# Patient Record
Sex: MALE | Race: WHITE | Employment: FULL TIME | URBAN - METROPOLITAN AREA
[De-identification: names, ages, dates, MRNs, and addresses within clinical notes are randomized per-mention and may not be internally consistent; named-entity substitution may affect disease eponyms.]

---

## 2020-09-08 ENCOUNTER — OP HISTORICAL/CONVERTED ENCOUNTER (OUTPATIENT)
Dept: OTHER | Age: 55
End: 2020-09-08

## 2020-09-17 ENCOUNTER — VIRTUAL VISIT (OUTPATIENT)
Dept: INTERNAL MEDICINE CLINIC | Age: 55
End: 2020-09-17
Payer: COMMERCIAL

## 2020-09-17 DIAGNOSIS — R63.4 WEIGHT LOSS, ABNORMAL: ICD-10-CM

## 2020-09-17 DIAGNOSIS — M62.59 ATROPHY OF MUSCLE OF MULTIPLE SITES: ICD-10-CM

## 2020-09-17 DIAGNOSIS — D64.9 ANEMIA, UNSPECIFIED TYPE: ICD-10-CM

## 2020-09-17 DIAGNOSIS — G72.41 INCLUSION BODY MYOSITIS: Primary | ICD-10-CM

## 2020-09-17 DIAGNOSIS — Z12.11 COLON CANCER SCREENING: ICD-10-CM

## 2020-09-17 DIAGNOSIS — I10 ESSENTIAL HYPERTENSION: ICD-10-CM

## 2020-09-17 DIAGNOSIS — E83.51 HYPOCALCEMIA: ICD-10-CM

## 2020-09-17 DIAGNOSIS — R63.0 POOR APPETITE: ICD-10-CM

## 2020-09-17 PROCEDURE — 99204 OFFICE O/P NEW MOD 45 MIN: CPT | Performed by: INTERNAL MEDICINE

## 2020-09-17 PROCEDURE — 99496 TRANSJ CARE MGMT HIGH F2F 7D: CPT | Performed by: INTERNAL MEDICINE

## 2020-09-17 RX ORDER — PRAVASTATIN SODIUM 20 MG/1
20 TABLET ORAL
COMMUNITY
End: 2020-10-27

## 2020-09-17 RX ORDER — LISINOPRIL 5 MG/1
5 TABLET ORAL DAILY
Qty: 30 TAB | Refills: 1 | Status: SHIPPED | OUTPATIENT
Start: 2020-09-17 | End: 2020-10-27

## 2020-09-17 RX ORDER — FEEDING PUMP
237 EACH MISCELLANEOUS 2 TIMES DAILY
Qty: 14220 ML | Refills: 1 | Status: SHIPPED | OUTPATIENT
Start: 2020-09-17 | End: 2020-11-16

## 2020-09-17 RX ORDER — MIRTAZAPINE 15 MG/1
7.5 TABLET, FILM COATED ORAL
COMMUNITY
End: 2020-10-27

## 2020-09-17 RX ORDER — OXYCODONE HYDROCHLORIDE 15 MG/1
15 TABLET ORAL
COMMUNITY
End: 2020-10-27

## 2020-09-17 RX ORDER — LISINOPRIL 20 MG/1
20 TABLET ORAL DAILY
COMMUNITY
End: 2020-09-17 | Stop reason: ALTCHOICE

## 2020-09-17 RX ORDER — ASPIRIN 81 MG/1
TABLET ORAL DAILY
COMMUNITY
End: 2020-10-27

## 2020-09-17 NOTE — PROGRESS NOTES
Mony Dobbins is a 48 y.o. 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 Jack Blackwell. He has been having episodes of confusion and episode of slurring of speech for which he was taken to the [facility]. [facility] including [facility] and results. He was hospitalized observation unit from 9/8 to 9/10, he had initial CT which showed a possible chronic lacunar infarct, he had CTA head and neck, no acute CVA, he was found to have some hypokalemia, thought to be dehydrated, since he had not been eating much nad has lost a lot of weight very fast.  Echocardiogram was done negative bubble study, no intramural thrombus, there was just mild left ventricular centric hypertrophy, no other findings. He was discharged on aspirin, atorvastatin, and 20 mg of lisinopril. His wife with him on camera tells me that they stopped giving him the lisinopril because his blood pressure went down to 90/52 and he was feeling dizzy. Eating better than before, he's eating 3 times a day small bites, but he says that he does not feel much change in her his appetite since he started taking the Remeron upon discharge from the hospital  He as been home since March working from home 180 lbs in March, 116 lbs now, no nausea, no vomiting. Sleep is ok. He has had a nurse vitis and one OT and PT sessions. He has not been able to be strong to walk since July or so. He says he's not feeling hungry at all. He has been drinking ensure once a twice a week. BP today morning was 146/85. Right now 127/88. Yesterday bp was 125/78, day before yesterday 143/81, day before 153/81, 133/93, 103/66. Never done colonoscopy. Wants to do cologuard    Needs new neurologist, his previous neurologist as per wife is retired.   They say he was at [facility] but he went into the [facility] portal and was not able to find any recent encounter since 2016. He has been following up with physical medicine and rehabilitation, On oxycodone for myositis pain, last visit was yesterday. He denies depression or anxiety. Review of Systems  Review of Systems   Constitutional: Negative for chills, fatigue, fever and unexpected weight change. HENT: Negative for congestion, ear pain, sneezing and sore throat. Eyes: Negative for pain and discharge. Respiratory: Negative for cough, shortness of breath and wheezing. Cardiovascular: Negative for chest pain, palpitations and leg swelling. Gastrointestinal: Negative for abdominal pain, blood in stool, constipation and diarrhea. Endocrine: Negative for polydipsia and polyuria. Genitourinary: Negative for difficulty urinating, dysuria, frequency, hematuria and urgency. Musculoskeletal: Positive for myalgias. Negative for arthralgias, back pain and joint swelling. Skin: Negative for rash. Allergic/Immunologic: Negative for environmental allergies and food allergies. Neurological: Negative for dizziness, speech difficulty, weakness, light-headedness, numbness and headaches. Hematological: Negative for adenopathy. Psychiatric/Behavioral: Negative for behavioral problems (Depression), sleep disturbance and suicidal ideas.           Past Medical History:   Diagnosis Date    Myositis     Familial inclusion      Past Surgical History:   Procedure Laterality Date    HX ORTHOPAEDIC      disc infused, neck      Social History     Socioeconomic History    Marital status:      Spouse name: Not on file    Number of children: Not on file    Years of education: Not on file    Highest education level: Not on file   Tobacco Use    Smoking status: Current Every Day Smoker     Packs/day: 0.50    Smokeless tobacco: Never Used   Substance and Sexual Activity    Alcohol use: Yes     Comment: occ     Family History   Problem Relation Age of Onset    Other Father     Other Sister    [facility] [place] Other Brother      Current Outpatient Medications   Medication Sig Dispense Refill    oxyCODONE IR (OXY-IR) 15 mg immediate release tablet Take 15 mg by mouth every four (4) hours as needed for Pain.  aspirin delayed-release 81 mg tablet Take  by mouth daily.  mirtazapine (REMERON) 15 mg tablet Take 7.5 mg by mouth nightly.  pravastatin (PRAVACHOL) 20 mg tablet Take 20 mg by mouth nightly.  lisinopriL (PRINIVIL, ZESTRIL) 5 mg tablet Take 1 Tab by mouth daily for 60 days. 30 Tab 1    food supplemt, lactose-reduced (Ensure High Protein) liqd Take 237 mL by mouth two (2) times a day for 60 days. 47660 mL 1     No Known Allergies    Objective: There were no vitals taken for this visit. Physical Exam:   Physical Exam  Vitals signs and nursing note reviewed. Constitutional:       Appearance: Normal appearance. HENT:      Head: Normocephalic and atraumatic. Eyes:      General: No scleral icterus. Conjunctiva/sclera: Conjunctivae normal.      Pupils: Pupils are equal, round, and reactive to light. Neck:      Musculoskeletal: Normal range of motion. Skin:     Coloration: Skin is not jaundiced or pale. Findings: No rash. Neurological:      Mental Status: He is alert and oriented to person, place, and time. Psychiatric:         Mood and Affect: Mood normal.         Behavior: Behavior normal.         Thought Content: Thought content normal.         Judgment: Judgment normal.          No results found for this or any previous visit.     Assessment/Plan:    Inclusion body myositis diagnosed in his 30s as mentioned above, progressing, think that lack of appetite has to do with this, he does not have any dysphagia at all, Remeron has not been helping to increase the appetite currently on 7.5 mg we will increase to 50 mg, he has lost more than 70 pounds in less than 6 months, very deconditioned and weak, he benefits from additional protein supplementation, for which I am ordering Ensure, recommended to take twice a day every day. She has already set up physical therapy and Occupational Therapy by home health. I will sign the necessary orders if need be. As part of the labs done in the hospital only remarkable finding calcium mildly low 8.4, probably poor intake, also mild anemia, will recheck CBC and iron profile repeat calcium levels. Check thyroid function and lipid panel as well. For now I recommend not to take pravastatin considering the history of the myositis, and the fact that there is really no clarity on him having had a CVA, seems unlikely. He can continue taking aspirin though. As for the hypertension, lisinopril 20 mg is a high dose, he was having low to borderline blood pressures, so I am reducing it to 5 mg and encouraged him to keep checking the blood pressure and keep diary so that we can review this in our next appointment. They prefer having virtual visits, more convenient for them so we will do our next follow-up that way. Time spent with patient: 40 minutes grater than 50% of which was in coordination of care/counseling        ICD-10-CM ICD-9-CM    1. Inclusion body myositis  G72.41 359.71 REFERRAL TO NEUROLOGY   2. Atrophy of muscle of multiple sites  M62.59 728.2 food supplemt, lactose-reduced (Ensure High Protein) liqd   3. Weight loss, abnormal  R63.4 783.21 food supplemt, lactose-reduced (Ensure High Protein) liqd   4. Poor appetite  R63.0 783.0 food supplemt, lactose-reduced (Ensure High Protein) liqd   5. Essential hypertension  I10 401.9 lisinopriL (PRINIVIL, ZESTRIL) 5 mg tablet      TSH 3RD GENERATION      T4, FREE      LIPID PANEL   6. Hypocalcemia  E83.51 275.41 CALCIUM   7. Anemia, unspecified type  D64.9 285.9 CBC WITH AUTOMATED DIFF   8.  Colon cancer screening  Z12.11 V76.51 COLOGUARD TEST (FECAL DNA COLORECTAL CANCER SCREENING)     Orders Placed This Encounter    COLOGUARD TEST (FECAL DNA COLORECTAL CANCER SCREENING)    CBC WITH AUTOMATED DIFF    TSH 3RD GENERATION    T4, FREE    LIPID PANEL    CALCIUM    REFERRAL TO NEUROLOGY     Referral Priority:   Routine     Referral Type:   Consultation     Referral Reason:   Specialty Services Required     Referred to Provider:   Sarah Castillo MD     Requested Specialty:   Neurology     Number of Visits Requested:   1    DISCONTD: lisinopriL (PRINIVIL, ZESTRIL) 20 mg tablet     Sig: Take 20 mg by mouth daily.  oxyCODONE IR (OXY-IR) 15 mg immediate release tablet     Sig: Take 15 mg by mouth every four (4) hours as needed for Pain.  aspirin delayed-release 81 mg tablet     Sig: Take  by mouth daily.  mirtazapine (REMERON) 15 mg tablet     Sig: Take 7.5 mg by mouth nightly.  pravastatin (PRAVACHOL) 20 mg tablet     Sig: Take 20 mg by mouth nightly.  lisinopriL (PRINIVIL, ZESTRIL) 5 mg tablet     Sig: Take 1 Tab by mouth daily for 60 days. Dispense:  30 Tab     Refill:  1    food supplemt, lactose-reduced (Ensure High Protein) liqd     Sig: Take 237 mL by mouth two (2) times a day for 60 days. Dispense:  25243 mL     Refill:  1     Please send PA request is necessary. Thank you       There are no Patient Instructions on file for this visit. Follow-up and Dispositions    · Return in about 4 weeks (around 10/15/2020) for Virtual visit. f/u HTN, poor appetite, weight loss, inclusion body myositis. Isaías Sen is a 48 y.o. male being evaluated by a Virtual Visit (video visit) encounter to address concerns as mentioned above. A caregiver was present when appropriate. Due to this being a TeleHealth encounter (During WXVEY-38 public health emergency), evaluation of the following organ systems was limited: Vitals/Constitutional/EENT/Resp/CV/GI//MS/Neuro/Skin/Heme-Lymph-Imm.   Pursuant to the emergency declaration under the [facility], [address] [place] [address] and the [employer] and [employer] General Act, this Virtual Visit was conducted with patient's (and/or legal guardian's) consent, to reduce the risk of exposure to COVID-19 and provide necessary medical care. Services were provided through a video synchronous discussion virtually to substitute for in-person encounter. --Day Pantoja MD on 9/18/2020 at 6:41 PM    An electronic signature was used to authenticate this note.

## 2020-09-25 DIAGNOSIS — G72.41 INCLUSION BODY MYOSITIS: Primary | ICD-10-CM

## 2020-09-25 DIAGNOSIS — M62.838 MUSCLE SPASMS OF BOTH LOWER EXTREMITIES: ICD-10-CM

## 2020-09-25 RX ORDER — BACLOFEN 10 MG/1
10 TABLET ORAL 3 TIMES DAILY
Qty: 90 TAB | Refills: 1 | Status: SHIPPED | OUTPATIENT
Start: 2020-09-25 | End: 2020-10-25

## 2020-09-25 NOTE — PROGRESS NOTES
Family called, he's having cramps and spasms, worsening. Start baclofen       ICD-10-CM ICD-9-CM    1. Inclusion body myositis  G72.41 359.71 baclofen (LIORESAL) 10 mg tablet   2.  Muscle spasms of both lower extremities  M62.838 728.85 baclofen (LIORESAL) 10 mg tablet

## 2020-09-26 ENCOUNTER — APPOINTMENT (OUTPATIENT)
Dept: GENERAL RADIOLOGY | Age: 55
DRG: 981 | End: 2020-09-26
Attending: EMERGENCY MEDICINE
Payer: COMMERCIAL

## 2020-09-26 ENCOUNTER — HOSPITAL ENCOUNTER (INPATIENT)
Age: 55
LOS: 31 days | Discharge: HOSPICE | DRG: 981 | End: 2020-10-27
Attending: EMERGENCY MEDICINE | Admitting: INTERNAL MEDICINE
Payer: COMMERCIAL

## 2020-09-26 ENCOUNTER — APPOINTMENT (OUTPATIENT)
Dept: CT IMAGING | Age: 55
DRG: 981 | End: 2020-09-26
Attending: EMERGENCY MEDICINE
Payer: COMMERCIAL

## 2020-09-26 DIAGNOSIS — J69.0 ASPIRATION PNEUMONITIS (HCC): ICD-10-CM

## 2020-09-26 DIAGNOSIS — Z51.5 PALLIATIVE CARE STATUS: ICD-10-CM

## 2020-09-26 DIAGNOSIS — I74.09 ACUTE OCCLUSION OF AORTOILIAC ARTERY (HCC): ICD-10-CM

## 2020-09-26 DIAGNOSIS — M79.605 PAIN IN BOTH LOWER EXTREMITIES: ICD-10-CM

## 2020-09-26 DIAGNOSIS — M79.604 PAIN IN BOTH LOWER EXTREMITIES: ICD-10-CM

## 2020-09-26 DIAGNOSIS — J93.9 PNEUMOTHORAX ON RIGHT: ICD-10-CM

## 2020-09-26 DIAGNOSIS — J93.9 PNEUMOTHORAX ON LEFT: ICD-10-CM

## 2020-09-26 DIAGNOSIS — G45.9 TIA (TRANSIENT ISCHEMIC ATTACK): ICD-10-CM

## 2020-09-26 DIAGNOSIS — G93.40 ENCEPHALOPATHY ACUTE: Primary | ICD-10-CM

## 2020-09-26 DIAGNOSIS — R07.9 CHEST PAIN, UNSPECIFIED TYPE: ICD-10-CM

## 2020-09-26 PROBLEM — I10 ESSENTIAL HYPERTENSION: Status: ACTIVE | Noted: 2020-09-26

## 2020-09-26 PROBLEM — N39.0 UTI (URINARY TRACT INFECTION): Status: ACTIVE | Noted: 2020-09-26

## 2020-09-26 PROBLEM — E86.0 ACUTE DEHYDRATION: Status: ACTIVE | Noted: 2020-09-26

## 2020-09-26 PROBLEM — G93.41 METABOLIC ENCEPHALOPATHY: Status: ACTIVE | Noted: 2020-09-26

## 2020-09-26 LAB
ALBUMIN SERPL-MCNC: 2.1 G/DL (ref 3.5–5)
ALBUMIN/GLOB SERPL: 0.6 {RATIO} (ref 1.1–2.2)
ALP SERPL-CCNC: 373 U/L (ref 45–117)
ALT SERPL-CCNC: 29 U/L (ref 12–78)
ANION GAP SERPL CALC-SCNC: 11 MMOL/L (ref 5–15)
APPEARANCE UR: CLEAR
APTT PPP: 46.4 SEC (ref 23–35.7)
APTT PPP: 55.4 SEC (ref 23–35.7)
ARTERIAL PATENCY WRIST A: ABNORMAL
AST SERPL W P-5'-P-CCNC: 68 U/L (ref 15–37)
ATRIAL RATE: 104 BPM
BACTERIA URNS QL MICRO: ABNORMAL /HPF
BASE DEFICIT BLDA-SCNC: 10.3 MMOL/L (ref 0–2)
BASOPHILS # BLD: 0 K/UL (ref 0–0.1)
BASOPHILS NFR BLD: 0 % (ref 0–1)
BDY SITE: ABNORMAL
BILIRUB SERPL-MCNC: 0.5 MG/DL (ref 0.2–1)
BILIRUB UR QL: ABNORMAL
BUN SERPL-MCNC: 10 MG/DL (ref 6–20)
BUN/CREAT SERPL: 16 (ref 12–20)
CA-I BLD-MCNC: 7.7 MG/DL (ref 8.5–10.1)
CALCULATED R AXIS, ECG10: 77 DEGREES
CALCULATED T AXIS, ECG11: -66 DEGREES
CHLORIDE SERPL-SCNC: 110 MMOL/L (ref 97–108)
CK MB CFR SERPL CALC: 3.5 %
CK MB SERPL-MCNC: 24.8 NG/ML (ref 5–25)
CK SERPL-CCNC: 700 NG/ML (ref 39–308)
CO2 SERPL-SCNC: 17 MMOL/L (ref 21–32)
COHGB MFR BLD: 0.9 % (ref 0–3)
COLOR UR: ABNORMAL
CREAT SERPL-MCNC: 0.61 MG/DL (ref 0.7–1.3)
D DIMER PPP FEU-MCNC: 0.99 UG/ML(FEU)
DIAGNOSIS, 93000: NORMAL
DIFFERENTIAL METHOD BLD: ABNORMAL
EOSINOPHIL # BLD: 0 K/UL (ref 0–0.4)
EOSINOPHIL NFR BLD: 0 % (ref 0–7)
ERYTHROCYTE [DISTWIDTH] IN BLOOD BY AUTOMATED COUNT: 16.6 % (ref 11.5–14.5)
FIBRINOGEN PPP-MCNC: 479 MG/DL (ref 220–535)
GAS FLOW.O2 O2 DELIVERY SYS: 6 L/MIN
GLOBULIN SER CALC-MCNC: 3.4 G/DL (ref 2–4)
GLUCOSE SERPL-MCNC: 87 MG/DL (ref 65–100)
GLUCOSE UR STRIP.AUTO-MCNC: NEGATIVE MG/DL
HCO3 BLDA-SCNC: 16 MMOL/L (ref 22–26)
HCT VFR BLD AUTO: 31.6 % (ref 36.6–50.3)
HGB BLD OXIMETRY-MCNC: 11.3 G/DL (ref 12–16)
HGB BLD-MCNC: 10.8 % (ref 12.1–17)
HGB UR QL STRIP: ABNORMAL
HYALINE CASTS URNS QL MICRO: ABNORMAL /LPF (ref 0–5)
IMM GRANULOCYTES # BLD AUTO: 0.1 K/UL (ref 0–0.04)
IMM GRANULOCYTES NFR BLD AUTO: 2 % (ref 0–0.5)
INR PPP: 2.4 (ref 0.9–1.1)
INR PPP: 2.6 (ref 0.9–1.1)
KETONES UR QL STRIP.AUTO: 20 MG/DL
LEUKOCYTE ESTERASE UR QL STRIP.AUTO: NEGATIVE
LYMPHOCYTES # BLD: 0.8 K/UL (ref 0.8–3.5)
LYMPHOCYTES NFR BLD: 10 % (ref 12–49)
MCH RBC QN AUTO: 32.3 PG (ref 26–34)
MCHC RBC AUTO-ENTMCNC: 34.2 G/DL (ref 30–36.5)
MCV RBC AUTO: 94.6 FL (ref 80–99)
METHGB MFR BLD: 0.6 % (ref 0–3)
MONOCYTES # BLD: 0.3 K/UL (ref 0–1)
MONOCYTES NFR BLD: 3 % (ref 5–13)
MUCOUS THREADS URNS QL MICRO: ABNORMAL /LPF
NEUTS SEG # BLD: 6.9 K/UL (ref 1.8–8)
NEUTS SEG NFR BLD: 85 % (ref 32–75)
NITRITE UR QL STRIP.AUTO: NEGATIVE
OXYHGB MFR BLD: 98 % (ref 0–3)
P-R INTERVAL, ECG05: 134 MS
PCO2 BLDA: 18 MMHG (ref 35–45)
PH BLDA: 7.43 [PH] (ref 7.35–7.45)
PH UR STRIP: 5 [PH] (ref 5–8)
PLATELET # BLD AUTO: 609 K/UL (ref 150–400)
PMV BLD AUTO: 8.8 FL (ref 8.9–12.9)
PO2 BLDA: 134 MMHG (ref 75–100)
POTASSIUM SERPL-SCNC: 3.8 MMOL/L (ref 3.5–5.1)
PROT SERPL-MCNC: 5.5 G/DL (ref 6.4–8.2)
PROT UR STRIP-MCNC: 30 MG/DL
PROTHROMBIN TIME: 25.9 SEC (ref 11.9–14.7)
PROTHROMBIN TIME: 27.1 SEC (ref 11.9–14.7)
Q-T INTERVAL, ECG07: 392 MS
QRS DURATION, ECG06: 76 MS
QTC CALCULATION (BEZET), ECG08: 515 MS
RBC # BLD AUTO: 3.34 M/UL (ref 4.1–5.7)
RBC #/AREA URNS HPF: ABNORMAL /HPF (ref 0–5)
SAO2 % BLD: 100 %
SAO2% DEVICE SAO2% SENSOR NAME: ABNORMAL
SODIUM SERPL-SCNC: 138 MMOL/L (ref 136–145)
SP GR UR REFRACTOMETRY: 1.03 (ref 1–1.03)
THERAPEUTIC RANGE,PTTT: ABNORMAL SEC (ref 68–109)
THERAPEUTIC RANGE,PTTT: ABNORMAL SEC (ref 68–109)
TROPONIN I SERPL-MCNC: <0.05 NG/ML
UROBILINOGEN UR QL STRIP.AUTO: 4 EU/DL (ref 0.1–1)
VENTRICULAR RATE, ECG03: 104 BPM
WBC # BLD AUTO: 8 K/UL (ref 4.1–11.1)
WBC URNS QL MICRO: ABNORMAL /HPF (ref 0–4)

## 2020-09-26 PROCEDURE — 85730 THROMBOPLASTIN TIME PARTIAL: CPT

## 2020-09-26 PROCEDURE — 74011250636 HC RX REV CODE- 250/636: Performed by: INTERNAL MEDICINE

## 2020-09-26 PROCEDURE — 74011250636 HC RX REV CODE- 250/636: Performed by: EMERGENCY MEDICINE

## 2020-09-26 PROCEDURE — 85610 PROTHROMBIN TIME: CPT

## 2020-09-26 PROCEDURE — 87077 CULTURE AEROBIC IDENTIFY: CPT

## 2020-09-26 PROCEDURE — 5A09357 ASSISTANCE WITH RESPIRATORY VENTILATION, LESS THAN 24 CONSECUTIVE HOURS, CONTINUOUS POSITIVE AIRWAY PRESSURE: ICD-10-PCS | Performed by: INTERNAL MEDICINE

## 2020-09-26 PROCEDURE — 87086 URINE CULTURE/COLONY COUNT: CPT

## 2020-09-26 PROCEDURE — 65610000006 HC RM INTENSIVE CARE

## 2020-09-26 PROCEDURE — 87186 SC STD MICRODIL/AGAR DIL: CPT

## 2020-09-26 PROCEDURE — 94640 AIRWAY INHALATION TREATMENT: CPT

## 2020-09-26 PROCEDURE — 85384 FIBRINOGEN ACTIVITY: CPT

## 2020-09-26 PROCEDURE — 85379 FIBRIN DEGRADATION QUANT: CPT

## 2020-09-26 PROCEDURE — 70450 CT HEAD/BRAIN W/O DYE: CPT

## 2020-09-26 PROCEDURE — 36415 COLL VENOUS BLD VENIPUNCTURE: CPT

## 2020-09-26 PROCEDURE — 99284 EMERGENCY DEPT VISIT MOD MDM: CPT

## 2020-09-26 PROCEDURE — 74011000258 HC RX REV CODE- 258: Performed by: NURSE PRACTITIONER

## 2020-09-26 PROCEDURE — 74011250636 HC RX REV CODE- 250/636

## 2020-09-26 PROCEDURE — 87635 SARS-COV-2 COVID-19 AMP PRB: CPT

## 2020-09-26 PROCEDURE — 96374 THER/PROPH/DIAG INJ IV PUSH: CPT

## 2020-09-26 PROCEDURE — 84484 ASSAY OF TROPONIN QUANT: CPT

## 2020-09-26 PROCEDURE — 74011000258 HC RX REV CODE- 258: Performed by: INTERNAL MEDICINE

## 2020-09-26 PROCEDURE — 81001 URINALYSIS AUTO W/SCOPE: CPT

## 2020-09-26 PROCEDURE — 71045 X-RAY EXAM CHEST 1 VIEW: CPT

## 2020-09-26 PROCEDURE — 82553 CREATINE MB FRACTION: CPT

## 2020-09-26 PROCEDURE — 93005 ELECTROCARDIOGRAM TRACING: CPT

## 2020-09-26 PROCEDURE — 74011000250 HC RX REV CODE- 250: Performed by: NURSE PRACTITIONER

## 2020-09-26 PROCEDURE — 96375 TX/PRO/DX INJ NEW DRUG ADDON: CPT

## 2020-09-26 PROCEDURE — 74011000250 HC RX REV CODE- 250: Performed by: INTERNAL MEDICINE

## 2020-09-26 PROCEDURE — 82803 BLOOD GASES ANY COMBINATION: CPT

## 2020-09-26 PROCEDURE — 51702 INSERT TEMP BLADDER CATH: CPT

## 2020-09-26 PROCEDURE — 82550 ASSAY OF CK (CPK): CPT

## 2020-09-26 PROCEDURE — 74011250636 HC RX REV CODE- 250/636: Performed by: NURSE PRACTITIONER

## 2020-09-26 PROCEDURE — 80053 COMPREHEN METABOLIC PANEL: CPT

## 2020-09-26 PROCEDURE — 85025 COMPLETE CBC W/AUTO DIFF WBC: CPT

## 2020-09-26 RX ORDER — CALCIUM GLUCONATE 20 MG/ML
1 INJECTION, SOLUTION INTRAVENOUS ONCE
Status: COMPLETED | OUTPATIENT
Start: 2020-09-26 | End: 2020-09-26

## 2020-09-26 RX ORDER — LORAZEPAM 2 MG/ML
INJECTION INTRAMUSCULAR
Status: COMPLETED
Start: 2020-09-26 | End: 2020-09-26

## 2020-09-26 RX ORDER — LISINOPRIL 10 MG/1
5 TABLET ORAL DAILY
Status: DISCONTINUED | OUTPATIENT
Start: 2020-09-27 | End: 2020-09-27

## 2020-09-26 RX ORDER — ONDANSETRON 2 MG/ML
4 INJECTION INTRAMUSCULAR; INTRAVENOUS
Status: DISCONTINUED | OUTPATIENT
Start: 2020-09-26 | End: 2020-10-26

## 2020-09-26 RX ORDER — SODIUM CHLORIDE 9 MG/ML
100 INJECTION, SOLUTION INTRAVENOUS CONTINUOUS
Status: DISCONTINUED | OUTPATIENT
Start: 2020-09-26 | End: 2020-09-29

## 2020-09-26 RX ORDER — ACETAMINOPHEN 325 MG/1
650 TABLET ORAL
Status: DISCONTINUED | OUTPATIENT
Start: 2020-09-26 | End: 2020-10-27 | Stop reason: HOSPADM

## 2020-09-26 RX ORDER — SODIUM BICARBONATE 1 MEQ/ML
50 SYRINGE (ML) INTRAVENOUS ONCE
Status: COMPLETED | OUTPATIENT
Start: 2020-09-26 | End: 2020-09-26

## 2020-09-26 RX ORDER — MAGNESIUM SULFATE HEPTAHYDRATE 40 MG/ML
2 INJECTION, SOLUTION INTRAVENOUS
Status: COMPLETED | OUTPATIENT
Start: 2020-09-26 | End: 2020-09-26

## 2020-09-26 RX ORDER — ASPIRIN 81 MG/1
81 TABLET ORAL DAILY
Status: DISCONTINUED | OUTPATIENT
Start: 2020-09-27 | End: 2020-10-18

## 2020-09-26 RX ORDER — GUAIFENESIN 100 MG/5ML
400 SOLUTION ORAL
Status: DISCONTINUED | OUTPATIENT
Start: 2020-09-26 | End: 2020-10-26

## 2020-09-26 RX ORDER — IPRATROPIUM BROMIDE AND ALBUTEROL SULFATE 2.5; .5 MG/3ML; MG/3ML
3 SOLUTION RESPIRATORY (INHALATION)
Status: COMPLETED | OUTPATIENT
Start: 2020-09-26 | End: 2020-09-26

## 2020-09-26 RX ORDER — SODIUM BICARBONATE 1 MEQ/ML
100 SYRINGE (ML) INTRAVENOUS ONCE
Status: COMPLETED | OUTPATIENT
Start: 2020-09-26 | End: 2020-09-26

## 2020-09-26 RX ORDER — IPRATROPIUM BROMIDE AND ALBUTEROL SULFATE 2.5; .5 MG/3ML; MG/3ML
3 SOLUTION RESPIRATORY (INHALATION)
Status: DISCONTINUED | OUTPATIENT
Start: 2020-09-26 | End: 2020-09-26

## 2020-09-26 RX ORDER — LORAZEPAM 2 MG/ML
1 INJECTION INTRAMUSCULAR
Status: COMPLETED | OUTPATIENT
Start: 2020-09-26 | End: 2020-09-26

## 2020-09-26 RX ADMIN — PIPERACILLIN SODIUM AND TAZOBACTAM SODIUM 3.38 G: 3; .375 INJECTION, POWDER, LYOPHILIZED, FOR SOLUTION INTRAVENOUS at 19:25

## 2020-09-26 RX ADMIN — SODIUM BICARBONATE 100 MEQ: 84 INJECTION, SOLUTION INTRAVENOUS at 19:25

## 2020-09-26 RX ADMIN — SODIUM CHLORIDE 1000 ML: 9 INJECTION, SOLUTION INTRAVENOUS at 14:16

## 2020-09-26 RX ADMIN — LORAZEPAM 1 MG: 2 INJECTION INTRAMUSCULAR at 17:55

## 2020-09-26 RX ADMIN — METHYLPREDNISOLONE SODIUM SUCCINATE 125 MG: 125 INJECTION, POWDER, FOR SOLUTION INTRAMUSCULAR; INTRAVENOUS at 17:55

## 2020-09-26 RX ADMIN — LORAZEPAM 1 MG: 2 INJECTION, SOLUTION INTRAMUSCULAR; INTRAVENOUS at 17:55

## 2020-09-26 RX ADMIN — IPRATROPIUM BROMIDE AND ALBUTEROL SULFATE 3 ML: .5; 3 SOLUTION RESPIRATORY (INHALATION) at 17:55

## 2020-09-26 RX ADMIN — SODIUM BICARBONATE 50 MEQ: 84 INJECTION, SOLUTION INTRAVENOUS at 18:53

## 2020-09-26 RX ADMIN — CALCIUM GLUCONATE 1000 MG: 20 INJECTION, SOLUTION INTRAVENOUS at 19:24

## 2020-09-26 RX ADMIN — CEFTRIAXONE SODIUM 1 G: 1 INJECTION, POWDER, FOR SOLUTION INTRAMUSCULAR; INTRAVENOUS at 17:55

## 2020-09-26 RX ADMIN — SODIUM BICARBONATE 50 MEQ: 84 INJECTION, SOLUTION INTRAVENOUS at 18:55

## 2020-09-26 RX ADMIN — SODIUM CHLORIDE 1000 ML: 9 INJECTION, SOLUTION INTRAVENOUS at 14:41

## 2020-09-26 RX ADMIN — SODIUM CHLORIDE 100 ML/HR: 9 INJECTION, SOLUTION INTRAVENOUS at 18:12

## 2020-09-26 RX ADMIN — MAGNESIUM SULFATE HEPTAHYDRATE 2 G: 40 INJECTION, SOLUTION INTRAVENOUS at 19:24

## 2020-09-26 NOTE — Clinical Note
TRANSFER - OUT REPORT:     Verbal report given to: Zara. Report consisted of patient's Situation, Background, Assessment and   Recommendations(SBAR). Opportunity for questions and clarification was provided. Patient transported with a Registered Nurse. Patient transported to: PACU.

## 2020-09-26 NOTE — ED TRIAGE NOTES
Family tried to wake him at 11am, pt is altered, confused, does not answer questions consistently, family reports this is not normal for him

## 2020-09-26 NOTE — ED NOTES
This RN to bedside with charge nurse. Pt wife reports that pt suddenly awoke with SOB and respiratory distress. This RN called NP Kibot to bedside. Lungs audibly wet. Pt appears to have aspirated. Orders obtained. Medicated per order. Will continue to monitor.

## 2020-09-26 NOTE — ED PROVIDER NOTES
HPI   Chief Complaint   Patient presents with    Altered mental status     52yoM with hx inclusion body myositis and lacunar CVA presents with altered mental status. Pt is unable to give history due to being non-verbal currently. Wife reports normally he is bed ridden but he can verbalize, is A&Ox2 at least and can eat. Since yesterday afternoon he has been non-verbal, not eating. She reports he was recently started on baclofen but he has only taken one dose. She denies finding recent fevers, cough, n/v/d in him. She denies that he takes any blood thinner, alcohol and illicit drugs.    Past Medical History:   Diagnosis Date    Myositis     Familial inclusion        Past Surgical History:   Procedure Laterality Date    HX ORTHOPAEDIC      disc infused, neck          Family History:   Problem Relation Age of Onset    Other Father     Other Sister     Other Brother        Social History     Socioeconomic History    Marital status:      Spouse name: Not on file    Number of children: Not on file    Years of education: Not on file    Highest education level: Not on file   Occupational History    Not on file   Social Needs    Financial resource strain: Not on file    Food insecurity     Worry: Not on file     Inability: Not on file    Transportation needs     Medical: Not on file     Non-medical: Not on file   Tobacco Use    Smoking status: Current Every Day Smoker     Packs/day: 0.50    Smokeless tobacco: Never Used   Substance and Sexual Activity    Alcohol use: Yes     Comment: occ    Drug use: Not on file    Sexual activity: Not on file   Lifestyle    Physical activity     Days per week: Not on file     Minutes per session: Not on file    Stress: Not on file   Relationships    Social connections     Talks on phone: Not on file     Gets together: Not on file     Attends Muslim service: Not on file     Active member of club or organization: Not on file     Attends meetings of clubs or organizations: Not on file     Relationship status: Not on file    Intimate partner violence     Fear of current or ex partner: Not on file     Emotionally abused: Not on file     Physically abused: Not on file     Forced sexual activity: Not on file   Other Topics Concern    Not on file   Social History Narrative    Not on file         ALLERGIES: Patient has no known allergies. Review of Systems   Unable to perform ROS: Patient nonverbal       Vitals:    09/26/20 1249 09/26/20 1418   BP: (!) 142/82 (!) 146/77   Pulse: (!) 104 (!) 102   Resp: 18 28   Temp: 97.5 °F (36.4 °C)    SpO2: 94% 100%   Weight: 52.6 kg (116 lb)    Height: 6' (1.829 m)             Physical Exam   Patient Vitals for the past 8 hrs:   Temp Pulse Resp BP SpO2   09/26/20 1418  (!) 102 28 (!) 146/77 100 %   09/26/20 1249 97.5 °F (36.4 °C) (!) 104 18 (!) 142/82 94 %        Nursing note and vitals reviewed. Constitutional: NAD. Very thin. Head: Normocephalic and atraumatic. Mouth/Throat: Airway patent. Dry mucous membranes. Eyes: EOMI. 3mm pupils equal and reactive. No scleral icterus. Neck: Neck supple. Cardiovascular: Tachy rate, regular rhythm. Normal heart sounds. No murmur, rub, or gallop. Good pulses throughout. Pulmonary/Chest: No respiratory distress. Clear to auscultation bilaterally. No wheezes, rales, or rhonchi. Abdominal/GI: BS normal, Soft, non-tender, non-distended. No rebound or guarding. Musculoskeletal: No gross injuries or bony deformities. BLE atrophied. Neurological: Alert but non-verbal. No facial droop. Moving BUE spontaneously. Eyes tracking around room. Not following commands. Very fine tremors diffusely but no asterixis. Lymph: No lymphadenopathy. Skin: Skin is warm and dry. No rash noted. MDM   Ddx = CVA, ICH, metabolic disarray, dehydration, pneumonia, UTI, ACS. EKG at 14:00 read by me showing sinus tachycardia rate 104, many motion artefacts, but no STEMI.  Possible diffuse T wave inversions vs motion artefacts. CT head unremarkable. Expect admission to hospitalist for encephalopathy. Labs Reviewed   CBC WITH AUTOMATED DIFF - Abnormal; Notable for the following components:       Result Value    RBC 3.34 (*)     HGB 10.8 (*)     HCT 31.6 (*)     RDW 16.6 (*)     PLATELET 188 (*)     MPV 8.8 (*)     NEUTROPHILS 85 (*)     LYMPHOCYTES 10 (*)     MONOCYTES 3 (*)     IMMATURE GRANULOCYTES 2 (*)     ABS. IMM. GRANS. 0.1 (*)     All other components within normal limits   METABOLIC PANEL, COMPREHENSIVE - Abnormal; Notable for the following components:    Chloride 110 (*)     CO2 17 (*)     Creatinine 0.61 (*)     Calcium 7.7 (*)     AST (SGOT) 68 (*)     Alk. phosphatase 373 (*)     Protein, total 5.5 (*)     Albumin 2.1 (*)     A-G Ratio 0.6 (*)     All other components within normal limits   CK W/ REFLX CKMB - Abnormal; Notable for the following components:    .0 (*)     All other components within normal limits   PROTHROMBIN TIME + INR - Abnormal; Notable for the following components:    Prothrombin time 25.9 (*)     INR 2.4 (*)     All other components within normal limits   PTT - Abnormal; Notable for the following components:    aPTT 55.4 (*)     All other components within normal limits   URINALYSIS W/MICROSCOPIC - Abnormal; Notable for the following components:    Protein 30 (*)     Ketone 20 (*)     Bilirubin Small (*)     Blood Moderate (*)     Urobilinogen 4.0 (*)     Bacteria 1+ (*)     Mucus 1+ (*)     All other components within normal limits   TROPONIN I   CK-MB,QUANT. CT HEAD WO CONT   Final Result   Impression: Unremarkable head CT without contrast.  No infarct, mass, or    hemorrhage. Please see full report. XR CHEST SNGL V   Final Result   Impression: No diagnostic abnormality.               Medications   sodium chloride 0.9 % bolus infusion 1,000 mL (0 mL IntraVENous IV Completed 9/26/20 7912)     Followed by   sodium chloride 0.9 % bolus infusion 1,000 mL (1,000 mL IntraVENous New Bag 9/26/20 1441)            Procedures             4:57 PM  admit Molly Leyden.  stable

## 2020-09-26 NOTE — H&P
Hospitalist           History and Physical Note: 9/26/2020      Subjective:   Patient is a 81-year-old male with history of familial polymyositis, TIA, and hypertension who was brought into the emergency department this afternoon due to weakness and lethargy which started about 3 days ago and progressed. Of note this patient is wheelchair-bound due to his musculoskeletal disease. According to his wife over the past 2 weeks he was experiencing progressive weakness which prompted his primary care doctor to order physical and occupational therapy which he was getting at home. He also complained of bilateral lower extremity pain so his primary care doctor started him on baclofen which he took the first dose  yesterday evening and this morning he was very drowsy which prompted his wife to call EMS and was brought to the emergency department. His appetite noted to be poor over the past 3 days with littile to eat or drink. Physical exam in ER suggestive of dehydration, urinalysis shows hematuria suspicious for UTI, he is afebrile and WBCs normal.      While he was laying flat in ER he he aspirated on his own saliva and developed some wheezing with cough. Chest x-ray on presentation was clear. It was also reported that he had altered mental status but during my evaluation he knew where he was he knew his name he knew he was in the hospital because he was not feeling well. He will be admitted for further management .     Problem List:  Patient Active Problem List   Diagnosis Code    Metabolic encephalopathy H73.47    UTI (urinary tract infection) N39.0    Aspiration pneumonitis (HCC) J69.0    Essential hypertension I10    Acute dehydration E86.0         Medications reviewed  Current Facility-Administered Medications   Medication Dose Route Frequency    cefTRIAXone (ROCEPHIN) 1 g in 0.9% sodium chloride (MBP/ADV) 50 mL MBP  1 g IntraVENous Q24H    0.9% sodium chloride infusion  100 mL/hr IntraVENous CONTINUOUS    [START ON 9/27/2020] lisinopriL (PRINIVIL, ZESTRIL) tablet 5 mg  5 mg Oral DAILY    [START ON 9/27/2020] aspirin delayed-release tablet 81 mg  81 mg Oral DAILY    acetaminophen (TYLENOL) tablet 650 mg  650 mg Oral Q6H PRN    ondansetron (ZOFRAN) injection 4 mg  4 mg IntraVENous Q8H PRN    guaiFENesin (ROBITUSSIN) 20 mg/mL oral liquid 400 mg  400 mg Oral Q6H PRN    [START ON 9/27/2020] methylPREDNISolone (PF) (SOLU-MEDROL) injection 40 mg  40 mg IntraVENous Q8H    sodium bicarbonate 8.4 % (1 mEq/mL) injection 50 mEq  50 mEq IntraVENous ONCE     Current Outpatient Medications   Medication Sig    baclofen (LIORESAL) 10 mg tablet Take 1 Tab by mouth three (3) times daily for 30 days.  oxyCODONE IR (OXY-IR) 15 mg immediate release tablet Take 15 mg by mouth every four (4) hours as needed for Pain.  aspirin delayed-release 81 mg tablet Take  by mouth daily.  mirtazapine (REMERON) 15 mg tablet Take 7.5 mg by mouth nightly.  pravastatin (PRAVACHOL) 20 mg tablet Take 20 mg by mouth nightly.  lisinopriL (PRINIVIL, ZESTRIL) 5 mg tablet Take 1 Tab by mouth daily for 60 days.  food supplemt, lactose-reduced (Ensure High Protein) liqd Take 237 mL by mouth two (2) times a day for 60 days. Review of Systems:   Review of Systems   Constitutional: Negative for chills, diaphoresis, fever, malaise/fatigue and weight loss. HENT: Negative for ear discharge, ear pain, hearing loss, nosebleeds, sinus pain and tinnitus. Respiratory: Positive for cough and wheezing. Negative for hemoptysis, sputum production and shortness of breath. Cardiovascular: Negative for chest pain, palpitations, orthopnea, claudication and leg swelling. Gastrointestinal: Negative for abdominal pain, constipation, diarrhea, heartburn, nausea and vomiting. Genitourinary: Negative for dysuria, flank pain, frequency, hematuria and urgency.    Musculoskeletal: Negative for back pain, falls, joint pain, myalgias and neck pain. Bilateral lower extremity weakness he is wheelchair-bound   Neurological: Positive for weakness. Negative for dizziness, tingling, focal weakness, seizures and headaches. Psychiatric/Behavioral: Negative for depression, hallucinations, memory loss, substance abuse and suicidal ideas. The patient is not nervous/anxious. Objective:   Physical Exam  Constitutional:       General: He is not in acute distress. Appearance: Normal appearance. He is normal weight. HENT:      Head: Normocephalic and atraumatic. Mouth/Throat:      Mouth: Mucous membranes are moist.      Pharynx: Oropharynx is clear. Eyes:      Pupils: Pupils are equal, round, and reactive to light. Neck:      Musculoskeletal: No neck rigidity. Cardiovascular:      Rate and Rhythm: Normal rate and regular rhythm. Heart sounds: No murmur. No friction rub. No gallop. Pulmonary:      Effort: Pulmonary effort is normal. No respiratory distress. Breath sounds: Wheezing present. No rales. Abdominal:      General: Bowel sounds are normal. There is no distension. Palpations: Abdomen is soft. Tenderness: There is no abdominal tenderness. There is no rebound. Musculoskeletal: Normal range of motion. General: No swelling, tenderness, deformity or signs of injury. Comments: Bilateral lower extremity weakness wheelchair-bound   Skin:     General: Skin is warm and dry. Coloration: Skin is not jaundiced. Findings: No bruising, erythema or rash. Neurological:      General: No focal deficit present. Mental Status: He is alert and oriented to person, place, and time. Sensory: No sensory deficit. Motor: No weakness. Gait: Gait normal.   Psychiatric:         Mood and Affect: Mood normal.         Behavior: Behavior normal.         Thought Content:  Thought content normal.          Visit Vitals  BP (!) 146/77 (BP 1 Location: Left arm)   Pulse (!) 102   Temp 97.5 °F (36.4 °C)   Resp 28   Ht 6' (1.829 m)   Wt 52.6 kg (116 lb)   SpO2 100%   BMI 15.73 kg/m²          Data Review:       Recent Days:  Recent Labs     09/26/20  1338   WBC 8.0   HGB 10.8*   HCT 31.6*   *     Recent Labs     09/26/20  1338      K 3.8   *   CO2 17*   GLU 87   BUN 10   CREA 0.61*   CA 7.7*   ALB 2.1*   TBILI 0.5   ALT 29   INR 2.4*     @LABRCNT(ph:3,pco2:3,po2:3,hco3:3,fio2:3)    Past Surgical History:   Procedure Laterality Date    HX ORTHOPAEDIC      disc infused, neck         Family History   Problem Relation Age of Onset    Other Father    [facility] Other Sister     Other Brother         Past Medical History:   Diagnosis Date    Myositis     Familial inclusion         Social History     Tobacco Use    Smoking status: Current Every Day Smoker     Packs/day: 0.50    Smokeless tobacco: Never Used   Substance Use Topics    Alcohol use: Yes     Comment: occ    Drug use: Not on file      Assessment/Plan     1. Acute metabolic encephalopathy  Secondary to dehydration and urinary tract infection  CT of brain shows no acute intracranial abnormalities  Urinalysis positive for hematuria  We will treat underlying causes by   start IV fluids for gentle rehydration and ceftriaxone for UTI    2. Metabolic acidosis  Likely aggravated by dehydration  Serum CO2 17  Will give 50 mEq of sodium bicarbonate once  Hydrate with IV fluids overnight. 3.  Acute dehydration  Secondary to poor oral intake  Start normal saline for gentle rehydration. 4.  Weakness complicated by Ari polymyositis  He is wheelchair-bound  Continue supportive care    5. Acute urinary tract infection  Urinalysis positive for hematuria  We will get a urine culture and start ceftriaxone. 6.  Benign essential hypertension  Restart home dose of lisinopril.     7.  Aspiration pneumonitis  Chest x-ray is clear, afebrile  Will downgrade his diet to NPO and consult speech therapist evaluate  Start ceftriaxone, Solu-Medrol, duo nebs, Mucinex as needed for cough    CBC, BMP in a.m. Heparin for DVT prophylaxis  Full code    Surrogate decision maker is his wife who was at bedside during my evaluation this evening. Care Plan discussed with: Patient/Family    Total time spent with patient: 50 minutes. Naina Rodgers NP     Patient seen in collaboration with nurse practitioner. Noted with increased work of breathing. Arterial blood gases ordered and patient placed on BiPAP. Patient will eventually need intubation. I have updated wife at bedside about clinical deterioration. ED physician Dr. Asiya Parada notified and we all reexamine the patient together. She will follow-up on arterial blood gases and decide on intubation.   Patient will be admitted to the intensive care unit post  reevaluation

## 2020-09-27 ENCOUNTER — APPOINTMENT (OUTPATIENT)
Dept: GENERAL RADIOLOGY | Age: 55
DRG: 981 | End: 2020-09-27
Attending: INTERNAL MEDICINE
Payer: COMMERCIAL

## 2020-09-27 ENCOUNTER — APPOINTMENT (OUTPATIENT)
Dept: CT IMAGING | Age: 55
DRG: 981 | End: 2020-09-27
Attending: INTERNAL MEDICINE
Payer: COMMERCIAL

## 2020-09-27 LAB
ANION GAP SERPL CALC-SCNC: 10 MMOL/L (ref 5–15)
ARTERIAL PATENCY WRIST A: ABNORMAL
BASE DEFICIT BLDA-SCNC: 4.2 MMOL/L (ref 0–2)
BASOPHILS # BLD: 0 K/UL (ref 0–0.1)
BASOPHILS NFR BLD: 0 % (ref 0–1)
BDY SITE: ABNORMAL
BUN SERPL-MCNC: 11 MG/DL (ref 6–20)
BUN/CREAT SERPL: 20 (ref 12–20)
CA-I BLD-MCNC: 7.1 MG/DL (ref 8.5–10.1)
CHLORIDE SERPL-SCNC: 113 MMOL/L (ref 97–108)
CO2 SERPL-SCNC: 23 MMOL/L (ref 21–32)
COHGB MFR BLD: 0.9 % (ref 0–3)
CREAT SERPL-MCNC: 0.55 MG/DL (ref 0.7–1.3)
DIFFERENTIAL METHOD BLD: ABNORMAL
EOSINOPHIL # BLD: 0 K/UL (ref 0–0.4)
EOSINOPHIL NFR BLD: 0 % (ref 0–7)
EPAP/CPAP/PEEP, PAPEEP: 5
ERYTHROCYTE [DISTWIDTH] IN BLOOD BY AUTOMATED COUNT: 16.9 % (ref 11.5–14.5)
FIO2 ON VENT: 50 %
GAS FLOW.O2 SETTING OXYMISER: 14 L/MIN
GLUCOSE SERPL-MCNC: 92 MG/DL (ref 65–100)
HCO3 BLDA-SCNC: 21 MMOL/L (ref 22–26)
HCT VFR BLD AUTO: 29.2 % (ref 36.6–50.3)
HGB BLD OXIMETRY-MCNC: 9.8 G/DL (ref 12–16)
HGB BLD-MCNC: 10 % (ref 12.1–17)
IMM GRANULOCYTES # BLD AUTO: 0.1 K/UL (ref 0–0.04)
IMM GRANULOCYTES NFR BLD AUTO: 1 % (ref 0–0.5)
LYMPHOCYTES # BLD: 0.7 K/UL (ref 0.8–3.5)
LYMPHOCYTES NFR BLD: 7 % (ref 12–49)
MCH RBC QN AUTO: 32.6 PG (ref 26–34)
MCHC RBC AUTO-ENTMCNC: 34.2 G/DL (ref 30–36.5)
MCV RBC AUTO: 95.1 FL (ref 80–99)
METHGB MFR BLD: 1 % (ref 0–3)
MONOCYTES # BLD: 0.3 K/UL (ref 0–1)
MONOCYTES NFR BLD: 3 % (ref 5–13)
NEUTS SEG # BLD: 9.3 K/UL (ref 1.8–8)
NEUTS SEG NFR BLD: 89 % (ref 32–75)
PCO2 BLDA: 20 MMHG (ref 35–45)
PH BLDA: 7.53 [PH] (ref 7.35–7.45)
PLATELET # BLD AUTO: 538 K/UL (ref 150–400)
PMV BLD AUTO: 9.1 FL (ref 8.9–12.9)
PO2 BLDA: 84 MMHG (ref 75–100)
POTASSIUM SERPL-SCNC: 2.9 MMOL/L (ref 3.5–5.1)
RBC # BLD AUTO: 3.07 M/UL (ref 4.1–5.7)
SAO2 % BLD: 97 %
SAO2% DEVICE SAO2% SENSOR NAME: ABNORMAL
SARS-COV-2, COV2: NORMAL
SODIUM SERPL-SCNC: 146 MMOL/L (ref 136–145)
WBC # BLD AUTO: 10.3 K/UL (ref 4.1–11.1)

## 2020-09-27 PROCEDURE — 82803 BLOOD GASES ANY COMBINATION: CPT

## 2020-09-27 PROCEDURE — 74011000258 HC RX REV CODE- 258: Performed by: INTERNAL MEDICINE

## 2020-09-27 PROCEDURE — 71045 X-RAY EXAM CHEST 1 VIEW: CPT

## 2020-09-27 PROCEDURE — 74011250637 HC RX REV CODE- 250/637: Performed by: INTERNAL MEDICINE

## 2020-09-27 PROCEDURE — 94660 CPAP INITIATION&MGMT: CPT

## 2020-09-27 PROCEDURE — 74011250637 HC RX REV CODE- 250/637: Performed by: NURSE PRACTITIONER

## 2020-09-27 PROCEDURE — 74011250636 HC RX REV CODE- 250/636: Performed by: NURSE PRACTITIONER

## 2020-09-27 PROCEDURE — 71275 CT ANGIOGRAPHY CHEST: CPT

## 2020-09-27 PROCEDURE — 92610 EVALUATE SWALLOWING FUNCTION: CPT

## 2020-09-27 PROCEDURE — 74011000636 HC RX REV CODE- 636: Performed by: INTERNAL MEDICINE

## 2020-09-27 PROCEDURE — 74011250636 HC RX REV CODE- 250/636: Performed by: INTERNAL MEDICINE

## 2020-09-27 PROCEDURE — 85025 COMPLETE CBC W/AUTO DIFF WBC: CPT

## 2020-09-27 PROCEDURE — 80048 BASIC METABOLIC PNL TOTAL CA: CPT

## 2020-09-27 PROCEDURE — 65610000006 HC RM INTENSIVE CARE

## 2020-09-27 PROCEDURE — 74011250636 HC RX REV CODE- 250/636: Performed by: EMERGENCY MEDICINE

## 2020-09-27 RX ORDER — METOPROLOL TARTRATE 50 MG/1
50 TABLET ORAL 2 TIMES DAILY
Status: DISCONTINUED | OUTPATIENT
Start: 2020-09-27 | End: 2020-10-15

## 2020-09-27 RX ORDER — LISINOPRIL 20 MG/1
20 TABLET ORAL DAILY
Status: DISCONTINUED | OUTPATIENT
Start: 2020-09-28 | End: 2020-10-07

## 2020-09-27 RX ORDER — POTASSIUM CHLORIDE 20 MEQ/1
40 TABLET, EXTENDED RELEASE ORAL
Status: COMPLETED | OUTPATIENT
Start: 2020-09-27 | End: 2020-09-27

## 2020-09-27 RX ORDER — ENOXAPARIN SODIUM 100 MG/ML
40 INJECTION SUBCUTANEOUS EVERY 24 HOURS
Status: DISCONTINUED | OUTPATIENT
Start: 2020-09-27 | End: 2020-10-23

## 2020-09-27 RX ORDER — ONDANSETRON 2 MG/ML
4 INJECTION INTRAMUSCULAR; INTRAVENOUS ONCE
Status: COMPLETED | OUTPATIENT
Start: 2020-09-27 | End: 2020-09-27

## 2020-09-27 RX ORDER — LABETALOL HYDROCHLORIDE 5 MG/ML
20 INJECTION, SOLUTION INTRAVENOUS ONCE
Status: ACTIVE | OUTPATIENT
Start: 2020-09-27 | End: 2020-09-28

## 2020-09-27 RX ORDER — MORPHINE SULFATE 2 MG/ML
4 INJECTION, SOLUTION INTRAMUSCULAR; INTRAVENOUS ONCE
Status: COMPLETED | OUTPATIENT
Start: 2020-09-27 | End: 2020-09-27

## 2020-09-27 RX ADMIN — PIPERACILLIN SODIUM AND TAZOBACTAM SODIUM 3.38 G: 3; .375 INJECTION, POWDER, LYOPHILIZED, FOR SOLUTION INTRAVENOUS at 03:00

## 2020-09-27 RX ADMIN — PIPERACILLIN SODIUM AND TAZOBACTAM SODIUM 3.38 G: 3; .375 INJECTION, POWDER, LYOPHILIZED, FOR SOLUTION INTRAVENOUS at 12:25

## 2020-09-27 RX ADMIN — ASPIRIN 81 MG: 81 TABLET, COATED ORAL at 08:47

## 2020-09-27 RX ADMIN — IOPAMIDOL 100 ML: 755 INJECTION, SOLUTION INTRAVENOUS at 14:44

## 2020-09-27 RX ADMIN — LISINOPRIL 5 MG: 10 TABLET ORAL at 08:48

## 2020-09-27 RX ADMIN — POTASSIUM CHLORIDE 40 MEQ: 1500 TABLET, EXTENDED RELEASE ORAL at 08:48

## 2020-09-27 RX ADMIN — ENOXAPARIN SODIUM 40 MG: 40 INJECTION SUBCUTANEOUS at 22:00

## 2020-09-27 RX ADMIN — MORPHINE SULFATE 4 MG: 2 INJECTION, SOLUTION INTRAMUSCULAR; INTRAVENOUS at 15:50

## 2020-09-27 RX ADMIN — PIPERACILLIN SODIUM AND TAZOBACTAM SODIUM 3.38 G: 3; .375 INJECTION, POWDER, LYOPHILIZED, FOR SOLUTION INTRAVENOUS at 22:00

## 2020-09-27 RX ADMIN — METHYLPREDNISOLONE SODIUM SUCCINATE 40 MG: 40 INJECTION, POWDER, FOR SOLUTION INTRAMUSCULAR; INTRAVENOUS at 06:14

## 2020-09-27 RX ADMIN — ONDANSETRON 4 MG: 2 INJECTION INTRAMUSCULAR; INTRAVENOUS at 15:50

## 2020-09-27 RX ADMIN — METHYLPREDNISOLONE SODIUM SUCCINATE 40 MG: 40 INJECTION, POWDER, FOR SOLUTION INTRAMUSCULAR; INTRAVENOUS at 15:50

## 2020-09-27 RX ADMIN — SODIUM CHLORIDE 100 ML/HR: 9 INJECTION, SOLUTION INTRAVENOUS at 19:06

## 2020-09-27 RX ADMIN — METHYLPREDNISOLONE SODIUM SUCCINATE 40 MG: 40 INJECTION, POWDER, FOR SOLUTION INTRAMUSCULAR; INTRAVENOUS at 22:00

## 2020-09-27 NOTE — PROGRESS NOTES
SPEECH LANGUAGE PATHOLOGY BEDSIDE SWALLOW EVALUATIONS  Patient: Benny Barraza (24 y.o. male)  Date: 9/27/2020  Primary Diagnosis: Metabolic encephalopathy [A99.60]        Precautions: Aspiration      ASSESSMENT :  Based on the objective data described below, the patient presents with mild-moderate pharyngeal dysphagia. Nsg reports giving patient meds whole with thin liquids this date, tolerated without difficulty. Pt with persistent cough since admission. Pt's wife present with pt's consent. Pt's wife reports patient with limited po since previous acute stay, but was consuming regular diet/thin liquids. Pt aroused with verbal cues, oriented to self, confused. Pt grows increasingly agitated about pulse ox on finger throughout swallow evaluation. Pt somewhat agreeable to po trials. Administered trials of thin and puree. Pt declined solids. Oral phase c/b reduced bolus awareness; verbal cues required to swallow prior to speaking. Intermittent delay in initiation of swallow. Once initiated, HLE appears adequate to digital palpation. S/sx aspiration/penetration observed with thin: wet vocal quality and cough. Impulsivity observed with thin via straw. Pt cued to SMALL sips, cooperated given reminders. Pt's wife expressed understanding. Patient will benefit from skilled intervention to address the above impairments. Patients rehabilitation potential is considered to be Fair. PLAN :  Recommendations and Planned Interventions:  Pt appears appropriate for initiation of puree/thin diet with STRICT aspiration precautions: 1:1 feeding assistance/supervision with ALL po intake, SMALL sips/bites, slow rate of intake, maintain HOB elevation 90 degrees for at least 30 minutes after meals. MBS if/as indicated. Frequency/Duration: Patient will be followed by speech-language pathology 4 times a week to address goals. Discharge Recommendations:  To Be Determined     SUBJECTIVE:   Patient confused, trying to remove pulse ox from finger. OBJECTIVE:     Past Medical History:   Diagnosis Date    Myositis     Familial inclusion      Past Surgical History:   Procedure Laterality Date    HX ORTHOPAEDIC      disc infused, neck      Prior Level of Function/Home Situation: Home with spouse     Diet prior to admission: regular/thin  Current Diet:  NPO, pending bedside swallow evaluation    Cognitive and Communication Status:  Neurologic State: Alert, Irritable, Restless, Confused  Orientation Level: Oriented to person, Disoriented to time, Disoriented to situation, Disoriented to place  Cognition: Decreased attention/concentration, Decreased command following, Impulsive, Poor safety awareness           Swallowing Evaluation:   Oral Assessment:  Oral Assessment  Labial: Left droop  Oral Hygiene: fair  Lingual: No impairment  Velum: No impairment  Mandible: No impairment     P.O. Trials:  Patient Position: upright in bed  Vocal quality prior to P.O.: No impairment  Consistency Presented: Ice chips; Thin liquid;Puree  How Presented: SLP-fed/presented;Spoon;Straw;Cup/sip; Successive swallows     Bolus Acceptance: No impairment  Bolus Formation/Control: No impairment  Propulsion: No impairment  Oral Residue: None  Initiation of Swallow: Delayed (# of seconds)  Laryngeal Elevation: Functional  Aspiration Signs/Symptoms: Change vocal quality;Weak cough  Pharyngeal Phase Characteristics: No impairment, issues, or problems   Effective Modifications: Small sips and bites  Cues for Modifications: Minimal-moderate     Oral Phase Severity: No impairment  Pharyngeal Phase Severity : Mild-moderate    Voice:   Vocal Quality: No impairment             After treatment:   Patient left in no apparent distress in bed, Call bell within reach and Nursing notified    COMMUNICATION/EDUCATION:   Patient was educated regarding ST POC, diet recs, aspiration risks, and safe swallow precautions.   He demonstrated Guarded understanding as evidenced by limited engagement. Patient's wife present for education, expressed understanding. The patient's plan of care including recommendations, planned interventions, and recommended diet changes were discussed with: RN, patient, and pt's wife. Patient/family agree to work toward stated goals and plan of care. Patient is unable to participate in goal setting and plan of care. Thank you for this referral.  Davi Viera M.S., CCC-SLP               Problem: Dysphagia (Adult)  Goal: *Acute Goals and Plan of Care (Insert Text)  9/27/2020 1441 by Manny Ramos  Outcome: Not Met  Note: Speech Therapy Goals  Initiated 9/27/2020  -Patient will participate in modified barium swallow study within 7 day(s). [x] Not met  []  MET   [] Progressing  [] Discontinue  -Patient will tolerate puree diet with thin liquids without signs/symptoms of aspiration given minimal cues within 4 day(s). [x] Not met  []  MET   [] Progressing  [] Discontinue  -Patient will demonstrate understanding of swallow safety precautions and aspiration precautions, diet recs with no cues within 7 day(s).         [x] Not met  []  MET   [] Progressing  [] Discontinue   9/27/2020 1440 by Manny Ramos  Outcome: Not Met

## 2020-09-27 NOTE — ROUTINE PROCESS
Patient received from ED, alert with disorientation to time/place, denies any pain, SOB, or dizziness. Receiving high flow oxygen by nasal cannula at 35lpm/40%FiO2.

## 2020-09-27 NOTE — ROUTINE PROCESS
TRANSFER - OUT REPORT: 
 
Verbal report given to [employer] on [facility]  being transferred to CVICU for routine progression of care Report consisted of patients Situation, Background, Assessment and  
Recommendations(SBAR). Information from the following report(s) SBAR was reviewed with the receiving nurse. Lines:  
Peripheral IV 84/94/07 Left Basilic (Active) Site Assessment Clean, dry, & intact 09/26/20 1428 Phlebitis Assessment 0 09/26/20 1428 Infiltration Assessment 0 09/26/20 1428 Dressing Status Clean, dry, & intact 09/26/20 1428 Dressing Type Transparent 09/26/20 1428 Opportunity for questions and clarification was provided. Patient transported with: 
 Registered Nurse

## 2020-09-27 NOTE — ED NOTES
Assumed care of patient. In to provide AM meds, patient tolerated well. RT in to change patient to hi flow nasal canulla pt toleratd well; maintaining sats above 97%. Patient tolerated po fluids well, no distress noted. Urine to chacko is dark brown almost black in color 500cc removed. Repositioned patient, tolerated well. Wife at bedside at this time. Denies any further needs. Will cont to monitor.

## 2020-09-27 NOTE — CONSULTS
Consult Date: 9/27/2020    Consults Mr. Alfredo Brambila is a 48year old man with history of a familial muscle disorder managed by a Dr. Karsten Churchill in [facility] who has retired, history of cervical spine fusion who has been declining in function since July this year. He was diagnosed with this muscle disorder since age 33 and has 2 sisters and 1 brother with it but they are all more functional than him. He himself was working full time, independent in [place] and started working from home since March this year. Since July he became more and more non ambulatory, was having a lot of muscle cramps in the legs but still no changes in mental status. He has been on oxycodone for many years and was prescribed baclofen 10 mg tid for muscle cramps the first dose of which he took Friday night. He slept all night Friday night and was unarousable yesterday am. His wife then brought him to ER. Ct head unrevealing. He has not seen any doctors for this decline other than a 2 day admission here where he was diagnosed with a age indeterminate lacunar stroke (per wife).    Subjective  requiring O2    Past Medical History:   Diagnosis Date    Myositis     Familial inclusion       Past Surgical History:   Procedure Laterality Date    HX ORTHOPAEDIC      disc infused, neck      Family History   Problem Relation Age of Onset    Other Father     Other Sister     Other Brother       Social History     Tobacco Use    Smoking status: Current Every Day Smoker     Packs/day: 0.50    Smokeless tobacco: Never Used   Substance Use Topics    Alcohol use: Yes     Comment: occ       Current Facility-Administered Medications   Medication Dose Route Frequency Provider Last Rate Last Dose    0.9% sodium chloride infusion  100 mL/hr IntraVENous CONTINUOUS Gisele Long  mL/hr at 09/26/20 1812 100 mL/hr at 09/26/20 1812    lisinopriL (PRINIVIL, ZESTRIL) tablet 5 mg  5 mg Oral DAILY Gisele Long NP   5 mg at 09/27/20 0848    aspirin delayed-release tablet 81 mg  81 mg Oral DAILY Gisele Long NP   81 mg at 09/27/20 0847    acetaminophen (TYLENOL) tablet 650 mg  650 mg Oral Q6H PRN Gisele Long NP        ondansetron (ZOFRAN) injection 4 mg  4 mg IntraVENous Q8H PRN Gisele Long NP        guaiFENesin (ROBITUSSIN) 20 mg/mL oral liquid 400 mg  400 mg Oral Q6H PRN Kye Rivera NP   Stopped at 09/26/20 1855    methylPREDNISolone (PF) (SOLU-MEDROL) injection 40 mg  40 mg IntraVENous Q8H Gisele Long NP   40 mg at 09/27/20 0614    piperacillin-tazobactam (ZOSYN) 3.375 g in 0.9% sodium chloride (MBP/ADV) 100 mL MBP  3.375 g IntraVENous Q8H Kevin Lu MD 25 mL/hr at 09/27/20 0300 3.375 g at 09/27/20 0300     Current Outpatient Medications   Medication Sig Dispense Refill    baclofen (LIORESAL) 10 mg tablet Take 1 Tab by mouth three (3) times daily for 30 days. 90 Tab 1    oxyCODONE IR (OXY-IR) 15 mg immediate release tablet Take 15 mg by mouth every four (4) hours as needed for Pain.  aspirin delayed-release 81 mg tablet Take  by mouth daily.  mirtazapine (REMERON) 15 mg tablet Take 7.5 mg by mouth nightly.  pravastatin (PRAVACHOL) 20 mg tablet Take 20 mg by mouth nightly.  lisinopriL (PRINIVIL, ZESTRIL) 5 mg tablet Take 1 Tab by mouth daily for 60 days. 30 Tab 1    food supplemt, lactose-reduced (Ensure High Protein) liqd Take 237 mL by mouth two (2) times a day for 60 days. 78153 mL 1        Review of Systems   Respiratory: Positive for shortness of breath. Neurological: Positive for speech difficulty and weakness. All other systems reviewed and are negative.       Objective     Vital signs for last 24 hours:  Visit Vitals  BP (!) 147/77   Pulse 98   Temp 97.5 °F (36.4 °C)   Resp 22   Ht 6' (1.829 m)   Wt 52.6 kg (116 lb)   SpO2 99%   BMI 15.73 kg/m²       Intake/Output this shift:    Data Review:   Recent Results (from the past 24 hour(s))   CBC WITH AUTOMATED DIFF    Collection Time: 09/26/20  1:38 PM   Result Value Ref Range    WBC 8.0 4.1 - 11.1 K/uL    RBC 3.34 (L) 4.10 - 5.70 M/uL    HGB 10.8 (L) 12.1 - 17.0 %    HCT 31.6 (L) 36.6 - 50.3 %    MCV 94.6 80.0 - 99.0 FL    MCH 32.3 26.0 - 34.0 PG    MCHC 34.2 30.0 - 36.5 g/dL    RDW 16.6 (H) 11.5 - 14.5 %    PLATELET 458 (H) 594 - 400 K/uL    MPV 8.8 (L) 8.9 - 12.9 FL    NEUTROPHILS 85 (H) 32 - 75 %    LYMPHOCYTES 10 (L) 12 - 49 %    MONOCYTES 3 (L) 5 - 13 %    EOSINOPHILS 0 0 - 7 %    BASOPHILS 0 0 - 1 %    IMMATURE GRANULOCYTES 2 (H) 0.0 - 0.5 %    ABS. NEUTROPHILS 6.9 1.8 - 8.0 K/UL    ABS. LYMPHOCYTES 0.8 0.8 - 3.5 K/UL    ABS. MONOCYTES 0.3 0.0 - 1.0 K/UL    ABS. EOSINOPHILS 0.0 0.0 - 0.4 K/UL    ABS. BASOPHILS 0.0 0.0 - 0.1 K/UL    ABS. IMM. GRANS. 0.1 (H) 0.00 - 0.04 K/UL    DF AUTOMATED     METABOLIC PANEL, COMPREHENSIVE    Collection Time: 09/26/20  1:38 PM   Result Value Ref Range    Sodium 138 136 - 145 mmol/L    Potassium 3.8 3.5 - 5.1 mmol/L    Chloride 110 (H) 97 - 108 mmol/L    CO2 17 (L) 21 - 32 mmol/L    Anion gap 11 5 - 15 mmol/L    Glucose 87 65 - 100 mg/dL    BUN 10 6 - 20 mg/dL    Creatinine 0.61 (L) 0.70 - 1.30 mg/dL    BUN/Creatinine ratio 16 12 - 20      GFR est AA >60 >60 ml/min/1.73m2    GFR est non-AA >60 >60 ml/min/1.73m2    Calcium 7.7 (L) 8.5 - 10.1 mg/dL    Bilirubin, total 0.5 0.2 - 1.0 mg/dL    AST (SGOT) 68 (H) 15 - 37 U/L    ALT (SGPT) 29 12 - 78 U/L    Alk.  phosphatase 373 (H) 45 - 117 U/L    Protein, total 5.5 (L) 6.4 - 8.2 g/dL    Albumin 2.1 (L) 3.5 - 5.0 g/dL    Globulin 3.4 2.0 - 4.0 g/dL    A-G Ratio 0.6 (L) 1.1 - 2.2     CK W/ REFLX CKMB    Collection Time: 09/26/20  1:38 PM   Result Value Ref Range    .0 (H) 39 - 308 ng/mL   PROTHROMBIN TIME + INR    Collection Time: 09/26/20  1:38 PM   Result Value Ref Range    Prothrombin time 25.9 (H) 11.9 - 14.7 sec    INR 2.4 (H) 0.9 - 1.1     TROPONIN I    Collection Time: 09/26/20  1:38 PM   Result Value Ref Range    Troponin-I, Qt. <0.05 <0.05 ng/mL   PTT    Collection Time: 09/26/20  1:38 PM   Result Value Ref Range    aPTT 55.4 (H) 23.0 - 35.7 sec    aPTT, therapeutic range   68 - 109 sec   CK-MB,QUANT.     Collection Time: 09/26/20  1:38 PM   Result Value Ref Range    CK - MB 24.8 (H) <3.6 ng/mL    CK-MB Index 3.5     EKG, 12 LEAD, INITIAL    Collection Time: 09/26/20  2:00 PM   Result Value Ref Range    Ventricular Rate 104 BPM    Atrial Rate 104 BPM    P-R Interval 134 ms    QRS Duration 76 ms    Q-T Interval 392 ms    QTC Calculation (Bezet) 515 ms    Calculated R Axis 77 degrees    Calculated T Axis -66 degrees    Diagnosis       Sinus tachycardia  ST & T wave abnormality, consider inferior ischemia  Abnormal ECG  When compared with ECG of 08-SEP-2020 18:55,  Inverted T waves have replaced nonspecific T wave abnormality in Lateral   leads  Confirmed by ANNETTE PIMENTEL ALEX[place] ([id number]) on 9/26/2020 3:45:55 PM     URINALYSIS W/MICROSCOPIC    Collection Time: 09/26/20  3:13 PM   Result Value Ref Range    Color [place]      Appearance Clear Clear      Specific gravity 1.028 1.003 - 1.030      pH (UA) 5.0 5.0 - 8.0      Protein 30 (A) Negative mg/dL    Glucose Negative Negative mg/dL    Ketone 20 (A) Negative mg/dL    Bilirubin Small (A) Negative      Blood Moderate (A) Negative      Urobilinogen 4.0 (H) 0.1 - 1.0 EU/dL    Nitrites Negative Negative      Leukocyte Esterase Negative Negative      WBC 0-4 0 - 4 /hpf    RBC 20-50 0 - 5 /hpf    Bacteria 1+ (A) Negative /hpf    Mucus 1+ (A) Negative /lpf    Hyaline cast 5-10 0 - 5 /lpf   BLOOD GAS, ARTERIAL    Collection Time: 09/26/20  6:45 PM   Result Value Ref Range    pH 7.429 7.35 - 7.45      PCO2 18 (L) 35 - 45 mmHg    PO2 134 (H) 75 - 100 mmHg    O2  >95 %    BICARBONATE 16 (L) 22 - 26 mmol/L    BASE DEFICIT 10.3 (H) 0 - 2 mmol/L    O2 METHOD Nasal Cannula      O2 FLOW RATE 6.0 L/min    SITE Right Radial      ENIO'S TEST PASS      Carboxy-Hgb 0.9 0 - 3 %    Methemoglobin 0.6 0 - 3 %    tHb 11.3 (L) 12.0 - 16.0 g/dL    Oxyhemoglobin 98.0 (H) 0 - 3 %   PROTHROMBIN TIME + INR    Collection Time: 09/26/20  8:44 PM   Result Value Ref Range    Prothrombin time 27.1 (H) 11.9 - 14.7 sec    INR 2.6 (H) 0.9 - 1.1     PTT    Collection Time: 09/26/20  8:44 PM   Result Value Ref Range    aPTT 46.4 (H) 23.0 - 35.7 sec    aPTT, therapeutic range   68 - 109 sec   D DIMER    Collection Time: 09/26/20  8:44 PM   Result Value Ref Range    D DIMER 0.99 (H) <0.50 ug/ml(FEU)   FIBRINOGEN    Collection Time: 09/26/20  8:44 PM   Result Value Ref Range    Fibrinogen 479 220 - 535 mg/dL   SARS-COV-2    Collection Time: 09/26/20 10:15 PM   Result Value Ref Range    SARS-CoV-2 Please find results under separate order     CBC WITH AUTOMATED DIFF    Collection Time: 09/27/20  2:43 AM   Result Value Ref Range    WBC 10.3 4.1 - 11.1 K/uL    RBC 3.07 (L) 4.10 - 5.70 M/uL    HGB 10.0 (L) 12.1 - 17.0 %    HCT 29.2 (L) 36.6 - 50.3 %    MCV 95.1 80.0 - 99.0 FL    MCH 32.6 26.0 - 34.0 PG    MCHC 34.2 30.0 - 36.5 g/dL    RDW 16.9 (H) 11.5 - 14.5 %    PLATELET 502 (H) 936 - 400 K/uL    MPV 9.1 8.9 - 12.9 FL    NEUTROPHILS 89 (H) 32 - 75 %    LYMPHOCYTES 7 (L) 12 - 49 %    MONOCYTES 3 (L) 5 - 13 %    EOSINOPHILS 0 0 - 7 %    BASOPHILS 0 0 - 1 %    IMMATURE GRANULOCYTES 1 (H) 0.0 - 0.5 %    ABS. NEUTROPHILS 9.3 (H) 1.8 - 8.0 K/UL    ABS. LYMPHOCYTES 0.7 (L) 0.8 - 3.5 K/UL    ABS. MONOCYTES 0.3 0.0 - 1.0 K/UL    ABS. EOSINOPHILS 0.0 0.0 - 0.4 K/UL    ABS. BASOPHILS 0.0 0.0 - 0.1 K/UL    ABS. IMM.  GRANS. 0.1 (H) 0.00 - 0.04 K/UL    DF AUTOMATED     METABOLIC PANEL, BASIC    Collection Time: 09/27/20  2:43 AM   Result Value Ref Range    Sodium 146 (H) 136 - 145 mmol/L    Potassium 2.9 (L) 3.5 - 5.1 mmol/L    Chloride 113 (H) 97 - 108 mmol/L    CO2 23 21 - 32 mmol/L    Anion gap 10 5 - 15 mmol/L    Glucose 92 65 - 100 mg/dL    BUN 11 6 - 20 mg/dL    Creatinine 0.55 (L) 0.70 - 1.30 mg/dL    BUN/Creatinine ratio 20 12 - 20      GFR est AA >60 >60 ml/min/1.73m2    GFR est non-AA >60 >60 ml/min/1.73m2    Calcium 7.1 (L) 8.5 - 10.1 mg/dL   BLOOD GAS, ARTERIAL    Collection Time: 09/27/20  7:45 AM   Result Value Ref Range    pH 7.531 (H) 7.35 - 7.45      PCO2 20 (L) 35 - 45 mmHg    PO2 84 75 - 100 mmHg    O2 SAT 97 >95 %    BICARBONATE 21 (L) 22 - 26 mmol/L    BASE DEFICIT 4.2 (H) 0 - 2 mmol/L    O2 METHOD BiPAP      FIO2 50.0 %    SET RATE 14      EPAP/CPAP/PEEP 5      SITE Right Radial      ENIO'S TEST PASS      Carboxy-Hgb 0.9 0 - 3 %    Methemoglobin 1.0 0 - 3 %    tHb 9.8 (L) 12.0 - 16.0 g/dL       Physical Exam    Neuro Physical Exam      General: emaciated, on supplemental O2    Neurological Exam:  Mental Status: Lethargic but arousable, tells me his name, knows he is in hospital   Cranial Nerves:   Looks at examiner, no facial droop, very dysarthric    Motor:  Not moving any extremity even to pain, decreased tone in all 4 ext    Reflexes:   Deep tendon reflexes 0/4 in b/l patellars, achilles, biceps    Sensory:   difficult to test   Gait:  Not tested    Tremor:   No tremor noted. Cerebellar:  No cerebellar signs present. Marcia Branham b/l     Assessment and Plan: Mr. Xiomara Salinas is a 48year old man with a genetic muscle disorder but has been independently functioning for 23 yrs since diagnosis with quick decline in past 2 months. He became unresponsive yesterday likely due to baclofen given the night before. Plan:   - needs workup for why he is declining so quickly: Mri brain to r/o stroke and mri c spine to r/o myelopathy (myopathy may be masking myelopathic symptoms)  - liver functions, TSH, vit b12, ck levels, mg2+ levels  -swallow eval  -hold baclofen and oxycodone (due to poor respiratory and mental status).

## 2020-09-27 NOTE — PROGRESS NOTES
Hospitalist Progress Note           Daily Progress Note: 9/27/2020    Chief complaint: Shortness of breath and weakness  Subjective: The patient is seen for follow  up.  60-year-old gentleman with history of inclusion body myositis admitted yesterday for progressive weakness and poor oral intake for several days. According to wife, patient has been having a slow clinical decline since July 2020. Noted with decreased mentation yesterday and was brought to the ED. Reportedly aspirated in the ED and had to be placed on BiPAP. More awake and alert today. Blood gases showed improved oxygenation and improved CO2 levels.       Problem List:  Problem List as of 9/27/2020 Date Reviewed: 9/17/2020          Codes Class Noted - Resolved    Metabolic encephalopathy EVU-00-SH: G93.41  ICD-9-CM: 348.31  9/26/2020 - Present        UTI (urinary tract infection) ICD-10-CM: N39.0  ICD-9-CM: 599.0  9/26/2020 - Present        Aspiration pneumonitis ([address] ICD-10-CM: J69.0  ICD-9-CM: 507.0  9/26/2020 - Present        Essential hypertension ICD-10-CM: I10  ICD-9-CM: 401.9  9/26/2020 - Present        Acute dehydration ICD-10-CM: E86.0  ICD-9-CM: 276.51  9/26/2020 - Present              Medications reviewed  Current Facility-Administered Medications   Medication Dose Route Frequency    0.9% sodium chloride infusion  100 mL/hr IntraVENous CONTINUOUS    lisinopriL (PRINIVIL, ZESTRIL) tablet 5 mg  5 mg Oral DAILY    aspirin delayed-release tablet 81 mg  81 mg Oral DAILY    acetaminophen (TYLENOL) tablet 650 mg  650 mg Oral Q6H PRN    ondansetron (ZOFRAN) injection 4 mg  4 mg IntraVENous Q8H PRN    guaiFENesin (ROBITUSSIN) 20 mg/mL oral liquid 400 mg  400 mg Oral Q6H PRN    methylPREDNISolone (PF) (SOLU-MEDROL) injection 40 mg  40 mg IntraVENous Q8H    piperacillin-tazobactam (ZOSYN) 3.375 g in 0.9% sodium chloride (MBP/ADV) 100 mL MBP  3.375 g IntraVENous Q8H     Current Outpatient Medications Medication Sig    baclofen (LIORESAL) 10 mg tablet Take 1 Tab by mouth three (3) times daily for 30 days.  oxyCODONE IR (OXY-IR) 15 mg immediate release tablet Take 15 mg by mouth every four (4) hours as needed for Pain.  aspirin delayed-release 81 mg tablet Take  by mouth daily.  mirtazapine (REMERON) 15 mg tablet Take 7.5 mg by mouth nightly.  pravastatin (PRAVACHOL) 20 mg tablet Take 20 mg by mouth nightly.  lisinopriL (PRINIVIL, ZESTRIL) 5 mg tablet Take 1 Tab by mouth daily for 60 days.  food supplemt, lactose-reduced (Ensure High Protein) liqd Take 237 mL by mouth two (2) times a day for 60 days. Review of Systems:   Review of systems not obtained due to patient factors. Objective:   Physical Exam:     Visit Vitals  BP (!) 147/77   Pulse 98   Temp 97.5 °F (36.4 °C)   Resp 22   Ht 6' (1.829 m)   Wt 52.6 kg (116 lb)   SpO2 99%   BMI 15.73 kg/m²    O2 Flow Rate (L/min): 35 l/min O2 Device: Hi flow nasal cannula    Temp (24hrs), Av.5 °F (36.4 °C), Min:97.5 °F (36.4 °C), Max:97.5 °F (36.4 °C)    No intake/output data recorded.  1901 -  0700  In: 1000 [I.V.:1000]  Out: -     General:  Alert, cooperative, no distress, appears stated age. Lungs:   Clear to auscultation bilaterally. Chest wall:  No tenderness or deformity. Heart:  Regular rate and rhythm, S1, S2 normal, no murmur, click, rub or gallop. Abdomen:   Soft, non-tender. Bowel sounds normal. No masses,  No organomegaly. Extremities: Extremities normal, atraumatic, no cyanosis or edema. Pulses: 2+ and symmetric all extremities. Skin: Skin color, texture, turgor normal. No rashes or lesions   Neurologic: CNII-XII intact.  No gross sensory or motor deficits     Data Review:       Recent Days:  Recent Labs     20  0243 20  1338   WBC 10.3 8.0   HGB 10.0* 10.8*   HCT 29.2* 31.6*   * 609*     Recent Labs     20  0243 204 20  1338   *  --  138   K 2.9*  --  3.8 *  --  110*   CO2 23  --  17*   GLU 92  --  87   BUN 11  --  10   CREA 0.55*  --  0.61*   CA 7.1*  --  7.7*   ALB  --   --  2.1*   TBILI  --   --  0.5   ALT  --   --  29   INR  --  2.6* 2.4*     Recent Labs     09/27/20  0745 09/26/20  1845   PH 7.531* 7.429   PCO2 20* 18*   PO2 84 134*   HCO3 21* 16*   FIO2 50.0  --        24 Hour Results:  Recent Results (from the past 24 hour(s))   CBC WITH AUTOMATED DIFF    Collection Time: 09/26/20  1:38 PM   Result Value Ref Range    WBC 8.0 4.1 - 11.1 K/uL    RBC 3.34 (L) 4.10 - 5.70 M/uL    HGB 10.8 (L) 12.1 - 17.0 %    HCT 31.6 (L) 36.6 - 50.3 %    MCV 94.6 80.0 - 99.0 FL    MCH 32.3 26.0 - 34.0 PG    MCHC 34.2 30.0 - 36.5 g/dL    RDW 16.6 (H) 11.5 - 14.5 %    PLATELET 946 (H) 366 - 400 K/uL    MPV 8.8 (L) 8.9 - 12.9 FL    NEUTROPHILS 85 (H) 32 - 75 %    LYMPHOCYTES 10 (L) 12 - 49 %    MONOCYTES 3 (L) 5 - 13 %    EOSINOPHILS 0 0 - 7 %    BASOPHILS 0 0 - 1 %    IMMATURE GRANULOCYTES 2 (H) 0.0 - 0.5 %    ABS. NEUTROPHILS 6.9 1.8 - 8.0 K/UL    ABS. LYMPHOCYTES 0.8 0.8 - 3.5 K/UL    ABS. MONOCYTES 0.3 0.0 - 1.0 K/UL    ABS. EOSINOPHILS 0.0 0.0 - 0.4 K/UL    ABS. BASOPHILS 0.0 0.0 - 0.1 K/UL    ABS. IMM. GRANS. 0.1 (H) 0.00 - 0.04 K/UL    DF AUTOMATED     METABOLIC PANEL, COMPREHENSIVE    Collection Time: 09/26/20  1:38 PM   Result Value Ref Range    Sodium 138 136 - 145 mmol/L    Potassium 3.8 3.5 - 5.1 mmol/L    Chloride 110 (H) 97 - 108 mmol/L    CO2 17 (L) 21 - 32 mmol/L    Anion gap 11 5 - 15 mmol/L    Glucose 87 65 - 100 mg/dL    BUN 10 6 - 20 mg/dL    Creatinine 0.61 (L) 0.70 - 1.30 mg/dL    BUN/Creatinine ratio 16 12 - 20      GFR est AA >60 >60 ml/min/1.73m2    GFR est non-AA >60 >60 ml/min/1.73m2    Calcium 7.7 (L) 8.5 - 10.1 mg/dL    Bilirubin, total 0.5 0.2 - 1.0 mg/dL    AST (SGOT) 68 (H) 15 - 37 U/L    ALT (SGPT) 29 12 - 78 U/L    Alk.  phosphatase 373 (H) 45 - 117 U/L    Protein, total 5.5 (L) 6.4 - 8.2 g/dL    Albumin 2.1 (L) 3.5 - 5.0 g/dL    Globulin 3.4 2.0 - 4.0 g/dL    A-G Ratio 0.6 (L) 1.1 - 2.2     CK W/ REFLX CKMB    Collection Time: 09/26/20  1:38 PM   Result Value Ref Range    .0 (H) 39 - 308 ng/mL   PROTHROMBIN TIME + INR    Collection Time: 09/26/20  1:38 PM   Result Value Ref Range    Prothrombin time 25.9 (H) 11.9 - 14.7 sec    INR 2.4 (H) 0.9 - 1.1     TROPONIN I    Collection Time: 09/26/20  1:38 PM   Result Value Ref Range    Troponin-I, Qt. <0.05 <0.05 ng/mL   PTT    Collection Time: 09/26/20  1:38 PM   Result Value Ref Range    aPTT 55.4 (H) 23.0 - 35.7 sec    aPTT, therapeutic range   68 - 109 sec   CK-MB,QUANT.     Collection Time: 09/26/20  1:38 PM   Result Value Ref Range    CK - MB 24.8 (H) <3.6 ng/mL    CK-MB Index 3.5     EKG, 12 LEAD, INITIAL    Collection Time: 09/26/20  2:00 PM   Result Value Ref Range    Ventricular Rate 104 BPM    Atrial Rate 104 BPM    P-R Interval 134 ms    QRS Duration 76 ms    Q-T Interval 392 ms    QTC Calculation (Bezet) 515 ms    Calculated R Axis 77 degrees    Calculated T Axis -66 degrees    Diagnosis       Sinus tachycardia  ST & T wave abnormality, consider inferior ischemia  Abnormal ECG  When compared with ECG of 08-SEP-2020 18:55,  Inverted T waves have replaced nonspecific T wave abnormality in Lateral   leads  Confirmed by ANNETTE PIMENTEL [place] ([id number]) on 9/26/2020 3:45:55 PM     URINALYSIS W/MICROSCOPIC    Collection Time: 09/26/20  3:13 PM   Result Value Ref Range    Color [place]      Appearance Clear Clear      Specific gravity 1.028 1.003 - 1.030      pH (UA) 5.0 5.0 - 8.0      Protein 30 (A) Negative mg/dL    Glucose Negative Negative mg/dL    Ketone 20 (A) Negative mg/dL    Bilirubin Small (A) Negative      Blood Moderate (A) Negative      Urobilinogen 4.0 (H) 0.1 - 1.0 EU/dL    Nitrites Negative Negative      Leukocyte Esterase Negative Negative      WBC 0-4 0 - 4 /hpf    RBC 20-50 0 - 5 /hpf    Bacteria 1+ (A) Negative /hpf    Mucus 1+ (A) Negative /lpf    Hyaline cast 5-10 0 - 5 /lpf   BLOOD GAS, ARTERIAL    Collection Time: 09/26/20  6:45 PM   Result Value Ref Range    pH 7.429 7.35 - 7.45      PCO2 18 (L) 35 - 45 mmHg    PO2 134 (H) 75 - 100 mmHg    O2  >95 %    BICARBONATE 16 (L) 22 - 26 mmol/L    BASE DEFICIT 10.3 (H) 0 - 2 mmol/L    O2 METHOD Nasal Cannula      O2 FLOW RATE 6.0 L/min    SITE Right Radial      ENIO'S TEST PASS      Carboxy-Hgb 0.9 0 - 3 %    Methemoglobin 0.6 0 - 3 %    tHb 11.3 (L) 12.0 - 16.0 g/dL    Oxyhemoglobin 98.0 (H) 0 - 3 %   PROTHROMBIN TIME + INR    Collection Time: 09/26/20  8:44 PM   Result Value Ref Range    Prothrombin time 27.1 (H) 11.9 - 14.7 sec    INR 2.6 (H) 0.9 - 1.1     PTT    Collection Time: 09/26/20  8:44 PM   Result Value Ref Range    aPTT 46.4 (H) 23.0 - 35.7 sec    aPTT, therapeutic range   68 - 109 sec   D DIMER    Collection Time: 09/26/20  8:44 PM   Result Value Ref Range    D DIMER 0.99 (H) <0.50 ug/ml(FEU)   FIBRINOGEN    Collection Time: 09/26/20  8:44 PM   Result Value Ref Range    Fibrinogen 479 220 - 535 mg/dL   SARS-COV-2    Collection Time: 09/26/20 10:15 PM   Result Value Ref Range    SARS-CoV-2 Please find results under separate order     CBC WITH AUTOMATED DIFF    Collection Time: 09/27/20  2:43 AM   Result Value Ref Range    WBC 10.3 4.1 - 11.1 K/uL    RBC 3.07 (L) 4.10 - 5.70 M/uL    HGB 10.0 (L) 12.1 - 17.0 %    HCT 29.2 (L) 36.6 - 50.3 %    MCV 95.1 80.0 - 99.0 FL    MCH 32.6 26.0 - 34.0 PG    MCHC 34.2 30.0 - 36.5 g/dL    RDW 16.9 (H) 11.5 - 14.5 %    PLATELET 993 (H) 400 - 400 K/uL    MPV 9.1 8.9 - 12.9 FL    NEUTROPHILS 89 (H) 32 - 75 %    LYMPHOCYTES 7 (L) 12 - 49 %    MONOCYTES 3 (L) 5 - 13 %    EOSINOPHILS 0 0 - 7 %    BASOPHILS 0 0 - 1 %    IMMATURE GRANULOCYTES 1 (H) 0.0 - 0.5 %    ABS. NEUTROPHILS 9.3 (H) 1.8 - 8.0 K/UL    ABS. LYMPHOCYTES 0.7 (L) 0.8 - 3.5 K/UL    ABS. MONOCYTES 0.3 0.0 - 1.0 K/UL    ABS. EOSINOPHILS 0.0 0.0 - 0.4 K/UL    ABS. BASOPHILS 0.0 0.0 - 0.1 K/UL    ABS. IMM.  GRANS. 0.1 (H) 0.00 - 0.04 K/UL DF AUTOMATED     METABOLIC PANEL, BASIC    Collection Time: 09/27/20  2:43 AM   Result Value Ref Range    Sodium 146 (H) 136 - 145 mmol/L    Potassium 2.9 (L) 3.5 - 5.1 mmol/L    Chloride 113 (H) 97 - 108 mmol/L    CO2 23 21 - 32 mmol/L    Anion gap 10 5 - 15 mmol/L    Glucose 92 65 - 100 mg/dL    BUN 11 6 - 20 mg/dL    Creatinine 0.55 (L) 0.70 - 1.30 mg/dL    BUN/Creatinine ratio 20 12 - 20      GFR est AA >60 >60 ml/min/1.73m2    GFR est non-AA >60 >60 ml/min/1.73m2    Calcium 7.1 (L) 8.5 - 10.1 mg/dL   BLOOD GAS, ARTERIAL    Collection Time: 09/27/20  7:45 AM   Result Value Ref Range    pH 7.531 (H) 7.35 - 7.45      PCO2 20 (L) 35 - 45 mmHg    PO2 84 75 - 100 mmHg    O2 SAT 97 >95 %    BICARBONATE 21 (L) 22 - 26 mmol/L    BASE DEFICIT 4.2 (H) 0 - 2 mmol/L    O2 METHOD BiPAP      FIO2 50.0 %    SET RATE 14      EPAP/CPAP/PEEP 5      SITE Right Radial      ENIO'S TEST PASS      Carboxy-Hgb 0.9 0 - 3 %    Methemoglobin 1.0 0 - 3 %    tHb 9.8 (L) 12.0 - 16.0 g/dL           Assessment/     Acute hypoxic respiratory failure likely secondary to aspiration  Urinary tract infection  Metabolic encephalopathy  Severe dehydration due to poor oral intake  Benign essential hypertension  History of inclusion body myositis  Generalized debilitation from medical illness  Moderate protein calorie malnutrition  Metabolic acidosis. Improved after bicarbonate push  Plan:  Continue supportive care including IV antibiotics  Wean off high flow oxygen as tolerated  Updates provided to wife at bedside  Follow-up MRI of the cervical spine and brain  PT OT evaluation when stable      Care Plan discussed with: Patient/Family    Total time spent with patient: 30 minutes.     Bharati Pérez MD

## 2020-09-27 NOTE — PROGRESS NOTES
NEUROLOGY  PROGRESS NOTE    Admission History/Pertinent Events  Martina Cheadle is a 48y.o. year old male with a past medical history of Familial Polymyositis, TIA, HTN who presented on 9/26/2020. Per chart review, patient presented with encephalopathy and generalized weakness which had been worsening over the past 3 days prior to presentation. Patient's wife reported that since July 2020 patient has had a steady decline in his overall functional status. Patient has had a great degree of worsening in the past 2 weeks for which patient was evaluated by his PCP. Patient reportedly was given baclofen for cramping and spastic pain in his bilateral lower extremities. Patient took his 1st dose of baclofen on the evening of 09/25/2020 after which patient became significantly encephalopathic. Workup in the ED revealed UA concerning for UTI for which patient was treated with IV fluids and ceftriaxone. Emergent CTH was negative for acute findings. Neurology consulted  For further evaluation of patient's generalized decline in the past several months. Home Antiplatelets: ASA 81  Home Antilipids: Pravastatin 20      ASSESSMENT/PLAN      Impression  Etiology of patient's presenting encephalopathy likely related to use of baclofen as this was a recent medication patient was started on patient's symptoms started shortly thereafter. Also suspicious of possible metabolic derangement given decreased p.o. intake and possible UTI. Will continue to hold baclofen on oxycodone at the current time. Will follow up on MRI brain and MRI cervical spine to evaluate for new pathology that could be contributing to the patient's state declined from July 2020. Will benefit from obtaining neurology records from [facility]. Usually with familial myopathies, as reported by patient and per chart review, treatment with IV steroids or rituximab does not help.   Will need further information and regards to patient's myopathic diagnosis for further recommendations. Labs  Positive: CK (700), UA (bacteria)  Negative: WBC, Na, BUN, Creatinine, Troponin,      Neuroimaging  -CTH WO  NAICA      -MRI Brain WO      -MRI Cervical WO        Plan      Toxic/Metabolic Encephalopathy  Associated: Familial Polymyositis, Decreased PO Intake, Possible UTI, Baclofen/Oxycodone Use  -[id number], TELE  -PT/OT/ST as needed  -Hold baclofen and oxycodone while patient is encephalopathic  -F/U MRI Brain WO, MRI Cervical WO  -F/U LFTs, TSH, VitB12, Mg  -Obtain Neurology Records from [facility]   Patient reports that he is doing well this morning. He seems confused and is oriented only to person knows that he is in the hospital but is confused to the situation. 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  He has no current complaints.       OBJECTIVE  Vital Signs  Temp:  [97.5 °F (36.4 °C)-97.9 °F (36.6 °C)]   Pulse (Heart Rate):  []   BP: (117-194)/()   Resp Rate:  [18-33]   O2 Sat (%):  [94 %-100 %]   Weight:  [52.6 kg (116 lb)]     MEDICATIONS  Current Facility-Administered Medications   Medication Dose Route Frequency    [START ON 9/28/2020] lisinopriL (PRINIVIL, ZESTRIL) tablet 20 mg  20 mg Oral DAILY    labetaloL (NORMODYNE;TRANDATE) injection 20 mg  20 mg IntraVENous ONCE    metoprolol tartrate (LOPRESSOR) tablet 50 mg  50 mg Oral BID    0.9% sodium chloride infusion  100 mL/hr IntraVENous CONTINUOUS    aspirin delayed-release tablet 81 mg  81 mg Oral DAILY    acetaminophen (TYLENOL) tablet 650 mg  650 mg Oral Q6H PRN    ondansetron (ZOFRAN) injection 4 mg  4 mg IntraVENous Q8H PRN    guaiFENesin (ROBITUSSIN) 20 mg/mL oral liquid 400 mg  400 mg Oral Q6H PRN    methylPREDNISolone (PF) (SOLU-MEDROL) injection 40 mg  40 mg IntraVENous Q8H    piperacillin-tazobactam (ZOSYN) 3.375 g in 0.9% sodium chloride (MBP/ADV) 100 mL MBP  3.375 g IntraVENous Q8H        Physical/Neurological Exam  General: Elderly  male, compliant  Cardiovascular: normal rate and rhythm   Pulmonary: CTAB      Patient awake, alert but clearly confused; following central and peripheral commands   No expressive or receptive aphasia; No dysarthria   Pupils react to light bilaterally; EOM Intact   No visual field deficits on gross exam   Intact to light touch on face bilaterally   No facial droop   No gross hearing loss   Tongue is midline   Motor   BUE: 4+/5   BLE: 2/5 at best   No abnormal movements   Normal tone throughout   Sensation to light touch intact grossly throughout   Reflexes: Absent bilateral lower extremities; 1+ bilateral brachioradialis  Toes downgoing bilaterally, no ankle clonus with forced dorsiflexion bilaterally, negative Evans's bilaterally       Labs: I've reviewed the labs for today     This document has been prepared by the [employer] voice recognition system, typographical errors may have occurred.  Attempts have been made to correct errors, however inadvertent errors may persist.

## 2020-09-28 ENCOUNTER — APPOINTMENT (OUTPATIENT)
Dept: MRI IMAGING | Age: 55
DRG: 981 | End: 2020-09-28
Attending: PSYCHIATRY & NEUROLOGY
Payer: COMMERCIAL

## 2020-09-28 ENCOUNTER — TELEPHONE (OUTPATIENT)
Dept: INTERNAL MEDICINE CLINIC | Age: 55
End: 2020-09-28

## 2020-09-28 ENCOUNTER — APPOINTMENT (OUTPATIENT)
Dept: CT IMAGING | Age: 55
DRG: 981 | End: 2020-09-28
Attending: INTERNAL MEDICINE
Payer: COMMERCIAL

## 2020-09-28 LAB
ANION GAP SERPL CALC-SCNC: 9 MMOL/L (ref 5–15)
BASOPHILS # BLD: 0 K/UL (ref 0–0.1)
BASOPHILS NFR BLD: 0 % (ref 0–1)
BUN SERPL-MCNC: 11 MG/DL (ref 6–20)
BUN/CREAT SERPL: 20 (ref 12–20)
CA-I BLD-MCNC: 7.6 MG/DL (ref 8.5–10.1)
CHLORIDE SERPL-SCNC: 117 MMOL/L (ref 97–108)
CO2 SERPL-SCNC: 21 MMOL/L (ref 21–32)
CREAT SERPL-MCNC: 0.56 MG/DL (ref 0.7–1.3)
DIFFERENTIAL METHOD BLD: ABNORMAL
EOSINOPHIL # BLD: 0 K/UL (ref 0–0.4)
EOSINOPHIL NFR BLD: 0 % (ref 0–7)
ERYTHROCYTE [DISTWIDTH] IN BLOOD BY AUTOMATED COUNT: 17.5 % (ref 11.5–14.5)
GLUCOSE BLD STRIP.AUTO-MCNC: 98 MG/DL (ref 65–100)
GLUCOSE SERPL-MCNC: 102 MG/DL (ref 65–100)
HCT VFR BLD AUTO: 26.3 % (ref 36.6–50.3)
HGB BLD-MCNC: 8.8 % (ref 12.1–17)
IMM GRANULOCYTES # BLD AUTO: 0.1 K/UL (ref 0–0.04)
IMM GRANULOCYTES NFR BLD AUTO: 1 % (ref 0–0.5)
LYMPHOCYTES # BLD: 0.6 K/UL (ref 0.8–3.5)
LYMPHOCYTES NFR BLD: 8 % (ref 12–49)
MCH RBC QN AUTO: 32.1 PG (ref 26–34)
MCHC RBC AUTO-ENTMCNC: 33.5 G/DL (ref 30–36.5)
MCV RBC AUTO: 96 FL (ref 80–99)
MONOCYTES # BLD: 0.4 K/UL (ref 0–1)
MONOCYTES NFR BLD: 5 % (ref 5–13)
NEUTS SEG # BLD: 7.2 K/UL (ref 1.8–8)
NEUTS SEG NFR BLD: 86 % (ref 32–75)
PERFORMED BY, TECHID: NORMAL
PLATELET # BLD AUTO: 398 K/UL (ref 150–400)
PMV BLD AUTO: 8.7 FL (ref 8.9–12.9)
POTASSIUM SERPL-SCNC: 3.4 MMOL/L (ref 3.5–5.1)
RBC # BLD AUTO: 2.74 M/UL (ref 4.1–5.7)
SARS-COV-2, COV2NT: NOT DETECTED
SODIUM SERPL-SCNC: 147 MMOL/L (ref 136–145)
WBC # BLD AUTO: 8.2 K/UL (ref 4.1–11.1)

## 2020-09-28 PROCEDURE — 65610000006 HC RM INTENSIVE CARE

## 2020-09-28 PROCEDURE — 74011250636 HC RX REV CODE- 250/636

## 2020-09-28 PROCEDURE — 80048 BASIC METABOLIC PNL TOTAL CA: CPT

## 2020-09-28 PROCEDURE — 82962 GLUCOSE BLOOD TEST: CPT

## 2020-09-28 PROCEDURE — 74011250637 HC RX REV CODE- 250/637: Performed by: NURSE PRACTITIONER

## 2020-09-28 PROCEDURE — 74011250636 HC RX REV CODE- 250/636: Performed by: INTERNAL MEDICINE

## 2020-09-28 PROCEDURE — 92526 ORAL FUNCTION THERAPY: CPT

## 2020-09-28 PROCEDURE — 77010033678 HC OXYGEN DAILY

## 2020-09-28 PROCEDURE — 36415 COLL VENOUS BLD VENIPUNCTURE: CPT

## 2020-09-28 PROCEDURE — 70551 MRI BRAIN STEM W/O DYE: CPT

## 2020-09-28 PROCEDURE — 74011250636 HC RX REV CODE- 250/636: Performed by: NURSE PRACTITIONER

## 2020-09-28 PROCEDURE — 85025 COMPLETE CBC W/AUTO DIFF WBC: CPT

## 2020-09-28 PROCEDURE — 74011250637 HC RX REV CODE- 250/637: Performed by: INTERNAL MEDICINE

## 2020-09-28 PROCEDURE — 65270000029 HC RM PRIVATE

## 2020-09-28 PROCEDURE — 74011000258 HC RX REV CODE- 258: Performed by: INTERNAL MEDICINE

## 2020-09-28 PROCEDURE — 94660 CPAP INITIATION&MGMT: CPT

## 2020-09-28 RX ORDER — LORAZEPAM 2 MG/ML
0.5 INJECTION INTRAMUSCULAR ONCE
Status: COMPLETED | OUTPATIENT
Start: 2020-09-28 | End: 2020-09-28

## 2020-09-28 RX ORDER — DEXTROSE, SODIUM CHLORIDE, AND POTASSIUM CHLORIDE 5; .45; .15 G/100ML; G/100ML; G/100ML
75 INJECTION INTRAVENOUS CONTINUOUS
Status: DISCONTINUED | OUTPATIENT
Start: 2020-09-28 | End: 2020-09-29

## 2020-09-28 RX ORDER — LABETALOL HCL 20 MG/4 ML
5 SYRINGE (ML) INTRAVENOUS
Status: DISCONTINUED | OUTPATIENT
Start: 2020-09-28 | End: 2020-09-28

## 2020-09-28 RX ORDER — LORAZEPAM 2 MG/ML
INJECTION INTRAMUSCULAR
Status: COMPLETED
Start: 2020-09-28 | End: 2020-09-28

## 2020-09-28 RX ORDER — HALOPERIDOL 5 MG/ML
3 INJECTION INTRAMUSCULAR ONCE
Status: ACTIVE | OUTPATIENT
Start: 2020-09-28 | End: 2020-09-29

## 2020-09-28 RX ADMIN — LORAZEPAM 0.5 MG: 2 INJECTION, SOLUTION INTRAMUSCULAR; INTRAVENOUS at 12:24

## 2020-09-28 RX ADMIN — ENOXAPARIN SODIUM 40 MG: 40 INJECTION SUBCUTANEOUS at 21:22

## 2020-09-28 RX ADMIN — POTASSIUM CHLORIDE, DEXTROSE MONOHYDRATE AND SODIUM CHLORIDE 75 ML/HR: 150; 5; 450 INJECTION, SOLUTION INTRAVENOUS at 11:51

## 2020-09-28 RX ADMIN — METHYLPREDNISOLONE SODIUM SUCCINATE 40 MG: 40 INJECTION, POWDER, FOR SOLUTION INTRAMUSCULAR; INTRAVENOUS at 15:26

## 2020-09-28 RX ADMIN — METHYLPREDNISOLONE SODIUM SUCCINATE 40 MG: 40 INJECTION, POWDER, FOR SOLUTION INTRAMUSCULAR; INTRAVENOUS at 21:22

## 2020-09-28 RX ADMIN — METHYLPREDNISOLONE SODIUM SUCCINATE 40 MG: 40 INJECTION, POWDER, FOR SOLUTION INTRAMUSCULAR; INTRAVENOUS at 06:32

## 2020-09-28 RX ADMIN — ACETAMINOPHEN 650 MG: 325 TABLET, FILM COATED ORAL at 21:23

## 2020-09-28 RX ADMIN — PIPERACILLIN SODIUM AND TAZOBACTAM SODIUM 3.38 G: 3; .375 INJECTION, POWDER, LYOPHILIZED, FOR SOLUTION INTRAVENOUS at 12:23

## 2020-09-28 RX ADMIN — METOPROLOL TARTRATE 50 MG: 50 TABLET, FILM COATED ORAL at 21:22

## 2020-09-28 RX ADMIN — PIPERACILLIN SODIUM AND TAZOBACTAM SODIUM 3.38 G: 3; .375 INJECTION, POWDER, LYOPHILIZED, FOR SOLUTION INTRAVENOUS at 06:32

## 2020-09-28 RX ADMIN — PIPERACILLIN SODIUM AND TAZOBACTAM SODIUM 3.38 G: 3; .375 INJECTION, POWDER, LYOPHILIZED, FOR SOLUTION INTRAVENOUS at 18:44

## 2020-09-28 RX ADMIN — LORAZEPAM 0.5 MG: 2 INJECTION, SOLUTION INTRAMUSCULAR; INTRAVENOUS at 15:25

## 2020-09-28 RX ADMIN — SODIUM CHLORIDE 100 ML/HR: 9 INJECTION, SOLUTION INTRAVENOUS at 06:33

## 2020-09-28 NOTE — PROGRESS NOTES
Reason for Admission:   Metabolic Encephalopathy                     RUR Score:  15%               Plan for utilizing home health:  Prior [facility] in the past.  Currently open for [facility] services via [facility]. RUSS needed prior to discharge. PCP: First and Last name:  William Garza MD   Name of Practice: [address] [place] [address] Internal Medicine   Are you a current patient: Yes/No: Yes   Approximate date of last visit: Sep 17th, 2020 (virtual)    Can you participate in a virtual visit with your PCP: Yes                      Current Advanced Directive/Advance Care Plan: FULL Code. No POA or Advanced Medical Directive. Spouse is the primary decision maker, in the event he's unable to make decisions for himself. Transition of Care Plan:    -d/c home  -RUSS for [facility] via [facility]  -PCP follow up       Lives in a two story home w/spouse. There are five steps to enter the front door. Patient doesn't ambulate up/down the stairs as he uses a chair lift. Patient established care w/listed PCP during virtual visit (9/17/2020). Patient to return approximate Oct 15th, 2020 per AVS.  No scheduled upcoming PCP appointment. Spouse voiced he's being followed by a pain specialist.  [address]). No home O2. DME includes: cane, walkers (2), wheelchairs (2), shower chair, chair lift, blood pressure cuff, pulse ox, bed rail, and shower chair. Pt requires assistance w/ADL's & IADL's from spouse. Currently isn't driving at this time. Prior HH in the past.  Currently open w/HH via [facility]. RUSS order needed prior to discharge for SN, PT, OT. No prior SNF or IRF in the past.  Referral to be sent via [facility] to [facility]. FULL Code. No POA or Advanced Medical Directive. Spouse is the primary decision maker, in the event is unable to make decisions for himself. SW to continue to follow re: discharge disposition.         Care Management Interventions  PCP Verified by CM: Yes(No physical visit.  )  Mode of Transport at Discharge: Other (see comment)(Family)  Transition of Care Consult (CM Consult): Discharge Planning  Discharge Durable Medical Equipment: (No home O2.   DME (cane, walkers, wheelchairs, chairliift, shower chair, blood pressure cuff, pulse ox, bed rails) )  Current Support Network: Lives with Spouse, Own Home(Two story home w/five steps to enter the home.)  Confirm Follow Up Transport: Family  Discharge Location  Discharge Placement: Home with home health        CLAUDIA Murdock

## 2020-09-28 NOTE — CONSULTS
Comprehensive Nutrition Assessment    Type and Reason for Visit: Initial    Nutrition Recommendations/Plan:   Advance diet per SLP recs    RD to provide ONS recs as appropriate    If SLP rec'd maintain NPO, rec'd NG placement for initiation of TF    If/when NG available for use, initiate TF of Jevity 1.5 at 30mL/hr    RD will provide full TF recs as appropriate    Nutrition Assessment:  AMS pta. COVID pending. Dx dehydration, UTI on admit. Admitted to ICU for monitoring 2/2 aspiration event in ED- CXR remains clear. Pt typically consumes regular texture diet at home; SLP rec'd NPO at this time. Plans to continue to monitor. Labs: K 3.4, Na 147. Meds: IVF, D5, solumedrol, zosyn. Malnutrition Assessment:  Malnutrition Status: Moderate malnutrition    Context:  Acute illness     Findings of the 6 clinical characteristics of malnutrition:   Energy Intake:  7 - 50% or less of est energy requirements for 5 or more days  Weight Loss:  Unable to assess     Body Fat Loss:  Unable to assess,     Muscle Mass Loss:  1 - Mild muscle mass loss, Clavicles (pectoralis & deltoids), Thigh (quadraceps), Calf  Fluid Accumulation:  No significant fluid accumulation,        Estimated Daily Nutrient Needs:  Energy (kcal):  1850kcal (30kcal/kg)  Protein (g):  79g (1.5g/kg)       Fluid (ml/day):  1575mL (30mL/kg)    Nutrition Related Findings:  Unable to perform NFPE. Pt does not appear with significant wasting on visual assessment. No documented hx dysphagia, n/v, or c/d. Last BM yesterday, per RN. No edema.       Wounds:    Unstageable(Multiple unstaged PU; none appear to be open.)       Current Nutrition Therapies:  DIET NPO    Anthropometric Measures:  · Height:  6' (182.9 cm)  · Current Body Wt:  52.6 kg (115 lb 15.4 oz)   · Ideal Body Wt:  178 lbs:  65.1 %   · BMI Category:  Underweight (BMI less than 22) age over 72       Nutrition Diagnosis:   · Inadequate protein-energy intake related to swallowing difficulty, increased demand for energy/nutrients, catabolic illness(AMS) as evidenced by NPO or clear liquid status due to medical condition, BMI, swallowing study results      Nutrition Interventions:   Food and/or Nutrient Delivery: Continue NPO(advance diet per SLP)  Nutrition Education and Counseling: No recommendations at this time  Coordination of Nutrition Care: Continued inpatient monitoring    Goals:  Initiate means of nutrition to meet >75% EENs >7 days  Wt gain 1lb +/- 0.5lb per week  Na and lytes wnl  Improve skin integrity       Nutrition Monitoring and Evaluation:   Behavioral-Environmental Outcomes:  N/A  Food/Nutrient Intake Outcomes: Diet advancement/tolerance, Enteral nutrition intake/tolerance  Physical Signs/Symptoms Outcomes: Biochemical data, Weight, Skin    Discharge Planning:     Too soon to determine     Electronically signed by Lawyer Ovalle on 9/28/2020 at 11:08 AM    Contact: [id number]

## 2020-09-28 NOTE — PROGRESS NOTES
Problem: Dysphagia (Adult)  Goal: *Acute Goals and Plan of Care (Insert Text)  Description: Speech Therapy Goals  Initiated 9/28/2020  -Patient will participate in modified barium swallow study if indicated pending pt's progress. [ ] Not met  [ ]  MET   [ ] Progressing  [ ] Nick Francis  -Patient will tolerate puree diet with nectar thick liquids without signs/symptoms of aspiration given moderate cues within 4 day(s). [ ] Not met  [ ]  MET   [ ] Progressing  [ ] Nick Francis  -Patient will demonstrate understanding of swallow safety precautions and aspiration precautions, diet recs with moderate cues within 7 day(s). [ ] Not met  [ ]  MET   [ ] Progressing  [ ] Discontinue      Outcome: Progressing [facility] TREATMENT  Patient: Ruben Estrella (19 y.o. male)  Date: 9/28/2020  Diagnosis: Metabolic encephalopathy [O30.37]   <principal problem not specified>       Precautions: aspiration      ASSESSMENT:  Pt seen for follow up, s/p MRI C-spine, nsg reports pt has received Ativan and is drowsy. Pt NPO Pending SLP assessment though evaluating SLP with recs for puree/thin diet. Pt does respond to clinician with max prompts and does accept PO trials thin via straw. Oral phase with generalized mild weakness, negatively impacted by fatigue. Pharyngeal phase with mild delay and min weakness to palpation. Cough after thin via straw noted. No overt s/s aspiration with nectar via straw. MIN trial puree tolerated without deficits. PLAN:  Recommendations and Planned Interventions: At this time, recommend diet upgrade to puree/nectar with strict aspiration and GERD precautions, monitor pt for s/s aspiration. SLP follow up for trials of diet upgrade, MBS if/as indicated pending pt's progress. Patient continues to benefit from skilled intervention to address the above impairments. Continue treatment per established plan of care.   Discharge Recommendations: To Be Determined     SUBJECTIVE:   Patient is drowsy but responsive. OBJECTIVE:   Cognitive and Communication Status:  Neurologic State: Confused  Orientation Level: Disoriented to place, Disoriented to situation, Disoriented to time  Cognition: Appropriate decision making    Pain:  No overt pain observed          After treatment:   Patient left in no apparent distress in bed, Call bell within reach, and Nursing notified    COMMUNICATION/EDUCATION:   Patient was not able to participate in education due to fatigue, nsg present and made aware of recs. The patient's plan of care including recommendations, planned interventions, and recommended diet changes were discussed with: Registered nurse.      Nicolas Gibbs M.S. CCC-SLP  Time Calculation: 10 mins

## 2020-09-28 NOTE — PROGRESS NOTES
Hospitalist Progress Note           Daily Progress Note: 9/28/2020    Chief complaint: Shortness of breath and weakness  Subjective: The patient is seen for follow  up.  56-year-old gentleman with history of inclusion body myositis admitted for progressive weakness and poor oral intake for several days. According to wife, patient has been having a slow clinical decline since July 2020. Noted with decreased mentation prior to admission and was brought to the ED. Reportedly took baclofen prior to AMS. Reportedly aspirated in the ED and had to be placed on BiPAP. More awake, alert and conversant today. Blood gases showed improved oxygenation and improved CO2 levels.       Problem List:  Problem List as of 9/28/2020 Date Reviewed: 9/17/2020          Codes Class Noted - Resolved    Metabolic encephalopathy YEP-[id number]: G93.41  ICD-9-CM: 348.31  9/26/2020 - Present        UTI (urinary tract infection) ICD-10-CM: N39.0  ICD-9-CM: 599.0  9/26/2020 - Present        Aspiration pneumonitis ([address] ICD-10-CM: J69.0  ICD-9-CM: 507.0  9/26/2020 - Present        Essential hypertension ICD-10-CM: I10  ICD-9-CM: 401.9  9/26/2020 - Present        Acute dehydration ICD-10-CM: E86.0  ICD-9-CM: 276.51  9/26/2020 - Present              Medications reviewed  Current Facility-Administered Medications   Medication Dose Route Frequency    dextrose 5% - 0.45% NaCl with KCl 20 mEq/L infusion  75 mL/hr IntraVENous CONTINUOUS    lisinopriL (PRINIVIL, ZESTRIL) tablet 20 mg  20 mg Oral DAILY    metoprolol tartrate (LOPRESSOR) tablet 50 mg  50 mg Oral BID    enoxaparin (LOVENOX) injection 40 mg  40 mg SubCUTAneous Q24H    0.9% sodium chloride infusion  100 mL/hr IntraVENous CONTINUOUS    aspirin delayed-release tablet 81 mg  81 mg Oral DAILY    acetaminophen (TYLENOL) tablet 650 mg  650 mg Oral Q6H PRN    ondansetron (ZOFRAN) injection 4 mg  4 mg IntraVENous Q8H PRN    guaiFENesin (ROBITUSSIN) 20 mg/mL oral liquid 400 mg  400 mg Oral Q6H PRN    methylPREDNISolone (PF) (SOLU-MEDROL) injection 40 mg  40 mg IntraVENous Q8H    piperacillin-tazobactam (ZOSYN) 3.375 g in 0.9% sodium chloride (MBP/ADV) 100 mL MBP  3.375 g IntraVENous Q8H       Review of Systems:   Review of systems obtained and negative    Objective:   Physical Exam:     Visit Vitals  BP (!) 129/106   Pulse 86   Temp 97.8 °F (36.6 °C)   Resp 21   Ht 6' (1.829 m)   Wt 52.6 kg (116 lb)   SpO2 100%   BMI 15.73 kg/m²    O2 Flow Rate (L/min): 35 l/min O2 Device: Hi flow nasal cannula    Temp (24hrs), Av.9 °F (36.6 °C), Min:97.8 °F (36.6 °C), Max:97.9 °F (36.6 °C)    No intake/output data recorded.  1901 -  0700  In: 2100 [I.V.:2100]  Out: 275 [Urine:275]    General:  Alert, cooperative, no distress, appears older than stated age. Lungs:   Clear to auscultation bilaterally. Chest wall:  No tenderness or deformity. Heart:  Regular rate and rhythm, S1, S2 normal, no murmur, click, rub or gallop. Abdomen:   Soft, non-tender. Bowel sounds normal. No masses,  No organomegaly. Extremities: Extremities normal, atraumatic, no cyanosis or edema. Pulses: 2+ and symmetric all extremities. Skin: Skin color, texture, turgor normal. No rashes or lesions   Neurologic: CNII-XII intact.  No gross sensory or motor deficits     Data Review:       Recent Days:  Recent Labs     20  0445 20  0243 20  1338   WBC 8.2 10.3 8.0   HGB 8.8* 10.0* 10.8*   HCT 26.3* 29.2* 31.6*    538* 609*     Recent Labs     20  0445 20  0243 20  2044 20  1338   * 146*  --  138   K 3.4* 2.9*  --  3.8   * 113*  --  110*   CO2 21 23  --  17*   * 92  --  87   BUN 11 11  --  10   CREA 0.56* 0.55*  --  0.61*   CA 7.6* 7.1*  --  7.7*   ALB  --   --   --  2.1*   TBILI  --   --   --  0.5   ALT  --   --   --  29   INR  --   --  2.6* 2.4*     Recent Labs     20  0745 20  1845   PH 7.531* 7.429   PCO2 20* 18* PO2 84 134*   HCO3 21* 16*   FIO2 50.0  --        24 Hour Results:  Recent Results (from the past 24 hour(s))   METABOLIC PANEL, BASIC    Collection Time: 09/28/20  4:45 AM   Result Value Ref Range    Sodium 147 (H) 136 - 145 mmol/L    Potassium 3.4 (L) 3.5 - 5.1 mmol/L    Chloride 117 (H) 97 - 108 mmol/L    CO2 21 21 - 32 mmol/L    Anion gap 9 5 - 15 mmol/L    Glucose 102 (H) 65 - 100 mg/dL    BUN 11 6 - 20 mg/dL    Creatinine 0.56 (L) 0.70 - 1.30 mg/dL    BUN/Creatinine ratio 20 12 - 20      GFR est AA >60 >60 ml/min/1.73m2    GFR est non-AA >60 >60 ml/min/1.73m2    Calcium 7.6 (L) 8.5 - 10.1 mg/dL   CBC WITH AUTOMATED DIFF    Collection Time: 09/28/20  4:45 AM   Result Value Ref Range    WBC 8.2 4.1 - 11.1 K/uL    RBC 2.74 (L) 4.10 - 5.70 M/uL    HGB 8.8 (L) 12.1 - 17.0 %    HCT 26.3 (L) 36.6 - 50.3 %    MCV 96.0 80.0 - 99.0 FL    MCH 32.1 26.0 - 34.0 PG    MCHC 33.5 30.0 - 36.5 g/dL    RDW 17.5 (H) 11.5 - 14.5 %    PLATELET 655 313 - 417 K/uL    MPV 8.7 (L) 8.9 - 12.9 FL    NEUTROPHILS 86 (H) 32 - 75 %    LYMPHOCYTES 8 (L) 12 - 49 %    MONOCYTES 5 5 - 13 %    EOSINOPHILS 0 0 - 7 %    BASOPHILS 0 0 - 1 %    IMMATURE GRANULOCYTES 1 (H) 0.0 - 0.5 %    ABS. NEUTROPHILS 7.2 1.8 - 8.0 K/UL    ABS. LYMPHOCYTES 0.6 (L) 0.8 - 3.5 K/UL    ABS. MONOCYTES 0.4 0.0 - 1.0 K/UL    ABS. EOSINOPHILS 0.0 0.0 - 0.4 K/UL    ABS. BASOPHILS 0.0 0.0 - 0.1 K/UL    ABS. IMM. GRANS. 0.1 (H) 0.00 - 0.04 K/UL    DF AUTOMATED     GLUCOSE, POC    Collection Time: 09/28/20  8:10 AM   Result Value Ref Range    Glucose (POC) 98 65 - 100 mg/dL    Performed by KRISTEN LEA            Assessment/     Acute hypoxic respiratory failure likely secondary to aspiration. Improved  Urinary tract infection  Metabolic encephalopathy  Severe dehydration due to poor oral intake  Benign essential hypertension  History of inclusion body myositis  Generalized debilitation from medical illness  Moderate protein calorie malnutrition  Metabolic acidosis.   Improved after bicarbonate(4 amps) push  Plan:  Continue supportive care including IV antibiotics  Follow-up MRI of the cervical spine and brain  PT OT evaluation . Transfer to medical telemetry floor  Clinically improving      Care Plan discussed with: Patient/Family    Total time spent with patient: 30 minutes.     Jason Pineda MD

## 2020-09-28 NOTE — CONSULTS
[address]    Name:  Delayne Schilder  MR#:  [id number]  :  1965  ACCOUNT #:  [de-identified]  DATE OF SERVICE:  2020      ATTENDING PHYSICIAN:  Tania Watt MD, hospitalist.    REASON FOR CONSULTATION:  Respiratory failure and pneumonia. HISTORY OF PRESENT ILLNESS:  The patient is a 73-year-old  male. 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 He also has a history of hypertension and TIA. He was brought over to the emergency room yesterday on  because of progressive weakness and lethargy. This apparently started three days ago. The patient is wheelchair bound because of his disorder. The wife expressed concern that for the last two weeks he has had more pronounced weakness. He has been getting physical therapy at home. He also had complained of lower extremity pain and has been on baclofen by his PCP. Because of progressive lethargy, the wife called the paramedics, he was brought over to the emergency room. His oral intake has been poor. In the emergency room, he was evaluated. He appeared to be in metabolic acidosis. He was hydrated with improvement. CT of the head was also obtained. He also had a chest x-ray and a CTA of the chest done. This will be discussed below. He has been ruled out for COVID-19. He has been placed on appropriate droplet precautions. I was asked for further evaluation. He is on high-flow oxygen at now 40% at this time. The patient is unable to provide me with any meaningful information. Most of this information was obtained from current medical records and after discussion with the nursing staff. Hemodynamically, he is stable. PAST MEDICAL HISTORY:  Significant for inclusion body myositis, TIA, and hypertension. ALLERGIES:  NO KNOWN DRUG ALLERGIES.     MEDICATIONS:  Currently, the patient started on normal saline at 100 mL per hour, aspirin 81 mg p.o. daily, labetalol 20 mg x1 was given, lisinopril 20 mg p.o. daily, Solu-Medrol 40 mg IV every 8 hourly, metoprolol 50 mg p.o. b.i.d., Zosyn 3.375 g IV every 8 hourly and other p.r.n. medications. SOCIAL HISTORY:  He lives with his wife. I am unable to obtain any significant history of drug, alcohol, or tobacco abuse. OCCUPATIONAL HISTORY, FAMILY HISTORY, AND REVIEW OF SYSTEMS:  Unobtainable from the patient as well. PHYSICAL EXAMINATION:  GENERAL:  The patient appears older than his stated age of 48 years. He is on high-flow oxygen 40%. In no distress. VITAL SIGNS:  Temperature is 97.9, pulse is 98 per minute, respiratory rate is 21, and blood pressure is 117/71. Saturation is 99%. HEENT:  Pupils equal, round and reactive to light. Mild pallor is noted. No icterus. Nasal passages are patent. He is on high-flow nasal cannula. He is unable to fully open his mouth for oropharyngeal evaluation. NECK:  Supple. Trachea is central.  No JVD or lymphadenopathy. He is not using accessory muscles of respiration. CHEST:  Symmetrical with equal and diminished air entry bilaterally. He does appear to have some basilar crackles, but his inspiratory effort is very poor. No rhonchi or wheezing. Rhythm is regular without any loud murmur or gallop. Monitor showing sinus tachycardia. ABDOMEN:  Soft and benign. Bowel sounds audible. No masses or organomegaly. EXTREMITIES:  Do not show any cyanosis or clubbing. No pitting edema. Pulses are palpable. NEUROLOGIC:  Shows that the patient does not move any extremities. He has decreased muscle tone as well. No obvious tremors. He tries to talk but his speech is very dysarthric. SKIN:  Warm and dry. LABORATORY DATA:  Portable chest x-ray shows no acute process. CT of the head showed no acute process. CTA was personally reviewed. The formal report is pending. He does appear to have right lower lobe airspace disease in the dependent portion compatible with aspiration pneumonia.   WBC count is 10.3 with neutrophils of 89%, hemoglobin is 10, platelet count of 244. INR is 2.6. D-dimer is 0.99. Arterial blood gases done on 50% high-flow showed pH of 7.53, pCO2 of 20, and PaO2 of 84. ASSESSMENT AND PLAN:  1. Hypoxic respiratory failure. This appears to be secondary to aspiration pneumonia. The patient is prone for aspiration pneumonia given his neurologic disorder. He has inclusion body myositis/familial polymyositis. We will be starting him on intravenous Zosyn empirically. He should be n.p.o. for now. Speech therapist evaluation in the morning. Continue with high-flow oxygen at 40%. In the a.m., we will personally review the CT scan as well. He has been placed on droplet precautions, COVID-19 is pending. It is noted that the patient was also started on intravenous Solu-Medrol which may be continued. Consider zinc and vitamin C supplementation. 2.  Metabolic encephalopathy. He has a progressive neurologic disorder. Neurology is consulted. He has familial myositis. Further recommendation per Neurology. 3.  Dehydration and metabolic acidosis. Continue with intravenous fluids. This appears to have improved with hydration. 4.  Possible urinary tract infection. 5.  Hypertension. 6.  For deep venous thrombosis prophylaxis, I will start him on Lovenox subcutaneously. Thank you for allowing me to participate in the care of this patient. I will follow the patient closely with you. It appears that the patient is full code at this time. Prognosis appears to be guarded. Approximately one hour was spent in the management of this patient.       Regi Price MD      [id number]  D:  09/27/2020 20:46  T:  09/28/2020 1:58  JOB #:  [id number]

## 2020-09-28 NOTE — CONSULTS
Full consultation dictated. [id number]. Acute hypoxic respiratory failure. Appears to be due to aspiration pneumonia. Given his neurologic disorder he is at high risk for aspiration. N.p.o. for now. Speech evaluation in the morning. Continue with Zosyn. Continue with IV fluids. Rule out COVID-19. Thank you for this consultation.

## 2020-09-28 NOTE — PROGRESS NOTES
Spoke with Dr. Sindi Larson regarding radiologist concerns from today's CTA. Advised to pass this along tomorrow for Dr. Clare Wiley.

## 2020-09-28 NOTE — PROGRESS NOTES
NEUROLOGY  PROGRESS NOTE    Admission History/Pertinent Events  Kevin Olivares is a 48y.o. year old male with a past medical history of Familial Polymyositis, TIA, HTN who presented on 9/26/2020. Per chart review, patient presented with encephalopathy and generalized weakness which had been worsening over the past 3 days prior to presentation. Patient's wife reported that since July 2020 patient has had a steady decline in his overall functional status. Patient has had a great degree of worsening in the past 2 weeks for which patient was evaluated by his PCP. Patient reportedly was given baclofen for cramping and spastic pain in his bilateral lower extremities. Patient took his 1st dose of baclofen on the evening of 09/25/2020 after which patient became significantly encephalopathic. Workup in the ED revealed UA concerning for UTI for which patient was treated with IV fluids and ceftriaxone. Emergent CTH was negative for acute findings. Neurology consulted  For further evaluation of patient's generalized decline in the past several months. Home Antiplatelets: ASA 81  Home Antilipids: Pravastatin 20      ASSESSMENT/PLAN      Impression  Patient's MRI without any acute/subacute findings to reflect etiology of patient's presenting encephalopathy. Patient unable to get MRI of the cervical spine as he was not able to tolerate the length of the examination. Patient's mental status slowly improving and thus a toxic/metabolic etiology is most likely. Spoke with patient's wife who agrees the patient's mental status is improving and is the best it has been in the last several days. Patient's wife was advised to follow-up with neuromuscular specialist for patient's worsening weakness in the lower extremities to which patient's wife agrees and reports she will try to make an appointment at [facility].      Labs  Positive: CK (700), UA (bacteria)  Negative: WBC, Na, BUN, Creatinine, Troponin,      Neuroimaging  -CTH WO  NAICA      -MRI Brain WO  NAICA  Chronic bilateral white matter microvascular ischemic changes      Plan    Toxic/Metabolic Encephalopathy  Associated: Familial Polymyositis, Decreased PO Intake, Possible UTI, Baclofen/Oxycodone Use  -PT/OT/ST as needed  -Hold baclofen and oxycodone while patient is encephalopathic  -F/U LFTs, TSH, VitB12, Mg    No further neurologic work-up is necessary at the current time. Neurology will now sign off. Please do not hesitate to contact neurology with any questions/concerns. Patient will follow-up with neuromuscular specialty at [facility]. SUBJECTIVE   Patient reports that he is feeling well. He has no complaints this morning but would like his mittens off. He is alert and oriented to person, place but not time. He felt it was 36. He knows who the president is. No changes on neurological examination otherwise.       OBJECTIVE  Vital Signs  Temp:  [97.5 °F (36.4 °C)-97.9 °F (36.6 °C)]   Pulse (Heart Rate):  []   BP: (117-194)/()   Resp Rate:  [18-33]   O2 Sat (%):  [94 %-100 %]   Weight:  [52.6 kg (116 lb)]     MEDICATIONS  Current Facility-Administered Medications   Medication Dose Route Frequency    dextrose 5% - 0.45% NaCl with KCl 20 mEq/L infusion  75 mL/hr IntraVENous CONTINUOUS    lisinopriL (PRINIVIL, ZESTRIL) tablet 20 mg  20 mg Oral DAILY    metoprolol tartrate (LOPRESSOR) tablet 50 mg  50 mg Oral BID    enoxaparin (LOVENOX) injection 40 mg  40 mg SubCUTAneous Q24H    0.9% sodium chloride infusion  100 mL/hr IntraVENous CONTINUOUS    aspirin delayed-release tablet 81 mg  81 mg Oral DAILY    acetaminophen (TYLENOL) tablet 650 mg  650 mg Oral Q6H PRN    ondansetron (ZOFRAN) injection 4 mg  4 mg IntraVENous Q8H PRN    guaiFENesin (ROBITUSSIN) 20 mg/mL oral liquid 400 mg  400 mg Oral Q6H PRN    methylPREDNISolone (PF) (SOLU-MEDROL) injection 40 mg  40 mg IntraVENous Q8H    piperacillin-tazobactam (ZOSYN) 3.375 g in 0.9% sodium chloride (MBP/ADV) 100 mL MBP  3.375 g IntraVENous Q8H        Physical/Neurological Exam  General: Elderly  male, compliant  Cardiovascular: normal rate and rhythm   Pulmonary: CTAB      Patient awake, alert but clearly confused; following central and peripheral commands   No expressive or receptive aphasia; No dysarthria   Pupils react to light bilaterally; EOM Intact   No visual field deficits on gross exam   Intact to light touch on face bilaterally   No facial droop   No gross hearing loss   Tongue is midline   Motor   BUE: 4+/5   BLE: 2/5 at best   No abnormal movements   Normal tone throughout   Sensation to light touch intact grossly throughout   Reflexes: Absent bilateral lower extremities; 1+ bilateral brachioradialis  Toes downgoing bilaterally, no ankle clonus with forced dorsiflexion bilaterally, negative Evans's bilaterally       Labs: I've reviewed the labs for today     This document has been prepared by the [employer] voice recognition system, typographical errors may have occurred.  Attempts have been made to correct errors, however inadvertent errors may persist.

## 2020-09-28 NOTE — WOUND CARE
IP WOUND CONSULT Bethann Severance MEDICAL RECORD NUMBER:  [id number] AGE: 48 y.o. GENDER: male  : 1965 
TODAY'S DATE:  2020 GENERAL  
 
[] Follow-up [x] New Consult Bethann Severance is a 48 y.o. male referred by:  
[x] Physician 
[] Nursing 
[] Other: PAST MEDICAL HISTORY Past Medical History:  
Diagnosis Date  Myositis Familial inclusion PAST SURGICAL HISTORY Past Surgical History:  
Procedure Laterality Date  HX ORTHOPAEDIC    
 disc infused, neck FAMILY HISTORY Family History Problem Relation Age of Onset  Other Father  Other Sister  Other Brother ALLERGIES No Known Allergies MEDICATIONS No current facility-administered medications on file prior to encounter. Current Outpatient Medications on File Prior to Encounter Medication Sig Dispense Refill  baclofen (LIORESAL) 10 mg tablet Take 1 Tab by mouth three (3) times daily for 30 days. 90 Tab 1  
 oxyCODONE IR (OXY-IR) 15 mg immediate release tablet Take 15 mg by mouth every four (4) hours as needed for Pain.  aspirin delayed-release 81 mg tablet Take  by mouth daily.  mirtazapine (REMERON) 15 mg tablet Take 7.5 mg by mouth nightly.  pravastatin (PRAVACHOL) 20 mg tablet Take 20 mg by mouth nightly.  lisinopriL (PRINIVIL, ZESTRIL) 5 mg tablet Take 1 Tab by mouth daily for 60 days. 30 Tab 1  
 food supplemt, lactose-reduced (Ensure High Protein) liqd Take 237 mL by mouth two (2) times a day for 60 days. 16709 mL 1  
 
 
 
[unfilled] Visit Vitals BP (!) 156/83 (BP 1 Location: Right arm) Pulse 80 Temp 96.8 °F (36 °C) Resp 22 Ht 6' (1.829 m) Wt 52.6 kg (116 lb) SpO2 98% BMI 15.73 kg/m² ASSESSMENT Wound Identification: Yes Wound Type: Pressure injuries Contributing Factors: chronic pressure, decreased mobility, shear force, incontinence of stool and malnutrition Wound Foot Anterior;Right;Lateral deep red, blanchable. 09/27/20 (Active) Pressure Injury Deep Tissue Injury 09/28/20 1809 Shape Round regular 09/28/20 1809 Wound Length (cm) 2 cm 09/28/20 1809 Wound Width (cm) 2 cm 09/28/20 1809 Wound Surface Area (cm^2) 4 cm^2 09/28/20 1809 Condition of Edges Closed 09/28/20 1809 Assessment Clean, dry, and intact; Red; Other (Comment) 09/28/20 1809 Tissue Type Percent Maroon/Purple 40 % 09/28/20 1809 Tissue Type Percent Red 60 09/28/20 1809 Drainage Amount None 09/28/20 1809 Wound Odor None 09/28/20 1809 Lakshmi-wound Assessment Non-blanchable erythema 09/28/20 1809 Number of days: 1 Wound Ankle Anterior;Right;Lateral deep red, blanchable with non-blanching area over bony prominence. 09/27/20 (Active) Pressure Injury Stage 1 09/28/20 1809 Wound Length (cm) 1 cm 09/28/20 1809 Wound Width (cm) 1 cm 09/28/20 1809 Wound Surface Area (cm^2) 1 cm^2 09/28/20 1809 Condition of Edges Closed 09/28/20 1809 Assessment Clean, dry, and intact; Red 09/28/20 1809 Tissue Type Percent Red 100 09/28/20 1809 Drainage Amount None 09/28/20 1809 Wound Odor None 09/28/20 1809 Lakshmi-wound Assessment Non-blanchable erythema 09/28/20 1809 Number of days: 1 Wound Heel Left deep red, non-blanching. 09/27/20 (Active) Pressure Injury Stage 1 09/28/20 1809 Shape Round regular 09/28/20 1809 Condition of Edges Closed 09/28/20 1809 Assessment Clean, dry, and intact; Red 09/28/20 1809 Drainage Amount None 09/28/20 1809 Wound Odor None 09/28/20 1809 Lakshmi-wound Assessment Non-blanchable erythema 09/28/20 1809 Number of days: 1 Wound Foot Left;Plantar deep red, blanching. 09/27/20 (Active) Number of days: 1 Wound Hip Right;Posterior; Lateral deep red, blanching. (Active) Dressing Status Clean, dry, and intact; Removed 09/28/20 1809 Dressing Type Foam 09/28/20 1809 Pressure Injury Stage 2 09/28/20 1809 Shape Round regular 09/28/20 1809 Wound Length (cm) 7 cm 09/28/20 1809 Wound Width (cm) 6 cm 09/28/20 1809 Wound Surface Area (cm^2) 42 cm^2 09/28/20 1809 Condition of Base Granulation; Other (comment) 09/28/20 1809 Condition of Edges Open 09/28/20 1809 Assessment Clean 09/28/20 1809 Tissue Type Percent Red 100 09/28/20 1809 Drainage Amount None 09/28/20 1809 Wound Odor None 09/28/20 1809 Number of days: 1 Wound Sacral/coccyx Dorsal deep red/purple, non-blanching, covering most of sacrum, coccyx, and inner buttocks. 09/27/20 (Active) Dressing Status Removed 09/28/20 1809 Dressing Type Foam 09/28/20 1809 Pressure Injury Deep Tissue Injury 09/28/20 1809 Shape Round irregular 09/28/20 1809 Condition of Edges Closed 09/28/20 1809 Assessment Clean, dry, and intact; Red 09/28/20 1809 Tissue Type Percent Maroon/Purple 75 % 09/28/20 1809 Tissue Type Percent Red 25 09/28/20 1809 Drainage Amount None 09/28/20 1809 Wound Odor None 09/28/20 1809 Lakshmi-wound Assessment Non-blanchable erythema 09/28/20 1809 Dressing Changed Changed/New 09/28/20 1809 Dressing Type Applied Zinc based paste 09/28/20 1809 Number of days: 1 Wound Spine deep red, blanching, along bony prominences over spine. 09/27/20 (Active) Dressing Status Clean, dry, and intact 09/28/20 1809 Dressing Type Foam 09/28/20 1809 Pressure Injury Stage 1 09/28/20 1809 Shape Round regular 09/28/20 1809 Condition of Edges Closed 09/28/20 1809 Assessment Clean, dry, and intact 09/28/20 1809 Tissue Type Percent Red 100 09/28/20 1809 Drainage Amount None 09/28/20 1809 Wound Odor None 09/28/20 1809 Lakshmi-wound Assessment Clean;Dry; Intact 09/28/20 1809 Number of days: 1 PLAN Skin Care & Pressure Relief Recommendations Minimize layers of linen Turn/reposition approximately every 2 hours Manage incontinence Promote continence; Skin Protective lotion/cream to buttocks and sacrum daily and as needed with incontinence care Offload heels pillows Physician/Provider notified:  
Recommendations: Will need P500 Low Air Loss when transfers to med-surg unit. DO NOT apply Mepilex Border to right hip wound, skin is too fragile. Teaching completed with:  
[] Patient          
[] Family member      
[] Caregiver         
[] Nursing 
[] Other Patient/Caregiver Teaching: 
Level of patient/caregiver understanding able to:  
[] Indicates understanding       [] Needs reinforcement 
[] Unsuccessful      [] Verbal Understanding 
[] Demonstrated understanding       [] No evidence of learning 
[] Refused teaching         [] N/A Electronically signed by Benjy Stratton RN on 9/28/2020 at 6:27 PM

## 2020-09-28 NOTE — PROGRESS NOTES
Pulmonology and Critical Care Progress Note    Subjective:     Chief Complaint:   Chief Complaint   Patient presents with    Altered mental status          Patient seen and examined in ICU  Overnight as noted    Lying in bed comfortably  Awake and alert  No acute distress    Currently on 30% high flow at 35 L  Adequate oxygen saturations    Review of Systems:  Review of systems not obtained due to patient factors.     Current Facility-Administered Medications   Medication Dose Route Frequency Provider Last Rate Last Dose    dextrose 5% - 0.45% NaCl with KCl 20 mEq/L infusion  75 mL/hr IntraVENous CONTINUOUS Margaret Hall MD 75 mL/hr at 09/28/20 1151 75 mL/hr at 09/28/20 1151    lisinopriL (PRINIVIL, ZESTRIL) tablet 20 mg  20 mg Oral DAILY Margaret Hall MD   Stopped at 09/28/20 0900    metoprolol tartrate (LOPRESSOR) tablet 50 mg  50 mg Oral BID Margaret Hall MD   Stopped at 09/28/20 0900    enoxaparin (LOVENOX) injection 40 mg  40 mg SubCUTAneous Q24H Behzad Ocasio MD   40 mg at 09/27/20 2200    0.9% sodium chloride infusion  100 mL/hr IntraVENous CONTINUOUS Tim Long  mL/hr at 09/28/20 0633 100 mL/hr at 09/28/20 3894    aspirin delayed-release tablet 81 mg  81 mg Oral DAILY Maritza Juárez NP   Stopped at 09/28/20 0900    acetaminophen (TYLENOL) tablet 650 mg  650 mg Oral Q6H PRN Gisele Long NP        ondansetron (ZOFRAN) injection 4 mg  4 mg IntraVENous Q8H PRN Gisele Long NP        guaiFENesin (ROBITUSSIN) 20 mg/mL oral liquid 400 mg  400 mg Oral Q6H PRN Maritza Juárez NP   Stopped at 09/26/20 1855    methylPREDNISolone (PF) (SOLU-MEDROL) injection 40 mg  40 mg IntraVENous Q8H Gisele Long NP   40 mg at 09/28/20 0031    piperacillin-tazobactam (ZOSYN) 3.375 g in 0.9% sodium chloride (MBP/ADV) 100 mL MBP  3.375 g IntraVENous Q8H Margaret Hall MD 25 mL/hr at 09/28/20 1223 3.375 g at 09/28/20 1223            No Known Allergies        Objective:     Blood pressure (!) 129/106, pulse 83, temperature 97.6 °F (36.4 °C), resp. rate 21, height 6' (1.829 m), weight 52.6 kg (116 lb), SpO2 100 %. Temp (24hrs), Av.8 °F (36.6 °C), Min:97.6 °F (36.4 °C), Max:97.9 °F (36.6 °C)      Intake and Output:  Current Shift: No intake/output data recorded. Last 3 Shifts: 1901 -  0700  In: 2100 [I.V.:2100]  Out: 275 [Urine:275]    Physical Exam:     General: Lying in bed comfortably, no acute distress, on high flow oxygen  Eye: Reactive, symmetric  Throat and Neck: Supple  Lung: Reduced air entry bilaterally with prolonged exhalation but no wheezing. Occasional crackles. Heart: S1+S2. No murmurs  Abdomen: soft, non-tender. Bowel sounds normal. No masses; obese  Extremities: No edema  : Not done  Skin: No cyanosis  Neurologic: Awake and alert, grossly nonfocal  Psychiatric:  Unable to perform due to patient's condition      Lab/Data Review: All lab results for the last 24 hours reviewed.   Recent Results (from the past 24 hour(s))   METABOLIC PANEL, BASIC    Collection Time: 20  4:45 AM   Result Value Ref Range    Sodium 147 (H) 136 - 145 mmol/L    Potassium 3.4 (L) 3.5 - 5.1 mmol/L    Chloride 117 (H) 97 - 108 mmol/L    CO2 21 21 - 32 mmol/L    Anion gap 9 5 - 15 mmol/L    Glucose 102 (H) 65 - 100 mg/dL    BUN 11 6 - 20 mg/dL    Creatinine 0.56 (L) 0.70 - 1.30 mg/dL    BUN/Creatinine ratio 20 12 - 20      GFR est AA >60 >60 ml/min/1.73m2    GFR est non-AA >60 >60 ml/min/1.73m2    Calcium 7.6 (L) 8.5 - 10.1 mg/dL   CBC WITH AUTOMATED DIFF    Collection Time: 20  4:45 AM   Result Value Ref Range    WBC 8.2 4.1 - 11.1 K/uL    RBC 2.74 (L) 4.10 - 5.70 M/uL    HGB 8.8 (L) 12.1 - 17.0 %    HCT 26.3 (L) 36.6 - 50.3 %    MCV 96.0 80.0 - 99.0 FL    MCH 32.1 26.0 - 34.0 PG    MCHC 33.5 30.0 - 36.5 g/dL    RDW 17.5 (H) 11.5 - 14.5 %    PLATELET 237 971 - 229 K/uL    MPV 8.7 (L) 8.9 - 12.9 FL    NEUTROPHILS 86 (H) 32 - 75 %    LYMPHOCYTES 8 (L) 12 - 49 %    MONOCYTES 5 5 - 13 %    EOSINOPHILS 0 0 - 7 %    BASOPHILS 0 0 - 1 %    IMMATURE GRANULOCYTES 1 (H) 0.0 - 0.5 %    ABS. NEUTROPHILS 7.2 1.8 - 8.0 K/UL    ABS. LYMPHOCYTES 0.6 (L) 0.8 - 3.5 K/UL    ABS. MONOCYTES 0.4 0.0 - 1.0 K/UL    ABS. EOSINOPHILS 0.0 0.0 - 0.4 K/UL    ABS. BASOPHILS 0.0 0.0 - 0.1 K/UL    ABS. IMM. GRANS. 0.1 (H) 0.00 - 0.04 K/UL    DF AUTOMATED     GLUCOSE, POC    Collection Time: 09/28/20  8:10 AM   Result Value Ref Range    Glucose (POC) 98 65 - 100 mg/dL    Performed by KRISTEN LEA      chest X-ray      CTA CHEST W OR W WO CONT   Final Result   IMPRESSION: Limited by breathing motion. 1. No large pulmonary arterial filling defect. 2. Bronchial thickening with right greater than left lower lung atelectasis or   pneumonia/pneumonitis. Bubbly debris in the trachea. 3. Atherosclerosis. Abrupt discontinuation of contrast in the aorta on the very   inferior slices. Contrast is seen in the celiac artery and SMA and the left   renal artery. The right kidney demonstrates decreased attenuation compared to   the left concerning for medical renal disease to include ischemia. Recommend CTA   abdomen/pelvis. Called report to charge nurse Petra Krishna at 9:05 PM 9/27/2020.   4. Cholelithiasis within distended gallbladder. 5. Dilated small bowel. 6. Other findings as above. XR CHEST PORT   Final Result   IMPRESSION:      No airspace disease in the lungs. Follow-up as clinically indicated. CT HEAD WO CONT   Final Result   Impression: Unremarkable head CT without contrast.  No infarct, mass, or    hemorrhage. Please see full report. XR CHEST SNGL V   Final Result   Impression: No diagnostic abnormality. MRI BRAIN WO CONT    (Results Pending)   MRI CERV SPINE W CONT    (Results Pending)       CT Results  (Last 48 hours)               09/27/20 1438  CTA CHEST W OR W WO CONT Final result    Impression:  IMPRESSION: Limited by breathing motion. 1. No large pulmonary arterial filling defect. 2. Bronchial thickening with right greater than left lower lung atelectasis or   pneumonia/pneumonitis. Bubbly debris in the trachea. 3. Atherosclerosis. Abrupt discontinuation of contrast in the aorta on the very   inferior slices. Contrast is seen in the celiac artery and SMA and the left   renal artery. The right kidney demonstrates decreased attenuation compared to   the left concerning for medical renal disease to include ischemia. Recommend CTA   abdomen/pelvis. Called report to charge nurse Kelly Chaney at 9:05 PM 9/27/2020.   4. Cholelithiasis within distended gallbladder. 5. Dilated small bowel. 6. Other findings as above. Narrative:  PE.       Comparison chest x-ray 9/27/2020 with indication shortness of breath. Chest CTA Technique: Axial chest with IV contrast. Multiplanar chest   reformatting and chest MIPs. 100 cc Isovue 370 administered IV. Dose reduction: All CT scans at this facility are performed using dose reduction   optimization techniques as appropriate to a performed exam including the   following: Automated exposure control, adjustments of the mA and/or kV according   to patient's size, or use of iterative reconstruction technique. FINDINGS: Limited by breathing motion. No large pulmonary arterial filling   defect. Partially calcified thoracic aorta without aneurysm or dissection. Normal heart size. No pericardial effusion. No mediastinal or hilar adenopathy. Emphysema. Bronchial thickening without bronchiectasis leading to dependent   right greater than left lower lung atelectasis or infiltrate. There is bubbly   debris in the trachea. No pleural effusion. No axillary adenopathy. Included   thyroid unremarkable. Included imaging of the upper abdomen demonstrates   cholelithiasis within a distended gallbladder, abrupt discontinuation of flow in   the aorta, diminished contrast enhancement in the right kidney compared to the   left kidney. Dilated small bowel.  Bony skeleton is intact. Incompletely imaged   anterior cervical spine plate fixation. 09/26/20 1352  CT HEAD WO CONT Final result    Impression:  Impression: Unremarkable head CT without contrast.  No infarct, mass, or    hemorrhage. Please see full report. Narrative:  History is acute mental status change. Serial axial images were obtained through the brain at 5 mm intervals without   contrast administration. Bone and brain windows are evaluated as well as   sagittal and coronal reformatted images. Dose reduction:  All CT scans at this facility are performed using dose   reduction optimization techniques as appropriate to a performed exam including   the following: automated exposure control, adjustments of the mA and/or kV   according to patient size, or use of iterative reconstruction technique. The ventricles are midline without extrinsic compression or dilatation. There is   no intra or extra-axial hemorrhage, mass, infarct or edema. No midline shift is   identified. No orbital mass is identified. On bone windows there is no skull fracture or soft tissue swelling. No sinusitis   or mastoiditis is identified. There are vascular calcifications present. There   is motion artifact which mars the images, however. Assessment:     1. Acute respiratory failure with hypoxia  2. Acute metabolic encephalopathy  3. Aspiration pneumonia  4. Metabolic acidosis  5. UTI  6. Hypertension  7. Familial polymyositis    Plan:     1. Hypoxic respiratory failure. This appears to be secondary to aspiration pneumonia. CT chest reviewed  Shows bilateral infiltrates right more than left with debris in the airway suspicious for aspiration    The patient is prone for aspiration pneumonia given his neurologic disorder. He has inclusion body myositis/familial polymyositis. We will be starting him on intravenous Zosyn empirically. Speech therapist evaluation. Continue with high-flow oxygen at 30%. He has been placed on droplet precautions, COVID-19 is pending. It is noted that the patient was also started on intravenous Solu-Medrol which may be continued. 2.  Aspiration pneumonia:  With Zosyn for antibiotic coverage  Speech evaluation    3. Metabolic encephalopathy. He has a progressive neurologic disorder. Neurology is consulted. He has familial myositis. Further recommendation per Neurology. 4.  Dehydration and metabolic acidosis. Continue with intravenous fluids. This appears to have improved with hydration. 5.  Possible urinary tract infection. 6.  Hypertension. For deep venous thrombosis prophylaxis, I will start him on Lovenox subcutaneously.     Thank you for allowing me to participate in the care of this patient. I will follow the patient closely with you. It appears that the patient is full code at this time. Prognosis appears to be guarded. DVT and GI prophylaxis    Questions of patient were answered at bedside in detail  Case discussed in detail with RN, RT, and care team    Thank you for involving me in the care of this patient  I will follow with you closely during hospitalization    Time spent more than 30 minutes in direct patient care with no overlap reviewing results and records, decision-making, and answering questions.       Josie Lott MD  [facility]  9/28/2020  12:35 PM

## 2020-09-29 ENCOUNTER — APPOINTMENT (OUTPATIENT)
Dept: CT IMAGING | Age: 55
DRG: 981 | End: 2020-09-29
Attending: INTERNAL MEDICINE
Payer: COMMERCIAL

## 2020-09-29 ENCOUNTER — APPOINTMENT (OUTPATIENT)
Dept: GENERAL RADIOLOGY | Age: 55
DRG: 981 | End: 2020-09-29
Attending: INTERNAL MEDICINE
Payer: COMMERCIAL

## 2020-09-29 LAB
ANION GAP SERPL CALC-SCNC: 7 MMOL/L (ref 5–15)
ARTERIAL PATENCY WRIST A: ABNORMAL
BASE DEFICIT BLDA-SCNC: 1.3 MMOL/L (ref 0–2)
BASOPHILS # BLD: 0 K/UL (ref 0–0.1)
BASOPHILS NFR BLD: 0 % (ref 0–1)
BDY SITE: ABNORMAL
BUN SERPL-MCNC: 10 MG/DL (ref 6–20)
BUN/CREAT SERPL: 26 (ref 12–20)
CA-I BLD-MCNC: 7.8 MG/DL (ref 8.5–10.1)
CHLORIDE SERPL-SCNC: 116 MMOL/L (ref 97–108)
CO2 SERPL-SCNC: 21 MMOL/L (ref 21–32)
COLONY COUNT,CNT: ABNORMAL
COLONY COUNT,CNT: ABNORMAL
CREAT SERPL-MCNC: 0.38 MG/DL (ref 0.7–1.3)
CULTURE,CULT: ABNORMAL
DIFFERENTIAL METHOD BLD: ABNORMAL
EOSINOPHIL # BLD: 0 K/UL (ref 0–0.4)
EOSINOPHIL NFR BLD: 0 % (ref 0–7)
ERYTHROCYTE [DISTWIDTH] IN BLOOD BY AUTOMATED COUNT: 17 % (ref 11.5–14.5)
FIO2 ON VENT: 21 %
GLUCOSE SERPL-MCNC: 159 MG/DL (ref 65–100)
HCO3 BLDA-SCNC: 23 MMOL/L (ref 22–26)
HCT VFR BLD AUTO: 26.4 % (ref 36.6–50.3)
HGB BLD-MCNC: 9.1 % (ref 12.1–17)
IMM GRANULOCYTES # BLD AUTO: 0.1 K/UL (ref 0–0.04)
IMM GRANULOCYTES NFR BLD AUTO: 1 % (ref 0–0.5)
LYMPHOCYTES # BLD: 0.7 K/UL (ref 0.8–3.5)
LYMPHOCYTES NFR BLD: 11 % (ref 12–49)
MAGNESIUM SERPL-MCNC: 1.7 MG/DL (ref 1.6–2.4)
MCH RBC QN AUTO: 32.5 PG (ref 26–34)
MCHC RBC AUTO-ENTMCNC: 34.5 G/DL (ref 30–36.5)
MCV RBC AUTO: 94.3 FL (ref 80–99)
MONOCYTES # BLD: 0.4 K/UL (ref 0–1)
MONOCYTES NFR BLD: 7 % (ref 5–13)
NEUTS SEG # BLD: 5.1 K/UL (ref 1.8–8)
NEUTS SEG NFR BLD: 81 % (ref 32–75)
PCO2 BLDA: 28 MMHG (ref 35–45)
PH BLDA: 7.49 [PH] (ref 7.35–7.45)
PLATELET # BLD AUTO: 376 K/UL (ref 150–400)
PMV BLD AUTO: 8.7 FL (ref 8.9–12.9)
PO2 BLDA: 80 MMHG (ref 75–100)
POTASSIUM SERPL-SCNC: 2.6 MMOL/L (ref 3.5–5.1)
POTASSIUM SERPL-SCNC: 3 MMOL/L (ref 3.5–5.1)
RBC # BLD AUTO: 2.8 M/UL (ref 4.1–5.7)
SAO2 % BLD: 97 %
SODIUM SERPL-SCNC: 144 MMOL/L (ref 136–145)
SPECIAL REQUESTS,SREQ: ABNORMAL
WBC # BLD AUTO: 6.2 K/UL (ref 4.1–11.1)

## 2020-09-29 PROCEDURE — 85025 COMPLETE CBC W/AUTO DIFF WBC: CPT

## 2020-09-29 PROCEDURE — 92526 ORAL FUNCTION THERAPY: CPT

## 2020-09-29 PROCEDURE — 36415 COLL VENOUS BLD VENIPUNCTURE: CPT

## 2020-09-29 PROCEDURE — 84132 ASSAY OF SERUM POTASSIUM: CPT

## 2020-09-29 PROCEDURE — 71045 X-RAY EXAM CHEST 1 VIEW: CPT

## 2020-09-29 PROCEDURE — 74011000258 HC RX REV CODE- 258: Performed by: INTERNAL MEDICINE

## 2020-09-29 PROCEDURE — 74011250636 HC RX REV CODE- 250/636: Performed by: INTERNAL MEDICINE

## 2020-09-29 PROCEDURE — 82803 BLOOD GASES ANY COMBINATION: CPT

## 2020-09-29 PROCEDURE — 74011250637 HC RX REV CODE- 250/637: Performed by: NURSE PRACTITIONER

## 2020-09-29 PROCEDURE — 74011250637 HC RX REV CODE- 250/637: Performed by: INTERNAL MEDICINE

## 2020-09-29 PROCEDURE — 74011250636 HC RX REV CODE- 250/636: Performed by: NURSE PRACTITIONER

## 2020-09-29 PROCEDURE — 74174 CTA ABD&PLVS W/CONTRAST: CPT

## 2020-09-29 PROCEDURE — 83735 ASSAY OF MAGNESIUM: CPT

## 2020-09-29 PROCEDURE — 65270000029 HC RM PRIVATE

## 2020-09-29 PROCEDURE — 74011000636 HC RX REV CODE- 636: Performed by: INTERNAL MEDICINE

## 2020-09-29 RX ORDER — POTASSIUM CHLORIDE 1.5 G/1.77G
40 POWDER, FOR SOLUTION ORAL 2 TIMES DAILY WITH MEALS
Status: DISCONTINUED | OUTPATIENT
Start: 2020-09-29 | End: 2020-09-29

## 2020-09-29 RX ORDER — POTASSIUM CHLORIDE 750 MG/1
40 TABLET, FILM COATED, EXTENDED RELEASE ORAL
Status: DISCONTINUED | OUTPATIENT
Start: 2020-09-29 | End: 2020-09-29

## 2020-09-29 RX ORDER — POTASSIUM CHLORIDE 1.5 G/1.77G
40 POWDER, FOR SOLUTION ORAL
Status: COMPLETED | OUTPATIENT
Start: 2020-09-29 | End: 2020-09-29

## 2020-09-29 RX ORDER — DEXTROSE MONOHYDRATE AND SODIUM CHLORIDE 5; .45 G/100ML; G/100ML
50 INJECTION, SOLUTION INTRAVENOUS CONTINUOUS
Status: DISCONTINUED | OUTPATIENT
Start: 2020-09-29 | End: 2020-10-09

## 2020-09-29 RX ORDER — POTASSIUM CHLORIDE 7.45 MG/ML
10 INJECTION INTRAVENOUS
Status: COMPLETED | OUTPATIENT
Start: 2020-09-29 | End: 2020-09-29

## 2020-09-29 RX ADMIN — ASPIRIN 81 MG: 81 TABLET, COATED ORAL at 09:17

## 2020-09-29 RX ADMIN — METOPROLOL TARTRATE 50 MG: 50 TABLET, FILM COATED ORAL at 21:19

## 2020-09-29 RX ADMIN — METHYLPREDNISOLONE SODIUM SUCCINATE 40 MG: 40 INJECTION, POWDER, FOR SOLUTION INTRAMUSCULAR; INTRAVENOUS at 07:48

## 2020-09-29 RX ADMIN — IOPAMIDOL 100 ML: 755 INJECTION, SOLUTION INTRAVENOUS at 02:10

## 2020-09-29 RX ADMIN — DEXTROSE AND SODIUM CHLORIDE 75 ML/HR: 5; 450 INJECTION, SOLUTION INTRAVENOUS at 21:18

## 2020-09-29 RX ADMIN — POTASSIUM CHLORIDE 10 MEQ: 7.46 INJECTION, SOLUTION INTRAVENOUS at 11:41

## 2020-09-29 RX ADMIN — DEXTROSE AND SODIUM CHLORIDE 75 ML/HR: 5; 450 INJECTION, SOLUTION INTRAVENOUS at 09:24

## 2020-09-29 RX ADMIN — LISINOPRIL 20 MG: 20 TABLET ORAL at 09:17

## 2020-09-29 RX ADMIN — PIPERACILLIN SODIUM AND TAZOBACTAM SODIUM 3.38 G: 3; .375 INJECTION, POWDER, LYOPHILIZED, FOR SOLUTION INTRAVENOUS at 18:36

## 2020-09-29 RX ADMIN — POTASSIUM CHLORIDE 40 MEQ: 1.5 FOR SOLUTION ORAL at 09:17

## 2020-09-29 RX ADMIN — POTASSIUM CHLORIDE 10 MEQ: 7.46 INJECTION, SOLUTION INTRAVENOUS at 13:12

## 2020-09-29 RX ADMIN — METHYLPREDNISOLONE SODIUM SUCCINATE 40 MG: 40 INJECTION, POWDER, FOR SOLUTION INTRAMUSCULAR; INTRAVENOUS at 21:19

## 2020-09-29 RX ADMIN — ACETAMINOPHEN 650 MG: 325 TABLET, FILM COATED ORAL at 09:24

## 2020-09-29 RX ADMIN — POTASSIUM CHLORIDE, DEXTROSE MONOHYDRATE AND SODIUM CHLORIDE 75 ML/HR: 150; 5; 450 INJECTION, SOLUTION INTRAVENOUS at 02:47

## 2020-09-29 RX ADMIN — ENOXAPARIN SODIUM 40 MG: 40 INJECTION SUBCUTANEOUS at 21:19

## 2020-09-29 RX ADMIN — PIPERACILLIN SODIUM AND TAZOBACTAM SODIUM 3.38 G: 3; .375 INJECTION, POWDER, LYOPHILIZED, FOR SOLUTION INTRAVENOUS at 11:41

## 2020-09-29 RX ADMIN — METHYLPREDNISOLONE SODIUM SUCCINATE 40 MG: 40 INJECTION, POWDER, FOR SOLUTION INTRAMUSCULAR; INTRAVENOUS at 14:29

## 2020-09-29 RX ADMIN — METOPROLOL TARTRATE 50 MG: 50 TABLET, FILM COATED ORAL at 09:17

## 2020-09-29 RX ADMIN — POTASSIUM CHLORIDE 10 MEQ: 7.46 INJECTION, SOLUTION INTRAVENOUS at 09:17

## 2020-09-29 RX ADMIN — POTASSIUM CHLORIDE 10 MEQ: 7.46 INJECTION, SOLUTION INTRAVENOUS at 10:24

## 2020-09-29 RX ADMIN — PIPERACILLIN SODIUM AND TAZOBACTAM SODIUM 3.38 G: 3; .375 INJECTION, POWDER, LYOPHILIZED, FOR SOLUTION INTRAVENOUS at 02:47

## 2020-09-29 NOTE — PROGRESS NOTES
4 Eyes:    Pt has stage 1 pressure ulcers to his right ankle, left heel and medial spine. All are covered with mepilex foam, heels floated. Also had DTI's to his right lateral foot and sacrum. Noted blanchable redness to plantar aspect of left foot and the right lateral hip. Pt on strict turn schedule.

## 2020-09-29 NOTE — PROGRESS NOTES
Noted that CTA chest results with radiologist rec to CTA  abd pelvis not addressed. Called hospitalist Dr. Sundeep Valentin and notified. Received order for CTA abd/pelvis.

## 2020-09-29 NOTE — PROGRESS NOTES
Hospitalist Progress Note           Daily Progress Note: 9/29/2020    Chief complaint: Shortness of breath and weakness  Subjective: The patient is seen for follow  up.  60-year-old gentleman with history of inclusion body myositis admitted for progressive weakness and poor oral intake for several days. There was an abrupt decline on Friday when he was not speaking and was unresponsive. According to wife, patient has been having a slow clinical decline since July 2020. Noted with decreased mentation prior to admission and was brought to the ED. Reportedly took baclofen prior to AMS. Reportedly aspirated in the ED and had to be placed on BiPAP. He has since been weaned to a nasal cannula. MRI of the brain done yesterday is negative    Potassium this morning is low at 2.6 and replacement has been ordered. ABG from 2 days ago showed a respiratory alkalosis with a metabolic acidosis    Urine culture is showing gram-negative rods    CTA of the chest showed right lower lobe pneumonia. It also showed decreased attenuation of the right kidney compared to the left concerning for medical renal disease including ischemia. For that reason, a CTA of the abdomen and pelvis was done overnight but has not yet been read.     Problem List:  Problem List as of 9/29/2020 Date Reviewed: 9/17/2020          Codes Class Noted - Resolved    Metabolic encephalopathy MAP-28-BA: G93.41  ICD-9-CM: 348.31  9/26/2020 - Present        UTI (urinary tract infection) ICD-10-CM: N39.0  ICD-9-CM: 599.0  9/26/2020 - Present        Aspiration pneumonitis ([facility] [address] ICD-10-CM: J69.0  ICD-9-CM: 507.0  9/26/2020 - Present        Essential hypertension ICD-10-CM: I10  ICD-9-CM: 401.9  9/26/2020 - Present        Acute dehydration ICD-10-CM: E86.0  ICD-9-CM: 276.51  9/26/2020 - Present              Medications reviewed  Current Facility-Administered Medications   Medication Dose Route Frequency    dextrose 5 % - 0.45% NaCl infusion  75 mL/hr IntraVENous CONTINUOUS    potassium chloride 10 mEq in 100 ml IVPB  10 mEq IntraVENous Q1H    potassium chloride (KLOR-CON) packet for solution 40 mEq  40 mEq Oral BID WITH MEALS    lisinopriL (PRINIVIL, ZESTRIL) tablet 20 mg  20 mg Oral DAILY    metoprolol tartrate (LOPRESSOR) tablet 50 mg  50 mg Oral BID    enoxaparin (LOVENOX) injection 40 mg  40 mg SubCUTAneous Q24H    aspirin delayed-release tablet 81 mg  81 mg Oral DAILY    acetaminophen (TYLENOL) tablet 650 mg  650 mg Oral Q6H PRN    ondansetron (ZOFRAN) injection 4 mg  4 mg IntraVENous Q8H PRN    guaiFENesin (ROBITUSSIN) 20 mg/mL oral liquid 400 mg  400 mg Oral Q6H PRN    methylPREDNISolone (PF) (SOLU-MEDROL) injection 40 mg  40 mg IntraVENous Q8H    piperacillin-tazobactam (ZOSYN) 3.375 g in 0.9% sodium chloride (MBP/ADV) 100 mL MBP  3.375 g IntraVENous Q8H       Review of Systems:   Review of systems obtained and negative    Objective:   Physical Exam:     Visit Vitals  BP (!) 164/72 (BP 1 Location: Right arm, BP Patient Position: At rest)   Pulse (!) 57   Temp 97.3 °F (36.3 °C)   Resp 22   Ht 6' (1.829 m)   Wt 52.6 kg (116 lb)   SpO2 100%   BMI 15.73 kg/m²    O2 Flow Rate (L/min): 35 l/min O2 Device: Room air    Temp (24hrs), Av.1 °F (36.2 °C), Min:96.8 °F (36 °C), Max:97.6 °F (36.4 °C)    No intake/output data recorded.  1901 -  0700  In: 2100 [I.V.:2100]  Out: 725 [Urine:725]    General:  Alert, cooperative, no distress, appears older than stated age. Lungs:   Clear to auscultation bilaterally. Chest wall:  No tenderness or deformity. Heart:  Regular rate and rhythm, S1, S2 normal, no murmur, click, rub or gallop. Abdomen:   Soft, non-tender. Bowel sounds normal. No masses,  No organomegaly. Extremities: Extremities normal, atraumatic, no cyanosis or edema. Pulses: 2+ and symmetric all extremities.    Skin: Skin color, texture, turgor normal. No rashes or lesions   Neurologic: CNII-XII intact. No gross sensory or motor deficits     Data Review:       Recent Days:  Recent Labs     09/29/20  0420 09/28/20 0445 09/27/20  0243   WBC 6.2 8.2 10.3   HGB 9.1* 8.8* 10.0*   HCT 26.4* 26.3* 29.2*    398 538*     Recent Labs     09/29/20  0420 09/28/20  0445 09/27/20  0243 09/26/20  2044 09/26/20  1338    147* 146*  --  138   K 2.6* 3.4* 2.9*  --  3.8   * 117* 113*  --  110*   CO2 21 21 23  --  17*   * 102* 92  --  87   BUN 10 11 11  --  10   CREA 0.38* 0.56* 0.55*  --  0.61*   CA 7.8* 7.6* 7.1*  --  7.7*   ALB  --   --   --   --  2.1*   TBILI  --   --   --   --  0.5   ALT  --   --   --   --  29   INR  --   --   --  2.6* 2.4*     Recent Labs     09/27/20  0745 09/26/20  1845   PH 7.531* 7.429   PCO2 20* 18*   PO2 84 134*   HCO3 21* 16*   FIO2 50.0  --        24 Hour Results:  Recent Results (from the past 24 hour(s))   METABOLIC PANEL, BASIC    Collection Time: 09/29/20  4:20 AM   Result Value Ref Range    Sodium 144 136 - 145 mmol/L    Potassium 2.6 (LL) 3.5 - 5.1 mmol/L    Chloride 116 (H) 97 - 108 mmol/L    CO2 21 21 - 32 mmol/L    Anion gap 7 5 - 15 mmol/L    Glucose 159 (H) 65 - 100 mg/dL    BUN 10 6 - 20 mg/dL    Creatinine 0.38 (L) 0.70 - 1.30 mg/dL    BUN/Creatinine ratio 26 (H) 12 - 20      GFR est AA >60 >60 ml/min/1.73m2    GFR est non-AA >60 >60 ml/min/1.73m2    Calcium 7.8 (L) 8.5 - 10.1 mg/dL   CBC WITH AUTOMATED DIFF    Collection Time: 09/29/20  4:20 AM   Result Value Ref Range    WBC 6.2 4.1 - 11.1 K/uL    RBC 2.80 (L) 4.10 - 5.70 M/uL    HGB 9.1 (L) 12.1 - 17.0 %    HCT 26.4 (L) 36.6 - 50.3 %    MCV 94.3 80.0 - 99.0 FL    MCH 32.5 26.0 - 34.0 PG    MCHC 34.5 30.0 - 36.5 g/dL    RDW 17.0 (H) 11.5 - 14.5 %    PLATELET 022 300 - 203 K/uL    MPV 8.7 (L) 8.9 - 12.9 FL    NEUTROPHILS 81 (H) 32 - 75 %    LYMPHOCYTES 11 (L) 12 - 49 %    MONOCYTES 7 5 - 13 %    EOSINOPHILS 0 0 - 7 %    BASOPHILS 0 0 - 1 %    IMMATURE GRANULOCYTES 1 (H) 0.0 - 0.5 %    ABS.  NEUTROPHILS 5.1 1.8 - 8.0 K/UL    ABS. LYMPHOCYTES 0.7 (L) 0.8 - 3.5 K/UL    ABS. MONOCYTES 0.4 0.0 - 1.0 K/UL    ABS. EOSINOPHILS 0.0 0.0 - 0.4 K/UL    ABS. BASOPHILS 0.0 0.0 - 0.1 K/UL    ABS. IMM. GRANS. 0.1 (H) 0.00 - 0.04 K/UL    DF AUTOMATED             Assessment/     Acute hypoxic respiratory failure likely secondary to aspiration. Improved    Right lower lobe aspiration pneumonia    Urinary tract infection due to gram-negative rods    Metabolic encephalopathy    Hypokalemia    Medical renal disease right kidney, rule out ischemia    Severe dehydration due to poor oral intake    Benign essential hypertension    History of inclusion body myositis    Generalized debilitation from medical illness    Moderate protein calorie malnutrition    Metabolic acidosis. Plan:  Continue IV Zosyn, await final urine culture  Recheck ABG  Awaiting CTA of abdomen report  I will speak with neurology who apparently mentioned to the wife about a possible transfer to [facility] to see a neuromuscular neurologist    Care Plan discussed with: Patient/Family    Total time spent with patient: 30 minutes.     Annie Sanders MD

## 2020-09-29 NOTE — PROGRESS NOTES
Pulmonology and Critical Care Progress Note    Subjective:     Chief Complaint:   Chief Complaint   Patient presents with    Altered mental status          Patient seen and examined in ICU  Overnight as noted    Lying in bed comfortably  Awake and alert  No acute distress    MRI of the brain done yesterday is negative    Chest x-ray reviewed and shows mild basilar haziness suspicious for atelectasis    ABGs today showed pH of 7.48, PCO2 28, PO2 80, bicarb 23, saturation 97% on room air     Urine culture is showing gram-negative rods     CTA of the chest showed right lower lobe pneumonia. It also showed decreased attenuation of the right kidney compared to the left concerning for medical renal disease including ischemia. For that reason, a CTA of the abdomen and pelvis was done overnight but has not yet been read. Review of Systems:  Review of systems not obtained due to patient factors.     Current Facility-Administered Medications   Medication Dose Route Frequency Provider Last Rate Last Dose    dextrose 5 % - 0.45% NaCl infusion  75 mL/hr IntraVENous CONTINUOUS Deidra Leslie MD 75 mL/hr at 09/29/20 0924 75 mL/hr at 09/29/20 0924    potassium chloride 10 mEq in 100 ml IVPB  10 mEq IntraVENous Q1H Deidra Leslie  mL/hr at 09/29/20 1024 10 mEq at 09/29/20 1024    lisinopriL (PRINIVIL, ZESTRIL) tablet 20 mg  20 mg Oral DAILY Vera Mcmanus MD   20 mg at 09/29/20 0917    metoprolol tartrate (LOPRESSOR) tablet 50 mg  50 mg Oral BID Vera Mcmanus MD   50 mg at 09/29/20 0917    enoxaparin (LOVENOX) injection 40 mg  40 mg SubCUTAneous Q24H Neal Chambers MD   40 mg at 09/28/20 2122    aspirin delayed-release tablet 81 mg  81 mg Oral DAILY Gisele Long NP   81 mg at 09/29/20 0917    acetaminophen (TYLENOL) tablet 650 mg  650 mg Oral Q6H PRN Gisele Long NP   650 mg at 09/29/20 0924    ondansetron (ZOFRAN) injection 4 mg  4 mg IntraVENous Q8H PRN Makenzie Long NP        guaiFENesin (ROBITUSSIN) 20 mg/mL oral liquid 400 mg  400 mg Oral Q6H PRN Mimi Matta NP   Stopped at 20 1855    methylPREDNISolone (PF) (SOLU-MEDROL) injection 40 mg  40 mg IntraVENous Q8H Gisele Long NP   40 mg at 20 0748    piperacillin-tazobactam (ZOSYN) 3.375 g in 0.9% sodium chloride (MBP/ADV) 100 mL MBP  3.375 g IntraVENous Q8H Gwyn Carbine MD 25 mL/hr at 20 0247 3.375 g at 20 0247            No Known Allergies        Objective:     Blood pressure (!) 158/83, pulse 65, temperature 97.3 °F (36.3 °C), resp. rate 22, height 6' (1.829 m), weight 52.6 kg (116 lb), SpO2 100 %. Temp (24hrs), Av.1 °F (36.2 °C), Min:96.8 °F (36 °C), Max:97.6 °F (36.4 °C)      Intake and Output:  Current Shift: 701 - 1900  In: -   Out: 325 [Urine:325]  Last 3 Shifts: 1901 -  07  In: 2100 [I.V.:2100]  Out: 725 [Urine:725]    Physical Exam:     General: Lying in bed comfortably, no acute distress  Throat and Neck: Supple  Lung: Reduced air entry bilaterally with prolonged exhalation but no wheezing. Occasional crackles. Heart: S1+S2. No murmurs  Abdomen: soft, non-tender. Bowel sounds normal. No masses; obese  Extremities: No edema  : Not done  Skin: No cyanosis  Neurologic: Awake and alert, grossly nonfocal  Psychiatric:  Unable to perform due to patient's condition      Lab/Data Review: All lab results for the last 24 hours reviewed.   Recent Results (from the past 24 hour(s))   METABOLIC PANEL, BASIC    Collection Time: 20  4:20 AM   Result Value Ref Range    Sodium 144 136 - 145 mmol/L    Potassium 2.6 (LL) 3.5 - 5.1 mmol/L    Chloride 116 (H) 97 - 108 mmol/L    CO2 21 21 - 32 mmol/L    Anion gap 7 5 - 15 mmol/L    Glucose 159 (H) 65 - 100 mg/dL    BUN 10 6 - 20 mg/dL    Creatinine 0.38 (L) 0.70 - 1.30 mg/dL    BUN/Creatinine ratio 26 (H) 12 - 20      GFR est AA >60 >60 ml/min/1.73m2    GFR est non-AA >60 >60 ml/min/1.73m2    Calcium 7.8 (L) 8.5 - 10.1 mg/dL   CBC WITH AUTOMATED DIFF    Collection Time: 09/29/20  4:20 AM   Result Value Ref Range    WBC 6.2 4.1 - 11.1 K/uL    RBC 2.80 (L) 4.10 - 5.70 M/uL    HGB 9.1 (L) 12.1 - 17.0 %    HCT 26.4 (L) 36.6 - 50.3 %    MCV 94.3 80.0 - 99.0 FL    MCH 32.5 26.0 - 34.0 PG    MCHC 34.5 30.0 - 36.5 g/dL    RDW 17.0 (H) 11.5 - 14.5 %    PLATELET 291 909 - 491 K/uL    MPV 8.7 (L) 8.9 - 12.9 FL    NEUTROPHILS 81 (H) 32 - 75 %    LYMPHOCYTES 11 (L) 12 - 49 %    MONOCYTES 7 5 - 13 %    EOSINOPHILS 0 0 - 7 %    BASOPHILS 0 0 - 1 %    IMMATURE GRANULOCYTES 1 (H) 0.0 - 0.5 %    ABS. NEUTROPHILS 5.1 1.8 - 8.0 K/UL    ABS. LYMPHOCYTES 0.7 (L) 0.8 - 3.5 K/UL    ABS. MONOCYTES 0.4 0.0 - 1.0 K/UL    ABS. EOSINOPHILS 0.0 0.0 - 0.4 K/UL    ABS. BASOPHILS 0.0 0.0 - 0.1 K/UL    ABS. IMM. GRANS. 0.1 (H) 0.00 - 0.04 K/UL    DF AUTOMATED     BLOOD GAS, ARTERIAL    Collection Time: 09/29/20  9:30 AM   Result Value Ref Range    pH 7.485 (H) 7.35 - 7.45      PCO2 28 (L) 35 - 45 mmHg    PO2 80 75 - 100 mmHg    O2 SAT 97 >95 %    BICARBONATE 23 22 - 26 mmol/L    BASE DEFICIT 1.3 0 - 2 mmol/L    FIO2 21.0 %    SITE Right Radial      ENIO'S TEST PASS       chest X-ray      XR CHEST PORT   Final Result   Impression: Underexpanded lungs with bibasilar atelectasis. MRI BRAIN WO CONT   Final Result   Impression: No evidence of acute hemorrhage, mass or infarct. No sinusitis or   mastoiditis. Please see above discussion. CTA CHEST W OR W WO CONT   Final Result   IMPRESSION: Limited by breathing motion. 1. No large pulmonary arterial filling defect. 2. Bronchial thickening with right greater than left lower lung atelectasis or   pneumonia/pneumonitis. Bubbly debris in the trachea. 3. Atherosclerosis. Abrupt discontinuation of contrast in the aorta on the very   inferior slices. Contrast is seen in the celiac artery and SMA and the left   renal artery.  The right kidney demonstrates decreased attenuation compared to   the left concerning for medical renal disease to include ischemia. Recommend CTA   abdomen/pelvis. Called report to charge nurse Darnell Hollis at 9:05 PM 9/27/2020.   4. Cholelithiasis within distended gallbladder. 5. Dilated small bowel. 6. Other findings as above. XR CHEST PORT   Final Result   IMPRESSION:      No airspace disease in the lungs. Follow-up as clinically indicated. CT HEAD WO CONT   Final Result   Impression: Unremarkable head CT without contrast.  No infarct, mass, or    hemorrhage. Please see full report. XR CHEST SNGL V   Final Result   Impression: No diagnostic abnormality. CTA ABDOMEN PELV W CONT    (Results Pending)       CT Results  (Last 48 hours)               09/27/20 1438  CTA CHEST W OR W WO CONT Final result    Impression:  IMPRESSION: Limited by breathing motion. 1. No large pulmonary arterial filling defect. 2. Bronchial thickening with right greater than left lower lung atelectasis or   pneumonia/pneumonitis. Bubbly debris in the trachea. 3. Atherosclerosis. Abrupt discontinuation of contrast in the aorta on the very   inferior slices. Contrast is seen in the celiac artery and SMA and the left   renal artery. The right kidney demonstrates decreased attenuation compared to   the left concerning for medical renal disease to include ischemia. Recommend CTA   abdomen/pelvis. Called report to charge nurse Darnell Hollis at 9:05 PM 9/27/2020.   4. Cholelithiasis within distended gallbladder. 5. Dilated small bowel. 6. Other findings as above. Narrative:  PE.       Comparison chest x-ray 9/27/2020 with indication shortness of breath. Chest CTA Technique: Axial chest with IV contrast. Multiplanar chest   reformatting and chest MIPs. 100 cc Isovue 370 administered IV.    Dose reduction: All CT scans at this facility are performed using dose reduction   optimization techniques as appropriate to a performed exam including the   following: Automated exposure control, adjustments of the mA and/or kV according   to patient's size, or use of iterative reconstruction technique. FINDINGS: Limited by breathing motion. No large pulmonary arterial filling   defect. Partially calcified thoracic aorta without aneurysm or dissection. Normal heart size. No pericardial effusion. No mediastinal or hilar adenopathy. Emphysema. Bronchial thickening without bronchiectasis leading to dependent   right greater than left lower lung atelectasis or infiltrate. There is bubbly   debris in the trachea. No pleural effusion. No axillary adenopathy. Included   thyroid unremarkable. Included imaging of the upper abdomen demonstrates   cholelithiasis within a distended gallbladder, abrupt discontinuation of flow in   the aorta, diminished contrast enhancement in the right kidney compared to the   left kidney. Dilated small bowel. Bony skeleton is intact. Incompletely imaged   anterior cervical spine plate fixation. Assessment:     1. Acute respiratory failure with hypoxia  2. Acute metabolic encephalopathy  3. Aspiration pneumonia  4. Metabolic acidosis  5. UTI  6. Hypertension  7. Familial polymyositis    Plan:     1. Hypoxic respiratory failure. This appears to be secondary to aspiration pneumonia. CT chest reviewed  Shows bilateral infiltrates right more than left with debris in the airway suspicious for aspiration    The patient is prone for aspiration pneumonia given his neurologic disorder. He has inclusion body myositis/familial polymyositis. On intravenous Zosyn empirically. Speech therapist evaluation. Now on room air  ABG stable  No metabolic acidosis  Does have respiratory alkalosis likely due to hyperventilation from pain during ABGs  On IV Solu-Medrol     2. Aspiration pneumonia:  With Zosyn for antibiotic coverage  Speech evaluation    3. Metabolic encephalopathy. He has a progressive neurologic disorder.     Brain MRI negative  Neurology is consulted. He has familial myositis. Further recommendation per Neurology. 4.  Dehydration and metabolic acidosis. Continue with intravenous fluids. This appears to have improved with hydration. 5.  Possible urinary tract infection. 6.  Hypertension. For deep venous thrombosis prophylaxis, I will start him on Lovenox subcutaneously.     Thank you for allowing me to participate in the care of this patient. I will follow the patient closely with you. It appears that the patient is full code at this time. Prognosis appears to be guarded. DVT and GI prophylaxis    Questions of patient were answered at bedside in detail  Case discussed in detail with RN, RT, and care team    Thank you for involving me in the care of this patient  I will follow with you closely during hospitalization    Time spent more than 30 minutes in direct patient care with no overlap reviewing results and records, decision-making, and answering questions.       Hilario Parry MD  [facility]  9/29/2020  12:35 PM

## 2020-09-29 NOTE — PROGRESS NOTES
Telephone report called to [facility] - RESIDENT DRUG [facility]REAT[facility] (WOMEN) on [address]

## 2020-09-29 NOTE — PROGRESS NOTES
Problem: Dysphagia (Adult)  Goal: *Acute Goals and Plan of Care (Insert Text)  Description: Speech Therapy Goals  Initiated 9/28/2020  -Patient will participate in modified barium swallow study if indicated pending pt's progress. [ ] Not met  [ ]  MET   [ x] Progressing  [ ] Discontinue  -Patient will tolerate dysphagia 2 diet with nectar thick liquids without signs/symptoms of aspiration given moderate cues within 4 day(s). [ ] Not met  [ ]  MET   [x ] Progressing  [ ] Discontinue  -Patient will demonstrate understanding of swallow safety precautions and aspiration precautions, diet recs with moderate cues within 7 day(s). [ ] Not met  [ ]  MET   [ x] Progressing  [ ] Discontinue      Outcome: Progressing Towards Goal     SPEECH [address] TREATMENT  Patient: Juan Merritt (89 y.o. male)  Date: 9/29/2020  Diagnosis: Metabolic encephalopathy [R00.18]   <principal problem not specified>       Precautions: aspiration      ASSESSMENT:  Pt seen for tx, wife is present with pt's consent. Pt's vocal quality is hypophonic. With trials thin via straw, significant cough response is noted. No further s/s aspiration with nectar, puree or solid trials. Generalized weakness is noted. Slowed but functional mastication. Pharyngeal phase with reduced hyolaryngeal excursion to palpation with thin; however, this appears improved with thicker consistencies. 02 sats maintained WNL throughout session. PLAN:  Recommendations and Planned Interventions:  Recommend diet upgrade to d2/nectar with STRICT aspiration and GERD precautions, monitor pt closely for s/s aspiration. Pt may benefit from MBS if s/s aspiration persist, when medically appropriate. Discussed with MD, orders obtained. Patient continues to benefit from skilled intervention to address the above impairments. Continue treatment per established plan of care. Discharge Recommendations:   To Be Determined     SUBJECTIVE: Patient agreeable, alert. OBJECTIVE:   Cognitive and Communication Status:  Neurologic State: Alert  Orientation Level: Oriented to person  Cognition: Decreased attention/concentration  Dysphagia Treatment:  Oral Assessment:  Oral Assessment  Labial: Decreased seal  Dentition: Limited  Oral Hygiene: dental caries noted  Lingual: Decreased strength  Velum: No impairment  P.O. Trials:  Patient Position: upright in bed  Vocal quality prior to P.O.: Breathy;Low volume  Consistency Presented: Nectar thick liquid;Puree; Solid; Thin liquid  How Presented: SLP-fed/presented;Cup/sip;Spoon;Straw;Successive swallows  Bolus Acceptance: No impairment   Propulsion: Delayed (# of seconds) 1-2  Oral Residue: None  Initiation of Swallow: Delayed (# of seconds) min delay  Laryngeal Elevation: Functional;Weak  Aspiration Signs/Symptoms: Weak cough  Pharyngeal Phase Characteristics: Easily fatigued      Oral phase severity: mild  Pharyngeal phase severity: mild-mod  Pain:  No overt pain identified by pt. After treatment:   Patient left in no apparent distress in bed, Call bell within reach, Nursing notified and Caregiver / family present    COMMUNICATION/EDUCATION:   Patient was educated regarding his deficit(s) of dysphagia, concerns for aspiration, aspiration risk with s/s aspiration, possible benefit from [facility]. He demonstrated Good understanding as evidenced by verbal responsiveness and teach back. The patient's plan of care including recommendations, planned interventions, and recommended diet changes were discussed with: Registered nurse, Physician and pt and wife.      Berenice Ng M.S. CCC-SLP  Time Calculation: 13 mins

## 2020-09-29 NOTE — PROGRESS NOTES
ALFA Plan:    -d/c home  -PCP follow up  -[place] via [address] [place] [address]      12:40PM [facility] been [facility]. SOC x 24-48 hours after discharge. Patient will require Medicare 2nd IM letter prior to discharge. SW to continue to monitor re: discharge needs and disposition. CLAUDIA Cortez          Referral sent to [address] [place] [address] via [place], for [facility]. SW to continue to follow re: discharge needs and disposition.       CLAUDIA Cortez

## 2020-09-30 LAB
ANION GAP SERPL CALC-SCNC: 8 MMOL/L (ref 5–15)
BASOPHILS # BLD: 0 K/UL (ref 0–0.1)
BASOPHILS NFR BLD: 0 % (ref 0–1)
BUN SERPL-MCNC: 5 MG/DL (ref 6–20)
BUN/CREAT SERPL: 11 (ref 12–20)
CA-I BLD-MCNC: 7.8 MG/DL (ref 8.5–10.1)
CHLORIDE SERPL-SCNC: 111 MMOL/L (ref 97–108)
CO2 SERPL-SCNC: 22 MMOL/L (ref 21–32)
CREAT SERPL-MCNC: 0.44 MG/DL (ref 0.7–1.3)
DIFFERENTIAL METHOD BLD: ABNORMAL
EOSINOPHIL # BLD: 0 K/UL (ref 0–0.4)
EOSINOPHIL NFR BLD: 0 % (ref 0–7)
ERYTHROCYTE [DISTWIDTH] IN BLOOD BY AUTOMATED COUNT: 16.7 % (ref 11.5–14.5)
GLUCOSE SERPL-MCNC: 106 MG/DL (ref 65–100)
HCT VFR BLD AUTO: 29.7 % (ref 36.6–50.3)
HGB BLD-MCNC: 10.4 % (ref 12.1–17)
IMM GRANULOCYTES # BLD AUTO: 0.1 K/UL (ref 0–0.04)
IMM GRANULOCYTES NFR BLD AUTO: 2 % (ref 0–0.5)
LYMPHOCYTES # BLD: 0.7 K/UL (ref 0.8–3.5)
LYMPHOCYTES NFR BLD: 14 % (ref 12–49)
MCH RBC QN AUTO: 32.2 PG (ref 26–34)
MCHC RBC AUTO-ENTMCNC: 35 G/DL (ref 30–36.5)
MCV RBC AUTO: 92 FL (ref 80–99)
MONOCYTES # BLD: 0.4 K/UL (ref 0–1)
MONOCYTES NFR BLD: 8 % (ref 5–13)
NEUTS SEG # BLD: 4.1 K/UL (ref 1.8–8)
NEUTS SEG NFR BLD: 79 % (ref 32–75)
NRBC # BLD: 0.13 K/UL (ref 0–0.01)
NRBC BLD-RTO: 2.6 PER 100 WBC
PLATELET # BLD AUTO: 391 K/UL (ref 150–400)
PMV BLD AUTO: 9 FL (ref 8.9–12.9)
POTASSIUM SERPL-SCNC: 2.8 MMOL/L (ref 3.5–5.1)
RBC # BLD AUTO: 3.23 M/UL (ref 4.1–5.7)
SODIUM SERPL-SCNC: 141 MMOL/L (ref 136–145)
WBC # BLD AUTO: 5.2 K/UL (ref 4.1–11.1)

## 2020-09-30 PROCEDURE — 85025 COMPLETE CBC W/AUTO DIFF WBC: CPT

## 2020-09-30 PROCEDURE — 97161 PT EVAL LOW COMPLEX 20 MIN: CPT

## 2020-09-30 PROCEDURE — 74011250636 HC RX REV CODE- 250/636: Performed by: NURSE PRACTITIONER

## 2020-09-30 PROCEDURE — 97530 THERAPEUTIC ACTIVITIES: CPT

## 2020-09-30 PROCEDURE — 74011250637 HC RX REV CODE- 250/637: Performed by: INTERNAL MEDICINE

## 2020-09-30 PROCEDURE — 74011250636 HC RX REV CODE- 250/636: Performed by: INTERNAL MEDICINE

## 2020-09-30 PROCEDURE — 65270000029 HC RM PRIVATE

## 2020-09-30 PROCEDURE — 92526 ORAL FUNCTION THERAPY: CPT

## 2020-09-30 PROCEDURE — 74011250637 HC RX REV CODE- 250/637: Performed by: NURSE PRACTITIONER

## 2020-09-30 PROCEDURE — 94760 N-INVAS EAR/PLS OXIMETRY 1: CPT

## 2020-09-30 PROCEDURE — 74011000258 HC RX REV CODE- 258: Performed by: INTERNAL MEDICINE

## 2020-09-30 PROCEDURE — 36415 COLL VENOUS BLD VENIPUNCTURE: CPT

## 2020-09-30 PROCEDURE — 80048 BASIC METABOLIC PNL TOTAL CA: CPT

## 2020-09-30 RX ORDER — PREDNISONE 20 MG/1
20 TABLET ORAL
Status: DISCONTINUED | OUTPATIENT
Start: 2020-10-01 | End: 2020-10-17

## 2020-09-30 RX ORDER — OXYCODONE HYDROCHLORIDE 5 MG/1
15 TABLET ORAL
Status: DISCONTINUED | OUTPATIENT
Start: 2020-09-30 | End: 2020-10-23

## 2020-09-30 RX ORDER — LEVOFLOXACIN 500 MG/1
500 TABLET, FILM COATED ORAL EVERY 24 HOURS
Status: DISCONTINUED | OUTPATIENT
Start: 2020-09-30 | End: 2020-10-17

## 2020-09-30 RX ORDER — POTASSIUM CHLORIDE 20 MEQ/1
40 TABLET, EXTENDED RELEASE ORAL 3 TIMES DAILY
Status: COMPLETED | OUTPATIENT
Start: 2020-09-30 | End: 2020-10-01

## 2020-09-30 RX ADMIN — OXYCODONE HYDROCHLORIDE 15 MG: 5 TABLET ORAL at 17:04

## 2020-09-30 RX ADMIN — PIPERACILLIN SODIUM AND TAZOBACTAM SODIUM 3.38 G: 3; .375 INJECTION, POWDER, LYOPHILIZED, FOR SOLUTION INTRAVENOUS at 02:54

## 2020-09-30 RX ADMIN — ONDANSETRON 4 MG: 2 INJECTION INTRAMUSCULAR; INTRAVENOUS at 04:02

## 2020-09-30 RX ADMIN — POTASSIUM CHLORIDE 40 MEQ: 1500 TABLET, EXTENDED RELEASE ORAL at 17:04

## 2020-09-30 RX ADMIN — LEVOFLOXACIN 500 MG: 500 TABLET, FILM COATED ORAL at 12:12

## 2020-09-30 RX ADMIN — ACETAMINOPHEN 650 MG: 325 TABLET, FILM COATED ORAL at 09:50

## 2020-09-30 RX ADMIN — ASPIRIN 81 MG: 81 TABLET, COATED ORAL at 09:46

## 2020-09-30 RX ADMIN — METOPROLOL TARTRATE 50 MG: 50 TABLET, FILM COATED ORAL at 21:20

## 2020-09-30 RX ADMIN — POTASSIUM CHLORIDE 40 MEQ: 1500 TABLET, EXTENDED RELEASE ORAL at 21:20

## 2020-09-30 RX ADMIN — ENOXAPARIN SODIUM 40 MG: 40 INJECTION SUBCUTANEOUS at 21:20

## 2020-09-30 RX ADMIN — METOPROLOL TARTRATE 50 MG: 50 TABLET, FILM COATED ORAL at 09:46

## 2020-09-30 RX ADMIN — METHYLPREDNISOLONE SODIUM SUCCINATE 40 MG: 40 INJECTION, POWDER, FOR SOLUTION INTRAMUSCULAR; INTRAVENOUS at 04:02

## 2020-09-30 RX ADMIN — LISINOPRIL 20 MG: 20 TABLET ORAL at 09:46

## 2020-09-30 NOTE — PROGRESS NOTES
Transfer skin assessment completed by myself and Jerrica Mendes. patient has a skin tear to left wrist, stage 2 to right hip and deep tissue injury to sacrum with stage 2 on sacrum. Stage 1 on left heel, right ankle stage 1 and bilateral heels with deep tissue injury, and red spine.

## 2020-09-30 NOTE — PROGRESS NOTES
PHYSICAL THERAPY EVALUATION  Patient: Sparkle Rolle (81 y.o. male)  Date: 9/30/2020  Primary Diagnosis: Metabolic encephalopathy [M78.31]        Precautions: Falls      ASSESSMENT  This is a 47 y/o male with h/o familial polymyositis  was brought to [facility]  with c/o  weakness and lethargy 3 days ago . CT head and MRI brain  shows no acute intracranial abnormalities. CTA of the chest showed right lower lobe pneumonia            Based on the objective data described below, the patient presents with decreased strength , -2/5 grossly in  b/l LE ,R foot drop  , c/o numbness and tingling in b/l LE (below knees ), tightness present in b/l hamstrings , balance deficits , and need for increased assist with functional mobility ( needs Maurice/modA  for rolling to L side , maxA for supine <>sit transfers , able to sit at the EOB for ~1 minutes , c/o dizziness , needed mod/maxA for static sitting balance , maxA x2 for scooting towards the [facility] ) . Recommend d/c to SNF when medically appropriate . Current Level of Function Impacting Discharge (mobility/balance): Requires maxA for mobility   Functional Outcome Measure: The patient scored 9 on the AM PAC basic mobility outcome measure . Other factors to consider for discharge: Decreased safety awareness      Patient will benefit from skilled therapy intervention to address the above noted impairments. PLAN :  Recommendations and Planned Interventions: bed mobility training, transfer training, gait training, therapeutic exercises, patient and family training/education, and therapeutic activities      Frequency/Duration: Patient will be followed by physical therapy:  5 times a week to address goals.     Recommendation for discharge: (in order for the patient to meet his/her long term goals)  SNF    This discharge recommendation:  Has been made in collaboration with the attending provider and/or case management    IF patient discharges home will need the following DME: to be determined (TBD)         SUBJECTIVE:   Patient stated I am feeling very weak .     OBJECTIVE DATA SUMMARY:   HISTORY:    Past Medical History:   Diagnosis Date    Myositis     Familial inclusion      Past Surgical History:   Procedure Laterality Date    HX ORTHOPAEDIC      disc infused, neck        Personal factors and/or comorbidities impacting plan of care:   Home Situation  Home Environment: Private residence  # Steps to Enter: 4  Rails to Enter: Yes  Hand Rails : Bilateral  Wheelchair Ramp: No  One/Two Story Residence: Two story  # of Interior Steps: (stair lift)  Living Alone: No  Support Systems: Spouse/Significant Other/Partner  Patient Expects to be Discharged to[de-identified] Private residence  Current DME Used/Available at Home: Other (comment)RW , w/c     PLOF: Pt needed assist  for ADLS/IADLS,  mod I with mobility I month ago , progressively declined and was mostly w/c bound prior to admission. EXAMINATION/PRESENTATION/DECISION MAKING:   Critical Behavior:  Neurologic State: Alert  Orientation Level: Oriented X4  Cognition: Decreased attention/concentration  Safety/Judgement: Decreased awareness of need for safety    Skin:  Intact here exposed     Range Of Motion:  AROM: Generally decreased, functional    Strength:    Strength: Generally decreased, functional(-2/5 grossly ,R foot drop > L foot drop)    Tone & Sensation:   Tone: Abnormal(decreased tone )    Sensation: Impaired(numbness on b/l LE below knees )    Coordination:  Coordination: Generally decreased, functional , impaired b/l Le      Functional Mobility:  Bed Mobility:  Rolling: Minimum assistance/mod A   Supine to Sit: Maximum assistance  Sit to Supine: Maximum assistance  Scooting: Maximum assistance  Transfers:  NT sec.  To safety concerns   Balance:   Sitting: Impaired(mod/maxA )  Sitting - Static: Poor (constant support)    Functional Measure:  [facility] [address] [address] AM-PAC 6 Clicks         Basic Mobility Inpatient Short Form  How much difficulty does the patient currently have. .. Unable A Lot A Little None   1. Turning over in bed (including adjusting bedclothes, sheets and blankets)? [] 1   [x] 2   [] 3   [] 4   2. Sitting down on and standing up from a chair with arms ( e.g., wheelchair, bedside commode, etc.)   [] 1   [x] 2   [] 3   [] 4   3. Moving from lying on back to sitting on the side of the bed? [] 1   [x] 2   [] 3   [] 4          How much help from another person does the patient currently need. .. Total A Lot A Little None   4. Moving to and from a bed to a chair (including a wheelchair)? [x] 1   [] 2   [] 3   [] 4   5. Need to walk in hospital room? [x] 1   [] 2   [] 3   [] 4   6. Climbing 3-5 steps with a railing? [x] 1   [] 2   [] 3   [] 4   © 2007, Trustees of [address] [place] [address], under license to [employer]. All rights reserved     Score:  Initial: 9 Most Recent: X (Date :9/30/20-- )   Interpretation of Tool:  Represents activities that are increasingly more difficult (i.e. Bed mobility, Transfers, Gait).   Score 24 23 22-20 19-15 14-10 9-7 6   Modifier CH CI CJ CK CL CM CN           Physical Therapy Evaluation Charge Determination   History Examination Presentation Decision-Making   MEDIUM  Complexity : 1-2 comorbidities / personal factors will impact the outcome/ POC  HIGH Complexity : 4+ Standardized tests and measures addressing body structure, function, activity limitation and / or participation in recreation  MEDIUM Complexity : Evolving with changing characteristics  HIGH Complexity : FOTO score of 1- 25       Based on the above components, the patient evaluation is determined to be of the following complexity level: MEDIUM        Activity Tolerance:   Fair and signs and symptoms of orthostatic hypotension    After treatment patient left in no apparent distress:   Supine in bed, Bed / chair alarm activated, and Caregiver / family present    COMMUNICATION/EDUCATION:   The patients plan of care was discussed with: Registered nurse. Patient/family have participated as able in goal setting and plan of care. and Patient/family agree to work toward stated goals and plan of care.     Thank you for this referral.  John Reynaga   Time Calculation: 36 mins

## 2020-09-30 NOTE — PROGRESS NOTES
Informed Dr. Washington Mandujano of rectal tempeture of 94.1 orders to keep on warming blanket cover patient and continue to monitor

## 2020-09-30 NOTE — PROGRESS NOTES
SPEECH LANGUAGE PATHOLOGY DYSPHAGIA TREATMENT  Patient: Marisa Cordero (35 y.o. male)  Date: 9/30/2020  Diagnosis: Metabolic encephalopathy [I62.05]   <principal problem not specified>       Precautions: Aspiration     ASSESSMENT:  Administered thin liquids to trial via cup and straw. Reduced bolus control w/ weak pharyngeal response. Swallow delay, reduced HLE and protraction to palpation. Weak cough response noted after thin liquid trials. Bolus control improved w/ nectar thick trial from cup. Encouragement to ingest liquid. Pharyngeal response remains weak. No overt s/sx of aspiration noted. Patient declined solid trials at this time. Concerns for aspiration and safety of p.o. intake due to patient's weak respiratory drive, oral and pharyngeal weakness. Concerns also for adequate nutritional intake. PLAN:  Recommendations and Planned Interventions:  Continue ground diet, nectar thick liquids. MBS to further assess swallow function and safety of p.o. intake. Dietitian consult for nutritional supplements. Patient continues to benefit from skilled intervention to address the above impairments. Continue treatment per established plan of care. Discharge Recommendations:  inpatient rehab     SUBJECTIVE:   Patient seen at bedside. Wife present in room. Wife reports patient w/ minimal intake of breakfast tray. Wife indicates patient has had significant weight loss in the past year ~ 70 lbs. They report a \" barium study\"   was completed years ago. Patient required MAX A to reposition in bed. OBJECTIVE:   Cognitive and Communication Status:  Neurologic State: Alert  Orientation Level: Oriented to person  Cognition: Decreased attention/concentration      After treatment:   Patient left in no apparent distress in bed    COMMUNICATION/EDUCATION:   Patient was educated regarding his deficit(s) of swallowing as this relates to his diagnosis of familial polymyositis.   He demonstrated Good understanding as evidenced by verbal understanding. The patient's plan of care including recommendations, planned interventions, and recommended diet changes were discussed with: Physician. Zeynep Burns M.S. CCC-SLP  Time Calculation: 13 mins        Problem: Dysphagia (Adult)  Goal: *Acute Goals and Plan of Care (Insert Text)  Description: Speech Therapy Goals  Initiated 9/28/2020  -Patient will participate in modified barium swallow study if indicated pending pt's progress. [ ] Not met  [ ]  MET   [ x] Progressing  [ ] Discontinue  -Patient will tolerate dysphagia 2 diet with nectar thick liquids without signs/symptoms of aspiration given moderate cues within 4 day(s). [ ] Not met  [ ]  MET   [x ] Progressing  [ ] Discontinue  -Patient will demonstrate understanding of swallow safety precautions and aspiration precautions, diet recs with moderate cues within 7 day(s).         [ ] Not met  [ ]  MET   [ x] Progressing  [ ] Discontinue      Outcome: Progressing Towards Goal

## 2020-09-30 NOTE — PROGRESS NOTES
Patient would benefit from dietitian consult to provide nutritional supplements if warranted. Patient w/ minimal p.o. intake and on-going weight loss.

## 2020-09-30 NOTE — PROGRESS NOTES
Pulmonology and Critical Care Progress Note    Subjective:     Chief Complaint:   Chief Complaint   Patient presents with    Altered mental status          Patient seen and examined  Overnight as noted  Transferred out of ICU  Lying in bed comfortably  Awake and alert  No acute distress    MRI of the brain done is negative    Chest x-ray reviewed and shows mild basilar haziness suspicious for atelectasis    ABGs 9/29 showed pH of 7.48, PCO2 28, PO2 80, bicarb 23, saturation 97% on room air     Urine culture is showing gram-negative rods     CTA of the chest showed right lower lobe pneumonia. It also showed decreased attenuation of the right kidney compared to the left concerning for medical renal disease including ischemia. For that reason, a CTA of the abdomen and pelvis was done overnight but has not yet been read. Review of Systems:  Review of systems not obtained due to patient factors.     Current Facility-Administered Medications   Medication Dose Route Frequency Provider Last Rate Last Dose    levoFLOXacin (LEVAQUIN) tablet 500 mg  500 mg Oral Q24H Radha Zapata MD        potassium chloride (K-DUR, KLOR-CON) SR tablet 40 mEq  40 mEq Oral TID Angelica Nunez MD        dextrose 5 % - 0.45% NaCl infusion  75 mL/hr IntraVENous CONTINUOUS Angelica Nunez MD 75 mL/hr at 09/29/20 2118 75 mL/hr at 09/29/20 2118    lisinopriL (PRINIVIL, ZESTRIL) tablet 20 mg  20 mg Oral DAILY Mart Alarcon MD   20 mg at 09/30/20 0946    metoprolol tartrate (LOPRESSOR) tablet 50 mg  50 mg Oral BID Mart Alarcon MD   50 mg at 09/30/20 0946    enoxaparin (LOVENOX) injection 40 mg  40 mg SubCUTAneous Q24H Luis M Silva MD   40 mg at 09/29/20 2119    aspirin delayed-release tablet 81 mg  81 mg Oral DAILY Gisele Long NP   81 mg at 09/30/20 0946    acetaminophen (TYLENOL) tablet 650 mg  650 mg Oral Q6H PRN Gisele Long NP   650 mg at 09/30/20 0950    ondansetron (ZOFRAN) injection 4 mg  4 mg IntraVENous Q8H PRN Francyne Bound T, NP   4 mg at 20 0402    guaiFENesin (ROBITUSSIN) 20 mg/mL oral liquid 400 mg  400 mg Oral Q6H PRN Francyne Bound T, NP   Stopped at 20 1855    methylPREDNISolone (PF) (SOLU-MEDROL) injection 40 mg  40 mg IntraVENous Q8H Gisele Long T, NP   40 mg at 20 0402            No Known Allergies        Objective:     Blood pressure 137/78, pulse 74, temperature (!) 93.6 °F (34.2 °C), resp. rate 14, height 6' (1.829 m), weight 52.6 kg (116 lb), SpO2 99 %. Temp (24hrs), Av.7 °F (34.8 °C), Min:93 °F (33.9 °C), Max:97 °F (36.1 °C)      Intake and Output:  Current Shift: No intake/output data recorded. Last 3 Shifts:  1901 -  0700  In: 3086.3 [P.O.:150; I.V.:2936.3]  Out: 1450 [Urine:1450]    Physical Exam:     General: Lying in bed comfortably, no acute distress  Throat and Neck: Supple  Lung: Reduced air entry bilaterally with prolonged exhalation but no wheezing. Occasional crackles. Heart: S1+S2. No murmurs  Abdomen: soft, non-tender. Bowel sounds normal. No masses; obese  Extremities: No edema  : Not done  Skin: No cyanosis  Neurologic: Awake and alert, grossly nonfocal  Psychiatric:  Unable to perform due to patient's condition      Lab/Data Review: All lab results for the last 24 hours reviewed.   Recent Results (from the past 24 hour(s))   POTASSIUM    Collection Time: 20  3:30 PM   Result Value Ref Range    Potassium 3.0 (L) 3.5 - 5.1 mmol/L   MAGNESIUM    Collection Time: 20  3:30 PM   Result Value Ref Range    Magnesium 1.7 1.6 - 2.4 mg/dL   METABOLIC PANEL, BASIC    Collection Time: 20  5:01 AM   Result Value Ref Range    Sodium 141 136 - 145 mmol/L    Potassium 2.8 (L) 3.5 - 5.1 mmol/L    Chloride 111 (H) 97 - 108 mmol/L    CO2 22 21 - 32 mmol/L    Anion gap 8 5 - 15 mmol/L    Glucose 106 (H) 65 - 100 mg/dL    BUN 5 (L) 6 - 20 mg/dL    Creatinine 0.44 (L) 0.70 - 1.30 mg/dL    BUN/Creatinine ratio 11 (L) 12 - 20      GFR est AA >60 >60 ml/min/1.73m2    GFR est non-AA >60 >60 ml/min/1.73m2    Calcium 7.8 (L) 8.5 - 10.1 mg/dL   CBC WITH AUTOMATED DIFF    Collection Time: 09/30/20  5:01 AM   Result Value Ref Range    WBC 5.2 4.1 - 11.1 K/uL    RBC 3.23 (L) 4.10 - 5.70 M/uL    HGB 10.4 (L) 12.1 - 17.0 %    HCT 29.7 (L) 36.6 - 50.3 %    MCV 92.0 80.0 - 99.0 FL    MCH 32.2 26.0 - 34.0 PG    MCHC 35.0 30.0 - 36.5 g/dL    RDW 16.7 (H) 11.5 - 14.5 %    PLATELET 332 500 - 057 K/uL    MPV 9.0 8.9 - 12.9 FL    NRBC 2.6 (H) 0  WBC    ABSOLUTE NRBC 0.13 (H) 0.00 - 0.01 K/uL    NEUTROPHILS 79 (H) 32 - 75 %    LYMPHOCYTES 14 12 - 49 %    MONOCYTES 8 5 - 13 %    EOSINOPHILS 0 0 - 7 %    BASOPHILS 0 0 - 1 %    IMMATURE GRANULOCYTES 2 (H) 0.0 - 0.5 %    ABS. NEUTROPHILS 4.1 1.8 - 8.0 K/UL    ABS. LYMPHOCYTES 0.7 (L) 0.8 - 3.5 K/UL    ABS. MONOCYTES 0.4 0.0 - 1.0 K/UL    ABS. EOSINOPHILS 0.0 0.0 - 0.4 K/UL    ABS. BASOPHILS 0.0 0.0 - 0.1 K/UL    ABS. IMM. GRANS. 0.1 (H) 0.00 - 0.04 K/UL    DF AUTOMATED       chest X-ray      XR CHEST PORT   Final Result   Impression: Underexpanded lungs with bibasilar atelectasis. CTA ABDOMEN PELV W CONT   Final Result   IMPRESSION: Mid abdominal aortic occlusion, with threatened right kidney and   fairly good collateralization to the legs. This could be causing a mesenteric   steal phenomenon, however      MRI BRAIN WO CONT   Final Result   Impression: No evidence of acute hemorrhage, mass or infarct. No sinusitis or   mastoiditis. Please see above discussion. CTA CHEST W OR W WO CONT   Final Result   IMPRESSION: Limited by breathing motion. 1. No large pulmonary arterial filling defect. 2. Bronchial thickening with right greater than left lower lung atelectasis or   pneumonia/pneumonitis. Bubbly debris in the trachea. 3. Atherosclerosis. Abrupt discontinuation of contrast in the aorta on the very   inferior slices. Contrast is seen in the celiac artery and SMA and the left   renal artery.  The right kidney demonstrates decreased attenuation compared to   the left concerning for medical renal disease to include ischemia. Recommend CTA   abdomen/pelvis. Called report to charge nurse Natividad Odell at 9:05 PM 9/27/2020.   4. Cholelithiasis within distended gallbladder. 5. Dilated small bowel. 6. Other findings as above. XR CHEST PORT   Final Result   IMPRESSION:      No airspace disease in the lungs. Follow-up as clinically indicated. CT HEAD WO CONT   Final Result   Impression: Unremarkable head CT without contrast.  No infarct, mass, or    hemorrhage. Please see full report. XR CHEST SNGL V   Final Result   Impression: No diagnostic abnormality. CT Results  (Last 48 hours)               09/29/20 0215  CTA ABDOMEN PELV W CONT Final result    Impression:  IMPRESSION: Mid abdominal aortic occlusion, with threatened right kidney and   fairly good collateralization to the legs. This could be causing a mesenteric   steal phenomenon, however       Narrative:  Contrast study or follow-up from 2 days prior and shows small pleural effusions   and adjacent atelectasis. Liver, spleen, atrophic pancreas are normal. Small   gallstones. Normal adrenals       Again seen is aortic occlusion at the renal arteries, with high-grade stenosis   of right renal artery at its origin and with moderate renal atrophy. No   demonstrated renal perfusion on this arterial phase only study, noting arterial   phase contrast does get to the renal hilum, with renal artery is diminutive. Left renal artery is widely patent and kidney is appropriately perfused. Mild   disease at celiac and SMA origins, not flow-limiting. Reconstitution of femoral   branches mainly from inferior epigastric and inferior mesenteric branches and   some reconstitution of diseased iliacs       No small bowel abnormality, lymphadenopathy or free fluid       Catheterize bladder is normal. Rectum mildly distended with stool.  Lower body wall edema and advanced chronic changes at the right hip                 Assessment:     1. Acute respiratory failure with hypoxia  2. Acute metabolic encephalopathy  3. Aspiration pneumonia  4. Metabolic acidosis  5. UTI  6. Hypertension  7. Familial polymyositis    Plan:     1. Hypoxic respiratory failure. This appears to be secondary to aspiration pneumonia. CT chest reviewed  Shows bilateral infiltrates right more than left with debris in the airway suspicious for aspiration    The patient is prone for aspiration pneumonia given his neurologic disorder. He has inclusion body myositis/familial polymyositis. On intravenous Zosyn empirically. Speech therapist evaluation. Now on room air  ABG stable  No metabolic acidosis  Does have respiratory alkalosis likely due to hyperventilation from pain during ABGs  On IV Solu-Medrol     2. Aspiration pneumonia:  With Zosyn for antibiotic coverage  Speech evaluation    3. Metabolic encephalopathy. He has a progressive neurologic disorder. Brain MRI negative  Neurology is consulted. He has familial myositis. Further recommendation per Neurology. 4.  Dehydration and metabolic acidosis. Continue with intravenous fluids. This appears to have improved with hydration. 5.  Possible urinary tract infection. 6.  Hypertension.     Thank you for allowing me to participate in the care of this patient. I will follow the patient closely with you. It appears that the patient is full code at this time. Prognosis appears to be guarded.       DVT and GI prophylaxis    PT OT evaluation    Questions of patient were answered at bedside in detail  Case discussed in detail with RN, RT, and care team    Thank you for involving me in the care of this patient  I will follow with you closely during hospitalization    Time spent more than 30 minutes in direct patient care with no overlap reviewing results and records, decision-making, and answering questions.       Chirag Roberts MD  [facility]  9/30/2020  12:35 PM

## 2020-09-30 NOTE — PROGRESS NOTES
Hospitalist Progress Note           Daily Progress Note: 9/30/2020    Chief complaint: Shortness of breath and weakness  Subjective: The patient is seen for follow  up. Repeat ABG yesterday showed respiratory alkalosis. Potassium still low at 2.8. I spoke with the neurologist yesterday who clarified that he is recommending patient follow-up in the outpatient setting with a neuromuscular neurologist, not a transfer. Urine culture shows E. coli sensitive to quinolones and ceftriaxone. A of the abdomen shows mid abdominal aortic occlusion with good collateralization to the legs. There is high-grade stenosis of the right renal artery at its origin. Given the collateralization, this appears to be a chronic process. His body temperature dropped to 93 last night and again this morning.   A warming blanket has been placed      Problem List:  Problem List as of 9/30/2020 Date Reviewed: 9/17/2020          Codes Class Noted - Resolved    Metabolic encephalopathy CHW-23-VV: G93.41  ICD-9-CM: 348.31  9/26/2020 - Present        UTI (urinary tract infection) ICD-10-CM: N39.0  ICD-9-CM: 599.0  9/26/2020 - Present        Aspiration pneumonitis ([facility] [address] ICD-10-CM: J69.0  ICD-9-CM: 507.0  9/26/2020 - Present        Essential hypertension ICD-10-CM: I10  ICD-9-CM: 401.9  9/26/2020 - Present        Acute dehydration ICD-10-CM: E86.0  ICD-9-CM: 276.51  9/26/2020 - Present              Medications reviewed  Current Facility-Administered Medications   Medication Dose Route Frequency    dextrose 5 % - 0.45% NaCl infusion  75 mL/hr IntraVENous CONTINUOUS    lisinopriL (PRINIVIL, ZESTRIL) tablet 20 mg  20 mg Oral DAILY    metoprolol tartrate (LOPRESSOR) tablet 50 mg  50 mg Oral BID    enoxaparin (LOVENOX) injection 40 mg  40 mg SubCUTAneous Q24H    aspirin delayed-release tablet 81 mg  81 mg Oral DAILY    acetaminophen (TYLENOL) tablet 650 mg  650 mg Oral Q6H PRN    ondansetron (ZOFRAN) injection 4 mg  4 mg IntraVENous Q8H PRN    guaiFENesin (ROBITUSSIN) 20 mg/mL oral liquid 400 mg  400 mg Oral Q6H PRN    methylPREDNISolone (PF) (SOLU-MEDROL) injection 40 mg  40 mg IntraVENous Q8H    piperacillin-tazobactam (ZOSYN) 3.375 g in 0.9% sodium chloride (MBP/ADV) 100 mL MBP  3.375 g IntraVENous Q8H       Review of Systems:   Review of systems obtained and negative    Objective:   Physical Exam:     Visit Vitals  /78 (BP 1 Location: Right arm)   Pulse 74   Temp (!) 93.6 °F (34.2 °C)   Resp 14   Ht 6' (1.829 m)   Wt 52.6 kg (116 lb)   SpO2 99%   BMI 15.73 kg/m²    O2 Flow Rate (L/min): 35 l/min O2 Device: Room air    Temp (24hrs), Av.9 °F (34.9 °C), Min:93 °F (33.9 °C), Max:97 °F (36.1 °C)    No intake/output data recorded.  190 -  0700  In: 3086.3 [P.O.:150; I.V.:2936.3]  Out: 1450 [Urine:1450]    General:  Alert, cooperative, no distress, appears older than stated age. Lungs:   Clear to auscultation bilaterally. Chest wall:  No tenderness or deformity. Heart:  Regular rate and rhythm, S1, S2 normal, no murmur, click, rub or gallop. Abdomen:   Soft, non-tender. Bowel sounds normal. No masses,  No organomegaly. Extremities: Extremities normal, atraumatic, no cyanosis or edema. Pulses: 2+ and symmetric all extremities. Skin: Skin color, texture, turgor normal. No rashes or lesions   Neurologic: CNII-XII intact.  No gross sensory or motor deficits     Data Review:       Recent Days:  Recent Labs     20  0501 20  0420 20  0445   WBC 5.2 6.2 8.2   HGB 10.4* 9.1* 8.8*   HCT 29.7* 26.4* 26.3*    376 398     Recent Labs     20  0501 20  1530 20  0420 20  0445     --  144 147*   K 2.8* 3.0* 2.6* 3.4*   *  --  116* 117*   CO2 22  --  21 21   *  --  159* 102*   BUN 5*  --  10 11   CREA 0.44*  --  0.38* 0.56*   CA 7.8*  --  7.8* 7.6*   MG  --  1.7  --   --      Recent Labs     20  0930   PH 7.485*   PCO2 28*   PO2 80 HCO3 23   FIO2 21.0       24 Hour Results:  Recent Results (from the past 24 hour(s))   POTASSIUM    Collection Time: 09/29/20  3:30 PM   Result Value Ref Range    Potassium 3.0 (L) 3.5 - 5.1 mmol/L   MAGNESIUM    Collection Time: 09/29/20  3:30 PM   Result Value Ref Range    Magnesium 1.7 1.6 - 2.4 mg/dL   METABOLIC PANEL, BASIC    Collection Time: 09/30/20  5:01 AM   Result Value Ref Range    Sodium 141 136 - 145 mmol/L    Potassium 2.8 (L) 3.5 - 5.1 mmol/L    Chloride 111 (H) 97 - 108 mmol/L    CO2 22 21 - 32 mmol/L    Anion gap 8 5 - 15 mmol/L    Glucose 106 (H) 65 - 100 mg/dL    BUN 5 (L) 6 - 20 mg/dL    Creatinine 0.44 (L) 0.70 - 1.30 mg/dL    BUN/Creatinine ratio 11 (L) 12 - 20      GFR est AA >60 >60 ml/min/1.73m2    GFR est non-AA >60 >60 ml/min/1.73m2    Calcium 7.8 (L) 8.5 - 10.1 mg/dL   CBC WITH AUTOMATED DIFF    Collection Time: 09/30/20  5:01 AM   Result Value Ref Range    WBC 5.2 4.1 - 11.1 K/uL    RBC 3.23 (L) 4.10 - 5.70 M/uL    HGB 10.4 (L) 12.1 - 17.0 %    HCT 29.7 (L) 36.6 - 50.3 %    MCV 92.0 80.0 - 99.0 FL    MCH 32.2 26.0 - 34.0 PG    MCHC 35.0 30.0 - 36.5 g/dL    RDW 16.7 (H) 11.5 - 14.5 %    PLATELET 052 319 - 262 K/uL    MPV 9.0 8.9 - 12.9 FL    NRBC 2.6 (H) 0  WBC    ABSOLUTE NRBC 0.13 (H) 0.00 - 0.01 K/uL    NEUTROPHILS 79 (H) 32 - 75 %    LYMPHOCYTES 14 12 - 49 %    MONOCYTES 8 5 - 13 %    EOSINOPHILS 0 0 - 7 %    BASOPHILS 0 0 - 1 %    IMMATURE GRANULOCYTES 2 (H) 0.0 - 0.5 %    ABS. NEUTROPHILS 4.1 1.8 - 8.0 K/UL    ABS. LYMPHOCYTES 0.7 (L) 0.8 - 3.5 K/UL    ABS. MONOCYTES 0.4 0.0 - 1.0 K/UL    ABS. EOSINOPHILS 0.0 0.0 - 0.4 K/UL    ABS. BASOPHILS 0.0 0.0 - 0.1 K/UL    ABS. IMM. GRANS. 0.1 (H) 0.00 - 0.04 K/UL    DF AUTOMATED             Assessment/     Acute hypoxic respiratory failure likely secondary to aspiration.  Improved    Right lower lobe aspiration pneumonia    Urinary tract infection due to E. coli    Metabolic encephalopathy, improved    Hypokalemia    Hypothermia, probably largely environmental.  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 Plan:  Change antibiotic to oral levofloxacin  Vascular surgery consultation although doubt intervention will be required given apparent chronicity of problem  Working toward SNF placement  Replete potassium    Care Plan discussed with: Patient/Family    Total time spent with patient: 30 minutes.     Reynold Porter MD

## 2020-10-01 ENCOUNTER — APPOINTMENT (OUTPATIENT)
Dept: NON INVASIVE DIAGNOSTICS | Age: 55
DRG: 981 | End: 2020-10-01
Attending: SURGERY
Payer: COMMERCIAL

## 2020-10-01 LAB
ALBUMIN SERPL-MCNC: 1.5 G/DL (ref 3.5–5)
ANION GAP SERPL CALC-SCNC: 10 MMOL/L (ref 5–15)
BASOPHILS # BLD: 0 K/UL (ref 0–0.1)
BASOPHILS NFR BLD: 0 % (ref 0–1)
BUN SERPL-MCNC: 3 MG/DL (ref 6–20)
BUN/CREAT SERPL: 6 (ref 12–20)
CA-I BLD-MCNC: 7.7 MG/DL (ref 8.5–10.1)
CHLORIDE SERPL-SCNC: 111 MMOL/L (ref 97–108)
CO2 SERPL-SCNC: 21 MMOL/L (ref 21–32)
CREAT SERPL-MCNC: 0.5 MG/DL (ref 0.7–1.3)
DIFFERENTIAL METHOD BLD: ABNORMAL
ECHO AO ROOT DIAM: 3.1 CM
ECHO AV PEAK GRADIENT: 3 MMHG
ECHO EST RA PRESSURE: 3 MMHG
ECHO LV E' SEPTAL VELOCITY: 4.78 CM/S
ECHO LV EDV A4C: 87 CM3
ECHO LV EJECTION FRACTION BIPLANE: 52.3 % (ref 55–100)
ECHO LV ESV A4C: 38 CM3
ECHO LV INTERNAL DIMENSION DIASTOLIC: 4.22 CM (ref 4.2–5.9)
ECHO LV INTERNAL DIMENSION SYSTOLIC: 3.3 CM
ECHO LV IVSD: 0.9 CM (ref 0.6–1)
ECHO LV MASS 2D: 144.1 G (ref 88–224)
ECHO LV MASS INDEX 2D: 85.3 G/M2 (ref 49–115)
ECHO LV POSTERIOR WALL DIASTOLIC: 1.16 CM (ref 0.6–1)
ECHO LVOT PEAK GRADIENT: 1 MMHG
ECHO MV A VELOCITY: 78.4 CM/S
ECHO MV AREA PHT: 2.82 CM2
ECHO MV E DECELERATION TIME (DT): 0.18 S
ECHO MV E VELOCITY: 78.4 CM/S
ECHO MV E/A RATIO: 1
ECHO MV E/E' SEPTAL: 16.4
ECHO MV PRESSURE HALF TIME (PHT): 0.08 S
ECHO PV REGURGITANT MAX VELOCITY: 513 CM/S
ECHO PV REGURGITANT MAX VELOCITY: 54.3 CM/S
ECHO PV REGURGITANT MAX VELOCITY: 84.5 CM/S
ECHO RIGHT VENTRICULAR SYSTOLIC PRESSURE (RVSP): 19 MMHG
ECHO RV INTERNAL DIMENSION: 3.3 CM
ECHO TV MAX VELOCITY: 201 CM/S
ECHO TV REGURGITANT PEAK GRADIENT: 16 MMHG
EOSINOPHIL # BLD: 0 K/UL (ref 0–0.4)
EOSINOPHIL NFR BLD: 0 % (ref 0–7)
ERYTHROCYTE [DISTWIDTH] IN BLOOD BY AUTOMATED COUNT: 17 % (ref 11.5–14.5)
GLUCOSE SERPL-MCNC: 91 MG/DL (ref 65–100)
HCT VFR BLD AUTO: 30.6 % (ref 36.6–50.3)
HGB BLD-MCNC: 10.6 % (ref 12.1–17)
IMM GRANULOCYTES # BLD AUTO: 0 K/UL (ref 0–0.04)
IMM GRANULOCYTES NFR BLD AUTO: 1 % (ref 0–0.5)
LYMPHOCYTES # BLD: 1.7 K/UL (ref 0.8–3.5)
LYMPHOCYTES NFR BLD: 23 % (ref 12–49)
MCH RBC QN AUTO: 32.2 PG (ref 26–34)
MCHC RBC AUTO-ENTMCNC: 34.6 G/DL (ref 30–36.5)
MCV RBC AUTO: 93 FL (ref 80–99)
MONOCYTES # BLD: 0.6 K/UL (ref 0–1)
MONOCYTES NFR BLD: 8 % (ref 5–13)
NEUTS SEG # BLD: 5.1 K/UL (ref 1.8–8)
NEUTS SEG NFR BLD: 69 % (ref 32–75)
PHOSPHATE SERPL-MCNC: 1.5 MG/DL (ref 2.6–4.7)
PLATELET # BLD AUTO: 379 K/UL (ref 150–400)
PMV BLD AUTO: 9.5 FL (ref 8.9–12.9)
POTASSIUM SERPL-SCNC: 3.2 MMOL/L (ref 3.5–5.1)
RBC # BLD AUTO: 3.29 M/UL (ref 4.1–5.7)
SODIUM SERPL-SCNC: 142 MMOL/L (ref 136–145)
WBC # BLD AUTO: 7 K/UL (ref 4.1–11.1)

## 2020-10-01 PROCEDURE — 85025 COMPLETE CBC W/AUTO DIFF WBC: CPT

## 2020-10-01 PROCEDURE — 74011250636 HC RX REV CODE- 250/636: Performed by: SURGERY

## 2020-10-01 PROCEDURE — 97530 THERAPEUTIC ACTIVITIES: CPT

## 2020-10-01 PROCEDURE — 93880 EXTRACRANIAL BILAT STUDY: CPT

## 2020-10-01 PROCEDURE — 74011636637 HC RX REV CODE- 636/637: Performed by: INTERNAL MEDICINE

## 2020-10-01 PROCEDURE — 80069 RENAL FUNCTION PANEL: CPT

## 2020-10-01 PROCEDURE — 65270000029 HC RM PRIVATE

## 2020-10-01 PROCEDURE — 74011000258 HC RX REV CODE- 258: Performed by: INTERNAL MEDICINE

## 2020-10-01 PROCEDURE — 74011250637 HC RX REV CODE- 250/637: Performed by: INTERNAL MEDICINE

## 2020-10-01 PROCEDURE — 93922 UPR/L XTREMITY ART 2 LEVELS: CPT

## 2020-10-01 PROCEDURE — 99251 PR INITL INPATIENT CONSULT NEW/ESTAB PT 20 MIN: CPT | Performed by: INTERNAL MEDICINE

## 2020-10-01 PROCEDURE — 74011250636 HC RX REV CODE- 250/636: Performed by: INTERNAL MEDICINE

## 2020-10-01 PROCEDURE — 36415 COLL VENOUS BLD VENIPUNCTURE: CPT

## 2020-10-01 PROCEDURE — 74011250637 HC RX REV CODE- 250/637: Performed by: NURSE PRACTITIONER

## 2020-10-01 PROCEDURE — C8929 TTE W OR WO FOL WCON,DOPPLER: HCPCS

## 2020-10-01 RX ORDER — EZETIMIBE 10 MG/1
10 TABLET ORAL DAILY
Status: DISCONTINUED | OUTPATIENT
Start: 2020-10-02 | End: 2020-10-07

## 2020-10-01 RX ADMIN — LEVOFLOXACIN 500 MG: 500 TABLET, FILM COATED ORAL at 13:34

## 2020-10-01 RX ADMIN — OXYCODONE HYDROCHLORIDE 15 MG: 5 TABLET ORAL at 00:11

## 2020-10-01 RX ADMIN — DIBASIC SODIUM PHOSPHATE, MONOBASIC POTASSIUM PHOSPHATE AND MONOBASIC SODIUM PHOSPHATE 2 TABLET: 852; 155; 130 TABLET ORAL at 13:34

## 2020-10-01 RX ADMIN — DEXTROSE AND SODIUM CHLORIDE 75 ML/HR: 5; 450 INJECTION, SOLUTION INTRAVENOUS at 15:47

## 2020-10-01 RX ADMIN — PERFLUTREN 2 ML: 6.52 INJECTION, SUSPENSION INTRAVENOUS at 11:00

## 2020-10-01 RX ADMIN — OXYCODONE HYDROCHLORIDE 15 MG: 5 TABLET ORAL at 15:57

## 2020-10-01 RX ADMIN — METOPROLOL TARTRATE 50 MG: 50 TABLET, FILM COATED ORAL at 19:57

## 2020-10-01 RX ADMIN — DEXTROSE AND SODIUM CHLORIDE 75 ML/HR: 5; 450 INJECTION, SOLUTION INTRAVENOUS at 05:25

## 2020-10-01 RX ADMIN — DIBASIC SODIUM PHOSPHATE, MONOBASIC POTASSIUM PHOSPHATE AND MONOBASIC SODIUM PHOSPHATE 2 TABLET: 852; 155; 130 TABLET ORAL at 19:58

## 2020-10-01 RX ADMIN — ASPIRIN 81 MG: 81 TABLET, COATED ORAL at 08:41

## 2020-10-01 RX ADMIN — POTASSIUM CHLORIDE 40 MEQ: 1500 TABLET, EXTENDED RELEASE ORAL at 08:39

## 2020-10-01 RX ADMIN — OXYCODONE HYDROCHLORIDE 15 MG: 5 TABLET ORAL at 05:14

## 2020-10-01 RX ADMIN — ENOXAPARIN SODIUM 40 MG: 40 INJECTION SUBCUTANEOUS at 19:58

## 2020-10-01 RX ADMIN — POTASSIUM CHLORIDE 40 MEQ: 1500 TABLET, EXTENDED RELEASE ORAL at 15:53

## 2020-10-01 RX ADMIN — LISINOPRIL 20 MG: 20 TABLET ORAL at 08:40

## 2020-10-01 RX ADMIN — OXYCODONE HYDROCHLORIDE 15 MG: 5 TABLET ORAL at 09:25

## 2020-10-01 RX ADMIN — OXYCODONE HYDROCHLORIDE 15 MG: 5 TABLET ORAL at 19:57

## 2020-10-01 RX ADMIN — PREDNISONE 20 MG: 20 TABLET ORAL at 08:40

## 2020-10-01 RX ADMIN — METOPROLOL TARTRATE 50 MG: 50 TABLET, FILM COATED ORAL at 08:39

## 2020-10-01 NOTE — CONSULTS
Consult    Subjective:     Mika Ferreira is a 48 y.o. man was admitted to hospital 5 days ago with generalized weakness, with TIA symptoms. I was consulted today for aortobiiliac occlusion. Patient is examined and patient is accompanied by his wife. Patient is awake and alert. Patient apparently having leg pain as well for a while. Patient denies any buttock pain. Patient denies any abdominal symptoms. Patient was told he has musculoskeletal disorder. Patient has been losing weight steadily. Patient currently bedbound. His condition progressively gotten worse since last month and severely got worse last few weeks. He is not able to get out of bed by himself without any assist..  Patient was also told that he had a remote history of stroke as well. His posterior leg pain is mild to moderate at times. CT angiogram findings discussed as below.     Past Medical History:   Diagnosis Date    Myositis     Familial inclusion       Past Surgical History:   Procedure Laterality Date    HX ORTHOPAEDIC      disc infused, neck      Family History   Problem Relation Age of Onset    Other Father     Other Sister     Other Brother       Social History     Tobacco Use    Smoking status: Current Every Day Smoker     Packs/day: 0.50    Smokeless tobacco: Never Used   Substance Use Topics    Alcohol use: Yes     Comment: occ       Current Facility-Administered Medications   Medication Dose Route Frequency Provider Last Rate Last Dose    levoFLOXacin (LEVAQUIN) tablet 500 mg  500 mg Oral Q24H Wesley Urbina MD   500 mg at 09/30/20 1212    potassium chloride (K-DUR, KLOR-CON) SR tablet 40 mEq  40 mEq Oral TID Wesley Urbina MD   40 mEq at 09/30/20 2120    predniSONE (DELTASONE) tablet 20 mg  20 mg Oral DAILY WITH BREAKFAST Stephenie Zapata MD        oxyCODONE IR (ROXICODONE) tablet 15 mg  15 mg Oral Q4H PRN Wesley Urbina MD   15 mg at 10/01/20 0011    dextrose 5 % - 0.45% NaCl infusion  75 mL/hr IntraVENous CONTINUOUS Alvino Perry MD 75 mL/hr at 09/29/20 2118 75 mL/hr at 09/29/20 2118    lisinopriL (PRINIVIL, ZESTRIL) tablet 20 mg  20 mg Oral DAILY Zina Felipe MD   20 mg at 09/30/20 0946    metoprolol tartrate (LOPRESSOR) tablet 50 mg  50 mg Oral BID Zina Felipe MD   50 mg at 09/30/20 2120    enoxaparin (LOVENOX) injection 40 mg  40 mg SubCUTAneous Q24H Pj Amezcua MD   40 mg at 09/30/20 2120    aspirin delayed-release tablet 81 mg  81 mg Oral DAILY KibotGisele T, NP   81 mg at 09/30/20 0946    acetaminophen (TYLENOL) tablet 650 mg  650 mg Oral Q6H PRN Ethan, Gisele SUTTON, NP   650 mg at 09/30/20 0950    ondansetron (ZOFRAN) injection 4 mg  4 mg IntraVENous Q8H PRN DanielbotGisele, NP   4 mg at 09/30/20 0402    guaiFENesin (ROBITUSSIN) 20 mg/mL oral liquid 400 mg  400 mg Oral Q6H PRN Annelise Velásquez T, NP   Stopped at 09/26/20 1855        No Known Allergies     Review of Systems:  A comprehensive review of systems was negative except for that written in the History of Present Illness. Objective: Intake and Output:    No intake/output data recorded. 09/29 0701 - 09/30 1900  In: 1250 [P.O.:150; I.V.:1100]  Out: 1250 [Urine:1250]    Physical Exam:   Visit Vitals  /83 (BP 1 Location: Left arm, BP Patient Position: At rest)   Pulse 72   Temp (!) 95.9 °F (35.5 °C)   Resp 18   Ht 6' (1.829 m)   Wt 52.6 kg (116 lb)   SpO2 99%   BMI 15.73 kg/m²     General:  Alert, cooperative, no distress, appears stated age. Head:  Normocephalic, without obvious abnormality, atraumatic. Eyes:  Conjunctivae/corneas clear. PERRL, EOMs intact. Fundi benign   Ears:  Normal TMs and external ear canals both ears. Nose: Nares normal. Septum midline. Mucosa normal. No drainage or sinus tenderness. Throat: Lips, mucosa, and tongue normal. Teeth and gums normal.   Neck: Supple, symmetrical, trachea midline, no adenopathy, thyroid: no enlargement/tenderness/nodules, no carotid bruit and no JVD. Back:   Symmetric, no curvature. ROM normal. No CVA tenderness. Lungs:   Clear to auscultation bilaterally. Chest wall:  No tenderness or deformity. Heart:  Regular rate and rhythm, S1, S2 normal, no murmur, click, rub or gallop. Abdomen:   Soft, non-tender. Bowel sounds normal. No masses,  No organomegaly. Skin: Skin color, texture, turgor normal. No rashes or lesions   Lymph nodes: Cervical, supraclavicular, and axillary nodes normal.   Neurologic: CNII-XII intact. Normal strength, sensation and reflexes throughout. Vascular examination patient has nonpalpable femoral pulses nonpalpable pedal pulse bilaterally. Sensation is compromised. Motor function is intact.     ECG:  normal EKG, normal sinus rhythm, unchanged from previous tracings, there are no previous tracings available for comparison     Data Review:   Recent Results (from the past 24 hour(s))   METABOLIC PANEL, BASIC    Collection Time: 09/30/20  5:01 AM   Result Value Ref Range    Sodium 141 136 - 145 mmol/L    Potassium 2.8 (L) 3.5 - 5.1 mmol/L    Chloride 111 (H) 97 - 108 mmol/L    CO2 22 21 - 32 mmol/L    Anion gap 8 5 - 15 mmol/L    Glucose 106 (H) 65 - 100 mg/dL    BUN 5 (L) 6 - 20 mg/dL    Creatinine 0.44 (L) 0.70 - 1.30 mg/dL    BUN/Creatinine ratio 11 (L) 12 - 20      GFR est AA >60 >60 ml/min/1.73m2    GFR est non-AA >60 >60 ml/min/1.73m2    Calcium 7.8 (L) 8.5 - 10.1 mg/dL   CBC WITH AUTOMATED DIFF    Collection Time: 09/30/20  5:01 AM   Result Value Ref Range    WBC 5.2 4.1 - 11.1 K/uL    RBC 3.23 (L) 4.10 - 5.70 M/uL    HGB 10.4 (L) 12.1 - 17.0 %    HCT 29.7 (L) 36.6 - 50.3 %    MCV 92.0 80.0 - 99.0 FL    MCH 32.2 26.0 - 34.0 PG    MCHC 35.0 30.0 - 36.5 g/dL    RDW 16.7 (H) 11.5 - 14.5 %    PLATELET 624 155 - 472 K/uL    MPV 9.0 8.9 - 12.9 FL    NRBC 2.6 (H) 0  WBC    ABSOLUTE NRBC 0.13 (H) 0.00 - 0.01 K/uL    NEUTROPHILS 79 (H) 32 - 75 %    LYMPHOCYTES 14 12 - 49 %    MONOCYTES 8 5 - 13 % EOSINOPHILS 0 0 - 7 %    BASOPHILS 0 0 - 1 %    IMMATURE GRANULOCYTES 2 (H) 0.0 - 0.5 %    ABS. NEUTROPHILS 4.1 1.8 - 8.0 K/UL    ABS. LYMPHOCYTES 0.7 (L) 0.8 - 3.5 K/UL    ABS. MONOCYTES 0.4 0.0 - 1.0 K/UL    ABS. EOSINOPHILS 0.0 0.0 - 0.4 K/UL    ABS. BASOPHILS 0.0 0.0 - 0.1 K/UL    ABS. IMM. GRANS. 0.1 (H) 0.00 - 0.04 K/UL    DF AUTOMATED         Chest x-ray no significant change from prior studies. Assessment:     Active Problems:    Metabolic encephalopathy (6/97/2950)      UTI (urinary tract infection) (9/26/2020)      Aspiration pneumonitis ([facility] [address] (9/26/2020)      Essential hypertension (9/26/2020)      Acute dehydration (9/26/2020)        Plan:     TIA: Patient is a generalized weakness in setting of TIA need to be worked up for coronary artery disease. I will order carotid duplex ultrasound. I reviewed CT angiogram of the abdomen pelvis. Patient has a complete occlusion from the infrarenal to distal external iliac artery bilaterally. Patient has a significant collateral system from a epigastric vessels. Patient's left renal artery is widely patent, patient's a right kidney is atrophic. Patient's renal function is normal however. Patient has a poor vascular examination clinically and does have symptoms of chronic limb ischemia. I am not sure he is leg weakness and pain is related to musculoskeletal disorder, to my opinion that it is related aortoiliac occlusion. I will also order KASEY examination both legs. I will also echocardiogram as well. I will make further recommendations after carotid duplex ultrasound and KASEY exam is reviewed.

## 2020-10-01 NOTE — PROGRESS NOTES
PHYSICAL THERAPY TREATMENT  Patient: Rogelio Donald (85 y.o. male)  Date: 10/1/2020  Diagnosis: Metabolic encephalopathy [X16.83]   <principal problem not specified>       Precautions:    Chart, physical therapy assessment, plan of care and goals were reviewed. ASSESSMENT  Patient continues with skilled PT services and is progressing slowly towards goals. Pt. Received in semi-supine and agreeable for therapy . Pt. Presents to PT with c/o pain in b/l LE below knees- 7/10 . Participated in active assisted /PROM ther. Ex's for b/l Le -10 reps , needed frequent rest breaks sec. To fatigue . Performed rolling to L side with modA , supine >sit transfers with mod/maxA , sit >supine with maxA , able to tolerate sitting for ~ 1 minute with mod/maxA  ,maxA x 2 for scooting up on the bed . Recommend d/c to SNF when medically appropriate . Current Level of Function Impacting Discharge (mobility/balance): Requires maxA for functional mobility. PLAN :  Patient continues to benefit from skilled intervention to address the above impairments. Continue treatment per established plan of care. to address goals. Recommendation for discharge: (in order for the patient to meet his/her long term goals)  SNF when medically appropriate   This discharge recommendation:  Has been made in collaboration with the attending provider and/or case management    IF patient discharges home will need the following DME: hospital bed and mechanical lift       SUBJECTIVE:   Patient stated \" My legs are hurting     OBJECTIVE DATA SUMMARY:   Critical Behavior:  Neurologic State: Alert  Orientation Level: Disoriented to person, Disoriented to place  Cognition: Decreased attention/concentration  Safety/Judgement: Decreased awareness of need for safety  Functional Mobility Training:  Bed Mobility:  Rolling:  Moderate assistance  Supine to Sit: Maximum assistance  Sit to Supine: Maximum assistance  Scooting: Assist x2;Maximum assistance([facility]ards the [facility])    Balance:  Sitting: Impaired; With support  Sitting - Static: Poor (constant support)    Therapeutic Exercises:         EXERCISE   Sets   Reps   Active Active Assist   Passive Self ROM   Comments   Ankle Pumps  10 [] [x] [x] []    Quad Sets/Glut Sets   [] [] [] []    Hamstring Sets   [] [] [] []    Short Arc Quads   [] [] [] []    Heel Slides  10 [] [x] [x] []    Straight Leg Raises   [] [] [] []    Hip abd/add  10 [] [x] [x] []    Long Arc Quads   [] [] [] []    Marching   [] [] [] []       [] [] [] []       Pain Ratin/10 in b/l Le below knees     Activity Tolerance:   Fair and requires frequent rest breaks  Please refer to the flowsheet for vital signs taken during this treatment. After treatment patient left in no apparent distress:   Supine in bed, Call bell within reach, and Caregiver / family present    COMMUNICATION/COLLABORATION:   The patients plan of care was discussed with: Registered nurse.      Sarah Banegas   Time Calculation: 28 mins

## 2020-10-01 NOTE — PROGRESS NOTES
Hospitalist Progress Note           Daily Progress Note: 10/1/2020    Chief complaint: Shortness of breath and weakness  Subjective: The patient is seen for follow  up. He was seen by vascular surgery yesterday who indicates patient's leg weakness and pain may be more related to aortoiliac occlusion rather than his underlying inclusion body myositis. He has ordered KASEY studies as well as echocardiogram and carotid duplex. Dr. Milagros Schroeder has discussed aortobifemoral bypass with the family     Temperature now up to 97.5.     Problem List:  Problem List as of 10/1/2020 Date Reviewed: 9/17/2020          Codes Class Noted - Resolved    Metabolic encephalopathy BSB-34-DM: G93.41  ICD-9-CM: 348.31  9/26/2020 - Present        UTI (urinary tract infection) ICD-10-CM: N39.0  ICD-9-CM: 599.0  9/26/2020 - Present        Aspiration pneumonitis ([address] ICD-10-CM: J69.0  ICD-9-CM: 507.0  9/26/2020 - Present        Essential hypertension ICD-10-CM: I10  ICD-9-CM: 401.9  9/26/2020 - Present        Acute dehydration ICD-10-CM: E86.0  ICD-9-CM: 276.51  9/26/2020 - Present              Medications reviewed  Current Facility-Administered Medications   Medication Dose Route Frequency    levoFLOXacin (LEVAQUIN) tablet 500 mg  500 mg Oral Q24H    potassium chloride (K-DUR, KLOR-CON) SR tablet 40 mEq  40 mEq Oral TID    predniSONE (DELTASONE) tablet 20 mg  20 mg Oral DAILY WITH BREAKFAST    oxyCODONE IR (ROXICODONE) tablet 15 mg  15 mg Oral Q4H PRN    dextrose 5 % - 0.45% NaCl infusion  75 mL/hr IntraVENous CONTINUOUS    lisinopriL (PRINIVIL, ZESTRIL) tablet 20 mg  20 mg Oral DAILY    metoprolol tartrate (LOPRESSOR) tablet 50 mg  50 mg Oral BID    enoxaparin (LOVENOX) injection 40 mg  40 mg SubCUTAneous Q24H    aspirin delayed-release tablet 81 mg  81 mg Oral DAILY    acetaminophen (TYLENOL) tablet 650 mg  650 mg Oral Q6H PRN    ondansetron (ZOFRAN) injection 4 mg  4 mg IntraVENous Q8H PRN    guaiFENesin (ROBITUSSIN) 20 mg/mL oral liquid 400 mg  400 mg Oral Q6H PRN       Review of Systems:   Review of systems obtained and negative    Objective:   Physical Exam:     Visit Vitals  /77   Pulse 91   Temp 97.5 °F (36.4 °C)   Resp 18   Ht 6' 0.05\" (1.83 m)   Wt 52.6 kg (115 lb 15.4 oz)   SpO2 96%   BMI 15.71 kg/m²    O2 Flow Rate (L/min): 35 l/min O2 Device: Room air    Temp (24hrs), Av.2 °F (35.7 °C), Min:95.5 °F (35.3 °C), Max:97.5 °F (36.4 °C)    No intake/output data recorded. 1901 - 10/01 0700  In: 200 [P.O.:200]  Out: 1800 [Urine:1800]    General:  Alert, cooperative, no distress, appears older than stated age. Lungs:   Clear to auscultation bilaterally. Chest wall:  No tenderness or deformity. Heart:  Regular rate and rhythm, S1, S2 normal, no murmur, click, rub or gallop. Abdomen:   Soft, non-tender. Bowel sounds normal. No masses,  No organomegaly. Extremities: Extremities normal, atraumatic, no cyanosis or edema. Pulses: 2+ and symmetric all extremities. Skin: Skin color, texture, turgor normal. No rashes or lesions   Neurologic: CNII-XII intact.  No gross sensory or motor deficits     Data Review:       Recent Days:  Recent Labs     10/01/20  0900 20  0501 20  0420   WBC 7.0 5.2 6.2   HGB 10.6* 10.4* 9.1*   HCT 30.6* 29.7* 26.4*    391 376     Recent Labs     10/01/20  0110 20  0501 20  1530 20  0420    141  --  144   K 3.2* 2.8* 3.0* 2.6*   * 111*  --  116*   CO2 21 22  --  21   GLU 91 106*  --  159*   BUN 3* 5*  --  10   CREA 0.50* 0.44*  --  0.38*   CA 7.7* 7.8*  --  7.8*   MG  --   --  1.7  --    PHOS 1.5*  --   --   --    ALB 1.5*  --   --   --      Recent Labs     20  0930   PH 7.485*   PCO2 28*   PO2 80   HCO3 23   FIO2 21.0       24 Hour Results:  Recent Results (from the past 24 hour(s))   RENAL FUNCTION PANEL    Collection Time: 10/01/20  1:10 AM   Result Value Ref Range    Sodium 142 136 - 145 mmol/L Potassium 3.2 (L) 3.5 - 5.1 mmol/L    Chloride 111 (H) 97 - 108 mmol/L    CO2 21 21 - 32 mmol/L    Anion gap 10 5 - 15 mmol/L    Glucose 91 65 - 100 mg/dL    BUN 3 (L) 6 - 20 mg/dL    Creatinine 0.50 (L) 0.70 - 1.30 mg/dL    BUN/Creatinine ratio 6 (L) 12 - 20      GFR est AA >60 >60 ml/min/1.73m2    GFR est non-AA >60 >60 ml/min/1.73m2    Calcium 7.7 (L) 8.5 - 10.1 mg/dL    Phosphorus 1.5 (L) 2.6 - 4.7 mg/dL    Albumin 1.5 (L) 3.5 - 5.0 g/dL   CBC WITH AUTOMATED DIFF    Collection Time: 10/01/20  9:00 AM   Result Value Ref Range    WBC 7.0 4.1 - 11.1 K/uL    RBC 3.29 (L) 4.10 - 5.70 M/uL    HGB 10.6 (L) 12.1 - 17.0 %    HCT 30.6 (L) 36.6 - 50.3 %    MCV 93.0 80.0 - 99.0 FL    MCH 32.2 26.0 - 34.0 PG    MCHC 34.6 30.0 - 36.5 g/dL    RDW 17.0 (H) 11.5 - 14.5 %    PLATELET 169 198 - 466 K/uL    MPV 9.5 8.9 - 12.9 FL    NEUTROPHILS PENDING %    LYMPHOCYTES PENDING %    MONOCYTES PENDING %    EOSINOPHILS PENDING %    BASOPHILS PENDING %    IMMATURE GRANULOCYTES PENDING %    ABS. NEUTROPHILS PENDING K/UL    ABS. LYMPHOCYTES PENDING K/UL    ABS. MONOCYTES PENDING K/UL    ABS. EOSINOPHILS PENDING K/UL    ABS. BASOPHILS PENDING K/UL    ABS. IMM. GRANS. PENDING K/UL    DF AUTOMATED             Assessment/     Acute hypoxic respiratory failure likely secondary to aspiration. Improved    Right lower lobe aspiration pneumonia improved    Urinary tract infection due to E. coli    Metabolic encephalopathy, improved    Hypokalemia    Hypothermia, probably largely environmental.  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        Plan:  Replete phosphorus  Await noninvasive vascular studies as ordered by Dr. Elmo Vega  I will speak with Dr. Elmo Vega regarding timing of surgery. May want to delay this due to recent UTI and pneumonia    Care Plan discussed with: Patient/Family    Total time spent with patient: 30 minutes.     Kennedy Roberto MD

## 2020-10-01 NOTE — PROGRESS NOTES
PT treatment attempted . Currently , pt. Off the floor - CVL. PT will reattempt for PT treatment at a later time .  Thanks

## 2020-10-01 NOTE — PROGRESS NOTES
Physician Progress Note      Viet Urbina  CSN #:                  [id number]  :                       1965  ADMIT DATE:       2020 12:44 PM  [address] [place] DATE:  RESPONDING  PROVIDER #:        Coreen Alvarenga MD          QUERY TEXT:    Pt admitted with metabolic encephalopathy due to UTI and dehydration. Pt noted to have multiple pressure ulcers, DTIs. If possible, please document in progress notes and discharge summary if pressure ulcers were present on admission (POA):       The medical record reflects the following:  Risk Factors: bedbound; willem polymyositis    Clinical Indicators:     Wound Care-  DTI to Right foot anterior, lateral, non blanchable erythema  Stage 1 to Right ankle anterior, lateral, non blanchable erythema  Stage 1 to Left heel, non blanchable erythema  Stage 2 to Right hip posterior, lateral  DTI to sacrum/coccyx, red purple, non blanching erythema  Stage 1 to spine, red blanching    Treatment: Wound care consult; turn/reposition Q 2hrs; manage incontinence; offload heels    Thank you,  Frandy Rothman RN, BSN  Clinical Documentation  Options provided:  -- Yes, pressure ulcers to right ankle/foot, left heel, right hip, sacrum, and spine were present at the time of the order to admit to the hospital  -- No, pressure ulcers to right ankle/foot, left heel, right hip, sacrum, and spine were not present on admission and developed during the inpatient stay  -- Other - I will add my own diagnosis  -- Disagree - Not applicable / Not valid  -- Disagree - Clinically unable to determine / Unknown  -- Refer to Clinical Documentation Reviewer    PROVIDER RESPONSE TEXT:    Yes, pressure ulcers to right ankle/foot, left heel, right hip, sacrum, and spine were present at the time of the order to admit to the hospital.    Query created by: Elmer Villegas on 10/1/2020 9:14 AM      Electronically signed by:  Coreen Alvarenga MD 10/1/2020 3:38 PM

## 2020-10-01 NOTE — CONSULTS
[facility] CARDIOLOGY  CARDIOLOGY CONSULTATION    REASON FOR CONSULT: Pre-op risk stratification. REQUESTING PROVIDER: Dr. Osei Mayers. CHIEF COMPLAINT: Pre-op/PVD    HISTORY OF PRESENT ILLNESS:  Benny Barraza is a 48y.o. year-old male with past medical history significant for inclusion body myositis, smoking, hypertension who presented due to UTI and pneumonia, and was on bipap for acute respiratory failure. The admission seems to have been caused by [employer]. He was found to have abdominal aortic occlusion with peripheral arterial ulcers. He was seen by Dr. Reggie Velasco and plan is for aorto-bifem bypass. Patient seems to be active before July, when he could walk one flight of stairs slowly with no chest pain or shortness of breath but slowly went downhill after July this year. Records from hospital admission course thus far reviewed. EKG shows sinus tachycardia with non-specific ST-T changes. INPATIENT MEDICATIONS:  Home medications reviewed.     Current Facility-Administered Medications:     phosphorus (K PHOS NEUTRAL) 250 mg tablet 2 Tab, 2 Tab, Oral, BID, Bereket CASTREJON MD, 2 Tab at 10/01/20 1334    perflutren lipid microspheres (DEFINITY) 1.1 mg/mL contrast injection, , , ,     levoFLOXacin (LEVAQUIN) tablet 500 mg, 500 mg, Oral, Q24H, Bereket CASTREJON MD, 500 mg at 10/01/20 1334    predniSONE (DELTASONE) tablet 20 mg, 20 mg, Oral, DAILY WITH BREAKFAST, Sherita Fischer MD, 20 mg at 10/01/20 0840    oxyCODONE IR (ROXICODONE) tablet 15 mg, 15 mg, Oral, Q4H PRN, Sherita Fischer MD, 15 mg at 10/01/20 1557    dextrose 5 % - 0.45% NaCl infusion, 75 mL/hr, IntraVENous, CONTINUOUS, Nichol Zapata MD, Last Rate: 75 mL/hr at 10/01/20 1547, 75 mL/hr at 10/01/20 1547    lisinopriL (PRINIVIL, ZESTRIL) tablet 20 mg, 20 mg, Oral, DAILY, Aj Carlos MD, 20 mg at 10/01/20 0840    metoprolol tartrate (LOPRESSOR) tablet 50 mg, 50 mg, Oral, BID, Aj Carlos MD, 50 mg at 10/01/20 8323   enoxaparin (LOVENOX) injection 40 mg, 40 mg, SubCUTAneous, Q24H, Kalyn Corral MD, 40 mg at 09/30/20 2120    aspirin delayed-release tablet 81 mg, 81 mg, Oral, DAILY, Kibot, Edwan T, NP, 81 mg at 10/01/20 0841    acetaminophen (TYLENOL) tablet 650 mg, 650 mg, Oral, Q6H PRN, Kibot, Edwan T, NP, 650 mg at 09/30/20 0950    ondansetron (ZOFRAN) injection 4 mg, 4 mg, IntraVENous, Q8H PRN, Kibot, Edwan T, NP, 4 mg at 09/30/20 0402    guaiFENesin (ROBITUSSIN) 20 mg/mL oral liquid 400 mg, 400 mg, Oral, Q6H PRN, Nomi Shells, NP, Stopped at 09/26/20 1855     ALLERGIES:  Allergies reviewed with the patient,No Known Allergies . FAMILY HISTORY:  Family history reviewed. Father with CAD. SOCIAL HISTORY:  Notable for moderate  tobacco use, no heavy alcohol or illicit drug use. REVIEW OF SYSTEMS:  Complete review of systems performed, pertinents noted above, all other systems are negative. PHYSICAL EXAMINATION:  Vital sign assessment reveal a blood pressure of 123/83   and pulse rate of 86. Cardiovascular exam has a heart with a regular rate and rhythm, normal S1 and S2. No murmur present. There are no rubs or gallops. Good peripheral pulses. No jugular venous distension. No carotid bruits are present. Respiratory exam reveals clear lung fields, no rales or rhonchi. Gastrointestinal exam has soft, nontender abdomen with normal bowel sounds. Lymphatic exam reveals no edema, diminished peripheral pulses and no varicosities. Foot ulcers are currently covered with dressing. Neurologic exam is nonfocal.  Musculoskeletal exam is notable for a normal gait. Recent labs results and imaging reviewed. 1. Pre-op risk stratification: Echo noted to be with normal EF. Will obtain nuclear stress test for further evaluation due ot nature of surgery. 2.  PAD: Continue ASA, will add zetia. Unclear if statins are contraindicated with [employer]. Will check with primary team or neurology.     3.  Hypertension: BP is close to goal,continue current meds. Thank you for involving us in the care of this patient. Please do not hesitate to balta us if additional questions arise.

## 2020-10-01 NOTE — PROGRESS NOTES
Unable to complete MBS this date s/t off floor for multiple tests. Will re-attempt MBS on 10/2/2020.

## 2020-10-01 NOTE — PROGRESS NOTES
Pulmonology and Critical Care Progress Note    Subjective:     Chief Complaint:   Chief Complaint   Patient presents with    Altered mental status          Patient seen and examined  Overnight as noted  Lying in bed comfortably  Awake and alert  No acute distress    MRI of the brain done is negative    Chest x-ray reviewed and shows mild basilar haziness suspicious for atelectasis    ABGs 9/29 showed pH of 7.48, PCO2 28, PO2 80, bicarb 23, saturation 97% on room air     Urine culture is showing gram-negative rods    Review of Systems:  Review of systems not obtained due to patient factors.     Current Facility-Administered Medications   Medication Dose Route Frequency Provider Last Rate Last Dose    phosphorus (K PHOS NEUTRAL) 250 mg tablet 2 Tab  2 Tab Oral BID June Binu Harman MD        perflutren lipid microspheres (DEFINITY) 1.1 mg/mL contrast injection             levoFLOXacin (LEVAQUIN) tablet 500 mg  500 mg Oral Q24H Crystal Reina MD   500 mg at 09/30/20 1212    potassium chloride (K-DUR, KLOR-CON) SR tablet 40 mEq  40 mEq Oral TID Crystal Reina MD   40 mEq at 10/01/20 0839    predniSONE (DELTASONE) tablet 20 mg  20 mg Oral DAILY WITH BREAKFAST Crystal Reina MD   20 mg at 10/01/20 0840    oxyCODONE IR (ROXICODONE) tablet 15 mg  15 mg Oral Q4H PRN Crystal Reina MD   15 mg at 10/01/20 0925    dextrose 5 % - 0.45% NaCl infusion  75 mL/hr IntraVENous CONTINUOUS Crystal Reina MD 75 mL/hr at 10/01/20 0525 75 mL/hr at 10/01/20 0525    lisinopriL (PRINIVIL, ZESTRIL) tablet 20 mg  20 mg Oral DAILY Yoko Bains MD   20 mg at 10/01/20 0840    metoprolol tartrate (LOPRESSOR) tablet 50 mg  50 mg Oral BID Yoko Bains MD   50 mg at 10/01/20 0839    enoxaparin (LOVENOX) injection 40 mg  40 mg SubCUTAneous Q24H Angela Preciado MD   40 mg at 09/30/20 2120    aspirin delayed-release tablet 81 mg  81 mg Oral DAILY Gisele Long NP   81 mg at 10/01/20 0841    acetaminophen (TYLENOL) tablet 650 mg  650 mg Oral Q6H PRN Gisele Long, NP   650 mg at 20 0950    ondansetron (ZOFRAN) injection 4 mg  4 mg IntraVENous Q8H PRN JimmytGisele, NP   4 mg at 20 0402    guaiFENesin (ROBITUSSIN) 20 mg/mL oral liquid 400 mg  400 mg Oral Q6H PRN Willistine Piggs, NP   Stopped at 20 1855            No Known Allergies        Objective:     Blood pressure 129/77, pulse 91, temperature 97.5 °F (36.4 °C), resp. rate 18, height 6' 0.05\" (1.83 m), weight 52.6 kg (115 lb 15.4 oz), SpO2 96 %. Temp (24hrs), Av.2 °F (35.7 °C), Min:95.5 °F (35.3 °C), Max:97.5 °F (36.4 °C)      Intake and Output:  Current Shift: No intake/output data recorded. Last 3 Shifts: 1901 - 10/01 0700  In: 200 [P.O.:200]  Out: 1800 [Urine:1800]    Physical Exam:     General: Lying in bed comfortably, no acute distress  Throat and Neck: Supple  Lung: Reduced air entry bilaterally with prolonged exhalation but no wheezing. Occasional crackles. Heart: S1+S2. No murmurs  Abdomen: soft, non-tender. Bowel sounds normal. No masses; obese  Extremities: No edema  : Not done  Skin: No cyanosis  Neurologic: Awake and alert, grossly nonfocal  Psychiatric:  Unable to perform due to patient's condition      Lab/Data Review: All lab results for the last 24 hours reviewed.   Recent Results (from the past 24 hour(s))   RENAL FUNCTION PANEL    Collection Time: 10/01/20  1:10 AM   Result Value Ref Range    Sodium 142 136 - 145 mmol/L    Potassium 3.2 (L) 3.5 - 5.1 mmol/L    Chloride 111 (H) 97 - 108 mmol/L    CO2 21 21 - 32 mmol/L    Anion gap 10 5 - 15 mmol/L    Glucose 91 65 - 100 mg/dL    BUN 3 (L) 6 - 20 mg/dL    Creatinine 0.50 (L) 0.70 - 1.30 mg/dL    BUN/Creatinine ratio 6 (L) 12 - 20      GFR est AA >60 >60 ml/min/1.73m2    GFR est non-AA >60 >60 ml/min/1.73m2    Calcium 7.7 (L) 8.5 - 10.1 mg/dL    Phosphorus 1.5 (L) 2.6 - 4.7 mg/dL    Albumin 1.5 (L) 3.5 - 5.0 g/dL   DUPLEX CAROTID BILATERAL    Collection Time: 10/01/20  1:33 AM   Result Value Ref Range    Left CCA dist sys 48.2 cm/s    Left CCA dist asher 12.9 cm/s    Left CCA prox sys 45.8 cm/s    Left CCA prox asher 12.1 cm/s    Left ICA dist sys 87.4 cm/s    Left ICA dist asher 25.1 cm/s    Left ICA prox sys 61.1 cm/s    Left ICA prox asher 18.8 cm/s    Left ECA sys 61.1 cm/s    LEFT EXTERNAL CAROTID ARTERY D 13.40 cm/s    Left subclavian sys 75.4 cm/s    LEFT SUBCLAVIAN ARTERY D 0.00 cm/s    Left vertebral sys 27.9 cm/s    LEFT VERTEBRAL ARTERY D 10.80 cm/s    Right cca dist sys 54.0 cm/s    Right CCA dist asher 5.3 cm/s    Right CCA prox sys 67.2 cm/s    Right CCA prox asher 13.0 cm/s    Right ICA dist sys 97.8 cm/s    Right ICA dist asher 34.9 cm/s    Right ICA prox sys 53.7 cm/s    Right ICA prox asher 16.1 cm/s    Right eca sys 90.5 cm/s    RIGHT EXTERNAL CAROTID ARTERY D 10.90 cm/s    Right subclavian sys 102.0 cm/s    RIGHT SUBCLAVIAN ARTERY D 0.00 cm/s    Right vertebral sys 47.6 cm/s    RIGHT VERTEBRAL ARTERY D 11.30 cm/s   CBC WITH AUTOMATED DIFF    Collection Time: 10/01/20  9:00 AM   Result Value Ref Range    WBC 7.0 4.1 - 11.1 K/uL    RBC 3.29 (L) 4.10 - 5.70 M/uL    HGB 10.6 (L) 12.1 - 17.0 %    HCT 30.6 (L) 36.6 - 50.3 %    MCV 93.0 80.0 - 99.0 FL    MCH 32.2 26.0 - 34.0 PG    MCHC 34.6 30.0 - 36.5 g/dL    RDW 17.0 (H) 11.5 - 14.5 %    PLATELET 454 190 - 468 K/uL    MPV 9.5 8.9 - 12.9 FL    NEUTROPHILS PENDING %    LYMPHOCYTES PENDING %    MONOCYTES PENDING %    EOSINOPHILS PENDING %    BASOPHILS PENDING %    IMMATURE GRANULOCYTES PENDING %    ABS. NEUTROPHILS PENDING K/UL    ABS. LYMPHOCYTES PENDING K/UL    ABS. MONOCYTES PENDING K/UL    ABS. EOSINOPHILS PENDING K/UL    ABS. BASOPHILS PENDING K/UL    ABS. IMM. GRANS.  PENDING K/UL    DF AUTOMATED     ECHO ADULT COMPLETE    Collection Time: 10/01/20 11:28 AM   Result Value Ref Range    LV ED Vol A4C 87.00 cm3    LV ES Vol A4C 38.00 cm3    IVSd 0.90 0.6 - 1.0 cm    LVIDd 4.22 4.2 - 5.9 cm    LVIDs 3.30 cm Pulmonic Regurgitant End Max Velocity 54.30 cm/s    LVOT Peak Gradient 1.00 mmHg    LVPWd 1.16 (A) 0.6 - 1.0 cm    LV E' Septal Velocity 4.78 cm/s    BP EF 52.3 (A) 55 - 100 %    E/E' lateral 13.00     E/E' septal 16.40     Pulmonic Regurgitant End Max Velocity 84.50 cm/s    AoV PG 3.00 mmHg    Ao Root D 3.10 cm    Pulmonic Regurgitant End Max Velocity 513.00 cm/s    MR Peak Gradient 105.00 mmHg    Mitral Valve E Wave Deceleration Time 0.18 s    Mitral Valve Pressure Half-time 0.08 s    MV A Howard 78.40 cm/s    MV E Howard 78.40 cm/s    MVA (PHT) 2.82 cm2    MV E/A 1.00     Est. RA Pressure 3.00 mmHg    RVIDd 3.30 cm    RVSP 19.00 mmHg    Tricuspid Valve Max Velocity 201.00 cm/s    Triscuspid Valve Regurgitation Peak Gradient 16.00 mmHg     chest X-ray      XR CHEST PORT   Final Result   Impression: Underexpanded lungs with bibasilar atelectasis. CTA ABDOMEN PELV W CONT   Final Result   IMPRESSION: Mid abdominal aortic occlusion, with threatened right kidney and   fairly good collateralization to the legs. This could be causing a mesenteric   steal phenomenon, however      MRI BRAIN WO CONT   Final Result   Impression: No evidence of acute hemorrhage, mass or infarct. No sinusitis or   mastoiditis. Please see above discussion. CTA CHEST W OR W WO CONT   Final Result   IMPRESSION: Limited by breathing motion. 1. No large pulmonary arterial filling defect. 2. Bronchial thickening with right greater than left lower lung atelectasis or   pneumonia/pneumonitis. Bubbly debris in the trachea. 3. Atherosclerosis. Abrupt discontinuation of contrast in the aorta on the very   inferior slices. Contrast is seen in the celiac artery and SMA and the left   renal artery. The right kidney demonstrates decreased attenuation compared to   the left concerning for medical renal disease to include ischemia. Recommend CTA   abdomen/pelvis.  Called report to charge nurse Cecil Morton at 9:05 PM 9/27/2020.   4. Cholelithiasis within distended gallbladder. 5. Dilated small bowel. 6. Other findings as above. XR CHEST PORT   Final Result   IMPRESSION:      No airspace disease in the lungs. Follow-up as clinically indicated. CT HEAD WO CONT   Final Result   Impression: Unremarkable head CT without contrast.  No infarct, mass, or    hemorrhage. Please see full report. XR CHEST SNGL V   Final Result   Impression: No diagnostic abnormality. XR SWALLOW FUNC VIDEO    (Results Pending)       CT Results  (Last 48 hours)    None          Assessment:     1. Acute respiratory failure with hypoxia  2. Acute metabolic encephalopathy  3. Aspiration pneumonia  4. Metabolic acidosis  5. UTI  6. Hypertension  7. Familial polymyositis    Plan:     1. Hypoxic respiratory failure. This appears to be secondary to aspiration pneumonia. CT chest reviewed  Shows bilateral infiltrates right more than left with debris in the airway suspicious for aspiration    The patient is prone for aspiration pneumonia given his neurologic disorder. He has inclusion body myositis/familial polymyositis. On intravenous Zosyn empirically. Speech therapist evaluation. Now on room air  ABG stable  No metabolic acidosis  Does have respiratory alkalosis likely due to hyperventilation from pain during ABGs  On IV Solu-Medrol     2. Aspiration pneumonia:  With Zosyn for antibiotic coverage  Speech evaluation    3. Metabolic encephalopathy. He has a progressive neurologic disorder. Brain MRI negative  Neurology is consulted. He has familial myositis. Further recommendation per Neurology. 4.  Dehydration and metabolic acidosis. Continue with intravenous fluids. This appears to have improved with hydration. 5.  Possible urinary tract infection. 6.  Hypertension.     Thank you for allowing me to participate in the care of this patient. I will follow the patient closely with you.       DVT and GI prophylaxis    PT OT evaluation    Questions of patient were answered at bedside in detail  Case discussed in detail with RN, RT, and care team    Thank you for involving me in the care of this patient  I will follow with you closely during hospitalization    Time spent more than 30 minutes in direct patient care with no overlap reviewing results and records, decision-making, and answering questions.       Niko Cuevas MD  [facility]  10/1/2020  12:35 PM

## 2020-10-01 NOTE — PROGRESS NOTES
OT eval order received and acknowledged. OT eval attempted at 1139 however pt currently off of the floor CVL. Will continue to follow patient and attempt OT eval at a later time. Thank you.

## 2020-10-01 NOTE — PROGRESS NOTES
CM spoke with Patient and Spouse at bedside. The plan is for the patient to go home with home health at this time. They were current with [facility] and referral has been sent and accepted by them. Current Discharge Plan: Home with [facility].

## 2020-10-01 NOTE — WOUND CARE
Delivered a P500 Low Air Loss specialty bed to [place] outside of patient's room.   Informed Jose De Jesus Faust Rn.

## 2020-10-02 ENCOUNTER — APPOINTMENT (OUTPATIENT)
Dept: NUCLEAR MEDICINE | Age: 55
DRG: 981 | End: 2020-10-02
Attending: INTERNAL MEDICINE
Payer: COMMERCIAL

## 2020-10-02 ENCOUNTER — APPOINTMENT (OUTPATIENT)
Dept: NON INVASIVE DIAGNOSTICS | Age: 55
DRG: 981 | End: 2020-10-02
Attending: INTERNAL MEDICINE
Payer: COMMERCIAL

## 2020-10-02 ENCOUNTER — APPOINTMENT (OUTPATIENT)
Dept: GENERAL RADIOLOGY | Age: 55
DRG: 981 | End: 2020-10-02
Attending: INTERNAL MEDICINE
Payer: COMMERCIAL

## 2020-10-02 LAB
BASOPHILS # BLD: 0 K/UL (ref 0–0.1)
BASOPHILS NFR BLD: 0 % (ref 0–1)
DIFFERENTIAL METHOD BLD: ABNORMAL
EOSINOPHIL # BLD: 0 K/UL (ref 0–0.4)
EOSINOPHIL NFR BLD: 1 % (ref 0–7)
ERYTHROCYTE [DISTWIDTH] IN BLOOD BY AUTOMATED COUNT: 17.6 % (ref 11.5–14.5)
HCT VFR BLD AUTO: 28.7 % (ref 36.6–50.3)
HGB BLD-MCNC: 9.7 G/DL (ref 12.1–17)
IMM GRANULOCYTES # BLD AUTO: 0.1 K/UL (ref 0–0.04)
IMM GRANULOCYTES NFR BLD AUTO: 2 % (ref 0–0.5)
LEFT CCA DIST DIAS: 12.9 CM/S
LEFT CCA DIST SYS: 48.2 CM/S
LEFT CCA PROX DIAS: 12.1 CM/S
LEFT CCA PROX SYS: 45.8 CM/S
LEFT ECA DIAS: 13.4 CM/S
LEFT ECA SYS: 61.1 CM/S
LEFT ICA DIST DIAS: 25.1 CM/S
LEFT ICA DIST SYS: 87.4 CM/S
LEFT ICA PROX DIAS: 18.8 CM/S
LEFT ICA PROX SYS: 61.1 CM/S
LEFT SUBCLAVIAN DIAS: 0 CM/S
LEFT SUBCLAVIAN SYS: 75.4 CM/S
LEFT VERTEBRAL DIAS: 10.8 CM/S
LEFT VERTEBRAL SYS: 27.9 CM/S
LYMPHOCYTES # BLD: 1.7 K/UL (ref 0.8–3.5)
LYMPHOCYTES NFR BLD: 31 % (ref 12–49)
MCH RBC QN AUTO: 31.9 PG (ref 26–34)
MCHC RBC AUTO-ENTMCNC: 33.8 G/DL (ref 30–36.5)
MCV RBC AUTO: 94.4 FL (ref 80–99)
MONOCYTES # BLD: 0.5 K/UL (ref 0–1)
MONOCYTES NFR BLD: 10 % (ref 5–13)
NEUTS SEG # BLD: 3.3 K/UL (ref 1.8–8)
NEUTS SEG NFR BLD: 59 % (ref 32–75)
PLATELET # BLD AUTO: 324 K/UL (ref 150–400)
PMV BLD AUTO: 9.3 FL (ref 8.9–12.9)
RBC # BLD AUTO: 3.04 M/UL (ref 4.1–5.7)
RIGHT CCA DIST DIAS: 5.3 CM/S
RIGHT CCA DIST SYS: 54 CM/S
RIGHT CCA PROX DIAS: 13 CM/S
RIGHT CCA PROX SYS: 67.2 CM/S
RIGHT ECA DIAS: 10.9 CM/S
RIGHT ECA SYS: 90.5 CM/S
RIGHT ICA DIST DIAS: 34.9 CM/S
RIGHT ICA DIST SYS: 97.8 CM/S
RIGHT ICA PROX DIAS: 16.1 CM/S
RIGHT ICA PROX SYS: 53.7 CM/S
RIGHT SUBCLAVIAN DIAS: 0 CM/S
RIGHT SUBCLAVIAN SYS: 102 CM/S
RIGHT VERTEBRAL DIAS: 11.3 CM/S
RIGHT VERTEBRAL SYS: 47.6 CM/S
STRESS BASELINE DIAS BP: 104 MMHG
STRESS BASELINE HR: 89 BPM
STRESS BASELINE SYS BP: 172 MMHG
STRESS PERCENT HR ACHIEVED: 85 %
STRESS POST PEAK HR: 141 BPM
STRESS ST DEPRESSION: 0 MM
STRESS ST ELEVATION: 0 MM
STRESS STAGE 1 DURATION: 1 MIN:SEC
STRESS STAGE 1 HR: 88 BPM
STRESS STAGE 2 BP: NORMAL MMHG
STRESS STAGE 2 DURATION: 2 MIN:SEC
STRESS STAGE 2 HR: 100 BPM
STRESS STAGE 3 BP: NORMAL MMHG
STRESS STAGE 3 DURATION: 3 MIN:SEC
STRESS STAGE 3 HR: 98 BPM
STRESS STAGE 4 BP: NORMAL MMHG
STRESS STAGE 4 DURATION: 4 MIN:SEC
STRESS STAGE 4 HR: 96 BPM
STRESS TARGET HR: 166 BPM
WBC # BLD AUTO: 5.5 K/UL (ref 4.1–11.1)

## 2020-10-02 PROCEDURE — 74011250637 HC RX REV CODE- 250/637: Performed by: INTERNAL MEDICINE

## 2020-10-02 PROCEDURE — 74011000258 HC RX REV CODE- 258: Performed by: INTERNAL MEDICINE

## 2020-10-02 PROCEDURE — A9500 TC99M SESTAMIBI: HCPCS

## 2020-10-02 PROCEDURE — 65270000029 HC RM PRIVATE

## 2020-10-02 PROCEDURE — 80069 RENAL FUNCTION PANEL: CPT

## 2020-10-02 PROCEDURE — 77010033678 HC OXYGEN DAILY

## 2020-10-02 PROCEDURE — 74011000255 HC RX REV CODE- 255: Performed by: INTERNAL MEDICINE

## 2020-10-02 PROCEDURE — 74011250636 HC RX REV CODE- 250/636

## 2020-10-02 PROCEDURE — 74011250637 HC RX REV CODE- 250/637: Performed by: NURSE PRACTITIONER

## 2020-10-02 PROCEDURE — 94760 N-INVAS EAR/PLS OXIMETRY 1: CPT

## 2020-10-02 PROCEDURE — 74230 X-RAY XM SWLNG FUNCJ C+: CPT

## 2020-10-02 PROCEDURE — 36415 COLL VENOUS BLD VENIPUNCTURE: CPT

## 2020-10-02 PROCEDURE — 74011636637 HC RX REV CODE- 636/637: Performed by: INTERNAL MEDICINE

## 2020-10-02 PROCEDURE — 92611 MOTION FLUOROSCOPY/SWALLOW: CPT

## 2020-10-02 PROCEDURE — 85025 COMPLETE CBC W/AUTO DIFF WBC: CPT

## 2020-10-02 PROCEDURE — 74011250636 HC RX REV CODE- 250/636: Performed by: INTERNAL MEDICINE

## 2020-10-02 RX ADMIN — BARIUM SULFATE 25 ML: 0.81 POWDER, FOR SUSPENSION ORAL at 14:10

## 2020-10-02 RX ADMIN — OXYCODONE HYDROCHLORIDE 15 MG: 5 TABLET ORAL at 19:32

## 2020-10-02 RX ADMIN — LISINOPRIL 20 MG: 20 TABLET ORAL at 15:10

## 2020-10-02 RX ADMIN — DEXTROSE AND SODIUM CHLORIDE 75 ML/HR: 5; 450 INJECTION, SOLUTION INTRAVENOUS at 04:22

## 2020-10-02 RX ADMIN — REGADENOSON 0.4 MG: 0.08 INJECTION, SOLUTION INTRAVENOUS at 13:06

## 2020-10-02 RX ADMIN — METOPROLOL TARTRATE 50 MG: 50 TABLET, FILM COATED ORAL at 21:15

## 2020-10-02 RX ADMIN — DEXTROSE AND SODIUM CHLORIDE 75 ML/HR: 5; 450 INJECTION, SOLUTION INTRAVENOUS at 18:00

## 2020-10-02 RX ADMIN — OXYCODONE HYDROCHLORIDE 15 MG: 5 TABLET ORAL at 00:02

## 2020-10-02 RX ADMIN — PREDNISONE 20 MG: 20 TABLET ORAL at 15:10

## 2020-10-02 RX ADMIN — ENOXAPARIN SODIUM 40 MG: 40 INJECTION SUBCUTANEOUS at 21:14

## 2020-10-02 RX ADMIN — BARIUM SULFATE 15 ML: 400 SUSPENSION ORAL at 14:10

## 2020-10-02 RX ADMIN — DIBASIC SODIUM PHOSPHATE, MONOBASIC POTASSIUM PHOSPHATE AND MONOBASIC SODIUM PHOSPHATE 2 TABLET: 852; 155; 130 TABLET ORAL at 21:14

## 2020-10-02 RX ADMIN — ASPIRIN 81 MG: 81 TABLET, COATED ORAL at 15:10

## 2020-10-02 RX ADMIN — BARIUM SULFATE 20 ML: 400 SUSPENSION ORAL at 14:11

## 2020-10-02 RX ADMIN — BARIUM SULFATE 10 ML: 400 PASTE ORAL at 14:11

## 2020-10-02 RX ADMIN — LEVOFLOXACIN 500 MG: 500 TABLET, FILM COATED ORAL at 15:10

## 2020-10-02 RX ADMIN — EZETIMIBE 10 MG: 10 TABLET ORAL at 15:10

## 2020-10-02 RX ADMIN — OXYCODONE HYDROCHLORIDE 15 MG: 5 TABLET ORAL at 04:23

## 2020-10-02 RX ADMIN — OXYCODONE HYDROCHLORIDE 15 MG: 5 TABLET ORAL at 15:10

## 2020-10-02 NOTE — PROGRESS NOTES
Hospitalist Progress Note           Daily Progress Note: 10/2/2020    Chief complaint: Shortness of breath and weakness  Subjective: The patient is seen for follow  up. He is doing about the same. KASEY studies showed severe to their disease with 0.4 on the right, 0.35 on the left. Dr. Ruth Boykin is planning aortobifemoral bypass this admission. Cardiology has been consulted for preoperative evaluation.   Nuclear medicine stress testing is being done today    Problem List:  Problem List as of 10/2/2020 Date Reviewed: 9/17/2020          Codes Class Noted - Resolved    Metabolic encephalopathy ANKUR-LENY: G93.41  ICD-9-CM: 348.31  9/26/2020 - Present        UTI (urinary tract infection) ICD-10-CM: N39.0  ICD-9-CM: 599.0  9/26/2020 - Present        Aspiration pneumonitis ([address] ICD-10-CM: J69.0  ICD-9-CM: 507.0  9/26/2020 - Present        Essential hypertension ICD-10-CM: I10  ICD-9-CM: 401.9  9/26/2020 - Present        Acute dehydration ICD-10-CM: E86.0  ICD-9-CM: 276.51  9/26/2020 - Present              Medications reviewed  Current Facility-Administered Medications   Medication Dose Route Frequency    phosphorus (K PHOS NEUTRAL) 250 mg tablet 2 Tab  2 Tab Oral BID    ezetimibe (ZETIA) tablet 10 mg  10 mg Oral DAILY    levoFLOXacin (LEVAQUIN) tablet 500 mg  500 mg Oral Q24H    predniSONE (DELTASONE) tablet 20 mg  20 mg Oral DAILY WITH BREAKFAST    oxyCODONE IR (ROXICODONE) tablet 15 mg  15 mg Oral Q4H PRN    dextrose 5 % - 0.45% NaCl infusion  75 mL/hr IntraVENous CONTINUOUS    lisinopriL (PRINIVIL, ZESTRIL) tablet 20 mg  20 mg Oral DAILY    metoprolol tartrate (LOPRESSOR) tablet 50 mg  50 mg Oral BID    enoxaparin (LOVENOX) injection 40 mg  40 mg SubCUTAneous Q24H    aspirin delayed-release tablet 81 mg  81 mg Oral DAILY    acetaminophen (TYLENOL) tablet 650 mg  650 mg Oral Q6H PRN    ondansetron (ZOFRAN) injection 4 mg  4 mg IntraVENous Q8H PRN    guaiFENesin (ROBITUSSIN) 20 mg/mL oral liquid 400 mg  400 mg Oral Q6H PRN       Review of Systems:   Review of systems obtained and negative    Objective:   Physical Exam:     Visit Vitals  /80   Pulse 84   Temp 97.4 °F (36.3 °C)   Resp 18   Ht 6' 0.05\" (1.83 m)   Wt 52.6 kg (115 lb 15.4 oz)   SpO2 100%   BMI 15.71 kg/m²    O2 Flow Rate (L/min): 35 l/min O2 Device: Room air    Temp (24hrs), Av.5 °F (36.4 °C), Min:97.3 °F (36.3 °C), Max:97.6 °F (36.4 °C)    No intake/output data recorded.  190 - 10/02 0700  In: 26 [P.O.:440]  Out: 2300 [Urine:2300]    General:  Alert, cooperative, no distress, appears older than stated age. Lungs:   Clear to auscultation bilaterally. Chest wall:  No tenderness or deformity. Heart:  Regular rate and rhythm, S1, S2 normal, no murmur, click, rub or gallop. Abdomen:   Soft, non-tender. Bowel sounds normal. No masses,  No organomegaly. Extremities: Extremities normal, atraumatic, no cyanosis or edema. Pulses: 2+ and symmetric all extremities. Skin: Skin color, texture, turgor normal. No rashes or lesions   Neurologic: CNII-XII intact.  No gross sensory or motor deficits     Data Review:       Recent Days:  Recent Labs     10/02/20  0715 10/01/20  0900 20  0501   WBC 5.5 7.0 5.2   HGB 9.7* 10.6* 10.4*   HCT 28.7* 30.6* 29.7*    379 391     Recent Labs     10/01/20  0110 20  0501 20  1530    141  --    K 3.2* 2.8* 3.0*   * 111*  --    CO2 21 22  --    GLU 91 106*  --    BUN 3* 5*  --    CREA 0.50* 0.44*  --    CA 7.7* 7.8*  --    MG  --   --  1.7   PHOS 1.5*  --   --    ALB 1.5*  --   --      Recent Labs     20  0930   PH 7.485*   PCO2 28*   PO2 80   HCO3 23   FIO2 21.0       24 Hour Results:  Recent Results (from the past 24 hour(s))   ECHO ADULT COMPLETE    Collection Time: 10/01/20 11:30 AM   Result Value Ref Range    LV ED Vol A4C 87.00 cm3    LV ES Vol A4C 38.00 cm3    IVSd 0.90 0.6 - 1.0 cm    LVIDd 4.22 4.2 - 5.9 cm    LVIDs 3.30 cm Pulmonic Regurgitant End Max Velocity 54.30 cm/s    LVOT Peak Gradient 1.00 mmHg    LVPWd 1.16 (A) 0.6 - 1.0 cm    LV E' Septal Velocity 4.78 cm/s    BP EF 52.3 (A) 55 - 100 %    E/E' septal 16.40     Pulmonic Regurgitant End Max Velocity 84.50 cm/s    AoV PG 3.00 mmHg    Ao Root D 3.10 cm    Pulmonic Regurgitant End Max Velocity 513.00 cm/s    Mitral Valve E Wave Deceleration Time 0.18 s    Mitral Valve Pressure Half-time 0.08 s    MV A Howard 78.40 cm/s    MV E Howard 78.40 cm/s    MVA (PHT) 2.82 cm2    MV E/A 1.00     Est. RA Pressure 3.00 mmHg    RVIDd 3.30 cm    RVSP 19.00 mmHg    Tricuspid Valve Max Velocity 201.00 cm/s    Triscuspid Valve Regurgitation Peak Gradient 16.00 mmHg    LV Mass .1 88 - 224 g    LV Mass AL Index 85.3 49 - 115 g/m2   DUPLEX CAROTID BILATERAL    Collection Time: 10/01/20 12:50 PM   Result Value Ref Range    Left CCA dist sys 48.2 cm/s    Left CCA dist asehr 12.9 cm/s    Left CCA prox sys 45.8 cm/s    Left CCA prox asher 12.1 cm/s    Left ICA dist sys 87.4 cm/s    Left ICA dist asher 25.1 cm/s    Left ICA prox sys 61.1 cm/s    Left ICA prox asher 18.8 cm/s    Left ECA sys 61.1 cm/s    LEFT EXTERNAL CAROTID ARTERY D 13.40 cm/s    Left subclavian sys 75.4 cm/s    LEFT SUBCLAVIAN ARTERY D 0.00 cm/s    Left vertebral sys 27.9 cm/s    LEFT VERTEBRAL ARTERY D 10.80 cm/s    Right cca dist sys 54.0 cm/s    Right CCA dist asher 5.3 cm/s    Right CCA prox sys 67.2 cm/s    Right CCA prox asher 13.0 cm/s    Right ICA dist sys 97.8 cm/s    Right ICA dist asher 34.9 cm/s    Right ICA prox sys 53.7 cm/s    Right ICA prox asher 16.1 cm/s    Right eca sys 90.5 cm/s    RIGHT EXTERNAL CAROTID ARTERY D 10.90 cm/s    Right subclavian sys 102.0 cm/s    RIGHT SUBCLAVIAN ARTERY D 0.00 cm/s    Right vertebral sys 47.6 cm/s    RIGHT VERTEBRAL ARTERY D 11.30 cm/s   CBC WITH AUTOMATED DIFF    Collection Time: 10/02/20  7:15 AM   Result Value Ref Range    WBC 5.5 4.1 - 11.1 K/uL    RBC 3.04 (L) 4.10 - 5.70 M/uL    HGB 9.7 (L) 12.1 - 17.0 g/dL    HCT 28.7 (L) 36.6 - 50.3 %    MCV 94.4 80.0 - 99.0 FL    MCH 31.9 26.0 - 34.0 PG    MCHC 33.8 30.0 - 36.5 g/dL    RDW 17.6 (H) 11.5 - 14.5 %    PLATELET 887 394 - 314 K/uL    MPV 9.3 8.9 - 12.9 FL    NEUTROPHILS 59 32 - 75 %    LYMPHOCYTES 31 12 - 49 %    MONOCYTES 10 5 - 13 %    EOSINOPHILS 1 0 - 7 %    BASOPHILS 0 0 - 1 %    IMMATURE GRANULOCYTES 2 (H) 0.0 - 0.5 %    ABS. NEUTROPHILS 3.3 1.8 - 8.0 K/UL    ABS. LYMPHOCYTES 1.7 0.8 - 3.5 K/UL    ABS. MONOCYTES 0.5 0.0 - 1.0 K/UL    ABS. EOSINOPHILS 0.0 0.0 - 0.4 K/UL    ABS. BASOPHILS 0.0 0.0 - 0.1 K/UL    ABS. IMM. GRANS. 0.1 (H) 0.00 - 0.04 K/UL    DF AUTOMATED             Assessment/     Acute hypoxic respiratory failure likely secondary to aspiration. Improved    Right lower lobe aspiration pneumonia improved    Urinary tract infection due to E. coli    Metabolic encephalopathy, improved    Hypokalemia    Hypothermia, probably largely environmental.  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 Plan:  Proceeding with nuclear medicine stress testing  Vascular surgery planning aortobifemoral bypass    Care Plan discussed with: Patient/Family    Total time spent with patient: 30 minutes.     Jenaro Truong MD

## 2020-10-02 NOTE — PROGRESS NOTES
Patient is examined this morning. I reviewed the carotid duplex ultrasound and KASEY examination from yesterday. Carotid duplex ultrasound shows mild plaque disease bilateral internal carotid without any major significant stenosis. However his KASEY examination shows a severe peripheral vascular disease. He is right ankle index is 0.4, left ankle index 0.35, his right TBI 0.23 the left TBI 0.22. Mr. Ma Shone came to the hospital with generalized weakness, and bilateral leg pain as well. TIA has been ruled out. I think a part of his problem with current admission is related to the complete occlusion of the mid aorta bilateral iliac system. Currently cardiology evaluation is pending. If pt's  cardiac performance is reasonable, patient will be offered inpatient aortobifemoral bypass with graft. If patient's cardiac performance is to be further delineated, or optimized this procedure will be probably over electively. Anyways I will continue monitor his progress.

## 2020-10-02 NOTE — PROGRESS NOTES
Pulmonology and Critical Care Progress Note    Subjective:     Chief Complaint:   Chief Complaint   Patient presents with    Altered mental status      Patient seen and examined  Overnight events noted    Lying in bed comfortably  Wife at bedside  No acute distress  On room air    Dr. Becky Little is planning aortobifemoral bypass this admission. Nuclear medicine stress testing is being done today    Review of Systems:  Review of systems not obtained due to patient factors.     Current Facility-Administered Medications   Medication Dose Route Frequency Provider Last Rate Last Dose    technetium sestamibi (CARDIOLITE) injection 30 millicurie  30 millicurie IntraVENous ONCE Graeme Ward MD        regadenoson (LEXISCAN) 0.4 mg/5 mL injection             phosphorus (K PHOS NEUTRAL) 250 mg tablet 2 Tab  2 Tab Oral BID Zainab Li MD   2 Tab at 10/01/20 1958    ezetimibe (ZETIA) tablet 10 mg  10 mg Oral DAILY Graeme Ward MD        levoFLOXacin (LEVAQUIN) tablet 500 mg  500 mg Oral Q24H Zainab Li MD   500 mg at 10/01/20 1334    predniSONE (DELTASONE) tablet 20 mg  20 mg Oral DAILY WITH BREAKFAST Zainab Li MD   20 mg at 10/01/20 0840    oxyCODONE IR (ROXICODONE) tablet 15 mg  15 mg Oral Q4H PRN Zainab Li MD   15 mg at 10/02/20 0423    dextrose 5 % - 0.45% NaCl infusion  75 mL/hr IntraVENous CONTINUOUS Zainab Li MD 75 mL/hr at 10/02/20 0422 75 mL/hr at 10/02/20 0422    lisinopriL (PRINIVIL, ZESTRIL) tablet 20 mg  20 mg Oral DAILY Kevin Lu MD   20 mg at 10/01/20 0840    metoprolol tartrate (LOPRESSOR) tablet 50 mg  50 mg Oral BID Kevin Lu MD   50 mg at 10/01/20 1957    enoxaparin (LOVENOX) injection 40 mg  40 mg SubCUTAneous Q24H Lizzie Tariq MD   40 mg at 10/01/20 1958    aspirin delayed-release tablet 81 mg  81 mg Oral DAILY Gisele Long NP   81 mg at 10/01/20 0841    acetaminophen (TYLENOL) tablet 650 mg  650 mg Oral Q6H PRN Michael SUTTON NP   650 mg at 20 0950    ondansetron (ZOFRAN) injection 4 mg  4 mg IntraVENous Q8H PRN Gisele Long, NP   4 mg at 20 0402    guaiFENesin (ROBITUSSIN) 20 mg/mL oral liquid 400 mg  400 mg Oral Q6H PRN Ashia Akhtar NP   Stopped at 20 1855            No Known Allergies        Objective:     Blood pressure 117/80, pulse 84, temperature 97.4 °F (36.3 °C), resp. rate 18, height 6' 0.05\" (1.83 m), weight 52.6 kg (115 lb 15.4 oz), SpO2 96 %. Temp (24hrs), Av.5 °F (36.4 °C), Min:97.3 °F (36.3 °C), Max:97.6 °F (36.4 °C)      Intake and Output:  Current Shift: No intake/output data recorded. Last 3 Shifts:  1901 - 10/02 0700  In: 26 [P.O.:440]  Out: 2300 [Urine:2300]    Physical Exam:     General: Lying in bed comfortably, no acute distress  Throat and Neck: Supple  Lung: Reduced air entry bilaterally with prolonged exhalation but no wheezing. Occasional crackles. Heart: S1+S2. No murmurs  Abdomen: soft, non-tender. Bowel sounds normal. No masses; obese  Extremities: No edema  : Not done  Skin: No cyanosis  Neurologic: Awake and alert, grossly nonfocal  Psychiatric:  Unable to perform due to patient's condition      Lab/Data Review: All lab results for the last 24 hours reviewed.   Recent Results (from the past 24 hour(s))   DUPLEX CAROTID BILATERAL    Collection Time: 10/01/20 12:50 PM   Result Value Ref Range    Left CCA dist sys 48.2 cm/s    Left CCA dist asher 12.9 cm/s    Left CCA prox sys 45.8 cm/s    Left CCA prox asher 12.1 cm/s    Left ICA dist sys 87.4 cm/s    Left ICA dist asher 25.1 cm/s    Left ICA prox sys 61.1 cm/s    Left ICA prox asher 18.8 cm/s    Left ECA sys 61.1 cm/s    LEFT EXTERNAL CAROTID ARTERY D 13.40 cm/s    Left subclavian sys 75.4 cm/s    LEFT SUBCLAVIAN ARTERY D 0.00 cm/s    Left vertebral sys 27.9 cm/s    LEFT VERTEBRAL ARTERY D 10.80 cm/s    Right cca dist sys 54.0 cm/s    Right CCA dist asher 5.3 cm/s    Right CCA prox sys 67.2 cm/s    Right CCA prox asher 13.0 cm/s    Right ICA dist sys 97.8 cm/s    Right ICA dist asher 34.9 cm/s    Right ICA prox sys 53.7 cm/s    Right ICA prox asher 16.1 cm/s    Right eca sys 90.5 cm/s    RIGHT EXTERNAL CAROTID ARTERY D 10.90 cm/s    Right subclavian sys 102.0 cm/s    RIGHT SUBCLAVIAN ARTERY D 0.00 cm/s    Right vertebral sys 47.6 cm/s    RIGHT VERTEBRAL ARTERY D 11.30 cm/s   CBC WITH AUTOMATED DIFF    Collection Time: 10/02/20  7:15 AM   Result Value Ref Range    WBC 5.5 4.1 - 11.1 K/uL    RBC 3.04 (L) 4.10 - 5.70 M/uL    HGB 9.7 (L) 12.1 - 17.0 g/dL    HCT 28.7 (L) 36.6 - 50.3 %    MCV 94.4 80.0 - 99.0 FL    MCH 31.9 26.0 - 34.0 PG    MCHC 33.8 30.0 - 36.5 g/dL    RDW 17.6 (H) 11.5 - 14.5 %    PLATELET 612 284 - 056 K/uL    MPV 9.3 8.9 - 12.9 FL    NEUTROPHILS 59 32 - 75 %    LYMPHOCYTES 31 12 - 49 %    MONOCYTES 10 5 - 13 %    EOSINOPHILS 1 0 - 7 %    BASOPHILS 0 0 - 1 %    IMMATURE GRANULOCYTES 2 (H) 0.0 - 0.5 %    ABS. NEUTROPHILS 3.3 1.8 - 8.0 K/UL    ABS. LYMPHOCYTES 1.7 0.8 - 3.5 K/UL    ABS. MONOCYTES 0.5 0.0 - 1.0 K/UL    ABS. EOSINOPHILS 0.0 0.0 - 0.4 K/UL    ABS. BASOPHILS 0.0 0.0 - 0.1 K/UL    ABS. IMM. GRANS. 0.1 (H) 0.00 - 0.04 K/UL    DF AUTOMATED     NUCLEAR CARDIAC STRESS TEST    Collection Time: 10/02/20 11:56 AM   Result Value Ref Range    Target  bpm     chest X-ray      XR CHEST PORT   Final Result   Impression: Underexpanded lungs with bibasilar atelectasis. CTA ABDOMEN PELV W CONT   Final Result   IMPRESSION: Mid abdominal aortic occlusion, with threatened right kidney and   fairly good collateralization to the legs. This could be causing a mesenteric   steal phenomenon, however      MRI BRAIN WO CONT   Final Result   Impression: No evidence of acute hemorrhage, mass or infarct. No sinusitis or   mastoiditis. Please see above discussion. CTA CHEST W OR W WO CONT   Final Result   IMPRESSION: Limited by breathing motion. 1. No large pulmonary arterial filling defect.    2. Bronchial thickening with right greater than left lower lung atelectasis or   pneumonia/pneumonitis. Bubbly debris in the trachea. 3. Atherosclerosis. Abrupt discontinuation of contrast in the aorta on the very   inferior slices. Contrast is seen in the celiac artery and SMA and the left   renal artery. The right kidney demonstrates decreased attenuation compared to   the left concerning for medical renal disease to include ischemia. Recommend CTA   abdomen/pelvis. Called report to charge nurse Reed Crawford at 9:05 PM 9/27/2020.   4. Cholelithiasis within distended gallbladder. 5. Dilated small bowel. 6. Other findings as above. XR CHEST PORT   Final Result   IMPRESSION:      No airspace disease in the lungs. Follow-up as clinically indicated. CT HEAD WO CONT   Final Result   Impression: Unremarkable head CT without contrast.  No infarct, mass, or    hemorrhage. Please see full report. XR CHEST SNGL V   Final Result   Impression: No diagnostic abnormality. XR SWALLOW FUNC VIDEO    (Results Pending)       CT Results  (Last 48 hours)    None          Assessment:     1. Acute respiratory failure with hypoxia  2. Acute metabolic encephalopathy  3. Aspiration pneumonia  4. Metabolic acidosis  5. UTI  6. Hypertension  7. Familial polymyositis    Plan:     1. Hypoxic respiratory failure. Possibly due to aspiration  Clinically improved  Now on room air    2. Aspiration pneumonia:  Treatment with Zosyn  Speech evaluation    3. Metabolic encephalopathy. He has a progressive neurologic disorder. Brain MRI negative  Neurology is consulted. He has familial myositis. Further recommendation per Neurology. 4.  Dehydration and metabolic acidosis. Resolved    5. Possible urinary tract infection. 6.  Hypertension. Dr. Jimenez Angel is planning aortobifemoral bypass this admission.     Nuclear medicine stress testing is being done today     Thank you for allowing me to participate in the care of this patient. I will follow the patient closely with you. DVT and GI prophylaxis    PT OT evaluation    Questions of patient were answered at bedside in detail  Case discussed in detail with RN, RT, and care team    Thank you for involving me in the care of this patient  I will follow with you closely during hospitalization    Time spent more than 30 minutes in direct patient care with no overlap reviewing results and records, decision-making, and answering questions.       Fartun Mora MD  [facility]  10/2/2020  12:35 PM

## 2020-10-02 NOTE — PROGRESS NOTES
SPEECH PATHOLOGY MODIFIED BARIUM SWALLOW STUDY  Patient: Sparkle Rolle (79 y.o. male)  Date: 10/2/2020  Primary Diagnosis: Metabolic encephalopathy [T24.16]        Precautions: aspiration    ASSESSMENT :  Based on the objective data described below, the patient presents with moderate-severe pharyngeal dysphagia. Oral phase c/b slow bolus transit and mastication. Premature spillage over BOT to the level of the pyriforms where swallow is initiated. Delay in swallow initiation. Pharyngeal phase c/b epiglottis immobile and does not invert. Reduced HLE and protraction. Reduced pharyngeal constriction. Residue present in hypopharynx that increases w/ additional trials and thicker consistencies. Reduced pharyngeal bolus transit w/ bolus fragmentation. Eventually hypopharyngeal and pharyngeal residue leads to on-going penetration w/ most consistencies trials. There is x 1 episode of secondary aspiration. Patient is cued to elicit cough response, however cough is weak and ineffective. Patient is aware of his pharyngeal residue. Use of multiple swallows, chin tuck and head turn R/L does aid in some clearance but is not fully effective. UES c/b reduced duration and relaxation. Bolus fragmentation through this segment. Esophageal c/b no evidence of retention or retrograde flow. Patient will benefit from skilled intervention to address the above impairments. Patients rehabilitation potential is considered to be Fair     PLAN :  Recommendations and Planned Interventions:  Downgrade to NDD2 ( ground ), NECTAR thick liquids. STRICT aspiration precautions. Discussed alternative means of nutrition w/ patient. Due to patient's current medical status, aspiration risk, weak respiratory drive and dx of familial polymyositis, patient needs to consider PEG placement.  This was discussed w/ patient and MD.   Frequency/Duration: Patient will be followed by speech-language pathology 3 times a week to address goals.  Discharge Recommendations: To Be Determined     SUBJECTIVE:   Patient alert. He reports he is eating ok. Current diet orders of chopped, nectar. Concerns of aspiration   At bedside and ability to maintain adequate caloric intake. OBJECTIVE:     Past Medical History:   Diagnosis Date    Myositis     Familial inclusion      Past Surgical History:   Procedure Laterality Date    HX ORTHOPAEDIC      disc infused, neck         CXR Results  (Last 48 hours)      None            Diet prior to admission: regular , thin  Current Diet:  DIET NUTRITIONAL SUPPLEMENTS  DIET NUTRITIONAL SUPPLEMENTS  DIET NUTRITIONAL SUPPLEMENTS  DIET NPO     Diet prior to admission: regular , thin  Current Diet:  ground, nectar   Radiologist: Dr. Galicia Bile Procedures  [x] Lateral View   [] A-P View [] Scanned to level of Sternum    [] Seated at 90 deg.   [] Other:    Presentation:   [x] Spoon   [x] Cup   [] Straw   [] Syringe   [] Consecutive Swallows  [] Other:    Consistencies:   [x] Ba+ liquid   [x] Ba+ liquid (nectar)   [x] Ba+ liquid (honey)     [x] Ba+ pudding   [] Ba+ crunched cookie   [x] Ba+ cookie   [] Other:     Testing Discontinued: [] Due to:    Treatment Techniques Attempted     [x] Head Turn: [x] Right [x] Left     [] Head Tilt: [] Right [] Left     [x] Chin Down:  [] Thermal Sensitization:  [] Supraglottic Swallow:  [] Mendelson's Maneuver:  [] Other:    Results  Dysphagia Present:    [x] Yes  [] No    Ratings of Dysphagia:    [] Mild  [x] Moderate [x] Severe    Stages of Breakdown:   [x] Oral [x] Pharyngeal   [] Esophageal    Aspiration: [x] Yes    [] No  [] At Risk  [x] Trace (<10%) [] Significant (>10%):     %  [] Penetration:  Level of: Vocal cords  Cough: [] Yes      [x] No    Consistency Aspirated: ( combination of residue in the pharynx )  [x] Thin Liquid   [x] Nectar   [x] Honey   [] Pureed     [] Mech-soft  [] Solid     Motility Problems with:  [] Lip Closure:   [] Sucking:   [] Mastication: [] Bolus Formation:   [] Bolus Control:  [] A-P Transport:  [] Posterior Tongue Elevation:  [x] Swallow Response (delayed):  [] Velopharyngeal Closure:  [x] Laryngeal Elevation:  [] Laryngeal Adduction:  [x] Cricopharyngeal Relaxation:  [] Esophageal Peristalsis:  [] Reflux:  [] Other: epiglottic inversion and pharyngeal constiction    Timing of Aspiration:  [] Before Swallow:  [x] During Swallow:  [x] After Swallow:    Transit Time Delay:  [] >1 Second  Oral  [x] >1 Second Pharyngeal  [] >20 Second Esophageal     Residuals:  [] Buccal Cavity   [] Velum/posterior pharyngeal wall  [x] Valleculae  [x] Pyriforms         COMMUNICATION/EDUCATION:   Patient receptive of education regarding dysphagia, MBS results, diet recommendations and aspiration risk. Patient demonstrated Good understanding as evidenced by verbal understanding asking appropriate questions. The patients plan of care including findings from [facility], recommendations, planned interventions, and recommended diet changes were discussed with: Physician. Patient/family have participated as able in goal setting and plan of care. Thank you for this referral.  Dagoberto Zavala M.S. Zafar De La Vega M.S., M.Ed., CCC-SLP  Time Calculation: 20 mins     Problem: Dysphagia (Adult)  Goal: *Acute Goals and Plan of Care (Insert Text)  Description: Speech Therapy Goals  Initiated 9/28/2020  -Patient will participate in modified barium swallow study if indicated pending pt's progress. [ ] Not met  Maximiliangrim ]  MET   [ ] Progressing  [ ] Michelle Singh  -Patient will tolerate dysphagia 2 diet with nectar thick liquids without signs/symptoms of aspiration given moderate cues within 4 day(s). [ ] Not met  [ ]  MET   [x ] Progressing  [ ] Discontinue  -Patient will demonstrate understanding of swallow safety precautions and aspiration precautions, diet recs with moderate cues within 7 day(s).         [ ] Not met  [ ]  MET   [ x] Progressing  [ ] Michelle Singh      10/2/2020 1408 by Susan Mares  Outcome: Progressing Towards Goal

## 2020-10-02 NOTE — PROGRESS NOTES
Comprehensive Nutrition Assessment    Type and Reason for Visit: Reassess(Interim)    Nutrition Recommendations/Plan:     Resume Mechanical Soft/Nectar/Mildly thick diet as soon as medically feasible      Adjust per SLP recs    Resume Ensure Pdg BID, Ensure Compact BID, Magic cup daily  Continue encouraging supplementation    Obtain updated measured wt    Nursing to document %meal and supplement intakes in I/Os    Nutrition Assessment:  AMS pta. COVID negative. Dx dehydration, UTI on admit. Admitted to ICU for monitoring 2/2 aspiration event in ED- CXR clear. Several wounds present. KASEY showed severe PVD - vasc procedure scheduled for this AM. Pt typically consumes regular texture diet at home; SLP rec'd NPO, previously on TF via NG. Advanced to NDD2 Mech altd w/ Nectar/Mildy thick liq. No PO intakes recorded in flowsheet. Spoke to RN yesterday who relayed pt intakes poor-fair, consumed Ensure Pdg, skipped lunch yesterday, having some outside foods of hashbrowns, banana puddings. Encouraged supplements with med delivery. NPO today for procedures. Will continue to monitor. Labs: H/H: 9.7/28.7, no updated BMP. Meds: D5 1/2NS 75mL/hr, prednisone, phos, zofran. Malnutrition Assessment:  Malnutrition Status: Moderate malnutrition    Context:  Acute illness         Estimated Daily Nutrient Needs:  Energy (kcal):  1850kcal (30kcal/kg)  Protein (g):  79g (1.5g/kg)       Fluid (ml/day):  1575mL (30mL/kg)    Nutrition Related Findings:  Unable to perform NFPE. Pt does not appear with significant facial wasting on visual assessment. RN reports pt appears very thin with muscle wasting to torso, arms, legs. No documented hx dysphagia, n/v, or c/d. Soft, loose BM documented 9/30. No edema. Wounds:    Multiple, Stage I, Stage II, Deep tissue injury(DTI to sacrum, R foot; Stage I to R ankle, R heel, spine; Stage II to R hip)       Current Nutrition Therapies:  DIET NUTRITIONAL SUPPLEMENTS Breakfast, Lunch;  Ensure Compact  DIET NUTRITIONAL SUPPLEMENTS Dinner, Lunch; Ensure Pudding  DIET NUTRITIONAL SUPPLEMENTS Dinner; Magic Cup (vanilla preferred)  DIET NPO    Anthropometric Measures:  · Height:  6' 0.05\" (183 cm)  · Current Body Wt:  52.6 kg (115 lb 15.4 oz)   · Admission Body Wt:  115 lb 15.4 oz(stated)    · Ideal Body Wt:  178 lbs:  65.1 %   · BMI Category:  Underweight (BMI less than 22) age over 72       Nutrition Diagnosis:   · Inadequate protein-energy intake related to swallowing difficulty, increased demand for energy/nutrients, catabolic illness(AMS) as evidenced by NPO or clear liquid status due to medical condition, BMI, swallowing study results      Nutrition Interventions:   Food and/or Nutrient Delivery: Start oral diet(post procedures; per MBS results)  Nutrition Education and Counseling: No recommendations at this time  Coordination of Nutrition Care: Continued inpatient monitoring    Goals:  Initiate means of nutrition to meet >75% EENs >7 days (progressing)  Wt gain 1lb +/- 0.5lb per week (unable to assess)  Na and lytes wnl (unable to assess)  Improve skin integrity  (not progressing)    Nutrition Monitoring and Evaluation:   Behavioral-Environmental Outcomes:  N/A  Food/Nutrient Intake Outcomes: Diet advancement/tolerance  Physical Signs/Symptoms Outcomes: Weight, Skin, Chewing or swallowing    Discharge Planning:     Too soon to determine     Electronically signed by Deja Acevedo on 10/2/2020 at 11:22 AM    Contact:  [phone]

## 2020-10-02 NOTE — PROGRESS NOTES
PT treatment attempted  . Currently , pt. Off the floor -NM . PT will reattempt for PT treatment  at a later time . Thanks .

## 2020-10-02 NOTE — PROGRESS NOTES
OT consult received. Patient unavailable for evaluation in am; off floor at [facility]. Will attempt evaluation next visit.   Thank you for the consult

## 2020-10-03 LAB
ALBUMIN SERPL-MCNC: 1.7 G/DL (ref 3.5–5)
ANION GAP SERPL CALC-SCNC: 5 MMOL/L (ref 5–15)
BUN SERPL-MCNC: 2 MG/DL (ref 6–20)
BUN/CREAT SERPL: 4 (ref 12–20)
CA-I BLD-MCNC: 7.8 MG/DL (ref 8.5–10.1)
CHLORIDE SERPL-SCNC: 110 MMOL/L (ref 97–108)
CO2 SERPL-SCNC: 26 MMOL/L (ref 21–32)
CREAT SERPL-MCNC: 0.47 MG/DL (ref 0.7–1.3)
GLUCOSE SERPL-MCNC: 70 MG/DL (ref 65–100)
PHOSPHATE SERPL-MCNC: 2.4 MG/DL (ref 2.6–4.7)
POTASSIUM SERPL-SCNC: 4.2 MMOL/L (ref 3.5–5.1)
SODIUM SERPL-SCNC: 141 MMOL/L (ref 136–145)

## 2020-10-03 PROCEDURE — 74011250637 HC RX REV CODE- 250/637: Performed by: INTERNAL MEDICINE

## 2020-10-03 PROCEDURE — 65270000029 HC RM PRIVATE

## 2020-10-03 PROCEDURE — 94760 N-INVAS EAR/PLS OXIMETRY 1: CPT

## 2020-10-03 PROCEDURE — 74011000258 HC RX REV CODE- 258: Performed by: INTERNAL MEDICINE

## 2020-10-03 PROCEDURE — 74011250636 HC RX REV CODE- 250/636: Performed by: INTERNAL MEDICINE

## 2020-10-03 PROCEDURE — 74011250637 HC RX REV CODE- 250/637: Performed by: NURSE PRACTITIONER

## 2020-10-03 PROCEDURE — 92526 ORAL FUNCTION THERAPY: CPT

## 2020-10-03 PROCEDURE — 74011636637 HC RX REV CODE- 636/637: Performed by: INTERNAL MEDICINE

## 2020-10-03 RX ORDER — DIPHENHYDRAMINE HCL 25 MG
50 CAPSULE ORAL
Status: DISCONTINUED | OUTPATIENT
Start: 2020-10-03 | End: 2020-10-26

## 2020-10-03 RX ADMIN — ENOXAPARIN SODIUM 40 MG: 40 INJECTION SUBCUTANEOUS at 20:48

## 2020-10-03 RX ADMIN — OXYCODONE HYDROCHLORIDE 15 MG: 5 TABLET ORAL at 20:48

## 2020-10-03 RX ADMIN — OXYCODONE HYDROCHLORIDE 15 MG: 5 TABLET ORAL at 12:16

## 2020-10-03 RX ADMIN — LISINOPRIL 20 MG: 20 TABLET ORAL at 08:41

## 2020-10-03 RX ADMIN — DIPHENHYDRAMINE HYDROCHLORIDE 50 MG: 25 CAPSULE ORAL at 01:29

## 2020-10-03 RX ADMIN — DEXTROSE AND SODIUM CHLORIDE 75 ML/HR: 5; 450 INJECTION, SOLUTION INTRAVENOUS at 19:10

## 2020-10-03 RX ADMIN — OXYCODONE HYDROCHLORIDE 15 MG: 5 TABLET ORAL at 05:55

## 2020-10-03 RX ADMIN — DEXTROSE AND SODIUM CHLORIDE 75 ML/HR: 5; 450 INJECTION, SOLUTION INTRAVENOUS at 05:55

## 2020-10-03 RX ADMIN — PREDNISONE 20 MG: 20 TABLET ORAL at 08:42

## 2020-10-03 RX ADMIN — OXYCODONE HYDROCHLORIDE 15 MG: 5 TABLET ORAL at 01:29

## 2020-10-03 RX ADMIN — EZETIMIBE 10 MG: 10 TABLET ORAL at 08:42

## 2020-10-03 RX ADMIN — DIBASIC SODIUM PHOSPHATE, MONOBASIC POTASSIUM PHOSPHATE AND MONOBASIC SODIUM PHOSPHATE 2 TABLET: 852; 155; 130 TABLET ORAL at 20:48

## 2020-10-03 RX ADMIN — METOPROLOL TARTRATE 50 MG: 50 TABLET, FILM COATED ORAL at 08:41

## 2020-10-03 RX ADMIN — ASPIRIN 81 MG: 81 TABLET, COATED ORAL at 08:42

## 2020-10-03 RX ADMIN — LEVOFLOXACIN 500 MG: 500 TABLET, FILM COATED ORAL at 11:20

## 2020-10-03 RX ADMIN — DIBASIC SODIUM PHOSPHATE, MONOBASIC POTASSIUM PHOSPHATE AND MONOBASIC SODIUM PHOSPHATE 2 TABLET: 852; 155; 130 TABLET ORAL at 08:41

## 2020-10-03 RX ADMIN — OXYCODONE HYDROCHLORIDE 15 MG: 5 TABLET ORAL at 17:42

## 2020-10-03 RX ADMIN — METOPROLOL TARTRATE 50 MG: 50 TABLET, FILM COATED ORAL at 20:48

## 2020-10-03 NOTE — PROGRESS NOTES
Pulmonology and Critical Care Progress Note    Subjective:     Chief Complaint:   Chief Complaint   Patient presents with    Altered mental status      Patient seen and examined  Overnight events noted    Lying in bed comfortably  No acute distress  On room air    Dr. Dionicio Francis is planning aortobifemoral bypass this admission. His nuclear medicine cardiac cath showed ischemia of the inferior wall. Modified barium swallow test showed issues of dysphagia.     Review of Systems:  Has chronic Reese catheter placement    Current Facility-Administered Medications   Medication Dose Route Frequency Provider Last Rate Last Dose    diphenhydrAMINE (BENADRYL) capsule 50 mg  50 mg Oral BID PRN Simba Sepulveda NP   50 mg at 10/03/20 0129    phosphorus (K PHOS NEUTRAL) 250 mg tablet 2 Tab  2 Tab Oral BID Angelica Nunez MD   2 Tab at 10/03/20 0841    ezetimibe (ZETIA) tablet 10 mg  10 mg Oral DAILY Alexis Ramirez MD   10 mg at 10/03/20 0842    levoFLOXacin (LEVAQUIN) tablet 500 mg  500 mg Oral Q24H Angelica Nunez MD   500 mg at 10/03/20 1120    predniSONE (DELTASONE) tablet 20 mg  20 mg Oral DAILY WITH BREAKFAST Angelica Nunez MD   20 mg at 10/03/20 0842    oxyCODONE IR (ROXICODONE) tablet 15 mg  15 mg Oral Q4H PRN Angelica Nunez MD   15 mg at 10/03/20 1216    dextrose 5 % - 0.45% NaCl infusion  75 mL/hr IntraVENous CONTINUOUS Angelica Nunez MD 75 mL/hr at 10/03/20 0555 75 mL/hr at 10/03/20 0555    lisinopriL (PRINIVIL, ZESTRIL) tablet 20 mg  20 mg Oral DAILY Mart Alarcon MD   20 mg at 10/03/20 0841    metoprolol tartrate (LOPRESSOR) tablet 50 mg  50 mg Oral BID Mart Alarcon MD   50 mg at 10/03/20 0841    enoxaparin (LOVENOX) injection 40 mg  40 mg SubCUTAneous Q24H Luis M Silva MD   40 mg at 10/02/20 2114    aspirin delayed-release tablet 81 mg  81 mg Oral DAILY Gisele Long NP   81 mg at 10/03/20 0842    acetaminophen (TYLENOL) tablet 650 mg  650 mg Oral Q6H PRN Andre Munguia NP 650 mg at 20 0950    ondansetron (ZOFRAN) injection 4 mg  4 mg IntraVENous Q8H PRN Gisele Long NP   4 mg at 20 0402    guaiFENesin (ROBITUSSIN) 20 mg/mL oral liquid 400 mg  400 mg Oral Q6H PRN Sergio Pineda PAGE   Stopped at 20 1855            No Known Allergies        Objective:     Blood pressure 100/71, pulse 81, temperature 97.8 °F (36.6 °C), resp. rate 18, height 6' (1.829 m), weight 52.2 kg (115 lb), SpO2 100 %. Temp (24hrs), Av.9 °F (36.1 °C), Min:94.4 °F (34.7 °C), Max:97.8 °F (36.6 °C)      Intake and Output:  Current Shift: No intake/output data recorded. Last 3 Shifts: 10/01 1901 - 10/03 0700  In: 240 [P.O.:240]  Out: [address] [Urine:2850]    Physical Exam:     General: Lying in bed comfortably, no acute distress  Throat and Neck: Supple  Lung: Reduced air entry bilaterally with prolonged exhalation but no wheezing. Occasional crackles. Heart: S1+S2. No murmurs  Abdomen: soft, non-tender. Bowel sounds normal. No masses; obese  Extremities: No edema  : Not done  Skin: No cyanosis  Neurologic: Awake and alert, grossly nonfocal  Psychiatric:  Unable to perform due to patient's condition      Lab/Data Review: All lab results for the last 24 hours reviewed. No results found for this or any previous visit (from the past 24 hour(s)). chest X-ray      XR SWALLOW FUNC VIDEO   Final Result   Impression: Significant hypopharyngeal residue. Episodes of penetration with   all consistencies. Episode of flash aspiration after water wash. XR CHEST PORT   Final Result   Impression: Underexpanded lungs with bibasilar atelectasis. CTA ABDOMEN PELV W CONT   Final Result   IMPRESSION: Mid abdominal aortic occlusion, with threatened right kidney and   fairly good collateralization to the legs. This could be causing a mesenteric   steal phenomenon, however      MRI BRAIN WO CONT   Final Result   Impression: No evidence of acute hemorrhage, mass or infarct.  No sinusitis or mastoiditis. Please see above discussion. CTA CHEST W OR W WO CONT   Final Result   IMPRESSION: Limited by breathing motion. 1. No large pulmonary arterial filling defect. 2. Bronchial thickening with right greater than left lower lung atelectasis or   pneumonia/pneumonitis. Bubbly debris in the trachea. 3. Atherosclerosis. Abrupt discontinuation of contrast in the aorta on the very   inferior slices. Contrast is seen in the celiac artery and SMA and the left   renal artery. The right kidney demonstrates decreased attenuation compared to   the left concerning for medical renal disease to include ischemia. Recommend CTA   abdomen/pelvis. Called report to charge nurse Mili Payment at 9:05 PM 9/27/2020.   4. Cholelithiasis within distended gallbladder. 5. Dilated small bowel. 6. Other findings as above. XR CHEST PORT   Final Result   IMPRESSION:      No airspace disease in the lungs. Follow-up as clinically indicated. CT HEAD WO CONT   Final Result   Impression: Unremarkable head CT without contrast.  No infarct, mass, or    hemorrhage. Please see full report. XR CHEST SNGL V   Final Result   Impression: No diagnostic abnormality. CT Results  (Last 48 hours)    None          Assessment:     1. Acute respiratory failure with hypoxia  2. Acute metabolic encephalopathy  3. Aspiration pneumonia  4. Metabolic acidosis  5. UTI  6. Hypertension  7. Familial polymyositis    Plan:     1. Hypoxic respiratory failure. Resolved. Possibly due to aspiration  Clinically improved  Now on room air    2. Aspiration pneumonia:  Treatment with Zosyn  Modified barium swallow showed penetration and significant dysphagia. 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 Patient may require PEG tube near future. 3.  Metabolic encephalopathy. He has a progressive neurologic disorder. Brain MRI negative  Neurology is consulted. He has familial myositis.     Further recommendation per Neurology. 4.  Dehydration and metabolic acidosis. Resolved    5. Possible urinary tract infection. 6.  Hypertension. 7.  Myocardial ischemia:  He underwent nuclear stress testing which showed significant inferior wall ischemia. He is due for cardiac catheterization. Dr. Reggie Velasco is planning aortobifemoral bypass this admission. Nuclear medicine stress testing is being done today     Thank you for allowing me to participate in the care of this patient. I will follow the patient closely with you. DVT and GI prophylaxis    PT OT evaluation    Questions of patient were answered at bedside in detail  Case discussed in detail with RN, RT, and care team    Thank you for involving me in the care of this patient  I will follow with you closely during hospitalization    Time spent more than 30 minutes in direct patient care with no overlap reviewing results and records, decision-making, and answering questions.       Mk Hagan MD  [facility]  10/3/2020

## 2020-10-03 NOTE — PROGRESS NOTES
Problem: Dysphagia (Adult)  Goal: *Acute Goals and Plan of Care (Insert Text)  Description: Speech Therapy Goals  Initiated 9/28/2020  -Patient will participate in modified barium swallow study if indicated pending pt's progress. [ ] Not met  LyndsayFélix ]  MET   [ ] Progressing  [ ] Bereket Nearing  -Patient will tolerate dysphagia 2 diet with nectar thick liquids without signs/symptoms of aspiration given moderate cues within 4 day(s). [ ] Not met  [ ]  MET   [x ] Progressing  [ ] Discontinue  -Patient will demonstrate understanding of swallow safety precautions and aspiration precautions, diet recs with moderate cues within 7 day(s). [ ] Not met  [ ]  MET   [ x] Progressing  [ ] Discontinue      Outcome: Progressing Towards Goal   SPEECH [address] TREATMENT  Patient: Benny Barraza (24 y.o. male)  Date: 10/3/2020  Diagnosis: Metabolic encephalopathy [D66.19]   <principal problem not specified>       Precautions: aspiration      ASSESSMENT:  Pt seen for tx, alert, agreeable, improved from initial assessment. Per nsg, lung sounds clear, no recent fevers. Reviewed with pt re: recent MBS results, aspiration risks and potential need for alternate source of nutrition, with or without PO Intake for pleasure depending on progression of pt's dysphagia and tolerance of PO. Questions addressed and pt expresses understanding. Thin via tsp with cold stim trials with double and effortful swallow, cough elicited by clinician. With puree and soft/puree/moistened soft, effortful and double swallow prompts provided. No overt s/s aspiration throughout. PLAN:  Recommendations and Planned Interventions: At this time,recommend cont current dysphagia 2 with nectar liquids, STRICT aspiration and GERD precautions, cont SLP tx for dysphagia. Pt will need continued SLP tx after d/c from acute. Patient continues to benefit from skilled intervention to address the above impairments.   Continue treatment per established plan of care. Discharge Recommendations: To Be Determined     SUBJECTIVE:   Patient is alert, agreeable and cooperative. OBJECTIVE:     Cognitive and Communication Status:  Neurologic State: Alert  Orientation Level: Oriented X4  Cognition: Appropriate for age attention/concentration        Safety/Judgement: Decreased awareness of need for safety  Dysphagia Treatment:  Oral Assessment:  Oral Assessment  Labial: No impairment  Dentition: Limited  Oral Hygiene: wfl  Lingual: No impairment;Decreased strength  P.O. Trials:  Patient Position: upright  Vocal quality prior to P.O.: No impairment  Consistency Presented: Nectar thick liquid;Pudding; Solid; Thin liquid  Laryngeal Elevation: Weak  Aspiration Signs/Symptoms: None       Pain:  Pain Scale 1: Numeric (0 - 10)  Pain Intensity 1: 0       After treatment:   Patient left in no apparent distress in bed, Call bell within reach, and Nursing notified    COMMUNICATION/EDUCATION:   Patient was educated regarding his deficit(s) of dysphagia, aspiration as this relates to his diagnosis of inclusion body myositis. He demonstrated Good understanding as evidenced by verbal responses. The patient's plan of care including recommendations, planned interventions, and recommended diet changes were discussed with: Registered nurse.      Berenice Vernon M.S., CCC-SLP  Time Calculation: 16 mins

## 2020-10-03 NOTE — PROGRESS NOTES
CARDIOLOGY PROGRESS NOTE     Patient seen and examined. This is a patient who is followed for Cardiac Clearance. Feels okay. No chest pain. Mild shortness of breath at rest.  No chest pain, no dizziness. No other complaints reported.     Telemetry reviewed, there were no events noted in the past 24 hours. NSR 70-80s     Pertinent review of systems items noted above, all other systems are negative. Current medications reviewed.     Physical Examination  Vital signs are stable. Blood pressure 100/70, Pulse 100  No apparent distress. Heart is regular, rate and rhythm. Normal S1, S2, no murmurs are appreciated. Lungs are clear bilaterally. Abdomen is soft, nontender, normal bowel sounds. Extremities have no edema.     Labs reviewed:      Impression and recommendations are as follows:  Pre-op risk stratification: Echo noted to be with normal EF. NST abnormal with a moderate area of ischemia. Continue ASA. Plans for inpt vascular surgery. Scheduled for cardiac cath at 1300 on Monday.   2.  PAD: Continue ASA, and zetia. Unclear if statins are contraindicated with [employer].  Will check with primary team or neurology.     3.  Hypertension: BP is close to goal,continue current meds.

## 2020-10-03 NOTE — PROGRESS NOTES
Hospitalist Progress Note           Daily Progress Note: 10/3/2020    Chief complaint: Shortness of breath and weakness  Subjective: The patient is seen for follow  up. He is doing about the same. His nuclear medicine stress test yesterday showed a moderate area of ischemia in the inferior wall. Cardiology is planning catheterization on Monday    I spoke with speech therapy yesterday following his modified barium swallow and he appears to have significant issues with dysphagia. This is likely related to his inclusion body myositis. I discussed with patient and his wife the likely need for a PEG tube in the very near future.   He states he has had swallowing issues for the past 10 years    Problem List:  Problem List as of 10/3/2020 Date Reviewed: 9/17/2020          Codes Class Noted - Resolved    Metabolic encephalopathy IHR-05-AI: G93.41  ICD-9-CM: 348.31  9/26/2020 - Present        UTI (urinary tract infection) ICD-10-CM: N39.0  ICD-9-CM: 599.0  9/26/2020 - Present        Aspiration pneumonitis ([address] ICD-10-CM: J69.0  ICD-9-CM: 507.0  9/26/2020 - Present        Essential hypertension ICD-10-CM: I10  ICD-9-CM: 401.9  9/26/2020 - Present        Acute dehydration ICD-10-CM: E86.0  ICD-9-CM: 276.51  9/26/2020 - Present              Medications reviewed  Current Facility-Administered Medications   Medication Dose Route Frequency    diphenhydrAMINE (BENADRYL) capsule 50 mg  50 mg Oral BID PRN    phosphorus (K PHOS NEUTRAL) 250 mg tablet 2 Tab  2 Tab Oral BID    ezetimibe (ZETIA) tablet 10 mg  10 mg Oral DAILY    levoFLOXacin (LEVAQUIN) tablet 500 mg  500 mg Oral Q24H    predniSONE (DELTASONE) tablet 20 mg  20 mg Oral DAILY WITH BREAKFAST    oxyCODONE IR (ROXICODONE) tablet 15 mg  15 mg Oral Q4H PRN    dextrose 5 % - 0.45% NaCl infusion  75 mL/hr IntraVENous CONTINUOUS    lisinopriL (PRINIVIL, ZESTRIL) tablet 20 mg  20 mg Oral DAILY    metoprolol tartrate (LOPRESSOR) tablet 50 mg  50 mg Oral BID    enoxaparin (LOVENOX) injection 40 mg  40 mg SubCUTAneous Q24H    aspirin delayed-release tablet 81 mg  81 mg Oral DAILY    acetaminophen (TYLENOL) tablet 650 mg  650 mg Oral Q6H PRN    ondansetron (ZOFRAN) injection 4 mg  4 mg IntraVENous Q8H PRN    guaiFENesin (ROBITUSSIN) 20 mg/mL oral liquid 400 mg  400 mg Oral Q6H PRN       Review of Systems:   Review of systems obtained and negative    Objective:   Physical Exam:     Visit Vitals  BP (!) 149/90 (BP 1 Location: Left arm, BP Patient Position: At rest)   Pulse 85   Temp 97.4 °F (36.3 °C)   Resp 18   Ht 6' (1.829 m)   Wt 52.2 kg (115 lb)   SpO2 99%   BMI 15.60 kg/m²    O2 Flow Rate (L/min): 35 l/min O2 Device: Room air    Temp (24hrs), Av.3 °F (35.7 °C), Min:94.4 °F (34.7 °C), Max:97.6 °F (36.4 °C)    No intake/output data recorded. 10/01 1901 - 10/03 0700  In: 240 [P.O.:240]  Out: 2850 [Urine:2850]    General:  Alert, cooperative, no distress, appears older than stated age. Lungs:   Clear to auscultation bilaterally. Chest wall:  No tenderness or deformity. Heart:  Regular rate and rhythm, S1, S2 normal, no murmur, click, rub or gallop. Abdomen:   Soft, non-tender. Bowel sounds normal. No masses,  No organomegaly. Extremities: Extremities normal, atraumatic, no cyanosis or edema. Pulses: 2+ and symmetric all extremities. Skin: Skin color, texture, turgor normal. No rashes or lesions   Neurologic: CNII-XII intact. No gross sensory or motor deficits     Data Review:       Recent Days:  Recent Labs     10/02/20  0715 10/01/20  0900   WBC 5.5 7.0   HGB 9.7* 10.6*   HCT 28.7* 30.6*    379     Recent Labs     10/02/20  0715 10/01/20  0110    142   K 4.2 3.2*   * 111*   CO2 26 21   GLU 70 91   BUN 2* 3*   CREA 0.47* 0.50*   CA 7.8* 7.7*   PHOS 2.4* 1.5*   ALB 1.7* 1.5*     No results for input(s): PH, PCO2, PO2, HCO3, FIO2 in the last 72 hours.     24 Hour Results:  Recent Results (from the past 24 hour(s))   NUCLEAR CARDIAC STRESS TEST    Collection Time: 10/02/20  2:19 PM   Result Value Ref Range    Target  bpm    Baseline HR 89 bpm    Baseline  mmHg    Percent HR 85 %    Post peak  bpm    Stress Base Diastolic  mmHg    Stress Stage 1 Duration 1 min:sec    Stress Stage 1 HR 88 bpm    Stress Stage 2 Duration 2 min:sec    Stress Stage 2  bpm    Stress Stage 2 /102 mmHg    Stress Stage 3 Duration 3 min:sec    Stress Stage 3 HR 98 bpm    Stress Stage 3 /96 mmHg    Stress Stage 4 Duration 4 min:sec    Stress Stage 4 HR 96 bpm    Stress Stage 4 /95 mmHg    ST Elevation (mm) 0 mm    ST Depression (mm) 0 mm           Assessment/     Acute hypoxic respiratory failure likely secondary to aspiration. Improved    Right lower lobe aspiration pneumonia improved    Urinary tract infection due to E. Coli    Abnormal Cardiolite stress test showing inferior ischemia. Catheterization planned for Monday    Metabolic encephalopathy, improved    Dysphagia due to inclusion body myositis    Hypokalemia    Hypothermia, probably largely environmental.  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 Plan:  Cardiac catheterization on Monday  Vascular surgery planning aortobifemoral bypass  Continue modified diet      Care Plan discussed with: Patient/Family    Total time spent with patient: 30 minutes.     Marcelino Baron MD

## 2020-10-04 LAB
ALBUMIN SERPL-MCNC: 1.5 G/DL (ref 3.5–5)
ALBUMIN/GLOB SERPL: 0.6 {RATIO} (ref 1.1–2.2)
ALP SERPL-CCNC: 138 U/L (ref 45–117)
ALT SERPL-CCNC: 58 U/L (ref 12–78)
ANION GAP SERPL CALC-SCNC: 7 MMOL/L (ref 5–15)
AST SERPL W P-5'-P-CCNC: 74 U/L (ref 15–37)
BASOPHILS # BLD: 0 K/UL (ref 0–0.1)
BASOPHILS NFR BLD: 0 % (ref 0–1)
BILIRUB SERPL-MCNC: 0.4 MG/DL (ref 0.2–1)
BUN SERPL-MCNC: 2 MG/DL (ref 6–20)
BUN/CREAT SERPL: 4 (ref 12–20)
CA-I BLD-MCNC: 7.7 MG/DL (ref 8.5–10.1)
CHLORIDE SERPL-SCNC: 106 MMOL/L (ref 97–108)
CO2 SERPL-SCNC: 27 MMOL/L (ref 21–32)
CREAT SERPL-MCNC: 0.45 MG/DL (ref 0.7–1.3)
DIFFERENTIAL METHOD BLD: ABNORMAL
EOSINOPHIL # BLD: 0 K/UL (ref 0–0.4)
EOSINOPHIL NFR BLD: 0 % (ref 0–7)
ERYTHROCYTE [DISTWIDTH] IN BLOOD BY AUTOMATED COUNT: 17.6 % (ref 11.5–14.5)
GLOBULIN SER CALC-MCNC: 2.5 G/DL (ref 2–4)
GLUCOSE SERPL-MCNC: 95 MG/DL (ref 65–100)
HCT VFR BLD AUTO: 26.5 % (ref 36.6–50.3)
HGB BLD-MCNC: 8.8 G/DL (ref 12.1–17)
IMM GRANULOCYTES # BLD AUTO: 0.1 K/UL (ref 0–0.04)
IMM GRANULOCYTES NFR BLD AUTO: 2 % (ref 0–0.5)
LYMPHOCYTES # BLD: 0.4 K/UL (ref 0.8–3.5)
LYMPHOCYTES NFR BLD: 10 % (ref 12–49)
MCH RBC QN AUTO: 31.8 PG (ref 26–34)
MCHC RBC AUTO-ENTMCNC: 33.2 G/DL (ref 30–36.5)
MCV RBC AUTO: 95.7 FL (ref 80–99)
MONOCYTES # BLD: 0.3 K/UL (ref 0–1)
MONOCYTES NFR BLD: 7 % (ref 5–13)
NEUTS SEG # BLD: 3.5 K/UL (ref 1.8–8)
NEUTS SEG NFR BLD: 81 % (ref 32–75)
PLATELET # BLD AUTO: 255 K/UL (ref 150–400)
PMV BLD AUTO: 9.1 FL (ref 8.9–12.9)
POTASSIUM SERPL-SCNC: 3.5 MMOL/L (ref 3.5–5.1)
PROT SERPL-MCNC: 4 G/DL (ref 6.4–8.2)
RBC # BLD AUTO: 2.77 M/UL (ref 4.1–5.7)
SODIUM SERPL-SCNC: 140 MMOL/L (ref 136–145)
WBC # BLD AUTO: 4.2 K/UL (ref 4.1–11.1)

## 2020-10-04 PROCEDURE — 74011250637 HC RX REV CODE- 250/637: Performed by: INTERNAL MEDICINE

## 2020-10-04 PROCEDURE — 80053 COMPREHEN METABOLIC PANEL: CPT

## 2020-10-04 PROCEDURE — 74011250636 HC RX REV CODE- 250/636: Performed by: INTERNAL MEDICINE

## 2020-10-04 PROCEDURE — 85025 COMPLETE CBC W/AUTO DIFF WBC: CPT

## 2020-10-04 PROCEDURE — 36415 COLL VENOUS BLD VENIPUNCTURE: CPT

## 2020-10-04 PROCEDURE — 65270000029 HC RM PRIVATE

## 2020-10-04 PROCEDURE — 74011636637 HC RX REV CODE- 636/637: Performed by: INTERNAL MEDICINE

## 2020-10-04 PROCEDURE — 74011250637 HC RX REV CODE- 250/637: Performed by: NURSE PRACTITIONER

## 2020-10-04 RX ADMIN — ASPIRIN 81 MG: 81 TABLET, COATED ORAL at 08:11

## 2020-10-04 RX ADMIN — ENOXAPARIN SODIUM 40 MG: 40 INJECTION SUBCUTANEOUS at 20:50

## 2020-10-04 RX ADMIN — DIBASIC SODIUM PHOSPHATE, MONOBASIC POTASSIUM PHOSPHATE AND MONOBASIC SODIUM PHOSPHATE 2 TABLET: 852; 155; 130 TABLET ORAL at 08:12

## 2020-10-04 RX ADMIN — OXYCODONE HYDROCHLORIDE 15 MG: 5 TABLET ORAL at 04:29

## 2020-10-04 RX ADMIN — OXYCODONE HYDROCHLORIDE 15 MG: 5 TABLET ORAL at 20:50

## 2020-10-04 RX ADMIN — METOPROLOL TARTRATE 50 MG: 50 TABLET, FILM COATED ORAL at 08:11

## 2020-10-04 RX ADMIN — PREDNISONE 20 MG: 20 TABLET ORAL at 08:12

## 2020-10-04 RX ADMIN — OXYCODONE HYDROCHLORIDE 15 MG: 5 TABLET ORAL at 17:21

## 2020-10-04 RX ADMIN — DIBASIC SODIUM PHOSPHATE, MONOBASIC POTASSIUM PHOSPHATE AND MONOBASIC SODIUM PHOSPHATE 2 TABLET: 852; 155; 130 TABLET ORAL at 20:50

## 2020-10-04 RX ADMIN — OXYCODONE HYDROCHLORIDE 15 MG: 5 TABLET ORAL at 08:18

## 2020-10-04 RX ADMIN — METOPROLOL TARTRATE 50 MG: 50 TABLET, FILM COATED ORAL at 20:50

## 2020-10-04 RX ADMIN — EZETIMIBE 10 MG: 10 TABLET ORAL at 08:11

## 2020-10-04 RX ADMIN — LEVOFLOXACIN 500 MG: 500 TABLET, FILM COATED ORAL at 11:06

## 2020-10-04 RX ADMIN — OXYCODONE HYDROCHLORIDE 15 MG: 5 TABLET ORAL at 13:15

## 2020-10-04 RX ADMIN — LISINOPRIL 20 MG: 20 TABLET ORAL at 08:12

## 2020-10-04 NOTE — PROGRESS NOTES
CARDIOLOGY PROGRESS NOTE     Patient seen and examined. This is a patient who is followed for Cardiac Clearance. Feels okay. No chest pain. Mild shortness of breath at rest.  No chest pain, no dizziness.  No other complaints reported.     Telemetry reviewed, there were no events noted in the past 24 hours.  NSR 70-80s     Pertinent review of systems items noted above, all other systems are negative. Current medications reviewed.     Physical Examination  Vital signs are stable. Blood pressure 117/74, Pulse 78  No apparent distress. Heart is regular, rate and rhythm. Normal S1, S2, no murmurs are appreciated. Lungs are clear bilaterally. Abdomen is soft, nontender, normal bowel sounds. Extremities have no edema.     Labs reviewed:      Impression and recommendations are as follows:  Pre-op risk stratification: Echo noted to be with normal EF. NST abnormal with a moderate area of ischemia. Continue ASA. Plans for inpt vascular surgery. Scheduled for cardiac cath at 1300 on Monday.  Risks, benefits and alternatives were discussed with him and he is willing to undergo the procedure. 2.  PAD: Continue ASA, and zetia. Unclear if statins are contraindicated with [employer]. Will check with primary team or neurology.     3.  Hypertension: BP is close to goal,continue current meds.

## 2020-10-04 NOTE — PROGRESS NOTES
Pulmonology and Critical Care Progress Note    Subjective:     Chief Complaint:   Chief Complaint   Patient presents with    Altered mental status      Patient seen and examined  Overnight events noted    Lying in bed comfortably  No acute distress  On room air    Dr. Rosalva Alexandra is planning aortobifemoral bypass this admission. His nuclear medicine cardiac cath showed ischemia of the inferior wall. Modified barium swallow test showed issues of dysphagia.     Review of Systems:  Has chronic Reese catheter placement    Current Facility-Administered Medications   Medication Dose Route Frequency Provider Last Rate Last Dose    diphenhydrAMINE (BENADRYL) capsule 50 mg  50 mg Oral BID PRN Alda Meyer NP   50 mg at 10/03/20 0129    phosphorus (K PHOS NEUTRAL) 250 mg tablet 2 Tab  2 Tab Oral BID Santino Hernández MD   2 Tab at 10/04/20 0877    ezetimibe (ZETIA) tablet 10 mg  10 mg Oral DAILY Dan Cranker, MD   10 mg at 10/04/20 0811    levoFLOXacin (LEVAQUIN) tablet 500 mg  500 mg Oral Q24H Santino Hernández MD   500 mg at 10/04/20 1106    predniSONE (DELTASONE) tablet 20 mg  20 mg Oral DAILY WITH BREAKFAST Santino Hernández MD   20 mg at 10/04/20 9957    oxyCODONE IR (ROXICODONE) tablet 15 mg  15 mg Oral Q4H PRN Santino Hernández MD   15 mg at 10/04/20 1721    dextrose 5 % - 0.45% NaCl infusion  75 mL/hr IntraVENous CONTINUOUS Santino Hernández MD 75 mL/hr at 10/03/20 1910 75 mL/hr at 10/03/20 1910    lisinopriL (PRINIVIL, ZESTRIL) tablet 20 mg  20 mg Oral DAILY Mark Stephens MD   20 mg at 10/04/20 1440    metoprolol tartrate (LOPRESSOR) tablet 50 mg  50 mg Oral BID Mark Stephens MD   50 mg at 10/04/20 0811    enoxaparin (LOVENOX) injection 40 mg  40 mg SubCUTAneous Q24H Danielle Dodson MD   40 mg at 10/03/20 2048    aspirin delayed-release tablet 81 mg  81 mg Oral DAILY Gisele Long NP   81 mg at 10/04/20 0811    acetaminophen (TYLENOL) tablet 650 mg  650 mg Oral Q6H PRN Charli Ortiz NP 650 mg at 20 0950    ondansetron (ZOFRAN) injection 4 mg  4 mg IntraVENous Q8H PRN Gisele Long NP   4 mg at 20 0402    guaiFENesin (ROBITUSSIN) 20 mg/mL oral liquid 400 mg  400 mg Oral Q6H PRN Mimi Matta NP   Stopped at 20 1855            No Known Allergies        Objective:     Blood pressure 128/72, pulse 89, temperature 98.5 °F (36.9 °C), resp. rate 16, height 6' (1.829 m), weight 52.2 kg (115 lb), SpO2 100 %. Temp (24hrs), Av.8 °F (36.6 °C), Min:97.5 °F (36.4 °C), Max:98.5 °F (36.9 °C)      Intake and Output:  Current Shift: No intake/output data recorded. Last 3 Shifts: 10/02 1901 - 10/04 0700  In: 1055 [P.O.:200; I.V.:855]  Out: 4150 [Urine:4150]    Physical Exam:     General: Lying in bed comfortably, no acute distress  Throat and Neck: Supple  Lung: Reduced air entry bilaterally with prolonged exhalation but no wheezing. Occasional crackles. Heart: S1+S2. No murmurs  Abdomen: soft, non-tender. Bowel sounds normal. No masses; obese  Extremities: No edema  : Not done  Skin: No cyanosis  Neurologic: Awake and alert, grossly nonfocal  Psychiatric:  Unable to perform due to patient's condition      Lab/Data Review: All lab results for the last 24 hours reviewed. Recent Results (from the past 24 hour(s))   CBC WITH AUTOMATED DIFF    Collection Time: 10/04/20 11:30 AM   Result Value Ref Range    WBC 4.2 4.1 - 11.1 K/uL    RBC 2.77 (L) 4.10 - 5.70 M/uL    HGB 8.8 (L) 12.1 - 17.0 g/dL    HCT 26.5 (L) 36.6 - 50.3 %    MCV 95.7 80.0 - 99.0 FL    MCH 31.8 26.0 - 34.0 PG    MCHC 33.2 30.0 - 36.5 g/dL    RDW 17.6 (H) 11.5 - 14.5 %    PLATELET 740 413 - 398 K/uL    MPV 9.1 8.9 - 12.9 FL    NEUTROPHILS 81 (H) 32 - 75 %    LYMPHOCYTES 10 (L) 12 - 49 %    MONOCYTES 7 5 - 13 %    EOSINOPHILS 0 0 - 7 %    BASOPHILS 0 0 - 1 %    IMMATURE GRANULOCYTES 2 (H) 0.0 - 0.5 %    ABS. NEUTROPHILS 3.5 1.8 - 8.0 K/UL    ABS. LYMPHOCYTES 0.4 (L) 0.8 - 3.5 K/UL    ABS.  MONOCYTES 0.3 0.0 - 1.0 K/UL ABS. EOSINOPHILS 0.0 0.0 - 0.4 K/UL    ABS. BASOPHILS 0.0 0.0 - 0.1 K/UL    ABS. IMM. GRANS. 0.1 (H) 0.00 - 0.04 K/UL    DF AUTOMATED     METABOLIC PANEL, COMPREHENSIVE    Collection Time: 10/04/20 11:30 AM   Result Value Ref Range    Sodium 140 136 - 145 mmol/L    Potassium 3.5 3.5 - 5.1 mmol/L    Chloride 106 97 - 108 mmol/L    CO2 27 21 - 32 mmol/L    Anion gap 7 5 - 15 mmol/L    Glucose 95 65 - 100 mg/dL    BUN 2 (L) 6 - 20 mg/dL    Creatinine 0.45 (L) 0.70 - 1.30 mg/dL    BUN/Creatinine ratio 4 (L) 12 - 20      GFR est AA >60 >60 ml/min/1.73m2    GFR est non-AA >60 >60 ml/min/1.73m2    Calcium 7.7 (L) 8.5 - 10.1 mg/dL    Bilirubin, total 0.4 0.2 - 1.0 mg/dL    AST (SGOT) 74 (H) 15 - 37 U/L    ALT (SGPT) 58 12 - 78 U/L    Alk. phosphatase 138 (H) 45 - 117 U/L    Protein, total 4.0 (L) 6.4 - 8.2 g/dL    Albumin 1.5 (L) 3.5 - 5.0 g/dL    Globulin 2.5 2.0 - 4.0 g/dL    A-G Ratio 0.6 (L) 1.1 - 2.2       chest X-ray      XR SWALLOW FUNC VIDEO   Final Result   Impression: Significant hypopharyngeal residue. Episodes of penetration with   all consistencies. Episode of flash aspiration after water wash. XR CHEST PORT   Final Result   Impression: Underexpanded lungs with bibasilar atelectasis. CTA ABDOMEN PELV W CONT   Final Result   IMPRESSION: Mid abdominal aortic occlusion, with threatened right kidney and   fairly good collateralization to the legs. This could be causing a mesenteric   steal phenomenon, however      MRI BRAIN WO CONT   Final Result   Impression: No evidence of acute hemorrhage, mass or infarct. No sinusitis or   mastoiditis. Please see above discussion. CTA CHEST W OR W WO CONT   Final Result   IMPRESSION: Limited by breathing motion. 1. No large pulmonary arterial filling defect. 2. Bronchial thickening with right greater than left lower lung atelectasis or   pneumonia/pneumonitis. Bubbly debris in the trachea. 3. Atherosclerosis.  Abrupt discontinuation of contrast in the aorta on the very   inferior slices. Contrast is seen in the celiac artery and SMA and the left   renal artery. The right kidney demonstrates decreased attenuation compared to   the left concerning for medical renal disease to include ischemia. Recommend CTA   abdomen/pelvis. Called report to charge nurse Maximo Brunner at 9:05 PM 9/27/2020.   4. Cholelithiasis within distended gallbladder. 5. Dilated small bowel. 6. Other findings as above. XR CHEST PORT   Final Result   IMPRESSION:      No airspace disease in the lungs. Follow-up as clinically indicated. CT HEAD WO CONT   Final Result   Impression: Unremarkable head CT without contrast.  No infarct, mass, or    hemorrhage. Please see full report. XR CHEST SNGL V   Final Result   Impression: No diagnostic abnormality. CT Results  (Last 48 hours)    None          Assessment:     1. Acute respiratory failure with hypoxia  2. Acute metabolic encephalopathy  3. Aspiration pneumonia  4. Metabolic acidosis  5. UTI  6. Hypertension  7. Familial polymyositis    Plan:     1. Hypoxic respiratory failure. Resolved. Possibly due to aspiration  Clinically improved  Now on room air    2. Aspiration pneumonia:  Treatment with Zosyn  Modified barium swallow showed penetration and significant dysphagia. 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 Patient may require PEG tube near future. 3.  Metabolic encephalopathy. He has a progressive neurologic disorder. Brain MRI negative  Neurology is consulted. He has familial myositis. Further recommendation per Neurology. 4.  Dehydration and metabolic acidosis. Resolved    5. Possible urinary tract infection. 6.  Hypertension. 7.  Myocardial ischemia:  He underwent nuclear stress testing which showed significant inferior wall ischemia. He is due for cardiac catheterization Monday. Dr. Priscila Ocampo is planning aortobifemoral bypass this admission.     Nuclear medicine stress testing is being done today     Thank you for allowing me to participate in the care of this patient. I will follow the patient closely with you. DVT and GI prophylaxis    PT OT evaluation    Questions of patient were answered at bedside in detail  Case discussed in detail with RN, RT, and care team    Thank you for involving me in the care of this patient  I will follow with you closely during hospitalization    Time spent more than 20 minutes in direct patient care with no overlap reviewing results and records, decision-making, and answering questions.       Paul Bates MD  [facility]  10/4/2020

## 2020-10-04 NOTE — PROGRESS NOTES
Hospitalist Progress Note           Daily Progress Note: 10/4/2020    Chief complaint: Shortness of breath and weakness  Subjective: The patient is seen for follow  up. He is doing about the same. No new problems.   Cardiac catheterization is planned for tomorrow    Problem List:  Problem List as of 10/4/2020 Date Reviewed: 9/17/2020          Codes Class Noted - Resolved    Metabolic encephalopathy MSO-[id number]: G93.41  ICD-9-CM: 348.31  9/26/2020 - Present        UTI (urinary tract infection) ICD-10-CM: N39.0  ICD-9-CM: 599.0  9/26/2020 - Present        Aspiration pneumonitis ([address] ICD-10-CM: J69.0  ICD-9-CM: 507.0  9/26/2020 - Present        Essential hypertension ICD-10-CM: I10  ICD-9-CM: 401.9  9/26/2020 - Present        Acute dehydration ICD-10-CM: E86.0  ICD-9-CM: 276.51  9/26/2020 - Present              Medications reviewed  Current Facility-Administered Medications   Medication Dose Route Frequency    diphenhydrAMINE (BENADRYL) capsule 50 mg  50 mg Oral BID PRN    phosphorus (K PHOS NEUTRAL) 250 mg tablet 2 Tab  2 Tab Oral BID    ezetimibe (ZETIA) tablet 10 mg  10 mg Oral DAILY    levoFLOXacin (LEVAQUIN) tablet 500 mg  500 mg Oral Q24H    predniSONE (DELTASONE) tablet 20 mg  20 mg Oral DAILY WITH BREAKFAST    oxyCODONE IR (ROXICODONE) tablet 15 mg  15 mg Oral Q4H PRN    dextrose 5 % - 0.45% NaCl infusion  75 mL/hr IntraVENous CONTINUOUS    lisinopriL (PRINIVIL, ZESTRIL) tablet 20 mg  20 mg Oral DAILY    metoprolol tartrate (LOPRESSOR) tablet 50 mg  50 mg Oral BID    enoxaparin (LOVENOX) injection 40 mg  40 mg SubCUTAneous Q24H    aspirin delayed-release tablet 81 mg  81 mg Oral DAILY    acetaminophen (TYLENOL) tablet 650 mg  650 mg Oral Q6H PRN    ondansetron (ZOFRAN) injection 4 mg  4 mg IntraVENous Q8H PRN    guaiFENesin (ROBITUSSIN) 20 mg/mL oral liquid 400 mg  400 mg Oral Q6H PRN       Review of Systems:   Review of systems obtained and negative    Objective: Physical Exam:     Visit Vitals  /74   Pulse 95   Temp 97.7 °F (36.5 °C)   Resp 16   Ht 6' (1.829 m)   Wt 52.2 kg (115 lb)   SpO2 100%   BMI 15.60 kg/m²    O2 Flow Rate (L/min): 35 l/min O2 Device: Room air    Temp (24hrs), Av.7 °F (36.5 °C), Min:97.5 °F (36.4 °C), Max:97.8 °F (36.6 °C)    No intake/output data recorded. 10/02 1901 - 10/04 0700  In: 1055 [P.O.:200; I.V.:855]  Out: 4150 [Urine:4150]    General:  Alert, cooperative, no distress, appears older than stated age. Lungs:   Clear to auscultation bilaterally. Chest wall:  No tenderness or deformity. Heart:  Regular rate and rhythm, S1, S2 normal, no murmur, click, rub or gallop. Abdomen:   Soft, non-tender. Bowel sounds normal. No masses,  No organomegaly. Extremities: Extremities normal, atraumatic, no cyanosis or edema. Pulses: 2+ and symmetric all extremities. Skin: Skin color, texture, turgor normal. No rashes or lesions   Neurologic: CNII-XII intact. No gross sensory or motor deficits     Data Review:       Recent Days:  Recent Labs     10/02/20  0715   WBC 5.5   HGB 9.7*   HCT 28.7*        Recent Labs     10/02/20  0715      K 4.2   *   CO2 26   GLU 70   BUN 2*   CREA 0.47*   CA 7.8*   PHOS 2.4*   ALB 1.7*     No results for input(s): PH, PCO2, PO2, HCO3, FIO2 in the last 72 hours. 24 Hour Results:  No results found for this or any previous visit (from the past 24 hour(s)). Assessment/     Acute hypoxic respiratory failure likely secondary to aspiration. Improved    Right lower lobe aspiration pneumonia improved    Urinary tract infection due to E. Coli    Abnormal Cardiolite stress test showing inferior ischemia.   Catheterization planned for Monday    Metabolic encephalopathy, improved    Dysphagia due to inclusion body myositis    Hypokalemia    Hypothermia, probably largely environmental.  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 Plan:  Cardiac catheterization tomorrow  Vascular s urgery planning aortobifemoral bypass        Care Plan discussed with: Patient/Family    Total time spent with patient: 30 minutes.     Esperanza Cornejo MD

## 2020-10-05 ENCOUNTER — ANESTHESIA EVENT (OUTPATIENT)
Dept: SURGERY | Age: 55
DRG: 981 | End: 2020-10-05
Payer: COMMERCIAL

## 2020-10-05 PROCEDURE — 93458 L HRT ARTERY/VENTRICLE ANGIO: CPT | Performed by: INTERNAL MEDICINE

## 2020-10-05 PROCEDURE — 74011000250 HC RX REV CODE- 250: Performed by: INTERNAL MEDICINE

## 2020-10-05 PROCEDURE — 74011250636 HC RX REV CODE- 250/636: Performed by: INTERNAL MEDICINE

## 2020-10-05 PROCEDURE — 74011250637 HC RX REV CODE- 250/637: Performed by: INTERNAL MEDICINE

## 2020-10-05 PROCEDURE — C1769 GUIDE WIRE: HCPCS | Performed by: INTERNAL MEDICINE

## 2020-10-05 PROCEDURE — 2709999900 HC NON-CHARGEABLE SUPPLY: Performed by: INTERNAL MEDICINE

## 2020-10-05 PROCEDURE — 77030029997 HC DEV COM RDL R BND TELE -B: Performed by: INTERNAL MEDICINE

## 2020-10-05 PROCEDURE — 74011000258 HC RX REV CODE- 258: Performed by: INTERNAL MEDICINE

## 2020-10-05 PROCEDURE — 86900 BLOOD TYPING SEROLOGIC ABO: CPT

## 2020-10-05 PROCEDURE — 77030016699 HC CATH ANGI DX INFN1 CARD -A: Performed by: INTERNAL MEDICINE

## 2020-10-05 PROCEDURE — 86923 COMPATIBILITY TEST ELECTRIC: CPT

## 2020-10-05 PROCEDURE — 77030025703 HC SYR ANGI VACLOK MRTM -A: Performed by: INTERNAL MEDICINE

## 2020-10-05 PROCEDURE — 74011250637 HC RX REV CODE- 250/637: Performed by: NURSE PRACTITIONER

## 2020-10-05 PROCEDURE — 74011000636 HC RX REV CODE- 636: Performed by: INTERNAL MEDICINE

## 2020-10-05 PROCEDURE — 74011636637 HC RX REV CODE- 636/637: Performed by: INTERNAL MEDICINE

## 2020-10-05 PROCEDURE — 36415 COLL VENOUS BLD VENIPUNCTURE: CPT

## 2020-10-05 PROCEDURE — 77030008542 HC TBNG MON PRSS EDWD -A: Performed by: INTERNAL MEDICINE

## 2020-10-05 PROCEDURE — B2111ZZ FLUOROSCOPY OF MULTIPLE CORONARY ARTERIES USING LOW OSMOLAR CONTRAST: ICD-10-PCS | Performed by: INTERNAL MEDICINE

## 2020-10-05 PROCEDURE — 4A023N7 MEASUREMENT OF CARDIAC SAMPLING AND PRESSURE, LEFT HEART, PERCUTANEOUS APPROACH: ICD-10-PCS | Performed by: INTERNAL MEDICINE

## 2020-10-05 PROCEDURE — 76210000016 HC OR PH I REC 1 TO 1.5 HR: Performed by: INTERNAL MEDICINE

## 2020-10-05 PROCEDURE — 65270000029 HC RM PRIVATE

## 2020-10-05 PROCEDURE — 97535 SELF CARE MNGMENT TRAINING: CPT

## 2020-10-05 PROCEDURE — 97165 OT EVAL LOW COMPLEX 30 MIN: CPT

## 2020-10-05 PROCEDURE — 99152 MOD SED SAME PHYS/QHP 5/>YRS: CPT | Performed by: INTERNAL MEDICINE

## 2020-10-05 PROCEDURE — C1893 INTRO/SHEATH, FIXED,NON-PEEL: HCPCS | Performed by: INTERNAL MEDICINE

## 2020-10-05 RX ORDER — LEVOFLOXACIN 500 MG/1
500 TABLET, FILM COATED ORAL EVERY 24 HOURS
Qty: 3 TAB | Refills: 0 | Status: SHIPPED | OUTPATIENT
Start: 2020-10-06 | End: 2020-10-27

## 2020-10-05 RX ORDER — NICARDIPINE HYDROCHLORIDE 2.5 MG/ML
INJECTION INTRAVENOUS AS NEEDED
Status: DISCONTINUED | OUTPATIENT
Start: 2020-10-05 | End: 2020-10-05 | Stop reason: HOSPADM

## 2020-10-05 RX ORDER — LISINOPRIL 20 MG/1
20 TABLET ORAL DAILY
Qty: 30 TAB | Refills: 0 | Status: SHIPPED | OUTPATIENT
Start: 2020-10-06 | End: 2020-10-27

## 2020-10-05 RX ORDER — HYDROCODONE BITARTRATE AND ACETAMINOPHEN 5; 325 MG/1; MG/1
1 TABLET ORAL
Status: CANCELLED | OUTPATIENT
Start: 2020-10-05

## 2020-10-05 RX ORDER — METOPROLOL TARTRATE 50 MG/1
50 TABLET ORAL 2 TIMES DAILY
Qty: 30 TAB | Refills: 0 | Status: SHIPPED | OUTPATIENT
Start: 2020-10-05 | End: 2020-10-27

## 2020-10-05 RX ORDER — PREDNISONE 20 MG/1
20 TABLET ORAL
Qty: 5 TAB | Refills: 0 | Status: SHIPPED | OUTPATIENT
Start: 2020-10-06 | End: 2020-10-15

## 2020-10-05 RX ORDER — SODIUM CHLORIDE 0.9 % (FLUSH) 0.9 %
5-40 SYRINGE (ML) INJECTION EVERY 8 HOURS
Status: CANCELLED | OUTPATIENT
Start: 2020-10-05

## 2020-10-05 RX ORDER — SODIUM CHLORIDE 0.9 % (FLUSH) 0.9 %
5-40 SYRINGE (ML) INJECTION AS NEEDED
Status: CANCELLED | OUTPATIENT
Start: 2020-10-05

## 2020-10-05 RX ORDER — SODIUM CHLORIDE 9 MG/ML
250 INJECTION, SOLUTION INTRAVENOUS AS NEEDED
Status: DISCONTINUED | OUTPATIENT
Start: 2020-10-05 | End: 2020-10-10

## 2020-10-05 RX ORDER — FENTANYL CITRATE 50 UG/ML
INJECTION, SOLUTION INTRAMUSCULAR; INTRAVENOUS AS NEEDED
Status: DISCONTINUED | OUTPATIENT
Start: 2020-10-05 | End: 2020-10-05 | Stop reason: HOSPADM

## 2020-10-05 RX ORDER — ACETAMINOPHEN 325 MG/1
650 TABLET ORAL
Status: CANCELLED | OUTPATIENT
Start: 2020-10-05

## 2020-10-05 RX ORDER — EZETIMIBE 10 MG/1
10 TABLET ORAL DAILY
Qty: 30 TAB | Refills: 0 | Status: SHIPPED | OUTPATIENT
Start: 2020-10-06 | End: 2020-10-15

## 2020-10-05 RX ORDER — HEPARIN SODIUM 200 [USP'U]/100ML
INJECTION, SOLUTION INTRAVENOUS
Status: COMPLETED | OUTPATIENT
Start: 2020-10-05 | End: 2020-10-05

## 2020-10-05 RX ORDER — LIDOCAINE HYDROCHLORIDE 10 MG/ML
INJECTION INFILTRATION; PERINEURAL AS NEEDED
Status: DISCONTINUED | OUTPATIENT
Start: 2020-10-05 | End: 2020-10-05 | Stop reason: HOSPADM

## 2020-10-05 RX ORDER — HEPARIN SODIUM 1000 [USP'U]/ML
INJECTION, SOLUTION INTRAVENOUS; SUBCUTANEOUS AS NEEDED
Status: DISCONTINUED | OUTPATIENT
Start: 2020-10-05 | End: 2020-10-05 | Stop reason: HOSPADM

## 2020-10-05 RX ORDER — MIDAZOLAM HYDROCHLORIDE 1 MG/ML
INJECTION, SOLUTION INTRAMUSCULAR; INTRAVENOUS AS NEEDED
Status: DISCONTINUED | OUTPATIENT
Start: 2020-10-05 | End: 2020-10-05 | Stop reason: HOSPADM

## 2020-10-05 RX ADMIN — DIBASIC SODIUM PHOSPHATE, MONOBASIC POTASSIUM PHOSPHATE AND MONOBASIC SODIUM PHOSPHATE 2 TABLET: 852; 155; 130 TABLET ORAL at 21:41

## 2020-10-05 RX ADMIN — PREDNISONE 20 MG: 20 TABLET ORAL at 08:42

## 2020-10-05 RX ADMIN — LISINOPRIL 20 MG: 20 TABLET ORAL at 08:43

## 2020-10-05 RX ADMIN — METOPROLOL TARTRATE 50 MG: 50 TABLET, FILM COATED ORAL at 21:20

## 2020-10-05 RX ADMIN — ASPIRIN 81 MG: 81 TABLET, COATED ORAL at 08:41

## 2020-10-05 RX ADMIN — EZETIMIBE 10 MG: 10 TABLET ORAL at 08:43

## 2020-10-05 RX ADMIN — OXYCODONE HYDROCHLORIDE 15 MG: 5 TABLET ORAL at 16:50

## 2020-10-05 RX ADMIN — DEXTROSE AND SODIUM CHLORIDE 75 ML/HR: 5; 450 INJECTION, SOLUTION INTRAVENOUS at 11:05

## 2020-10-05 RX ADMIN — METOPROLOL TARTRATE 50 MG: 50 TABLET, FILM COATED ORAL at 08:42

## 2020-10-05 RX ADMIN — OXYCODONE HYDROCHLORIDE 15 MG: 5 TABLET ORAL at 08:43

## 2020-10-05 RX ADMIN — LEVOFLOXACIN 500 MG: 500 TABLET, FILM COATED ORAL at 11:01

## 2020-10-05 RX ADMIN — DIBASIC SODIUM PHOSPHATE, MONOBASIC POTASSIUM PHOSPHATE AND MONOBASIC SODIUM PHOSPHATE 2 TABLET: 852; 155; 130 TABLET ORAL at 08:43

## 2020-10-05 RX ADMIN — OXYCODONE HYDROCHLORIDE 15 MG: 5 TABLET ORAL at 21:20

## 2020-10-05 NOTE — PROGRESS NOTES
Pt. Declining PT services this morning. More concerned about Cardiac catheterization procedure this afternoon. Advised patient and wife on HEP of ankle pumps, Hooklying clamshell, quad set, glute set. With verbalized understanding. Will follow up as time allows. Prefers AM PT.

## 2020-10-05 NOTE — PERIOP NOTES
TRANSFER - OUT REPORT:    Verbal report given to Nathan Sandoval RN on Neoma Chrissy  being transferred to  for routine post - op       Report consisted of patients Situation, Background, Assessment and   Recommendations(SBAR). Information from the following report(s) SBAR, Procedure Summary, Intake/Output and Dual Neuro Assessment was reviewed with the receiving nurse. Opportunity for questions and clarification was provided.       Patient transported with:   Monitor  Registered Nurse

## 2020-10-05 NOTE — PROGRESS NOTES
Hospitalist Progress Note           Daily Progress Note: 10/5/2020    Chief complaint: Shortness of breath and weakness  Subjective: The patient is seen for follow  up. He is doing about the same.    Dr Rosario Rodriguez indicates in his note today that he will likely plan on bringing patient back for the aortobifemoral procedure rather than do at this admission    Problem List:  Problem List as of 10/5/2020 Date Reviewed: 9/17/2020          Codes Class Noted - Resolved    Metabolic encephalopathy QJR-93-GK: G93.41  ICD-9-CM: 348.31  9/26/2020 - Present        UTI (urinary tract infection) ICD-10-CM: N39.0  ICD-9-CM: 599.0  9/26/2020 - Present        Aspiration pneumonitis ([address] ICD-10-CM: J69.0  ICD-9-CM: 507.0  9/26/2020 - Present        Essential hypertension ICD-10-CM: I10  ICD-9-CM: 401.9  9/26/2020 - Present        Acute dehydration ICD-10-CM: E86.0  ICD-9-CM: 276.51  9/26/2020 - Present              Medications reviewed  Current Facility-Administered Medications   Medication Dose Route Frequency    diphenhydrAMINE (BENADRYL) capsule 50 mg  50 mg Oral BID PRN    phosphorus (K PHOS NEUTRAL) 250 mg tablet 2 Tab  2 Tab Oral BID    ezetimibe (ZETIA) tablet 10 mg  10 mg Oral DAILY    levoFLOXacin (LEVAQUIN) tablet 500 mg  500 mg Oral Q24H    predniSONE (DELTASONE) tablet 20 mg  20 mg Oral DAILY WITH BREAKFAST    oxyCODONE IR (ROXICODONE) tablet 15 mg  15 mg Oral Q4H PRN    dextrose 5 % - 0.45% NaCl infusion  75 mL/hr IntraVENous CONTINUOUS    lisinopriL (PRINIVIL, ZESTRIL) tablet 20 mg  20 mg Oral DAILY    metoprolol tartrate (LOPRESSOR) tablet 50 mg  50 mg Oral BID    enoxaparin (LOVENOX) injection 40 mg  40 mg SubCUTAneous Q24H    aspirin delayed-release tablet 81 mg  81 mg Oral DAILY    acetaminophen (TYLENOL) tablet 650 mg  650 mg Oral Q6H PRN    ondansetron (ZOFRAN) injection 4 mg  4 mg IntraVENous Q8H PRN    guaiFENesin (ROBITUSSIN) 20 mg/mL oral liquid 400 mg  400 mg Oral Q6H PRN       Review of Systems:   Review of systems obtained and negative    Objective:   Physical Exam:     Visit Vitals  BP (!) 161/99 Comment: RN notified    Pulse 93   Temp 97.7 °F (36.5 °C)   Resp 16   Ht 6' (1.829 m)   Wt 52.2 kg (115 lb)   SpO2 98%   BMI 15.60 kg/m²    O2 Flow Rate (L/min): 35 l/min O2 Device: Room air    Temp (24hrs), Av.5 °F (36.4 °C), Min:97 °F (36.1 °C), Max:98.5 °F (36.9 °C)    No intake/output data recorded. 10/03 1901 - 10/05 0700  In: 2132 [P.O.:400; I.V.:1732]  Out: 2200 [Urine:2200]    General:  Alert, cooperative, no distress, appears older than stated age. Lungs:   Clear to auscultation bilaterally. Chest wall:  No tenderness or deformity. Heart:  Regular rate and rhythm, S1, S2 normal, no murmur, click, rub or gallop. Abdomen:   Soft, non-tender. Bowel sounds normal. No masses,  No organomegaly. Extremities: Extremities normal, atraumatic, no cyanosis or edema. Pulses: 2+ and symmetric all extremities. Skin: Skin color, texture, turgor normal. No rashes or lesions   Neurologic: CNII-XII intact. No gross sensory or motor deficits     Data Review:       Recent Days:  Recent Labs     10/04/20  1130   WBC 4.2   HGB 8.8*   HCT 26.5*        Recent Labs     10/04/20  1130      K 3.5      CO2 27   GLU 95   BUN 2*   CREA 0.45*   CA 7.7*   ALB 1.5*   TBILI 0.4   ALT 58     No results for input(s): PH, PCO2, PO2, HCO3, FIO2 in the last 72 hours.     24 Hour Results:  Recent Results (from the past 24 hour(s))   CBC WITH AUTOMATED DIFF    Collection Time: 10/04/20 11:30 AM   Result Value Ref Range    WBC 4.2 4.1 - 11.1 K/uL    RBC 2.77 (L) 4.10 - 5.70 M/uL    HGB 8.8 (L) 12.1 - 17.0 g/dL    HCT 26.5 (L) 36.6 - 50.3 %    MCV 95.7 80.0 - 99.0 FL    MCH 31.8 26.0 - 34.0 PG    MCHC 33.2 30.0 - 36.5 g/dL    RDW 17.6 (H) 11.5 - 14.5 %    PLATELET 445 495 - 056 K/uL    MPV 9.1 8.9 - 12.9 FL    NEUTROPHILS 81 (H) 32 - 75 %    LYMPHOCYTES 10 (L) 12 - 49 % MONOCYTES 7 5 - 13 %    EOSINOPHILS 0 0 - 7 %    BASOPHILS 0 0 - 1 %    IMMATURE GRANULOCYTES 2 (H) 0.0 - 0.5 %    ABS. NEUTROPHILS 3.5 1.8 - 8.0 K/UL    ABS. LYMPHOCYTES 0.4 (L) 0.8 - 3.5 K/UL    ABS. MONOCYTES 0.3 0.0 - 1.0 K/UL    ABS. EOSINOPHILS 0.0 0.0 - 0.4 K/UL    ABS. BASOPHILS 0.0 0.0 - 0.1 K/UL    ABS. IMM. GRANS. 0.1 (H) 0.00 - 0.04 K/UL    DF AUTOMATED     METABOLIC PANEL, COMPREHENSIVE    Collection Time: 10/04/20 11:30 AM   Result Value Ref Range    Sodium 140 136 - 145 mmol/L    Potassium 3.5 3.5 - 5.1 mmol/L    Chloride 106 97 - 108 mmol/L    CO2 27 21 - 32 mmol/L    Anion gap 7 5 - 15 mmol/L    Glucose 95 65 - 100 mg/dL    BUN 2 (L) 6 - 20 mg/dL    Creatinine 0.45 (L) 0.70 - 1.30 mg/dL    BUN/Creatinine ratio 4 (L) 12 - 20      GFR est AA >60 >60 ml/min/1.73m2    GFR est non-AA >60 >60 ml/min/1.73m2    Calcium 7.7 (L) 8.5 - 10.1 mg/dL    Bilirubin, total 0.4 0.2 - 1.0 mg/dL    AST (SGOT) 74 (H) 15 - 37 U/L    ALT (SGPT) 58 12 - 78 U/L    Alk. phosphatase 138 (H) 45 - 117 U/L    Protein, total 4.0 (L) 6.4 - 8.2 g/dL    Albumin 1.5 (L) 3.5 - 5.0 g/dL    Globulin 2.5 2.0 - 4.0 g/dL    A-G Ratio 0.6 (L) 1.1 - 2.2             Assessment/     Acute hypoxic respiratory failure likely secondary to aspiration. Improved    Right lower lobe aspiration pneumonia improved    Urinary tract infection due to E. Coli    Abnormal Cardiolite stress test showing inferior ischemia. Catheterization planned for Monday    Metabolic encephalopathy, improved    Dysphagia due to inclusion body myositis    Hypokalemia    Hypothermia, probably largely environmental.  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 Plan:  Cardiac catheterization today  Vascular surgery planning aortobifemoral bypass likely as outpatient  Potential discharge today  Patient and his wife are declining rehab and they plan on going home        Care Plan discussed with: Patient/Family    Total time spent with patient: 30 minutes.     Kennedy Roberto MD

## 2020-10-05 NOTE — PROGRESS NOTES
CARDIOLOGY PROGRESS NOTE    Patient seen and examined. This is a patient who is followed for Cardiac Clearance . Seen resting in bed, wife at bedside prior to cardiac catheterization. Feels okay. No shortness of breath. No chest pain, no dizziness. No other complaints reported. Telemetry reviewed, there were no events noted in the past 24 hours. NSR 70-80s    Pertinent review of systems items noted above, all other systems are negative. Current medications reviewed. Physical Examination  Vital signs are stable. Blood pressure 161/99, Pulse 93  No apparent distress. Heart is regular, rate and rhythm. Normal S1, S2, no murmurs are appreciated. Lungs are clear bilaterally. Abdomen is soft, nontender, normal bowel sounds. Extremities have no edema. Labs reviewed:     Case discussed with Dr. Ban Cruz and our impression and recommendations are as follows:  1. Pre-op risk stratification: Echo with normal EF. NST abnormal with a moderate area of ischemia. Continue asa. Plans for inpatient vascular surgery. Scheduled for cardiac cath at 1300 today. Risks, benefits and alternatives were discussed with him and he is willing to undergo the procedure. 2. PAD: Continue ASA, will add zetia. Unclear if statins are contraindicated with [employer]. Will check with primary team or neurology. 3. Hypertension: Blood pressure acceptable. Continue lisinopril and metoprolol.        Please do not hesitate to call me or Dr. Ban Cruz if additional questions arise.

## 2020-10-05 NOTE — PROGRESS NOTES
Problem: Self Care Deficits Care Plan (Adult)  Goal: *Acute Goals and Plan of Care (Insert Text)  Description: 1.  patient will brush teeth/perform oral care, set up/SBA , bed level  2. Patient will wash face, SBA, set up, bed level  3. patient will be  mod  I  for eating/drinking   4.  patient will tolerate sitting at EOB, 1 - 2 extremity support, SBA, 5 min+, in prep  for ADL tasks  5. Patient will engage in light EDGAR UE therapeutic exercises to promote > ADL and bed  mobility independence  Outcome: Not Met   OCCUPATIONAL THERAPY EVALUATION  Patient: Zhao Alcantara (14 y.o. male)  Date: 10/5/2020  Primary Diagnosis: Metabolic encephalopathy [Z10.71]  Procedure(s) (LRB):  LEFT HEART CATH / CORONARY ANGIOGRAPHY (N/A)  PERCUTANEOUS CORONARY INTERVENTION (N/A) Day of Surgery   Precautions: very weak;       ASSESSMENT  Based on the objective data described below, the patient presents with significant deficits with independence in ADL and functional mobility tasks. Limitations include;  significant generalized weakness, alterations in sensation, EDGAR UE weakness, limited activity tolerance, decreased awareness of  limitations    Current Level of Function Impacting Discharge (ADLs/self-care): dependent with bathing, dressing, toileting.  grooming: bed level, SBA/set up; eating; set up/SBA, bed level. Other factors to consider for discharge: time since onset; severity of deficits; bedroom on 2nd floor     Patient will benefit from skilled therapy intervention to address the above noted impairments. PLAN :  Recommendations and Planned Interventions: self care training, functional mobility training, therapeutic exercise, therapeutic activities, endurance activities, neuromuscular re-education, patient education, home safety training, and family training/education    Frequency/Duration: Patient will be followed by occupational therapy 4 times a week to address goals.     Recommendation for discharge: (in order for the patient to meet his/her long term goals)  SNF;  otherwise, home and resume [facility] OT    This discharge recommendation:  Has been made in collaboration with the attending provider and/or case management    IF patient discharges home will need the following DME: sliding/transfer board       SUBJECTIVE:   Patient stated it's been just the last month that there's been a big decline and I do everything.  states spouse re; patient transfers and self care tasks    Patient resides in a 2 story home, bedroom upstairs. Has a stairlift and multiple DME. Was receiving  OT/PT services prior to admission. Spouse indicates up  to a month ago, patient was overall mod independent with ADLs, required min assist with stand pivot transfer from bed to w/c. One month PTA:  patient received significant assistance from spouse for ADL tasks    OBJECTIVE DATA SUMMARY:   HISTORY:   Past Medical History:   Diagnosis Date    Myositis     Familial inclusion      Past Surgical History:   Procedure Laterality Date    HX ORTHOPAEDIC      disc infused, neck      2  Expanded or extensive additional review of patient history:     Home Situation  Home Environment: Private residence  # Steps to Enter: 4  Rails to Enter: Yes  Hand Rails : Bilateral  Wheelchair Ramp: No  One/Two Story Residence: Two story  # of Interior Steps: (stair lift)  Living Alone: No  Support Systems: Spouse/Significant Other/Partner  Patient Expects to be Discharged to[de-identified] Private residence  Current DME Used/Available at Home: Adaptive bathing aides, Commode, bedside, Transfer bench, Walker, rollator, Walker, rolling, Other (comment)(stair lift)    PLOF: Pt received assistance for ADLS/IADLS, received assistance with mobility prior to admission.      Hand dominance: Right    EXAMINATION OF PERFORMANCE DEFICITS:  Cognitive/Behavioral Status:  Neurologic State: Alert  Orientation Level: Oriented to person  Cognition: Appropriate for age attention/concentration Skin:   Bruising noted on Guido UE        Hearing: Auditory  Auditory Impairment: None    Vision/Perceptual:            Vision  appeared intact     Range of Motion:    AROM: Generally decreased, functional        RUE AROM  R Wrist Extension: (3-/5)  Right Hand Grasp: (fair  but functional for lightweight objects)   At rest, wrist noted to be in flexion \"wrist drop\"      LUE AROM  L Wrist Extension: (3-/5)  Left Hand Grasp: (fair)   At rest, wrist noted to be  held in flexion \"ie wrist drop)    Strength:  Grossly 3-/5  or less in UE's     Coordination:  Coordination: Generally decreased, functional  Fine Motor Skills-Upper: Left Intact; Right Intact         Tone & Sensation:  GUIDO UE: slightly hypotonic     Sensation: Impaired per patient report; comes  and goes in the UE's        Balance:   Not tested;  patient declined to sit at EOB    Functional Mobility and Transfers for ADLs:  Bed Mobility:  Supine to Sit: (declined)  Sit to Supine: (declined)    Transfers:   N/t    ADL Assessment:  Feeding: Supervision;Stand-by assistance;Setup    Oral Facial Hygiene/Grooming: Supervision;Stand-by assistance;Setup                   Toileting: Total assistance                ADL Intervention and task modifications:  Feeding  Drink to Mouth: Set-up; Stand-by assistance    Grooming  Grooming Assistance: Set-up; Stand-by assistance  Washing Face: (declined)  Brushing Teeth: (declined)  Brushing/Combing Hair: Set-up; Stand-by assistance                        Toileting  Bladder Hygiene: Total assistance (dependent)(ricco)         [address] [facility] [address] [address] AM-PAC \"6 Clicks\"                                                       Daily Activity Inpatient Short Form  How much help from another person does the patient currently need. .. Total; A Lot A Little None   1. Putting on and taking off regular lower body clothing? [x]  1 []  2 []  3 []  4   2. Bathing (including washing, rinsing, drying)? [x]  1 []  2 []  3 []  4   3.   Toileting, which includes using toilet, bedpan or urinal? [x] 1 []  2 []  3 []  4   4. Putting on and taking off regular upper body clothing? [x]  1 []  2 []  3 []  4   5. Taking care of personal grooming such as brushing teeth? []  1 [x]  2 []  3 []  4   6. Eating meals? []  1 [x]  2 []  3 []  4   © , Trustees of [address] [place] [address] [address] [address], under license to [employer]. All rights reserved     Score:      Interpretation of Tool:  Represents clinically-significant functional categories (i.e. Activities of daily living). Percentage of Impairment CH    0%   CI    1-19% CJ    20-39% CK    40-59% CL    60-79% CM    80-99% CN     100%   [facility]  Score 6-24 24 23 20-22 15-19 10-14 7-9 6        Occupational Therapy Evaluation Charge Determination   History Examination Decision-Making   LOW Complexity : Brief history review  MEDIUM Complexity : 3-5 performance deficits relating to physical, cognitive , or psychosocial skils that result in activity limitations and / or participation restrictions MEDIUM Complexity : Patient may present with comorbidities that affect occupational performnce. Miniml to moderate modification of tasks or assistance (eg, physical or verbal ) with assesment(s) is necessary to enable patient to complete evaluation       Based on the above components, the patient evaluation is determined to be of the following complexity level: LOW   Pain Ratin/10, EDGAR LE    Activity Tolerance:   Fair  Please refer to the flowsheet for vital signs taken during this treatment. After treatment patient left in no apparent distress:    Supine in bed, Caregiver / family present, and Side rails x 3    COMMUNICATION/EDUCATION:   The patients plan of care was discussed with: Physical therapy assistant and Registered nurse. Patient understands intent and goals of therapy, but is neutral about his/her participation. This patients plan of care is appropriate for delegation to [facility] [place].     Thank you for this referral.  Christine Garcia Olivia Shaw,OTR/L  Time Calculation: 25 mins

## 2020-10-05 NOTE — PROGRESS NOTES
Pulmonology and Critical Care Progress Note    Subjective:     Chief Complaint:   Chief Complaint   Patient presents with    Altered mental status      Patient seen and examined    Lying in bed comfortably  No acute distress  On room air, discussed with wife at the bedside. He is on puréed diet with nectar thickened liquids. He is for cardiac cath today. Dr. Shayna Montemayor is planning aortobifemoral bypass this admission. His nuclear medicine cardiac cath showed ischemia of the inferior wall. Modified barium swallow test showed issues of dysphagia.     Review of Systems:  Has chronic Reese catheter placement    Current Facility-Administered Medications   Medication Dose Route Frequency Provider Last Rate Last Dose    diphenhydrAMINE (BENADRYL) capsule 50 mg  50 mg Oral BID PRN [place] Fraction, NP   50 mg at 10/03/20 0129    phosphorus (K PHOS NEUTRAL) 250 mg tablet 2 Tab  2 Tab Oral BID Alberto Magana MD   2 Tab at 10/05/20 7502    ezetimibe (ZETIA) tablet 10 mg  10 mg Oral DAILY Yohana Brar MD   10 mg at 10/05/20 0843    levoFLOXacin (LEVAQUIN) tablet 500 mg  500 mg Oral Q24H Alberto Magana MD   500 mg at 10/05/20 1101    predniSONE (DELTASONE) tablet 20 mg  20 mg Oral DAILY WITH BREAKFAST Alberto Magana MD   20 mg at 10/05/20 0842    oxyCODONE IR (ROXICODONE) tablet 15 mg  15 mg Oral Q4H PRN Alberto Magana MD   15 mg at 10/05/20 0843    dextrose 5 % - 0.45% NaCl infusion  75 mL/hr IntraVENous CONTINUOUS Alberto Magana MD 75 mL/hr at 10/05/20 1105 75 mL/hr at 10/05/20 1105    lisinopriL (PRINIVIL, ZESTRIL) tablet 20 mg  20 mg Oral DAILY Umair Panda MD   20 mg at 10/05/20 0843    metoprolol tartrate (LOPRESSOR) tablet 50 mg  50 mg Oral BID Umair Panda MD   50 mg at 10/05/20 0842    enoxaparin (LOVENOX) injection 40 mg  40 mg SubCUTAneous Q24H Celina Mar MD   40 mg at 10/04/20 2050    aspirin delayed-release tablet 81 mg  81 mg Oral DAILY Gisele Long NP   81 mg at 10/05/20 0841    acetaminophen (TYLENOL) tablet 650 mg  650 mg Oral Q6H PRN Gisele Long NP   650 mg at 20 0950    ondansetron (ZOFRAN) injection 4 mg  4 mg IntraVENous Q8H PRN Gisele oLng NP   4 mg at 20 0402    guaiFENesin (ROBITUSSIN) 20 mg/mL oral liquid 400 mg  400 mg Oral Q6H PRN Saranya Villa NP   Stopped at 20 1855            No Known Allergies        Objective:     Blood pressure (!) 161/99, pulse 93, temperature 97.7 °F (36.5 °C), resp. rate 16, height 6' (1.829 m), weight 52.2 kg (115 lb), SpO2 98 %. Temp (24hrs), Av.5 °F (36.4 °C), Min:97 °F (36.1 °C), Max:98.5 °F (36.9 °C)      Intake and Output:  Current Shift: No intake/output data recorded. Last 3 Shifts: 10/03 1901 - 10/05 0700  In: 2132 [P.O.:400; I.V.:1732]  Out: 2200 [Urine:2200]    Physical Exam:     General: Lying in bed comfortably, no acute distress  Throat and Neck: Supple  Lung: Reduced air entry bilaterally with prolonged exhalation but no wheezing. Occasional crackles. Heart: S1+S2. No murmurs  Abdomen: soft, non-tender. Bowel sounds normal. No masses; obese  Extremities: No edema  : Not done  Skin: No cyanosis  Neurologic: Awake and alert, grossly nonfocal    Lab/Data Review: All lab results for the last 24 hours reviewed. No results found for this or any previous visit (from the past 24 hour(s)). chest X-ray      XR SWALLOW FUNC VIDEO   Final Result   Impression: Significant hypopharyngeal residue. Episodes of penetration with   all consistencies. Episode of flash aspiration after water wash. XR CHEST PORT   Final Result   Impression: Underexpanded lungs with bibasilar atelectasis. CTA ABDOMEN PELV W CONT   Final Result   IMPRESSION: Mid abdominal aortic occlusion, with threatened right kidney and   fairly good collateralization to the legs.  This could be causing a mesenteric   steal phenomenon, however      MRI BRAIN WO CONT   Final Result   Impression: No evidence of acute hemorrhage, mass or infarct. No sinusitis or   mastoiditis. Please see above discussion. CTA CHEST W OR W WO CONT   Final Result   IMPRESSION: Limited by breathing motion. 1. No large pulmonary arterial filling defect. 2. Bronchial thickening with right greater than left lower lung atelectasis or   pneumonia/pneumonitis. Bubbly debris in the trachea. 3. Atherosclerosis. Abrupt discontinuation of contrast in the aorta on the very   inferior slices. Contrast is seen in the celiac artery and SMA and the left   renal artery. The right kidney demonstrates decreased attenuation compared to   the left concerning for medical renal disease to include ischemia. Recommend CTA   abdomen/pelvis. Called report to charge nurse Coopertab Phillips at 9:05 PM 9/27/2020.   4. Cholelithiasis within distended gallbladder. 5. Dilated small bowel. 6. Other findings as above. XR CHEST PORT   Final Result   IMPRESSION:      No airspace disease in the lungs. Follow-up as clinically indicated. CT HEAD WO CONT   Final Result   Impression: Unremarkable head CT without contrast.  No infarct, mass, or    hemorrhage. Please see full report. XR CHEST SNGL V   Final Result   Impression: No diagnostic abnormality. CT Results  (Last 48 hours)    None          Assessment:     1. Acute respiratory failure with hypoxia  2. Acute metabolic encephalopathy  3. Aspiration pneumonia  4. Metabolic acidosis  5. UTI  6. Hypertension  7. Familial polymyositis    Plan:     1. Hypoxic respiratory failure. Resolved. Possibly due to aspiration  Clinically improved  Now on room air    2. Aspiration pneumonia:  Treatment with Zosyn  Modified barium swallow showed penetration and significant dysphagia. 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 Patient may require PEG tube near future. 3.  Metabolic encephalopathy. He has a progressive neurologic disorder.     Brain MRI negative    Further recommendation per Neurology. 4.  Dehydration and metabolic acidosis. Resolved    5. Possible urinary tract infection. 6.  Hypertension. 7.  Myocardial ischemia:  He underwent nuclear stress testing which showed significant inferior wall ischemia. He is due for cardiac catheterization today. Dr. Debbie Billings is planning aortobifemoral bypass this admission.       Thank you for allowing me to participate in the care of this patient. I will follow the patient closely with you. DVT and GI prophylaxis    Discussed with the wife at the bedside. Thank you for involving me in the care of this patient  I will follow with you closely during hospitalization    Time spent more than 30 minutes in direct patient care with no overlap reviewing results and records, decision-making, and answering questions.       Emily Liang MD  [facility]  10/5/2020

## 2020-10-05 NOTE — PROGRESS NOTES
Comprehensive Nutrition Assessment    Type and Reason for Visit: Reassess(Goal)    Nutrition Recommendations/Plan:     Continue Mechanically Altered/Nectar/Lvl 2 Mildly thick diet  Continue Ensure Compact BID, Ensure Pdg BID, Magic cup daily  Add Prosource to juices, sodas BID (additional 120kcal, 30g pro)    Encourage adequate intakes of meals/supplements, including with med delivery  Nursing to document %meal and supplement intakes in I/Os    Nutrition Assessment:  AMS pta. COVID negative. Dx dehydration, UTI on admit. Admitted to ICU for monitoring 2/2 aspiration event in ED- CXR clear. Several wounds present. Ongoing procedures, including cardiac cath today. Pt typically consumes regular texture diet at home; SLP rec'd NPO, previously on TF via NG. Advanced to NDD2 [facility] altd w/ Nectar/Mildy thick liq. No PO intakes recorded in flowsheet. NPO this AM. RD spoke to pt and family at bedside prior to procedure, reported intakes remain ~25% at meals or less. Variable acceptance of supplements, saving unopened items on bedside table. Pt and family would like to continue receiving supps however reports pt drinking thickened coca cola and apple juice more than Ensure Compact. Agreeable to try Prosource pkts in juices and sodas - discussed with RN as well. Receives somme outside foods of hashbrowns, banana puddings. Requested oatmeal, however not permitted on Lvl 5 Minced/Moist diet guidelines. Labs: H/H: 8.8/26.5, BUN 2, Cr 0.45, Ca 7.7, elevated LFTs. Meds: D5 1/2NS, prednisone, KPhos, zofran. Malnutrition Assessment:  Malnutrition Status:   Moderate malnutrition    Context:  Acute illness     Findings of the 6 clinical characteristics of malnutrition:   Energy Intake:  7 - 50% or less of est energy requirements for 5 or more days  Weight Loss:  Unable to assess     Body Fat Loss:  Unable to assess,     Muscle Mass Loss:  1 - Mild muscle mass loss, Clavicles (pectoralis & deltoids), Thigh (quadraceps), Calf  Fluid Accumulation:  No significant fluid accumulation,        Estimated Daily Nutrient Needs:  Energy (kcal):  1850kcal (30kcal/kg)  Protein (g):  79g (1.5g/kg)       Fluid (ml/day):  1575mL (30mL/kg)    Nutrition Related Findings:  Pt bundled under covers, RN noted very thin with muscle wasting to torso, arms, legs. Will f/u for NFPE as needed. No documented hx of dysphagia. No current n/v or c/d. Last BM documented yesterday, hard. Wounds:    Multiple, Stage I, Stage II, Deep tissue injury(DTI to sacrum, R foot; Stage I to R ankle, R heel, spine; Stage II to R hip)       Current Nutrition Therapies:  DIET NUTRITIONAL SUPPLEMENTS Breakfast, Lunch; Ensure Compact  DIET NUTRITIONAL SUPPLEMENTS Dinner, Lunch;  Ensure Pudding  DIET NUTRITIONAL SUPPLEMENTS Dinner; Magic Cup (vanilla preferred)  DIET DYSPHAGIA MECH ALTERED (NDD2) 2 [place]/2 Mildly Thick  DIET NUTRITIONAL SUPPLEMENTS Breakfast, Lunch; Prosource (Send for pt to mix with coca cola)    Anthropometric Measures:  · Height:  6' (182.9 cm)  · Current Body Wt:  52.6 kg (115 lb 15.4 oz)   · Admission Body Wt:  115 lb 15.4 oz(stated)      · Ideal Body Wt:  178 lbs:  65.1 %   · BMI Category:  Underweight (BMI less than 22) age over 72       Nutrition Diagnosis:   · Inadequate protein-energy intake related to swallowing difficulty, increased demand for energy/nutrients, catabolic illness(AMS) as evidenced by BMI, swallowing study results, intake 26-50%      Nutrition Interventions:   Food and/or Nutrient Delivery: Continue current diet, Modify oral nutrition supplement(add Prosource)  Nutrition Education and Counseling: No recommendations at this time  Coordination of Nutrition Care: Continued inpatient monitoring    Goals:  Initiate means of nutrition to meet >75% EENs >7 days, Wt gain 1lb +/- 0.5lb per week, Na and lytes wnl, Improve skin integrity       Nutrition Monitoring and Evaluation:   Behavioral-Environmental Outcomes:  N/A  Food/Nutrient Intake Outcomes: Food and nutrient intake, Supplement intake  Physical Signs/Symptoms Outcomes: Chewing or swallowing, GI status, Skin, Weight    Discharge Planning:     Too soon to determine     Electronically signed by Jenny Bassett on 10/5/2020 at 3:18 PM    Contact:  [phone]

## 2020-10-05 NOTE — PROGRESS NOTES
Patient examined this morning. I reviewed Dr. Thien Maya recommendations. Patient is scheduled for coronary angiogram today. Most likely patient's aortobifemoral bypass procedure will be done outpatient route due to ischemic changes on the stress test.  But will see what the coronary angiogram shows and go from there.

## 2020-10-05 NOTE — WOUND CARE
Four Eyes Skin Assessment/Post Procedure Assessment performed with Gurvinder Delacruz RN. Pt has two pea sized red blanchable area to the lower spine covered with a mepilex. Area in sacral/left buttock with sloughed skin, unstageable covered with mepilex. Multiple scattered bruises on bilateral arms. Left inner knee has reddened, blanchable area. Bilateral heels covered with mepilex. Heels floated. Left foot has reddened blanchable area on outer right great toe. Right foot has reddened blanchable areas on the outer portion of the foot by the pinky toe and up to the ankle. I agree with above assessment.

## 2020-10-05 NOTE — PROGRESS NOTES
Gave report to Toy Sanchez RN on 4W on Di Mehta who is being transferred from [facility] for routine post-op.

## 2020-10-06 ENCOUNTER — ANESTHESIA (OUTPATIENT)
Dept: SURGERY | Age: 55
DRG: 981 | End: 2020-10-06
Payer: COMMERCIAL

## 2020-10-06 LAB
ALBUMIN SERPL-MCNC: 1.9 G/DL (ref 3.5–5)
ALBUMIN/GLOB SERPL: 0.7 {RATIO} (ref 1.1–2.2)
ALP SERPL-CCNC: 156 U/L (ref 45–117)
ALT SERPL-CCNC: 60 U/L (ref 12–78)
ANION GAP SERPL CALC-SCNC: 7 MMOL/L (ref 5–15)
APTT PPP: 29 SEC (ref 23–35.7)
ARTERIAL PATENCY WRIST A: POSITIVE
AST SERPL W P-5'-P-CCNC: 61 U/L (ref 15–37)
BASE DEFICIT BLDA-SCNC: 6.9 MMOL/L (ref 0–2)
BASOPHILS # BLD: 0 K/UL (ref 0–0.1)
BASOPHILS NFR BLD: 0 % (ref 0–1)
BDY SITE: ABNORMAL
BILIRUB SERPL-MCNC: 1 MG/DL (ref 0.2–1)
BUN SERPL-MCNC: 4 MG/DL (ref 6–20)
BUN/CREAT SERPL: 9 (ref 12–20)
CA-I BLD-MCNC: 8.1 MG/DL (ref 8.5–10.1)
CHLORIDE SERPL-SCNC: 104 MMOL/L (ref 97–108)
CO2 SERPL-SCNC: 30 MMOL/L (ref 21–32)
CREAT SERPL-MCNC: 0.46 MG/DL (ref 0.7–1.3)
DIFFERENTIAL METHOD BLD: ABNORMAL
EOSINOPHIL # BLD: 0 K/UL (ref 0–0.4)
EOSINOPHIL NFR BLD: 1 % (ref 0–7)
ERYTHROCYTE [DISTWIDTH] IN BLOOD BY AUTOMATED COUNT: 16.6 % (ref 11.5–14.5)
FIO2 ON VENT: 100 %
GLOBULIN SER CALC-MCNC: 2.7 G/DL (ref 2–4)
GLUCOSE SERPL-MCNC: 71 MG/DL (ref 65–100)
HCO3 BLDA-SCNC: 19 MMOL/L (ref 22–26)
HCT VFR BLD AUTO: 37.7 % (ref 36.6–50.3)
HGB BLD-MCNC: 12.7 G/DL (ref 12.1–17)
IMM GRANULOCYTES # BLD AUTO: 0.1 K/UL (ref 0–0.04)
IMM GRANULOCYTES NFR BLD AUTO: 1 % (ref 0–0.5)
INR PPP: 0.9 (ref 0.9–1.1)
LYMPHOCYTES # BLD: 1.4 K/UL (ref 0.8–3.5)
LYMPHOCYTES NFR BLD: 27 % (ref 12–49)
MCH RBC QN AUTO: 31.3 PG (ref 26–34)
MCHC RBC AUTO-ENTMCNC: 33.7 G/DL (ref 30–36.5)
MCV RBC AUTO: 92.9 FL (ref 80–99)
MONOCYTES # BLD: 0.9 K/UL (ref 0–1)
MONOCYTES NFR BLD: 18 % (ref 5–13)
NEUTS SEG # BLD: 2.7 K/UL (ref 1.8–8)
NEUTS SEG NFR BLD: 53 % (ref 32–75)
PCO2 BLDA: 38 MMHG (ref 35–45)
PH BLDA: 7.3 [PH] (ref 7.35–7.45)
PLATELET # BLD AUTO: 207 K/UL (ref 150–400)
PMV BLD AUTO: 9.4 FL (ref 8.9–12.9)
PO2 BLDA: 351 MMHG (ref 75–100)
POTASSIUM SERPL-SCNC: 3.3 MMOL/L (ref 3.5–5.1)
PROT SERPL-MCNC: 4.6 G/DL (ref 6.4–8.2)
PROTHROMBIN TIME: 12.4 SEC (ref 11.9–14.7)
RBC # BLD AUTO: 4.06 M/UL (ref 4.1–5.7)
SAO2 % BLD: 100 %
SAO2% DEVICE SAO2% SENSOR NAME: ABNORMAL
SODIUM SERPL-SCNC: 141 MMOL/L (ref 136–145)
THERAPEUTIC RANGE,PTTT: NORMAL SEC (ref 68–109)
WBC # BLD AUTO: 5 K/UL (ref 4.1–11.1)

## 2020-10-06 PROCEDURE — 77030009967 HC RELD STPLR ENDOSC J&J -C: Performed by: SURGERY

## 2020-10-06 PROCEDURE — P9045 ALBUMIN (HUMAN), 5%, 250 ML: HCPCS | Performed by: NURSE ANESTHETIST, CERTIFIED REGISTERED

## 2020-10-06 PROCEDURE — 85610 PROTHROMBIN TIME: CPT

## 2020-10-06 PROCEDURE — 88305 TISSUE EXAM BY PATHOLOGIST: CPT

## 2020-10-06 PROCEDURE — 77030002987 HC SUT PROL J&J -B: Performed by: SURGERY

## 2020-10-06 PROCEDURE — 74011250637 HC RX REV CODE- 250/637: Performed by: INTERNAL MEDICINE

## 2020-10-06 PROCEDURE — 85730 THROMBOPLASTIN TIME PARTIAL: CPT

## 2020-10-06 PROCEDURE — 74011000272 HC RX REV CODE- 272: Performed by: SURGERY

## 2020-10-06 PROCEDURE — C1768 GRAFT, VASCULAR: HCPCS | Performed by: SURGERY

## 2020-10-06 PROCEDURE — 74011000258 HC RX REV CODE- 258: Performed by: NURSE ANESTHETIST, CERTIFIED REGISTERED

## 2020-10-06 PROCEDURE — 76060000047 HC ANESTHESIA 8 TO 8.5 HR: Performed by: SURGERY

## 2020-10-06 PROCEDURE — 30233N1 TRANSFUSION OF NONAUTOLOGOUS RED BLOOD CELLS INTO PERIPHERAL VEIN, PERCUTANEOUS APPROACH: ICD-10-PCS | Performed by: INTERNAL MEDICINE

## 2020-10-06 PROCEDURE — 77030002933 HC SUT MCRYL J&J -A: Performed by: SURGERY

## 2020-10-06 PROCEDURE — 2709999900 HC NON-CHARGEABLE SUPPLY: Performed by: SURGERY

## 2020-10-06 PROCEDURE — 36430 TRANSFUSION BLD/BLD COMPNT: CPT

## 2020-10-06 PROCEDURE — 77030002996 HC SUT SLK J&J -A: Performed by: SURGERY

## 2020-10-06 PROCEDURE — 82803 BLOOD GASES ANY COMBINATION: CPT

## 2020-10-06 PROCEDURE — 77030011264 HC ELECTRD BLD EXT COVD -A: Performed by: SURGERY

## 2020-10-06 PROCEDURE — 77030011808 HC STPLR ENDOSCOPIC J&J -D: Performed by: SURGERY

## 2020-10-06 PROCEDURE — 04100JK BYPASS ABDOMINAL AORTA TO BILATERAL FEMORAL ARTERIES WITH SYNTHETIC SUBSTITUTE, OPEN APPROACH: ICD-10-PCS | Performed by: SURGERY

## 2020-10-06 PROCEDURE — 05H533Z INSERTION OF INFUSION DEVICE INTO RIGHT SUBCLAVIAN VEIN, PERCUTANEOUS APPROACH: ICD-10-PCS | Performed by: SURGERY

## 2020-10-06 PROCEDURE — 74011250636 HC RX REV CODE- 250/636: Performed by: SURGERY

## 2020-10-06 PROCEDURE — 77030002966 HC SUT PDS J&J -A: Performed by: SURGERY

## 2020-10-06 PROCEDURE — 74011250636 HC RX REV CODE- 250/636: Performed by: NURSE ANESTHETIST, CERTIFIED REGISTERED

## 2020-10-06 PROCEDURE — 77030002916 HC SUT ETHLN J&J -A: Performed by: SURGERY

## 2020-10-06 PROCEDURE — 77030012406 HC DRN WND PENRS BARD -A: Performed by: SURGERY

## 2020-10-06 PROCEDURE — 77030031139 HC SUT VCRL2 J&J -A: Performed by: SURGERY

## 2020-10-06 PROCEDURE — 04C00ZZ EXTIRPATION OF MATTER FROM ABDOMINAL AORTA, OPEN APPROACH: ICD-10-PCS | Performed by: SURGERY

## 2020-10-06 PROCEDURE — 85025 COMPLETE CBC W/AUTO DIFF WBC: CPT

## 2020-10-06 PROCEDURE — C1757 CATH, THROMBECTOMY/EMBOLECT: HCPCS | Performed by: SURGERY

## 2020-10-06 PROCEDURE — 36415 COLL VENOUS BLD VENIPUNCTURE: CPT

## 2020-10-06 PROCEDURE — 5A1945Z RESPIRATORY VENTILATION, 24-96 CONSECUTIVE HOURS: ICD-10-PCS | Performed by: SURGERY

## 2020-10-06 PROCEDURE — 88311 DECALCIFY TISSUE: CPT

## 2020-10-06 PROCEDURE — 77030014006 HC SPNG HEMSTAT J&J -A: Performed by: SURGERY

## 2020-10-06 PROCEDURE — 77030013567 HC DRN WND RESERV BARD -A: Performed by: SURGERY

## 2020-10-06 PROCEDURE — 74011000250 HC RX REV CODE- 250: Performed by: NURSE ANESTHETIST, CERTIFIED REGISTERED

## 2020-10-06 PROCEDURE — 77030008463 HC STPLR SKN PROX J&J -B: Performed by: SURGERY

## 2020-10-06 PROCEDURE — 65270000029 HC RM PRIVATE

## 2020-10-06 PROCEDURE — 65610000006 HC RM INTENSIVE CARE

## 2020-10-06 PROCEDURE — 77030003029 HC SUT VCRL J&J -B: Performed by: SURGERY

## 2020-10-06 PROCEDURE — 77030010512 HC APPL CLP LIG J&J -C: Performed by: SURGERY

## 2020-10-06 PROCEDURE — 04BB0ZZ EXCISION OF INFERIOR MESENTERIC ARTERY, OPEN APPROACH: ICD-10-PCS | Performed by: SURGERY

## 2020-10-06 PROCEDURE — 88304 TISSUE EXAM BY PATHOLOGIST: CPT

## 2020-10-06 PROCEDURE — 74011636637 HC RX REV CODE- 636/637: Performed by: INTERNAL MEDICINE

## 2020-10-06 PROCEDURE — 76010000183 HC OR TIME 8 TO 8.5 HR INTENSV-TIER 1: Performed by: SURGERY

## 2020-10-06 PROCEDURE — 80053 COMPREHEN METABOLIC PANEL: CPT

## 2020-10-06 PROCEDURE — P9016 RBC LEUKOCYTES REDUCED: HCPCS

## 2020-10-06 PROCEDURE — 77030002986 HC SUT PROL J&J -A: Performed by: SURGERY

## 2020-10-06 PROCEDURE — 74011250637 HC RX REV CODE- 250/637: Performed by: NURSE PRACTITIONER

## 2020-10-06 PROCEDURE — 07TP0ZZ RESECTION OF SPLEEN, OPEN APPROACH: ICD-10-PCS | Performed by: SURGERY

## 2020-10-06 PROCEDURE — 76010000144 HC OR TIME 8 TO 8.5 HR: Performed by: SURGERY

## 2020-10-06 PROCEDURE — 92526 ORAL FUNCTION THERAPY: CPT

## 2020-10-06 PROCEDURE — 77030041621 HC DRN WND BARD -A: Performed by: SURGERY

## 2020-10-06 PROCEDURE — 74011250637 HC RX REV CODE- 250/637: Performed by: SURGERY

## 2020-10-06 DEVICE — IMPLANTABLE DEVICE: Type: IMPLANTABLE DEVICE | Site: AORTA | Status: FUNCTIONAL

## 2020-10-06 RX ORDER — PROPOFOL 10 MG/ML
INJECTION, EMULSION INTRAVENOUS AS NEEDED
Status: DISCONTINUED | OUTPATIENT
Start: 2020-10-06 | End: 2020-10-07 | Stop reason: HOSPADM

## 2020-10-06 RX ORDER — ONDANSETRON 2 MG/ML
4 INJECTION INTRAMUSCULAR; INTRAVENOUS AS NEEDED
Status: CANCELLED | OUTPATIENT
Start: 2020-10-06

## 2020-10-06 RX ORDER — EPHEDRINE SULFATE/0.9% NACL/PF 50 MG/5 ML
5 SYRINGE (ML) INTRAVENOUS AS NEEDED
Status: CANCELLED | OUTPATIENT
Start: 2020-10-06

## 2020-10-06 RX ORDER — MIDAZOLAM HYDROCHLORIDE 1 MG/ML
INJECTION, SOLUTION INTRAMUSCULAR; INTRAVENOUS AS NEEDED
Status: DISCONTINUED | OUTPATIENT
Start: 2020-10-06 | End: 2020-10-07 | Stop reason: HOSPADM

## 2020-10-06 RX ORDER — DIPHENHYDRAMINE HYDROCHLORIDE 50 MG/ML
12.5 INJECTION, SOLUTION INTRAMUSCULAR; INTRAVENOUS AS NEEDED
Status: CANCELLED | OUTPATIENT
Start: 2020-10-06 | End: 2020-10-06

## 2020-10-06 RX ORDER — MORPHINE SULFATE 10 MG/ML
2 INJECTION, SOLUTION INTRAMUSCULAR; INTRAVENOUS
Status: CANCELLED | OUTPATIENT
Start: 2020-10-06

## 2020-10-06 RX ORDER — SODIUM CHLORIDE, SODIUM LACTATE, POTASSIUM CHLORIDE, CALCIUM CHLORIDE 600; 310; 30; 20 MG/100ML; MG/100ML; MG/100ML; MG/100ML
INJECTION, SOLUTION INTRAVENOUS
Status: DISCONTINUED | OUTPATIENT
Start: 2020-10-06 | End: 2020-10-07 | Stop reason: HOSPADM

## 2020-10-06 RX ORDER — FENTANYL CITRATE 50 UG/ML
50 INJECTION, SOLUTION INTRAMUSCULAR; INTRAVENOUS AS NEEDED
Status: CANCELLED | OUTPATIENT
Start: 2020-10-06

## 2020-10-06 RX ORDER — CEFAZOLIN SODIUM 1 G/3ML
INJECTION, POWDER, FOR SOLUTION INTRAMUSCULAR; INTRAVENOUS AS NEEDED
Status: DISCONTINUED | OUTPATIENT
Start: 2020-10-06 | End: 2020-10-07 | Stop reason: HOSPADM

## 2020-10-06 RX ORDER — SODIUM CHLORIDE 0.9 % (FLUSH) 0.9 %
5-40 SYRINGE (ML) INJECTION AS NEEDED
Status: CANCELLED | OUTPATIENT
Start: 2020-10-06

## 2020-10-06 RX ORDER — SODIUM CHLORIDE 0.9 % (FLUSH) 0.9 %
5-40 SYRINGE (ML) INJECTION EVERY 8 HOURS
Status: CANCELLED | OUTPATIENT
Start: 2020-10-06

## 2020-10-06 RX ORDER — EPHEDRINE SULFATE/0.9% NACL/PF 50 MG/5 ML
SYRINGE (ML) INTRAVENOUS AS NEEDED
Status: DISCONTINUED | OUTPATIENT
Start: 2020-10-06 | End: 2020-10-07 | Stop reason: HOSPADM

## 2020-10-06 RX ORDER — MIDAZOLAM HYDROCHLORIDE 1 MG/ML
1 INJECTION, SOLUTION INTRAMUSCULAR; INTRAVENOUS AS NEEDED
Status: CANCELLED | OUTPATIENT
Start: 2020-10-06

## 2020-10-06 RX ORDER — HYDROMORPHONE HYDROCHLORIDE 2 MG/ML
INJECTION, SOLUTION INTRAMUSCULAR; INTRAVENOUS; SUBCUTANEOUS AS NEEDED
Status: DISCONTINUED | OUTPATIENT
Start: 2020-10-06 | End: 2020-10-07 | Stop reason: HOSPADM

## 2020-10-06 RX ORDER — ALBUMIN HUMAN 50 G/1000ML
SOLUTION INTRAVENOUS AS NEEDED
Status: DISCONTINUED | OUTPATIENT
Start: 2020-10-06 | End: 2020-10-07 | Stop reason: HOSPADM

## 2020-10-06 RX ORDER — FENTANYL CITRATE 50 UG/ML
50 INJECTION, SOLUTION INTRAMUSCULAR; INTRAVENOUS
Status: CANCELLED | OUTPATIENT
Start: 2020-10-06

## 2020-10-06 RX ORDER — SODIUM CHLORIDE 0.9 G/100ML
IRRIGANT IRRIGATION AS NEEDED
Status: DISCONTINUED | OUTPATIENT
Start: 2020-10-06 | End: 2020-10-06 | Stop reason: HOSPADM

## 2020-10-06 RX ORDER — HEPARIN SODIUM 1000 [USP'U]/ML
INJECTION, SOLUTION INTRAVENOUS; SUBCUTANEOUS AS NEEDED
Status: DISCONTINUED | OUTPATIENT
Start: 2020-10-06 | End: 2020-10-06 | Stop reason: HOSPADM

## 2020-10-06 RX ORDER — DEXAMETHASONE SODIUM PHOSPHATE 4 MG/ML
INJECTION, SOLUTION INTRA-ARTICULAR; INTRALESIONAL; INTRAMUSCULAR; INTRAVENOUS; SOFT TISSUE AS NEEDED
Status: DISCONTINUED | OUTPATIENT
Start: 2020-10-06 | End: 2020-10-07 | Stop reason: HOSPADM

## 2020-10-06 RX ORDER — POTASSIUM CHLORIDE 7.45 MG/ML
10 INJECTION INTRAVENOUS
Status: DISCONTINUED | OUTPATIENT
Start: 2020-10-06 | End: 2020-10-06

## 2020-10-06 RX ORDER — KETAMINE HYDROCHLORIDE 10 MG/ML
INJECTION, SOLUTION INTRAMUSCULAR; INTRAVENOUS AS NEEDED
Status: DISCONTINUED | OUTPATIENT
Start: 2020-10-06 | End: 2020-10-07 | Stop reason: HOSPADM

## 2020-10-06 RX ORDER — MIDAZOLAM HYDROCHLORIDE 1 MG/ML
0.5 INJECTION, SOLUTION INTRAMUSCULAR; INTRAVENOUS
Status: CANCELLED | OUTPATIENT
Start: 2020-10-06

## 2020-10-06 RX ORDER — POTASSIUM CHLORIDE 7.45 MG/ML
10 INJECTION INTRAVENOUS ONCE
Status: COMPLETED | OUTPATIENT
Start: 2020-10-06 | End: 2020-10-06

## 2020-10-06 RX ORDER — LIDOCAINE HYDROCHLORIDE 10 MG/ML
0.1 INJECTION, SOLUTION EPIDURAL; INFILTRATION; INTRACAUDAL; PERINEURAL AS NEEDED
Status: CANCELLED | OUTPATIENT
Start: 2020-10-06

## 2020-10-06 RX ORDER — SUCCINYLCHOLINE CHLORIDE 20 MG/ML
INJECTION INTRAMUSCULAR; INTRAVENOUS AS NEEDED
Status: DISCONTINUED | OUTPATIENT
Start: 2020-10-06 | End: 2020-10-07 | Stop reason: HOSPADM

## 2020-10-06 RX ORDER — POTASSIUM CHLORIDE 20 MEQ/1
40 TABLET, EXTENDED RELEASE ORAL
Status: COMPLETED | OUTPATIENT
Start: 2020-10-06 | End: 2020-10-06

## 2020-10-06 RX ORDER — HEPARIN SODIUM 1000 [USP'U]/ML
INJECTION, SOLUTION INTRAVENOUS; SUBCUTANEOUS AS NEEDED
Status: DISCONTINUED | OUTPATIENT
Start: 2020-10-06 | End: 2020-10-07 | Stop reason: HOSPADM

## 2020-10-06 RX ORDER — FENTANYL CITRATE 50 UG/ML
INJECTION, SOLUTION INTRAMUSCULAR; INTRAVENOUS AS NEEDED
Status: DISCONTINUED | OUTPATIENT
Start: 2020-10-06 | End: 2020-10-07 | Stop reason: HOSPADM

## 2020-10-06 RX ORDER — ROCURONIUM BROMIDE 10 MG/ML
INJECTION, SOLUTION INTRAVENOUS AS NEEDED
Status: DISCONTINUED | OUTPATIENT
Start: 2020-10-06 | End: 2020-10-07 | Stop reason: HOSPADM

## 2020-10-06 RX ADMIN — LEVOFLOXACIN 500 MG: 500 TABLET, FILM COATED ORAL at 10:00

## 2020-10-06 RX ADMIN — SUCCINYLCHOLINE CHLORIDE 80 MG: 20 INJECTION, SOLUTION INTRAMUSCULAR; INTRAVENOUS at 17:33

## 2020-10-06 RX ADMIN — Medication 30 MG: at 21:32

## 2020-10-06 RX ADMIN — KETAMINE HYDROCHLORIDE 30 MG: 10 INJECTION INTRAMUSCULAR; INTRAVENOUS at 18:24

## 2020-10-06 RX ADMIN — OXYCODONE HYDROCHLORIDE 15 MG: 5 TABLET ORAL at 04:09

## 2020-10-06 RX ADMIN — OXYCODONE HYDROCHLORIDE 15 MG: 5 TABLET ORAL at 12:28

## 2020-10-06 RX ADMIN — MIDAZOLAM HYDROCHLORIDE 2 MG: 2 INJECTION, SOLUTION INTRAMUSCULAR; INTRAVENOUS at 17:23

## 2020-10-06 RX ADMIN — PREDNISONE 20 MG: 20 TABLET ORAL at 08:04

## 2020-10-06 RX ADMIN — LISINOPRIL 20 MG: 20 TABLET ORAL at 08:04

## 2020-10-06 RX ADMIN — DEXAMETHASONE SODIUM PHOSPHATE 8 MG: 4 INJECTION, SOLUTION INTRA-ARTICULAR; INTRALESIONAL; INTRAMUSCULAR; INTRAVENOUS; SOFT TISSUE at 18:25

## 2020-10-06 RX ADMIN — CEFAZOLIN SODIUM 1 G: 1 INJECTION, POWDER, FOR SOLUTION INTRAMUSCULAR; INTRAVENOUS at 17:55

## 2020-10-06 RX ADMIN — ALBUMIN (HUMAN) 250 ML: 12.5 INJECTION, SOLUTION INTRAVENOUS at 18:52

## 2020-10-06 RX ADMIN — PHENYLEPHRINE HYDROCHLORIDE 200 MCG: 10 INJECTION INTRAVENOUS at 18:01

## 2020-10-06 RX ADMIN — SODIUM CHLORIDE 35 MCG/MIN: 9 INJECTION, SOLUTION INTRAVENOUS at 19:47

## 2020-10-06 RX ADMIN — DIBASIC SODIUM PHOSPHATE, MONOBASIC POTASSIUM PHOSPHATE AND MONOBASIC SODIUM PHOSPHATE 2 TABLET: 852; 155; 130 TABLET ORAL at 08:04

## 2020-10-06 RX ADMIN — PROPOFOL 70 MG: 10 INJECTION, EMULSION INTRAVENOUS at 17:33

## 2020-10-06 RX ADMIN — SODIUM CHLORIDE, POTASSIUM CHLORIDE, SODIUM LACTATE AND CALCIUM CHLORIDE: 600; 310; 30; 20 INJECTION, SOLUTION INTRAVENOUS at 18:45

## 2020-10-06 RX ADMIN — POTASSIUM CHLORIDE 40 MEQ: 1500 TABLET, EXTENDED RELEASE ORAL at 15:11

## 2020-10-06 RX ADMIN — SODIUM CHLORIDE, POTASSIUM CHLORIDE, SODIUM LACTATE AND CALCIUM CHLORIDE: 600; 310; 30; 20 INJECTION, SOLUTION INTRAVENOUS at 17:23

## 2020-10-06 RX ADMIN — PHENYLEPHRINE HYDROCHLORIDE 200 MCG: 10 INJECTION INTRAVENOUS at 21:33

## 2020-10-06 RX ADMIN — ROCURONIUM BROMIDE 10 MG: 10 SOLUTION INTRAVENOUS at 19:45

## 2020-10-06 RX ADMIN — SODIUM CHLORIDE, POTASSIUM CHLORIDE, SODIUM LACTATE AND CALCIUM CHLORIDE: 600; 310; 30; 20 INJECTION, SOLUTION INTRAVENOUS at 23:45

## 2020-10-06 RX ADMIN — ASPIRIN 81 MG: 81 TABLET, COATED ORAL at 08:04

## 2020-10-06 RX ADMIN — POTASSIUM CHLORIDE 10 MEQ: 7.46 INJECTION, SOLUTION INTRAVENOUS at 15:11

## 2020-10-06 RX ADMIN — PROPOFOL 130 MG: 10 INJECTION, EMULSION INTRAVENOUS at 20:06

## 2020-10-06 RX ADMIN — ROCURONIUM BROMIDE 10 MG: 10 SOLUTION INTRAVENOUS at 18:56

## 2020-10-06 RX ADMIN — PHENYLEPHRINE HYDROCHLORIDE 200 MCG: 10 INJECTION INTRAVENOUS at 17:55

## 2020-10-06 RX ADMIN — ROCURONIUM BROMIDE 10 MG: 10 SOLUTION INTRAVENOUS at 17:32

## 2020-10-06 RX ADMIN — ROCURONIUM BROMIDE 30 MG: 10 SOLUTION INTRAVENOUS at 20:07

## 2020-10-06 RX ADMIN — HEPARIN SODIUM 6000 UNITS: 1000 INJECTION, SOLUTION INTRAVENOUS; SUBCUTANEOUS at 20:23

## 2020-10-06 RX ADMIN — SODIUM CHLORIDE, POTASSIUM CHLORIDE, SODIUM LACTATE AND CALCIUM CHLORIDE: 600; 310; 30; 20 INJECTION, SOLUTION INTRAVENOUS at 23:02

## 2020-10-06 RX ADMIN — OXYCODONE HYDROCHLORIDE 15 MG: 5 TABLET ORAL at 08:04

## 2020-10-06 RX ADMIN — SODIUM CHLORIDE, POTASSIUM CHLORIDE, SODIUM LACTATE AND CALCIUM CHLORIDE: 600; 310; 30; 20 INJECTION, SOLUTION INTRAVENOUS at 17:40

## 2020-10-06 RX ADMIN — PHENYLEPHRINE HYDROCHLORIDE 200 MCG: 10 INJECTION INTRAVENOUS at 21:41

## 2020-10-06 RX ADMIN — EZETIMIBE 10 MG: 10 TABLET ORAL at 08:04

## 2020-10-06 RX ADMIN — SODIUM CHLORIDE 3 MCG: 450 INJECTION INTRAVENOUS at 21:35

## 2020-10-06 RX ADMIN — PHENYLEPHRINE HYDROCHLORIDE 200 MCG: 10 INJECTION INTRAVENOUS at 21:38

## 2020-10-06 RX ADMIN — FENTANYL CITRATE 100 MCG: 50 INJECTION, SOLUTION INTRAMUSCULAR; INTRAVENOUS at 18:16

## 2020-10-06 RX ADMIN — ROCURONIUM BROMIDE 20 MG: 10 SOLUTION INTRAVENOUS at 18:01

## 2020-10-06 RX ADMIN — PHENYLEPHRINE HYDROCHLORIDE 200 MCG: 10 INJECTION INTRAVENOUS at 21:35

## 2020-10-06 RX ADMIN — FENTANYL CITRATE 50 MCG: 50 INJECTION, SOLUTION INTRAMUSCULAR; INTRAVENOUS at 19:16

## 2020-10-06 RX ADMIN — METOPROLOL TARTRATE 50 MG: 50 TABLET, FILM COATED ORAL at 08:04

## 2020-10-06 RX ADMIN — FENTANYL CITRATE 50 MCG: 50 INJECTION, SOLUTION INTRAMUSCULAR; INTRAVENOUS at 18:48

## 2020-10-06 RX ADMIN — HYDROMORPHONE HYDROCHLORIDE 1 MG: 2 INJECTION INTRAMUSCULAR; INTRAVENOUS; SUBCUTANEOUS at 21:03

## 2020-10-06 RX ADMIN — Medication 10 MG: at 17:38

## 2020-10-06 RX ADMIN — CEFAZOLIN SODIUM 1 G: 1 INJECTION, POWDER, FOR SOLUTION INTRAMUSCULAR; INTRAVENOUS at 21:46

## 2020-10-06 RX ADMIN — ALBUMIN (HUMAN) 250 ML: 12.5 INJECTION, SOLUTION INTRAVENOUS at 18:06

## 2020-10-06 NOTE — PROGRESS NOTES
Pulmonology and Critical Care Progress Note    Subjective:     Chief Complaint:   Chief Complaint   Patient presents with    Altered mental status      Patient seen and examined    Lying in bed comfortably  No acute distress  On room air, discussed with wife at the bedside. He is on puréed diet with nectar thickened liquids. Cardiac catheterization was done on 10/5. Noted. Dr. Andrez Alejandro is planning aortobifemoral bypass this admission. His nuclear medicine cardiac cath showed ischemia of the inferior wall. Modified barium swallow test showed issues of dysphagia.     Review of Systems:  Has chronic Reese catheter placement    Current Facility-Administered Medications   Medication Dose Route Frequency Provider Last Rate Last Dose    influenza vaccine 2020-21 (4 yrs+)(PF) (FLUCELVAX QUAD) injection 0.5 mL  0.5 mL IntraMUSCular PRIOR TO DISCHARGE Frelier, Betti Schilder, MD        potassium chloride 10 mEq in 100 ml IVPB  10 mEq IntraVENous ONCE Adenike Olson  mL/hr at 10/06/20 1511 10 mEq at 10/06/20 1511    0.9% sodium chloride infusion 250 mL  250 mL IntraVENous PRN Adenike Olson MD        diphenhydrAMINE (BENADRYL) capsule 50 mg  50 mg Oral BID PRN Marleni Costello, NP   50 mg at 10/03/20 0129    phosphorus (K PHOS NEUTRAL) 250 mg tablet 2 Tab  2 Tab Oral BID Alvino Perry MD   2 Tab at 10/06/20 0804    ezetimibe (ZETIA) tablet 10 mg  10 mg Oral DAILY Heidy Goff MD   10 mg at 10/06/20 0804    levoFLOXacin (LEVAQUIN) tablet 500 mg  500 mg Oral Q24H Alvino Perry MD   Stopped at 10/06/20 1100    predniSONE (DELTASONE) tablet 20 mg  20 mg Oral DAILY WITH BREAKFAST Alvino Perry MD   20 mg at 10/06/20 0804    oxyCODONE IR (ROXICODONE) tablet 15 mg  15 mg Oral Q4H PRN Alvino Perry MD   15 mg at 10/06/20 1228    dextrose 5 % - 0.45% NaCl infusion  75 mL/hr IntraVENous CONTINUOUS Alvino Perry MD 75 mL/hr at 10/05/20 1105 75 mL/hr at 10/05/20 1105    lisinopriL (PRINIVIL, ZESTRIL) tablet 20 mg  20 mg Oral DAILY Sheila Hernandez MD   20 mg at 10/06/20 0804    metoprolol tartrate (LOPRESSOR) tablet 50 mg  50 mg Oral BID Sheila Hernandez MD   50 mg at 10/06/20 0804    enoxaparin (LOVENOX) injection 40 mg  40 mg SubCUTAneous Q24H Lydia Molina MD   Stopped at 10/05/20 2141    aspirin delayed-release tablet 81 mg  81 mg Oral DAILY Gisele Long, NP   81 mg at 10/06/20 0804    acetaminophen (TYLENOL) tablet 650 mg  650 mg Oral Q6H PRN Gisele Long, NP   650 mg at 20 0950    ondansetron (ZOFRAN) injection 4 mg  4 mg IntraVENous Q8H PRN Angela Long T, NP   4 mg at 20 0402    guaiFENesin (ROBITUSSIN) 20 mg/mL oral liquid 400 mg  400 mg Oral Q6H PRN Matthias SUTTON, NP   Stopped at 20 1855            No Known Allergies        Objective:     Blood pressure (!) 144/85, pulse 67, temperature 97.4 °F (36.3 °C), resp. rate 18, height 6' (1.829 m), weight 52.2 kg (115 lb), SpO2 99 %. Temp (24hrs), Av.7 °F (36.5 °C), Min:97.4 °F (36.3 °C), Max:98.2 °F (36.8 °C)      Intake and Output:  Current Shift: 10/06 0701 - 10/06 1900  In: 404.2   Out: 675 [Urine:675]  Last 3 Shifts: 10/04 1901 - 10/06 0700  In: 4977 [P.O.:200; I.V.:877]  Out:  [Urine:2000]    Physical Exam:     General: Lying in bed comfortably, no acute distress  Throat and Neck: Supple  Lung: Reduced air entry bilaterally with prolonged exhalation but no wheezing. Occasional crackles. Examination is mostly unchanged. Heart: S1+S2. No murmurs  Abdomen: soft, non-tender. Bowel sounds normal. No masses; obese  Extremities: No edema  : Not done  Skin: No cyanosis  Neurologic: Awake and alert, grossly nonfocal    Lab/Data Review: All lab results for the last 24 hours reviewed.   Recent Results (from the past 24 hour(s))   TYPE & SCREEN    Collection Time: 10/05/20 11:15 PM   Result Value Ref Range    Crossmatch Expiration 10/08/2020,2359     ABO/Rh(D) O Positive     Antibody screen Negative     Unit number [id number]     Blood component type  LR     Unit division 00     Status of unit [address] to transfuse     Crossmatch result Compatible     Unit number [id number]     Blood component type [facility]     Unit division 00     Status of unit [address] to transfuse     Crossmatch result Compatible    CBC WITH AUTOMATED DIFF    Collection Time: 10/06/20  8:15 AM   Result Value Ref Range    WBC 5.0 4.1 - 11.1 K/uL    RBC 4.06 (L) 4.10 - 5.70 M/uL    HGB 12.7 12.1 - 17.0 g/dL    HCT 37.7 36.6 - 50.3 %    MCV 92.9 80.0 - 99.0 FL    MCH 31.3 26.0 - 34.0 PG    MCHC 33.7 30.0 - 36.5 g/dL    RDW 16.6 (H) 11.5 - 14.5 %    PLATELET 118 625 - 389 K/uL    MPV 9.4 8.9 - 12.9 FL    NEUTROPHILS 53 32 - 75 %    LYMPHOCYTES 27 12 - 49 %    MONOCYTES 18 (H) 5 - 13 %    EOSINOPHILS 1 0 - 7 %    BASOPHILS 0 0 - 1 %    IMMATURE GRANULOCYTES 1 (H) 0.0 - 0.5 %    ABS. NEUTROPHILS 2.7 1.8 - 8.0 K/UL    ABS. LYMPHOCYTES 1.4 0.8 - 3.5 K/UL    ABS. MONOCYTES 0.9 0.0 - 1.0 K/UL    ABS. EOSINOPHILS 0.0 0.0 - 0.4 K/UL    ABS. BASOPHILS 0.0 0.0 - 0.1 K/UL    ABS. IMM. GRANS. 0.1 (H) 0.00 - 0.04 K/UL    DF AUTOMATED     METABOLIC PANEL, COMPREHENSIVE    Collection Time: 10/06/20  8:15 AM   Result Value Ref Range    Sodium 141 136 - 145 mmol/L    Potassium 3.3 (L) 3.5 - 5.1 mmol/L    Chloride 104 97 - 108 mmol/L    CO2 30 21 - 32 mmol/L    Anion gap 7 5 - 15 mmol/L    Glucose 71 65 - 100 mg/dL    BUN 4 (L) 6 - 20 mg/dL    Creatinine 0.46 (L) 0.70 - 1.30 mg/dL    BUN/Creatinine ratio 9 (L) 12 - 20      GFR est AA >60 >60 ml/min/1.73m2    GFR est non-AA >60 >60 ml/min/1.73m2    Calcium 8.1 (L) 8.5 - 10.1 mg/dL    Bilirubin, total 1.0 0.2 - 1.0 mg/dL    AST (SGOT) 61 (H) 15 - 37 U/L    ALT (SGPT) 60 12 - 78 U/L    Alk.  phosphatase 156 (H) 45 - 117 U/L    Protein, total 4.6 (L) 6.4 - 8.2 g/dL    Albumin 1.9 (L) 3.5 - 5.0 g/dL    Globulin 2.7 2.0 - 4.0 g/dL    A-G Ratio 0.7 (L) 1.1 - 2.2     PTT Collection Time: 10/06/20  8:15 AM   Result Value Ref Range    aPTT 29.0 23.0 - 35.7 sec    aPTT, therapeutic range   68 - 109 sec   PROTHROMBIN TIME + INR    Collection Time: 10/06/20  8:15 AM   Result Value Ref Range    Prothrombin time 12.4 11.9 - 14.7 sec    INR 0.9 0.9 - 1.1       chest X-ray      XR SWALLOW FUNC VIDEO   Final Result   Impression: Significant hypopharyngeal residue. Episodes of penetration with   all consistencies. Episode of flash aspiration after water wash. XR CHEST PORT   Final Result   Impression: Underexpanded lungs with bibasilar atelectasis. CTA ABDOMEN PELV W CONT   Final Result   IMPRESSION: Mid abdominal aortic occlusion, with threatened right kidney and   fairly good collateralization to the legs. This could be causing a mesenteric   steal phenomenon, however      MRI BRAIN WO CONT   Final Result   Impression: No evidence of acute hemorrhage, mass or infarct. No sinusitis or   mastoiditis. Please see above discussion. CTA CHEST W OR W WO CONT   Final Result   IMPRESSION: Limited by breathing motion. 1. No large pulmonary arterial filling defect. 2. Bronchial thickening with right greater than left lower lung atelectasis or   pneumonia/pneumonitis. Bubbly debris in the trachea. 3. Atherosclerosis. Abrupt discontinuation of contrast in the aorta on the very   inferior slices. Contrast is seen in the celiac artery and SMA and the left   renal artery. The right kidney demonstrates decreased attenuation compared to   the left concerning for medical renal disease to include ischemia. Recommend CTA   abdomen/pelvis. Called report to charge nurse Yair Back at 9:05 PM 9/27/2020.   4. Cholelithiasis within distended gallbladder. 5. Dilated small bowel. 6. Other findings as above. XR CHEST PORT   Final Result   IMPRESSION:      No airspace disease in the lungs. Follow-up as clinically indicated.        CT HEAD WO CONT   Final Result   Impression: Unremarkable head CT without contrast.  No infarct, mass, or    hemorrhage. Please see full report. XR CHEST SNGL V   Final Result   Impression: No diagnostic abnormality. CT Results  (Last 48 hours)    None          Assessment:     1. Acute respiratory failure with hypoxia  2. Acute metabolic encephalopathy  3. Aspiration pneumonia  4. Metabolic acidosis  5. UTI  6. Hypertension  7. Familial polymyositis    Plan:     1. Hypoxic respiratory failure. Resolved. Possibly due to aspiration  Clinically improved  Now on room air    2. Aspiration pneumonia:  Treatment with Zosyn  Modified barium swallow showed penetration and significant dysphagia. 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 Patient may require PEG tube near future. We will obtain a follow-up chest x-ray. 3.  Metabolic encephalopathy. He has a progressive neurologic disorder. Brain MRI negative    Further recommendation per Neurology. 4.  Dehydration and metabolic acidosis. Resolved    5. Possible urinary tract infection. 6.  Hypertension. 7.  Myocardial ischemia:  He underwent nuclear stress testing which showed significant inferior wall ischemia. He underwent cardiac catheterization on 10/5/2020 which showed nonobstructive coronary artery disease. Medical management is planned at this time. Dr. Rosario Rodriguez is planning aortobifemoral bypass this admission.       Thank you for allowing me to participate in the care of this patient. I will follow the patient closely with you. DVT and GI prophylaxis    Discussed with the wife at the bedside. Thank you for involving me in the care of this patient  I will follow the patient on a as needed basis.       Donna Coates MD  [facility]  10/6/2020

## 2020-10-06 NOTE — ROUTINE PROCESS
PT tx attempted at [phone] however pt needs to be RA following CC. Consulted with supervising PT and will RA when medically appropriate. Will continue to follow patient and attempt PT at a later time. Thank you.

## 2020-10-06 NOTE — PROGRESS NOTES
SPEECH LANGUAGE PATHOLOGY DYSPHAGIA TREATMENT  Patient: Nani Canchola (17 y.o. male)  Date: 10/6/2020  Diagnosis: Metabolic encephalopathy [C03.41] <principal problem not specified>  Procedure(s) (LRB):  LEFT HEART CATH / CORONARY ANGIOGRAPHY (N/A) 1 Day Post-Op  Precautions: fall, aspiration      ASSESSMENT:  Patient seen a bedside, wife present. Patient currently NPO for surgery. Current diet ground, NECTAR. Educated wife to [facility] results, patient's aspiration and nutritional risk, diet recommendations, swallowing prognosis and long-term recommendations ( PEG tube ). Compensatory swallow strategies of : multiple swallows, smaller more frequent meals, smooth/mechanical textures and GERD precautions. Wife asked appropriate questions regarding swallowing safety and prognosis. PLAN:  Recommendations and Planned Interventions:  Resume diet of ground ( NDD2), NECTAR s/p surgery. STRICT aspiration precautions. Continue w/ nutritional supplements per dietician. Patient continues to benefit from skilled intervention to address the above impairments. Continue treatment per established plan of care. Discharge Recommendations:  Home Health     SUBJECTIVE:   Patient alert, seen at bedside. NPO for a procedure. OBJECTIVE:   Cognitive and Communication Status:  Neurologic State: Alert  Orientation Level: Oriented X4, Other (Comment)(with intermittent confusion)  Cognition: Appropriate decision making, Appropriate for age attention/concentration, Appropriate safety awareness       After treatment:   Patient left in no apparent distress in bed    COMMUNICATION/EDUCATION:   Patient was educated regarding his deficit(s) of swallowing as this relates to his diagnosis. He demonstrated Good understanding as evidenced by appropriate questions asked and verbal understanding.       Dariel Sauceda M.S., CCC-SLP  Time Calculation: 10 mins

## 2020-10-06 NOTE — PROGRESS NOTES
Hospitalist Progress Note           Daily Progress Note: 10/6/2020    Chief complaint: Shortness of breath and weakness  Subjective: The patient is seen for follow  up. Wife at bedside during this examination. Notes Improvement in breathing but notes overall lower extremity weakness.   Per Family,they are scheduled for aortobifemoral bypass surgery later today    Problem List:  Problem List as of 10/6/2020 Date Reviewed: 9/17/2020          Codes Class Noted - Resolved    Metabolic encephalopathy YWD-87-PA: G93.41  ICD-9-CM: 348.31  9/26/2020 - Present        UTI (urinary tract infection) ICD-10-CM: N39.0  ICD-9-CM: 599.0  9/26/2020 - Present        Aspiration pneumonitis ([address] ICD-10-CM: J69.0  ICD-9-CM: 507.0  9/26/2020 - Present        Essential hypertension ICD-10-CM: I10  ICD-9-CM: 401.9  9/26/2020 - Present        Acute dehydration ICD-10-CM: E86.0  ICD-9-CM: 276.51  9/26/2020 - Present              Medications reviewed  Current Facility-Administered Medications   Medication Dose Route Frequency    influenza vaccine 2020-21 (4 yrs+)(PF) (FLUCELVAX QUAD) injection 0.5 mL  0.5 mL IntraMUSCular PRIOR TO DISCHARGE    0.9% sodium chloride infusion 250 mL  250 mL IntraVENous PRN    diphenhydrAMINE (BENADRYL) capsule 50 mg  50 mg Oral BID PRN    phosphorus (K PHOS NEUTRAL) 250 mg tablet 2 Tab  2 Tab Oral BID    ezetimibe (ZETIA) tablet 10 mg  10 mg Oral DAILY    levoFLOXacin (LEVAQUIN) tablet 500 mg  500 mg Oral Q24H    predniSONE (DELTASONE) tablet 20 mg  20 mg Oral DAILY WITH BREAKFAST    oxyCODONE IR (ROXICODONE) tablet 15 mg  15 mg Oral Q4H PRN    dextrose 5 % - 0.45% NaCl infusion  75 mL/hr IntraVENous CONTINUOUS    lisinopriL (PRINIVIL, ZESTRIL) tablet 20 mg  20 mg Oral DAILY    metoprolol tartrate (LOPRESSOR) tablet 50 mg  50 mg Oral BID    enoxaparin (LOVENOX) injection 40 mg  40 mg SubCUTAneous Q24H    aspirin delayed-release tablet 81 mg  81 mg Oral DAILY    acetaminophen (TYLENOL) tablet 650 mg  650 mg Oral Q6H PRN    ondansetron (ZOFRAN) injection 4 mg  4 mg IntraVENous Q8H PRN    guaiFENesin (ROBITUSSIN) 20 mg/mL oral liquid 400 mg  400 mg Oral Q6H PRN       Review of Systems:   Review of systems obtained and negative    Objective:   Physical Exam:     Visit Vitals  BP (!) 141/86 (BP 1 Location: Left arm, BP Patient Position: At rest)   Pulse 71   Temp 97.6 °F (36.4 °C)   Resp 18   Ht 6' (1.829 m)   Wt 52.2 kg (115 lb)   SpO2 97%   BMI 15.60 kg/m²    O2 Flow Rate (L/min): 35 l/min O2 Device: Room air    Temp (24hrs), Av.7 °F (36.5 °C), Min:97.4 °F (36.3 °C), Max:98.2 °F (36.8 °C)    10/06 0701 - 10/06 1900  In: 404.2   Out: 675 [Urine:675]   10/04 1901 - 10/06 0700  In: 5389 [P.O.:200; I.V.:877]  Out: 2000 [Urine:2000]    General:  Alert, cooperative, no distress, appears older than stated age. Lungs:   Clear to auscultation bilaterally. Chest wall:  No tenderness or deformity. Heart:  Regular rate and rhythm, S1, S2 normal, no murmur, click, rub or gallop. Abdomen:   Soft, non-tender. Bowel sounds normal. No masses,  No organomegaly. Extremities: Extremities normal, atraumatic, no cyanosis or edema. Pulses: 2+ and symmetric all extremities. Skin: Skin color, texture, turgor normal. No rashes or lesions   Neurologic: CNII-XII intact. No gross sensory or motor deficits     Data Review:       Recent Days:  Recent Labs     10/06/20  0815 10/04/20  1130   WBC 5.0 4.2   HGB 12.7 8.8*   HCT 37.7 26.5*    255     Recent Labs     10/06/20  0815 10/04/20  1130    140   K 3.3* 3.5    106   CO2 30 27   GLU 71 95   BUN 4* 2*   CREA 0.46* 0.45*   CA 8.1* 7.7*   ALB 1.9* 1.5*   TBILI 1.0 0.4   ALT 60 58     No results for input(s): PH, PCO2, PO2, HCO3, FIO2 in the last 72 hours.     24 Hour Results:  Recent Results (from the past 24 hour(s))   TYPE & SCREEN    Collection Time: 10/05/20 11:15 PM   Result Value Ref Range    Crossmatch Expiration 10/08/2020,2359     ABO/Rh(D) Rk Enrique Positive     Antibody screen Negative     Unit number [id number]     Blood component type [facility]     Unit division 00     Status of unit [address] to transfuse     Crossmatch result Compatible     Unit number [id number]     Blood component type [facility]     Unit division 00     Status of unit [address] to transfuse     Crossmatch result Compatible    CBC WITH AUTOMATED DIFF    Collection Time: 10/06/20  8:15 AM   Result Value Ref Range    WBC 5.0 4.1 - 11.1 K/uL    RBC 4.06 (L) 4.10 - 5.70 M/uL    HGB 12.7 12.1 - 17.0 g/dL    HCT 37.7 36.6 - 50.3 %    MCV 92.9 80.0 - 99.0 FL    MCH 31.3 26.0 - 34.0 PG    MCHC 33.7 30.0 - 36.5 g/dL    RDW 16.6 (H) 11.5 - 14.5 %    PLATELET 505 544 - 722 K/uL    MPV 9.4 8.9 - 12.9 FL    NEUTROPHILS 53 32 - 75 %    LYMPHOCYTES 27 12 - 49 %    MONOCYTES 18 (H) 5 - 13 %    EOSINOPHILS 1 0 - 7 %    BASOPHILS 0 0 - 1 %    IMMATURE GRANULOCYTES 1 (H) 0.0 - 0.5 %    ABS. NEUTROPHILS 2.7 1.8 - 8.0 K/UL    ABS. LYMPHOCYTES 1.4 0.8 - 3.5 K/UL    ABS. MONOCYTES 0.9 0.0 - 1.0 K/UL    ABS. EOSINOPHILS 0.0 0.0 - 0.4 K/UL    ABS. BASOPHILS 0.0 0.0 - 0.1 K/UL    ABS. IMM. GRANS. 0.1 (H) 0.00 - 0.04 K/UL    DF AUTOMATED     METABOLIC PANEL, COMPREHENSIVE    Collection Time: 10/06/20  8:15 AM   Result Value Ref Range    Sodium 141 136 - 145 mmol/L    Potassium 3.3 (L) 3.5 - 5.1 mmol/L    Chloride 104 97 - 108 mmol/L    CO2 30 21 - 32 mmol/L    Anion gap 7 5 - 15 mmol/L    Glucose 71 65 - 100 mg/dL    BUN 4 (L) 6 - 20 mg/dL    Creatinine 0.46 (L) 0.70 - 1.30 mg/dL    BUN/Creatinine ratio 9 (L) 12 - 20      GFR est AA >60 >60 ml/min/1.73m2    GFR est non-AA >60 >60 ml/min/1.73m2    Calcium 8.1 (L) 8.5 - 10.1 mg/dL    Bilirubin, total 1.0 0.2 - 1.0 mg/dL    AST (SGOT) 61 (H) 15 - 37 U/L    ALT (SGPT) 60 12 - 78 U/L    Alk.  phosphatase 156 (H) 45 - 117 U/L    Protein, total 4.6 (L) 6.4 - 8.2 g/dL    Albumin 1.9 (L) 3.5 - 5.0 g/dL    Globulin 2.7 2.0 - 4.0 g/dL    A-G Ratio 0.7 (L) 1.1 - 2.2             Assessment/     Acute hypoxic respiratory failure likely secondary to aspiration. Improved    Right lower lobe aspiration pneumonia improved    Urinary tract infection due to E. Coli    Abnormal Cardiolite stress test showing inferior ischemia. Catheterization done yesterday. 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 Repleted    Hypothermia, probably largely environmental.  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 Plan:  Continue supportive care  Vascular surgery planning aortobifemoral bypass later today  We will reassess patient post procedure  Anticipate discharge to inpatient rehab if family agrees        Care Plan discussed with: Patient/Family    Total time spent with patient: 30 minutes.     George Jc MD

## 2020-10-06 NOTE — ANESTHESIA PREPROCEDURE EVALUATION
Relevant Problems   No relevant active problems       Anesthetic History   No history of anesthetic complications            Review of Systems / Medical History  Patient summary reviewed, nursing notes reviewed and pertinent labs reviewed    Pulmonary          Smoker      Comments: ASPIRATION PNEUMONITIS. Neuro/Psych             Comments: Numbness in feet. Cardiovascular    Hypertension                Comments: Echo:  · Image quality for this study was technically difficult. · Contrast used: DEFINITY. · Saline contrast was given to evaluate for intracardiac shunt. · LV: Estimated LVEF is 50 - 55%. Visually measured ejection fraction. Normal cavity size, wall thickness and diastolic function. Low normal systolic function. Wall motion: normal.  · LA: No atrial septal defect present. · MV: Moderate mitral valve regurgitation is present. · TV: Mild tricuspid valve regurgitation is present. GI/Hepatic/Renal         Renal disease: CRI       Endo/Other        Anemia (Hb/Hct 8.8/26. 5)     Other Findings   Comments: Myositis. MULTIPLE BRUISES.          Physical Exam    Airway  Mallampati: I  TM Distance: > 6 cm  Neck ROM: normal range of motion        Cardiovascular    Rhythm: regular  Rate: normal         Dental      Comments: JAIN   Pulmonary      Decreased breath sounds           Abdominal         Other Findings            Anesthetic Plan    ASA: 3, emergent  Anesthesia type: general          Induction: Intravenous  Anesthetic plan and risks discussed with: Patient

## 2020-10-06 NOTE — ROUTINE PROCESS
Bedside and Verbal shift change report given to Kathie Leigh RN (oncoming nurse) by Rossy Nieves RN (offgoing nurse). Report included the following information SBAR, Intake/Output, MAR, Recent Results, Med Rec Status, Cardiac Rhythm (NS), Alarm Parameters  and Quality Measures.

## 2020-10-06 NOTE — PROGRESS NOTES
CARDIOLOGY PROGRESS NOTE    Patient seen and examined. This is a patient who is followed for Cardiac Clearance . Seen resting in bed, wife at bedside. S/p cardiac catheterization yesterday. Plans for vascular surgery today. Feels the same. No shortness of breath. No chest pain, no dizziness. No other complaints reported. Telemetry reviewed, there were no events noted in the past 24 hours. NSR 70-80s    Pertinent review of systems items noted above, all other systems are negative. Current medications reviewed. Physical Examination  Vital signs are stable. Blood pressure 150/89, Pulse 80  No apparent distress. Heart is regular, rate and rhythm. Normal S1, S2, no murmurs are appreciated. Lungs are clear bilaterally. Abdomen is soft, nontender, normal bowel sounds. Extremities have no edema. Labs reviewed:     Case discussed with Dr. Anna Patel and our impression and recommendations are as follows:  1. Pre-op risk stratification: Echo with normal EF. NST abnormal with a moderate area of ischemia. Cardiac cath from yesterday shows nonobstructive CAD. Continue asa. Moderate risk for inpatient vascular surgery. 2. PAD: Continue ASA, will add zetia. Unclear if statins are contraindicated with [employer]. Will check with primary team or neurology. 3. Hypertension: Blood pressure acceptable. Continue lisinopril and metoprolol. 4. Nonobstructive CAD: 50% disease to the left cx. He is asymptomatic currently but unable to walk due to his PAD. Will monitor symptoms after surgery. Continue DAPT and zetia.        Please do not hesitate to call me or Dr. Santos Ash if additional questions arise.

## 2020-10-06 NOTE — PROGRESS NOTES
Mr. Natalie Espinosa examined this morning. He looks comfortable without obvious distress. Vital signs stable. Family have decided proceed with procedure we discussed yesterday, including aortobifemoral bypass graft. We have talked about clearly regarding the rationale for the procedure, risks benefits and complication. Patient is tentatively scheduled for the procedure today. Patient has been transfused 2 units of packed red blood cells hematocrit is 37%. Patient also had a coronary angiogram previously. Has been cleared for surgery per cardiology as well.

## 2020-10-06 NOTE — PROGRESS NOTES
CM met with patient and his wife in patient room to discuss DCP. Patient is recc for SNF and patient and wife had previously declined SNF, agreeable to [facility]. Patient and wife continue to want to proceed with DC to home with [facility], patient has been accepted with [facility]. Wife is primary caregiver, have other family members that assist when needed. CM continues to follow.

## 2020-10-06 NOTE — ANESTHESIA PROCEDURE NOTES
Arterial Line Placement    Start time: 10/6/2020 5:30 PM  End time: 10/6/2020 5:35 PM  Performed by: Matthias Sanon CRNA  Authorized by: Matthias Sanon CRNA     Pre-Procedure  Indications:  Arterial pressure monitoring and blood sampling  Preanesthetic Checklist: patient identified, risks and benefits discussed, anesthesia consent, site marked, patient being monitored, timeout performed and patient being monitored    Timeout Time: 18:30        Procedure:   Prep:  Chlorhexidine  Seldinger Technique?: Yes    Orientation:  Left  Location:  Radial artery  Catheter size:  20 G  Number of attempts:  1  Cont Cardiac Output Sensor: Yes      Assessment:   Post-procedure:  Line secured and sterile dressing applied  Patient Tolerance:  Patient tolerated the procedure well with no immediate complications

## 2020-10-07 ENCOUNTER — APPOINTMENT (OUTPATIENT)
Dept: NON INVASIVE DIAGNOSTICS | Age: 55
DRG: 981 | End: 2020-10-07
Attending: INTERNAL MEDICINE
Payer: COMMERCIAL

## 2020-10-07 ENCOUNTER — APPOINTMENT (OUTPATIENT)
Dept: GENERAL RADIOLOGY | Age: 55
DRG: 981 | End: 2020-10-07
Attending: SURGERY
Payer: COMMERCIAL

## 2020-10-07 ENCOUNTER — APPOINTMENT (OUTPATIENT)
Dept: ULTRASOUND IMAGING | Age: 55
DRG: 981 | End: 2020-10-07
Attending: INTERNAL MEDICINE
Payer: COMMERCIAL

## 2020-10-07 ENCOUNTER — APPOINTMENT (OUTPATIENT)
Dept: GENERAL RADIOLOGY | Age: 55
DRG: 981 | End: 2020-10-07
Attending: INTERNAL MEDICINE
Payer: COMMERCIAL

## 2020-10-07 LAB
ALBUMIN SERPL-MCNC: 1.8 G/DL (ref 3.5–5)
ALBUMIN/GLOB SERPL: 1.2 {RATIO} (ref 1.1–2.2)
ALP SERPL-CCNC: 58 U/L (ref 45–117)
ALT SERPL-CCNC: 25 U/L (ref 12–78)
ANION GAP SERPL CALC-SCNC: 17 MMOL/L (ref 5–15)
ANION GAP SERPL CALC-SCNC: 9 MMOL/L (ref 5–15)
ARTERIAL PATENCY WRIST A: ABNORMAL
ARTERIAL PATENCY WRIST A: ABNORMAL
AST SERPL W P-5'-P-CCNC: 45 U/L (ref 15–37)
ATRIAL RATE: 89 BPM
BASE DEFICIT BLDA-SCNC: 1.6 MMOL/L (ref 0–2)
BASE DEFICIT BLDA-SCNC: 10 MMOL/L (ref 0–2)
BASE DEFICIT BLDA-SCNC: 12.7 MMOL/L (ref 0–2)
BASOPHILS # BLD: 0 K/UL (ref 0–0.1)
BASOPHILS NFR BLD: 0 % (ref 0–1)
BDY SITE: ABNORMAL
BILIRUB SERPL-MCNC: 2 MG/DL (ref 0.2–1)
BUN SERPL-MCNC: 5 MG/DL (ref 6–20)
BUN SERPL-MCNC: 7 MG/DL (ref 6–20)
BUN/CREAT SERPL: 10 (ref 12–20)
BUN/CREAT SERPL: 9 (ref 12–20)
CA-I BLD-MCNC: 7.2 MG/DL (ref 8.5–10.1)
CA-I BLD-MCNC: 7.2 MG/DL (ref 8.5–10.1)
CALCULATED R AXIS, ECG10: 71 DEGREES
CALCULATED T AXIS, ECG11: 79 DEGREES
CHLORIDE SERPL-SCNC: 107 MMOL/L (ref 97–108)
CHLORIDE SERPL-SCNC: 107 MMOL/L (ref 97–108)
CO2 SERPL-SCNC: 18 MMOL/L (ref 21–32)
CO2 SERPL-SCNC: 25 MMOL/L (ref 21–32)
CREAT SERPL-MCNC: 0.58 MG/DL (ref 0.7–1.3)
CREAT SERPL-MCNC: 0.72 MG/DL (ref 0.7–1.3)
DIAGNOSIS, 93000: NORMAL
DIFFERENTIAL METHOD BLD: ABNORMAL
EOSINOPHIL # BLD: 0 K/UL (ref 0–0.4)
EOSINOPHIL NFR BLD: 0 % (ref 0–7)
EPAP/CPAP/PEEP, PAPEEP: 5
ERYTHROCYTE [DISTWIDTH] IN BLOOD BY AUTOMATED COUNT: 15.7 % (ref 11.5–14.5)
FIO2 ON VENT: 35 %
FIO2 ON VENT: 50 %
FIO2 ON VENT: 60 %
GAS FLOW.O2 SETTING OXYMISER: 12 L/MIN
GAS FLOW.O2 SETTING OXYMISER: 12 L/MIN
GLOBULIN SER CALC-MCNC: 1.5 G/DL (ref 2–4)
GLUCOSE SERPL-MCNC: 122 MG/DL (ref 65–100)
GLUCOSE SERPL-MCNC: 142 MG/DL (ref 65–100)
HCO3 BLDA-SCNC: 15 MMOL/L (ref 22–26)
HCO3 BLDA-SCNC: 16 MMOL/L (ref 22–26)
HCO3 BLDA-SCNC: 23 MMOL/L (ref 22–26)
HCT VFR BLD AUTO: 33.5 % (ref 36.6–50.3)
HGB BLD-MCNC: 11.1 G/DL (ref 12.1–17)
IMM GRANULOCYTES # BLD AUTO: 0 K/UL
IMM GRANULOCYTES NFR BLD AUTO: 0 %
INR PPP: 1.4 (ref 0.9–1.1)
LYMPHOCYTES # BLD: 1.1 K/UL (ref 0.8–3.5)
LYMPHOCYTES NFR BLD: 7 % (ref 12–49)
MCH RBC QN AUTO: 30.2 PG (ref 26–34)
MCHC RBC AUTO-ENTMCNC: 33.1 G/DL (ref 30–36.5)
MCV RBC AUTO: 91 FL (ref 80–99)
MONOCYTES # BLD: 1.8 K/UL (ref 0–1)
MONOCYTES NFR BLD: 11 % (ref 5–13)
NEUTS BAND NFR BLD MANUAL: 3 % (ref 0–6)
NEUTS SEG # BLD: 13.5 K/UL (ref 1.8–8)
NEUTS SEG NFR BLD: 79 % (ref 32–75)
PCO2 BLDA: 20 MMHG (ref 35–45)
PCO2 BLDA: 26 MMHG (ref 35–45)
PCO2 BLDA: 28 MMHG (ref 35–45)
PH BLDA: 7.35 [PH] (ref 7.35–7.45)
PH BLDA: 7.36 [PH] (ref 7.35–7.45)
PH BLDA: 7.5 [PH] (ref 7.35–7.45)
PLATELET # BLD AUTO: 46 K/UL (ref 150–400)
PMV BLD AUTO: 10.3 FL (ref 8.9–12.9)
PO2 BLDA: 138 MMHG (ref 75–100)
PO2 BLDA: 194 MMHG (ref 75–100)
PO2 BLDA: 208 MMHG (ref 75–100)
POTASSIUM SERPL-SCNC: 2.9 MMOL/L (ref 3.5–5.1)
POTASSIUM SERPL-SCNC: 3.9 MMOL/L (ref 3.5–5.1)
PRESSURE SUPPORT SETTING VENT: 12 CM[H2O]
PROT SERPL-MCNC: 3.3 G/DL (ref 6.4–8.2)
PROTHROMBIN TIME: 16.8 SEC (ref 11.9–14.7)
Q-T INTERVAL, ECG07: 326 MS
QRS DURATION, ECG06: 76 MS
QTC CALCULATION (BEZET), ECG08: 398 MS
RBC # BLD AUTO: 3.68 M/UL (ref 4.1–5.7)
RBC MORPH BLD: ABNORMAL
RBC MORPH BLD: ABNORMAL
SAO2 % BLD: 100 %
SAO2% DEVICE SAO2% SENSOR NAME: ABNORMAL
SODIUM SERPL-SCNC: 141 MMOL/L (ref 136–145)
SODIUM SERPL-SCNC: 142 MMOL/L (ref 136–145)
SPECIMEN SITE: ABNORMAL
TROPONIN I SERPL-MCNC: 0.05 NG/ML
TROPONIN I SERPL-MCNC: 0.06 NG/ML
VENTILATION MODE VENT: ABNORMAL
VENTRICULAR RATE, ECG03: 90 BPM
VT SETTING VENT: 500 ML
WBC # BLD AUTO: 16.4 K/UL (ref 4.1–11.1)

## 2020-10-07 PROCEDURE — 74011250636 HC RX REV CODE- 250/636: Performed by: INTERNAL MEDICINE

## 2020-10-07 PROCEDURE — 74011000258 HC RX REV CODE- 258: Performed by: SURGERY

## 2020-10-07 PROCEDURE — 74011250636 HC RX REV CODE- 250/636: Performed by: NURSE ANESTHETIST, CERTIFIED REGISTERED

## 2020-10-07 PROCEDURE — 80048 BASIC METABOLIC PNL TOTAL CA: CPT

## 2020-10-07 PROCEDURE — 74011000250 HC RX REV CODE- 250

## 2020-10-07 PROCEDURE — 74018 RADEX ABDOMEN 1 VIEW: CPT

## 2020-10-07 PROCEDURE — 94003 VENT MGMT INPAT SUBQ DAY: CPT

## 2020-10-07 PROCEDURE — 65610000006 HC RM INTENSIVE CARE

## 2020-10-07 PROCEDURE — C8923 2D TTE W OR W/O FOL W/CON,CO: HCPCS

## 2020-10-07 PROCEDURE — 85610 PROTHROMBIN TIME: CPT

## 2020-10-07 PROCEDURE — 77010033678 HC OXYGEN DAILY

## 2020-10-07 PROCEDURE — 80053 COMPREHEN METABOLIC PANEL: CPT

## 2020-10-07 PROCEDURE — 76770 US EXAM ABDO BACK WALL COMP: CPT

## 2020-10-07 PROCEDURE — P9059 PLASMA, FRZ BETWEEN 8-24HOUR: HCPCS

## 2020-10-07 PROCEDURE — 74011000258 HC RX REV CODE- 258: Performed by: INTERNAL MEDICINE

## 2020-10-07 PROCEDURE — C9113 INJ PANTOPRAZOLE SODIUM, VIA: HCPCS | Performed by: INTERNAL MEDICINE

## 2020-10-07 PROCEDURE — 94400 HC END TIDAL CO2 RESPONSE CURVE: CPT

## 2020-10-07 PROCEDURE — P9016 RBC LEUKOCYTES REDUCED: HCPCS

## 2020-10-07 PROCEDURE — P9045 ALBUMIN (HUMAN), 5%, 250 ML: HCPCS

## 2020-10-07 PROCEDURE — 36415 COLL VENOUS BLD VENIPUNCTURE: CPT

## 2020-10-07 PROCEDURE — 82803 BLOOD GASES ANY COMBINATION: CPT

## 2020-10-07 PROCEDURE — 85025 COMPLETE CBC W/AUTO DIFF WBC: CPT

## 2020-10-07 PROCEDURE — 71045 X-RAY EXAM CHEST 1 VIEW: CPT

## 2020-10-07 PROCEDURE — 93005 ELECTROCARDIOGRAM TRACING: CPT

## 2020-10-07 PROCEDURE — 74011250637 HC RX REV CODE- 250/637: Performed by: INTERNAL MEDICINE

## 2020-10-07 PROCEDURE — 94002 VENT MGMT INPAT INIT DAY: CPT

## 2020-10-07 PROCEDURE — 74011250636 HC RX REV CODE- 250/636

## 2020-10-07 PROCEDURE — P9047 ALBUMIN (HUMAN), 25%, 50ML: HCPCS | Performed by: SURGERY

## 2020-10-07 PROCEDURE — 84484 ASSAY OF TROPONIN QUANT: CPT

## 2020-10-07 PROCEDURE — 65270000029 HC RM PRIVATE

## 2020-10-07 PROCEDURE — 74011250636 HC RX REV CODE- 250/636: Performed by: SURGERY

## 2020-10-07 PROCEDURE — P9035 PLATELET PHERES LEUKOREDUCED: HCPCS

## 2020-10-07 RX ORDER — DOPAMINE HYDROCHLORIDE 160 MG/100ML
2.5 INJECTION, SOLUTION INTRAVENOUS
Status: DISCONTINUED | OUTPATIENT
Start: 2020-10-07 | End: 2020-10-11

## 2020-10-07 RX ORDER — IPRATROPIUM BROMIDE AND ALBUTEROL SULFATE 2.5; .5 MG/3ML; MG/3ML
3 SOLUTION RESPIRATORY (INHALATION)
Status: DISCONTINUED | OUTPATIENT
Start: 2020-10-07 | End: 2020-10-09

## 2020-10-07 RX ORDER — SODIUM CHLORIDE 9 MG/ML
250 INJECTION, SOLUTION INTRAVENOUS AS NEEDED
Status: DISCONTINUED | OUTPATIENT
Start: 2020-10-07 | End: 2020-10-10

## 2020-10-07 RX ORDER — ALBUMIN HUMAN 250 G/1000ML
25 SOLUTION INTRAVENOUS ONCE
Status: COMPLETED | OUTPATIENT
Start: 2020-10-07 | End: 2020-10-07

## 2020-10-07 RX ORDER — HYDROCORTISONE SODIUM SUCCINATE 100 MG/2ML
250 INJECTION, POWDER, FOR SOLUTION INTRAMUSCULAR; INTRAVENOUS ONCE
Status: COMPLETED | OUTPATIENT
Start: 2020-10-07 | End: 2020-10-07

## 2020-10-07 RX ORDER — NOREPINEPHRINE BITARTRATE/D5W 8 MG/250ML
PLASTIC BAG, INJECTION (ML) INTRAVENOUS
Status: COMPLETED
Start: 2020-10-07 | End: 2020-10-07

## 2020-10-07 RX ORDER — ALBUMIN HUMAN 50 G/1000ML
SOLUTION INTRAVENOUS
Status: COMPLETED
Start: 2020-10-07 | End: 2020-10-07

## 2020-10-07 RX ORDER — FUROSEMIDE 10 MG/ML
20 INJECTION INTRAMUSCULAR; INTRAVENOUS ONCE
Status: COMPLETED | OUTPATIENT
Start: 2020-10-07 | End: 2020-10-07

## 2020-10-07 RX ORDER — PROPOFOL 10 MG/ML
5 VIAL (ML) INTRAVENOUS
Status: DISCONTINUED | OUTPATIENT
Start: 2020-10-07 | End: 2020-10-11

## 2020-10-07 RX ADMIN — FUROSEMIDE 20 MG: 10 INJECTION, SOLUTION INTRAMUSCULAR; INTRAVENOUS at 07:42

## 2020-10-07 RX ADMIN — FENTANYL CITRATE 125 MCG/HR: 50 INJECTION, SOLUTION INTRAMUSCULAR; INTRAVENOUS at 21:33

## 2020-10-07 RX ADMIN — SODIUM CHLORIDE 500 ML: 9 INJECTION, SOLUTION INTRAVENOUS at 11:13

## 2020-10-07 RX ADMIN — PIPERACILLIN AND TAZOBACTAM 3.38 G: 3; .375 INJECTION, POWDER, LYOPHILIZED, FOR SOLUTION INTRAVENOUS at 03:11

## 2020-10-07 RX ADMIN — PIPERACILLIN AND TAZOBACTAM 3.38 G: 3; .375 INJECTION, POWDER, LYOPHILIZED, FOR SOLUTION INTRAVENOUS at 21:40

## 2020-10-07 RX ADMIN — PROPOFOL 20 MCG/KG/MIN: 10 INJECTION, EMULSION INTRAVENOUS at 16:33

## 2020-10-07 RX ADMIN — ALBUMIN (HUMAN) 25 G: 0.25 INJECTION, SOLUTION INTRAVENOUS at 09:39

## 2020-10-07 RX ADMIN — SODIUM CHLORIDE 40 MG: 9 INJECTION, SOLUTION INTRAMUSCULAR; INTRAVENOUS; SUBCUTANEOUS at 21:40

## 2020-10-07 RX ADMIN — ALBUMIN (HUMAN) 25 G: 12.5 SOLUTION INTRAVENOUS at 05:00

## 2020-10-07 RX ADMIN — Medication 15 MCG: at 02:55

## 2020-10-07 RX ADMIN — PERFLUTREN 2 ML: 6.52 INJECTION, SUSPENSION INTRAVENOUS at 16:02

## 2020-10-07 RX ADMIN — VASOPRESSIN 0.03 UNITS/MIN: 20 INJECTION INTRAVENOUS at 06:15

## 2020-10-07 RX ADMIN — SODIUM CHLORIDE 1000 ML: 9 INJECTION, SOLUTION INTRAVENOUS at 20:32

## 2020-10-07 RX ADMIN — SODIUM CHLORIDE 1000 ML: 9 INJECTION, SOLUTION INTRAVENOUS at 15:11

## 2020-10-07 RX ADMIN — DOPAMINE HYDROCHLORIDE IN DEXTROSE 5 MCG/KG/MIN: 1.6 INJECTION, SOLUTION INTRAVENOUS at 06:00

## 2020-10-07 RX ADMIN — SODIUM CHLORIDE 40 MG: 9 INJECTION, SOLUTION INTRAMUSCULAR; INTRAVENOUS; SUBCUTANEOUS at 11:03

## 2020-10-07 RX ADMIN — PHENYLEPHRINE HYDROCHLORIDE 100 MCG/MIN: 10 INJECTION INTRAVENOUS at 00:50

## 2020-10-07 RX ADMIN — PIPERACILLIN AND TAZOBACTAM 3.38 G: 3; .375 INJECTION, POWDER, LYOPHILIZED, FOR SOLUTION INTRAVENOUS at 13:19

## 2020-10-07 RX ADMIN — ALBUMIN (HUMAN) 12.5 G: 12.5 INJECTION, SOLUTION INTRAVENOUS at 05:22

## 2020-10-07 RX ADMIN — FENTANYL CITRATE 125 MCG/HR: 50 INJECTION, SOLUTION INTRAMUSCULAR; INTRAVENOUS at 15:55

## 2020-10-07 RX ADMIN — PROPOFOL 10 MCG/KG/MIN: 10 INJECTION, EMULSION INTRAVENOUS at 12:17

## 2020-10-07 RX ADMIN — ALBUMIN (HUMAN) 25 G: 0.25 INJECTION, SOLUTION INTRAVENOUS at 20:25

## 2020-10-07 RX ADMIN — SODIUM CHLORIDE, POTASSIUM CHLORIDE, SODIUM LACTATE AND CALCIUM CHLORIDE: 600; 310; 30; 20 INJECTION, SOLUTION INTRAVENOUS at 00:07

## 2020-10-07 RX ADMIN — PROPOFOL 5 MCG/KG/MIN: 10 INJECTION, EMULSION INTRAVENOUS at 01:45

## 2020-10-07 RX ADMIN — SODIUM CHLORIDE 1000 ML: 9 INJECTION, SOLUTION INTRAVENOUS at 08:39

## 2020-10-07 RX ADMIN — DEXTROSE AND SODIUM CHLORIDE 75 ML/HR: 5; 450 INJECTION, SOLUTION INTRAVENOUS at 03:11

## 2020-10-07 RX ADMIN — HYDROCORTISONE SODIUM SUCCINATE 250 MG: 100 INJECTION, POWDER, FOR SOLUTION INTRAMUSCULAR; INTRAVENOUS at 04:43

## 2020-10-07 RX ADMIN — FENTANYL CITRATE 75 MCG/HR: 50 INJECTION, SOLUTION INTRAMUSCULAR; INTRAVENOUS at 10:56

## 2020-10-07 RX ADMIN — DIBASIC SODIUM PHOSPHATE, MONOBASIC POTASSIUM PHOSPHATE AND MONOBASIC SODIUM PHOSPHATE 2 TABLET: 852; 155; 130 TABLET ORAL at 21:32

## 2020-10-07 RX ADMIN — FENTANYL CITRATE 125 MCG/HR: 50 INJECTION, SOLUTION INTRAMUSCULAR; INTRAVENOUS at 11:59

## 2020-10-07 NOTE — PROGRESS NOTES
Hospitalist Progress Note           Daily Progress Note: 10/7/2020    Chief complaint: Shortness of breath and weakness  Subjective: The patient is seen for follow  up. Postoperative day #1 status post infrarenal aorta endarterectomy. Reportedly hypotensive postprocedure requiring 3 vasopressors. Now on dopamine and vasopressin with improved blood pressure.  Poor Urine output overnight    Problem List:  Problem List as of 10/7/2020 Date Reviewed: 9/17/2020          Codes Class Noted - Resolved    Metabolic encephalopathy [facility]-12-WW: G93.41  ICD-9-CM: 348.31  9/26/2020 - Present        UTI (urinary tract infection) ICD-10-CM: N39.0  ICD-9-CM: 599.0  9/26/2020 - Present        Aspiration pneumonitis ([address] ICD-10-CM: J69.0  ICD-9-CM: 507.0  9/26/2020 - Present        Essential hypertension ICD-10-CM: I10  ICD-9-CM: 401.9  9/26/2020 - Present        Acute dehydration ICD-10-CM: E86.0  ICD-9-CM: 276.51  9/26/2020 - Present              Medications reviewed  Current Facility-Administered Medications   Medication Dose Route Frequency    0.9% sodium chloride infusion 250 mL  250 mL IntraVENous PRN    piperacillin-tazobactam (ZOSYN) 3.375 g in 0.9% sodium chloride (MBP/ADV) 100 mL MBP  3.375 g IntraVENous Q8H    propofol (DIPRIVAN) 10 mg/mL infusion  5 mcg/kg/min IntraVENous TITRATE    fentaNYL (PF) 1,500 mcg/30 mL (50 mcg/mL) infusion  75 mcg/hr IntraVENous TITRATE    0.9% sodium chloride infusion 250 mL  250 mL IntraVENous PRN    PHENYLephrine (ALISTAIR-SYNEPHRINE) 30 mg in 0.9% sodium chloride 250 mL infusion   mcg/min IntraVENous CONTINUOUS    0.9% sodium chloride infusion 250 mL  250 mL IntraVENous PRN    DOPamine (INTROPIN) 400 mg in dextrose 5% 250 mL infusion  5 mcg/kg/min IntraVENous TITRATE    0.9% sodium chloride infusion 250 mL  250 mL IntraVENous PRN    vasopressin (VASOSTRICT) 20 Units in 0.9% sodium chloride 100 mL infusion  0.01-0.1 Units/min IntraVENous TITRATE    influenza vaccine 2020-21 (4 yrs+)(PF) (FLUCELVAX QUAD) injection 0.5 mL  0.5 mL IntraMUSCular PRIOR TO DISCHARGE    0.9% sodium chloride infusion 250 mL  250 mL IntraVENous PRN    diphenhydrAMINE (BENADRYL) capsule 50 mg  50 mg Oral BID PRN    phosphorus (K PHOS NEUTRAL) 250 mg tablet 2 Tab  2 Tab Oral BID    levoFLOXacin (LEVAQUIN) tablet 500 mg  500 mg Oral Q24H    predniSONE (DELTASONE) tablet 20 mg  20 mg Oral DAILY WITH BREAKFAST    oxyCODONE IR (ROXICODONE) tablet 15 mg  15 mg Oral Q4H PRN    dextrose 5 % - 0.45% NaCl infusion  75 mL/hr IntraVENous CONTINUOUS    lisinopriL (PRINIVIL, ZESTRIL) tablet 20 mg  20 mg Oral DAILY    metoprolol tartrate (LOPRESSOR) tablet 50 mg  50 mg Oral BID    enoxaparin (LOVENOX) injection 40 mg  40 mg SubCUTAneous Q24H    aspirin delayed-release tablet 81 mg  81 mg Oral DAILY    acetaminophen (TYLENOL) tablet 650 mg  650 mg Oral Q6H PRN    ondansetron (ZOFRAN) injection 4 mg  4 mg IntraVENous Q8H PRN    guaiFENesin (ROBITUSSIN) 20 mg/mL oral liquid 400 mg  400 mg Oral Q6H PRN       Review of Systems:   Review of systems unable to be obtained because he is intubated    Objective:   Physical Exam:     Visit Vitals  /77 (BP Patient Position: At rest)   Pulse 94   Temp 98.1 °F (36.7 °C)   Resp 12   Ht 6' (1.829 m)   Wt 52.2 kg (115 lb)   SpO2 100%   BMI 15.60 kg/m²    O2 Flow Rate (L/min): 35 l/min O2 Device: Ventilator    Temp (24hrs), Av.4 °F (36.3 °C), Min:95.9 °F (35.5 °C), Max:98.1 °F (36.7 °C)    No intake/output data recorded. 10/05 1901 - 10/07 0700  In: 49742.2 [I.V.:9000]  Out: 5 [Urine:700; Drains:480]    General:  Alert, cooperative, no distress, appears older than stated age. Lungs:   Clear to auscultation bilaterally. Chest wall:  No tenderness or deformity. Heart:  Regular rate and rhythm, S1, S2 normal, no murmur, click, rub or gallop. Abdomen:   Soft, non-tender. Diminished Bowel sounds. Dressings in place.    Extremities: Extremities normal, atraumatic, positive cyanosis. Pulses: 2+ and symmetric all extremities. Skin: Skin color, texture, turgor normal. No rashes or lesions   Neurologic: CNII-XII intact.      Data Review:       Recent Days:  Recent Labs     10/07/20  0241 10/06/20  0815 10/04/20  1130   WBC 16.4* 5.0 4.2   HGB 11.1* 12.7 8.8*   HCT 33.5* 37.7 26.5*   PLT 46* 207 255     Recent Labs     10/07/20  0241 10/06/20  0815 10/04/20  1130    141 140   K 3.9 3.3* 3.5    104 106   CO2 18* 30 27   * 71 95   BUN 5* 4* 2*   CREA 0.58* 0.46* 0.45*   CA 7.2* 8.1* 7.7*   ALB 1.8* 1.9* 1.5*   TBILI 2.0* 1.0 0.4   ALT 25 60 58   INR 1.4* 0.9  --      Recent Labs     10/07/20  0400 10/07/20  0100 10/06/20  2300   PH 7.349* 7.359 7.30*   PCO2 20* 26* 38   PO2 194* 208* 351*   HCO3 15* 16* 19*   FIO2 50.0 60.0 100       24 Hour Results:  Recent Results (from the past 24 hour(s))   BLOOD GAS, ARTERIAL    Collection Time: 10/06/20 11:00 PM   Result Value Ref Range    pH 7.30 (L) 7.35 - 7.45      PCO2 38 35 - 45 mmHg    PO2 351 (H) 75 - 100 mmHg    O2  >95 %    BICARBONATE 19 (L) 22 - 26 mmol/L    BASE DEFICIT 6.9 (H) 0 - 2 mmol/L    O2 METHOD VENT      FIO2 100 %    SITE Right Radial      ENIO'S TEST Positive     BLOOD GAS, ARTERIAL    Collection Time: 10/07/20  1:00 AM   Result Value Ref Range    pH 7.359 7.35 - 7.45      PCO2 26 (L) 35 - 45 mmHg    PO2 208 (H) 75 - 100 mmHg    O2  >95 %    BICARBONATE 16 (L) 22 - 26 mmol/L    BASE DEFICIT 10.0 (H) 0 - 2 mmol/L    O2 METHOD VENT      FIO2 60.0 %    MODE Assist Control/Volume Control      Tidal volume 500      SET RATE 12      EPAP/CPAP/PEEP 5.0      SITE Arterial Line     CBC WITH AUTOMATED DIFF    Collection Time: 10/07/20  2:41 AM   Result Value Ref Range    WBC 16.4 (H) 4.1 - 11.1 K/uL    RBC 3.68 (L) 4.10 - 5.70 M/uL    HGB 11.1 (L) 12.1 - 17.0 g/dL    HCT 33.5 (L) 36.6 - 50.3 %    MCV 91.0 80.0 - 99.0 FL    MCH 30.2 26.0 - 34.0 PG    MCHC 33.1 30.0 - 36.5 g/dL    RDW 15.7 (H) 11.5 - 14.5 %    PLATELET 46 (LL) 370 - 400 K/uL    MPV 10.3 8.9 - 12.9 FL    DF AUTOMATED      NEUTROPHILS 79 (H) 32 - 75 %    BAND NEUTROPHILS 3 0 - 6 %    LYMPHOCYTES 7 (L) 12 - 49 %    MONOCYTES 11 5 - 13 %    EOSINOPHILS 0 0 - 7 %    BASOPHILS 0 0 - 1 %    IMMATURE GRANULOCYTES 0 %    ABS. NEUTROPHILS 13.5 (H) 1.8 - 8.0 K/UL    ABS. LYMPHOCYTES 1.1 0.8 - 3.5 K/UL    ABS. MONOCYTES 1.8 (H) 0.0 - 1.0 K/UL    ABS. EOSINOPHILS 0.0 0.0 - 0.4 K/UL    ABS. BASOPHILS 0.0 0.0 - 0.1 K/UL    ABS. IMM. GRANS. 0.0 K/UL    RBC COMMENTS [place] cells  1+        RBC COMMENTS Macrocytosis  1+       METABOLIC PANEL, COMPREHENSIVE    Collection Time: 10/07/20  2:41 AM   Result Value Ref Range    Sodium 142 136 - 145 mmol/L    Potassium 3.9 3.5 - 5.1 mmol/L    Chloride 107 97 - 108 mmol/L    CO2 18 (L) 21 - 32 mmol/L    Anion gap 17 (H) 5 - 15 mmol/L    Glucose 122 (H) 65 - 100 mg/dL    BUN 5 (L) 6 - 20 mg/dL    Creatinine 0.58 (L) 0.70 - 1.30 mg/dL    BUN/Creatinine ratio 9 (L) 12 - 20      GFR est AA >60 >60 ml/min/1.73m2    GFR est non-AA >60 >60 ml/min/1.73m2    Calcium 7.2 (L) 8.5 - 10.1 mg/dL    Bilirubin, total 2.0 (H) 0.2 - 1.0 mg/dL    AST (SGOT) 45 (H) 15 - 37 U/L    ALT (SGPT) 25 12 - 78 U/L    Alk.  phosphatase 58 45 - 117 U/L    Protein, total 3.3 (L) 6.4 - 8.2 g/dL    Albumin 1.8 (L) 3.5 - 5.0 g/dL    Globulin 1.5 (L) 2.0 - 4.0 g/dL    A-G Ratio 1.2 1.1 - 2.2     PROTHROMBIN TIME + INR    Collection Time: 10/07/20  2:41 AM   Result Value Ref Range    Prothrombin time 16.8 (H) 11.9 - 14.7 sec    INR 1.4 (H) 0.9 - 1.1     BLOOD GAS, ARTERIAL    Collection Time: 10/07/20  4:00 AM   Result Value Ref Range    pH 7.349 (L) 7.35 - 7.45      PCO2 20 (L) 35 - 45 mmHg    PO2 194 (H) 75 - 100 mmHg    O2  >95 %    BICARBONATE 15 (L) 22 - 26 mmol/L    BASE DEFICIT 12.7 (H) 0 - 2 mmol/L    O2 METHOD VENT      FIO2 50.0 %    MODE Assist Control/Volume Control      Tidal volume 500      SET RATE 12      EPAP/CPAP/PEEP 5.0      Sample source Arterial      SITE Right Radial      ENIO'S TEST PASS     PLATELETS, ALLOCATE    Collection Time: 10/07/20  4:30 AM   Result Value Ref Range    Unit number [id number]     Blood component type PLTPH LR,2     Unit division 00     Status of unit [address] to transfuse    TROPONIN I    Collection Time: 10/07/20  4:40 AM   Result Value Ref Range    Troponin-I, Qt. 0.05 (H) <0.05 ng/mL   PLASMA, ALLOCATE    Collection Time: 10/07/20  4:45 AM   Result Value Ref Range    Unit number [id number]     Blood component type FP 24H,Thaw     Unit division 00     Status of unit [address] to transfuse     Unit number [id number]     Blood component type FP 24H,Thaw     Unit division 00     Status of unit [address] to transfuse            Assessment/     Acute hypoxic respiratory failure postsurgery. Now intubated    Acute kidney injury likely secondary to ATN from hypotension    Hypovolemic shock post surgery    Right lower lobe aspiration pneumonia improved    Urinary tract infection due to E. Coli    Abnormal Cardiolite stress test showing inferior ischemia. Catheterization done yesterday. 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 Repleted    Hypothermia, probably largely environmental.  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        Plan:  Continue supportive care including ventilator support  Wean Off vasopressors as tolerated  Consult nephrologist for impending renal failure and possible need for CVVH  Monitor electrolytes closely  We will readdress CODE STATUS with wife        Care Plan discussed with: Patient/Family    Total time spent with patient: 30 minutes.     Randall Doyle MD

## 2020-10-07 NOTE — PROGRESS NOTES
Patient received from OR. Intubated. Bp 68/40. , 100% on 50% fi02. Pt has Left lower abdomen Sari drain draining bright red blood. Right lower abodomen sari draining bright red blood. Abdominal incision covered with abd pads and gauze with bright red drainage. Dr Jimenez Angel at bedside requested emergent transfusion of 2U PRBCs. 2U FFP. 1L bolus begin levophed gtt and max dose of phenyelphrine    Patient too unstable to complete entire skin assessment.

## 2020-10-07 NOTE — ANTIMICROBIAL STEWARDSHIP
The Antimicrobial Stewardship Team has reviewed current therapy. Patient is on day #9 of Levaquin therapy for UTI. Cultures are negative. Consider d/c Levaquin as patient has completed an adequate duration of therapy.

## 2020-10-07 NOTE — PROGRESS NOTES
Pulmonology and Critical Care Progress Note    Subjective:     Chief Complaint:   Chief Complaint   Patient presents with    Altered mental status      Patient seen and examined at the bedside. The patient underwent surgery, aortobifemoral bypass surgery last evening. Post surgery he was left on the ventilator. Apparently blood loss was about 3.5 L. When he returned to the ICU he was on multiple pressors. Now he is off pressors. Map is 80. He is not on any sedation. He wakes up and is appropriate. No significant endotracheal tube secretions. He has NG tube to low intermittent suction. Dark brownish fluid. He has a left radial arterial line. He has a right subclavian central venous catheter with CVP monitoring. CVP is 6. urine output is poor. Distal pulses are with Dopplers. Wife is at the bedside also. The nurse tells me that plan is to put him on fentanyl. Cardiac catheterization was done on 10/5. Noted. His nuclear medicine cardiac cath showed ischemia of the inferior wall. Modified barium swallow test showed issues of dysphagia.     Review of Systems:  Has chronic Reese catheter placement    Current Facility-Administered Medications   Medication Dose Route Frequency Provider Last Rate Last Dose    0.9% sodium chloride infusion 250 mL  250 mL IntraVENous PRN Yue Olson MD        piperacillin-tazobactam (ZOSYN) 3.375 g in 0.9% sodium chloride (MBP/ADV) 100 mL MBP  3.375 g IntraVENous Q8H Yue Olson MD 25 mL/hr at 10/07/20 0311 3.375 g at 10/07/20 0311    propofol (DIPRIVAN) 10 mg/mL infusion  5 mcg/kg/min IntraVENous TITRATE Yue Olson MD 1.6 mL/hr at 10/07/20 0145 5 mcg/kg/min at 10/07/20 0145    fentaNYL (PF) 1,500 mcg/30 mL (50 mcg/mL) infusion  75 mcg/hr IntraVENous TITRATE Yue Olson MD        0.9% sodium chloride infusion 250 mL  250 mL IntraVENous PRN Yue Olson MD        PHENYLephrine (ALISTAIR-SYNEPHRINE) 30 mg in 0.9% sodium chloride 250 mL infusion   mcg/min IntraVENous CONTINUOUS Betty Olson MD 50 mL/hr at 10/07/20 0050 100 mcg/min at 10/07/20 0050    0.9% sodium chloride infusion 250 mL  250 mL IntraVENous PRN Betty Olson MD        DOPamine (INTROPIN) 400 mg in dextrose 5% 250 mL infusion  5 mcg/kg/min IntraVENous TITRATE Betty Olson MD 9.8 mL/hr at 10/07/20 0600 5 mcg/kg/min at 10/07/20 0600    0.9% sodium chloride infusion 250 mL  250 mL IntraVENous PRN Betty Olson MD        vasopressin (VASOSTRICT) 20 Units in 0.9% sodium chloride 100 mL infusion  0.01-0.1 Units/min IntraVENous TITRATE Betty Olson MD   Stopped at 10/07/20 3187    influenza vaccine 2020-21 (4 yrs+)(PF) (FLUCELVAX QUAD) injection 0.5 mL  0.5 mL IntraMUSCular PRIOR TO DISCHARGE Deidra Leslie MD   Stopped at 10/07/20 0900    0.9% sodium chloride infusion 250 mL  250 mL IntraVENous PRN Betty Olson MD        diphenhydrAMINE (BENADRYL) capsule 50 mg  50 mg Oral BID PRN Jayce Signs, NP   50 mg at 10/03/20 0129    phosphorus (K PHOS NEUTRAL) 250 mg tablet 2 Tab  2 Tab Oral BID Deidra Leslie MD   Stopped at 10/06/20 2100    ezetimibe (ZETIA) tablet 10 mg  10 mg Oral DAILY Sophia Correia MD   Stopped at 10/07/20 0900    levoFLOXacin (LEVAQUIN) tablet 500 mg  500 mg Oral Q24H Deidra Leslie MD   Stopped at 10/06/20 1100    predniSONE (DELTASONE) tablet 20 mg  20 mg Oral DAILY WITH BREAKFAST Deidra Leslie MD   Stopped at 10/07/20 0800    oxyCODONE IR (ROXICODONE) tablet 15 mg  15 mg Oral Q4H PRN Deidra Leslie MD   15 mg at 10/06/20 1228    dextrose 5 % - 0.45% NaCl infusion  75 mL/hr IntraVENous CONTINUOUS Deidra Leslie MD 75 mL/hr at 10/07/20 0311 75 mL/hr at 10/07/20 0311    lisinopriL (PRINIVIL, ZESTRIL) tablet 20 mg  20 mg Oral DAILY Vera Mcmanus MD   Stopped at 10/07/20 0900    metoprolol tartrate (LOPRESSOR) tablet 50 mg  50 mg Oral BID Vera Mcmanus MD   Stopped at 10/06/20 2100    enoxaparin (LOVENOX) injection 40 mg  40 mg SubCUTAneous Q24H Anton Puri MD   Stopped at 10/05/20 2141    aspirin delayed-release tablet 81 mg  81 mg Oral DAILY Estella SUTTON NP   Stopped at 10/07/20 0900    acetaminophen (TYLENOL) tablet 650 mg  650 mg Oral Q6H PRN Gisele Long NP   650 mg at 20 0950    ondansetron (ZOFRAN) injection 4 mg  4 mg IntraVENous Q8H PRN Gisele Long NP   4 mg at 20 0402    guaiFENesin (ROBITUSSIN) 20 mg/mL oral liquid 400 mg  400 mg Oral Q6H PRN Getachew Floyd NP   Stopped at 20 1855            No Known Allergies        Objective:     Blood pressure 119/77, pulse 94, temperature 98.1 °F (36.7 °C), resp. rate 12, height 6' (1.829 m), weight 52.2 kg (115 lb), SpO2 100 %. Temp (24hrs), Av.4 °F (36.3 °C), Min:95.9 °F (35.5 °C), Max:98.1 °F (36.7 °C)      Intake and Output:  Current Shift: No intake/output data recorded. Last 3 Shifts: 10/05 1901 - 10/07 0700  In: 22612.2 [I.V.:9000]  Out: 4663 [Urine:700; Drains:480]    Physical Exam:     General: Lying in bed, on ventilator. Not sedated at this time. He wakes up easily. And is appropriate. Throat and Neck: Supple. No JVD. He has a right subclavian central line. He has an 8.0 endotracheal tube in place. He has NG tube in the right nares. To low intermittent suction. Dark brownish small amount of fluid is noted in the canister. Lung: Reduced air entry bilaterally. Occasional rhonchi. Heart: S1+S2. No murmurs  Abdomen: Status post surgery. He has a midline incision. He has 2 HELGA drains one on each side. Bowel sounds are hypoactive. Extremities:  He has some pitting edema. Distal pulses of the lower extremities are noted with Dopplers. Left radial arterial line. : Reese catheter in place. Making some urine output. Skin: No cyanosis  Neurologic: Wakes up easily, grossly nonfocal     Lab/Data Review: All lab results for the last 24 hours reviewed.   Recent Results (from the past 24 hour(s))   BLOOD GAS, ARTERIAL Collection Time: 10/06/20 11:00 PM   Result Value Ref Range    pH 7.30 (L) 7.35 - 7.45      PCO2 38 35 - 45 mmHg    PO2 351 (H) 75 - 100 mmHg    O2  >95 %    BICARBONATE 19 (L) 22 - 26 mmol/L    BASE DEFICIT 6.9 (H) 0 - 2 mmol/L    O2 METHOD VENT      FIO2 100 %    SITE Right Radial      ENIO'S TEST Positive     BLOOD GAS, ARTERIAL    Collection Time: 10/07/20  1:00 AM   Result Value Ref Range    pH 7.359 7.35 - 7.45      PCO2 26 (L) 35 - 45 mmHg    PO2 208 (H) 75 - 100 mmHg    O2  >95 %    BICARBONATE 16 (L) 22 - 26 mmol/L    BASE DEFICIT 10.0 (H) 0 - 2 mmol/L    O2 METHOD VENT      FIO2 60.0 %    MODE Assist Control/Volume Control      Tidal volume 500      SET RATE 12      EPAP/CPAP/PEEP 5.0      SITE Arterial Line     CBC WITH AUTOMATED DIFF    Collection Time: 10/07/20  2:41 AM   Result Value Ref Range    WBC 16.4 (H) 4.1 - 11.1 K/uL    RBC 3.68 (L) 4.10 - 5.70 M/uL    HGB 11.1 (L) 12.1 - 17.0 g/dL    HCT 33.5 (L) 36.6 - 50.3 %    MCV 91.0 80.0 - 99.0 FL    MCH 30.2 26.0 - 34.0 PG    MCHC 33.1 30.0 - 36.5 g/dL    RDW 15.7 (H) 11.5 - 14.5 %    PLATELET 46 (LL) 980 - 400 K/uL    MPV 10.3 8.9 - 12.9 FL    DF AUTOMATED      NEUTROPHILS 79 (H) 32 - 75 %    BAND NEUTROPHILS 3 0 - 6 %    LYMPHOCYTES 7 (L) 12 - 49 %    MONOCYTES 11 5 - 13 %    EOSINOPHILS 0 0 - 7 %    BASOPHILS 0 0 - 1 %    IMMATURE GRANULOCYTES 0 %    ABS. NEUTROPHILS 13.5 (H) 1.8 - 8.0 K/UL    ABS. LYMPHOCYTES 1.1 0.8 - 3.5 K/UL    ABS. MONOCYTES 1.8 (H) 0.0 - 1.0 K/UL    ABS. EOSINOPHILS 0.0 0.0 - 0.4 K/UL    ABS. BASOPHILS 0.0 0.0 - 0.1 K/UL    ABS. IMM.  GRANS. 0.0 K/UL    RBC COMMENTS [place] cells  1+        RBC COMMENTS Macrocytosis  1+       METABOLIC PANEL, COMPREHENSIVE    Collection Time: 10/07/20  2:41 AM   Result Value Ref Range    Sodium 142 136 - 145 mmol/L    Potassium 3.9 3.5 - 5.1 mmol/L    Chloride 107 97 - 108 mmol/L    CO2 18 (L) 21 - 32 mmol/L    Anion gap 17 (H) 5 - 15 mmol/L    Glucose 122 (H) 65 - 100 mg/dL BUN 5 (L) 6 - 20 mg/dL    Creatinine 0.58 (L) 0.70 - 1.30 mg/dL    BUN/Creatinine ratio 9 (L) 12 - 20      GFR est AA >60 >60 ml/min/1.73m2    GFR est non-AA >60 >60 ml/min/1.73m2    Calcium 7.2 (L) 8.5 - 10.1 mg/dL    Bilirubin, total 2.0 (H) 0.2 - 1.0 mg/dL    AST (SGOT) 45 (H) 15 - 37 U/L    ALT (SGPT) 25 12 - 78 U/L    Alk.  phosphatase 58 45 - 117 U/L    Protein, total 3.3 (L) 6.4 - 8.2 g/dL    Albumin 1.8 (L) 3.5 - 5.0 g/dL    Globulin 1.5 (L) 2.0 - 4.0 g/dL    A-G Ratio 1.2 1.1 - 2.2     PROTHROMBIN TIME + INR    Collection Time: 10/07/20  2:41 AM   Result Value Ref Range    Prothrombin time 16.8 (H) 11.9 - 14.7 sec    INR 1.4 (H) 0.9 - 1.1     BLOOD GAS, ARTERIAL    Collection Time: 10/07/20  4:00 AM   Result Value Ref Range    pH 7.349 (L) 7.35 - 7.45      PCO2 20 (L) 35 - 45 mmHg    PO2 194 (H) 75 - 100 mmHg    O2  >95 %    BICARBONATE 15 (L) 22 - 26 mmol/L    BASE DEFICIT 12.7 (H) 0 - 2 mmol/L    O2 METHOD VENT      FIO2 50.0 %    MODE Assist Control/Volume Control      Tidal volume 500      SET RATE 12      EPAP/CPAP/PEEP 5.0      Sample source Arterial      SITE Right Radial      ENIO'S TEST PASS     PLATELETS, ALLOCATE    Collection Time: 10/07/20  4:30 AM   Result Value Ref Range    Unit number [id number]     Blood component type PLTPH LR,2     Unit division 00     Status of unit Issued     [address] to transfuse    TROPONIN I    Collection Time: 10/07/20  4:40 AM   Result Value Ref Range    Troponin-I, Qt. 0.05 (H) <0.05 ng/mL   PLASMA, ALLOCATE    Collection Time: 10/07/20  4:45 AM   Result Value Ref Range    Unit number [id number]     Blood component type FP 24H,Thaw     Unit division 00     Status of unit Issued     [address] to transfuse     Unit number [id number]     Blood component type FP 24H,Thaw     Unit division 00     Status of unit Issued     [address] to transfuse      chest X-ray      XR CHEST PORT   Final Result   IMPRESSION: Postoperative findings, postprocedural findings, medical devices as   described. Minimal right lung base atelectasis may be residual adhesive   intraoperative atelectasis. Odell Millan XR ABD (KUB)   Final Result   Findings/impression:      Numerous surgical clips project over the left upper quadrant and mid abdomen. Midline skin staples noted. Drainage tubes project over the pelvis, lower   abdomen and left upper quadrant. Enteric tube tip projects over the proximal   stomach. No unexpected radiopaque foreign bodies are identified. Oral contrast material throughout the colon. Bowel gas pattern is   nonobstructive. No acute osseous abdomen identified. XR SWALLOW FUNC VIDEO   Final Result   Impression: Significant hypopharyngeal residue. Episodes of penetration with   all consistencies. Episode of flash aspiration after water wash. XR CHEST PORT   Final Result   Impression: Underexpanded lungs with bibasilar atelectasis. CTA ABDOMEN PELV W CONT   Final Result   IMPRESSION: Mid abdominal aortic occlusion, with threatened right kidney and   fairly good collateralization to the legs. This could be causing a mesenteric   steal phenomenon, however      MRI BRAIN WO CONT   Final Result   Impression: No evidence of acute hemorrhage, mass or infarct. No sinusitis or   mastoiditis. Please see above discussion. CTA CHEST W OR W WO CONT   Final Result   IMPRESSION: Limited by breathing motion. 1. No large pulmonary arterial filling defect. 2. Bronchial thickening with right greater than left lower lung atelectasis or   pneumonia/pneumonitis. Bubbly debris in the trachea. 3. Atherosclerosis. Abrupt discontinuation of contrast in the aorta on the very   inferior slices. Contrast is seen in the celiac artery and SMA and the left   renal artery. The right kidney demonstrates decreased attenuation compared to   the left concerning for medical renal disease to include ischemia.  Recommend CTA   abdomen/pelvis. Called report to charge nurse Tami James at 9:05 PM 9/27/2020.   4. Cholelithiasis within distended gallbladder. 5. Dilated small bowel. 6. Other findings as above. XR CHEST PORT   Final Result   IMPRESSION:      No airspace disease in the lungs. Follow-up as clinically indicated. CT HEAD WO CONT   Final Result   Impression: Unremarkable head CT without contrast.  No infarct, mass, or    hemorrhage. Please see full report. XR CHEST SNGL V   Final Result   Impression: No diagnostic abnormality. XR CHEST PORT    (Results Pending)       CT Results  (Last 48 hours)    None          Assessment:     1. Acute respiratory failure, now back on the ventilator after aortobifemoral bypass surgery on 10/6/2020.  2.  Acute metabolic encephalopathy, resolved  3. Aspiration pneumonia  4. Severe peripheral vascular disease  5. UTI  6. Hypertension  7. Familial polymyositis    Plan:     1. Hypoxic respiratory failure. Patient had improved and had been on room air. Yesterday on 10/6/2020 he underwent aortobifemoral bypass surgery. Post surgery he was left on the ventilator. EBL was 3.5 L. When he arrived to the ICU he was on multiple pressors. He is now off pressors. On dopamine at 5 mics. His chest x-ray this morning shows endotracheal tube is somewhat high in position. He had a right subclavian central line in place. He has a right basilar atelectasis. No focal airspace disease. Arterial blood gases this morning showed a 7.34/20/194. On 50% ventilator. Vent settings include 500/50%/12/5. Vent mechanics are reasonable. Minimal endotracheal tube secretions. He is to be started on fentanyl for sedation. Agree with that. I will change the ventilator settings to IMV and pressure support mode. 500/35%, pressure support of 12, IMV of 8 and PEEP of 5. Assess for weaning later today or most likely in the morning.     2.  Aspiration pneumonia:  He is currently on the ventilator. He is on Zosyn and Levaquin. Also on prednisone. I start him on nebulized bronchodilator treatments. Modified barium swallow showed penetration and significant dysphagia. 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 Patient may require PEG tube near future. He had been on puréed diet with nectar thickened liquids. 3.  Metabolic encephalopathy. He has a progressive neurologic disorder. Brain MRI negative    Further recommendation per Neurology. 4.  Dehydration and metabolic acidosis. Resolved  Monitor kidney function after surgery. 5.  Possible urinary tract infection. 6.  Hypertension. Dyslipidemia and severe peripheral vascular disease. 7.  Myocardial ischemia:  He underwent nuclear stress testing which showed significant inferior wall ischemia. He underwent cardiac catheterization on 10/5/2020 which showed nonobstructive coronary artery disease. Medical management is planned at this time.      Thank you for allowing me to participate in the care of this patient. I will follow the patient closely with you. DVT and GI prophylaxis. He is on Lovenox. I will start him on Protonix IV. Repeat all labs in the morning. Replenish electrolytes as needed. He has NG tube to low intermittent suction. Holding nutrition at this time. Discussed with the wife at the bedside. Patient is critically ill at this time. Discussed with the nursing team.  More than 50 minutes spent with patient care.   Thank you for involving me in the care of this patient      Nicky Richardson MD  [facility]  10/7/2020

## 2020-10-07 NOTE — BRIEF OP NOTE
Brief Postoperative Note    Patient: Lucita Valentino  YOB: 1965  MRN: [id number]    Date of Procedure: 10/6/2020     Pre-Op Diagnosis: Aorto-iliac disease ([address] [I74.09]    Post-Op Diagnosis: same, hemorrhage from splenic capsular tear. Procedure(s): Aorta Bifemoral Bypass with 98aht3ng hemoshield graft. splenenctomy    Surgeon(s):  Yarelis Syed MD    Surgical Assistant: None    Anesthesia: General     Estimated Blood Loss (mL): 3126-9147DI    Complications:     Specimens:   ID Type Source Tests Collected by Time Destination   1 : Plaque from aorta Preservative   Yeni Olson MD 10/6/2020 2036 Pathology   2 : Spleen Preservative   Yarelis Syed MD 10/6/2020 2312 Pathology        Implants:   Implant Name Type Inv. Item Serial No.  Lot No. LRB No. Used Action   GRAFT HEMSHLD GLD [id number] --  - [id number]  GRAFT HEMSHLD GLD [id number] --  [id number] [employer]ARDIOVASCLR 20C25 N/A 1 Implanted       Drains:   Nasogastric Tube 10/06/20 (Active)       Drain 7 fr marielle brain 10/06/20 Right; Lower Abdomen (Active)       Drain 10/06/20 Left; Lower Abdomen (Active)       Findings:    Electronically Signed by Juan Carlos Lewis MD on 10/7/2020 at 12:49 AM

## 2020-10-07 NOTE — PROGRESS NOTES
CARDIOLOGY PROGRESS NOTE    Patient seen and examined. This is a patient who is followed for Cardiac Clearance . Now post op from aorta bifemoral bypass, wife at bedside. Had hemorrhage from the splenic tear requiring splenectomy. Hypotensive needing multiple pressors, now just on dopamine following fluid boluses and blood. Intubated and sedated. No distress. No other complaints reported. Telemetry reviewed, there were no events noted in the past 24 hours. NSR 70-80s    Pertinent review of systems items noted above, all other systems are negative. Current medications reviewed. Physical Examination  Vital signs are stable. Blood pressure 134/88, Pulse 100  Intubated and sedated No apparent distress. Heart is regular, rate and rhythm. Normal S1, S2, no murmurs are appreciated. Lungs are clear bilaterally. Abdomen is soft, nontender, normal bowel sounds. Extremities have no edema.     Labs reviewed:   Recent Results (from the past 12 hour(s))   BLOOD GAS, ARTERIAL    Collection Time: 10/07/20  4:00 AM   Result Value Ref Range    pH 7.349 (L) 7.35 - 7.45      PCO2 20 (L) 35 - 45 mmHg    PO2 194 (H) 75 - 100 mmHg    O2  >95 %    BICARBONATE 15 (L) 22 - 26 mmol/L    BASE DEFICIT 12.7 (H) 0 - 2 mmol/L    O2 METHOD VENT      FIO2 50.0 %    MODE Assist Control/Volume Control      Tidal volume 500      SET RATE 12      EPAP/CPAP/PEEP 5.0      Sample source Arterial      SITE Right Radial      ENIO'S TEST PASS     PLATELETS, ALLOCATE    Collection Time: 10/07/20  4:30 AM   Result Value Ref Range    Unit number [id number]     Blood component type PLTPH LR,2     Unit division 00     Status of unit Issued     [address] to transfuse    TROPONIN I    Collection Time: 10/07/20  4:40 AM   Result Value Ref Range    Troponin-I, Qt. 0.05 (H) <0.05 ng/mL   PLASMA, ALLOCATE    Collection Time: 10/07/20  4:45 AM   Result Value Ref Range    Unit number [id number]     Blood component type FP 24H,Thaw Unit division 00     Status of unit Issued     [address] to transfuse     Unit number [id number]     Blood component type FP 24H,Thaw     Unit division 00     Status of unit [address] to transfuse        Case discussed with Dr. Donna English and our impression and recommendations are as follows:  1. Pre-op risk stratification: Echo with normal EF. NST abnormal with a moderate area of ischemia. Cardiac cath from yesterday shows nonobstructive CAD. Continue asa. Moderate risk for inpatient vascular surgery. Will assess post operative EKG. 2. PAD: Continue ASA, will add zetia. Unclear if statins are contraindicated with [employer]. Will check with primary team or neurology. 3. Hypertension:Hypotensive, requiring pressors, fluid, and blood. Most likely from splenic tear, Will assess with limited echo. 4. Nonobstructive CAD: 50% disease to the left cx. He is asymptomatic currently but unable to walk due to his PAD. Will monitor symptoms after surgery. Continue DAPT and zetia.        Please do not hesitate to call me or Dr. Donna English if additional questions arise.

## 2020-10-07 NOTE — PROGRESS NOTES
Physician Progress Note      [facility] #:                  [id number]  :                       1965  ADMIT DATE:       2020 12:44 PM  [address] [place] DATE:  RESPONDING  PROVIDER #:        Pelon Epstein MD          QUERY TEXT:    Patient admitted with aspiration pneumonia. Noted documentation of Acute Kidney Injury likely secondary to ATN from hypotension in Int Med PN 10/7. Please document in progress notes and discharge summary:    The medical record reflects the following:  Risk Factors: hypotension post surgery    Clinical Indicators:    Est Blood Loss from surgery 3500-400ml    BPs: 84/62, 80/56, 56/36, 81/38, 72/61, 80/67. .. Cr 0.45-> 0.46-> 0.58  GFR > 60  BUN 2-> 4-> 5    Treatment: Vasopressin titrated gtt; Albumin 25g IV; Levophed 8mg/250ml gtt; 0.9% NS 1.5L bolus; Thank you,  Tremaine Pastor RN, BSN  Clinical Documentation        Defined by Kidney Disease Improving Global Outcomes (KDIGO) clinical practice guideline for acute kidney injury:  -Increase in SCr by ? 0.3 mg/dl within 48 hours; or  -Increase in SCr to ? 1.5 times baseline, which is known or presumed to have occurred within the prior 7 days; or  -Urine volume < 0.5ml/kg/h for 6 hours  Options provided:  -- Acute kidney injury evidenced by, Please document evidence.   -- Acute kidney injury ruled out after study  -- Other - I will add my own diagnosis  -- Disagree - Not applicable / Not valid  -- Disagree - Clinically unable to determine / Unknown  -- Refer to Clinical Documentation Reviewer    PROVIDER RESPONSE TEXT:    This patient has an acute kidney injury as evidenced by rise in creatinine and decrease urine output    Query created by: Nguyen Torrez on 10/7/2020 12:30 PM      Electronically signed by:  Pelon Epstein MD 10/7/2020 2:48 PM

## 2020-10-07 NOTE — ANESTHESIA POSTPROCEDURE EVALUATION
Procedure(s): Aorta Bifemoral Bypass.     general    Anesthesia Post Evaluation      Multimodal analgesia: multimodal analgesia not used between 6 hours prior to anesthesia start to PACU discharge  Patient location during evaluation: bedside  Patient participation: complete - patient cannot participate  Pain management: adequate  Anesthetic complications: no  Cardiovascular status: acceptable  Respiratory status: acceptable, ventilator, intubated and ETT  Hydration status: acceptable  Post anesthesia nausea and vomiting:  none  Final Post Anesthesia Temperature Assessment:  Hypothermia (<36 degrees C)      INITIAL Post-op Vital signs:   Vitals Value Taken Time   /64 10/7/2020 12:58 AM   Temp 35.5 °C (95.9 °F) 10/7/2020 12:58 AM   Pulse 102 10/7/2020 12:58 AM   Resp 10 10/7/2020 12:58 AM   SpO2 100 % 10/7/2020 12:58 AM

## 2020-10-07 NOTE — ROUTINE PROCESS
Patient currently on PT caseload however patient transferred to ICU from [facility] due to medical decline. Will need new PT eval order once pt medically stable for reassessment. Thank you.

## 2020-10-07 NOTE — PROGRESS NOTES
4 eyes skin assessment completed with BRITTNEY Luis RN. Multiple bruises noted scattered on bilat UE. Skin is thin, cold, pale, mottled, and slightly jaundiced in color. Mepilex noted on bilat arms, ribs, hips, sacrum, heels. All dry and intact. Right lateral foot noted to be reddenned in color. Left heel is nonblanchable. Ulcer noted on right lateral calf. Patient unable to tolerate being turned on side. Patient grimacing, tachycardic, and tachypneic. BP became low. Patient placed back in supine position with pillow under right hip, bilat LE and HOB 30 degrees.

## 2020-10-07 NOTE — WOUND CARE
Called [facility] this morning and requested  of P500 mattress on 5W. Pump #[id number] missing. Confirmation [id number].

## 2020-10-07 NOTE — CONSULTS
Nephrology Consult    Patient: Bethann Severance MRN: [id number]  SSN: [id number]    YOB: 1965  Age: 48 y.o. Sex: male      Subjective: The patient is a 60-year-old white man with underlying history of hypertension, peripheral arterial disease, TIAs status post op  infrarenal aorta endarterectomy, with aortic by common femoral artery bypass with 14 mm x 7 mm Hemosure graft done few hours ago. Postoperatively patient was finally hypotensive requiring 3 pressor support which resulted low urine output which prompted a nephrology consultation. His creatinine is 0.58. Chest x-ray showed no evidence of pulmonary edema. No lower extremity swelling. There is no history on lisinopril 20 mg daily.     Past Medical History:   Diagnosis Date    Myositis     Familial inclusion      Past Surgical History:   Procedure Laterality Date    HX ORTHOPAEDIC      disc infused, neck       Family History   Problem Relation Age of Onset    Other Father     Other Sister     Other Brother      Social History     Tobacco Use    Smoking status: Current Every Day Smoker     Packs/day: 0.50    Smokeless tobacco: Never Used   Substance Use Topics    Alcohol use: Yes     Comment: occ      Current Facility-Administered Medications   Medication Dose Route Frequency Provider Last Rate Last Dose    0.9% sodium chloride infusion 250 mL  250 mL IntraVENous PRN Michelle Olson MD        piperacillin-tazobactam (ZOSYN) 3.375 g in 0.9% sodium chloride (MBP/ADV) 100 mL MBP  3.375 g IntraVENous Q8H Michelle Olson MD 25 mL/hr at 10/07/20 1319 3.375 g at 10/07/20 1319    propofol (DIPRIVAN) 10 mg/mL infusion  5 mcg/kg/min IntraVENous TITRATE Michelle Olson MD 6.3 mL/hr at 10/07/20 1314 20 mcg/kg/min at 10/07/20 1314    fentaNYL (PF) 1,500 mcg/30 mL (50 mcg/mL) infusion  75 mcg/hr IntraVENous TITRATE Michelle Olson MD 2.5 mL/hr at 10/07/20 1159 125 mcg/hr at 10/07/20 1159    0.9% sodium chloride infusion 250 mL  250 mL IntraVENous PRN Lauren Olson MD        PHENYLephrine (ALISTAIR-SYNEPHRINE) 30 mg in 0.9% sodium chloride 250 mL infusion   mcg/min IntraVENous CONTINUOUS Lauren Olson MD 50 mL/hr at 10/07/20 0050 100 mcg/min at 10/07/20 0050    0.9% sodium chloride infusion 250 mL  250 mL IntraVENous PRN Lauren Olson MD        DOPamine (INTROPIN) 400 mg in dextrose 5% 250 mL infusion  5 mcg/kg/min IntraVENous TITRATE Lauren Olson MD 9.8 mL/hr at 10/07/20 0600 5 mcg/kg/min at 10/07/20 0600    0.9% sodium chloride infusion 250 mL  250 mL IntraVENous PRN Lauren Olson MD        vasopressin (VASOSTRICT) 20 Units in 0.9% sodium chloride 100 mL infusion  0.01-0.1 Units/min IntraVENous TITRATE Lauren Olson MD   Stopped at 10/07/20 0943    pantoprazole (PROTONIX) 40 mg in 0.9% sodium chloride 10 mL injection  40 mg IntraVENous Q12H Minnie Capps MD   40 mg at 10/07/20 1103    albuterol-ipratropium (DUO-NEB) 2.5 MG-0.5 MG/3 ML  3 mL Nebulization Q6H PRN Minnie Capps MD        sodium chloride 0.9 % bolus infusion 1,000 mL  1,000 mL IntraVENous Gayle Maciel MD        influenza vaccine 2020-21 (4 yrs+)(PF) (FLUCELVAX QUAD) injection 0.5 mL  0.5 mL IntraMUSCular PRIOR TO DISCHARGE Aolnso Conley MD   Stopped at 10/07/20 0900    0.9% sodium chloride infusion 250 mL  250 mL IntraVENous PRN Lauren Olson MD        diphenhydrAMINE (BENADRYL) capsule 50 mg  50 mg Oral BID PRN Gracia Gomez NP   50 mg at 10/03/20 0129    phosphorus (K PHOS NEUTRAL) 250 mg tablet 2 Tab  2 Tab Oral BID Alonso Conley MD   Stopped at 10/06/20 2100    levoFLOXacin (LEVAQUIN) tablet 500 mg  500 mg Oral Q24H Alonso Conley MD   Stopped at 10/06/20 1100    predniSONE (DELTASONE) tablet 20 mg  20 mg Oral DAILY WITH BREAKFAST Alonso Conley MD   Stopped at 10/07/20 0800    oxyCODONE IR (ROXICODONE) tablet 15 mg  15 mg Oral Q4H PRN Alonso Conley MD   15 mg at 10/06/20 1228    dextrose 5 % - 0.45% NaCl infusion  125 mL/hr IntraVENous CONTINUOUS Keisha Glaser MD 75 mL/hr at 10/07/20 0311 75 mL/hr at 10/07/20 0311    metoprolol tartrate (LOPRESSOR) tablet 50 mg  50 mg Oral BID Andrea Brewer MD   Stopped at 10/06/20 2100    enoxaparin (LOVENOX) injection 40 mg  40 mg SubCUTAneous Q24H Linda Cottrell MD   Stopped at 10/05/20 2141    aspirin delayed-release tablet 81 mg  81 mg Oral DAILY Evonne Shira T, NP   Stopped at 10/07/20 0900    acetaminophen (TYLENOL) tablet 650 mg  650 mg Oral Q6H PRN Kibot, Edwan T, NP   650 mg at 09/30/20 0950    ondansetron (ZOFRAN) injection 4 mg  4 mg IntraVENous Q8H PRN Kibot, Edwan T, NP   4 mg at 09/30/20 0402    guaiFENesin (ROBITUSSIN) 20 mg/mL oral liquid 400 mg  400 mg Oral Q6H PRN Evonne Shira T, NP   Stopped at 09/26/20 1855        No Known Allergies    Review of Systems:  A comprehensive review of systems was negative except for that written in the History of Present Illness.     Objective:     Vitals:    10/07/20 1000 10/07/20 1108 10/07/20 1200 10/07/20 1300   BP: 119/74 135/79 126/79 130/80   Pulse: 88 87 86 85   Resp: 12 12 9 12   Temp:  (!) 96.5 °F (35.8 °C)     SpO2: 100% 100% 100% 100%   Weight:       Height:            Physical Exam:  General: Under sedation  Nose NG in place  Oral cavity ET in place  Chest on ventilatory support  Heart S1-S2 normal  Genitourinary Reese in place  Lower extremity no edema  Assessment:     Hospital Problems  Date Reviewed: 9/17/2020          Codes Class Noted POA    Metabolic encephalopathy DDO-[id number]: G93.41  ICD-9-CM: 348.31  9/26/2020 Unknown        UTI (urinary tract infection) ICD-10-CM: N39.0  ICD-9-CM: 599.0  9/26/2020 Unknown        Aspiration pneumonitis ([facility] [address] ICD-10-CM: J69.0  ICD-9-CM: 507.0  9/26/2020 Unknown        Essential hypertension ICD-10-CM: I10  ICD-9-CM: 401.9  9/26/2020 Unknown        Acute dehydration ICD-10-CM: E86.0  ICD-9-CM: 276.51  9/26/2020 Unknown              Plan:     #1 Low urine output :  - likely prerenal from hypotension.    -He was put on 3 pressors.    -Creatinine 0.58.    -His blood pressures are better now. -K is 3.9.    -No indication for dialysis.    -No volume overload. -I will give normal saline 1 L as a bolus.    -I will increase IV fluids. 2.  Metabolic acidosis, high anion gap. From hypotension and lactic acidosis  CO2 was 18  I will check lactic acid level  No indication for bicarb drip for now  I will send a stat BMP      #3 status post  infrarenal aorta endarterectomy, with aortic by common femoral artery bypass with 14 mm x 7 mm Hemosure graft    #4 hypertension. Blood pressure is low. I will discontinue lisinopril. I will discontinue potassium chloride    Thank you for the consultation.     Signed By: Mihai Lemons MD     October 7, 2020

## 2020-10-07 NOTE — PROGRESS NOTES
Patient transferred to ICU postop currently remains intubated per chart review. ST will require new orders to follow. Please place new consult once medically appropriate.

## 2020-10-08 ENCOUNTER — APPOINTMENT (OUTPATIENT)
Dept: CT IMAGING | Age: 55
DRG: 981 | End: 2020-10-08
Attending: SURGERY
Payer: COMMERCIAL

## 2020-10-08 ENCOUNTER — APPOINTMENT (OUTPATIENT)
Dept: GENERAL RADIOLOGY | Age: 55
DRG: 981 | End: 2020-10-08
Attending: INTERNAL MEDICINE
Payer: COMMERCIAL

## 2020-10-08 LAB
ANION GAP SERPL CALC-SCNC: 9 MMOL/L (ref 5–15)
ARTERIAL PATENCY WRIST A: POSITIVE
BASE DEFICIT BLDA-SCNC: 1.1 MMOL/L (ref 0–2)
BASE DEFICIT BLDA-SCNC: 1.2 MMOL/L (ref 0–2)
BASOPHILS # BLD: 0 K/UL (ref 0–0.1)
BASOPHILS NFR BLD: 0 % (ref 0–1)
BDY SITE: ABNORMAL
BDY SITE: ABNORMAL
BLD PROD TYP BPU: NORMAL
BPU ID: NORMAL
BUN SERPL-MCNC: 7 MG/DL (ref 6–20)
BUN/CREAT SERPL: 11 (ref 12–20)
CA-I BLD-MCNC: 7.1 MG/DL (ref 8.5–10.1)
CHLORIDE SERPL-SCNC: 109 MMOL/L (ref 97–108)
CO2 SERPL-SCNC: 24 MMOL/L (ref 21–32)
CREAT SERPL-MCNC: 0.66 MG/DL (ref 0.7–1.3)
DIFFERENTIAL METHOD BLD: ABNORMAL
EOSINOPHIL # BLD: 0 K/UL (ref 0–0.4)
EOSINOPHIL NFR BLD: 0 % (ref 0–7)
EPAP/CPAP/PEEP, PAPEEP: 5
ERYTHROCYTE [DISTWIDTH] IN BLOOD BY AUTOMATED COUNT: 16.2 % (ref 11.5–14.5)
FIO2 ON VENT: 28 %
FIO2 ON VENT: 28 %
GLUCOSE SERPL-MCNC: 104 MG/DL (ref 65–100)
HCO3 BLDA-SCNC: 23 MMOL/L (ref 22–26)
HCO3 BLDA-SCNC: 24 MMOL/L (ref 22–26)
HCT VFR BLD AUTO: 19.3 % (ref 36.6–50.3)
HCT VFR BLD AUTO: 19.9 % (ref 36.6–50.3)
HGB BLD-MCNC: 6.8 G/DL (ref 12.1–17)
HGB BLD-MCNC: 7 G/DL (ref 12.1–17)
IMM GRANULOCYTES # BLD AUTO: 0.2 K/UL (ref 0–0.04)
IMM GRANULOCYTES NFR BLD AUTO: 1 % (ref 0–0.5)
LYMPHOCYTES # BLD: 2.6 K/UL (ref 0.8–3.5)
LYMPHOCYTES NFR BLD: 11 % (ref 12–49)
MCH RBC QN AUTO: 30.6 PG (ref 26–34)
MCHC RBC AUTO-ENTMCNC: 35.2 G/DL (ref 30–36.5)
MCV RBC AUTO: 86.9 FL (ref 80–99)
MONOCYTES # BLD: 2.4 K/UL (ref 0–1)
MONOCYTES NFR BLD: 11 % (ref 5–13)
NEUTS SEG # BLD: 19 K/UL (ref 1.8–8)
NEUTS SEG NFR BLD: 80 % (ref 32–75)
NRBC # BLD: 0.94 K/UL (ref 0–0.01)
NRBC BLD-RTO: 4.2 PER 100 WBC
PCO2 BLDA: 31 MMHG (ref 35–45)
PCO2 BLDA: 33 MMHG (ref 35–45)
PH BLDA: 7.45 [PH] (ref 7.35–7.45)
PH BLDA: 7.46 [PH] (ref 7.35–7.45)
PLATELET # BLD AUTO: 126 K/UL (ref 150–400)
PMV BLD AUTO: 10.4 FL (ref 8.9–12.9)
PO2 BLDA: 47 MMHG (ref 75–100)
PO2 BLDA: 62 MMHG (ref 75–100)
POTASSIUM SERPL-SCNC: 2.6 MMOL/L (ref 3.5–5.1)
PRESSURE SUPPORT SETTING VENT: 12 CM[H2O]
RBC # BLD AUTO: 2.29 M/UL (ref 4.1–5.7)
SAO2 % BLD: 87 %
SAO2 % BLD: 93 %
SAO2% DEVICE SAO2% SENSOR NAME: ABNORMAL
SERVICE CMNT-IMP: ABNORMAL
SODIUM SERPL-SCNC: 142 MMOL/L (ref 136–145)
SPECIMEN SITE: ABNORMAL
STATUS OF UNIT,%ST: NORMAL
TRANSFUSION STATUS PATIENT QL: NORMAL
UNIT DIVISION, %UDIV: 0
VENTILATION MODE VENT: ABNORMAL
WBC # BLD AUTO: 22.4 K/UL (ref 4.1–11.1)

## 2020-10-08 PROCEDURE — 65610000006 HC RM INTENSIVE CARE

## 2020-10-08 PROCEDURE — 74011250636 HC RX REV CODE- 250/636: Performed by: INTERNAL MEDICINE

## 2020-10-08 PROCEDURE — 74011000250 HC RX REV CODE- 250: Performed by: INTERNAL MEDICINE

## 2020-10-08 PROCEDURE — 74011250636 HC RX REV CODE- 250/636: Performed by: SURGERY

## 2020-10-08 PROCEDURE — 36415 COLL VENOUS BLD VENIPUNCTURE: CPT

## 2020-10-08 PROCEDURE — 74011000258 HC RX REV CODE- 258: Performed by: INTERNAL MEDICINE

## 2020-10-08 PROCEDURE — 85018 HEMOGLOBIN: CPT

## 2020-10-08 PROCEDURE — 85025 COMPLETE CBC W/AUTO DIFF WBC: CPT

## 2020-10-08 PROCEDURE — 94400 HC END TIDAL CO2 RESPONSE CURVE: CPT

## 2020-10-08 PROCEDURE — 71045 X-RAY EXAM CHEST 1 VIEW: CPT

## 2020-10-08 PROCEDURE — 77010033678 HC OXYGEN DAILY

## 2020-10-08 PROCEDURE — 85014 HEMATOCRIT: CPT

## 2020-10-08 PROCEDURE — C9113 INJ PANTOPRAZOLE SODIUM, VIA: HCPCS | Performed by: INTERNAL MEDICINE

## 2020-10-08 PROCEDURE — 36600 WITHDRAWAL OF ARTERIAL BLOOD: CPT

## 2020-10-08 PROCEDURE — 36430 TRANSFUSION BLD/BLD COMPNT: CPT

## 2020-10-08 PROCEDURE — P9016 RBC LEUKOCYTES REDUCED: HCPCS

## 2020-10-08 PROCEDURE — 94667 MNPJ CHEST WALL 1ST: CPT

## 2020-10-08 PROCEDURE — 94003 VENT MGMT INPAT SUBQ DAY: CPT

## 2020-10-08 PROCEDURE — 75635 CT ANGIO ABDOMINAL ARTERIES: CPT

## 2020-10-08 PROCEDURE — 94668 MNPJ CHEST WALL SBSQ: CPT

## 2020-10-08 PROCEDURE — 74011000258 HC RX REV CODE- 258: Performed by: SURGERY

## 2020-10-08 PROCEDURE — P9047 ALBUMIN (HUMAN), 25%, 50ML: HCPCS | Performed by: SURGERY

## 2020-10-08 PROCEDURE — 74011250637 HC RX REV CODE- 250/637: Performed by: INTERNAL MEDICINE

## 2020-10-08 PROCEDURE — 82803 BLOOD GASES ANY COMBINATION: CPT

## 2020-10-08 PROCEDURE — 80048 BASIC METABOLIC PNL TOTAL CA: CPT

## 2020-10-08 PROCEDURE — 3E0436Z INTRODUCTION OF NUTRITIONAL SUBSTANCE INTO CENTRAL VEIN, PERCUTANEOUS APPROACH: ICD-10-PCS | Performed by: INTERNAL MEDICINE

## 2020-10-08 PROCEDURE — 74011250637 HC RX REV CODE- 250/637: Performed by: SURGERY

## 2020-10-08 PROCEDURE — 94640 AIRWAY INHALATION TREATMENT: CPT

## 2020-10-08 PROCEDURE — 74011000636 HC RX REV CODE- 636: Performed by: INTERNAL MEDICINE

## 2020-10-08 RX ORDER — ALBUMIN HUMAN 250 G/1000ML
12.5 SOLUTION INTRAVENOUS ONCE
Status: COMPLETED | OUTPATIENT
Start: 2020-10-08 | End: 2020-10-08

## 2020-10-08 RX ORDER — HYDROCORTISONE 100 MG/60ML
100 SUSPENSION RECTAL EVERY 12 HOURS
Status: DISCONTINUED | OUTPATIENT
Start: 2020-10-08 | End: 2020-10-12

## 2020-10-08 RX ORDER — SODIUM CHLORIDE 9 MG/ML
250 INJECTION, SOLUTION INTRAVENOUS AS NEEDED
Status: DISCONTINUED | OUTPATIENT
Start: 2020-10-08 | End: 2020-10-10

## 2020-10-08 RX ORDER — ALBUMIN HUMAN 250 G/1000ML
25 SOLUTION INTRAVENOUS ONCE
Status: COMPLETED | OUTPATIENT
Start: 2020-10-08 | End: 2020-10-08

## 2020-10-08 RX ORDER — FUROSEMIDE 10 MG/ML
20 INJECTION INTRAMUSCULAR; INTRAVENOUS 2 TIMES DAILY
Status: DISCONTINUED | OUTPATIENT
Start: 2020-10-08 | End: 2020-10-11

## 2020-10-08 RX ORDER — MAGNESIUM SULFATE HEPTAHYDRATE 40 MG/ML
2 INJECTION, SOLUTION INTRAVENOUS ONCE
Status: COMPLETED | OUTPATIENT
Start: 2020-10-08 | End: 2020-10-08

## 2020-10-08 RX ORDER — MIDAZOLAM HYDROCHLORIDE 1 MG/ML
4 INJECTION, SOLUTION INTRAMUSCULAR; INTRAVENOUS
Status: DISCONTINUED | OUTPATIENT
Start: 2020-10-08 | End: 2020-10-15

## 2020-10-08 RX ORDER — MIDAZOLAM HYDROCHLORIDE 1 MG/ML
2 INJECTION, SOLUTION INTRAMUSCULAR; INTRAVENOUS
Status: DISPENSED | OUTPATIENT
Start: 2020-10-08 | End: 2020-10-08

## 2020-10-08 RX ORDER — NOREPINEPHRINE BITARTRATE/D5W 8 MG/250ML
2 PLASTIC BAG, INJECTION (ML) INTRAVENOUS
Status: DISCONTINUED | OUTPATIENT
Start: 2020-10-08 | End: 2020-10-15

## 2020-10-08 RX ORDER — POTASSIUM CHLORIDE 7.45 MG/ML
10 INJECTION INTRAVENOUS
Status: COMPLETED | OUTPATIENT
Start: 2020-10-08 | End: 2020-10-08

## 2020-10-08 RX ADMIN — POTASSIUM CHLORIDE 10 MEQ: 7.46 INJECTION, SOLUTION INTRAVENOUS at 08:43

## 2020-10-08 RX ADMIN — POTASSIUM CHLORIDE 10 MEQ: 7.46 INJECTION, SOLUTION INTRAVENOUS at 12:00

## 2020-10-08 RX ADMIN — POTASSIUM PHOSPHATE, MONOBASIC POTASSIUM PHOSPHATE, DIBASIC: 224; 236 INJECTION, SOLUTION, CONCENTRATE INTRAVENOUS at 21:36

## 2020-10-08 RX ADMIN — IOPAMIDOL 100 ML: 755 INJECTION, SOLUTION INTRAVENOUS at 04:58

## 2020-10-08 RX ADMIN — POTASSIUM CHLORIDE 10 MEQ: 7.46 INJECTION, SOLUTION INTRAVENOUS at 14:44

## 2020-10-08 RX ADMIN — SODIUM CHLORIDE 40 MG: 9 INJECTION, SOLUTION INTRAMUSCULAR; INTRAVENOUS; SUBCUTANEOUS at 08:49

## 2020-10-08 RX ADMIN — SODIUM CHLORIDE, POTASSIUM CHLORIDE, SODIUM LACTATE AND CALCIUM CHLORIDE 1000 ML: 600; 310; 30; 20 INJECTION, SOLUTION INTRAVENOUS at 18:19

## 2020-10-08 RX ADMIN — FUROSEMIDE 20 MG: 10 INJECTION, SOLUTION INTRAMUSCULAR; INTRAVENOUS at 21:08

## 2020-10-08 RX ADMIN — VASOPRESSIN 0.04 UNITS/MIN: 20 INJECTION INTRAVENOUS at 01:51

## 2020-10-08 RX ADMIN — MAGNESIUM SULFATE HEPTAHYDRATE 2 G: 40 INJECTION, SOLUTION INTRAVENOUS at 06:38

## 2020-10-08 RX ADMIN — POTASSIUM CHLORIDE 10 MEQ: 7.46 INJECTION, SOLUTION INTRAVENOUS at 07:51

## 2020-10-08 RX ADMIN — MIDAZOLAM HYDROCHLORIDE 4 MG: 1 INJECTION, SOLUTION INTRAMUSCULAR; INTRAVENOUS at 04:02

## 2020-10-08 RX ADMIN — IOPAMIDOL 100 ML: 755 INJECTION, SOLUTION INTRAVENOUS at 05:23

## 2020-10-08 RX ADMIN — POTASSIUM CHLORIDE 10 MEQ: 7.46 INJECTION, SOLUTION INTRAVENOUS at 11:40

## 2020-10-08 RX ADMIN — PIPERACILLIN AND TAZOBACTAM 3.38 G: 3; .375 INJECTION, POWDER, LYOPHILIZED, FOR SOLUTION INTRAVENOUS at 06:03

## 2020-10-08 RX ADMIN — MIDAZOLAM HYDROCHLORIDE 4 MG: 1 INJECTION, SOLUTION INTRAMUSCULAR; INTRAVENOUS at 06:05

## 2020-10-08 RX ADMIN — SODIUM CHLORIDE 40 MG: 9 INJECTION, SOLUTION INTRAMUSCULAR; INTRAVENOUS; SUBCUTANEOUS at 21:10

## 2020-10-08 RX ADMIN — POTASSIUM CHLORIDE 10 MEQ: 7.46 INJECTION, SOLUTION INTRAVENOUS at 09:40

## 2020-10-08 RX ADMIN — FENTANYL CITRATE 125 MCG/HR: 50 INJECTION, SOLUTION INTRAMUSCULAR; INTRAVENOUS at 21:15

## 2020-10-08 RX ADMIN — FENTANYL CITRATE 125 MCG/HR: 50 INJECTION, SOLUTION INTRAMUSCULAR; INTRAVENOUS at 08:33

## 2020-10-08 RX ADMIN — DEXTROSE AND SODIUM CHLORIDE 50 ML/HR: 5; 450 INJECTION, SOLUTION INTRAVENOUS at 21:14

## 2020-10-08 RX ADMIN — POTASSIUM CHLORIDE 10 MEQ: 7.46 INJECTION, SOLUTION INTRAVENOUS at 06:38

## 2020-10-08 RX ADMIN — POTASSIUM CHLORIDE 10 MEQ: 7.46 INJECTION, SOLUTION INTRAVENOUS at 13:07

## 2020-10-08 RX ADMIN — DIBASIC SODIUM PHOSPHATE, MONOBASIC POTASSIUM PHOSPHATE AND MONOBASIC SODIUM PHOSPHATE 2 TABLET: 852; 155; 130 TABLET ORAL at 21:12

## 2020-10-08 RX ADMIN — HYDROCORTISONE 100 MG: 100 ENEMA RECTAL at 21:10

## 2020-10-08 RX ADMIN — DOPAMINE HYDROCHLORIDE IN DEXTROSE 5 MCG/KG/MIN: 1.6 INJECTION, SOLUTION INTRAVENOUS at 00:52

## 2020-10-08 RX ADMIN — MIDAZOLAM HYDROCHLORIDE 4 MG: 1 INJECTION, SOLUTION INTRAMUSCULAR; INTRAVENOUS at 13:19

## 2020-10-08 RX ADMIN — PIPERACILLIN AND TAZOBACTAM 3.38 G: 3; .375 INJECTION, POWDER, LYOPHILIZED, FOR SOLUTION INTRAVENOUS at 16:14

## 2020-10-08 RX ADMIN — PIPERACILLIN AND TAZOBACTAM 3.38 G: 3; .375 INJECTION, POWDER, LYOPHILIZED, FOR SOLUTION INTRAVENOUS at 21:35

## 2020-10-08 RX ADMIN — MIDAZOLAM HYDROCHLORIDE 4 MG: 1 INJECTION, SOLUTION INTRAMUSCULAR; INTRAVENOUS at 21:18

## 2020-10-08 RX ADMIN — MIDAZOLAM HYDROCHLORIDE 2 MG: 1 INJECTION, SOLUTION INTRAMUSCULAR; INTRAVENOUS at 02:20

## 2020-10-08 RX ADMIN — FUROSEMIDE 20 MG: 10 INJECTION, SOLUTION INTRAMUSCULAR; INTRAVENOUS at 08:06

## 2020-10-08 RX ADMIN — SODIUM CHLORIDE, POTASSIUM CHLORIDE, SODIUM LACTATE AND CALCIUM CHLORIDE 1000 ML: 600; 310; 30; 20 INJECTION, SOLUTION INTRAVENOUS at 21:52

## 2020-10-08 RX ADMIN — ALBUMIN (HUMAN) 12.5 G: 0.25 INJECTION, SOLUTION INTRAVENOUS at 21:17

## 2020-10-08 RX ADMIN — ALBUMIN (HUMAN) 25 G: 12.5 SOLUTION INTRAVENOUS at 02:20

## 2020-10-08 RX ADMIN — IPRATROPIUM BROMIDE AND ALBUTEROL SULFATE 3 ML: .5; 3 SOLUTION RESPIRATORY (INHALATION) at 19:32

## 2020-10-08 NOTE — PROGRESS NOTES
Hospitalist Progress Note           Daily Progress Note: 10/8/2020    Chief complaint: Shortness of breath and weakness  Subjective: The patient is seen for follow  up. Postoperative day #2 status post infrarenal aorta endarterectomy. Reportedly hypotensive postprocedure requiring 3 vasopressors. Now on dopamine at 5micgs with improved blood pressure. Poor Urine output overnight.   Wife at bedside during this evaluation    Problem List:  Problem List as of 10/8/2020 Date Reviewed: 9/17/2020          Codes Class Noted - Resolved    Metabolic encephalopathy XRY-88-SP: G93.41  ICD-9-CM: 348.31  9/26/2020 - Present        UTI (urinary tract infection) ICD-10-CM: N39.0  ICD-9-CM: 599.0  9/26/2020 - Present        Aspiration pneumonitis ([address] ICD-10-CM: J69.0  ICD-9-CM: 507.0  9/26/2020 - Present        Essential hypertension ICD-10-CM: I10  ICD-9-CM: 401.9  9/26/2020 - Present        Acute dehydration ICD-10-CM: E86.0  ICD-9-CM: 276.51  9/26/2020 - Present              Medications reviewed  Current Facility-Administered Medications   Medication Dose Route Frequency    midazolam (PF) (VERSED) injection 4 mg  4 mg IntraVENous Q2H PRN    potassium chloride 10 mEq in 100 ml IVPB  10 mEq IntraVENous Q1H    furosemide (LASIX) injection 20 mg  20 mg IntraVENous BID    0.9% sodium chloride infusion 250 mL  250 mL IntraVENous PRN    0.9% sodium chloride infusion 250 mL  250 mL IntraVENous PRN    piperacillin-tazobactam (ZOSYN) 3.375 g in 0.9% sodium chloride (MBP/ADV) 100 mL MBP  3.375 g IntraVENous Q8H    propofol (DIPRIVAN) 10 mg/mL infusion  5 mcg/kg/min IntraVENous TITRATE    fentaNYL (PF) 1,500 mcg/30 mL (50 mcg/mL) infusion  75 mcg/hr IntraVENous TITRATE    0.9% sodium chloride infusion 250 mL  250 mL IntraVENous PRN    PHENYLephrine (ALISTAIR-SYNEPHRINE) 30 mg in 0.9% sodium chloride 250 mL infusion   mcg/min IntraVENous CONTINUOUS    0.9% sodium chloride infusion 250 mL  250 mL IntraVENous PRN    DOPamine (INTROPIN) 400 mg in dextrose 5% 250 mL infusion  5 mcg/kg/min IntraVENous TITRATE    0.9% sodium chloride infusion 250 mL  250 mL IntraVENous PRN    vasopressin (VASOSTRICT) 20 Units in 0.9% sodium chloride 100 mL infusion  0.01-0.1 Units/min IntraVENous TITRATE    pantoprazole (PROTONIX) 40 mg in 0.9% sodium chloride 10 mL injection  40 mg IntraVENous Q12H    albuterol-ipratropium (DUO-NEB) 2.5 MG-0.5 MG/3 ML  3 mL Nebulization Q6H PRN    influenza vaccine 2020- (4 yrs+)(PF) (FLUCELVAX QUAD) injection 0.5 mL  0.5 mL IntraMUSCular PRIOR TO DISCHARGE    0.9% sodium chloride infusion 250 mL  250 mL IntraVENous PRN    diphenhydrAMINE (BENADRYL) capsule 50 mg  50 mg Oral BID PRN    phosphorus (K PHOS NEUTRAL) 250 mg tablet 2 Tab  2 Tab Oral BID    levoFLOXacin (LEVAQUIN) tablet 500 mg  500 mg Oral Q24H    predniSONE (DELTASONE) tablet 20 mg  20 mg Oral DAILY WITH BREAKFAST    oxyCODONE IR (ROXICODONE) tablet 15 mg  15 mg Oral Q4H PRN    dextrose 5 % - 0.45% NaCl infusion  50 mL/hr IntraVENous CONTINUOUS    metoprolol tartrate (LOPRESSOR) tablet 50 mg  50 mg Oral BID    enoxaparin (LOVENOX) injection 40 mg  40 mg SubCUTAneous Q24H    aspirin delayed-release tablet 81 mg  81 mg Oral DAILY    acetaminophen (TYLENOL) tablet 650 mg  650 mg Oral Q6H PRN    ondansetron (ZOFRAN) injection 4 mg  4 mg IntraVENous Q8H PRN    guaiFENesin (ROBITUSSIN) 20 mg/mL oral liquid 400 mg  400 mg Oral Q6H PRN       Review of Systems:   Review of systems unable to be obtained because he is intubated    Objective:   Physical Exam:     Visit Vitals  /69 (BP Patient Position: At rest)   Pulse 97   Temp 97.4 °F (36.3 °C)   Resp 10   Ht 6' 0.05\" (1.83 m)   Wt 52.2 kg (115 lb 1.3 oz)   SpO2 93%   BMI 15.59 kg/m²    O2 Flow Rate (L/min): 35 l/min O2 Device: Ventilator    Temp (24hrs), Av.8 °F (36 °C), Min:94.7 °F (34.8 °C), Max:97.6 °F (36.4 °C)    No intake/output data recorded.    10/06 1901 - 10/08 0700  In: 8750 [I.V.:8000]  Out: 8193 [Urine:3425; Drains:1185]    General:   Intubated and sedated, cooperative, no distress, appears older than stated age. Lungs:   Clear to auscultation bilaterally. Chest wall:  No tenderness or deformity. Heart:  Regular rate and rhythm, S1, S2 normal, no murmur, click, rub or gallop. Abdomen:   Soft, non-tender. Diminished Bowel sounds. Dressings in place. Extremities: Extremities normal, atraumatic, positive cyanosis. Pulses: 2+ and symmetric all extremities. Skin: Skin color, texture, turgor normal. No rashes or lesions   Neurologic: CNII-XII intact.      Data Review:       Recent Days:  Recent Labs     10/08/20  0400 10/08/20  0110 10/07/20  0241 10/06/20  0815   WBC 22.4*  --  16.4* 5.0   HGB 7.0* 6.8* 11.1* 12.7   HCT 19.9* 19.3* 33.5* 37.7   *  --  46* 207     Recent Labs     10/08/20  0400 10/07/20  1345 10/07/20  0241 10/06/20  0815    141 142 141   K 2.6* 2.9* 3.9 3.3*   * 107 107 104   CO2 24 25 18* 30   * 142* 122* 71   BUN 7 7 5* 4*   CREA 0.66* 0.72 0.58* 0.46*   CA 7.1* 7.2* 7.2* 8.1*   ALB  --   --  1.8* 1.9*   TBILI  --   --  2.0* 1.0   ALT  --   --  25 60   INR  --   --  1.4* 0.9     Recent Labs     10/08/20  0509 10/07/20  1500 10/07/20  0400   PH 7.46* 7.496* 7.349*   PCO2 31* 28* 20*   PO2 47* 138* 194*   HCO3 23 23 15*   FIO2 28 35.0 50.0       24 Hour Results:  Recent Results (from the past 24 hour(s))   EKG, 12 LEAD, SUBSEQUENT    Collection Time: 10/07/20  1:39 PM   Result Value Ref Range    Ventricular Rate 90 BPM    Atrial Rate 89 BPM    QRS Duration 76 ms    Q-T Interval 326 ms    QTC Calculation (Bezet) 398 ms    Calculated R Axis 71 degrees    Calculated T Axis 79 degrees    Diagnosis       Accelerated Junctional rhythm  Nonspecific T wave abnormality  Abnormal ECG  When compared with ECG of 26-SEP-2020 14:00,  Junctional rhythm has replaced Sinus rhythm  ST elevation now present in Inferior leads  Nonspecific T wave abnormality has replaced inverted T waves in Inferior   leads  QT has shortened  Confirmed by [facility] Alexandra MARTIN ([id number]) on 10/7/2020 8:39:83 PM     METABOLIC PANEL, BASIC    Collection Time: 10/07/20  1:45 PM   Result Value Ref Range    Sodium 141 136 - 145 mmol/L    Potassium 2.9 (L) 3.5 - 5.1 mmol/L    Chloride 107 97 - 108 mmol/L    CO2 25 21 - 32 mmol/L    Anion gap 9 5 - 15 mmol/L    Glucose 142 (H) 65 - 100 mg/dL    BUN 7 6 - 20 mg/dL    Creatinine 0.72 0.70 - 1.30 mg/dL    BUN/Creatinine ratio 10 (L) 12 - 20      GFR est AA >60 >60 ml/min/1.73m2    GFR est non-AA >60 >60 ml/min/1.73m2    Calcium 7.2 (L) 8.5 - 10.1 mg/dL   TROPONIN I    Collection Time: 10/07/20  2:30 PM   Result Value Ref Range    Troponin-I, Qt. 0.06 (H) <0.05 ng/mL   BLOOD GAS, ARTERIAL    Collection Time: 10/07/20  3:00 PM   Result Value Ref Range    pH 7.496 (H) 7.35 - 7.45      PCO2 28 (L) 35 - 45 mmHg    PO2 138 (H) 75 - 100 mmHg    O2  >95 %    BICARBONATE 23 22 - 26 mmol/L    BASE DEFICIT 1.6 0 - 2 mmol/L    O2 METHOD VENT      FIO2 35.0 %    MODE SIMV      Tidal volume 500      PRESSURE SUPPORT 12.0      EPAP/CPAP/PEEP 5.0      SITE Arterial Line      ENIO'S TEST PASS     HEMATOCRIT    Collection Time: 10/08/20  1:10 AM   Result Value Ref Range    HCT 19.3 (L) 36.6 - 50.3 %   HEMOGLOBIN    Collection Time: 10/08/20  1:10 AM   Result Value Ref Range    HGB 6.8 (L) 12.1 - 17.0 g/dL   CBC WITH AUTOMATED DIFF    Collection Time: 10/08/20  4:00 AM   Result Value Ref Range    WBC 22.4 (H) 4.1 - 11.1 K/uL    RBC 2.29 (L) 4.10 - 5.70 M/uL    HGB 7.0 (L) 12.1 - 17.0 g/dL    HCT 19.9 (L) 36.6 - 50.3 %    MCV 86.9 80.0 - 99.0 FL    MCH 30.6 26.0 - 34.0 PG    MCHC 35.2 30.0 - 36.5 g/dL    RDW 16.2 (H) 11.5 - 14.5 %    PLATELET 103 (L) 985 - 400 K/uL    MPV 10.4 8.9 - 12.9 FL    NRBC 4.2 (H) 0  WBC    ABSOLUTE NRBC 0.94 (H) 0.00 - 0.01 K/uL    NEUTROPHILS 80 (H) 32 - 75 %    ABS.  NEUTROPHILS 19.0 (H) 1.8 - 8.0 K/UL    LYMPHOCYTES 11 (L) 12 - 49 %    MONOCYTES 11 5 - 13 %    EOSINOPHILS 0 0 - 7 %    BASOPHILS 0 0 - 1 %    IMMATURE GRANULOCYTES 1 (H) 0.0 - 0.5 %    ABS. LYMPHOCYTES 2.6 0.8 - 3.5 K/UL    ABS. MONOCYTES 2.4 (H) 0.0 - 1.0 K/UL    ABS. EOSINOPHILS 0.0 0.0 - 0.4 K/UL    ABS. BASOPHILS 0.0 0.0 - 0.1 K/UL    ABS. IMM. GRANS. 0.2 (H) 0.00 - 0.04 K/UL    DF AUTOMATED     METABOLIC PANEL, BASIC    Collection Time: 10/08/20  4:00 AM   Result Value Ref Range    Sodium 142 136 - 145 mmol/L    Potassium 2.6 (LL) 3.5 - 5.1 mmol/L    Chloride 109 (H) 97 - 108 mmol/L    CO2 24 21 - 32 mmol/L    Anion gap 9 5 - 15 mmol/L    Glucose 104 (H) 65 - 100 mg/dL    BUN 7 6 - 20 mg/dL    Creatinine 0.66 (L) 0.70 - 1.30 mg/dL    BUN/Creatinine ratio 11 (L) 12 - 20      GFR est AA >60 >60 ml/min/1.73m2    GFR est non-AA >60 >60 ml/min/1.73m2    Calcium 7.1 (L) 8.5 - 10.1 mg/dL   BLOOD GAS, ARTERIAL    Collection Time: 10/08/20  5:09 AM   Result Value Ref Range    pH 7.46 (H) 7.35 - 7.45      PCO2 31 (L) 35 - 45 mmHg    PO2 47 (LL) 75 - 100 mmHg    O2 SAT 87 (L) >95 %    BICARBONATE 23 22 - 26 mmol/L    BASE DEFICIT 1.2 0 - 2 mmol/L    O2 METHOD VENT      FIO2 28 %    MODE SIMV      SPONTANEOUS RATE PENDING     PRESSURE SUPPORT 12      EPAP/CPAP/PEEP 5      Sample source Arterial      SITE Right Radial      ENIO'S TEST Positive      Critical value read back CALLED TO MEL CALLEJAS RN BY DOUG SCHULTE CRT            Assessment/     Acute hypoxic respiratory failure postsurgery. Now intubated    Acute kidney injury likely secondary to ATN from hypotension    Hypovolemic shock post surgery    Right lower lobe aspiration pneumonia improved    Urinary tract infection due to E. Coli    Abnormal Cardiolite stress test showing inferior ischemia. Catheterization done yesterday. 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  Repleted    Hypothermia, probably largely environmental. 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 Plan:  Continue supportive care including ventilator support  Wean Off vasopressor as tolerated  Monitor electrolytes closely  Clinical updates provided to wife at bedside. Patient remains critical  We will consider starting TPN        Care Plan discussed with: Patient/Family    Total time spent with patient: 30 minutes.     Cassy Mo MD

## 2020-10-08 NOTE — ROUTINE PROCESS
TRANSFER - OUT REPORT: 
 
Verbal report given to [facility] (name) on Jennifer Orellana  being transferred to  ICU (unit) for routine post - op Report consisted of patients Situation, Background, Assessment and  
Recommendations(SBAR). Information from the following report(s) SBAR, Intake/Output, MAR, Recent Results, Med Rec Status, Cardiac Rhythm (NS) and Quality Measures was reviewed with the receiving nurse. Opportunity for questions and clarification was provided. Patient transported after procedure straight to ICU.

## 2020-10-08 NOTE — PROGRESS NOTES
Nephrology Consult    Patient: Di Mehta MRN: [id number]  SSN: [id number]    YOB: 1965  Age: 48 y.o. Sex: male      Subjective:       The patient is seen in the room  On vent  Under sedation  Good UOP  K 2.6  Getting iv KCL     Past Medical History:   Diagnosis Date    Myositis     Familial inclusion      Past Surgical History:   Procedure Laterality Date    HX ORTHOPAEDIC      disc infused, neck       Family History   Problem Relation Age of Onset    Other Father     Other Sister     Other Brother      Social History     Tobacco Use    Smoking status: Current Every Day Smoker     Packs/day: 0.50    Smokeless tobacco: Never Used   Substance Use Topics    Alcohol use: Yes     Comment: occ      Current Facility-Administered Medications   Medication Dose Route Frequency Provider Last Rate Last Dose    midazolam (PF) (VERSED) injection 4 mg  4 mg IntraVENous Q2H PRN Alo Olson MD   4 mg at 10/08/20 9460    potassium chloride 10 mEq in 100 ml IVPB  10 mEq IntraVENous Q1H Alo Olson  mL/hr at 10/08/20 0940 10 mEq at 10/08/20 0940    furosemide (LASIX) injection 20 mg  20 mg IntraVENous BID Alo Olson MD   20 mg at 10/08/20 1842    0.9% sodium chloride infusion 250 mL  250 mL IntraVENous PRN Jp Vaz MD        TPN ADULT - CENTRAL   IntraVENous CONTINUOUS Jp Vaz MD        0.9% sodium chloride infusion 250 mL  250 mL IntraVENous PRN Alo Olson MD        piperacillin-tazobactam (ZOSYN) 3.375 g in 0.9% sodium chloride (MBP/ADV) 100 mL MBP  3.375 g IntraVENous Q8H Alo Olson MD 25 mL/hr at 10/08/20 0603 3.375 g at 10/08/20 0603    propofol (DIPRIVAN) 10 mg/mL infusion  5 mcg/kg/min IntraVENous TITRATE Alo Olson MD 6.3 mL/hr at 10/07/20 1633 20 mcg/kg/min at 10/07/20 1633    fentaNYL (PF) 1,500 mcg/30 mL (50 mcg/mL) infusion  75 mcg/hr IntraVENous TITRATE Alo Olson MD 2.5 mL/hr at 10/08/20 2840 125 mcg/hr at 10/08/20 7970    0.9% sodium chloride infusion 250 mL  250 mL IntraVENous PRN Bree Olson MD        PHENYLephrine (ALISTAIR-SYNEPHRINE) 30 mg in 0.9% sodium chloride 250 mL infusion   mcg/min IntraVENous CONTINUOUS Bree Olson MD 50 mL/hr at 10/07/20 0050 100 mcg/min at 10/07/20 0050    0.9% sodium chloride infusion 250 mL  250 mL IntraVENous PRN Bree Olson MD        DOPamine (INTROPIN) 400 mg in dextrose 5% 250 mL infusion  5 mcg/kg/min IntraVENous TITRATE Bree Olson MD 9.8 mL/hr at 10/08/20 0052 5 mcg/kg/min at 10/08/20 0052    0.9% sodium chloride infusion 250 mL  250 mL IntraVENous PRN Bree Olson MD        vasopressin (VASOSTRICT) 20 Units in 0.9% sodium chloride 100 mL infusion  0.01-0.1 Units/min IntraVENous TITRATE Bree Olson MD   Stopped at 10/08/20 0500    pantoprazole (PROTONIX) 40 mg in 0.9% sodium chloride 10 mL injection  40 mg IntraVENous Q12H Olman Clemons MD   40 mg at 10/08/20 0849    albuterol-ipratropium (DUO-NEB) 2.5 MG-0.5 MG/3 ML  3 mL Nebulization Q6H PRN Olman Clemons MD        influenza vaccine 2020-21 (4 yrs+)(PF) (FLUCELVAX QUAD) injection 0.5 mL  0.5 mL IntraMUSCular PRIOR TO DISCHARGE Ronaldo Persaud MD   Stopped at 10/07/20 0900    0.9% sodium chloride infusion 250 mL  250 mL IntraVENous PRN Bree Olson MD        diphenhydrAMINE (BENADRYL) capsule 50 mg  50 mg Oral BID PRN Alvah Denver, NP   50 mg at 10/03/20 0129    phosphorus (K PHOS NEUTRAL) 250 mg tablet 2 Tab  2 Tab Oral BID Ronaldo Persaud MD   Stopped at 10/08/20 0900    levoFLOXacin (LEVAQUIN) tablet 500 mg  500 mg Oral Q24H Ronaldo Persaud MD   Stopped at 10/06/20 1100    predniSONE (DELTASONE) tablet 20 mg  20 mg Oral DAILY WITH BREAKFAST Ronaldo Persaud MD   Stopped at 10/07/20 0800    oxyCODONE IR (ROXICODONE) tablet 15 mg  15 mg Oral Q4H PRN Ronaldo Persaud MD   15 mg at 10/06/20 1228    dextrose 5 % - 0.45% NaCl infusion  50 mL/hr IntraVENous CONTINUOUS Wesley, [address] Col[address] MD Braydon 50 mL/hr at 10/07/20 1633 50 mL/hr at 10/07/20 1633    metoprolol tartrate (LOPRESSOR) tablet 50 mg  50 mg Oral BID Home Marquez MD   Stopped at 10/06/20 2100    enoxaparin (LOVENOX) injection 40 mg  40 mg SubCUTAneous Q24H Dagoberto Reyes MD   Stopped at 10/05/20 2141    aspirin delayed-release tablet 81 mg  81 mg Oral DAILY Ivone SUTTON NP   Stopped at 10/07/20 0900    acetaminophen (TYLENOL) tablet 650 mg  650 mg Oral Q6H PRN Gisele Long NP   650 mg at 09/30/20 0950    ondansetron (ZOFRAN) injection 4 mg  4 mg IntraVENous Q8H PRN Gisele Long NP   4 mg at 09/30/20 0402    guaiFENesin (ROBITUSSIN) 20 mg/mL oral liquid 400 mg  400 mg Oral Q6H PRN Ivone SUTTON NP   Stopped at 09/26/20 1855        No Known Allergies    Review of Systems:  A comprehensive review of systems was negative except for that written in the History of Present Illness.     Objective:     Vitals:    10/08/20 0806 10/08/20 0900 10/08/20 1000 10/08/20 1103   BP: 96/63 (!) 127/56 115/69    Pulse: 87 (!) 101 97 93   Resp:  17 10 10   Temp:       SpO2:  95% 93% 96%   Weight:       Height:            Physical Exam:  General: Under sedation  Nose NG in place  Oral cavity ET in place  Chest on ventilatory support  Heart S1-S2 normal  Genitourinary Reese in place  Lower extremity no edema  Assessment:     Hospital Problems  Date Reviewed: 9/17/2020          Codes Class Noted POA    Metabolic encephalopathy RQD-38-MK: G93.41  ICD-9-CM: 348.31  9/26/2020 Unknown        UTI (urinary tract infection) ICD-10-CM: N39.0  ICD-9-CM: 599.0  9/26/2020 Unknown        Aspiration pneumonitis ([facility] [address] ICD-10-CM: J69.0  ICD-9-CM: 507.0  9/26/2020 Unknown        Essential hypertension ICD-10-CM: I10  ICD-9-CM: 401.9  9/26/2020 Unknown        Acute dehydration ICD-10-CM: E86.0  ICD-9-CM: 276.51  9/26/2020 Unknown              Plan:     #1 Low urine output :  - likely prerenal from hypotension.    -He was put on 3 pressors.    -Creatinine 0.58.    -His blood pressures are better now.    -No volume overload.    -gave normal saline 1 L as a bolus. -on IV fluids.  -good UOP    2. Metabolic acidosis, high anion gap. From hypotension and lactic acidosis  CO2 was 18-->24  I will check lactic acid level        #3 status post  infrarenal aorta endarterectomy, with aortic by common femoral artery bypass with 14 mm x 7 mm Hemosure graft    #4 hypertension. Blood pressure is low.     dced lisinopril    #5 severe Hypokalemia:  -K 2.6  -iv KCL 80 meq was ordered        Signed By: Nessa Jackson MD     October 8, 2020

## 2020-10-08 NOTE — PROGRESS NOTES
Patient examined this morning. Last night patient's hemoglobin was 6.6. Patient had a CT angiogram last night. I reviewed the CT angiogram personally. There is no final report. Aortobifemoral graft is intact. There is no hematoma. Left renal vessels patent. Patient currently CVP is around 8 or 9. Hemoglobin is 6.6 related to hemodilution effect. We will start him on Lasix 20 IV twice daily. On CT scan is left lung pleural effusion and atelectasis looks worse. I will also initiate chest physiotherapy. I will discontinue dopamine drip he is currently on renal dose of dopamine drip. We will cut down sedation is off hopefully we can extubate him today. Again he is lung field on the left side pleural effusion looks worse. We will monitor his progress closely.

## 2020-10-08 NOTE — OP NOTES
This is operative report on Thelma Capps, patient's YOB: 1965. Date of surgery: October 7, 2020. Surgeon: Dr. Sukhjinder Bradley. Preop diagnosis:  Aorto bilateral iliac occlusion. Bilateral lower leg weakness with severe peripheral artery disease. Postprocedure diagnosis:  Same as above. Splenic capsular tear with hemorrhage. Procedure:  1. Infrarenal abdominal aorta  To bilateral common femoral artery bypass with Hemashield graft size 14 mm x 7 mm. 2.  Proximal infrarenal aorta endarterectomy. 3.  Exposure of the supraceliac descending aorta. 4.  Splenectomy. 5.  Right subclavian central venous catheter placement using triple-lumen. Anesthesia: General.  Anesthesiologist: Dr. Kush Cabral. EBL: 3500 to 4000 cc. Intra-Op fluid: 2 units of albumin, 10 L of crystalloid, Cell Saver 1000 cc. Assistant: See operating room record. Drains: #7 HELGA drain x2. Specimens: Plaque removed from the aorta. Indication for surgery: Mr. Lesley Lopez is a very pleasant 60-year-old man currently hospitalized with general weakness, bilateral leg pain and weakness. CT angiogram shows aortoiliac occlusion. KASEY examination shows a severe peripheral vascular disease. Patient was told he had some sort of a myositis on both legs. After vascular work-up and examination shows most likely patient's generalized weakness in the leg weakness secondary from the severe peripheral vascular disease from aortoiliac occlusion. Patient was discussed about options of the revascularization procedure including aortobifemoral bypass with a graft. We talked about techniques, surgical risk benefits complication. After discussion patient wants to proceed with surgery. Description of procedure: Patient was brought to the operating table comfortable supine position, followed by general anesthesia was initiated. Patient required central line.   So using Seldinger technique triple-lumen catheter was placed right subclavian vein.  After this was complete. Patient had other invasive lines including arterial lines distally. Followed by patient's abdomen lower chest upper thigh was prepped usual sterile technique using DuraPrep's. Followed by sterile drapes applied usual fashion. At this time timeout was called after confirmation made the procedure commenced. Initial procedure was done by exposing bilateral common femoral artery. Incision was made right groin with a #15 blade, common femoral was exposed usual fashion followed by another incision was made left groin left common femoral was exposed in usual fashion. Pessary was applied. Followed by generous midline incision was made using #15 blade. Fascia was opened. Omni retractor was utilized to retract abdominal wall and the intra-abdominal cavity. Followed by the small bowel was mobilized superiorly and laterally. Then the retroperitoneum over the aorta was dissected and divided. Proximal bilateral common iliac artery was dissected off from the venous structures posteriorly without any injuries. Followed by dissection was continued surrounding tissues including IVC renal vein superiorly was dissected away from the abdominal aorta. I also made a separate window of the descending aorta at the diaphragmatic hiatus. Ligament of the left lobe was divided GE junction was retracted laterally. At this time NG tube was placed. Right-sided melisa muscle was slightly divided and further exposure was obtained to visualize descending aorta. The process of the mobilizing of the stomach and spleen there was a capsular tear of the spleen was noted. Packing was applied around the spleen. Followed by dissection was continued to expose the entire infrarenal aorta. Once entire infrarenal aorta was dissected and skeletonized Vesseloops applied. Initial division of the proximal common iliac artery was performed using TA staplers.   Followed by arteriotomy was made the body of the aorta again there was no flow to the occluded area. CHRIS was a compromised and this has to be ligated off. Endarterectomy was performed across the area of the material was removed renal vein was retracted superiorly for the endarterectomy. Heavy calcified plaque was removed from a completely occluded of the aorta flow was noted followed by aortic clamp was applied at the infrarenal level. Followed by hemo-shield bifurcated graft was sewn in.  Proximal anastomosis was done using 3-0 Prolene sutures to the aorta end-to-end fashion, followed by hemostasis obtained after initial proximal anastomosis complete. Followed by both limbs of the graft was tunneled retroperitoneally to each groin. Then followed by initial anastomosis was done in the right groin from graft to right common femoral artery end-to-side fashion using 5-0 Prolene sutures followed by same patient was done on the left groin. After distal anastomosis complete blood flow is reestablished to the graft system. Followed by splint was reexamined patient continues having bleeding from splenic capsular tear requiring splenectomy. Spleen was mobilized medially clamp was applied at the splenic hilum and then ligated. Followed by abdominal space was washed out thoroughly. Retroperitoneum was closed with 3-0 Vicryl sutures. I also placed drains using #7 HELGA drain. Followed by patient was closed with 0 PDS running suture skin was closed with staples. Both groins were closed with 3-0 Vicryl in layers followed by skin was closed with 4 Monocryl sutures. Appropriate sterile dressing was applied patient was kept intubated transfer to ICU stable condition. During this procedure EBL was 3500 cc to 4000 cc.

## 2020-10-08 NOTE — PROGRESS NOTES
CARDIOLOGY PROGRESS NOTE    Patient seen and examined. This is a patient who is followed for Cardiac Clearance . Now post op from aorta bifemoral bypass, wife at bedside. Had hemorrhage from the splenic tear requiring splenectomy causing hypotension requiring pressors, fluid, and blood products. Intubated and sedated. Blood pressure and abg has improved. Plans for possible extubation. No distress. No other complaints reported. Telemetry reviewed, there were no events noted in the past 24 hours. NSR 70-80s    Pertinent review of systems items noted above, all other systems are negative. Current medications reviewed. Physical Examination  Vital signs are stable. Blood pressure 96/63, Pulse 83  Intubated and sedated No apparent distress. Heart is regular, rate and rhythm. Normal S1, S2, no murmurs are appreciated. Lungs are clear to diminished bilaterally. Abdomen is soft, nontender, normal bowel sounds. Extremities have  +2 edema     Labs reviewed:   Recent Results (from the past 12 hour(s))   HEMATOCRIT    Collection Time: 10/08/20  1:10 AM   Result Value Ref Range    HCT 19.3 (L) 36.6 - 50.3 %   HEMOGLOBIN    Collection Time: 10/08/20  1:10 AM   Result Value Ref Range    HGB 6.8 (L) 12.1 - 17.0 g/dL   CBC WITH AUTOMATED DIFF    Collection Time: 10/08/20  4:00 AM   Result Value Ref Range    WBC 22.4 (H) 4.1 - 11.1 K/uL    RBC 2.29 (L) 4.10 - 5.70 M/uL    HGB 7.0 (L) 12.1 - 17.0 g/dL    HCT 19.9 (L) 36.6 - 50.3 %    MCV 86.9 80.0 - 99.0 FL    MCH 30.6 26.0 - 34.0 PG    MCHC 35.2 30.0 - 36.5 g/dL    RDW 16.2 (H) 11.5 - 14.5 %    PLATELET 977 (L) 561 - 400 K/uL    MPV 10.4 8.9 - 12.9 FL    NRBC 4.2 (H) 0  WBC    ABSOLUTE NRBC 0.94 (H) 0.00 - 0.01 K/uL    NEUTROPHILS 80 (H) 32 - 75 %    ABS. NEUTROPHILS 19.0 (H) 1.8 - 8.0 K/UL    LYMPHOCYTES 11 (L) 12 - 49 %    MONOCYTES 11 5 - 13 %    EOSINOPHILS 0 0 - 7 %    BASOPHILS 0 0 - 1 %    IMMATURE GRANULOCYTES 1 (H) 0.0 - 0.5 %    ABS.  LYMPHOCYTES 2.6 0.8 - 3.5 K/UL    ABS. MONOCYTES 2.4 (H) 0.0 - 1.0 K/UL    ABS. EOSINOPHILS 0.0 0.0 - 0.4 K/UL    ABS. BASOPHILS 0.0 0.0 - 0.1 K/UL    ABS. IMM. GRANS. 0.2 (H) 0.00 - 0.04 K/UL    DF AUTOMATED     METABOLIC PANEL, BASIC    Collection Time: 10/08/20  4:00 AM   Result Value Ref Range    Sodium 142 136 - 145 mmol/L    Potassium 2.6 (LL) 3.5 - 5.1 mmol/L    Chloride 109 (H) 97 - 108 mmol/L    CO2 24 21 - 32 mmol/L    Anion gap 9 5 - 15 mmol/L    Glucose 104 (H) 65 - 100 mg/dL    BUN 7 6 - 20 mg/dL    Creatinine 0.66 (L) 0.70 - 1.30 mg/dL    BUN/Creatinine ratio 11 (L) 12 - 20      GFR est AA >60 >60 ml/min/1.73m2    GFR est non-AA >60 >60 ml/min/1.73m2    Calcium 7.1 (L) 8.5 - 10.1 mg/dL   BLOOD GAS, ARTERIAL    Collection Time: 10/08/20  5:09 AM   Result Value Ref Range    pH 7.46 (H) 7.35 - 7.45      PCO2 31 (L) 35 - 45 mmHg    PO2 47 (LL) 75 - 100 mmHg    O2 SAT 87 (L) >95 %    BICARBONATE 23 22 - 26 mmol/L    BASE DEFICIT 1.2 0 - 2 mmol/L    O2 METHOD VENT      FIO2 28 %    MODE SIMV      SPONTANEOUS RATE PENDING     PRESSURE SUPPORT 12      EPAP/CPAP/PEEP 5      Sample source Arterial      SITE Right Radial      ENIO'S TEST Positive      Critical value read back CALLED TO MEL CALLEJAS RN BY DOUG SCHULTE CRT        Case discussed with Dr. Rehan Thacker and our impression and recommendations are as follows:  1. Pre-op risk stratification: Echo with normal EF. NST abnormal with a moderate area of ischemia. Cardiac cath from yesterday shows nonobstructive CAD. Continue asa. Moderate risk for inpatient vascular surgery. 2. PAD: Continue ASA and zetia. Unclear if statins are contraindicated with IBM- Will check with primary team or neurology. 3. Hypertension:Hypotensive, requiring pressors, fluid, and blood. Blood pressure medications being held. Most likely from splenic tear. No changes in echocardiogram.   4. Nonobstructive CAD: 50% disease to the left cx. He is asymptomatic currently but unable to walk due to his PAD.  Will monitor symptoms after surgery. Continue DAPT and zetia.        Please do not hesitate to call me or Dr. Francia Jeronimo if additional questions arise.

## 2020-10-09 ENCOUNTER — APPOINTMENT (OUTPATIENT)
Dept: GENERAL RADIOLOGY | Age: 55
DRG: 981 | End: 2020-10-09
Attending: INTERNAL MEDICINE
Payer: COMMERCIAL

## 2020-10-09 LAB
ABO + RH BLD: NORMAL
ANION GAP SERPL CALC-SCNC: 6 MMOL/L (ref 5–15)
ANION GAP SERPL CALC-SCNC: 9 MMOL/L (ref 5–15)
ARTERIAL PATENCY WRIST A: YES
BASE DEFICIT BLDA-SCNC: 2.5 MMOL/L (ref 0–2)
BASOPHILS # BLD: 0 K/UL (ref 0–0.1)
BASOPHILS # BLD: 0 K/UL (ref 0–0.1)
BASOPHILS NFR BLD: 0 % (ref 0–1)
BASOPHILS NFR BLD: 0 % (ref 0–1)
BDY SITE: ABNORMAL
BLD PROD TYP BPU: NORMAL
BLOOD GROUP ANTIBODIES SERPL: NEGATIVE
BPU ID: NORMAL
BUN SERPL-MCNC: 14 MG/DL (ref 6–20)
BUN SERPL-MCNC: 8 MG/DL (ref 6–20)
BUN/CREAT SERPL: 10 (ref 12–20)
BUN/CREAT SERPL: 20 (ref 12–20)
CA-I BLD-MCNC: 7.1 MG/DL (ref 8.5–10.1)
CA-I BLD-MCNC: 7.4 MG/DL (ref 8.5–10.1)
CHLORIDE SERPL-SCNC: 104 MMOL/L (ref 97–108)
CHLORIDE SERPL-SCNC: 106 MMOL/L (ref 97–108)
CO2 SERPL-SCNC: 25 MMOL/L (ref 21–32)
CO2 SERPL-SCNC: 28 MMOL/L (ref 21–32)
CREAT SERPL-MCNC: 0.71 MG/DL (ref 0.7–1.3)
CREAT SERPL-MCNC: 0.81 MG/DL (ref 0.7–1.3)
CROSSMATCH RESULT,%XM: NORMAL
DIFFERENTIAL METHOD BLD: ABNORMAL
DIFFERENTIAL METHOD BLD: ABNORMAL
EOSINOPHIL # BLD: 0 K/UL (ref 0–0.4)
EOSINOPHIL # BLD: 0 K/UL (ref 0–0.4)
EOSINOPHIL NFR BLD: 0 % (ref 0–7)
EOSINOPHIL NFR BLD: 1 % (ref 0–7)
ERYTHROCYTE [DISTWIDTH] IN BLOOD BY AUTOMATED COUNT: 14.9 % (ref 11.5–14.5)
ERYTHROCYTE [DISTWIDTH] IN BLOOD BY AUTOMATED COUNT: 15.1 % (ref 11.5–14.5)
FIO2 ON VENT: 35 %
GAS FLOW.O2 O2 DELIVERY SYS: 12 L/MIN
GLUCOSE SERPL-MCNC: 108 MG/DL (ref 65–100)
GLUCOSE SERPL-MCNC: 119 MG/DL (ref 65–100)
HCO3 BLDA-SCNC: 22 MMOL/L (ref 22–26)
HCT VFR BLD AUTO: 21.2 % (ref 36.6–50.3)
HCT VFR BLD AUTO: 29 % (ref 36.6–50.3)
HGB BLD-MCNC: 10.2 G/DL (ref 12.1–17)
HGB BLD-MCNC: 7.5 G/DL (ref 12.1–17)
IMM GRANULOCYTES # BLD AUTO: 0 K/UL
IMM GRANULOCYTES # BLD AUTO: 0.2 K/UL (ref 0–0.04)
IMM GRANULOCYTES NFR BLD AUTO: 0 %
IMM GRANULOCYTES NFR BLD AUTO: 1 % (ref 0–0.5)
LYMPHOCYTES # BLD: 0.2 K/UL (ref 0.8–3.5)
LYMPHOCYTES # BLD: 2.5 K/UL (ref 0.8–3.5)
LYMPHOCYTES NFR BLD: 1 % (ref 12–49)
LYMPHOCYTES NFR BLD: 10 % (ref 12–49)
MCH RBC QN AUTO: 30.9 PG (ref 26–34)
MCH RBC QN AUTO: 31.1 PG (ref 26–34)
MCHC RBC AUTO-ENTMCNC: 35.2 G/DL (ref 30–36.5)
MCHC RBC AUTO-ENTMCNC: 35.4 G/DL (ref 30–36.5)
MCV RBC AUTO: 87.9 FL (ref 80–99)
MCV RBC AUTO: 88 FL (ref 80–99)
MONOCYTES # BLD: 0.3 K/UL (ref 0–1)
MONOCYTES # BLD: 1.8 K/UL (ref 0–1)
MONOCYTES NFR BLD: 11 % (ref 5–13)
MONOCYTES NFR BLD: 2 % (ref 5–13)
NEUTS BAND NFR BLD MANUAL: 1 % (ref 0–6)
NEUTS SEG # BLD: 16.9 K/UL (ref 1.8–8)
NEUTS SEG # BLD: 18.9 K/UL (ref 1.8–8)
NEUTS SEG NFR BLD: 78 % (ref 32–75)
NEUTS SEG NFR BLD: 96 % (ref 32–75)
NRBC # BLD: 1.45 K/UL (ref 0–0.01)
NRBC # BLD: 1.57 K/UL
NRBC BLD MANUAL-RTO: 9 PER 100 WBC
NRBC BLD-RTO: 6.1 PER 100 WBC
PCO2 BLDA: 30 MMHG (ref 35–45)
PH BLDA: 7.46 [PH] (ref 7.35–7.45)
PLATELET # BLD AUTO: 51 K/UL (ref 150–400)
PLATELET # BLD AUTO: 82 K/UL (ref 150–400)
PMV BLD AUTO: 10.3 FL (ref 8.9–12.9)
PMV BLD AUTO: 10.8 FL (ref 8.9–12.9)
PO2 BLDA: 68 MMHG (ref 75–100)
POTASSIUM SERPL-SCNC: 2.9 MMOL/L (ref 3.5–5.1)
POTASSIUM SERPL-SCNC: 3 MMOL/L (ref 3.5–5.1)
RBC # BLD AUTO: 2.41 M/UL (ref 4.1–5.7)
RBC # BLD AUTO: 3.3 M/UL (ref 4.1–5.7)
RBC MORPH BLD: ABNORMAL
SAO2 % BLD: 96 %
SERVICE CMNT-IMP: ABNORMAL
SODIUM SERPL-SCNC: 138 MMOL/L (ref 136–145)
SODIUM SERPL-SCNC: 140 MMOL/L (ref 136–145)
SPECIMEN EXP DATE BLD: NORMAL
SPECIMEN SITE: ABNORMAL
STATUS OF UNIT,%ST: NORMAL
TRANSFUSION STATUS PATIENT QL: NORMAL
UNIT DIVISION, %UDIV: 0
WBC # BLD AUTO: 17.4 K/UL (ref 4.1–11.1)
WBC # BLD AUTO: 23.9 K/UL (ref 4.1–11.1)

## 2020-10-09 PROCEDURE — 94640 AIRWAY INHALATION TREATMENT: CPT

## 2020-10-09 PROCEDURE — 65610000006 HC RM INTENSIVE CARE

## 2020-10-09 PROCEDURE — 74011250636 HC RX REV CODE- 250/636: Performed by: SURGERY

## 2020-10-09 PROCEDURE — 74011000250 HC RX REV CODE- 250: Performed by: SURGERY

## 2020-10-09 PROCEDURE — C9113 INJ PANTOPRAZOLE SODIUM, VIA: HCPCS | Performed by: INTERNAL MEDICINE

## 2020-10-09 PROCEDURE — 94660 CPAP INITIATION&MGMT: CPT

## 2020-10-09 PROCEDURE — 74011250636 HC RX REV CODE- 250/636: Performed by: INTERNAL MEDICINE

## 2020-10-09 PROCEDURE — P9047 ALBUMIN (HUMAN), 25%, 50ML: HCPCS | Performed by: SURGERY

## 2020-10-09 PROCEDURE — 31720 CLEARANCE OF AIRWAYS: CPT

## 2020-10-09 PROCEDURE — 82803 BLOOD GASES ANY COMBINATION: CPT

## 2020-10-09 PROCEDURE — 94668 MNPJ CHEST WALL SBSQ: CPT

## 2020-10-09 PROCEDURE — 74011000258 HC RX REV CODE- 258: Performed by: SURGERY

## 2020-10-09 PROCEDURE — 77010033678 HC OXYGEN DAILY

## 2020-10-09 PROCEDURE — 74011000250 HC RX REV CODE- 250: Performed by: INTERNAL MEDICINE

## 2020-10-09 PROCEDURE — 71045 X-RAY EXAM CHEST 1 VIEW: CPT

## 2020-10-09 PROCEDURE — 85025 COMPLETE CBC W/AUTO DIFF WBC: CPT

## 2020-10-09 PROCEDURE — 36415 COLL VENOUS BLD VENIPUNCTURE: CPT

## 2020-10-09 PROCEDURE — 74011000258 HC RX REV CODE- 258: Performed by: INTERNAL MEDICINE

## 2020-10-09 PROCEDURE — 74011250637 HC RX REV CODE- 250/637: Performed by: SURGERY

## 2020-10-09 PROCEDURE — 80048 BASIC METABOLIC PNL TOTAL CA: CPT

## 2020-10-09 RX ORDER — POTASSIUM CHLORIDE 7.45 MG/ML
10 INJECTION INTRAVENOUS
Status: DISPENSED | OUTPATIENT
Start: 2020-10-09 | End: 2020-10-10

## 2020-10-09 RX ORDER — POTASSIUM CHLORIDE 7.45 MG/ML
10 INJECTION INTRAVENOUS
Status: DISPENSED | OUTPATIENT
Start: 2020-10-09 | End: 2020-10-09

## 2020-10-09 RX ORDER — SODIUM CHLORIDE FOR INHALATION 0.9 %
2.5 VIAL, NEBULIZER (ML) INHALATION AS NEEDED
Status: DISCONTINUED | OUTPATIENT
Start: 2020-10-09 | End: 2020-10-26

## 2020-10-09 RX ORDER — ALBUMIN HUMAN 250 G/1000ML
12.5 SOLUTION INTRAVENOUS ONCE
Status: COMPLETED | OUTPATIENT
Start: 2020-10-09 | End: 2020-10-09

## 2020-10-09 RX ORDER — LORAZEPAM 2 MG/ML
1 INJECTION INTRAMUSCULAR
Status: DISCONTINUED | OUTPATIENT
Start: 2020-10-09 | End: 2020-10-26 | Stop reason: SDUPTHER

## 2020-10-09 RX ORDER — ALBUTEROL SULFATE 2.5 MG/.5ML
2.5 SOLUTION RESPIRATORY (INHALATION)
Status: DISCONTINUED | OUTPATIENT
Start: 2020-10-09 | End: 2020-10-10

## 2020-10-09 RX ORDER — ACETYLCYSTEINE 200 MG/ML
600 SOLUTION ORAL; RESPIRATORY (INHALATION)
Status: DISCONTINUED | OUTPATIENT
Start: 2020-10-09 | End: 2020-10-10

## 2020-10-09 RX ORDER — ALBUMIN HUMAN 250 G/1000ML
25 SOLUTION INTRAVENOUS ONCE
Status: COMPLETED | OUTPATIENT
Start: 2020-10-09 | End: 2020-10-09

## 2020-10-09 RX ADMIN — MIDAZOLAM HYDROCHLORIDE 2 MG: 1 INJECTION, SOLUTION INTRAMUSCULAR; INTRAVENOUS at 18:15

## 2020-10-09 RX ADMIN — HYDROCORTISONE 100 MG: 100 ENEMA RECTAL at 13:43

## 2020-10-09 RX ADMIN — POTASSIUM CHLORIDE 10 MEQ: 7.46 INJECTION, SOLUTION INTRAVENOUS at 23:28

## 2020-10-09 RX ADMIN — DEXMEDETOMIDINE HYDROCHLORIDE 0.2 MCG/KG/HR: 100 INJECTION, SOLUTION, CONCENTRATE INTRAVENOUS at 17:55

## 2020-10-09 RX ADMIN — ALBUMIN (HUMAN) 12.5 G: 0.25 INJECTION, SOLUTION INTRAVENOUS at 21:45

## 2020-10-09 RX ADMIN — MIDAZOLAM HYDROCHLORIDE 4 MG: 1 INJECTION, SOLUTION INTRAMUSCULAR; INTRAVENOUS at 09:56

## 2020-10-09 RX ADMIN — ALBUTEROL SULFATE 2.5 MG: 2.5 SOLUTION RESPIRATORY (INHALATION) at 23:23

## 2020-10-09 RX ADMIN — SODIUM CHLORIDE 250 ML: 9 INJECTION, SOLUTION INTRAVENOUS at 20:58

## 2020-10-09 RX ADMIN — POTASSIUM CHLORIDE 10 MEQ: 10 INJECTION, SOLUTION INTRAVENOUS at 15:47

## 2020-10-09 RX ADMIN — SODIUM CHLORIDE 40 MG: 9 INJECTION, SOLUTION INTRAMUSCULAR; INTRAVENOUS; SUBCUTANEOUS at 21:48

## 2020-10-09 RX ADMIN — SODIUM CHLORIDE 40 MG: 9 INJECTION, SOLUTION INTRAMUSCULAR; INTRAVENOUS; SUBCUTANEOUS at 09:51

## 2020-10-09 RX ADMIN — POTASSIUM PHOSPHATE, MONOBASIC POTASSIUM PHOSPHATE, DIBASIC: 224; 236 INJECTION, SOLUTION, CONCENTRATE INTRAVENOUS at 22:00

## 2020-10-09 RX ADMIN — ALBUMIN (HUMAN) 25 G: 0.25 INJECTION, SOLUTION INTRAVENOUS at 13:35

## 2020-10-09 RX ADMIN — DEXTROSE AND SODIUM CHLORIDE 50 ML/HR: 5; 450 INJECTION, SOLUTION INTRAVENOUS at 05:20

## 2020-10-09 RX ADMIN — POTASSIUM CHLORIDE 10 MEQ: 10 INJECTION, SOLUTION INTRAVENOUS at 18:15

## 2020-10-09 RX ADMIN — SODIUM CHLORIDE, POTASSIUM CHLORIDE, SODIUM LACTATE AND CALCIUM CHLORIDE 500 ML: 600; 310; 30; 20 INJECTION, SOLUTION INTRAVENOUS at 12:49

## 2020-10-09 RX ADMIN — DOPAMINE HYDROCHLORIDE IN DEXTROSE 2.5 MCG/KG/MIN: 1.6 INJECTION, SOLUTION INTRAVENOUS at 05:20

## 2020-10-09 RX ADMIN — ACETYLCYSTEINE 600 MG: 200 SOLUTION ORAL; RESPIRATORY (INHALATION) at 23:23

## 2020-10-09 RX ADMIN — FUROSEMIDE 20 MG: 10 INJECTION, SOLUTION INTRAMUSCULAR; INTRAVENOUS at 09:51

## 2020-10-09 RX ADMIN — PIPERACILLIN AND TAZOBACTAM 3.38 G: 3; .375 INJECTION, POWDER, LYOPHILIZED, FOR SOLUTION INTRAVENOUS at 05:20

## 2020-10-09 RX ADMIN — FENTANYL CITRATE 75 MCG/HR: 50 INJECTION, SOLUTION INTRAMUSCULAR; INTRAVENOUS at 14:20

## 2020-10-09 RX ADMIN — PIPERACILLIN AND TAZOBACTAM 3.38 G: 3; .375 INJECTION, POWDER, LYOPHILIZED, FOR SOLUTION INTRAVENOUS at 21:49

## 2020-10-09 RX ADMIN — POTASSIUM CHLORIDE 10 MEQ: 10 INJECTION, SOLUTION INTRAVENOUS at 16:20

## 2020-10-09 RX ADMIN — PIPERACILLIN AND TAZOBACTAM 3.38 G: 3; .375 INJECTION, POWDER, LYOPHILIZED, FOR SOLUTION INTRAVENOUS at 13:59

## 2020-10-09 RX ADMIN — HYDROCORTISONE 100 MG: 100 ENEMA RECTAL at 21:51

## 2020-10-09 RX ADMIN — ALBUTEROL SULFATE 2.5 MG: 2.5 SOLUTION RESPIRATORY (INHALATION) at 19:43

## 2020-10-09 NOTE — PROGRESS NOTES
Physician Progress Note      Richa Thomas  CSN #:                  [id number]  :                       1965  ADMIT DATE:       2020 12:44 PM  [address] [place] DATE:  RESPONDING  PROVIDER #:        Maggie Johnson MD          QUERY TEXT:    Pt admitted with aspiration pneumonia. Pt noted to have aorto bilateral iliac occlusion requiring surgery. Per Op Note, there was a splenic capsular tear with hemorrhage resulting in a splenectomy. If possible, please document in progress notes and discharge summary:    The medical record reflects the following:  Risk Factors: surgery    Clinical Indicators:    Op Note 10/7-   The process of the mobilizing of the stomach and spleen there was a capsular tear of the spleen was noted. Packing was applied around the spleen. .. patient continues having bleeding from splenic capsular tear requiring splenectomy    EBL 3500 - 4000ml    Treatment: splenectomy; ICU monitoring      Thank you,  Trevor Sales RN, BSN  Clinical Documentation  Options provided:  -- Splenic capsular tear resulting in a splenectomy was an incidental occurrence inherent in the surgical procedure  -- Splenic capsular tear resulting in a splenectomy was a complication of the procedure  -- Other - I will add my own diagnosis  -- Disagree - Not applicable / Not valid  -- Disagree - Clinically unable to determine / Unknown  -- Refer to Clinical Documentation Reviewer    PROVIDER RESPONSE TEXT:    Patient had a Splenic capsular tear resulting in a splenectomy that was a complication of the procedure. Query created by: Tom Madera on 10/8/2020 12:06 PM      QUERY TEXT:    Pt admitted with UTI. Pt noted to have chronic indwelling urinary catheter. If possible, please document in the progress notes and discharge summary if you are evaluating and/or treating any of the following:     The medical record reflects the following:  Risk Factors: chronic chacko    Clinical Indicators:    UA= Blood MOD, Bact 1+  UCx= 50k E Coli    Treatment: UCx; Rocephin 1g IV daily; Thank you,  Kg Malone, RN, BSN  Clinical Documentation  Options provided:  -- UTI due to chronic indwelling urinary catheter  -- UTI not due to indwelling urinary catheter  -- Other - I will add my own diagnosis  -- Disagree - Not applicable / Not valid  -- Disagree - Clinically unable to determine / Unknown  -- Refer to Clinical Documentation Reviewer    PROVIDER RESPONSE TEXT:    UTI is due to the chronic indwelling urinary catheter. Query created by: Dominick Parekh on 10/9/2020 2:19 PM      QUERY TEXT:    Pt admitted with aspiration, UTI, respiratory failure. Pt noted to have developed 2 or more SIRS criteria starting on 10/7/20. If possible, please document in the progress notes and discharge summary if you are evaluating and /or treating any of the following: The medical record reflects the following:  Risk Factors: aspiration pna, UTI, recent surgery    Clinical Indicators:    VS starting 10/7:  WBC 16.4-> 22.4-> 23.9  T 94.7  HR 91 - 118  RR 10 - 24, intubated  BP: 88/58, 89/53, 74/53    Treatment: Dopamine titrated gtt; Ez titrated gtt; Vasopressin titrated gtt; Zosyn 3.375g IV Q 8hrs; Albumin 25g IV; 0.9% NS 1L bolus x 3    Thank you,  Kg Malone, RN, BSN  Clinical Documentation  Options provided:  -- Sepsis, not present on admission,  -- No Sepsis, localized infection only  -- Sepsis was ruled out  -- Other - I will add my own diagnosis  -- Disagree - Not applicable / Not valid  -- Disagree - Clinically unable to determine / Unknown  -- Refer to Clinical Documentation Reviewer    PROVIDER RESPONSE TEXT:    This patient has sepsis that was not present on admission.     Query created by: Dominick Parekh on 10/9/2020 2:32 PM      Electronically signed by:  Man Chung MD 10/9/2020 5:11 PM

## 2020-10-09 NOTE — PROGRESS NOTES
CARDIOLOGY PROGRESS NOTE    Patient seen and examined. This is a patient who is followed for Cardiac Clearance . Now post op from aorta bifemoral bypass, wife at bedside. Had hemorrhage from the splenic tear requiring splenectomy causing hypotension requiring pressors, fluid, and blood products. Extubated. No distress. No other complaints reported. Telemetry reviewed, there were no events noted in the past 24 hours. NSR 70-80s NSVT     Pertinent review of systems items noted above, all other systems are negative. Current medications reviewed. Physical Examination  Vital signs are stable. Blood pressure 149/78, Pulse 96  Intubated and sedated No apparent distress. Heart is regular, rate and rhythm. Normal S1, S2, no murmurs are appreciated. Lungs are clear to diminished bilaterally. Abdomen is soft, nontender, normal bowel sounds. Extremities have  +2 edema     Labs reviewed:   No results found for this or any previous visit (from the past 12 hour(s)). Case discussed with Dr. Celia Foley and our impression and recommendations are as follows:  1. Pre-op risk stratification: Post op now! Echo with normal EF. NST abnormal with a moderate area of ischemia. Cardiac cath from yesterday shows nonobstructive CAD. Continue asa. Moderate risk for inpatient vascular surgery. 2. PAD: Continue ASA and zetia. Unclear if statins are contraindicated with IBM- Will check with primary team or neurology. 3. Hypertension:Hypotensive, requiring pressors, fluid, and blood. Blood pressure medications being held. Most likely from splenic tear. No changes in echocardiogram.   4. Nonobstructive CAD: 50% disease to the left cx. He is asymptomatic currently but unable to walk due to his PAD. Will monitor symptoms after surgery. Continue DAPT and zetia. 5. NSVT: Run overnight per nursing.   Please keep serum potassium between 4-5 and serum magnesium > 2.  6.       Please do not hesitate to call me or Dr. Celia Foley if additional questions arise.

## 2020-10-09 NOTE — PROGRESS NOTES
Pulmonology and Critical Care Progress Note    Subjective:     Chief Complaint:   Chief Complaint   Patient presents with    Altered mental status      Patient seen and examined at the bedside. The patient underwent surgery, aortobifemoral bypass surgery 10/6/20. Post surgery he was left on the ventilator. Apparently blood loss was about 3.5 L. When he returned to the ICU he was on multiple pressors. I received a call around 2:30 AM.  Apparently patient self extubated himself. He was placed on 35% Ventimask and blood gases were done. Discussed below. This morning the patient wakes up easily. On 35% Ventimask. No distress. Wife at the bedside. Discussed with RN. Apparently he had some bleeding from the trauma of self extubation. NG tube is now suctioning dark blood. He is to be started on TPN. No significant endotracheal tube secretions. He has a left radial arterial line. He has a right subclavian central venous catheter with CVP monitoring. CVP is 8. urine output has improved. Distal pulses are with Dopplers. Wife is at the bedside also. He is on dopamine at 2 mics. On fentanyl 100 mics. Cardiac catheterization was done on 10/5. Noted. His nuclear medicine cardiac cath showed ischemia of the inferior wall. Modified barium swallow test showed issues of dysphagia.     Review of Systems:  Has chronic Reese catheter placement    Current Facility-Administered Medications   Medication Dose Route Frequency Provider Last Rate Last Dose    TPN ADULT - CENTRAL   IntraVENous CONTINUOUS Adrian Das MD        midazolam (PF) (VERSED) injection 4 mg  4 mg IntraVENous Q2H PRN Joshua Olson MD   4 mg at 10/09/20 4397    furosemide (LASIX) injection 20 mg  20 mg IntraVENous BID Joshua Olson MD   20 mg at 10/09/20 9135    0.9% sodium chloride infusion 250 mL  250 mL IntraVENous PRN Adrian Das MD        TPN ADULT - CENTRAL   IntraVENous CONTINUOUS Adrian Das MD 02 mL/hr at 10/08/20 2136      hydrocortisone (CORTENEMA) 100 mg/60 mL enema 100 mg  100 mg Rectal Q12H Bree Olson MD   100 mg at 10/08/20 2110    NOREPINephrine (LEVOPHED) 8 mg in 5% dextrose 250mL (32 mcg/mL) infusion  2 mcg/min IntraVENous TITRATE Bree Olson MD        vasopressin (VASOSTRICT) 20 Units in 0.9% sodium chloride 100 mL infusion  0.04 Units/min IntraVENous CONTINUOUS Bree Olson MD        0.9% sodium chloride infusion 250 mL  250 mL IntraVENous PRN Bree Olson MD        piperacillin-tazobactam (ZOSYN) 3.375 g in 0.9% sodium chloride (MBP/ADV) 100 mL MBP  3.375 g IntraVENous Q8H Bree Olson MD 25 mL/hr at 10/09/20 0520 3.375 g at 10/09/20 0520    propofol (DIPRIVAN) 10 mg/mL infusion  5 mcg/kg/min IntraVENous TITRATE Bree Olson MD 6.3 mL/hr at 10/07/20 1633 20 mcg/kg/min at 10/07/20 1633    fentaNYL (PF) 1,500 mcg/30 mL (50 mcg/mL) infusion  75 mcg/hr IntraVENous TITRATE Bree Olson MD 2 mL/hr at 10/09/20 0520 100 mcg/hr at 10/09/20 0520    0.9% sodium chloride infusion 250 mL  250 mL IntraVENous PRN Bree Olson MD        PHENYLephrine (ALISTAIR-SYNEPHRINE) 30 mg in 0.9% sodium chloride 250 mL infusion   mcg/min IntraVENous CONTINUOUS Bree Olson MD 50 mL/hr at 10/07/20 0050 100 mcg/min at 10/07/20 0050    0.9% sodium chloride infusion 250 mL  250 mL IntraVENous PRN Bree Olson MD        DOPamine (INTROPIN) 400 mg in dextrose 5% 250 mL infusion  2.5 mcg/kg/min IntraVENous TITRATE Bree Olson MD 5.9 mL/hr at 10/09/20 1130 3 mcg/kg/min at 10/09/20 1130    0.9% sodium chloride infusion 250 mL  250 mL IntraVENous PRN Bree Olson MD        vasopressin (VASOSTRICT) 20 Units in 0.9% sodium chloride 100 mL infusion  0.01-0.1 Units/min IntraVENous TITRATE Bree Olson MD   Stopped at 10/08/20 0500    pantoprazole (PROTONIX) 40 mg in 0.9% sodium chloride 10 mL injection  40 mg IntraVENous Q12H Olman Clemons MD   40 mg at 10/09/20 0951    albuterol-ipratropium (DUO-NEB) 2.5 MG-0.5 MG/3 ML  3 mL Nebulization Q6H PRN Minnie Capps MD   3 mL at 10/08/20 1932    influenza vaccine 2020-21 (4 yrs+)(PF) (FLUCELVAX QUAD) injection 0.5 mL  0.5 mL IntraMUSCular PRIOR TO DISCHARGE Alonso Conley MD   Stopped at 10/07/20 0900    0.9% sodium chloride infusion 250 mL  250 mL IntraVENous PRN Lauren Olson MD        diphenhydrAMINE (BENADRYL) capsule 50 mg  50 mg Oral BID PRN Gracia Gomez NP   50 mg at 10/03/20 0129    phosphorus (K PHOS NEUTRAL) 250 mg tablet 2 Tab  2 Tab Oral BID Alonso Conley MD   Stopped at 10/09/20 0900    levoFLOXacin (LEVAQUIN) tablet 500 mg  500 mg Oral Q24H Alonso Conley MD   Stopped at 10/06/20 1100    predniSONE (DELTASONE) tablet 20 mg  20 mg Oral DAILY WITH BREAKFAST Alonso Conley MD   Stopped at 10/07/20 0800    oxyCODONE IR (ROXICODONE) tablet 15 mg  15 mg Oral Q4H PRN Alonso Conley MD   15 mg at 10/06/20 1228    dextrose 5 % - 0.45% NaCl infusion  50 mL/hr IntraVENous CONTINUOUS Lauren Olson MD 50 mL/hr at 10/09/20 0520 50 mL/hr at 10/09/20 0520    metoprolol tartrate (LOPRESSOR) tablet 50 mg  50 mg Oral BID Tera Tanner MD   Stopped at 10/06/20 2100    enoxaparin (LOVENOX) injection 40 mg  40 mg SubCUTAneous Q24H Minnie Capps MD   Stopped at 10/05/20 2141    aspirin delayed-release tablet 81 mg  81 mg Oral DAILY Welsey Nair NP   Stopped at 10/07/20 0900    acetaminophen (TYLENOL) tablet 650 mg  650 mg Oral Q6H PRN Gisele Long NP   650 mg at 09/30/20 0950    ondansetron (ZOFRAN) injection 4 mg  4 mg IntraVENous Q8H PRN Gisele Long NP   4 mg at 09/30/20 0402    guaiFENesin (ROBITUSSIN) 20 mg/mL oral liquid 400 mg  400 mg Oral Q6H PRN Wesley Nair NP   Stopped at 09/26/20 4008            No Known Allergies        Objective:     Blood pressure 128/71, pulse 85, temperature (!) 96.3 °F (35.7 °C), resp.  rate 14, height 6' 0.05\" (1.83 m), weight 70.1 kg (154 lb 8.7 oz), SpO2 98 %. Temp (24hrs), Av.2 °F (35.7 °C), Min:94.5 °F (34.7 °C), Max:98.4 °F (36.9 °C)      Intake and Output:  Current Shift: 10/09 0701 - 10/09 1900  In: -   Out: 1586 [Urine:1350; Drains:120]  Last 3 Shifts: 10/07 1901 - 10/09 0700  In: 958.8   Out: 0097 [Urine:6350; Drains:675]    Physical Exam:     General: Lying in bed, on ventilator. Not sedated at this time. He wakes up easily. And is appropriate. Throat and Neck: Supple. No JVD. He has a right subclavian central line. He has NG tube in the right nares. To low intermittent suction. Dark brownish red fluid is noted in the canister. Lung: Reduced air entry bilaterally. Occasional rhonchi. More decreased breath sounds left lung base. Heart: S1+S2. No murmurs  Abdomen: Status post surgery. He has a midline incision. He has 2 HELGA drains one on each side. Bowel sounds are hypoactive. Draining serosanguineous fluid. Extremities:  He has more pitting edema. Distal pulses of the lower extremities are noted with Dopplers. Left radial arterial line. : Reese catheter in place. Making some urine output. Skin: No cyanosis  Neurologic: Wakes up easily, grossly nonfocal   .  Never dated problem with  Approximately  Lab/Data Review: All lab results for the last 24 hours reviewed.   Recent Results (from the past 24 hour(s))   BLOOD GAS, ARTERIAL    Collection Time: 10/08/20  1:50 PM   Result Value Ref Range    pH 7.450 7.35 - 7.45      PCO2 33 (L) 35 - 45 mmHg    PO2 62 (L) 75 - 100 mmHg    O2 SAT 93 (L) >95 %    BICARBONATE 24 22 - 26 mmol/L    BASE DEFICIT 1.1 0 - 2 mmol/L    FIO2 28 %    SITE Arterial Line     BLOOD GAS, ARTERIAL    Collection Time: 10/09/20  2:15 AM   Result Value Ref Range    pH 7.46 (H) 7.35 - 7.45      PCO2 30 (L) 35 - 45 mmHg    PO2 68 (L) 75 - 100 mmHg    O2 SAT 96 >95 %    BICARBONATE 22 22 - 26 mmol/L    BASE DEFICIT 2.5 (H) 0 - 2 mmol/L    O2 FLOW RATE 12 L/min    FIO2 35 %    Sample source Arterial Line      SITE Left Radial      ENIO'S TEST YES      Critical value read back [facility] JÚNIOR RN K 2.7    CBC WITH AUTOMATED DIFF    Collection Time: 10/09/20  3:05 AM   Result Value Ref Range    WBC 23.9 (H) 4.1 - 11.1 K/uL    RBC 3.30 (L) 4.10 - 5.70 M/uL    HGB 10.2 (L) 12.1 - 17.0 g/dL    HCT 29.0 (L) 36.6 - 50.3 %    MCV 87.9 80.0 - 99.0 FL    MCH 30.9 26.0 - 34.0 PG    MCHC 35.2 30.0 - 36.5 g/dL    RDW 14.9 (H) 11.5 - 14.5 %    PLATELET 82 (L) 938 - 400 K/uL    MPV 10.8 8.9 - 12.9 FL    NRBC 6.1 (H) 0  WBC    ABSOLUTE NRBC 1.45 (H) 0.00 - 0.01 K/uL    NEUTROPHILS 78 (H) 32 - 75 %    LYMPHOCYTES 10 (L) 12 - 49 %    MONOCYTES 11 5 - 13 %    EOSINOPHILS 1 0 - 7 %    BASOPHILS 0 0 - 1 %    IMMATURE GRANULOCYTES 1 (H) 0.0 - 0.5 %    ABS. NEUTROPHILS 18.9 (H) 1.8 - 8.0 K/UL    ABS. LYMPHOCYTES 2.5 0.8 - 3.5 K/UL    ABS. MONOCYTES 1.8 (H) 0.0 - 1.0 K/UL    ABS. EOSINOPHILS 0.0 0.0 - 0.4 K/UL    ABS. BASOPHILS 0.0 0.0 - 0.1 K/UL    ABS. IMM. GRANS. 0.2 (H) 0.00 - 0.04 K/UL    DF AUTOMATED     METABOLIC PANEL, BASIC    Collection Time: 10/09/20  3:05 AM   Result Value Ref Range    Sodium 140 136 - 145 mmol/L    Potassium 3.0 (L) 3.5 - 5.1 mmol/L    Chloride 106 97 - 108 mmol/L    CO2 25 21 - 32 mmol/L    Anion gap 9 5 - 15 mmol/L    Glucose 119 (H) 65 - 100 mg/dL    BUN 8 6 - 20 mg/dL    Creatinine 0.81 0.70 - 1.30 mg/dL    BUN/Creatinine ratio 10 (L) 12 - 20      GFR est AA >60 >60 ml/min/1.73m2    GFR est non-AA >60 >60 ml/min/1.73m2    Calcium 7.1 (L) 8.5 - 10.1 mg/dL     chest X-ray      XR CHEST PORT   Final Result   Impression:Left lung airspace disease/atelectasis. Suspect left pleural   effusion. Focal airspace disease at the right lung base. CTA ABD ART W RUNOFF W WO CONT   Final Result   IMPRESSION:   1. Interval postoperative changes from aorto-bifemoral artery bypass. The bypass   graft is patent. The proximal and distal anastomoses are patent. 2. Patent left renal artery.  Wedge shaped perfusion defect in the inferior pole   of the left kidney consistent with parenchymal infarct. 3. Patent diminutive right renal artery. Patchy parenchymal perfusion, improved   compared to the preoperative study from 9/29/2020.   4. Patent right and left lower extremity arteries without occlusion or   significant stenosis. 5. Stable mild short segment focal atherosclerotic dissection flap at the right   anterolateral aspect of the descending thoracic aorta at the T10 level. 6. Postoperative changes from splenectomy. Approximately 6.1 cm TR by 2.1 cm AP   by 8.4 cm CC hematoma in the splenic bed without findings of active contrast   extravasation. A surgical drain is present in the splenectomy bed extending to   beneath the left hemidiaphragm. 7. Expected pneumoperitoneum given recent surgery. Bilateral retroperitoneal   low-attenuation stranding and/or small volume fluid. Diffuse mesenteric   stranding and/or small volume fluid in the  pelvis. 8. Distended gallbladder with cholelithiasis and prominent common bile duct as   seen similarly on the preoperative study from 9/29/2020/.   9. Diffuse anasarca. Diffuse subcutaneous edema of the lower extremities, right   greater than left. Negative for right and left lower extremity DVT. XR CHEST PORT   Final Result   IMPRESSION:   1. The patient's life support lines and tubes are unchanged and are in   satisfactory position. 2.  There is increasing hypoventilation and atelectasis within the lung bases   greater on the left compared to the right. 3.  There also is increasing hazy opacity along the lower left hemithorax most   compatible with a layering moderate-sized left pleural effusion. XR CHEST PORT   Final Result   IMPRESSION:   1. The endotracheal tube is in satisfactory position. 2.  There is a hypoventilation of the lung bases with lung base atelectasis. The   remainder of the lung parenchyma is relatively clear.  There is vascular pruning   within the upper lobes suspect for underlying emphysema. 3.  There is free intra-abdominal air likely a function of recent abdominal   surgery. US RETROPERITONEUM COMP   Final Result   IMPRESSION:   1. There is a smaller size to the right kidney. 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 The kidneys otherwise image in a normal fashion. There are normal renal echotexture characteristics. 2.  There a moderate sized right pleural effusion. XR CHEST PORT   Final Result   IMPRESSION: Postoperative findings, postprocedural findings, medical devices as   described. Minimal right lung base atelectasis may be residual adhesive   intraoperative atelectasis. Osie Ra XR ABD (KUB)   Final Result   Findings/impression:      Numerous surgical clips project over the left upper quadrant and mid abdomen. Midline skin staples noted. Drainage tubes project over the pelvis, lower   abdomen and left upper quadrant. Enteric tube tip projects over the proximal   stomach. No unexpected radiopaque foreign bodies are identified. Oral contrast material throughout the colon. Bowel gas pattern is   nonobstructive. No acute osseous abdomen identified. XR SWALLOW FUNC VIDEO   Final Result   Impression: Significant hypopharyngeal residue. Episodes of penetration with   all consistencies. Episode of flash aspiration after water wash. XR CHEST PORT   Final Result   Impression: Underexpanded lungs with bibasilar atelectasis. CTA ABDOMEN PELV W CONT   Final Result   IMPRESSION: Mid abdominal aortic occlusion, with threatened right kidney and   fairly good collateralization to the legs. This could be causing a mesenteric   steal phenomenon, however      MRI BRAIN WO CONT   Final Result   Impression: No evidence of acute hemorrhage, mass or infarct. No sinusitis or   mastoiditis. Please see above discussion.       CTA CHEST W OR W WO CONT   Final Result   IMPRESSION: Limited by breathing motion. 1. No large pulmonary arterial filling defect. 2. Bronchial thickening with right greater than left lower lung atelectasis or   pneumonia/pneumonitis. Bubbly debris in the trachea. 3. Atherosclerosis. Abrupt discontinuation of contrast in the aorta on the very   inferior slices. Contrast is seen in the celiac artery and SMA and the left   renal artery. The right kidney demonstrates decreased attenuation compared to   the left concerning for medical renal disease to include ischemia. Recommend CTA   abdomen/pelvis. Called report to charge nurse [place] White at 9:05 PM 9/27/2020.   4. Cholelithiasis within distended gallbladder. 5. Dilated small bowel. 6. Other findings as above. XR CHEST PORT   Final Result   IMPRESSION:      No airspace disease in the lungs. Follow-up as clinically indicated. CT HEAD WO CONT   Final Result   Impression: Unremarkable head CT without contrast.  No infarct, mass, or    hemorrhage. Please see full report. XR CHEST SNGL V   Final Result   Impression: No diagnostic abnormality. CT Results  (Last 48 hours)               10/08/20 0521  CTA ABD ART W RUNOFF W WO CONT Final result    Impression:  IMPRESSION:   1. Interval postoperative changes from aorto-bifemoral artery bypass. The bypass   graft is patent. The proximal and distal anastomoses are patent. 2. Patent left renal artery. Wedge shaped perfusion defect in the inferior pole   of the left kidney consistent with parenchymal infarct. 3. Patent diminutive right renal artery. Patchy parenchymal perfusion, improved   compared to the preoperative study from 9/29/2020.   4. Patent right and left lower extremity arteries without occlusion or   significant stenosis. 5. Stable mild short segment focal atherosclerotic dissection flap at the right   anterolateral aspect of the descending thoracic aorta at the T10 level. 6. Postoperative changes from splenectomy.  Approximately 6.1 cm TR by 2.1 cm AP   by 8.4 cm CC hematoma in the splenic bed without findings of active contrast   extravasation. A surgical drain is present in the splenectomy bed extending to   beneath the left hemidiaphragm. 7. Expected pneumoperitoneum given recent surgery. Bilateral retroperitoneal   low-attenuation stranding and/or small volume fluid. Diffuse mesenteric   stranding and/or small volume fluid in the  pelvis. 8. Distended gallbladder with cholelithiasis and prominent common bile duct as   seen similarly on the preoperative study from 9/29/2020/.   9. Diffuse anasarca. Diffuse subcutaneous edema of the lower extremities, right   greater than left. Negative for right and left lower extremity DVT. Narrative:  INDICATION: Postop aortobifemoral bypass, evaluate left renal artery and distal   leg arterial system       TECHNIQUE: CTA performed from the level of the [place] through the feet after   bolus administration of 100 mL of Isovue-370 intravenous. Images are reviewed   and processed at a workstation according to the CT angiogram protocol with 3-D   and/or MIP post processing imaging generated. Automated mA/kV exposure control was utilized and patient examination was   performed in strict accordance with principles of ALARA. COMPARISON: CTA abdomen and pelvis, 9/29/2020       FINDINGS:    Vascular: The imaged portion of the descending thoracic aorta is patent and normal in   caliber. There is an unchanged mild short segment focal atherosclerotic   dissection flap at the 10:00 position of the descending thoracic aorta at the   T10 level. The suprarenal abdominal aorta is patent with advanced atherosclerosis causing   mild luminal narrowing. The celiac, splenic, common hepatic, gastroduodenal, and intrahepatic arteries   are patent. The superior mesenteric artery and distal arborization is patent.        The inferior mesenteric artery is occluded at its origin- an expected finding   given aortobifemoral bypass. The left renal artery is patent. The right renal artery is diminutive and   patent, as seen similarly on the prior study. Interval postoperative changes from aorto-bifemoral artery bypass. The bypass   graft is patent. The proximal and distal anastomoses are patent. The native   juxtarenal and infrarenal abdominal aorta and right and left iliac arteries are   occluded. The right common femoral, profunda femoral, superficial femoral, popliteal,   anterior tibial, posterior tibial, and peroneal arteries are patent. The   dorsalis pedis and posterior tibial arteries of the right foot are patent. Left common femoral and profunda femoral arteries are patent. The left   superficial femoral artery is patent with with up to 25-30% stenosis distally. The left popliteal artery is patent. The left anterior tibial, peroneal,   posterior tibial arteries are patent. The left dorsalis pedis and posterior   tibial arteries the foot are patent. The bilateral tibioperoneal, popliteal, femoral, deep femoral, common femoral,   iliac veins and IVC are patent. The portal, superior mesenteric, and splenic   veins are patent. There are left greater than right pleural effusions and left greater than right   basilar lung atelectasis. There is pneumoperitoneum, probably in the upper abdomen with scattered locules   of gas within the mid abdomen and pelvis. There are postoperative changes from splenectomy with multiple surgical clips in   the splenectomy bed. There is approximately 6.1 cm TR by 2.1 cm AP by 8.4 cm CC   hematoma in the splenic bed without findings of active contrast extravasation. A   surgical drain is present in the splenectomy bed extending to beneath the left   hemidiaphragm. Multiple surgical clips are present in the upper left abdomen adjacent to the   suprarenal aorta and adjacent to the aortobifemoral bypass.        The liver is normal in appearance given limitations of arterial contrast   enhanced timing. The gallbladder is distended. There is cholelithiasis. The common bile duct is   dilated measuring up to 9 mm in diameter down to the ampulla. No   choledocholithiasis is identified. The pancreas demonstrates normal enhancement. There are areas of peripancreatic   stranding and low-attenuation fluid. The adrenal glands are normal in appearance. Again seen is chronic right renal atrophy with patchy areas of parenchymal   enhancement, improved compared to the preoperative study. There is a wedge   shaped perfusion defect in the inferior pole of the left kidney consistent with   infarct. No hydroureteronephrosis. The stomach is decompressed with enteric tube in place. The small and large   bowel is nonobstructed. There is bilateral retroperitoneal low-attenuation   stranding and/or small volume fluid. There is diffuse mesenteric stranding   and/or small volume fluid in the pelvis. A Reese catheter is present within the urinary bladder. Gas is present within   the urinary bladder, presumably related to the indwelling Reese catheter. Diffuse anasarca. There is diffuse fatty atrophy of the posterior compartments   of the thighs and of the lower legs. There is diffuse subcutaneous edema of the   lower extremities, right greater than left. No osseous blastic or lytic lesion. Assessment:     1. Acute respiratory failure, after aortobifemoral bypass surgery on 10/6/2020. Patient self extubated himself early this morning, 10/9/2020.  2.  Acute metabolic encephalopathy, resolved  3. Aspiration pneumonia  4. Severe peripheral vascular disease  5. UTI  6. Hypertension  7. Familial polymyositis    Plan:     1. Hypoxic respiratory failure. Patient had improved and had been on room air. On 10/6/2020 he underwent aortobifemoral bypass surgery.   Post surgery he was left on the ventilator. EBL was 3.5 L. When he arrived to the ICU he was on multiple pressors. He is now off pressors. Patient self extubated himself early this morning around 2 AM.  He is stable on 35% Ventimask. On dopamine at 2 mics. He is sedated with fentanyl at 100 mics. However wakes up easily and appears to be appropriate. chest x-ray this morning shows increasing opacity left side, most likely pleural effusion with atelectasis. Endotracheal tube is out. Arterial blood gas on 35% Ventimask showed 7.46/30/68. He is started on diuretics which I agree with. Nephrology input noted. 2.  Aspiration pneumonia:  He is currently on the ventilator. He is on Zosyn and Levaquin. Also on prednisone. On nebulized bronchodilator treatments. Modified barium swallow showed penetration and significant dysphagia. 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 Patient may require PEG tube near future. He had been on puréed diet with nectar thickened liquids. 3.  Metabolic encephalopathy. He has a progressive neurologic disorder. Brain MRI negative    Further recommendation per Neurology. 4.  Dehydration and metabolic acidosis. Monitor kidney function after surgery. 5.  Possible urinary tract infection. 6.  Hypertension. Dyslipidemia and severe peripheral vascular disease. 7.  Myocardial ischemia:  He underwent nuclear stress testing which showed significant inferior wall ischemia. He underwent cardiac catheterization on 10/5/2020 which showed nonobstructive coronary artery disease. Medical management is planned at this time.      Thank you for allowing me to participate in the care of this patient. I will follow the patient closely with you. DVT and GI prophylaxis. He is on Lovenox and Protonix IV. If he is not extubated by tomorrow morning consider nutrition. Repeat all labs in the morning. Replenish electrolytes as needed. He has NG tube to low intermittent suction. Holding nutrition at this time. Discussed with the wife at the bedside. Patient is critically ill at this time. Discussed with the nursing team.  More than 30 minutes spent with patient care.   Thank you for involving me in the care of this patient      Misha Doss MD  [facility] [address]  10/9/2020

## 2020-10-09 NOTE — PROGRESS NOTES
Patient is examined this morning. Patient self extubated himself at last night. He is stable maintaining his oxygen level and ventilation. He is on currently on facemask. He is awake and alert and coherent. I change the dressing on the abdomen looks clean and dry. Patient's diuresing well. His current CVP is 8. He is currently low-dose renal dose dopamine drip. Yesterday we will try to wean off he did not tolerated. So plan for him today try to wean off of dopamine drip in exchange for levo and vasopressin. Once dopamine is shut off we will continue levo and vasopressin and slowly wean off completely. Meanwhile keep up with a CVP between 8-10. Replace with albumin's if blood pressure drops. Try to balance ins and outs negative side. We will continue monitor his progress.

## 2020-10-09 NOTE — PROGRESS NOTES
Nurse and tech was in patients room giving a bath and providing wound care. Nurse turned back to patient and with tech at bedside patient self extubated with mitts on. Patient was suctioned and cough was good. RT placed patient on a venti-mask 35%. Dr. Carrie Matute and Dr. Jesse Mathur made aware and aware of ABG results post extubation. Will continue to monitor.

## 2020-10-09 NOTE — PROGRESS NOTES
Nephrology Consult    Patient: Mika Ferreira MRN: [id number]  SSN: [id number]    YOB: 1965  Age: 48 y.o. Sex: male      Subjective:       The patient is seen in the room  On vent  Under sedation  Good UOP  K 3.0      Past Medical History:   Diagnosis Date    Myositis     Familial inclusion      Past Surgical History:   Procedure Laterality Date    HX ORTHOPAEDIC      disc infused, neck       Family History   Problem Relation Age of Onset    Other Father     Other Sister     Other Brother      Social History     Tobacco Use    Smoking status: Current Every Day Smoker     Packs/day: 0.50    Smokeless tobacco: Never Used   Substance Use Topics    Alcohol use: Yes     Comment: occ      Current Facility-Administered Medications   Medication Dose Route Frequency Provider Last Rate Last Dose    TPN ADULT - CENTRAL   IntraVENous CONTINUOUS Margaret Hall MD        midazolam (PF) (VERSED) injection 4 mg  4 mg IntraVENous Q2H PRN Yue Olson MD   4 mg at 10/09/20 2435    furosemide (LASIX) injection 20 mg  20 mg IntraVENous BID Yue Olson MD   20 mg at 10/09/20 6125    0.9% sodium chloride infusion 250 mL  250 mL IntraVENous PRN Margaret Hall MD        TPN ADULT - CENTRAL   IntraVENous CONTINUOUS Margaret Hall MD 42 mL/hr at 10/08/20 2136      hydrocortisone (CORTENEMA) 100 mg/60 mL enema 100 mg  100 mg Rectal Q12H Yue Olson MD   100 mg at 10/09/20 1343    NOREPINephrine (LEVOPHED) 8 mg in 5% dextrose 250mL (32 mcg/mL) infusion  2 mcg/min IntraVENous TITRATE Yue Olson MD        vasopressin (VASOSTRICT) 20 Units in 0.9% sodium chloride 100 mL infusion  0.04 Units/min IntraVENous CONTINUOUS Yue Olson MD        0.9% sodium chloride infusion 250 mL  250 mL IntraVENous PRN Yue Olson MD        piperacillin-tazobactam (ZOSYN) 3.375 g in 0.9% sodium chloride (MBP/ADV) 100 mL MBP  3.375 g IntraVENous Q8H Yue Olson MD 25 mL/hr at 10/09/20 1359 3.375 g at 10/09/20 1359    propofol (DIPRIVAN) 10 mg/mL infusion  5 mcg/kg/min IntraVENous TITRATE Michelle Olson MD 6.3 mL/hr at 10/07/20 1633 20 mcg/kg/min at 10/07/20 1633    fentaNYL (PF) 1,500 mcg/30 mL (50 mcg/mL) infusion  75 mcg/hr IntraVENous TITRATE Michelle Olson MD 1.5 mL/hr at 10/09/20 1420 75 mcg/hr at 10/09/20 1420    0.9% sodium chloride infusion 250 mL  250 mL IntraVENous PRN Michelle Olson MD        PHENYLephrine (ALISTAIR-SYNEPHRINE) 30 mg in 0.9% sodium chloride 250 mL infusion   mcg/min IntraVENous CONTINUOUS Michelle Olson MD 50 mL/hr at 10/07/20 0050 100 mcg/min at 10/07/20 0050    0.9% sodium chloride infusion 250 mL  250 mL IntraVENous PRN Michelle Olson MD        DOPamine (INTROPIN) 400 mg in dextrose 5% 250 mL infusion  2.5 mcg/kg/min IntraVENous TITRATE Michelle Olson MD 5.9 mL/hr at 10/09/20 1130 3 mcg/kg/min at 10/09/20 1130    0.9% sodium chloride infusion 250 mL  250 mL IntraVENous PRN Michelle Olson MD        vasopressin (VASOSTRICT) 20 Units in 0.9% sodium chloride 100 mL infusion  0.01-0.1 Units/min IntraVENous TITRATE Michelle Olson MD   Stopped at 10/08/20 0500    pantoprazole (PROTONIX) 40 mg in 0.9% sodium chloride 10 mL injection  40 mg IntraVENous Q12H Angela Preciado MD   40 mg at 10/09/20 0951    albuterol-ipratropium (DUO-NEB) 2.5 MG-0.5 MG/3 ML  3 mL Nebulization Q6H PRN Angela Preciado MD   3 mL at 10/08/20 1932    influenza vaccine 2020-21 (4 yrs+)(PF) (FLUCELVAX QUAD) injection 0.5 mL  0.5 mL IntraMUSCular PRIOR TO DISCHARGE Crystal Reina MD   Stopped at 10/07/20 0900    0.9% sodium chloride infusion 250 mL  250 mL IntraVENous PRN Wesley, Michelle Hand MD        diphenhydrAMINE (BENADRYL) capsule 50 mg  50 mg Oral BID PRN Blessing Bird NP   50 mg at 10/03/20 0129    phosphorus (K PHOS NEUTRAL) 250 mg tablet 2 Tab  2 Tab Oral BID Crystal Reina MD   Stopped at 10/09/20 0900    levoFLOXacin (LEVAQUIN) tablet 500 mg  500 mg Oral Q24H Alonso Conley MD   Stopped at 10/06/20 1100    predniSONE (DELTASONE) tablet 20 mg  20 mg Oral DAILY WITH BREAKFAST Alonso Conley MD   Stopped at 10/07/20 0800    oxyCODONE IR (ROXICODONE) tablet 15 mg  15 mg Oral Q4H PRN Alonso Conley MD   15 mg at 10/06/20 1228    dextrose 5 % - 0.45% NaCl infusion  50 mL/hr IntraVENous CONTINUOUS Wesley, Lauren Villatoro MD 50 mL/hr at 10/09/20 0520 50 mL/hr at 10/09/20 0520    metoprolol tartrate (LOPRESSOR) tablet 50 mg  50 mg Oral BID Tera Tanner MD   Stopped at 10/06/20 2100    enoxaparin (LOVENOX) injection 40 mg  40 mg SubCUTAneous Q24H Minnie Capps MD   Stopped at 10/05/20 2141    aspirin delayed-release tablet 81 mg  81 mg Oral DAILY Wesley Nair NP   Stopped at 10/07/20 0900    acetaminophen (TYLENOL) tablet 650 mg  650 mg Oral Q6H PRN Gisele Long NP   650 mg at 09/30/20 0950    ondansetron (ZOFRAN) injection 4 mg  4 mg IntraVENous Q8H PRN Gisele Long NP   4 mg at 09/30/20 0402    guaiFENesin (ROBITUSSIN) 20 mg/mL oral liquid 400 mg  400 mg Oral Q6H PRN Danii SUTTON NP   Stopped at 09/26/20 1855        No Known Allergies    Review of Systems:  A comprehensive review of systems was negative except for that written in the History of Present Illness.     Objective:     Vitals:    10/09/20 1130 10/09/20 1200 10/09/20 1300 10/09/20 1400   BP: 128/71 115/61 (!) 129/58 116/63   Pulse: 85 88 84 98   Resp:  19 14 19   Temp:  (!) 95.9 °F (35.5 °C)     SpO2:  96% 99% 95%   Weight:       Height:            Physical Exam:  General: Under sedation  Nose NG in place  Oral cavity ET in place  Chest on ventilatory support  Heart S1-S2 normal  Genitourinary Reese in place  Lower extremity no edema  Assessment:     Hospital Problems  Date Reviewed: 9/17/2020          Codes Class Noted POA    Metabolic encephalopathy QTD-26-LW: G93.41  ICD-9-CM: 348.31  9/26/2020 Unknown        UTI (urinary tract infection) ICD-10-CM: N39.0  ICD-9-CM: 599.0  9/26/2020 Unknown        Aspiration pneumonitis ([facility] [address] ICD-10-CM: J69.0  ICD-9-CM: 507.0  9/26/2020 Unknown        Essential hypertension ICD-10-CM: I10  ICD-9-CM: 401.9  9/26/2020 Unknown        Acute dehydration ICD-10-CM: E86.0  ICD-9-CM: 276.51  9/26/2020 Unknown              Plan:     #1 Low urine output :  - likely prerenal from hypotension.    -He was put on 3 pressors.    -Creatinine 0.58.    -His blood pressures are better now.    -No volume overload.    -gave normal saline 1 L as a bolus. -on IV fluids.  -good UOP    2. Metabolic acidosis, high anion gap. From hypotension and lactic acidosis  CO2 was 18-->24  I will check lactic acid level        #3 status post  infrarenal aorta endarterectomy, with aortic by common femoral artery bypass with 14 mm x 7 mm Hemosure graft    #4 hypertension. Blood pressure is low.     dced lisinopril    #5 severe Hypokalemia:  -K 2.6--->3.0  -iv KCL 40 meq          Signed By: Clarke Hansen MD     October 9, 2020

## 2020-10-09 NOTE — PROGRESS NOTES
D/C Plan:    -d/c home  -PCP follow up  -[facility] via [facility]       Patient [facility] services [facility]. SOC x 24-48 hours after discharge. SW to continue to follow re: discharge needs and disposition. In previous CM notes (10/6/2020 & 10/1/2020) patient made aware of d/c recommendation for SNF. Patient declined SNF.         CLUADIA Lucas

## 2020-10-09 NOTE — PROGRESS NOTES
Called to patient's room for oxygen sats of 70% on 50% venti mask. Patient placed on 100% nrb mask without improvement. Patient placed on bipap with AVAPS.   Oxygen saturations 91% on 100% fio2

## 2020-10-09 NOTE — PROGRESS NOTES
Hospitalist Progress Note           Daily Progress Note: 10/9/2020    Chief complaint: Shortness of breath and weakness  Subjective: The patient is seen for follow  up. Postoperative day #3 status post infrarenal aorta endarterectomy. Reportedly hypotensive postprocedure requiring 3 vasopressors. Now on dopamine at 5micgs with improved blood pressure. Self extubated earlier this morning.  On facemask oxygen    Problem List:  Problem List as of 10/9/2020 Date Reviewed: 9/17/2020          Codes Class Noted - Resolved    Metabolic encephalopathy FRD-64-NZ: G93.41  ICD-9-CM: 348.31  9/26/2020 - Present        UTI (urinary tract infection) ICD-10-CM: N39.0  ICD-9-CM: 599.0  9/26/2020 - Present        Aspiration pneumonitis ([address] ICD-10-CM: J69.0  ICD-9-CM: 507.0  9/26/2020 - Present        Essential hypertension ICD-10-CM: I10  ICD-9-CM: 401.9  9/26/2020 - Present        Acute dehydration ICD-10-CM: E86.0  ICD-9-CM: 276.51  9/26/2020 - Present              Medications reviewed  Current Facility-Administered Medications   Medication Dose Route Frequency    midazolam (PF) (VERSED) injection 4 mg  4 mg IntraVENous Q2H PRN    furosemide (LASIX) injection 20 mg  20 mg IntraVENous BID    0.9% sodium chloride infusion 250 mL  250 mL IntraVENous PRN    TPN ADULT - CENTRAL   IntraVENous CONTINUOUS    hydrocortisone (CORTENEMA) 100 mg/60 mL enema 100 mg  100 mg Rectal Q12H    NOREPINephrine (LEVOPHED) 8 mg in 5% dextrose 250mL (32 mcg/mL) infusion  2 mcg/min IntraVENous TITRATE    vasopressin (VASOSTRICT) 20 Units in 0.9% sodium chloride 100 mL infusion  0.04 Units/min IntraVENous CONTINUOUS    0.9% sodium chloride infusion 250 mL  250 mL IntraVENous PRN    piperacillin-tazobactam (ZOSYN) 3.375 g in 0.9% sodium chloride (MBP/ADV) 100 mL MBP  3.375 g IntraVENous Q8H    propofol (DIPRIVAN) 10 mg/mL infusion  5 mcg/kg/min IntraVENous TITRATE    fentaNYL (PF) 1,500 mcg/30 mL (50 mcg/mL) infusion  75 mcg/hr IntraVENous TITRATE    0.9% sodium chloride infusion 250 mL  250 mL IntraVENous PRN    PHENYLephrine (ALISTAIR-SYNEPHRINE) 30 mg in 0.9% sodium chloride 250 mL infusion   mcg/min IntraVENous CONTINUOUS    0.9% sodium chloride infusion 250 mL  250 mL IntraVENous PRN    DOPamine (INTROPIN) 400 mg in dextrose 5% 250 mL infusion  2.5 mcg/kg/min IntraVENous TITRATE    0.9% sodium chloride infusion 250 mL  250 mL IntraVENous PRN    vasopressin (VASOSTRICT) 20 Units in 0.9% sodium chloride 100 mL infusion  0.01-0.1 Units/min IntraVENous TITRATE    pantoprazole (PROTONIX) 40 mg in 0.9% sodium chloride 10 mL injection  40 mg IntraVENous Q12H    albuterol-ipratropium (DUO-NEB) 2.5 MG-0.5 MG/3 ML  3 mL Nebulization Q6H PRN    influenza vaccine 2020-21 (4 yrs+)(PF) (FLUCELVAX QUAD) injection 0.5 mL  0.5 mL IntraMUSCular PRIOR TO DISCHARGE    0.9% sodium chloride infusion 250 mL  250 mL IntraVENous PRN    diphenhydrAMINE (BENADRYL) capsule 50 mg  50 mg Oral BID PRN    phosphorus (K PHOS NEUTRAL) 250 mg tablet 2 Tab  2 Tab Oral BID    levoFLOXacin (LEVAQUIN) tablet 500 mg  500 mg Oral Q24H    predniSONE (DELTASONE) tablet 20 mg  20 mg Oral DAILY WITH BREAKFAST    oxyCODONE IR (ROXICODONE) tablet 15 mg  15 mg Oral Q4H PRN    dextrose 5 % - 0.45% NaCl infusion  50 mL/hr IntraVENous CONTINUOUS    metoprolol tartrate (LOPRESSOR) tablet 50 mg  50 mg Oral BID    enoxaparin (LOVENOX) injection 40 mg  40 mg SubCUTAneous Q24H    aspirin delayed-release tablet 81 mg  81 mg Oral DAILY    acetaminophen (TYLENOL) tablet 650 mg  650 mg Oral Q6H PRN    ondansetron (ZOFRAN) injection 4 mg  4 mg IntraVENous Q8H PRN    guaiFENesin (ROBITUSSIN) 20 mg/mL oral liquid 400 mg  400 mg Oral Q6H PRN       Review of Systems:   Review of systems unable to be obtained because he is intubated    Objective:   Physical Exam:     Visit Vitals  /71   Pulse 85   Temp (!) 96.3 °F (35.7 °C)   Resp 14   Ht 6' 0.05\" (1.83 m)   Wt 70.1 kg (154 lb 8.7 oz)   SpO2 98%   BMI 20.93 kg/m²    O2 Flow Rate (L/min): 15 l/min O2 Device: Ventimask    Temp (24hrs), Av.2 °F (35.7 °C), Min:94.5 °F (34.7 °C), Max:98.4 °F (36.9 °C)    10/09 0701 - 10/09 1900  In: -   Out: 0189 [Urine:1350; Drains:120]   10/07 1901 - 10/09 0700  In: 958.8   Out: 2428 [Urine:6350; Drains:675]    General:   awake and alert, cooperative, no distress, appears older than stated age. Lungs:   Clear to auscultation bilaterally with diminished breath sounds bilateral bases. Chest wall:  No tenderness or deformity. Heart:  Regular rate and rhythm, S1, S2 normal, no murmur, click, rub or gallop. Abdomen:   Soft, non-tender. Diminished Bowel sounds. Dressings in place. Extremities: Extremities normal, atraumatic, positive cyanosis. Pulses: 2+ and symmetric all extremities. Skin: Skin color, texture, turgor normal. No rashes or lesions   Neurologic: CNII-XII intact.      Data Review:       Recent Days:  Recent Labs     10/09/20  0305 10/08/20  0400 10/08/20  0110 10/07/20  0241   WBC 23.9* 22.4*  --  16.4*   HGB 10.2* 7.0* 6.8* 11.1*   HCT 29.0* 19.9* 19.3* 33.5*   PLT 82* 126*  --  46*     Recent Labs     10/09/20  0305 10/08/20  0400 10/07/20  1345 10/07/20  0241    142 141 142   K 3.0* 2.6* 2.9* 3.9    109* 107 107   CO2 25 24 25 18*   * 104* 142* 122*   BUN 8 7 7 5*   CREA 0.81 0.66* 0.72 0.58*   CA 7.1* 7.1* 7.2* 7.2*   ALB  --   --   --  1.8*   TBILI  --   --   --  2.0*   ALT  --   --   --  25   INR  --   --   --  1.4*     Recent Labs     10/09/20  0215 10/08/20  1350 10/08/20  0509   PH 7.46* 7.450 7.46*   PCO2 30* 33* 31*   PO2 68* 62* 47*   HCO3 22 24 23   FIO2 35 28 28       24 Hour Results:  Recent Results (from the past 24 hour(s))   BLOOD GAS, ARTERIAL    Collection Time: 10/08/20  1:50 PM   Result Value Ref Range    pH 7.450 7.35 - 7.45      PCO2 33 (L) 35 - 45 mmHg    PO2 62 (L) 75 - 100 mmHg    O2 SAT 93 (L) >95 % BICARBONATE 24 22 - 26 mmol/L    BASE DEFICIT 1.1 0 - 2 mmol/L    FIO2 28 %    SITE Arterial Line     BLOOD GAS, ARTERIAL    Collection Time: 10/09/20  2:15 AM   Result Value Ref Range    pH 7.46 (H) 7.35 - 7.45      PCO2 30 (L) 35 - 45 mmHg    PO2 68 (L) 75 - 100 mmHg    O2 SAT 96 >95 %    BICARBONATE 22 22 - 26 mmol/L    BASE DEFICIT 2.5 (H) 0 - 2 mmol/L    O2 FLOW RATE 12 L/min    FIO2 35 %    Sample source Arterial Line      SITE Left Radial      ENIO'S TEST YES      Critical value read back CVRB SHARONA BROWNYOR RN K 2.7    CBC WITH AUTOMATED DIFF    Collection Time: 10/09/20  3:05 AM   Result Value Ref Range    WBC 23.9 (H) 4.1 - 11.1 K/uL    RBC 3.30 (L) 4.10 - 5.70 M/uL    HGB 10.2 (L) 12.1 - 17.0 g/dL    HCT 29.0 (L) 36.6 - 50.3 %    MCV 87.9 80.0 - 99.0 FL    MCH 30.9 26.0 - 34.0 PG    MCHC 35.2 30.0 - 36.5 g/dL    RDW 14.9 (H) 11.5 - 14.5 %    PLATELET 82 (L) 974 - 400 K/uL    MPV 10.8 8.9 - 12.9 FL    NRBC 6.1 (H) 0  WBC    ABSOLUTE NRBC 1.45 (H) 0.00 - 0.01 K/uL    NEUTROPHILS 78 (H) 32 - 75 %    LYMPHOCYTES 10 (L) 12 - 49 %    MONOCYTES 11 5 - 13 %    EOSINOPHILS 1 0 - 7 %    BASOPHILS 0 0 - 1 %    IMMATURE GRANULOCYTES 1 (H) 0.0 - 0.5 %    ABS. NEUTROPHILS 18.9 (H) 1.8 - 8.0 K/UL    ABS. LYMPHOCYTES 2.5 0.8 - 3.5 K/UL    ABS. MONOCYTES 1.8 (H) 0.0 - 1.0 K/UL    ABS. EOSINOPHILS 0.0 0.0 - 0.4 K/UL    ABS. BASOPHILS 0.0 0.0 - 0.1 K/UL    ABS. IMM.  GRANS. 0.2 (H) 0.00 - 0.04 K/UL    DF AUTOMATED     METABOLIC PANEL, BASIC    Collection Time: 10/09/20  3:05 AM   Result Value Ref Range    Sodium 140 136 - 145 mmol/L    Potassium 3.0 (L) 3.5 - 5.1 mmol/L    Chloride 106 97 - 108 mmol/L    CO2 25 21 - 32 mmol/L    Anion gap 9 5 - 15 mmol/L    Glucose 119 (H) 65 - 100 mg/dL    BUN 8 6 - 20 mg/dL    Creatinine 0.81 0.70 - 1.30 mg/dL    BUN/Creatinine ratio 10 (L) 12 - 20      GFR est AA >60 >60 ml/min/1.73m2    GFR est non-AA >60 >60 ml/min/1.73m2    Calcium 7.1 (L) 8.5 - 10.1 mg/dL           Assessment/ Acute hypoxic respiratory failure postsurgery. Self extubated 10/09/20    Acute kidney injury likely secondary to ATN from hypotension. Improved    Hypovolemic shock post surgery    Right lower lobe aspiration pneumonia improved    Urinary tract infection due to E. Coli    Abnormal Cardiolite stress test showing inferior ischemia. 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 Repleted    Hypothermia, probably largely environmental.  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 Plan:  Continue supportive care including abtics  Wean Off vasopressor as tolerated  Monitor electrolytes closely  We will consider re ordering PT OT tomorrow  Clinical updates provided to wife at bedside. Patient remains critical  [address] discussed with: Patient/Family    Total time spent with patient: 30 minutes.     George Jc MD

## 2020-10-10 ENCOUNTER — APPOINTMENT (OUTPATIENT)
Dept: GENERAL RADIOLOGY | Age: 55
DRG: 981 | End: 2020-10-10
Attending: INTERNAL MEDICINE
Payer: COMMERCIAL

## 2020-10-10 ENCOUNTER — APPOINTMENT (OUTPATIENT)
Dept: GENERAL RADIOLOGY | Age: 55
DRG: 981 | End: 2020-10-10
Attending: SURGERY
Payer: COMMERCIAL

## 2020-10-10 LAB
ANION GAP SERPL CALC-SCNC: 8 MMOL/L (ref 5–15)
ARTERIAL PATENCY WRIST A: ABNORMAL
BASE EXCESS BLDA CALC-SCNC: 3.7 MMOL/L (ref 0–2)
BASOPHILS # BLD: 0 K/UL (ref 0–0.1)
BASOPHILS NFR BLD: 0 % (ref 0–1)
BDY SITE: ABNORMAL
BUN SERPL-MCNC: 14 MG/DL (ref 6–20)
BUN/CREAT SERPL: 20 (ref 12–20)
CA-I BLD-MCNC: 7.8 MG/DL (ref 8.5–10.1)
CHLORIDE SERPL-SCNC: 105 MMOL/L (ref 97–108)
CO2 SERPL-SCNC: 27 MMOL/L (ref 21–32)
CREAT SERPL-MCNC: 0.69 MG/DL (ref 0.7–1.3)
DIFFERENTIAL METHOD BLD: ABNORMAL
EOSINOPHIL # BLD: 0 K/UL (ref 0–0.4)
EOSINOPHIL NFR BLD: 0 % (ref 0–7)
EPAP/CPAP/PEEP, PAPEEP: 7
ERYTHROCYTE [DISTWIDTH] IN BLOOD BY AUTOMATED COUNT: 15.3 % (ref 11.5–14.5)
FIO2 ON VENT: 40 %
GLUCOSE SERPL-MCNC: 108 MG/DL (ref 65–100)
HCO3 BLDA-SCNC: 28 MMOL/L (ref 22–26)
HCT VFR BLD AUTO: 23.4 % (ref 36.6–50.3)
HGB BLD-MCNC: 8.2 G/DL (ref 12.1–17)
IMM GRANULOCYTES # BLD AUTO: 0.1 K/UL (ref 0–0.04)
IMM GRANULOCYTES NFR BLD AUTO: 0 % (ref 0–0.5)
LYMPHOCYTES # BLD: 0.7 K/UL (ref 0.8–3.5)
LYMPHOCYTES NFR BLD: 4 % (ref 12–49)
MCH RBC QN AUTO: 30.9 PG (ref 26–34)
MCHC RBC AUTO-ENTMCNC: 35 G/DL (ref 30–36.5)
MCV RBC AUTO: 88.3 FL (ref 80–99)
MONOCYTES # BLD: 1.4 K/UL (ref 0–1)
MONOCYTES NFR BLD: 7 % (ref 5–13)
NEUTS SEG # BLD: 17.6 K/UL (ref 1.8–8)
NEUTS SEG NFR BLD: 89 % (ref 32–75)
NRBC # BLD: 0.91 K/UL (ref 0–0.01)
NRBC BLD-RTO: 4.6 PER 100 WBC
PCO2 BLDA: 32 MMHG (ref 35–45)
PH BLDA: 7.52 [PH] (ref 7.35–7.45)
PLATELET # BLD AUTO: 58 K/UL (ref 150–400)
PMV BLD AUTO: 10.6 FL (ref 8.9–12.9)
PO2 BLDA: 49 MMHG (ref 75–100)
POTASSIUM SERPL-SCNC: 2.9 MMOL/L (ref 3.5–5.1)
RBC # BLD AUTO: 2.65 M/UL (ref 4.1–5.7)
SAO2 % BLD: 89 %
SAO2% DEVICE SAO2% SENSOR NAME: ABNORMAL
SODIUM SERPL-SCNC: 140 MMOL/L (ref 136–145)
VT SETTING VENT: 450 ML
WBC # BLD AUTO: 19.7 K/UL (ref 4.1–11.1)

## 2020-10-10 PROCEDURE — 94640 AIRWAY INHALATION TREATMENT: CPT

## 2020-10-10 PROCEDURE — 74011000258 HC RX REV CODE- 258: Performed by: INTERNAL MEDICINE

## 2020-10-10 PROCEDURE — 74011000250 HC RX REV CODE- 250: Performed by: SURGERY

## 2020-10-10 PROCEDURE — 94002 VENT MGMT INPAT INIT DAY: CPT

## 2020-10-10 PROCEDURE — 74011636637 HC RX REV CODE- 636/637: Performed by: INTERNAL MEDICINE

## 2020-10-10 PROCEDURE — 94400 HC END TIDAL CO2 RESPONSE CURVE: CPT

## 2020-10-10 PROCEDURE — 74011000250 HC RX REV CODE- 250: Performed by: INTERNAL MEDICINE

## 2020-10-10 PROCEDURE — 74011250637 HC RX REV CODE- 250/637: Performed by: SURGERY

## 2020-10-10 PROCEDURE — 74011250637 HC RX REV CODE- 250/637: Performed by: NURSE PRACTITIONER

## 2020-10-10 PROCEDURE — 5A1945Z RESPIRATORY VENTILATION, 24-96 CONSECUTIVE HOURS: ICD-10-PCS | Performed by: INTERNAL MEDICINE

## 2020-10-10 PROCEDURE — 74011250636 HC RX REV CODE- 250/636: Performed by: SURGERY

## 2020-10-10 PROCEDURE — 36415 COLL VENOUS BLD VENIPUNCTURE: CPT

## 2020-10-10 PROCEDURE — 71045 X-RAY EXAM CHEST 1 VIEW: CPT

## 2020-10-10 PROCEDURE — 94668 MNPJ CHEST WALL SBSQ: CPT

## 2020-10-10 PROCEDURE — 74011000258 HC RX REV CODE- 258: Performed by: SURGERY

## 2020-10-10 PROCEDURE — 82803 BLOOD GASES ANY COMBINATION: CPT

## 2020-10-10 PROCEDURE — 74011250637 HC RX REV CODE- 250/637: Performed by: INTERNAL MEDICINE

## 2020-10-10 PROCEDURE — 74011250636 HC RX REV CODE- 250/636: Performed by: INTERNAL MEDICINE

## 2020-10-10 PROCEDURE — 0BC58ZZ EXTIRPATION OF MATTER FROM RIGHT MIDDLE LOBE BRONCHUS, VIA NATURAL OR ARTIFICIAL OPENING ENDOSCOPIC: ICD-10-PCS | Performed by: INTERNAL MEDICINE

## 2020-10-10 PROCEDURE — 36600 WITHDRAWAL OF ARTERIAL BLOOD: CPT

## 2020-10-10 PROCEDURE — C9113 INJ PANTOPRAZOLE SODIUM, VIA: HCPCS | Performed by: INTERNAL MEDICINE

## 2020-10-10 PROCEDURE — 0BH17EZ INSERTION OF ENDOTRACHEAL AIRWAY INTO TRACHEA, VIA NATURAL OR ARTIFICIAL OPENING: ICD-10-PCS | Performed by: ANESTHESIOLOGY

## 2020-10-10 PROCEDURE — 87070 CULTURE OTHR SPECIMN AEROBIC: CPT

## 2020-10-10 PROCEDURE — 65610000006 HC RM INTENSIVE CARE

## 2020-10-10 PROCEDURE — 85025 COMPLETE CBC W/AUTO DIFF WBC: CPT

## 2020-10-10 PROCEDURE — 80048 BASIC METABOLIC PNL TOTAL CA: CPT

## 2020-10-10 PROCEDURE — 77010033678 HC OXYGEN DAILY

## 2020-10-10 RX ORDER — MAGNESIUM SULFATE HEPTAHYDRATE 40 MG/ML
2 INJECTION, SOLUTION INTRAVENOUS ONCE
Status: COMPLETED | OUTPATIENT
Start: 2020-10-10 | End: 2020-10-10

## 2020-10-10 RX ORDER — ALBUTEROL SULFATE 2.5 MG/.5ML
2.5 SOLUTION RESPIRATORY (INHALATION)
Status: DISCONTINUED | OUTPATIENT
Start: 2020-10-10 | End: 2020-10-26

## 2020-10-10 RX ORDER — HYDROMORPHONE HYDROCHLORIDE 1 MG/ML
1 INJECTION, SOLUTION INTRAMUSCULAR; INTRAVENOUS; SUBCUTANEOUS
Status: DISCONTINUED | OUTPATIENT
Start: 2020-10-10 | End: 2020-10-10

## 2020-10-10 RX ORDER — PROPOFOL 10 MG/ML
5 VIAL (ML) INTRAVENOUS
Status: DISCONTINUED | OUTPATIENT
Start: 2020-10-10 | End: 2020-10-11

## 2020-10-10 RX ORDER — METRONIDAZOLE 500 MG/100ML
500 INJECTION, SOLUTION INTRAVENOUS EVERY 8 HOURS
Status: DISCONTINUED | OUTPATIENT
Start: 2020-10-10 | End: 2020-10-17

## 2020-10-10 RX ORDER — ACETYLCYSTEINE 200 MG/ML
600 SOLUTION ORAL; RESPIRATORY (INHALATION)
Status: DISCONTINUED | OUTPATIENT
Start: 2020-10-10 | End: 2020-10-11

## 2020-10-10 RX ORDER — POTASSIUM CHLORIDE 7.45 MG/ML
10 INJECTION INTRAVENOUS
Status: COMPLETED | OUTPATIENT
Start: 2020-10-10 | End: 2020-10-10

## 2020-10-10 RX ADMIN — METOPROLOL TARTRATE 50 MG: 50 TABLET, FILM COATED ORAL at 08:35

## 2020-10-10 RX ADMIN — POTASSIUM CHLORIDE 10 MEQ: 7.46 INJECTION, SOLUTION INTRAVENOUS at 12:24

## 2020-10-10 RX ADMIN — SODIUM CHLORIDE 40 MG: 9 INJECTION, SOLUTION INTRAMUSCULAR; INTRAVENOUS; SUBCUTANEOUS at 08:34

## 2020-10-10 RX ADMIN — POTASSIUM CHLORIDE 10 MEQ: 7.46 INJECTION, SOLUTION INTRAVENOUS at 13:05

## 2020-10-10 RX ADMIN — DEXMEDETOMIDINE HYDROCHLORIDE 0.2 MCG/KG/HR: 100 INJECTION, SOLUTION, CONCENTRATE INTRAVENOUS at 10:31

## 2020-10-10 RX ADMIN — ASCORBIC ACID, VITAMIN A PALMITATE, CHOLECALCIFEROL, THIAMINE HYDROCHLORIDE, RIBOFLAVIN-5 PHOSPHATE SODIUM, PYRIDOXINE HYDROCHLORIDE, NIACINAMIDE, DEXPANTHENOL, ALPHA-TOCOPHEROL ACETATE, VITAMIN K1, FOLIC ACID, BIOTIN, CYANOCOBALAMIN: 200; 3300; 200; 6; 3.6; 6; 40; 15; 10; 150; 600; 60; 5 INJECTION, SOLUTION INTRAVENOUS at 22:51

## 2020-10-10 RX ADMIN — POTASSIUM CHLORIDE 10 MEQ: 7.46 INJECTION, SOLUTION INTRAVENOUS at 13:41

## 2020-10-10 RX ADMIN — HYDROCORTISONE 100 MG: 100 ENEMA RECTAL at 10:17

## 2020-10-10 RX ADMIN — LEVOFLOXACIN 500 MG: 500 TABLET, FILM COATED ORAL at 10:18

## 2020-10-10 RX ADMIN — ISODIUM CHLORIDE 2.5 ML: 0.03 SOLUTION RESPIRATORY (INHALATION) at 11:48

## 2020-10-10 RX ADMIN — ALBUTEROL SULFATE 2.5 MG: 2.5 SOLUTION RESPIRATORY (INHALATION) at 03:03

## 2020-10-10 RX ADMIN — ACETYLCYSTEINE 600 MG: 200 SOLUTION ORAL; RESPIRATORY (INHALATION) at 22:04

## 2020-10-10 RX ADMIN — ALBUTEROL SULFATE 2.5 MG: 2.5 SOLUTION RESPIRATORY (INHALATION) at 11:48

## 2020-10-10 RX ADMIN — ACETYLCYSTEINE 600 MG: 200 SOLUTION ORAL; RESPIRATORY (INHALATION) at 07:58

## 2020-10-10 RX ADMIN — PIPERACILLIN AND TAZOBACTAM 3.38 G: 3; .375 INJECTION, POWDER, LYOPHILIZED, FOR SOLUTION INTRAVENOUS at 05:06

## 2020-10-10 RX ADMIN — PROPOFOL 5 MCG/KG/MIN: 10 INJECTION, EMULSION INTRAVENOUS at 07:30

## 2020-10-10 RX ADMIN — ALBUTEROL SULFATE 2.5 MG: 2.5 SOLUTION RESPIRATORY (INHALATION) at 22:04

## 2020-10-10 RX ADMIN — METOPROLOL TARTRATE 50 MG: 50 TABLET, FILM COATED ORAL at 22:53

## 2020-10-10 RX ADMIN — ASPIRIN 81 MG: 81 TABLET, COATED ORAL at 08:34

## 2020-10-10 RX ADMIN — SODIUM CHLORIDE 40 MG: 9 INJECTION, SOLUTION INTRAMUSCULAR; INTRAVENOUS; SUBCUTANEOUS at 22:05

## 2020-10-10 RX ADMIN — FUROSEMIDE 20 MG: 10 INJECTION, SOLUTION INTRAMUSCULAR; INTRAVENOUS at 08:34

## 2020-10-10 RX ADMIN — PREDNISONE 20 MG: 20 TABLET ORAL at 08:34

## 2020-10-10 RX ADMIN — POTASSIUM CHLORIDE 10 MEQ: 7.46 INJECTION, SOLUTION INTRAVENOUS at 11:46

## 2020-10-10 RX ADMIN — DIBASIC SODIUM PHOSPHATE, MONOBASIC POTASSIUM PHOSPHATE AND MONOBASIC SODIUM PHOSPHATE 2 TABLET: 852; 155; 130 TABLET ORAL at 08:34

## 2020-10-10 RX ADMIN — METRONIDAZOLE 500 MG: 500 INJECTION, SOLUTION INTRAVENOUS at 15:34

## 2020-10-10 RX ADMIN — POTASSIUM CHLORIDE 10 MEQ: 7.46 INJECTION, SOLUTION INTRAVENOUS at 03:03

## 2020-10-10 RX ADMIN — POTASSIUM CHLORIDE 10 MEQ: 7.46 INJECTION, SOLUTION INTRAVENOUS at 00:50

## 2020-10-10 RX ADMIN — POTASSIUM CHLORIDE 10 MEQ: 7.46 INJECTION, SOLUTION INTRAVENOUS at 09:00

## 2020-10-10 RX ADMIN — FUROSEMIDE 20 MG: 10 INJECTION, SOLUTION INTRAMUSCULAR; INTRAVENOUS at 22:53

## 2020-10-10 RX ADMIN — MIDAZOLAM HYDROCHLORIDE 4 MG: 1 INJECTION, SOLUTION INTRAMUSCULAR; INTRAVENOUS at 14:30

## 2020-10-10 RX ADMIN — HYDROCORTISONE 100 MG: 100 ENEMA RECTAL at 22:06

## 2020-10-10 RX ADMIN — DEXMEDETOMIDINE HYDROCHLORIDE 0.6 MCG/KG/HR: 100 INJECTION, SOLUTION, CONCENTRATE INTRAVENOUS at 15:28

## 2020-10-10 RX ADMIN — ALBUTEROL SULFATE 2.5 MG: 2.5 SOLUTION RESPIRATORY (INHALATION) at 07:58

## 2020-10-10 RX ADMIN — DIBASIC SODIUM PHOSPHATE, MONOBASIC POTASSIUM PHOSPHATE AND MONOBASIC SODIUM PHOSPHATE 2 TABLET: 852; 155; 130 TABLET ORAL at 22:06

## 2020-10-10 RX ADMIN — POTASSIUM CHLORIDE: 2 INJECTION, SOLUTION, CONCENTRATE INTRAVENOUS at 05:07

## 2020-10-10 RX ADMIN — MAGNESIUM SULFATE HEPTAHYDRATE 2 G: 40 INJECTION, SOLUTION INTRAVENOUS at 08:33

## 2020-10-10 RX ADMIN — POTASSIUM CHLORIDE 10 MEQ: 7.46 INJECTION, SOLUTION INTRAVENOUS at 10:00

## 2020-10-10 RX ADMIN — CEFEPIME 1 G: 1 INJECTION, POWDER, FOR SOLUTION INTRAMUSCULAR; INTRAVENOUS at 15:32

## 2020-10-10 RX ADMIN — FENTANYL CITRATE 75 MCG/HR: 50 INJECTION, SOLUTION INTRAMUSCULAR; INTRAVENOUS at 12:30

## 2020-10-10 NOTE — PROGRESS NOTES
Dr. Helder Hyde ordered intubation of pt, Dr. Dalia Hotron notified. At 0716 110 mcg of propofol administered, pt vital signs WNL. Pt intubated at 0718 24 at lip, size 8.5 ETT. Cxray verified placement. Wife notified of intubation.

## 2020-10-10 NOTE — PROGRESS NOTES
Nephrology Consult    Patient: Corrina Agrawal MRN: [id number]  SSN: [id number]    YOB: 1965  Age: 48 y.o. Sex: male      Subjective:       The patient is seen in the room  On vent  Under sedation  Good UOP  K 2.9      Past Medical History:   Diagnosis Date    Myositis     Familial inclusion      Past Surgical History:   Procedure Laterality Date    HX ORTHOPAEDIC      disc infused, neck       Family History   Problem Relation Age of Onset    Other Father     Other Sister     Other Brother      Social History     Tobacco Use    Smoking status: Current Every Day Smoker     Packs/day: 0.50    Smokeless tobacco: Never Used   Substance Use Topics    Alcohol use: Yes     Comment: occ      Current Facility-Administered Medications   Medication Dose Route Frequency Provider Last Rate Last Dose    propofol (DIPRIVAN) 10 mg/mL infusion  5 mcg/kg/min IntraVENous TITRATE David Olson MD   Stopped at 10/10/20 1100    fentaNYL (PF) 1,500 mcg/30 mL (50 mcg/mL) infusion  75 mcg/hr IntraVENous TITRATE David Olson MD        potassium chloride 10 mEq in 100 ml IVPB  10 mEq IntraVENous Q1H David Olson MD        TPN ADULT - CENTRAL   IntraVENous CONTINUOUS Richie Justin MD        TPN ADULT - CENTRAL   IntraVENous CONTINUOUS Richie Justin MD 42 mL/hr at 10/09/20 2200      LORazepam (ATIVAN) injection 1 mg  1 mg IntraVENous Q2H PRN David Olson MD        dexmedeTOMidine [facility] [place]) 400 mcg in 0.9% sodium chloride 100 mL infusion  0.2-1.4 mcg/kg/hr IntraVENous TITRATE David Olson MD 5.3 mL/hr at 10/10/20 1100 0.3 mcg/kg/hr at 10/10/20 1100    albuterol CONCENTRATE 2.5mg/0.5 mL neb soln  2.5 mg Nebulization Q4H RT Judith Araujo MD   2.5 mg at 10/10/20 0758    sodium chloride 0.9% (NS) 3ml nebulizer solution  2.5 mL Nebulization PRN Judith Araujo MD        acetylcysteine (MUCOMYST) 200 mg/mL (20 %) solution 600 mg  600 mg Nebulization Q8H RT Judith Araujo MD   600 mg at 10/10/20 0758    midazolam (PF) (VERSED) injection 4 mg  4 mg IntraVENous Q2H PRN Joe Olson MD   2 mg at 10/09/20 1815    furosemide (LASIX) injection 20 mg  20 mg IntraVENous BID Joe Olson MD   20 mg at 10/10/20 4719    0.9% sodium chloride infusion 250 mL  250 mL IntraVENous PRN Mart Alarcon MD 15 mL/hr at 10/09/20 2058 250 mL at 10/09/20 2058    hydrocortisone (CORTENEMA) 100 mg/60 mL enema 100 mg  100 mg Rectal Q12H Joe Olson MD   100 mg at 10/10/20 1017    NOREPINephrine (LEVOPHED) 8 mg in 5% dextrose 250mL (32 mcg/mL) infusion  2 mcg/min IntraVENous TITRATE Joe Olson MD        vasopressin (VASOSTRICT) 20 Units in 0.9% sodium chloride 100 mL infusion  0.04 Units/min IntraVENous CONTINUOUS Joe Olson MD        0.9% sodium chloride infusion 250 mL  250 mL IntraVENous PRN Joe Olson MD        piperacillin-tazobactam (ZOSYN) 3.375 g in 0.9% sodium chloride (MBP/ADV) 100 mL MBP  3.375 g IntraVENous Q8H Joe Olson MD 25 mL/hr at 10/10/20 0506 3.375 g at 10/10/20 0506    propofol (DIPRIVAN) 10 mg/mL infusion  5 mcg/kg/min IntraVENous TITRATE Joe Olson MD 6.3 mL/hr at 10/07/20 1633 20 mcg/kg/min at 10/07/20 1633    0.9% sodium chloride infusion 250 mL  250 mL IntraVENous PRN Joe Olson MD        PHENYLephrine (ALISTAIR-SYNEPHRINE) 30 mg in 0.9% sodium chloride 250 mL infusion   mcg/min IntraVENous CONTINUOUS Joe Olson MD 50 mL/hr at 10/07/20 0050 100 mcg/min at 10/07/20 0050    0.9% sodium chloride infusion 250 mL  250 mL IntraVENous PRN Joe Olson MD        DOPamine (INTROPIN) 400 mg in dextrose 5% 250 mL infusion  2.5 mcg/kg/min IntraVENous TITRATE Joe Olson MD   Stopped at 10/09/20 1607    0.9% sodium chloride infusion 250 mL  250 mL IntraVENous PRN Joe Olson MD        vasopressin (VASOSTRICT) 20 Units in 0.9% sodium chloride 100 mL infusion  0.01-0.1 Units/min IntraVENous TITRATE Joe Olson MD   Stopped at 10/08/20 0500    pantoprazole (PROTONIX) 40 mg in 0.9% sodium chloride 10 mL injection  40 mg IntraVENous Q12H Cathy Betancourt MD   40 mg at 10/10/20 0834    influenza vaccine 2020-21 (4 yrs+)(PF) (FLUCELVAX QUAD) injection 0.5 mL  0.5 mL IntraMUSCular PRIOR TO DISCHARGE Leah Mckenzie MD   Stopped at 10/07/20 0900    0.9% sodium chloride infusion 250 mL  250 mL IntraVENous PRN Leia Olson MD        diphenhydrAMINE (BENADRYL) capsule 50 mg  50 mg Oral BID PRN Estela Dos Santos NP   50 mg at 10/03/20 0129    phosphorus (K PHOS NEUTRAL) 250 mg tablet 2 Tab  2 Tab Oral BID Leah Mckenzie MD   2 Tab at 10/10/20 0834    levoFLOXacin (LEVAQUIN) tablet 500 mg  500 mg Oral Q24H Leah Mckenzie MD   500 mg at 10/10/20 1018    predniSONE (DELTASONE) tablet 20 mg  20 mg Oral DAILY WITH BREAKFAST Leah Mckenzie MD   20 mg at 10/10/20 0834    oxyCODONE IR (ROXICODONE) tablet 15 mg  15 mg Oral Q4H PRN Leah Mckenzie MD   15 mg at 10/06/20 1228    metoprolol tartrate (LOPRESSOR) tablet 50 mg  50 mg Oral BID Storm Asif MD   50 mg at 10/10/20 0835    enoxaparin (LOVENOX) injection 40 mg  40 mg SubCUTAneous Q24H Cathy Betancourt MD   Stopped at 10/05/20 2141    aspirin delayed-release tablet 81 mg  81 mg Oral DAILY Gisele Long NP   81 mg at 10/10/20 0834    acetaminophen (TYLENOL) tablet 650 mg  650 mg Oral Q6H PRN Gisele Long NP   650 mg at 09/30/20 0950    ondansetron (ZOFRAN) injection 4 mg  4 mg IntraVENous Q8H PRN Do Long NP   4 mg at 09/30/20 0402    guaiFENesin (ROBITUSSIN) 20 mg/mL oral liquid 400 mg  400 mg Oral Q6H PRN Jovany SUTTON NP   Stopped at 09/26/20 1855        No Known Allergies    Review of Systems:  A comprehensive review of systems was negative except for that written in the History of Present Illness.     Objective:     Vitals:    10/10/20 0756 10/10/20 0759 10/10/20 0800 10/10/20 0930   BP:   114/67 (!) 99/53   Pulse: 88  82 76   Resp: 19  15 14   Temp:   98.1 °F (36.7 °C) 97.9 °F (36.6 °C)   SpO2: 97% 98% 97% 96%   Weight:       Height:            Physical Exam:  General: Under sedation  Nose NG in place  Oral cavity ET in place  Chest on ventilatory support  Heart S1-S2 normal  Genitourinary Reese in place  Lower extremity no edema  Assessment:     Hospital Problems  Date Reviewed: 9/17/2020          Codes Class Noted POA    Metabolic encephalopathy HEQ-28-KF: G93.41  ICD-9-CM: 348.31  9/26/2020 Unknown        UTI (urinary tract infection) ICD-10-CM: N39.0  ICD-9-CM: 599.0  9/26/2020 Unknown        Aspiration pneumonitis ([address] ICD-10-CM: J69.0  ICD-9-CM: 507.0  9/26/2020 Unknown        Essential hypertension ICD-10-CM: I10  ICD-9-CM: 401.9  9/26/2020 Unknown        Acute dehydration ICD-10-CM: E86.0  ICD-9-CM: 276.51  9/26/2020 Unknown              Plan:     #1 Low urine output :  - likely prerenal from hypotension.    -He was put on 3 pressors.    -Creatinine 0.58.    -His blood pressures are better now.    -No volume overload.    -gave normal saline 1 L as a bolus. -on IV fluids.  -good UOP    2. Metabolic acidosis, high anion gap. From hypotension and lactic acidosis  CO2 was 18-->24  I will check lactic acid level        #3 status post  infrarenal aorta endarterectomy, with aortic by common femoral artery bypass with 14 mm x 7 mm Hemosure graft    #4 hypertension. Blood pressure is low.     dced lisinopril    #5 severe Hypokalemia:  -K 2.9  -iv KCL 60 meq          Signed By: Loly Silva MD     October 10, 2020

## 2020-10-10 NOTE — PROCEDURES
MD SULTANA CALLED TO ICU FOR INTUBATION:    START:7:11  END:7:14    PATIENT ON BIPAP. INSITU NG IN PLACE. PROPOFOL 110MG I/V FOLLOWED BY FLUSH. SUCTION POSTERIOR PHARYNX.  ZAIRA, DL WITH MAC 3, GRADE 2 VIEW, ET8.5 PASSED VIA VOCAL CORDS. CUFF UP, +BL BS, + ETCO2, + BL CHEST RISE, SECURED BY RT AT 25 ON LIPS WITH TUBE TREADWELL. VITALS STABLE. NO COMPLICATIONS.

## 2020-10-10 NOTE — PROGRESS NOTES
Hospitalist Progress Note           Daily Progress Note: 10/10/2020    Chief complaint: Shortness of breath and weakness  Subjective: The patient is seen for follow  up. Postoperative day #3 status post infrarenal aorta endarterectomy. Reportedly hypotensive postprocedure requiring 3 vasopressors. Off vasopressors  Reportedly had an episode of hypoxemia this morning requiring BiPAP. Later intubated. Schedule for bronchoscopy due to left lung collapse. No fevers overnight.  Wife at bedside during this evaluation    Problem List:  Problem List as of 10/10/2020 Date Reviewed: 9/17/2020          Codes Class Noted - Resolved    Metabolic encephalopathy WAX-62-BE: G93.41  ICD-9-CM: 348.31  9/26/2020 - Present        UTI (urinary tract infection) ICD-10-CM: N39.0  ICD-9-CM: 599.0  9/26/2020 - Present        Aspiration pneumonitis ([address] ICD-10-CM: J69.0  ICD-9-CM: 507.0  9/26/2020 - Present        Essential hypertension ICD-10-CM: I10  ICD-9-CM: 401.9  9/26/2020 - Present        Acute dehydration ICD-10-CM: E86.0  ICD-9-CM: 276.51  9/26/2020 - Present              Medications reviewed  Current Facility-Administered Medications   Medication Dose Route Frequency    propofol (DIPRIVAN) 10 mg/mL infusion  5 mcg/kg/min IntraVENous TITRATE    fentaNYL (PF) 1,500 mcg/30 mL (50 mcg/mL) infusion  75 mcg/hr IntraVENous TITRATE    potassium chloride 10 mEq in 100 ml IVPB  10 mEq IntraVENous Q1H    TPN ADULT - CENTRAL   IntraVENous CONTINUOUS    LORazepam (ATIVAN) injection 1 mg  1 mg IntraVENous Q2H PRN    dexmedeTOMidine (PRECEDEX) 400 mcg in 0.9% sodium chloride 100 mL infusion  0.2-1.4 mcg/kg/hr IntraVENous TITRATE    albuterol CONCENTRATE 2.5mg/0.5 mL neb soln  2.5 mg Nebulization Q4H RT    sodium chloride 0.9% (NS) 3ml nebulizer solution  2.5 mL Nebulization PRN    acetylcysteine (MUCOMYST) 200 mg/mL (20 %) solution 600 mg  600 mg Nebulization Q8H RT    midazolam (PF) (VERSED) injection 4 mg  4 mg IntraVENous Q2H PRN    furosemide (LASIX) injection 20 mg  20 mg IntraVENous BID    0.9% sodium chloride infusion 250 mL  250 mL IntraVENous PRN    hydrocortisone (CORTENEMA) 100 mg/60 mL enema 100 mg  100 mg Rectal Q12H    NOREPINephrine (LEVOPHED) 8 mg in 5% dextrose 250mL (32 mcg/mL) infusion  2 mcg/min IntraVENous TITRATE    vasopressin (VASOSTRICT) 20 Units in 0.9% sodium chloride 100 mL infusion  0.04 Units/min IntraVENous CONTINUOUS    0.9% sodium chloride infusion 250 mL  250 mL IntraVENous PRN    piperacillin-tazobactam (ZOSYN) 3.375 g in 0.9% sodium chloride (MBP/ADV) 100 mL MBP  3.375 g IntraVENous Q8H    propofol (DIPRIVAN) 10 mg/mL infusion  5 mcg/kg/min IntraVENous TITRATE    0.9% sodium chloride infusion 250 mL  250 mL IntraVENous PRN    PHENYLephrine (ALISTAIR-SYNEPHRINE) 30 mg in 0.9% sodium chloride 250 mL infusion   mcg/min IntraVENous CONTINUOUS    0.9% sodium chloride infusion 250 mL  250 mL IntraVENous PRN    DOPamine (INTROPIN) 400 mg in dextrose 5% 250 mL infusion  2.5 mcg/kg/min IntraVENous TITRATE    0.9% sodium chloride infusion 250 mL  250 mL IntraVENous PRN    vasopressin (VASOSTRICT) 20 Units in 0.9% sodium chloride 100 mL infusion  0.01-0.1 Units/min IntraVENous TITRATE    pantoprazole (PROTONIX) 40 mg in 0.9% sodium chloride 10 mL injection  40 mg IntraVENous Q12H    influenza vaccine 2020-21 (4 yrs+)(PF) (FLUCELVAX QUAD) injection 0.5 mL  0.5 mL IntraMUSCular PRIOR TO DISCHARGE    0.9% sodium chloride infusion 250 mL  250 mL IntraVENous PRN    diphenhydrAMINE (BENADRYL) capsule 50 mg  50 mg Oral BID PRN    phosphorus (K PHOS NEUTRAL) 250 mg tablet 2 Tab  2 Tab Oral BID    levoFLOXacin (LEVAQUIN) tablet 500 mg  500 mg Oral Q24H    predniSONE (DELTASONE) tablet 20 mg  20 mg Oral DAILY WITH BREAKFAST    oxyCODONE IR (ROXICODONE) tablet 15 mg  15 mg Oral Q4H PRN    metoprolol tartrate (LOPRESSOR) tablet 50 mg  50 mg Oral BID    enoxaparin (LOVENOX) injection 40 mg  40 mg SubCUTAneous Q24H    aspirin delayed-release tablet 81 mg  81 mg Oral DAILY    acetaminophen (TYLENOL) tablet 650 mg  650 mg Oral Q6H PRN    ondansetron (ZOFRAN) injection 4 mg  4 mg IntraVENous Q8H PRN    guaiFENesin (ROBITUSSIN) 20 mg/mL oral liquid 400 mg  400 mg Oral Q6H PRN       Review of Systems:   Review of systems unable to be obtained because he is intubated    Objective:   Physical Exam:     Visit Vitals  BP (!) 99/53 (BP 1 Location: Right arm, BP Patient Position: At rest)   Pulse 76   Temp 97.9 °F (36.6 °C)   Resp 14   Ht 6' 0.05\" (1.83 m)   Wt 70.1 kg (154 lb 8.7 oz)   SpO2 96%   BMI 20.93 kg/m²    O2 Flow Rate (L/min): 15 l/min O2 Device: Ventilator    Temp (24hrs), Av.7 °F (36.5 °C), Min:95.9 °F (35.5 °C), Max:99.5 °F (37.5 °C)    No intake/output data recorded. 10/08 1901 - 10/10 0700  In: 355.5   Out: 7775 [KCCUD:[id number]; Drains:460]    General:   intubated and sedated, cooperative, no distress, appears older than stated age. Lungs:   Clear to auscultation bilaterally with diminished breath sounds bilateral bases. Chest wall:  No tenderness or deformity. Heart:  Regular rate and rhythm, S1, S2 normal, no murmur, click, rub or gallop. Abdomen:   Soft, non-tender. Diminished Bowel sounds. Dressings in place. Extremities: Extremities normal, atraumatic, positive cyanosis. Pulses: 2+ and symmetric all extremities. Skin: Skin color, texture, turgor normal. No rashes or lesions   Neurologic: CNII-XII intact.      Data Review:       Recent Days:  Recent Labs     10/10/20  0521 10/09/20  2045 10/09/20  0305   WBC 19.7* 17.4* 23.9*   HGB 8.2* 7.5* 10.2*   HCT 23.4* 21.2* 29.0*   PLT 58* 51* 82*     Recent Labs     10/10/20  0521 10/09/20  2045 10/09/20  0305    138 140   K 2.9* 2.9* 3.0*    104 106   CO2 27 28 25   * 108* 119*   BUN 14 14 8   CREA 0.69* 0.71 0.81   CA 7.8* 7.4* 7.1*     Recent Labs     10/10/20  0440 10/09/20  0215 10/08/20  1350 PH 7.524* 7.46* 7.450   PCO2 32* 30* 33*   PO2 49* 68* 62*   HCO3 28* 22 24   FIO2 40.0 35 28       24 Hour Results:  Recent Results (from the past 24 hour(s))   CBC WITH AUTOMATED DIFF    Collection Time: 10/09/20  8:45 PM   Result Value Ref Range    WBC 17.4 (H) 4.1 - 11.1 K/uL    RBC 2.41 (L) 4.10 - 5.70 M/uL    HGB 7.5 (L) 12.1 - 17.0 g/dL    HCT 21.2 (L) 36.6 - 50.3 %    MCV 88.0 80.0 - 99.0 FL    MCH 31.1 26.0 - 34.0 PG    MCHC 35.4 30.0 - 36.5 g/dL    RDW 15.1 (H) 11.5 - 14.5 %    PLATELET 51 (L) 493 - 400 K/uL    MPV 10.3 8.9 - 12.9 FL    NEUTROPHILS 96 (H) 32 - 75 %    BAND NEUTROPHILS 1 0 - 6 %    LYMPHOCYTES 1 (L) 12 - 49 %    MONOCYTES 2 (L) 5 - 13 %    EOSINOPHILS 0 0 - 7 %    BASOPHILS 0 0 - 1 %    NRBC 9.0  WBC    IMMATURE GRANULOCYTES 0 %    ABS. NEUTROPHILS 16.9 (H) 1.8 - 8.0 K/UL    ABS. LYMPHOCYTES 0.2 (L) 0.8 - 3.5 K/UL    ABS. MONOCYTES 0.3 0.0 - 1.0 K/UL    ABS. EOSINOPHILS 0.0 0.0 - 0.4 K/UL    ABS. BASOPHILS 0.0 0.0 - 0.1 K/UL    ABSOLUTE NRBC 1.57 K/uL    ABS. IMM.  GRANS. 0.0 K/UL    DF AUTOMATED      RBC COMMENTS Anisocytosis  1+        RBC COMMENTS Poikilocytosis  1+        RBC COMMENTS Microcytosis  1+       METABOLIC PANEL, BASIC    Collection Time: 10/09/20  8:45 PM   Result Value Ref Range    Sodium 138 136 - 145 mmol/L    Potassium 2.9 (L) 3.5 - 5.1 mmol/L    Chloride 104 97 - 108 mmol/L    CO2 28 21 - 32 mmol/L    Anion gap 6 5 - 15 mmol/L    Glucose 108 (H) 65 - 100 mg/dL    BUN 14 6 - 20 mg/dL    Creatinine 0.71 0.70 - 1.30 mg/dL    BUN/Creatinine ratio 20 12 - 20      GFR est AA >60 >60 ml/min/1.73m2    GFR est non-AA >60 >60 ml/min/1.73m2    Calcium 7.4 (L) 8.5 - 10.1 mg/dL   BLOOD GAS, ARTERIAL    Collection Time: 10/10/20  4:40 AM   Result Value Ref Range    pH 7.524 (H) 7.35 - 7.45      PCO2 32 (L) 35 - 45 mmHg    PO2 49 (LL) 75 - 100 mmHg    O2 SAT 89 (L) >95 %    BICARBONATE 28 (H) 22 - 26 mmol/L    BASE EXCESS 3.7 (H) 0 - 2 mmol/L    O2 METHOD AVAPS      FIO2 40.0 %    Tidal volume 450      EPAP/CPAP/PEEP 7      SITE Right Brachial      ENIO'S TEST PASS     CBC WITH AUTOMATED DIFF    Collection Time: 10/10/20  5:21 AM   Result Value Ref Range    WBC 19.7 (H) 4.1 - 11.1 K/uL    RBC 2.65 (L) 4.10 - 5.70 M/uL    HGB 8.2 (L) 12.1 - 17.0 g/dL    HCT 23.4 (L) 36.6 - 50.3 %    MCV 88.3 80.0 - 99.0 FL    MCH 30.9 26.0 - 34.0 PG    MCHC 35.0 30.0 - 36.5 g/dL    RDW 15.3 (H) 11.5 - 14.5 %    PLATELET 58 (L) 875 - 400 K/uL    MPV 10.6 8.9 - 12.9 FL    NRBC 4.6 (H) 0  WBC    ABSOLUTE NRBC 0.91 (H) 0.00 - 0.01 K/uL    NEUTROPHILS 89 (H) 32 - 75 %    LYMPHOCYTES 4 (L) 12 - 49 %    MONOCYTES 7 5 - 13 %    EOSINOPHILS 0 0 - 7 %    BASOPHILS 0 0 - 1 %    IMMATURE GRANULOCYTES 0 0.0 - 0.5 %    ABS. NEUTROPHILS 17.6 (H) 1.8 - 8.0 K/UL    ABS. LYMPHOCYTES 0.7 (L) 0.8 - 3.5 K/UL    ABS. MONOCYTES 1.4 (H) 0.0 - 1.0 K/UL    ABS. EOSINOPHILS 0.0 0.0 - 0.4 K/UL    ABS. BASOPHILS 0.0 0.0 - 0.1 K/UL    ABS. IMM. GRANS. 0.1 (H) 0.00 - 0.04 K/UL    DF AUTOMATED     METABOLIC PANEL, BASIC    Collection Time: 10/10/20  5:21 AM   Result Value Ref Range    Sodium 140 136 - 145 mmol/L    Potassium 2.9 (L) 3.5 - 5.1 mmol/L    Chloride 105 97 - 108 mmol/L    CO2 27 21 - 32 mmol/L    Anion gap 8 5 - 15 mmol/L    Glucose 108 (H) 65 - 100 mg/dL    BUN 14 6 - 20 mg/dL    Creatinine 0.69 (L) 0.70 - 1.30 mg/dL    BUN/Creatinine ratio 20 12 - 20      GFR est AA >60 >60 ml/min/1.73m2    GFR est non-AA >60 >60 ml/min/1.73m2    Calcium 7.8 (L) 8.5 - 10.1 mg/dL           Assessment/     Acute hypoxic respiratory failure postsurgery. Self extubated 10/09/20. Reintubated 10/10 due to hypoxemia    Acute kidney injury likely secondary to ATN from hypotension. Improved    Hypovolemic shock post surgery    Right lower lobe aspiration pneumonia improved    Urinary tract infection due to E. Coli    Abnormal Cardiolite stress test showing inferior ischemia.   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 Repleted    Hypothermia, probably largely environmental.  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 Plan:  Continue supportive care including abtics  Monitor electrolytes closely  Clinical updates provided to wife at bedside. Patient remains critical  Renew TPN  Bronchoscopy scheduled for today        Care Plan discussed with: Patient/Family    Total time spent with patient: 30 minutes.     Donald Garcia MD

## 2020-10-10 NOTE — PROGRESS NOTES
Bedside shift change report given to Tree RN (oncoming nurse) by Sang Flower RN (offgoing nurse). Report included the following information SBAR.

## 2020-10-10 NOTE — PROCEDURES
[facility]  Pulmonary, Critical Care, and Sleep Medicine    Name: Marisa Cordero MRN: [id number]   : 1965 Hospital: [address] [place] [address]   Date: 10/10/2020        Bronchoscopy Report    Procedure: diagnostic and therapeutic bronchoscopy. Indication: Abnormal chest imaging and Mucus Plugging    Consent/Treatment: Informed consent was obtained from the  family after risks, benefits and alternatives were explained. Timeout verified the correct patient and correct procedure. Anesthesia:   Topical sedation to nares, mouth, and tracheobronchial tree with lidocaine  Patient on ventilator and receiving  Fentanyl at 100 and Precedex at 0.7    Procedure Details:   -- The bronchoscope was introduced through an endotracheal tube. -- The trachea and austin were completely inspected and were found to be normal.  -- The right-sided endobronchial anatomy was completely inspected and was found to be normal.  -- The left-sided endobronchial anatomy was initially completely obscured by thick, brown mucous which was aggressively suctioned. A sample of bronchial washings were taken for culture. The left tracheobronchial tree was completely clear of mucous by the end of the procedure. Specimens:   Bronchial washings were sent for  microbiology    Rapid On-Site Evaluation: A preliminary diagnosis of mucous plugging, left lung atelectasis, pneumonia.     Complications: none    Estimated Blood Loss: Minimal    Elvia Quevedo DO  October 10, 2020  2:34 PM

## 2020-10-10 NOTE — PROGRESS NOTES
Assisted Dr. Ashley Moore with bronchoscopy at bedside with RT bronch cart. Patient tolerated well. Bronch successful by Dr. Ashley Moore. No complications at this time.

## 2020-10-10 NOTE — PROGRESS NOTES
Patient's repeat HB 7.5 g/dl and potassium 2.9. Kevin Chappell informed and he gave an order to repeat H and H in the morning. Replace potassium with Potassium 10 meq/L hourly four doses.

## 2020-10-10 NOTE — PROGRESS NOTES
Pulmonology and Critical Care Progress Note    Subjective:     Chief Complaint:   Chief Complaint   Patient presents with    Altered mental status      Patient seen and examined at the bedside this afternoon. The patient underwent surgery, aortobifemoral bypass surgery 10/6/20. Post surgery he was left on the ventilator. Apparently blood loss was about 3.5 L. When he returned to the ICU he was on multiple pressors. Patient self-extubated on 10/9/20 early in the morning and was not doing well with ventimask, therefore was switched to BiPAP in the evening. I received a call this morning from Dr. Rosalva Alexandra stating that the patient's entire left lung was opacified and likely due to mucous plugging despite aggressive pulmonary toileting (chest PT, albuterol nebs, mucomyst, guaifenesin). We made the decision to electively intubate the patient and plan for bronchoscopy this afternoon for removal of mucous plugs. I obtained consent from the patient's wife after a successful intubation by anesthesia this morning. The bronch was completed successfully with removal of all visible mucous plugs today. Cardiac catheterization was done on 10/5. Noted. His nuclear medicine cardiac cath showed ischemia of the inferior wall. Modified barium swallow test showed issues of dysphagia.       Review of Systems:  Has chronic Reese catheter placement    Current Facility-Administered Medications   Medication Dose Route Frequency Provider Last Rate Last Dose    propofol (DIPRIVAN) 10 mg/mL infusion  5 mcg/kg/min IntraVENous TITRATE Gracie Olson MD   Stopped at 10/10/20 1100    fentaNYL (PF) 1,500 mcg/30 mL (50 mcg/mL) infusion  75 mcg/hr IntraVENous TITRATE Gracie Olson MD 1.5 mL/hr at 10/10/20 1230 75 mcg/hr at 10/10/20 1230    TPN ADULT - CENTRAL AA 5% D20% W/ CA + ELECTROLYTES   IntraVENous CONTINUOUS Mark Stephens MD        cefepime (MAXIPIME) 1 g in 0.9% sodium chloride (MBP/ADV) 50 mL MBP  1 g IntraVENous Q8H Chacho Reddy DO        metroNIDAZOLE (FLAGYL) IVPB premix 500 mg  500 mg IntraVENous Q8H Paolo Santiago DO        TPN ADULT - CENTRAL   IntraVENous CONTINUOUS Sugar Parekh MD 42 mL/hr at 10/09/20 2200      LORazepam (ATIVAN) injection 1 mg  1 mg IntraVENous Q2H PRN Yeni Olson MD        dexmedeTOMidine [facility]) 400 mcg in 0.9% sodium chloride 100 mL infusion  0.2-1.4 mcg/kg/hr IntraVENous TITRATE Yeni Olsno MD 14 mL/hr at 10/10/20 1345 0.8 mcg/kg/hr at 10/10/20 1345    albuterol CONCENTRATE 2.5mg/0.5 mL neb soln  2.5 mg Nebulization Q4H RT Lan Cabello MD   2.5 mg at 10/10/20 1148    sodium chloride 0.9% (NS) 3ml nebulizer solution  2.5 mL Nebulization PRN Lan Cabello MD   2.5 mL at 10/10/20 1148    acetylcysteine (MUCOMYST) 200 mg/mL (20 %) solution 600 mg  600 mg Nebulization Q8H RT Lan Cabello MD   600 mg at 10/10/20 0758    midazolam (PF) (VERSED) injection 4 mg  4 mg IntraVENous Q2H PRN Yeni Olson MD   2 mg at 10/09/20 1815    furosemide (LASIX) injection 20 mg  20 mg IntraVENous BID Yeni Olson MD   20 mg at 10/10/20 9316    0.9% sodium chloride infusion 250 mL  250 mL IntraVENous PRN Sugar Parekh MD 15 mL/hr at 10/09/20 2058 250 mL at 10/09/20 2058    hydrocortisone (CORTENEMA) 100 mg/60 mL enema 100 mg  100 mg Rectal Q12H Yeni Olson MD   100 mg at 10/10/20 1017    NOREPINephrine (LEVOPHED) 8 mg in 5% dextrose 250mL (32 mcg/mL) infusion  2 mcg/min IntraVENous TITRATE Yeni Olson MD        vasopressin (VASOSTRICT) 20 Units in 0.9% sodium chloride 100 mL infusion  0.04 Units/min IntraVENous CONTINUOUS Yeni Olson MD        0.9% sodium chloride infusion 250 mL  250 mL IntraVENous PRN Yeni Olson MD        propofol (DIPRIVAN) 10 mg/mL infusion  5 mcg/kg/min IntraVENous TITRATE Yeni Olson MD 6.3 mL/hr at 10/07/20 1633 20 mcg/kg/min at 10/07/20 1633    0.9% sodium chloride infusion 250 mL  250 mL IntraVENous PRN Ynei Olson MD       [facility] PHENYLephrine (ALISTAIR-SYNEPHRINE) 30 mg in 0.9% sodium chloride 250 mL infusion   mcg/min IntraVENous CONTINUOUS Sarah Beth Olson MD 50 mL/hr at 10/07/20 0050 100 mcg/min at 10/07/20 0050    0.9% sodium chloride infusion 250 mL  250 mL IntraVENous PRN Sarah Beth Olson MD        DOPamine (INTROPIN) 400 mg in dextrose 5% 250 mL infusion  2.5 mcg/kg/min IntraVENous TITRATE Sarah Beth Olson MD   Stopped at 10/09/20 1607    0.9% sodium chloride infusion 250 mL  250 mL IntraVENous PRN Sarah Beth Olson MD        vasopressin (VASOSTRICT) 20 Units in 0.9% sodium chloride 100 mL infusion  0.01-0.1 Units/min IntraVENous TITRATE Sarah Beth Olson MD   Stopped at 10/08/20 0500    pantoprazole (PROTONIX) 40 mg in 0.9% sodium chloride 10 mL injection  40 mg IntraVENous Q12H Minal Nunes MD   40 mg at 10/10/20 0834    influenza vaccine 2020-21 (4 yrs+)(PF) (FLUCELVAX QUAD) injection 0.5 mL  0.5 mL IntraMUSCular PRIOR TO DISCHARGE Quirino Alanis MD   Stopped at 10/07/20 0900    0.9% sodium chloride infusion 250 mL  250 mL IntraVENous PRN Sarah Beth Olson MD        diphenhydrAMINE (BENADRYL) capsule 50 mg  50 mg Oral BID PRN Clare Madrid, NP   50 mg at 10/03/20 0129    phosphorus (K PHOS NEUTRAL) 250 mg tablet 2 Tab  2 Tab Oral BID Quirino Alanis MD   2 Tab at 10/10/20 0834    levoFLOXacin (LEVAQUIN) tablet 500 mg  500 mg Oral Q24H Quirino Alanis MD   500 mg at 10/10/20 1018    predniSONE (DELTASONE) tablet 20 mg  20 mg Oral DAILY WITH BREAKFAST Quirino Alanis MD   20 mg at 10/10/20 0834    oxyCODONE IR (ROXICODONE) tablet 15 mg  15 mg Oral Q4H PRN Quirino Alanis MD   15 mg at 10/06/20 1228    metoprolol tartrate (LOPRESSOR) tablet 50 mg  50 mg Oral BID Corie Diaz MD   50 mg at 10/10/20 0835    enoxaparin (LOVENOX) injection 40 mg  40 mg SubCUTAneous Q24H Minal Nunes MD   Stopped at 10/05/20 2141    aspirin delayed-release tablet 81 mg  81 mg Oral DAILY Gisele Long NP   81 mg at 10/10/20 0834    acetaminophen (TYLENOL) tablet 650 mg  650 mg Oral Q6H PRN Gisele Long T, NP   650 mg at 20 0950    ondansetron (ZOFRAN) injection 4 mg  4 mg IntraVENous Q8H PRN Danielbot, Loran T, NP   4 mg at 20 0402    guaiFENesin (ROBITUSSIN) 20 mg/mL oral liquid 400 mg  400 mg Oral Q6H PRN Towana Can, NP   Stopped at 20 1855            No Known Allergies        Objective:     Blood pressure (!) 99/53, pulse 64, temperature 97.9 °F (36.6 °C), resp. rate 12, height 6' 0.05\" (1.83 m), weight 70.1 kg (154 lb 8.7 oz), SpO2 99 %. Temp (24hrs), Av.9 °F (36.6 °C), Min:96.4 °F (35.8 °C), Max:99.5 °F (37.5 °C)      Intake and Output:  Current Shift: 10/10 0701 - 10/10 1900  In: -   Out: 400 [Urine:400]  Last 3 Shifts: 10/08 1901 - 10/10 0700  In: 355.5   [facility]irk Ferrer [facility]560]    Physical Exam:     General: Lying in bed, on ventilator. Sedated. No acute distress  Throat and Neck: Supple. No JVD. He has a right subclavian central line. He has NG tube in the right nares. To low intermittent suction. Dark brownish red fluid is noted in the canister. Lung: Reduced air entry bilaterally, no breath sounds on the left. Occasional rhonchi. Ventilated. Heart: S1+S2. No murmurs  Abdomen: Status post surgery. He has a midline incision. He has 2 HELGA drains one on each side. Bowel sounds are hypoactive. Draining serosanguineous fluid. Extremities:  He has more pitting edema. Distal pulses of the lower extremities are noted with Dopplers. : Reese catheter in place. Making some urine output. Skin: No cyanosis  Neurologic: Wakes up easily, grossly nonfocal   .  Never dated problem with  Approximately  Lab/Data Review: All lab results for the last 24 hours reviewed.   Recent Results (from the past 24 hour(s))   CBC WITH AUTOMATED DIFF    Collection Time: 10/09/20  8:45 PM   Result Value Ref Range    WBC 17.4 (H) 4.1 - 11.1 K/uL    RBC 2.41 (L) 4.10 - 5.70 M/uL    HGB 7.5 (L) 12.1 - 17.0 g/dL    HCT 21.2 (L) 36.6 - 50.3 %    MCV 88.0 80.0 - 99.0 FL    MCH 31.1 26.0 - 34.0 PG    MCHC 35.4 30.0 - 36.5 g/dL    RDW 15.1 (H) 11.5 - 14.5 %    PLATELET 51 (L) 825 - 400 K/uL    MPV 10.3 8.9 - 12.9 FL    NEUTROPHILS 96 (H) 32 - 75 %    BAND NEUTROPHILS 1 0 - 6 %    LYMPHOCYTES 1 (L) 12 - 49 %    MONOCYTES 2 (L) 5 - 13 %    EOSINOPHILS 0 0 - 7 %    BASOPHILS 0 0 - 1 %    NRBC 9.0  WBC    IMMATURE GRANULOCYTES 0 %    ABS. NEUTROPHILS 16.9 (H) 1.8 - 8.0 K/UL    ABS. LYMPHOCYTES 0.2 (L) 0.8 - 3.5 K/UL    ABS. MONOCYTES 0.3 0.0 - 1.0 K/UL    ABS. EOSINOPHILS 0.0 0.0 - 0.4 K/UL    ABS. BASOPHILS 0.0 0.0 - 0.1 K/UL    ABSOLUTE NRBC 1.57 K/uL    ABS. IMM.  GRANS. 0.0 K/UL    DF AUTOMATED      RBC COMMENTS Anisocytosis  1+        RBC COMMENTS Poikilocytosis  1+        RBC COMMENTS Microcytosis  1+       METABOLIC PANEL, BASIC    Collection Time: 10/09/20  8:45 PM   Result Value Ref Range    Sodium 138 136 - 145 mmol/L    Potassium 2.9 (L) 3.5 - 5.1 mmol/L    Chloride 104 97 - 108 mmol/L    CO2 28 21 - 32 mmol/L    Anion gap 6 5 - 15 mmol/L    Glucose 108 (H) 65 - 100 mg/dL    BUN 14 6 - 20 mg/dL    Creatinine 0.71 0.70 - 1.30 mg/dL    BUN/Creatinine ratio 20 12 - 20      GFR est AA >60 >60 ml/min/1.73m2    GFR est non-AA >60 >60 ml/min/1.73m2    Calcium 7.4 (L) 8.5 - 10.1 mg/dL   BLOOD GAS, ARTERIAL    Collection Time: 10/10/20  4:40 AM   Result Value Ref Range    pH 7.524 (H) 7.35 - 7.45      PCO2 32 (L) 35 - 45 mmHg    PO2 49 (LL) 75 - 100 mmHg    O2 SAT 89 (L) >95 %    BICARBONATE 28 (H) 22 - 26 mmol/L    BASE EXCESS 3.7 (H) 0 - 2 mmol/L    O2 METHOD AVAPS      FIO2 40.0 %    Tidal volume 450      EPAP/CPAP/PEEP 7      SITE Right Brachial      ENIO'S TEST PASS     CBC WITH AUTOMATED DIFF    Collection Time: 10/10/20  5:21 AM   Result Value Ref Range    WBC 19.7 (H) 4.1 - 11.1 K/uL    RBC 2.65 (L) 4.10 - 5.70 M/uL    HGB 8.2 (L) 12.1 - 17.0 g/dL    HCT 23.4 (L) 36.6 - 50.3 %    MCV 88.3 80.0 - 99.0 FL    MCH 30.9 26.0 - 34.0 PG    MCHC 35.0 30.0 - 36.5 g/dL    RDW 15.3 (H) 11.5 - 14.5 %    PLATELET 58 (L) 718 - 400 K/uL    MPV 10.6 8.9 - 12.9 FL    NRBC 4.6 (H) 0  WBC    ABSOLUTE NRBC 0.91 (H) 0.00 - 0.01 K/uL    NEUTROPHILS 89 (H) 32 - 75 %    LYMPHOCYTES 4 (L) 12 - 49 %    MONOCYTES 7 5 - 13 %    EOSINOPHILS 0 0 - 7 %    BASOPHILS 0 0 - 1 %    IMMATURE GRANULOCYTES 0 0.0 - 0.5 %    ABS. NEUTROPHILS 17.6 (H) 1.8 - 8.0 K/UL    ABS. LYMPHOCYTES 0.7 (L) 0.8 - 3.5 K/UL    ABS. MONOCYTES 1.4 (H) 0.0 - 1.0 K/UL    ABS. EOSINOPHILS 0.0 0.0 - 0.4 K/UL    ABS. BASOPHILS 0.0 0.0 - 0.1 K/UL    ABS. IMM. GRANS. 0.1 (H) 0.00 - 0.04 K/UL    DF AUTOMATED     METABOLIC PANEL, BASIC    Collection Time: 10/10/20  5:21 AM   Result Value Ref Range    Sodium 140 136 - 145 mmol/L    Potassium 2.9 (L) 3.5 - 5.1 mmol/L    Chloride 105 97 - 108 mmol/L    CO2 27 21 - 32 mmol/L    Anion gap 8 5 - 15 mmol/L    Glucose 108 (H) 65 - 100 mg/dL    BUN 14 6 - 20 mg/dL    Creatinine 0.69 (L) 0.70 - 1.30 mg/dL    BUN/Creatinine ratio 20 12 - 20      GFR est AA >60 >60 ml/min/1.73m2    GFR est non-AA >60 >60 ml/min/1.73m2    Calcium 7.8 (L) 8.5 - 10.1 mg/dL     chest X-ray      XR CHEST PORT   Final Result   IMPRESSION: Postoperative changes, left upper abdomen. Left pneumonectomy versus   complete lung collapse; correlate with surgical history. Right lung relatively   clear. ET tube 27 mm above the [place]. XR CHEST PORT   Final Result   IMPRESSION: Complete collapse of the left lung with no evidence of residual   pulmonary aeration on the left. Associated left pleural effusion is likely. XR CHEST PORT   Final Result   Impression:Left lung airspace disease/atelectasis. Suspect left pleural   effusion. Focal airspace disease at the right lung base. CTA ABD ART W RUNOFF W WO CONT   Final Result   IMPRESSION:   1. Interval postoperative changes from aorto-bifemoral artery bypass. The bypass   graft is patent.  The proximal and distal anastomoses are patent. 2. Patent left renal artery. Wedge shaped perfusion defect in the inferior pole   of the left kidney consistent with parenchymal infarct. 3. Patent diminutive right renal artery. Patchy parenchymal perfusion, improved   compared to the preoperative study from 9/29/2020.   4. Patent right and left lower extremity arteries without occlusion or   significant stenosis. 5. Stable mild short segment focal atherosclerotic dissection flap at the right   anterolateral aspect of the descending thoracic aorta at the T10 level. 6. Postoperative changes from splenectomy. Approximately 6.1 cm TR by 2.1 cm AP   by 8.4 cm CC hematoma in the splenic bed without findings of active contrast   extravasation. A surgical drain is present in the splenectomy bed extending to   beneath the left hemidiaphragm. 7. Expected pneumoperitoneum given recent surgery. Bilateral retroperitoneal   low-attenuation stranding and/or small volume fluid. Diffuse mesenteric   stranding and/or small volume fluid in the  pelvis. 8. Distended gallbladder with cholelithiasis and prominent common bile duct as   seen similarly on the preoperative study from 9/29/2020/.   9. Diffuse anasarca. Diffuse subcutaneous edema of the lower extremities, right   greater than left. Negative for right and left lower extremity DVT. XR CHEST PORT   Final Result   IMPRESSION:   1. The patient's life support lines and tubes are unchanged and are in   satisfactory position. 2.  There is increasing hypoventilation and atelectasis within the lung bases   greater on the left compared to the right. 3.  There also is increasing hazy opacity along the lower left hemithorax most   compatible with a layering moderate-sized left pleural effusion. XR CHEST PORT   Final Result   IMPRESSION:   1. The endotracheal tube is in satisfactory position. 2.  There is a hypoventilation of the lung bases with lung base atelectasis. The   remainder of the lung parenchyma is relatively clear. There is vascular pruning   within the upper lobes suspect for underlying emphysema. 3.  There is free intra-abdominal air likely a function of recent abdominal   surgery. US RETROPERITONEUM COMP   Final Result   IMPRESSION:   1. There is a smaller size to the right kidney. 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 The kidneys otherwise image in a normal fashion. There are normal renal echotexture characteristics. 2.  There a moderate sized right pleural effusion. XR CHEST PORT   Final Result   IMPRESSION: Postoperative findings, postprocedural findings, medical devices as   described. Minimal right lung base atelectasis may be residual adhesive   intraoperative atelectasis. Tabby Sleet XR ABD (KUB)   Final Result   Findings/impression:      Numerous surgical clips project over the left upper quadrant and mid abdomen. Midline skin staples noted. Drainage tubes project over the pelvis, lower   abdomen and left upper quadrant. Enteric tube tip projects over the proximal   stomach. No unexpected radiopaque foreign bodies are identified. Oral contrast material throughout the colon. Bowel gas pattern is   nonobstructive. No acute osseous abdomen identified. XR SWALLOW FUNC VIDEO   Final Result   Impression: Significant hypopharyngeal residue. Episodes of penetration with   all consistencies. Episode of flash aspiration after water wash. XR CHEST PORT   Final Result   Impression: Underexpanded lungs with bibasilar atelectasis. CTA ABDOMEN PELV W CONT   Final Result   IMPRESSION: Mid abdominal aortic occlusion, with threatened right kidney and   fairly good collateralization to the legs. This could be causing a mesenteric   steal phenomenon, however      MRI BRAIN WO CONT   Final Result   Impression: No evidence of acute hemorrhage, mass or infarct. No sinusitis or   mastoiditis.  Please see above discussion. CTA CHEST W OR W WO CONT   Final Result   IMPRESSION: Limited by breathing motion. 1. No large pulmonary arterial filling defect. 2. Bronchial thickening with right greater than left lower lung atelectasis or   pneumonia/pneumonitis. Bubbly debris in the trachea. 3. Atherosclerosis. Abrupt discontinuation of contrast in the aorta on the very   inferior slices. Contrast is seen in the celiac artery and SMA and the left   renal artery. The right kidney demonstrates decreased attenuation compared to   the left concerning for medical renal disease to include ischemia. Recommend CTA   abdomen/pelvis. Called report to charge nurse Antoinette Brito at 9:05 PM 9/27/2020.   4. Cholelithiasis within distended gallbladder. 5. Dilated small bowel. 6. Other findings as above. XR CHEST PORT   Final Result   IMPRESSION:      No airspace disease in the lungs. Follow-up as clinically indicated. CT HEAD WO CONT   Final Result   Impression: Unremarkable head CT without contrast.  No infarct, mass, or    hemorrhage. Please see full report. XR CHEST SNGL V   Final Result   Impression: No diagnostic abnormality. XR CHEST PORT    (Results Pending)       CT Results  (Last 48 hours)    None          Assessment:     1. Acute respiratory failure, after aortobifemoral bypass surgery on 10/6/2020. Patient self extubated himself early in the morning on 10/9/2020. Re-intubated on 10/10/20 for left-sided mucous plugging and complete left lung collapse. 2.  Acute metabolic encephalopathy, resolved  3. Aspiration pneumonia  4. Severe peripheral vascular disease  5. UTI  6. Hypertension  7. Familial polymyositis    Plan:     1.)  Acute Hypoxic respiratory failure. Patient had improved and had been on room air. On 10/6/2020 he underwent aortobifemoral bypass surgery. Post-surgery he was left on the ventilator. EBL was 3.5 L. When he arrived to the ICU he was on multiple pressors. He is now off pressors. Patient self extubated himself early in the AM on 10/9/20. Re-intubated on 10/10/20 in order to undergo bronchoscopy for left-lung collapse due to mucous plugging. S/p successful bronch with suctioning of mucous plugs on 10/10/20, bronchial washings sent for culture. Repeat CXR in the morning. Current vent settings are AC//12/40%/5, satting 98%  Plan for SAT/SBT in the morning, possible extubation if CXR is improved. He is started on diuretics which I agree with. Nephrology input noted. 2.)  Aspiration/HAP pneumonia:  He is currently back on the ventilator. Continue PO Levaquin but likely can stop soon; d/c zosyn as the pharmacy is having difficulty getting this medication for him. Start on Cefepime/Flagyl today. Also on prednisone, will need to wean this soon. On nebulized bronchodilator treatments. Modified barium swallow showed penetration and significant dysphagia. 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 Patient may require PEG tube near future. He had been on puréed diet with nectar thickened liquids. 3.)  Metabolic encephalopathy. He has a progressive neurologic disorder. Brain MRI negative    Further recommendation per Neurology. 4.)  Dehydration and metabolic acidosis. Monitor kidney function after surgery. 5.  Possible urinary tract infection. 6.  Hypertension. Dyslipidemia and severe peripheral vascular disease. 7.  Myocardial ischemia:  He underwent nuclear stress testing which showed significant inferior wall ischemia. He underwent cardiac catheterization on 10/5/2020 which showed nonobstructive coronary artery disease. Medical management is planned at this time.      Thank you for allowing me to participate in the care of this patient. I will follow the patient closely with you. DVT and GI prophylaxis. He is on Lovenox and Protonix IV. If he is not extubated by tomorrow morning consider nutrition.   Repeat all labs in the morning. Replenish electrolytes as needed. Discussed with the wife at the bedside. Patient is critically ill at this time. Discussed with the nursing team.  More than 50 minutes spent with patient care.   Thank you for involving me in the care of this patient      Jeremiah Goode DO  [facility]  10/10/2020

## 2020-10-10 NOTE — PROGRESS NOTES
Comprehensive Nutrition Assessment    Type and Reason for Visit: Reassess    Nutrition Recommendations/Plan:   Rec'd adjust NG to low int suction; unless large volume blood OP rec'd initiation of TF    Initiate TF via NG of Osmolite 1.2 at 30mL/hr  Increase by 20mL q4hr to goal rate of 68mL/hr, continuous  Flush with 75mL free water q4hr  Goal feeds provide 1958kcal, 91g protein, 1788mL fluid (meeting ~100% est needs)    D/c TPN after current ordered bag complete    Nutrition Assessment:  Admitted to ICU 2/2 aspiration event in ED. S/p cardiac cath. 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 Returned to ICU intubated 10/6, requiring high volume pressors. NG to suction during this time with some bloody OP. Pt self extubated 10/8. Worsening resp status requiring reintubation today. Currently sedated, not on pressors. CXR finding extensive mucous plugging- s/p bronch today. Initially requiring NG, advanced to Ground/NTL per SLP recs. Pt now NPO since procedure 10/6. TPN ordered as Standard TPN at 42mL/hr with no lipids- initiated yesterday. NG in place, currently clamped. Spoke with MD who reports TPN initiated 2/2 concern for bloody NG OP. RN to return NG to low int suction overnight and MD agreeable to TF initiation tomorrow if pt with limited OP. Labs: , K 2.9. Meds: IVF, precedex, versed, flagyl, levaquin, norepi, prednisone, lopressor, furosemide, ativan. Malnutrition Assessment:  Malnutrition Status: Moderate malnutrition        Estimated Daily Nutrient Needs:  Energy (kcal):  1950kcal  Protein (g):  91g (1.3g/kg)       Fluid (ml/day):  1750mL (25ml/kg)    Nutrition Related Findings:  Previously noted pt very thin with muscle wasting. Dysphagia noted through admit. NG in place, clamped. No n/v. Last BM documented 10/4, RN reports BM more recently- ? yesterday.       Wounds:    Multiple, Stage I, Stage II, Deep tissue injury(DTI to sacrum, R foot; Stage I to R ankle, R heel, spine; Stage II to R hip)       Current Nutrition Therapies:  DIET NPO  TPN ADULT - CENTRAL  TPN ADULT - CENTRAL AA 5% D20% W/ CA + ELECTROLYTES  Current Parenteral Nutrition Orders:  · Type and Formula: Standard TPN (AA5%, D20%)   · Lipids: None  · Duration: Continuous  · Rate/Volume: 43mL/hr  · Current PN Order Provides: 887kcal, 50g protein (~50% est kcal, pro needs)    Anthropometric Measures:  · Height:  6' (182.9 cm)  · Current Body Wt:  70.1 kg (154 lb 8.7 oz)(10/8)   · Admission Body Wt:  115 lb 15.4 oz(pt stated)     · Ideal Body Wt:  178 lbs:  86.8 %   · BMI Category:  Underweight (BMI less than 22) age over 72       Nutrition Diagnosis:   · Inadequate protein-energy intake related to swallowing difficulty, increased demand for energy/nutrients, catabolic illness(AMS) as evidenced by BMI, swallowing study results, intake 26-50%      Nutrition Interventions:   Food and/or Nutrient Delivery: Continue current diet, Modify oral nutrition supplement(add Prosource)  Nutrition Education and Counseling: No recommendations at this time  Coordination of Nutrition Care: Continued inpatient monitoring    Goals:  Initiate means of nutrition to meet >75% EENs >7 days  Wt gain 1lb +/- 0.5lb per week  Na and lytes wnl  Improve skin integrity       Nutrition Monitoring and Evaluation:   Behavioral-Environmental Outcomes:  N/A  Food/Nutrient Intake Outcomes: Food and nutrient intake, Supplement intake  Physical Signs/Symptoms Outcomes: Chewing or swallowing, GI status, Skin, Weight    Discharge Planning:     Too soon to determine     Electronically signed by Jeyson Tam on 10/10/2020 at 4:28 PM    Contact:

## 2020-10-10 NOTE — PROGRESS NOTES
Patient is examined this morning. Patient hemodynamically stable his blood pressure systolic 268B. His CVP currently is 9. He is diuresing well. He is awake. He had a hypoxic episode last night. He was placed on BiPAP therapy. His chest x-ray this morning shows complete opacity of the left lung field. Most likely mucous plugging on left main bronchus. I have spoken to the pulmonologist on-call, recommend a secure airway prior therapeutic and diagnostic bronchoscopy. I will coordinate with anesthesiologist on call to reintubate this patient prior bronchoscopy. And I will also initiate chest percussion therapy on the left lung field every 6 hours. We will correct his electrolyte abnormalities. We will continue current low volume TPN. We will continue monitor his hemodynamics, ins and outs total volume to be negative side. We will continue monitor his progress closely. David Desai

## 2020-10-11 ENCOUNTER — APPOINTMENT (OUTPATIENT)
Dept: GENERAL RADIOLOGY | Age: 55
DRG: 981 | End: 2020-10-11
Attending: INTERNAL MEDICINE
Payer: COMMERCIAL

## 2020-10-11 LAB
ANION GAP SERPL CALC-SCNC: 5 MMOL/L (ref 5–15)
ARTERIAL PATENCY WRIST A: YES
BASE EXCESS BLDA CALC-SCNC: 2.4 MMOL/L (ref 0–2)
BASOPHILS # BLD: 0 K/UL (ref 0–0.1)
BASOPHILS NFR BLD: 1 % (ref 0–1)
BDY SITE: ABNORMAL
BUN SERPL-MCNC: 17 MG/DL (ref 6–20)
BUN/CREAT SERPL: 25 (ref 12–20)
CA-I BLD-MCNC: 7.6 MG/DL (ref 8.5–10.1)
CHLORIDE SERPL-SCNC: 104 MMOL/L (ref 97–108)
CO2 SERPL-SCNC: 29 MMOL/L (ref 21–32)
CREAT SERPL-MCNC: 0.68 MG/DL (ref 0.7–1.3)
DIFFERENTIAL METHOD BLD: ABNORMAL
EOSINOPHIL # BLD: 0 K/UL (ref 0–0.4)
EOSINOPHIL NFR BLD: 0 % (ref 0–7)
EPAP/CPAP/PEEP, PAPEEP: 5
ERYTHROCYTE [DISTWIDTH] IN BLOOD BY AUTOMATED COUNT: 15.6 % (ref 11.5–14.5)
FIO2 ON VENT: 40 %
GLUCOSE SERPL-MCNC: 126 MG/DL (ref 65–100)
HCO3 BLDA-SCNC: 27 MMOL/L (ref 22–26)
HCT VFR BLD AUTO: 25.6 % (ref 36.6–50.3)
HGB BLD-MCNC: 8.9 G/DL (ref 12.1–17)
IMM GRANULOCYTES # BLD AUTO: 0 K/UL (ref 0–0.04)
IMM GRANULOCYTES NFR BLD AUTO: 0 % (ref 0–0.5)
LYMPHOCYTES # BLD: 0.6 K/UL (ref 0.8–3.5)
LYMPHOCYTES NFR BLD: 3 % (ref 12–49)
MCH RBC QN AUTO: 30.9 PG (ref 26–34)
MCHC RBC AUTO-ENTMCNC: 34.8 G/DL (ref 30–36.5)
MCV RBC AUTO: 88.9 FL (ref 80–99)
MONOCYTES # BLD: 1.4 K/UL (ref 0–1)
MONOCYTES NFR BLD: 7 % (ref 5–13)
NEUTS SEG # BLD: 16.9 K/UL (ref 1.8–8)
NEUTS SEG NFR BLD: 89 % (ref 32–75)
PCO2 BLDA: 30 MMHG (ref 35–45)
PH BLDA: 7.53 [PH] (ref 7.35–7.45)
PLATELET # BLD AUTO: 92 K/UL (ref 150–400)
PMV BLD AUTO: 11.5 FL (ref 8.9–12.9)
PO2 BLDA: 83 MMHG (ref 75–100)
POTASSIUM SERPL-SCNC: 2.7 MMOL/L (ref 3.5–5.1)
RBC # BLD AUTO: 2.88 M/UL (ref 4.1–5.7)
SAO2 % BLD: 99 %
SODIUM SERPL-SCNC: 138 MMOL/L (ref 136–145)
SPECIMEN SITE: ABNORMAL
VT SETTING VENT: 500 ML
WBC # BLD AUTO: 18.9 K/UL (ref 4.1–11.1)

## 2020-10-11 PROCEDURE — 74011636637 HC RX REV CODE- 636/637: Performed by: INTERNAL MEDICINE

## 2020-10-11 PROCEDURE — 94640 AIRWAY INHALATION TREATMENT: CPT

## 2020-10-11 PROCEDURE — P9016 RBC LEUKOCYTES REDUCED: HCPCS

## 2020-10-11 PROCEDURE — 94400 HC END TIDAL CO2 RESPONSE CURVE: CPT

## 2020-10-11 PROCEDURE — 80048 BASIC METABOLIC PNL TOTAL CA: CPT

## 2020-10-11 PROCEDURE — P9047 ALBUMIN (HUMAN), 25%, 50ML: HCPCS | Performed by: SURGERY

## 2020-10-11 PROCEDURE — 82803 BLOOD GASES ANY COMBINATION: CPT

## 2020-10-11 PROCEDURE — 74011250637 HC RX REV CODE- 250/637: Performed by: SURGERY

## 2020-10-11 PROCEDURE — 36415 COLL VENOUS BLD VENIPUNCTURE: CPT

## 2020-10-11 PROCEDURE — 74011250636 HC RX REV CODE- 250/636: Performed by: INTERNAL MEDICINE

## 2020-10-11 PROCEDURE — 94668 MNPJ CHEST WALL SBSQ: CPT

## 2020-10-11 PROCEDURE — 65610000006 HC RM INTENSIVE CARE

## 2020-10-11 PROCEDURE — 74011000258 HC RX REV CODE- 258: Performed by: INTERNAL MEDICINE

## 2020-10-11 PROCEDURE — 74011000250 HC RX REV CODE- 250: Performed by: INTERNAL MEDICINE

## 2020-10-11 PROCEDURE — 74011250637 HC RX REV CODE- 250/637: Performed by: NURSE PRACTITIONER

## 2020-10-11 PROCEDURE — 74011250636 HC RX REV CODE- 250/636: Performed by: HOSPITALIST

## 2020-10-11 PROCEDURE — 86923 COMPATIBILITY TEST ELECTRIC: CPT

## 2020-10-11 PROCEDURE — 71045 X-RAY EXAM CHEST 1 VIEW: CPT

## 2020-10-11 PROCEDURE — 36430 TRANSFUSION BLD/BLD COMPNT: CPT

## 2020-10-11 PROCEDURE — 74011000258 HC RX REV CODE- 258: Performed by: SURGERY

## 2020-10-11 PROCEDURE — 77010033678 HC OXYGEN DAILY

## 2020-10-11 PROCEDURE — 74011250637 HC RX REV CODE- 250/637: Performed by: INTERNAL MEDICINE

## 2020-10-11 PROCEDURE — 94003 VENT MGMT INPAT SUBQ DAY: CPT

## 2020-10-11 PROCEDURE — 86900 BLOOD TYPING SEROLOGIC ABO: CPT

## 2020-10-11 PROCEDURE — C9113 INJ PANTOPRAZOLE SODIUM, VIA: HCPCS | Performed by: INTERNAL MEDICINE

## 2020-10-11 PROCEDURE — 74011250637 HC RX REV CODE- 250/637: Performed by: HOSPITALIST

## 2020-10-11 PROCEDURE — 74011000250 HC RX REV CODE- 250: Performed by: SURGERY

## 2020-10-11 PROCEDURE — 74011250636 HC RX REV CODE- 250/636: Performed by: SURGERY

## 2020-10-11 PROCEDURE — 85025 COMPLETE CBC W/AUTO DIFF WBC: CPT

## 2020-10-11 RX ORDER — MAGNESIUM SULFATE HEPTAHYDRATE 40 MG/ML
2 INJECTION, SOLUTION INTRAVENOUS ONCE
Status: COMPLETED | OUTPATIENT
Start: 2020-10-11 | End: 2020-10-11

## 2020-10-11 RX ORDER — ALBUMIN HUMAN 250 G/1000ML
12.5 SOLUTION INTRAVENOUS 2 TIMES DAILY
Status: COMPLETED | OUTPATIENT
Start: 2020-10-11 | End: 2020-10-15

## 2020-10-11 RX ORDER — SODIUM CHLORIDE 9 MG/ML
250 INJECTION, SOLUTION INTRAVENOUS AS NEEDED
Status: DISCONTINUED | OUTPATIENT
Start: 2020-10-11 | End: 2020-10-11

## 2020-10-11 RX ORDER — ALBUMIN HUMAN 250 G/1000ML
12.5 SOLUTION INTRAVENOUS ONCE
Status: COMPLETED | OUTPATIENT
Start: 2020-10-11 | End: 2020-10-11

## 2020-10-11 RX ORDER — FUROSEMIDE 10 MG/ML
40 INJECTION INTRAMUSCULAR; INTRAVENOUS DAILY
Status: DISCONTINUED | OUTPATIENT
Start: 2020-10-12 | End: 2020-10-16

## 2020-10-11 RX ORDER — POTASSIUM CHLORIDE 20 MEQ/1
40 TABLET, EXTENDED RELEASE ORAL 2 TIMES DAILY
Status: COMPLETED | OUTPATIENT
Start: 2020-10-11 | End: 2020-10-11

## 2020-10-11 RX ORDER — POTASSIUM CHLORIDE 1.5 G/1.77G
40 POWDER, FOR SOLUTION ORAL DAILY
Status: DISCONTINUED | OUTPATIENT
Start: 2020-10-12 | End: 2020-10-14

## 2020-10-11 RX ORDER — POTASSIUM CHLORIDE 7.45 MG/ML
10 INJECTION INTRAVENOUS
Status: COMPLETED | OUTPATIENT
Start: 2020-10-11 | End: 2020-10-11

## 2020-10-11 RX ADMIN — FENTANYL CITRATE 75 MCG/HR: 50 INJECTION, SOLUTION INTRAMUSCULAR; INTRAVENOUS at 10:18

## 2020-10-11 RX ADMIN — ALBUMIN (HUMAN) 12.5 G: 0.25 INJECTION, SOLUTION INTRAVENOUS at 08:11

## 2020-10-11 RX ADMIN — CEFEPIME 1 G: 1 INJECTION, POWDER, FOR SOLUTION INTRAMUSCULAR; INTRAVENOUS at 02:06

## 2020-10-11 RX ADMIN — POTASSIUM CHLORIDE 40 MEQ: 1500 TABLET, EXTENDED RELEASE ORAL at 08:11

## 2020-10-11 RX ADMIN — DEXMEDETOMIDINE HYDROCHLORIDE 0.4 MCG/KG/HR: 100 INJECTION, SOLUTION, CONCENTRATE INTRAVENOUS at 02:06

## 2020-10-11 RX ADMIN — ALBUTEROL SULFATE 2.5 MG: 2.5 SOLUTION RESPIRATORY (INHALATION) at 13:10

## 2020-10-11 RX ADMIN — METRONIDAZOLE 500 MG: 500 INJECTION, SOLUTION INTRAVENOUS at 06:10

## 2020-10-11 RX ADMIN — METRONIDAZOLE 500 MG: 500 INJECTION, SOLUTION INTRAVENOUS at 22:51

## 2020-10-11 RX ADMIN — ACETYLCYSTEINE 600 MG: 200 SOLUTION ORAL; RESPIRATORY (INHALATION) at 02:15

## 2020-10-11 RX ADMIN — ASPIRIN 81 MG: 81 TABLET, COATED ORAL at 08:11

## 2020-10-11 RX ADMIN — ALBUMIN HUMAN 12.5 G: 250 SOLUTION INTRAVENOUS at 09:21

## 2020-10-11 RX ADMIN — HYDROCORTISONE 100 MG: 100 ENEMA RECTAL at 22:30

## 2020-10-11 RX ADMIN — POTASSIUM CHLORIDE 10 MEQ: 7.46 INJECTION, SOLUTION INTRAVENOUS at 09:21

## 2020-10-11 RX ADMIN — MAGNESIUM SULFATE HEPTAHYDRATE 2 G: 40 INJECTION, SOLUTION INTRAVENOUS at 08:10

## 2020-10-11 RX ADMIN — POTASSIUM CHLORIDE 10 MEQ: 7.46 INJECTION, SOLUTION INTRAVENOUS at 08:13

## 2020-10-11 RX ADMIN — SODIUM CHLORIDE 40 MG: 9 INJECTION, SOLUTION INTRAMUSCULAR; INTRAVENOUS; SUBCUTANEOUS at 22:28

## 2020-10-11 RX ADMIN — POTASSIUM CHLORIDE 10 MEQ: 7.46 INJECTION, SOLUTION INTRAVENOUS at 10:19

## 2020-10-11 RX ADMIN — DIBASIC SODIUM PHOSPHATE, MONOBASIC POTASSIUM PHOSPHATE AND MONOBASIC SODIUM PHOSPHATE 2 TABLET: 852; 155; 130 TABLET ORAL at 22:28

## 2020-10-11 RX ADMIN — CEFEPIME 1 G: 1 INJECTION, POWDER, FOR SOLUTION INTRAMUSCULAR; INTRAVENOUS at 11:21

## 2020-10-11 RX ADMIN — ALBUTEROL SULFATE 2.5 MG: 2.5 SOLUTION RESPIRATORY (INHALATION) at 02:15

## 2020-10-11 RX ADMIN — HYDROCORTISONE 100 MG: 100 ENEMA RECTAL at 08:09

## 2020-10-11 RX ADMIN — LEVOFLOXACIN 500 MG: 500 TABLET, FILM COATED ORAL at 10:23

## 2020-10-11 RX ADMIN — ACETYLCYSTEINE 600 MG: 200 SOLUTION ORAL; RESPIRATORY (INHALATION) at 13:10

## 2020-10-11 RX ADMIN — FUROSEMIDE 20 MG: 10 INJECTION, SOLUTION INTRAMUSCULAR; INTRAVENOUS at 08:11

## 2020-10-11 RX ADMIN — ALBUMIN HUMAN 12.5 G: 250 SOLUTION INTRAVENOUS at 22:27

## 2020-10-11 RX ADMIN — METOPROLOL TARTRATE 50 MG: 50 TABLET, FILM COATED ORAL at 22:29

## 2020-10-11 RX ADMIN — DIBASIC SODIUM PHOSPHATE, MONOBASIC POTASSIUM PHOSPHATE AND MONOBASIC SODIUM PHOSPHATE 2 TABLET: 852; 155; 130 TABLET ORAL at 08:10

## 2020-10-11 RX ADMIN — ALBUTEROL SULFATE 2.5 MG: 2.5 SOLUTION RESPIRATORY (INHALATION) at 19:57

## 2020-10-11 RX ADMIN — POTASSIUM CHLORIDE 10 MEQ: 7.46 INJECTION, SOLUTION INTRAVENOUS at 11:36

## 2020-10-11 RX ADMIN — SODIUM CHLORIDE 40 MG: 9 INJECTION, SOLUTION INTRAMUSCULAR; INTRAVENOUS; SUBCUTANEOUS at 08:11

## 2020-10-11 RX ADMIN — METRONIDAZOLE 500 MG: 500 INJECTION, SOLUTION INTRAVENOUS at 00:32

## 2020-10-11 RX ADMIN — ACETYLCYSTEINE 600 MG: 200 SOLUTION ORAL; RESPIRATORY (INHALATION) at 07:38

## 2020-10-11 RX ADMIN — ALBUTEROL SULFATE 2.5 MG: 2.5 SOLUTION RESPIRATORY (INHALATION) at 07:38

## 2020-10-11 RX ADMIN — POTASSIUM CHLORIDE 40 MEQ: 1500 TABLET, EXTENDED RELEASE ORAL at 22:30

## 2020-10-11 RX ADMIN — CEFEPIME 1 G: 1 INJECTION, POWDER, FOR SOLUTION INTRAMUSCULAR; INTRAVENOUS at 18:38

## 2020-10-11 RX ADMIN — PREDNISONE 20 MG: 20 TABLET ORAL at 08:11

## 2020-10-11 RX ADMIN — METRONIDAZOLE 500 MG: 500 INJECTION, SOLUTION INTRAVENOUS at 15:19

## 2020-10-11 NOTE — PROGRESS NOTES
Patient is examined this morning. Patient had a therapeutic bronchoscopy yesterday. He is currently intubated. He is completely awake and alert. His blood pressure currently is 94/60. CVP is around 2. Chest x-ray shows left lung opacities resolved. Looks like patient still having moderate sizable pleural effusion. Patient currently getting TPN. We will start tube feeds today. We will monitor patient pleural effusion closely, if not improved significantly or respiratory compromise may require thoracentesis. And I will start him on scheduled dose of albumin twice daily. We will continue to monitor his progress. Continue chest physiotherapy as scheduled. Hopefully he will be extubated over the next 24 to 48 hours.

## 2020-10-11 NOTE — PROGRESS NOTES
Pulmonology and Critical Care Progress Note    Subjective:     Chief Complaint:   Chief Complaint   Patient presents with    Altered mental status      Patient seen and examined at the bedside this afternoon. The patient underwent surgery, aortobifemoral bypass surgery 10/6/20. Post surgery he was left on the ventilator. Apparently blood loss was about 3.5 L. When he returned to the ICU he was on multiple pressors. Patient self-extubated on 10/9/20 early in the morning and was not doing well with ventimask, therefore was switched to BiPAP in the evening. Chest x-ray yesterday demonstrated complete left lung opacification due to mucous plugging. Therefore, we moved forward with a flexible bronchoscopy at the bedside yesterday afternoon with successful removal of mucous plugs. Chest x-ray reviewed today and showed significant reexpansion of the left lung with minimal left basilar opacities which actually improved on repeat chest x-ray later in the morning. Patient is doing fantastic on the ventilator right now, wide-awake and following commands. Passed a spontaneous breathing trial early this morning. Passed another spontaneous breathing trial again this afternoon, we turned off the fentanyl and the Precedex given lower blood pressures. We will move forward with extubation today. Cardiac catheterization was done on 10/5. Noted. His nuclear medicine cardiac cath showed ischemia of the inferior wall. Modified barium swallow test showed issues of dysphagia.       Review of Systems:  Has chronic Reese catheter placement    Current Facility-Administered Medications   Medication Dose Route Frequency Provider Last Rate Last Dose    potassium chloride (K-DUR, KLOR-CON) SR tablet 40 mEq  40 mEq Oral BID Royal Meier MD   40 mEq at 10/11/20 0811    albumin human 25% (BUMINATE) solution 12.5 g  12.5 g IntraVENous BID Leia Olson MD   12.5 g at 10/11/20 0921    [START ON 10/12/2020] furosemide (LASIX) injection 40 mg  40 mg IntraVENous DAILY Asaf Alves MD       [facility] [START ON 10/12/2020] potassium chloride (KLOR-CON) packet for solution 40 mEq  40 mEq Per NG tube DAILY Asaf Alves MD        0.9% sodium chloride infusion 250 mL  250 mL IntraVENous PRN Betty Olson MD        0.9% sodium chloride infusion 250 mL  250 mL IntraVENous PRN Betty Olson MD        propofol (DIPRIVAN) 10 mg/mL infusion  5 mcg/kg/min IntraVENous TITRATE Betty Olson MD   Stopped at 10/10/20 1100    fentaNYL (PF) 1,500 mcg/30 mL (50 mcg/mL) infusion  75 mcg/hr IntraVENous TITRATE Betty Olson MD 0.8 mL/hr at 10/11/20 1520 40 mcg/hr at 10/11/20 1520    cefepime (MAXIPIME) 1 g in 0.9% sodium chloride (MBP/ADV) 50 mL MBP  1 g IntraVENous Q8H Johnny Santiago,  mL/hr at 10/11/20 1121 1 g at 10/11/20 1121    metroNIDAZOLE (FLAGYL) IVPB premix 500 mg  500 mg IntraVENous Q8H Johnny Santiago,  mL/hr at 10/11/20 1519 500 mg at 10/11/20 1519    albuterol CONCENTRATE 2.5mg/0.5 mL neb soln  2.5 mg Nebulization Q6H RT Johnny Santiago, DO   2.5 mg at 10/11/20 1310    LORazepam (ATIVAN) injection 1 mg  1 mg IntraVENous Q2H PRN Betty Olson MD        dexmedeTOMidine [facility]) 400 mcg in 0.9% sodium chloride 100 mL infusion  0.2-1.4 mcg/kg/hr IntraVENous TITRATE Betty Olson MD 5.3 mL/hr at 10/11/20 1200 0.3 mcg/kg/hr at 10/11/20 1200    sodium chloride 0.9% (NS) 3ml nebulizer solution  2.5 mL Nebulization PRN Neal Chambers MD   2.5 mL at 10/10/20 1148    midazolam (PF) (VERSED) injection 4 mg  4 mg IntraVENous Q2H PRN Betty Olson MD   4 mg at 10/10/20 1430    hydrocortisone (CORTENEMA) 100 mg/60 mL enema 100 mg  100 mg Rectal Q12H Betty Olson MD   100 mg at 10/11/20 0809    NOREPINephrine (LEVOPHED) 8 mg in 5% dextrose 250mL (32 mcg/mL) infusion  2 mcg/min IntraVENous TITRATE Betty Olson MD        vasopressin (VASOSTRICT) 20 Units in 0.9% sodium chloride 100 mL infusion  0.04 Units/min IntraVENous CONTINUOUS Yue Olson MD        propofol (DIPRIVAN) 10 mg/mL infusion  5 mcg/kg/min IntraVENous TITRATE Yue Olson MD 6.3 mL/hr at 10/07/20 1633 20 mcg/kg/min at 10/07/20 1633    PHENYLephrine (ALISTAIR-SYNEPHRINE) 30 mg in 0.9% sodium chloride 250 mL infusion   mcg/min IntraVENous CONTINUOUS Yue Olson MD 50 mL/hr at 10/07/20 0050 100 mcg/min at 10/07/20 0050    DOPamine (INTROPIN) 400 mg in dextrose 5% 250 mL infusion  2.5 mcg/kg/min IntraVENous TITRATE Yue Olson MD   Stopped at 10/09/20 1607    pantoprazole (PROTONIX) 40 mg in 0.9% sodium chloride 10 mL injection  40 mg IntraVENous Q12H Behzad Ocasio MD   40 mg at 10/11/20 0811    influenza vaccine 2020-21 (4 yrs+)(PF) (FLUCELVAX QUAD) injection 0.5 mL  0.5 mL IntraMUSCular PRIOR TO DISCHARGE Wesley Urbina MD   Stopped at 10/07/20 0900    diphenhydrAMINE (BENADRYL) capsule 50 mg  50 mg Oral BID PRN Jessica Doyle NP   50 mg at 10/03/20 0129    phosphorus (K PHOS NEUTRAL) 250 mg tablet 2 Tab  2 Tab Oral BID Wesley Urbina MD   2 Tab at 10/11/20 0810    levoFLOXacin (LEVAQUIN) tablet 500 mg  500 mg Oral Q24H Wesley Urbina MD   500 mg at 10/11/20 1023    predniSONE (DELTASONE) tablet 20 mg  20 mg Oral DAILY WITH BREAKFAST Wesley Urbina MD   20 mg at 10/11/20 0811    oxyCODONE IR (ROXICODONE) tablet 15 mg  15 mg Oral Q4H PRN Wesley Urbina MD   15 mg at 10/06/20 1228    metoprolol tartrate (LOPRESSOR) tablet 50 mg  50 mg Oral BID Margaret Hall MD   Stopped at 10/11/20 0900    enoxaparin (LOVENOX) injection 40 mg  40 mg SubCUTAneous Q24H Behzad Ocasio MD   Stopped at 10/05/20 2141    aspirin delayed-release tablet 81 mg  81 mg Oral DAILY Gisele Long NP   81 mg at 10/11/20 0811    acetaminophen (TYLENOL) tablet 650 mg  650 mg Oral Q6H PRN Gisele Long NP   650 mg at 09/30/20 0950    ondansetron (ZOFRAN) injection 4 mg  4 mg IntraVENous Q8H PRN Gisele Long NP   4 mg at 09/30/20 0402    guaiFENesin (ROBITUSSIN) 20 mg/mL oral liquid 400 mg  400 mg Oral Q6H PRN Bryan Norton NP   Stopped at 20            No Known Allergies        Objective:     Blood pressure (!) 82/56, pulse 72, temperature 97.3 °F (36.3 °C), resp. rate 15, height 6' (1.829 m), weight 70.6 kg (155 lb 10.3 oz), SpO2 100 %. Temp (24hrs), Av.7 °F (36.5 °C), Min:96.6 °F (35.9 °C), Max:98.6 °F (37 °C)      Intake and Output:  Current Shift: 10/11 0701 - 10/11 1900  In: 878.9 [I.V.:728.9]  Out: 860 [Urine:800; Drains:60]  Last 3 Shifts: 10/09 1901 - 10/11 0700  In: 1901 [I.V.:1901]  Out: 4760 [Urine:4100; Drains:560]    Physical Exam:     General: Lying in bed, on ventilator. Wide-awake and following commands. No acute distress  Throat and Neck: Supple. No JVD. He has a right subclavian central line. He has NG tube in the right nares. Lung:  Much improved aeration bilaterally, minimal rhonchi in the left base. Ventilated. Heart: S1+S2. No murmurs  Abdomen: Status post surgery. He has a midline incision. He has 2 HELGA drains one on each side. Bowel sounds are hypoactive. Draining serosanguineous fluid. Extremities:  He has more pitting edema. Distal pulses of the lower extremities are noted with Dopplers. : Reese catheter in place. Making some urine output. Skin: No cyanosis  Neurologic: Wakes up easily, following commands. Lab/Data Review: All lab results for the last 24 hours reviewed.   Recent Results (from the past 24 hour(s))   CBC WITH AUTOMATED DIFF    Collection Time: 10/11/20  4:32 AM   Result Value Ref Range    WBC 18.9 (H) 4.1 - 11.1 K/uL    RBC 2.88 (L) 4.10 - 5.70 M/uL    HGB 8.9 (L) 12.1 - 17.0 g/dL    HCT 25.6 (L) 36.6 - 50.3 %    MCV 88.9 80.0 - 99.0 FL    MCH 30.9 26.0 - 34.0 PG    MCHC 34.8 30.0 - 36.5 g/dL    RDW 15.6 (H) 11.5 - 14.5 %    PLATELET 92 (L) 542 - 400 K/uL    MPV 11.5 8.9 - 12.9 FL    NEUTROPHILS 89 (H) 32 - 75 %    LYMPHOCYTES 3 (L) 12 - 49 %    MONOCYTES 7 5 - 13 %    EOSINOPHILS 0 0 - 7 %    BASOPHILS 1 0 - 1 %    IMMATURE GRANULOCYTES 0 0.0 - 0.5 %    ABS. NEUTROPHILS 16.9 (H) 1.8 - 8.0 K/UL    ABS. LYMPHOCYTES 0.6 (L) 0.8 - 3.5 K/UL    ABS. MONOCYTES 1.4 (H) 0.0 - 1.0 K/UL    ABS. EOSINOPHILS 0.0 0.0 - 0.4 K/UL    ABS. BASOPHILS 0.0 0.0 - 0.1 K/UL    ABS. IMM. GRANS. 0 0.00 - 0.04 K/UL    DF AUTOMATED     METABOLIC PANEL, BASIC    Collection Time: 10/11/20  4:32 AM   Result Value Ref Range    Sodium 138 136 - 145 mmol/L    Potassium 2.7 (LL) 3.5 - 5.1 mmol/L    Chloride 104 97 - 108 mmol/L    CO2 29 21 - 32 mmol/L    Anion gap 5 5 - 15 mmol/L    Glucose 126 (H) 65 - 100 mg/dL    BUN 17 6 - 20 mg/dL    Creatinine 0.68 (L) 0.70 - 1.30 mg/dL    BUN/Creatinine ratio 25 (H) 12 - 20      GFR est AA >60 >60 ml/min/1.73m2    GFR est non-AA >60 >60 ml/min/1.73m2    Calcium 7.6 (L) 8.5 - 10.1 mg/dL   BLOOD GAS, ARTERIAL    Collection Time: 10/11/20  5:56 AM   Result Value Ref Range    pH 7.53 (H) 7.35 - 7.45      PCO2 30 (L) 35 - 45 mmHg    PO2 83 75 - 100 mmHg    O2 SAT 99 >95 %    BICARBONATE 27 (H) 22 - 26 mmol/L    BASE EXCESS 2.4 (H) 0 - 2 mmol/L    FIO2 40 %    Tidal volume 500      EPAP/CPAP/PEEP 5      Sample source Arterial      SITE Right Radial      ENIO'S TEST YES     TYPE & SCREEN    Collection Time: 10/11/20  3:20 PM   Result Value Ref Range    Crossmatch Expiration 10/14/2020,2359     ABO/Rh(D) O Positive     Antibody screen Negative     Unit number [id number]     Blood component type RC LR     Unit division 00     Status of unit Allocated     TRANSFUSION STATUS Ok to transfuse     Crossmatch result Compatible      chest X-ray      XR CHEST SNGL V   Final Result   IMPRESSION: Breathing left basilar airspace disease. XR CHEST PORT   Final Result   IMPRESSION: Significant reexpansion of the left lung with persistent airspace   disease throughout much of the left lower lobe.       XR CHEST PORT   Final Result   IMPRESSION: Postoperative changes, left upper abdomen. Left pneumonectomy versus   complete lung collapse; correlate with surgical history. Right lung relatively   clear. ET tube 27 mm above the [place]. XR CHEST PORT   Final Result   IMPRESSION: Complete collapse of the left lung with no evidence of residual   pulmonary aeration on the left. Associated left pleural effusion is likely. XR CHEST PORT   Final Result   Impression:Left lung airspace disease/atelectasis. Suspect left pleural   effusion. Focal airspace disease at the right lung base. CTA ABD ART W RUNOFF W WO CONT   Final Result   IMPRESSION:   1. Interval postoperative changes from aorto-bifemoral artery bypass. The bypass   graft is patent. The proximal and distal anastomoses are patent. 2. Patent left renal artery. Wedge shaped perfusion defect in the inferior pole   of the left kidney consistent with parenchymal infarct. 3. Patent diminutive right renal artery. Patchy parenchymal perfusion, improved   compared to the preoperative study from 9/29/2020.   4. Patent right and left lower extremity arteries without occlusion or   significant stenosis. 5. Stable mild short segment focal atherosclerotic dissection flap at the right   anterolateral aspect of the descending thoracic aorta at the T10 level. 6. Postoperative changes from splenectomy. Approximately 6.1 cm TR by 2.1 cm AP   by 8.4 cm CC hematoma in the splenic bed without findings of active contrast   extravasation. A surgical drain is present in the splenectomy bed extending to   beneath the left hemidiaphragm. 7. Expected pneumoperitoneum given recent surgery. Bilateral retroperitoneal   low-attenuation stranding and/or small volume fluid. Diffuse mesenteric   stranding and/or small volume fluid in the  pelvis. 8. Distended gallbladder with cholelithiasis and prominent common bile duct as   seen similarly on the preoperative study from 9/29/2020/.   9. Diffuse anasarca.  Diffuse subcutaneous edema of the lower extremities, right   greater than left. Negative for right and left lower extremity DVT. XR CHEST PORT   Final Result   IMPRESSION:   1. The patient's life support lines and tubes are unchanged and are in   satisfactory position. 2.  There is increasing hypoventilation and atelectasis within the lung bases   greater on the left compared to the right. 3.  There also is increasing hazy opacity along the lower left hemithorax most   compatible with a layering moderate-sized left pleural effusion. XR CHEST PORT   Final Result   IMPRESSION:   1. The endotracheal tube is in satisfactory position. 2.  There is a hypoventilation of the lung bases with lung base atelectasis. The   remainder of the lung parenchyma is relatively clear. There is vascular pruning   within the upper lobes suspect for underlying emphysema. 3.  There is free intra-abdominal air likely a function of recent abdominal   surgery. US RETROPERITONEUM COMP   Final Result   IMPRESSION:   1. There is a smaller size to the right kidney. 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 The kidneys otherwise image in a normal fashion. There are normal renal echotexture characteristics. 2.  There a moderate sized right pleural effusion. XR CHEST PORT   Final Result   IMPRESSION: Postoperative findings, postprocedural findings, medical devices as   described. Minimal right lung base atelectasis may be residual adhesive   intraoperative atelectasis. Tonye Cogan XR ABD (KUB)   Final Result   Findings/impression:      Numerous surgical clips project over the left upper quadrant and mid abdomen. Midline skin staples noted. Drainage tubes project over the pelvis, lower   abdomen and left upper quadrant. Enteric tube tip projects over the proximal   stomach. No unexpected radiopaque foreign bodies are identified. Oral contrast material throughout the colon. Bowel gas pattern is   nonobstructive.       No acute osseous abdomen identified. XR SWALLOW FUNC VIDEO   Final Result   Impression: Significant hypopharyngeal residue. Episodes of penetration with   all consistencies. Episode of flash aspiration after water wash. XR CHEST PORT   Final Result   Impression: Underexpanded lungs with bibasilar atelectasis. CTA ABDOMEN PELV W CONT   Final Result   IMPRESSION: Mid abdominal aortic occlusion, with threatened right kidney and   fairly good collateralization to the legs. This could be causing a mesenteric   steal phenomenon, however      MRI BRAIN WO CONT   Final Result   Impression: No evidence of acute hemorrhage, mass or infarct. No sinusitis or   mastoiditis. Please see above discussion. CTA CHEST W OR W WO CONT   Final Result   IMPRESSION: Limited by breathing motion. 1. No large pulmonary arterial filling defect. 2. Bronchial thickening with right greater than left lower lung atelectasis or   pneumonia/pneumonitis. Bubbly debris in the trachea. 3. Atherosclerosis. Abrupt discontinuation of contrast in the aorta on the very   inferior slices. Contrast is seen in the celiac artery and SMA and the left   renal artery. The right kidney demonstrates decreased attenuation compared to   the left concerning for medical renal disease to include ischemia. Recommend CTA   abdomen/pelvis. Called report to charge nurse [place] White at 9:05 PM 9/27/2020.   4. Cholelithiasis within distended gallbladder. 5. Dilated small bowel. 6. Other findings as above. XR CHEST PORT   Final Result   IMPRESSION:      No airspace disease in the lungs. Follow-up as clinically indicated. CT HEAD WO CONT   Final Result   Impression: Unremarkable head CT without contrast.  No infarct, mass, or    hemorrhage. Please see full report. XR CHEST SNGL V   Final Result   Impression: No diagnostic abnormality. CT Results  (Last 48 hours)    None          Assessment:     1.   Acute respiratory failure, after aortobifemoral bypass surgery on 10/6/2020. Patient self extubated himself early in the morning on 10/9/2020. Re-intubated on 10/10/20 for left-sided mucous plugging and complete left lung collapse. 2.  Acute metabolic encephalopathy, resolved  3. Aspiration pneumonia  4. Severe peripheral vascular disease  5. UTI  6. Hypertension  7. Familial polymyositis    Plan:     1.)  Acute Hypoxic respiratory failure. Patient had improved and had been on room air. On 10/6/2020 he underwent aortobifemoral bypass surgery. Post-surgery he was left on the ventilator. EBL was 3.5 L. When he arrived to the ICU he was on multiple pressors. He is now off pressors. Patient self extubated himself early in the AM on 10/9/20. Re-intubated on 10/10/20 in order to undergo bronchoscopy for left-lung collapse due to mucous plugging. S/p successful bronch with suctioning of mucous plugs on 10/10/20, bronchial washings sent for culture, currently pending. Repeat CXR this morning with significant improvement in left lung aeration. Rated SBT this morning and this afternoon, will move forward with extubation today. Russ Rogers He is started on diuretics which I agree with. Nephrology input noted. 2.)  Aspiration/HAP pneumonia:  He is currently back on the ventilator. Continue PO Levaquin but likely can stop soon; d/c zosyn as the pharmacy is having difficulty getting this medication for him. Started on Cefepime/Flagyl 10/10/20, white blood cell count slowly coming down. Also on prednisone, will need to wean this soon as well. On nebulized bronchodilator treatments. Modified barium swallow showed penetration and significant dysphagia. 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 Patient may require PEG tube near future. He had been on puréed diet with nectar thickened liquids. 3.)  Metabolic encephalopathy. He has a progressive neurologic disorder.     Much improved mental status today.  Brain MRI negative    Further recommendation per Neurology. 4.)  Dehydration and metabolic acidosis. Monitor kidney function after surgery. 5.  Possible urinary tract infection. 6.  Hypertension. Dyslipidemia and severe peripheral vascular disease. 7.  Myocardial ischemia:  He underwent nuclear stress testing which showed significant inferior wall ischemia. He underwent cardiac catheterization on 10/5/2020 which showed nonobstructive coronary artery disease. Medical management is planned at this time.      Thank you for allowing me to participate in the care of this patient. I will follow the patient closely with you. DVT and GI prophylaxis. He is on Lovenox and Protonix IV. Repeat all labs in the morning. Replenish electrolytes as needed. Discussed with the wife at the bedside. Patient is critically ill at this time. Discussed with the nursing team.  More than 40 minutes spent with patient care.   Thank you for involving me in the care of this patient      Hernan Hampton DO  [facility]  10/11/2020

## 2020-10-11 NOTE — PROGRESS NOTES
Pt tolerated spontaneous breathing trials well. RT extubated pt at 1440. Pt alert, following commands. Oral care provided,  Pt sats 100% on 3L NC. Discontinued bilateral wrist restraints. Pt verbalized understanding of not pulling lines/tubes/drains. Pt resting comfortably with wife at bedside.

## 2020-10-11 NOTE — PROGRESS NOTES
Hospitalist Progress Note           Daily Progress Note: 10/11/2020    Chief complaint: Shortness of breath and weakness  Subjective: The patient is seen for follow  up. Postoperative day #4 status post infrarenal aorta endarterectomy. Reportedly hypotensive postprocedure requiring 3 vasopressors. Off vasopressors  Patient had bronchoscopy done yesterday with reexpansion of left lung. Remains intubated but fully awake.   No fevers overnight      Problem List:  Problem List as of 10/11/2020 Date Reviewed: 9/17/2020          Codes Class Noted - Resolved    Metabolic encephalopathy [facility]: G93.41  ICD-9-CM: 348.31  9/26/2020 - Present        UTI (urinary tract infection) ICD-10-CM: N39.0  ICD-9-CM: 599.0  9/26/2020 - Present        Aspiration pneumonitis ([address] ICD-10-CM: J69.0  ICD-9-CM: 507.0  9/26/2020 - Present        Essential hypertension ICD-10-CM: I10  ICD-9-CM: 401.9  9/26/2020 - Present        Acute dehydration ICD-10-CM: E86.0  ICD-9-CM: 276.51  9/26/2020 - Present              Medications reviewed  Current Facility-Administered Medications   Medication Dose Route Frequency    potassium chloride 10 mEq in 100 ml IVPB  10 mEq IntraVENous Q1H    potassium chloride (K-DUR, KLOR-CON) SR tablet 40 mEq  40 mEq Oral BID    albumin human 25% (BUMINATE) solution 12.5 g  12.5 g IntraVENous BID    propofol (DIPRIVAN) 10 mg/mL infusion  5 mcg/kg/min IntraVENous TITRATE    fentaNYL (PF) 1,500 mcg/30 mL (50 mcg/mL) infusion  75 mcg/hr IntraVENous TITRATE    cefepime (MAXIPIME) 1 g in 0.9% sodium chloride (MBP/ADV) 50 mL MBP  1 g IntraVENous Q8H    metroNIDAZOLE (FLAGYL) IVPB premix 500 mg  500 mg IntraVENous Q8H    acetylcysteine (MUCOMYST) 200 mg/mL (20 %) solution 600 mg  600 mg Nebulization Q6H RT    albuterol CONCENTRATE 2.5mg/0.5 mL neb soln  2.5 mg Nebulization Q6H RT    LORazepam (ATIVAN) injection 1 mg  1 mg IntraVENous Q2H PRN    dexmedeTOMidine (PRECEDEX) 400 mcg in 0.9% sodium chloride 100 mL infusion  0.2-1.4 mcg/kg/hr IntraVENous TITRATE    sodium chloride 0.9% (NS) 3ml nebulizer solution  2.5 mL Nebulization PRN    midazolam (PF) (VERSED) injection 4 mg  4 mg IntraVENous Q2H PRN    furosemide (LASIX) injection 20 mg  20 mg IntraVENous BID    hydrocortisone (CORTENEMA) 100 mg/60 mL enema 100 mg  100 mg Rectal Q12H    NOREPINephrine (LEVOPHED) 8 mg in 5% dextrose 250mL (32 mcg/mL) infusion  2 mcg/min IntraVENous TITRATE    vasopressin (VASOSTRICT) 20 Units in 0.9% sodium chloride 100 mL infusion  0.04 Units/min IntraVENous CONTINUOUS    propofol (DIPRIVAN) 10 mg/mL infusion  5 mcg/kg/min IntraVENous TITRATE    PHENYLephrine (ALISTAIR-SYNEPHRINE) 30 mg in 0.9% sodium chloride 250 mL infusion   mcg/min IntraVENous CONTINUOUS    DOPamine (INTROPIN) 400 mg in dextrose 5% 250 mL infusion  2.5 mcg/kg/min IntraVENous TITRATE    pantoprazole (PROTONIX) 40 mg in 0.9% sodium chloride 10 mL injection  40 mg IntraVENous Q12H    influenza vaccine 2020-21 (4 yrs+)(PF) (FLUCELVAX QUAD) injection 0.5 mL  0.5 mL IntraMUSCular PRIOR TO DISCHARGE    diphenhydrAMINE (BENADRYL) capsule 50 mg  50 mg Oral BID PRN    phosphorus (K PHOS NEUTRAL) 250 mg tablet 2 Tab  2 Tab Oral BID    levoFLOXacin (LEVAQUIN) tablet 500 mg  500 mg Oral Q24H    predniSONE (DELTASONE) tablet 20 mg  20 mg Oral DAILY WITH BREAKFAST    oxyCODONE IR (ROXICODONE) tablet 15 mg  15 mg Oral Q4H PRN    metoprolol tartrate (LOPRESSOR) tablet 50 mg  50 mg Oral BID    enoxaparin (LOVENOX) injection 40 mg  40 mg SubCUTAneous Q24H    aspirin delayed-release tablet 81 mg  81 mg Oral DAILY    acetaminophen (TYLENOL) tablet 650 mg  650 mg Oral Q6H PRN    ondansetron (ZOFRAN) injection 4 mg  4 mg IntraVENous Q8H PRN    guaiFENesin (ROBITUSSIN) 20 mg/mL oral liquid 400 mg  400 mg Oral Q6H PRN       Review of Systems:   Review of systems unable to be obtained because he is intubated    Objective:   Physical Exam: Visit Vitals  BP (!) 96/56 (BP 1 Location: Right arm, BP Patient Position: At rest)   Pulse 62   Temp 97.5 °F (36.4 °C)   Resp 15   Ht 6' (1.829 m)   Wt 70.6 kg (155 lb 10.3 oz)   SpO2 100%   BMI 21.11 kg/m²    O2 Flow Rate (L/min): 15 l/min O2 Device: Ventilator    Temp (24hrs), Av.8 °F (36.6 °C), Min:96.6 °F (35.9 °C), Max:98.6 °F (37 °C)    No intake/output data recorded. 10/09 1901 - 10/11 0700  In: 1901 [I.V.:190]  Out: 12 [Urine:4100; Drains:560]    General:   intubated and sedated, cooperative, no distress, appears older than stated age. Lungs:   Clear to auscultation bilaterally with diminished breath sounds bilateral bases. Chest wall:  No tenderness or deformity. Heart:  Regular rate and rhythm, S1, S2 normal, no murmur, click, rub or gallop. Abdomen:   Soft, non-tender. Diminished Bowel sounds. Dressings in place. Extremities: Extremities normal, atraumatic, positive edema bilaterally   Pulses: 2+ and symmetric all extremities. Skin: Skin color, texture, turgor normal. No rashes or lesions   Neurologic: CNII-XII intact.      Data Review:       Recent Days:  Recent Labs     10/11/20  0432 10/10/20  0521 10/09/20  2045   WBC 18.9* 19.7* 17.4*   HGB 8.9* 8.2* 7.5*   HCT 25.6* 23.4* 21.2*   PLT 92* 58* 51*     Recent Labs     10/11/20  0432 10/10/20  0521 10/09/20  2045    140 138   K 2.7* 2.9* 2.9*    105 104   CO2 29 27 28   * 108* 108*   BUN 17 14 14   CREA 0.68* 0.69* 0.71   CA 7.6* 7.8* 7.4*     Recent Labs     10/11/20  0556 10/10/20  0440 10/09/20  0215   PH 7.53* 7.524* 7.46*   PCO2 30* 32* 30*   PO2 83 49* 68*   HCO3 27* 28* 22   FIO2 40 40.0 35       24 Hour Results:  Recent Results (from the past 24 hour(s))   CBC WITH AUTOMATED DIFF    Collection Time: 10/11/20  4:32 AM   Result Value Ref Range    WBC 18.9 (H) 4.1 - 11.1 K/uL    RBC 2.88 (L) 4.10 - 5.70 M/uL    HGB 8.9 (L) 12.1 - 17.0 g/dL    HCT 25.6 (L) 36.6 - 50.3 %    MCV 88.9 80.0 - 99.0 FL    MCH 30.9 26.0 - 34.0 PG    MCHC 34.8 30.0 - 36.5 g/dL    RDW 15.6 (H) 11.5 - 14.5 %    PLATELET 92 (L) 684 - 400 K/uL    MPV 11.5 8.9 - 12.9 FL    NEUTROPHILS 89 (H) 32 - 75 %    LYMPHOCYTES 3 (L) 12 - 49 %    MONOCYTES 7 5 - 13 %    EOSINOPHILS 0 0 - 7 %    BASOPHILS 1 0 - 1 %    IMMATURE GRANULOCYTES 0 0.0 - 0.5 %    ABS. NEUTROPHILS 16.9 (H) 1.8 - 8.0 K/UL    ABS. LYMPHOCYTES 0.6 (L) 0.8 - 3.5 K/UL    ABS. MONOCYTES 1.4 (H) 0.0 - 1.0 K/UL    ABS. EOSINOPHILS 0.0 0.0 - 0.4 K/UL    ABS. BASOPHILS 0.0 0.0 - 0.1 K/UL    ABS. IMM. GRANS. 0 0.00 - 0.04 K/UL    DF AUTOMATED     METABOLIC PANEL, BASIC    Collection Time: 10/11/20  4:32 AM   Result Value Ref Range    Sodium 138 136 - 145 mmol/L    Potassium 2.7 (LL) 3.5 - 5.1 mmol/L    Chloride 104 97 - 108 mmol/L    CO2 29 21 - 32 mmol/L    Anion gap 5 5 - 15 mmol/L    Glucose 126 (H) 65 - 100 mg/dL    BUN 17 6 - 20 mg/dL    Creatinine 0.68 (L) 0.70 - 1.30 mg/dL    BUN/Creatinine ratio 25 (H) 12 - 20      GFR est AA >60 >60 ml/min/1.73m2    GFR est non-AA >60 >60 ml/min/1.73m2    Calcium 7.6 (L) 8.5 - 10.1 mg/dL   BLOOD GAS, ARTERIAL    Collection Time: 10/11/20  5:56 AM   Result Value Ref Range    pH 7.53 (H) 7.35 - 7.45      PCO2 30 (L) 35 - 45 mmHg    PO2 83 75 - 100 mmHg    O2 SAT 99 >95 %    BICARBONATE 27 (H) 22 - 26 mmol/L    BASE EXCESS 2.4 (H) 0 - 2 mmol/L    FIO2 40 %    Tidal volume 500      EPAP/CPAP/PEEP 5      Sample source Arterial      SITE Right Radial      ENIO'S TEST YES             Assessment/     Acute hypoxic respiratory failure postsurgery. Self extubated 10/09/20. Reintubated 10/10 due to hypoxemia    Acute kidney injury likely secondary to ATN from hypotension. Improved    Hypovolemic shock post surgery    Right lower lobe aspiration pneumonia improved    Urinary tract infection due to E. Coli    Abnormal Cardiolite stress test showing inferior ischemia.   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 Repleted    Hypothermia, probably largely environmental.  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 Plan:  Continue supportive care including abtics  Monitor electrolytes closely  Begin tube feeds  Wean off mechanical ventilator as tolerated  Will need aggressive PT OT once extubated        Care Plan discussed with: Patient/Family    Total time spent with patient: 30 minutes.     Nafisa Urias MD

## 2020-10-11 NOTE — PROGRESS NOTES
Progress Note    Patient: Laine Urbina MRN: [id number]  SSN: [id number]    YOB: 1965  Age: 48 y.o. Sex: male      Admit Date: 9/26/2020    LOS: 14 days     Subjective: Intubated and sedated. Objective:     Vitals:    10/10/20 1700 10/10/20 1901 10/10/20 2000 10/10/20 2115   BP: 109/69 103/64 98/66    Pulse: (!) 55 (!) 53 (!) 54 (!) 56   Resp: 12 14 12 12   Temp:  (!) 96.6 °F (35.9 °C) 96.8 °F (36 °C)    SpO2: 100% 100% 100% 100%   Weight:       Height:            Intake and Output:  Current Shift: No intake/output data recorded. Last three shifts: 10/09 0701 - 10/10 1900  In: 1901 [I.V.:1901]  Out: 7569 [Urine:5390; Drains:530]    Physical Exam:   LUNG: clear to auscultation bilaterally  HEART: regular rate and rhythm, S1, S2 normal, no murmur, click, rub or gallop  ABDOMEN: soft, non-tender. Bowel sounds normal. No masses,  no organomegaly  EXTREMITIES:  +1 - +2 bilateral LE and UE swelling.     Lab/Data Review:  Recent Results (from the past 24 hour(s))   BLOOD GAS, ARTERIAL    Collection Time: 10/10/20  4:40 AM   Result Value Ref Range    pH 7.524 (H) 7.35 - 7.45      PCO2 32 (L) 35 - 45 mmHg    PO2 49 (LL) 75 - 100 mmHg    O2 SAT 89 (L) >95 %    BICARBONATE 28 (H) 22 - 26 mmol/L    BASE EXCESS 3.7 (H) 0 - 2 mmol/L    O2 METHOD AVAPS      FIO2 40.0 %    Tidal volume 450      EPAP/CPAP/PEEP 7      SITE Right Brachial      ENIO'S TEST PASS     CBC WITH AUTOMATED DIFF    Collection Time: 10/10/20  5:21 AM   Result Value Ref Range    WBC 19.7 (H) 4.1 - 11.1 K/uL    RBC 2.65 (L) 4.10 - 5.70 M/uL    HGB 8.2 (L) 12.1 - 17.0 g/dL    HCT 23.4 (L) 36.6 - 50.3 %    MCV 88.3 80.0 - 99.0 FL    MCH 30.9 26.0 - 34.0 PG    MCHC 35.0 30.0 - 36.5 g/dL    RDW 15.3 (H) 11.5 - 14.5 %    PLATELET 58 (L) 745 - 400 K/uL    MPV 10.6 8.9 - 12.9 FL    NRBC 4.6 (H) 0  WBC    ABSOLUTE NRBC 0.91 (H) 0.00 - 0.01 K/uL    NEUTROPHILS 89 (H) 32 - 75 %    LYMPHOCYTES 4 (L) 12 - 49 %    MONOCYTES 7 5 - 13 % EOSINOPHILS 0 0 - 7 %    BASOPHILS 0 0 - 1 %    IMMATURE GRANULOCYTES 0 0.0 - 0.5 %    ABS. NEUTROPHILS 17.6 (H) 1.8 - 8.0 K/UL    ABS. LYMPHOCYTES 0.7 (L) 0.8 - 3.5 K/UL    ABS. MONOCYTES 1.4 (H) 0.0 - 1.0 K/UL    ABS. EOSINOPHILS 0.0 0.0 - 0.4 K/UL    ABS. BASOPHILS 0.0 0.0 - 0.1 K/UL    ABS. IMM. GRANS. 0.1 (H) 0.00 - 0.04 K/UL    DF AUTOMATED     METABOLIC PANEL, BASIC    Collection Time: 10/10/20  5:21 AM   Result Value Ref Range    Sodium 140 136 - 145 mmol/L    Potassium 2.9 (L) 3.5 - 5.1 mmol/L    Chloride 105 97 - 108 mmol/L    CO2 27 21 - 32 mmol/L    Anion gap 8 5 - 15 mmol/L    Glucose 108 (H) 65 - 100 mg/dL    BUN 14 6 - 20 mg/dL    Creatinine 0.69 (L) 0.70 - 1.30 mg/dL    BUN/Creatinine ratio 20 12 - 20      GFR est AA >60 >60 ml/min/1.73m2    GFR est non-AA >60 >60 ml/min/1.73m2    Calcium 7.8 (L) 8.5 - 10.1 mg/dL             Assessment:     Active Problems:    Metabolic encephalopathy (7/43/2407)      UTI (urinary tract infection) (9/26/2020)      Aspiration pneumonitis (HCC) (9/26/2020)      Essential hypertension (9/26/2020)      Acute dehydration (9/26/2020)        Plan:          1. PAD: Continue ASA and zetia. Unclear if statins are contraindicated with [employer]- Will check with primary team or neurology. 2. Hypotensive on pressors. Watching carefully. No changes in echocardiogram.   3. Nonobstructive CAD: 50% disease to the left cx. Continue DAPT and zetia. 4. NSVT: resolved. Replete lytes.      Signed By: Nneka Peck MD     October 10, 2020

## 2020-10-11 NOTE — PROGRESS NOTES
Bedside shift change report given to Tree RN (oncoming nurse) by Perlita Perales RN (offgoing nurse). Report included the following information SBAR.

## 2020-10-11 NOTE — PROGRESS NOTES
Pt remains intubated, not appropriate for ST sw evaluation. D/c from [address] [place] [address] Dr consult and re-consult when medially appropriate. RN aware.

## 2020-10-11 NOTE — PROGRESS NOTES
At 1640 patient extubated to 3lpm nc per Dr. Pj Rm. Patient tolerated well, no adverse reactions or complications at this time.

## 2020-10-12 LAB
ABO + RH BLD: NORMAL
ANION GAP SERPL CALC-SCNC: 5 MMOL/L (ref 5–15)
BACTERIA SPEC CULT: NORMAL
BACTERIA SPEC CULT: NORMAL
BASOPHILS # BLD: 0 K/UL (ref 0–0.1)
BASOPHILS NFR BLD: 0 % (ref 0–1)
BLD PROD TYP BPU: NORMAL
BLOOD GROUP ANTIBODIES SERPL: NEGATIVE
BPU ID: NORMAL
BUN SERPL-MCNC: 17 MG/DL (ref 6–20)
BUN/CREAT SERPL: 28 (ref 12–20)
CA-I BLD-MCNC: 8.1 MG/DL (ref 8.5–10.1)
CHLORIDE SERPL-SCNC: 106 MMOL/L (ref 97–108)
CO2 SERPL-SCNC: 30 MMOL/L (ref 21–32)
CREAT SERPL-MCNC: 0.61 MG/DL (ref 0.7–1.3)
CROSSMATCH RESULT,%XM: NORMAL
DIFFERENTIAL METHOD BLD: ABNORMAL
EOSINOPHIL # BLD: 0 K/UL (ref 0–0.4)
EOSINOPHIL NFR BLD: 0 % (ref 0–7)
ERYTHROCYTE [DISTWIDTH] IN BLOOD BY AUTOMATED COUNT: 15.3 % (ref 11.5–14.5)
GLUCOSE SERPL-MCNC: 96 MG/DL (ref 65–100)
GRAM STN SPEC: NORMAL
HCT VFR BLD AUTO: 31 % (ref 36.6–50.3)
HGB BLD-MCNC: 10.8 G/DL (ref 12.1–17)
IMM GRANULOCYTES # BLD AUTO: 0.2 K/UL
IMM GRANULOCYTES NFR BLD AUTO: 1 % (ref 0–0.5)
LYMPHOCYTES # BLD: 1 K/UL (ref 0.8–3.5)
LYMPHOCYTES NFR BLD: 5 % (ref 12–49)
MCH RBC QN AUTO: 30.9 PG (ref 26–34)
MCHC RBC AUTO-ENTMCNC: 34.8 G/DL (ref 30–36.5)
MCV RBC AUTO: 88.6 FL (ref 80–99)
MONOCYTES # BLD: 1 K/UL (ref 0–1)
MONOCYTES NFR BLD: 5 % (ref 5–13)
NEUTS SEG # BLD: 17.8 K/UL (ref 1.8–8)
NEUTS SEG NFR BLD: 89 % (ref 32–75)
NRBC # BLD: 1.2 K/UL
NRBC # BLD: 1.24 K/UL (ref 0–0.01)
NRBC BLD MANUAL-RTO: 6 PER 100 WBC
NRBC BLD-RTO: 5.8 PER 100 WBC
PLATELET # BLD AUTO: 107 K/UL (ref 150–400)
PMV BLD AUTO: 10.8 FL (ref 8.9–12.9)
POTASSIUM SERPL-SCNC: 2.7 MMOL/L (ref 3.5–5.1)
RBC # BLD AUTO: 3.5 M/UL (ref 4.1–5.7)
RBC MORPH BLD: ABNORMAL
SODIUM SERPL-SCNC: 141 MMOL/L (ref 136–145)
SPECIAL REQUESTS,SREQ: NORMAL
SPECIMEN EXP DATE BLD: NORMAL
STATUS OF UNIT,%ST: NORMAL
TRANSFUSION STATUS PATIENT QL: NORMAL
UNIT DIVISION, %UDIV: 0
WBC # BLD AUTO: 20 K/UL (ref 4.1–11.1)

## 2020-10-12 PROCEDURE — 94640 AIRWAY INHALATION TREATMENT: CPT

## 2020-10-12 PROCEDURE — 36592 COLLECT BLOOD FROM PICC: CPT

## 2020-10-12 PROCEDURE — 74011000258 HC RX REV CODE- 258: Performed by: INTERNAL MEDICINE

## 2020-10-12 PROCEDURE — 74011250637 HC RX REV CODE- 250/637: Performed by: INTERNAL MEDICINE

## 2020-10-12 PROCEDURE — 74011250636 HC RX REV CODE- 250/636

## 2020-10-12 PROCEDURE — 74011000250 HC RX REV CODE- 250: Performed by: INTERNAL MEDICINE

## 2020-10-12 PROCEDURE — 74011250636 HC RX REV CODE- 250/636: Performed by: INTERNAL MEDICINE

## 2020-10-12 PROCEDURE — 85025 COMPLETE CBC W/AUTO DIFF WBC: CPT

## 2020-10-12 PROCEDURE — 77010033678 HC OXYGEN DAILY

## 2020-10-12 PROCEDURE — 94668 MNPJ CHEST WALL SBSQ: CPT

## 2020-10-12 PROCEDURE — P9047 ALBUMIN (HUMAN), 25%, 50ML: HCPCS | Performed by: SURGERY

## 2020-10-12 PROCEDURE — 92526 ORAL FUNCTION THERAPY: CPT

## 2020-10-12 PROCEDURE — 74011250637 HC RX REV CODE- 250/637: Performed by: SURGERY

## 2020-10-12 PROCEDURE — 74011636637 HC RX REV CODE- 636/637: Performed by: INTERNAL MEDICINE

## 2020-10-12 PROCEDURE — 80048 BASIC METABOLIC PNL TOTAL CA: CPT

## 2020-10-12 PROCEDURE — 74011000250 HC RX REV CODE- 250

## 2020-10-12 PROCEDURE — 74011250636 HC RX REV CODE- 250/636: Performed by: SURGERY

## 2020-10-12 PROCEDURE — 74011250637 HC RX REV CODE- 250/637: Performed by: NURSE PRACTITIONER

## 2020-10-12 PROCEDURE — C9113 INJ PANTOPRAZOLE SODIUM, VIA: HCPCS | Performed by: INTERNAL MEDICINE

## 2020-10-12 PROCEDURE — 65610000006 HC RM INTENSIVE CARE

## 2020-10-12 PROCEDURE — 36415 COLL VENOUS BLD VENIPUNCTURE: CPT

## 2020-10-12 RX ORDER — SODIUM CHLORIDE FOR INHALATION 0.9 %
VIAL, NEBULIZER (ML) INHALATION
Status: COMPLETED
Start: 2020-10-12 | End: 2020-10-12

## 2020-10-12 RX ORDER — SPIRONOLACTONE 25 MG/1
25 TABLET ORAL 2 TIMES DAILY
Status: DISCONTINUED | OUTPATIENT
Start: 2020-10-12 | End: 2020-10-15

## 2020-10-12 RX ORDER — MIDODRINE HYDROCHLORIDE 5 MG/1
10 TABLET ORAL 2 TIMES DAILY
Status: DISCONTINUED | OUTPATIENT
Start: 2020-10-12 | End: 2020-10-15

## 2020-10-12 RX ORDER — POTASSIUM CHLORIDE 7.45 MG/ML
10 INJECTION INTRAVENOUS
Status: COMPLETED | OUTPATIENT
Start: 2020-10-12 | End: 2020-10-12

## 2020-10-12 RX ORDER — POTASSIUM CHLORIDE 7.45 MG/ML
INJECTION INTRAVENOUS
Status: COMPLETED
Start: 2020-10-12 | End: 2020-10-12

## 2020-10-12 RX ORDER — MAGNESIUM SULFATE 1 G/100ML
1 INJECTION INTRAVENOUS ONCE
Status: COMPLETED | OUTPATIENT
Start: 2020-10-12 | End: 2020-10-12

## 2020-10-12 RX ADMIN — ALBUMIN HUMAN 12.5 G: 250 SOLUTION INTRAVENOUS at 22:45

## 2020-10-12 RX ADMIN — ALBUTEROL SULFATE 2.5 MG: 2.5 SOLUTION RESPIRATORY (INHALATION) at 14:14

## 2020-10-12 RX ADMIN — MIDODRINE HYDROCHLORIDE 10 MG: 5 TABLET ORAL at 16:01

## 2020-10-12 RX ADMIN — POTASSIUM CHLORIDE 10 MEQ: 7.46 INJECTION, SOLUTION INTRAVENOUS at 08:50

## 2020-10-12 RX ADMIN — PREDNISONE 20 MG: 20 TABLET ORAL at 08:51

## 2020-10-12 RX ADMIN — METRONIDAZOLE 500 MG: 500 INJECTION, SOLUTION INTRAVENOUS at 06:18

## 2020-10-12 RX ADMIN — CEFEPIME 1 G: 1 INJECTION, POWDER, FOR SOLUTION INTRAMUSCULAR; INTRAVENOUS at 02:17

## 2020-10-12 RX ADMIN — MIDODRINE HYDROCHLORIDE 10 MG: 5 TABLET ORAL at 22:45

## 2020-10-12 RX ADMIN — ALBUMIN HUMAN 12.5 G: 250 SOLUTION INTRAVENOUS at 08:50

## 2020-10-12 RX ADMIN — SPIRONOLACTONE 25 MG: 25 TABLET ORAL at 16:01

## 2020-10-12 RX ADMIN — CEFEPIME 1 G: 1 INJECTION, POWDER, FOR SOLUTION INTRAMUSCULAR; INTRAVENOUS at 17:24

## 2020-10-12 RX ADMIN — ENOXAPARIN SODIUM 40 MG: 40 INJECTION SUBCUTANEOUS at 22:44

## 2020-10-12 RX ADMIN — POTASSIUM CHLORIDE 10 MEQ: 7.46 INJECTION, SOLUTION INTRAVENOUS at 09:55

## 2020-10-12 RX ADMIN — ISODIUM CHLORIDE 3 ML: 0.03 SOLUTION RESPIRATORY (INHALATION) at 14:14

## 2020-10-12 RX ADMIN — HYDROCORTISONE 100 MG: 100 ENEMA RECTAL at 08:52

## 2020-10-12 RX ADMIN — POTASSIUM CHLORIDE 10 MEQ: 7.46 INJECTION, SOLUTION INTRAVENOUS at 06:25

## 2020-10-12 RX ADMIN — FUROSEMIDE 40 MG: 10 INJECTION, SOLUTION INTRAMUSCULAR; INTRAVENOUS at 08:51

## 2020-10-12 RX ADMIN — POTASSIUM CHLORIDE 10 MEQ: 7.46 INJECTION, SOLUTION INTRAVENOUS at 11:31

## 2020-10-12 RX ADMIN — CEFEPIME 1 G: 1 INJECTION, POWDER, FOR SOLUTION INTRAMUSCULAR; INTRAVENOUS at 11:30

## 2020-10-12 RX ADMIN — DIBASIC SODIUM PHOSPHATE, MONOBASIC POTASSIUM PHOSPHATE AND MONOBASIC SODIUM PHOSPHATE 2 TABLET: 852; 155; 130 TABLET ORAL at 08:50

## 2020-10-12 RX ADMIN — METOPROLOL TARTRATE 50 MG: 50 TABLET, FILM COATED ORAL at 08:50

## 2020-10-12 RX ADMIN — OXYCODONE HYDROCHLORIDE 15 MG: 5 TABLET ORAL at 19:52

## 2020-10-12 RX ADMIN — METRONIDAZOLE 500 MG: 500 INJECTION, SOLUTION INTRAVENOUS at 22:45

## 2020-10-12 RX ADMIN — SODIUM CHLORIDE 40 MG: 9 INJECTION, SOLUTION INTRAMUSCULAR; INTRAVENOUS; SUBCUTANEOUS at 22:44

## 2020-10-12 RX ADMIN — ASPIRIN 81 MG: 81 TABLET, COATED ORAL at 08:51

## 2020-10-12 RX ADMIN — METOPROLOL TARTRATE 50 MG: 50 TABLET, FILM COATED ORAL at 22:46

## 2020-10-12 RX ADMIN — ISODIUM CHLORIDE 3 ML: 0.03 SOLUTION RESPIRATORY (INHALATION) at 08:16

## 2020-10-12 RX ADMIN — ALBUTEROL SULFATE 2.5 MG: 2.5 SOLUTION RESPIRATORY (INHALATION) at 08:15

## 2020-10-12 RX ADMIN — SPIRONOLACTONE 25 MG: 25 TABLET ORAL at 22:48

## 2020-10-12 RX ADMIN — SODIUM CHLORIDE 40 MG: 9 INJECTION, SOLUTION INTRAMUSCULAR; INTRAVENOUS; SUBCUTANEOUS at 08:50

## 2020-10-12 RX ADMIN — OXYCODONE HYDROCHLORIDE 15 MG: 5 TABLET ORAL at 14:01

## 2020-10-12 RX ADMIN — POTASSIUM CHLORIDE 10 MEQ: 7.46 INJECTION, SOLUTION INTRAVENOUS at 13:50

## 2020-10-12 RX ADMIN — LEVOFLOXACIN 500 MG: 500 TABLET, FILM COATED ORAL at 11:03

## 2020-10-12 RX ADMIN — METRONIDAZOLE 500 MG: 500 INJECTION, SOLUTION INTRAVENOUS at 14:01

## 2020-10-12 RX ADMIN — POTASSIUM CHLORIDE 10 MEQ: 7.46 INJECTION, SOLUTION INTRAVENOUS at 11:02

## 2020-10-12 RX ADMIN — MAGNESIUM SULFATE HEPTAHYDRATE 1 G: 1 INJECTION, SOLUTION INTRAVENOUS at 06:25

## 2020-10-12 RX ADMIN — POTASSIUM CHLORIDE 40 MEQ: 1.5 FOR SOLUTION ORAL at 08:50

## 2020-10-12 RX ADMIN — ALBUTEROL SULFATE 2.5 MG: 2.5 SOLUTION RESPIRATORY (INHALATION) at 19:30

## 2020-10-12 NOTE — PROGRESS NOTES
Patient is examined this morning. He was extubated yesterday. He is quite stable. He is off pressors now. He is getting tube feeds. His abdomen incision is clean and dry, as well as both groin incisions. HELGA drain output noted. We will do a bedside swallow evaluation if he does okay we will discontinue NG tube today. And try with a liquid diet first.  Hypokalemia is being replaced. We will continue albumin scheduled dosing additional more days. Continue monitor his progress. Continue pressors pulmonary care and pulmonary toiletry.

## 2020-10-12 NOTE — PROGRESS NOTES
Comprehensive Nutrition Assessment    Type and Reason for Visit: Reassess    Nutrition Recommendations/Plan:   Continue Full Lq/ NTL diet    Add Ensure Enlive TID (NTL)    Advance diet per SLP recs    Nutrition Assessment:  Admitted to ICU 2/2 aspiration event in ED. Intubated s/p procedure (10/6), self extubated (10/8). Reintubated for worsening resp status (10/10) with bronch for mucous plugging same day. Extubated 10/11. Sedation d/c with intubation; not requiring pressors. TPN ordered 10/8-11 as Standard TPN at 42mL/hr with no lipids d/t concern for bloody NG OP. TF initiated 10/11 per RD recs- achieved goal rate however d/c for extubation. SLP eval'd, rec'd advancement to Full/NTL diet today per SLP recs. RN reports limited intake, however pt tolerating; consumed ~25% of tray at lunch. Pt reports appetite fair; dislike of thickened liquids, does not like tomato soup. Agreeable to supplementation- RN reports provided spare Ensure on unit, pt consumed. Labs: H/H 10.8/31.0, Ca 8.1, K 2.7. Meds: cefepime, furosemide, levaquin, flagyl, PPI, KPhos, KCl.     AMS pta. Dx dehydration, UTI on admit. Per chart review; pt typically consumes regular texture diet at home. SLP rec'd NPO on admit. S/p cardiac cath. 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 Returned to ICU intubated s/p procedure, requiring high volume pressors. Pressors requirements reduced and d/c with extubation. TF not initiated on first intubation d/t plans for extubation. When intubated second time, pt ordered TF of Osmolite 1.2 at goal 68mL/hr continuous with 75mL free water q4hr- TF provided 1958kcal, 91g protein, 1788mL fluid (adequate to meet EENs on vent). TPN (10/8-11) provided ~50% of est needs; ordered d/t concern for GI bleed however not substantiated so d/c once TF initiated. Malnutrition Assessment:  Malnutrition Status:   Moderate malnutrition    Context:  Acute illness     Findings of the 6 clinical characteristics of malnutrition:   Energy Intake:  1 - 75% or less of est energy req for 7 or more days  Weight Loss:  Unable to assess     Body Fat Loss:  No significant body fat loss,     Muscle Mass Loss:  1 - Mild muscle mass loss, Clavicles (pectoralis & deltoids), Thigh (quadraceps)  Fluid Accumulation:  No significant fluid accumulation,        Estimated Daily Nutrient Needs:  Energy (kcal):  2000kcal (28kcal/kg)  Protein (g):  85g (1.2g/kg)(stress)       Fluid (ml/day):  1775mL (25mL/kg)    Nutrition Related Findings:  Previously noted pt thin with muscle wasting. NG d/c. No n/v. Last BM today, loose. Dysphagia noted, SLP following. Wounds:    Multiple(multiple pressure injuries, no open wounds)       Current Nutrition Therapies:  DIET FULL LIQUID 2 [place]/2 Mildly Thick    Anthropometric Measures:  · Height:  6' (182.9 cm)  · Current Body Wt:  71 kg (156 lb 8.4 oz)(10/12)   · Admission Body Wt:  115 lb 15.4 oz(pt stated)    · Usual Body Wt:   115lb 15.4oz (stated on admit)  · Ideal Body Wt:  178 lbs:  87.9 %   · BMI Category:  Normal weight (BMI 18.5-24. 9)     Wt hx: 70.1kg (10/8)  Wt gain 1kg noted since admit; possibly 2/2 fluid status.     Nutrition Diagnosis:   · Predicted inadequate energy intake related to swallowing difficulty, increased demand for energy/nutrients, catabolic illness as evidenced by swallowing study results      Nutrition Interventions:   Food and/or Nutrient Delivery: Continue current diet, Start oral nutrition supplement  Nutrition Education and Counseling: No recommendations at this time  Coordination of Nutrition Care: Continued inpatient monitoring    Goals:  Initiate means of nutrition to meet >75% EENs in 7 days (met)  Wt gain 1lb +/- 0.5lb per week (met)  Na and lytes wnl (not met)  Improved skin integrity (met)    Nutrition Monitoring and Evaluation:   Behavioral-Environmental Outcomes:  N/A  Food/Nutrient Intake Outcomes: Food and nutrient intake, Supplement intake  Physical Signs/Symptoms Outcomes: Chewing or swallowing, Skin, Weight    Discharge Planning:     Too soon to determine     Electronically signed by Ebenezer Tamez on 10/12/2020 at 4:18 PM    Contact:

## 2020-10-12 NOTE — PROGRESS NOTES
Progress Note    Patient: Antoine Enriquez MRN: [id number]  SSN: [id number]    YOB: 1965  Age: 48 y.o. Sex: male      Admit Date: 9/26/2020    LOS: 15 days     Subjective: Wife at the bedside. He is just about to be extubated. Objective:     Vitals:    10/11/20 1830 10/11/20 1938 10/11/20 1955 10/11/20 2030   BP: 112/65 114/64  126/62   Pulse: 78 79  78   Resp: 18 19  21   Temp:  97.5 °F (36.4 °C)  97.6 °F (36.4 °C)   SpO2: 100% 100% 100% 100%   Weight:       Height:            Intake and Output:  Current Shift: 10/11 1901 - 10/12 0700  In: -   Out: 240 [Urine:200; Drains:40]  Last three shifts: 10/10 0701 - 10/11 1900  In: 2779.8 [I.V.:2629.8]  Out: 6116 [Urine:3160; Drains:380]    Physical Exam:   LUNG: CTA anterolaterally  HEART: RRR, normal S1S2  ABDOMEN: soft, non-tender. Bowel sounds hypoactive. No masses,  no organomegaly  EXTREMITIES:  +1 UE an LE edema. Lab/Data Review:  Recent Results (from the past 24 hour(s))   CBC WITH AUTOMATED DIFF    Collection Time: 10/11/20  4:32 AM   Result Value Ref Range    WBC 18.9 (H) 4.1 - 11.1 K/uL    RBC 2.88 (L) 4.10 - 5.70 M/uL    HGB 8.9 (L) 12.1 - 17.0 g/dL    HCT 25.6 (L) 36.6 - 50.3 %    MCV 88.9 80.0 - 99.0 FL    MCH 30.9 26.0 - 34.0 PG    MCHC 34.8 30.0 - 36.5 g/dL    RDW 15.6 (H) 11.5 - 14.5 %    PLATELET 92 (L) 532 - 400 K/uL    MPV 11.5 8.9 - 12.9 FL    NEUTROPHILS 89 (H) 32 - 75 %    LYMPHOCYTES 3 (L) 12 - 49 %    MONOCYTES 7 5 - 13 %    EOSINOPHILS 0 0 - 7 %    BASOPHILS 1 0 - 1 %    IMMATURE GRANULOCYTES 0 0.0 - 0.5 %    ABS. NEUTROPHILS 16.9 (H) 1.8 - 8.0 K/UL    ABS. LYMPHOCYTES 0.6 (L) 0.8 - 3.5 K/UL    ABS. MONOCYTES 1.4 (H) 0.0 - 1.0 K/UL    ABS. EOSINOPHILS 0.0 0.0 - 0.4 K/UL    ABS. BASOPHILS 0.0 0.0 - 0.1 K/UL    ABS. IMM.  GRANS. 0 0.00 - 0.04 K/UL    DF AUTOMATED     METABOLIC PANEL, BASIC    Collection Time: 10/11/20  4:32 AM   Result Value Ref Range    Sodium 138 136 - 145 mmol/L    Potassium 2.7 (LL) 3.5 - 5.1 mmol/L    Chloride 104 97 - 108 mmol/L    CO2 29 21 - 32 mmol/L    Anion gap 5 5 - 15 mmol/L    Glucose 126 (H) 65 - 100 mg/dL    BUN 17 6 - 20 mg/dL    Creatinine 0.68 (L) 0.70 - 1.30 mg/dL    BUN/Creatinine ratio 25 (H) 12 - 20      GFR est AA >60 >60 ml/min/1.73m2    GFR est non-AA >60 >60 ml/min/1.73m2    Calcium 7.6 (L) 8.5 - 10.1 mg/dL   BLOOD GAS, ARTERIAL    Collection Time: 10/11/20  5:56 AM   Result Value Ref Range    pH 7.53 (H) 7.35 - 7.45      PCO2 30 (L) 35 - 45 mmHg    PO2 83 75 - 100 mmHg    O2 SAT 99 >95 %    BICARBONATE 27 (H) 22 - 26 mmol/L    BASE EXCESS 2.4 (H) 0 - 2 mmol/L    FIO2 40 %    Tidal volume 500      EPAP/CPAP/PEEP 5      Sample source Arterial      SITE Right Radial      ENIO'S TEST YES     TYPE & SCREEN    Collection Time: 10/11/20  3:20 PM   Result Value Ref Range    Crossmatch Expiration 10/14/2020,2359     ABO/Rh(D) O Positive     Antibody screen Negative     Unit number [id number]     Blood component type RC LR     Unit division 00     Status of unit Issued     [address] to transfuse     Crossmatch result Compatible              Assessment:     Active Problems:    Metabolic encephalopathy (1/71/6365)      UTI (urinary tract infection) (9/26/2020)      Aspiration pneumonitis ([address] (9/26/2020)      Essential hypertension (9/26/2020)      Acute dehydration (9/26/2020)        Plan:     1. PAD: unchanged. 2. Hypotensive on pressors. Pressors weaned. 3. Nonobstructive CAD: 50% disease to the left cx. Continue ECASA.lovenox for DVT.    4. NSVT: none this weekend.   Replete K    Signed By: Carlos Kruse MD     October 11, 2020

## 2020-10-12 NOTE — PROGRESS NOTES
Bedside shift change report given to Tree RN (oncoming nurse) by Katie Ram RN (offgoing nurse). Report included the following information SBAR.

## 2020-10-12 NOTE — PROGRESS NOTES
Lab called to report a potassium of 2.7. Kamaljit Perez informed and he ordered potassium 10 meq/L 6 doses and Magnesium sulfate 1 gm 1 dose.

## 2020-10-12 NOTE — PROGRESS NOTES
Renal Progress Note    Patient: Elmer Sepulveda MRN: [id number]  SSN: [id number]    YOB: 1965  Age: 48 y.o. Sex: male      Admit Date: 9/26/2020    LOS: 16 days     Subjective:   Patient seen at bedside. Alert and awake, no acute distress. Patient is feeling better,still has swelling in LEs, on diuretics  No complaints of shortness of breath.    K is low at 2.7        Current Facility-Administered Medications   Medication Dose Route Frequency    potassium chloride 10 mEq in 100 ml IVPB  10 mEq IntraVENous Q1H    albumin human 25% (BUMINATE) solution 12.5 g  12.5 g IntraVENous BID    furosemide (LASIX) injection 40 mg  40 mg IntraVENous DAILY    potassium chloride (KLOR-CON) packet for solution 40 mEq  40 mEq Per NG tube DAILY    fentaNYL (PF) 1,500 mcg/30 mL (50 mcg/mL) infusion  75 mcg/hr IntraVENous TITRATE    cefepime (MAXIPIME) 1 g in 0.9% sodium chloride (MBP/ADV) 50 mL MBP  1 g IntraVENous Q8H    metroNIDAZOLE (FLAGYL) IVPB premix 500 mg  500 mg IntraVENous Q8H    albuterol CONCENTRATE 2.5mg/0.5 mL neb soln  2.5 mg Nebulization Q6H RT    LORazepam (ATIVAN) injection 1 mg  1 mg IntraVENous Q2H PRN    dexmedeTOMidine (PRECEDEX) 400 mcg in 0.9% sodium chloride 100 mL infusion  0.2-1.4 mcg/kg/hr IntraVENous TITRATE    sodium chloride 0.9% (NS) 3ml nebulizer solution  2.5 mL Nebulization PRN    midazolam (PF) (VERSED) injection 4 mg  4 mg IntraVENous Q2H PRN    hydrocortisone (CORTENEMA) 100 mg/60 mL enema 100 mg  100 mg Rectal Q12H    NOREPINephrine (LEVOPHED) 8 mg in 5% dextrose 250mL (32 mcg/mL) infusion  2 mcg/min IntraVENous TITRATE    vasopressin (VASOSTRICT) 20 Units in 0.9% sodium chloride 100 mL infusion  0.04 Units/min IntraVENous CONTINUOUS    PHENYLephrine (ALISTAIR-SYNEPHRINE) 30 mg in 0.9% sodium chloride 250 mL infusion   mcg/min IntraVENous CONTINUOUS    pantoprazole (PROTONIX) 40 mg in 0.9% sodium chloride 10 mL injection  40 mg IntraVENous Q12H    influenza vaccine 2020-21 (4 yrs+)(PF) (FLUCELVAX QUAD) injection 0.5 mL  0.5 mL IntraMUSCular PRIOR TO DISCHARGE    diphenhydrAMINE (BENADRYL) capsule 50 mg  50 mg Oral BID PRN    phosphorus (K PHOS NEUTRAL) 250 mg tablet 2 Tab  2 Tab Oral BID    levoFLOXacin (LEVAQUIN) tablet 500 mg  500 mg Oral Q24H    predniSONE (DELTASONE) tablet 20 mg  20 mg Oral DAILY WITH BREAKFAST    oxyCODONE IR (ROXICODONE) tablet 15 mg  15 mg Oral Q4H PRN    metoprolol tartrate (LOPRESSOR) tablet 50 mg  50 mg Oral BID    enoxaparin (LOVENOX) injection 40 mg  40 mg SubCUTAneous Q24H    aspirin delayed-release tablet 81 mg  81 mg Oral DAILY    acetaminophen (TYLENOL) tablet 650 mg  650 mg Oral Q6H PRN    ondansetron (ZOFRAN) injection 4 mg  4 mg IntraVENous Q8H PRN    guaiFENesin (ROBITUSSIN) 20 mg/mL oral liquid 400 mg  400 mg Oral Q6H PRN        Vitals:    10/12/20 1000 10/12/20 1100 10/12/20 1200 10/12/20 1414   BP: (!) 110/56 131/85 100/67    Pulse: 94 84 84    Resp: 24 21 22    Temp:  97.7 °F (36.5 °C)     SpO2: 100% 100% 100% 100%   Weight:       Height:         Objective:   General: alert awake well-oriented, no acute distress. HEENT: EOMI, no Icterus, no Pallor,  mucosa moist.  Neck: Neck is supple, No JVD  Lungs: decreased breathsounds at bases, no respiratory distress on inspection '  CVS: heart sounds normal,  no murmurs, no rubs. GI: soft, nontender, normal BS. Extremeties: no cyanosis,1-2+ dependent edema in ext   Neuro: Alert, awake, oriented x3, answering simple questions appropriately  Skin: normal skin turgor, no skin rashes.         Intake and Output:  Current Shift: 10/12 0701 - 10/12 1900  In: -   Out: 70 [Drains:70]  Last three shifts: 10/10 1901 - 10/12 0700  In: 1441.1 [I.V.:728.9]  Out: 6023 [Urine:2340; Drains:580]      Lab/Data Review:  Recent Labs     10/12/20  0400 10/11/20  0432 10/10/20  0521   WBC 20.0* 18.9* 19.7*   HGB 10.8* 8.9* 8.2*   HCT 31.0* 25.6* 23.4*   * 92* 58*     Recent Labs 10/12/20  0400 10/11/20  0432 10/10/20  0521    138 140   K 2.7* 2.7* 2.9*    104 105   CO2 30 29 27   GLU 96 126* 108*   BUN 17 17 14   CREA 0.61* 0.68* 0.69*   CA 8.1* 7.6* 7.8*     Recent Labs     10/11/20  0556 10/10/20  0440   PH 7.53* 7.524*   PCO2 30* 32*   PO2 83 49*   HCO3 27* 28*   FIO2 40 40.0     Recent Results (from the past 24 hour(s))   TYPE & SCREEN    Collection Time: 10/11/20  3:20 PM   Result Value Ref Range    Crossmatch Expiration 10/14/2020,2359     ABO/Rh(D) O Positive     Antibody screen Negative     Unit number [id number]     Blood component type RC LR     Unit division 00     Status of unit Issued,final     TRANSFUSION STATUS Ok to transfuse     Crossmatch result Compatible    CBC WITH AUTOMATED DIFF    Collection Time: 10/12/20  4:00 AM   Result Value Ref Range    WBC 20.0 (H) 4.1 - 11.1 K/uL    RBC 3.50 (L) 4.10 - 5.70 M/uL    HGB 10.8 (L) 12.1 - 17.0 g/dL    HCT 31.0 (L) 36.6 - 50.3 %    MCV 88.6 80.0 - 99.0 FL    MCH 30.9 26.0 - 34.0 PG    MCHC 34.8 30.0 - 36.5 g/dL    RDW 15.3 (H) 11.5 - 14.5 %    PLATELET 998 (L) 453 - 400 K/uL    MPV 10.8 8.9 - 12.9 FL    NRBC 5.8 (H) 0  WBC    ABSOLUTE NRBC 1.24 (H) 0.00 - 0.01 K/uL    NEUTROPHILS 89 (H) 32 - 75 %    LYMPHOCYTES 5 (L) 12 - 49 %    MONOCYTES 5 5 - 13 %    EOSINOPHILS 0 0 - 7 %    BASOPHILS 0 0 - 1 %    IMMATURE GRANULOCYTES 1 (H) 0.0 - 0.5 %    ABS. NEUTROPHILS 17.8 (H) 1.8 - 8.0 K/UL    ABS. LYMPHOCYTES 1.0 0.8 - 3.5 K/UL    ABS. MONOCYTES 1.0 0.0 - 1.0 K/UL    ABS. EOSINOPHILS 0.0 0.0 - 0.4 K/UL    ABS. BASOPHILS 0.0 0.0 - 0.1 K/UL    ABS. IMM.  GRANS. 0.2 K/UL    DF AUTOMATED      NRBC 6.0  WBC    ABSOLUTE NRBC 1.20 K/uL    RBC COMMENTS Polychromasia  1+       METABOLIC PANEL, BASIC    Collection Time: 10/12/20  4:00 AM   Result Value Ref Range    Sodium 141 136 - 145 mmol/L    Potassium 2.7 (LL) 3.5 - 5.1 mmol/L    Chloride 106 97 - 108 mmol/L    CO2 30 21 - 32 mmol/L    Anion gap 5 5 - 15 mmol/L Glucose 96 65 - 100 mg/dL    BUN 17 6 - 20 mg/dL    Creatinine 0.61 (L) 0.70 - 1.30 mg/dL    BUN/Creatinine ratio 28 (H) 12 - 20      GFR est AA >60 >60 ml/min/1.73m2    GFR est non-AA >60 >60 ml/min/1.73m2    Calcium 8.1 (L) 8.5 - 10.1 mg/dL        Assessment and Plan:       1. severe hypokalemia:  -from diuretics and metabolic alkalosis  Will give kcl 10 meqx3 runs today  Will add spironolactone 25 bid  Decrease lasix to 40 mg daily unril K is corrected  -continue PO K supplements and will monitor K levels, will check Mg tomorrow    2. Low urine output :  - likely prerenal from hypotension.    -improved with diuretics now      3. Edema: still significant edema+  -good diuresis +, continue once daily lasix     4. .  Metabolic alkalosis: sec to diuretics  Will consider adding diamox if persistent    5.  Hypotension: borderline low Bps+  Started on IV albumin as per vascular, will add midodrine 10 mg po bid  Continue to monitor BPs        #5 status post  infrarenal aorta endarterectomy, with aortic by common femoral artery bypass with 14 mm x 7 mm Hemosure graft         Signed By: Janine Argueta MD     October 12, 2020

## 2020-10-12 NOTE — PROGRESS NOTES
D/C Plan:    -d/c home  -PCP follow up  -[facility] via [address] spoke w/spouse Mrs. Romero. Patient identifier's (name, , and four digit security code) verified per HIPAA policy. Sw identified self, role, and nature of call. Ailyn Gutierrez of the call re: discharge plan. Informed spouse d/c recommendation is for SNF or IRF. Spouse graciously declined SNF/IRF. Desires to stick w/original d/c plan of HH. Patient has been accepted for [facility] via [facility]. SOC x 24-48 hours after discharge.        Saba Mahan, MSW

## 2020-10-12 NOTE — PROGRESS NOTES
Hospitalist Progress Note           Daily Progress Note: 10/12/2020    Chief complaint: Shortness of breath and weakness  Subjective: The patient is seen for follow  up. Postoperative day #5 status post infrarenal aorta endarterectomy. Reportedly hypotensive postprocedure requiring 3 vasopressors. Off vasopressors  Patient had bronchoscopy done 10/10 with reexpansion of left lung.   Extubated 10/11 and now on nasal oxygen  Wife at bedside during this evaluation  Problem List:  Problem List as of 10/12/2020 Date Reviewed: 9/17/2020          Codes Class Noted - Resolved    Metabolic encephalopathy QFE-78-VC: G93.41  ICD-9-CM: 348.31  9/26/2020 - Present        UTI (urinary tract infection) ICD-10-CM: N39.0  ICD-9-CM: 599.0  9/26/2020 - Present        Aspiration pneumonitis ([address] ICD-10-CM: J69.0  ICD-9-CM: 507.0  9/26/2020 - Present        Essential hypertension ICD-10-CM: I10  ICD-9-CM: 401.9  9/26/2020 - Present        Acute dehydration ICD-10-CM: E86.0  ICD-9-CM: 276.51  9/26/2020 - Present              Medications reviewed  Current Facility-Administered Medications   Medication Dose Route Frequency    potassium chloride 10 mEq in 100 ml IVPB  10 mEq IntraVENous Q1H    albumin human 25% (BUMINATE) solution 12.5 g  12.5 g IntraVENous BID    furosemide (LASIX) injection 40 mg  40 mg IntraVENous DAILY    potassium chloride (KLOR-CON) packet for solution 40 mEq  40 mEq Per NG tube DAILY    fentaNYL (PF) 1,500 mcg/30 mL (50 mcg/mL) infusion  75 mcg/hr IntraVENous TITRATE    cefepime (MAXIPIME) 1 g in 0.9% sodium chloride (MBP/ADV) 50 mL MBP  1 g IntraVENous Q8H    metroNIDAZOLE (FLAGYL) IVPB premix 500 mg  500 mg IntraVENous Q8H    albuterol CONCENTRATE 2.5mg/0.5 mL neb soln  2.5 mg Nebulization Q6H RT    LORazepam (ATIVAN) injection 1 mg  1 mg IntraVENous Q2H PRN    dexmedeTOMidine (PRECEDEX) 400 mcg in 0.9% sodium chloride 100 mL infusion  0.2-1.4 mcg/kg/hr IntraVENous TITRATE  sodium chloride 0.9% (NS) 3ml nebulizer solution  2.5 mL Nebulization PRN    midazolam (PF) (VERSED) injection 4 mg  4 mg IntraVENous Q2H PRN    hydrocortisone (CORTENEMA) 100 mg/60 mL enema 100 mg  100 mg Rectal Q12H    NOREPINephrine (LEVOPHED) 8 mg in 5% dextrose 250mL (32 mcg/mL) infusion  2 mcg/min IntraVENous TITRATE    vasopressin (VASOSTRICT) 20 Units in 0.9% sodium chloride 100 mL infusion  0.04 Units/min IntraVENous CONTINUOUS    PHENYLephrine (ALISTAIR-SYNEPHRINE) 30 mg in 0.9% sodium chloride 250 mL infusion   mcg/min IntraVENous CONTINUOUS    pantoprazole (PROTONIX) 40 mg in 0.9% sodium chloride 10 mL injection  40 mg IntraVENous Q12H    influenza vaccine 2020-21 (4 yrs+)(PF) (FLUCELVAX QUAD) injection 0.5 mL  0.5 mL IntraMUSCular PRIOR TO DISCHARGE    diphenhydrAMINE (BENADRYL) capsule 50 mg  50 mg Oral BID PRN    phosphorus (K PHOS NEUTRAL) 250 mg tablet 2 Tab  2 Tab Oral BID    levoFLOXacin (LEVAQUIN) tablet 500 mg  500 mg Oral Q24H    predniSONE (DELTASONE) tablet 20 mg  20 mg Oral DAILY WITH BREAKFAST    oxyCODONE IR (ROXICODONE) tablet 15 mg  15 mg Oral Q4H PRN    metoprolol tartrate (LOPRESSOR) tablet 50 mg  50 mg Oral BID    enoxaparin (LOVENOX) injection 40 mg  40 mg SubCUTAneous Q24H    aspirin delayed-release tablet 81 mg  81 mg Oral DAILY    acetaminophen (TYLENOL) tablet 650 mg  650 mg Oral Q6H PRN    ondansetron (ZOFRAN) injection 4 mg  4 mg IntraVENous Q8H PRN    guaiFENesin (ROBITUSSIN) 20 mg/mL oral liquid 400 mg  400 mg Oral Q6H PRN       Review of Systems:   Review of systems unable to be obtained because he is intubated    Objective:   Physical Exam:     Visit Vitals  BP (!) 110/56   Pulse 94   Temp 97.8 °F (36.6 °C)   Resp 24   Ht 6' (1.829 m)   Wt 71 kg (156 lb 8.4 oz)   SpO2 100%   BMI 21.23 kg/m²    O2 Flow Rate (L/min): 3 l/min O2 Device: Nasal cannula    Temp (24hrs), Av.3 °F (36.3 °C), Min:96.8 °F (36 °C), Max:97.8 °F (36.6 °C)    10/12 0701 - 10/12 1900  In: -   Out: 79 [Drains:70]   10/10 1901 - 10/12 0700  In: 1441.1 [I.V.:728.9]  Out: 4058 [Urine:2340; Drains:580]    General:  awake and conversant, cooperative, no distress, appears older than stated age. Lungs:   Clear to auscultation bilaterally with diminished breath sounds bilateral bases. Chest wall:  No tenderness or deformity. Heart:  Regular rate and rhythm, S1, S2 normal, no murmur, click, rub or gallop. Abdomen:   Soft, non-tender. Diminished Bowel sounds. Dressings in place. Extremities: Extremities normal, atraumatic, positive edema bilaterally   Pulses: 2+ and symmetric all extremities. Skin: Skin color, texture, turgor normal. No rashes or lesions   Neurologic: CNII-XII intact.      Data Review:       Recent Days:  Recent Labs     10/12/20  0400 10/11/20  0432 10/10/20  0521   WBC 20.0* 18.9* 19.7*   HGB 10.8* 8.9* 8.2*   HCT 31.0* 25.6* 23.4*   * 92* 58*     Recent Labs     10/12/20  0400 10/11/20  0432 10/10/20  0521    138 140   K 2.7* 2.7* 2.9*    104 105   CO2 30 29 27   GLU 96 126* 108*   BUN 17 17 14   CREA 0.61* 0.68* 0.69*   CA 8.1* 7.6* 7.8*     Recent Labs     10/11/20  0556 10/10/20  0440   PH 7.53* 7.524*   PCO2 30* 32*   PO2 83 49*   HCO3 27* 28*   FIO2 40 40.0       24 Hour Results:  Recent Results (from the past 24 hour(s))   TYPE & SCREEN    Collection Time: 10/11/20  3:20 PM   Result Value Ref Range    Crossmatch Expiration 10/14/2020,0088     ABO/Rh(D) O Positive     Antibody screen Negative     Unit number [id number]     Blood component type RC LR     Unit division 00     Status of unit Issued,final     TRANSFUSION STATUS Ok to transfuse     Crossmatch result Compatible    CBC WITH AUTOMATED DIFF    Collection Time: 10/12/20  4:00 AM   Result Value Ref Range    WBC 20.0 (H) 4.1 - 11.1 K/uL    RBC 3.50 (L) 4.10 - 5.70 M/uL    HGB 10.8 (L) 12.1 - 17.0 g/dL    HCT 31.0 (L) 36.6 - 50.3 %    MCV 88.6 80.0 - 99.0 FL    MCH 30.9 26.0 - 34.0 PG MCHC 34.8 30.0 - 36.5 g/dL    RDW 15.3 (H) 11.5 - 14.5 %    PLATELET 647 (L) 905 - 400 K/uL    MPV 10.8 8.9 - 12.9 FL    NRBC 5.8 (H) 0  WBC    ABSOLUTE NRBC 1.24 (H) 0.00 - 0.01 K/uL    NEUTROPHILS 89 (H) 32 - 75 %    LYMPHOCYTES 5 (L) 12 - 49 %    MONOCYTES 5 5 - 13 %    EOSINOPHILS 0 0 - 7 %    BASOPHILS 0 0 - 1 %    IMMATURE GRANULOCYTES 1 (H) 0.0 - 0.5 %    ABS. NEUTROPHILS 17.8 (H) 1.8 - 8.0 K/UL    ABS. LYMPHOCYTES 1.0 0.8 - 3.5 K/UL    ABS. MONOCYTES 1.0 0.0 - 1.0 K/UL    ABS. EOSINOPHILS 0.0 0.0 - 0.4 K/UL    ABS. BASOPHILS 0.0 0.0 - 0.1 K/UL    ABS. IMM. GRANS. 0.2 K/UL    DF AUTOMATED      NRBC 6.0  WBC    ABSOLUTE NRBC 1.20 K/uL    RBC COMMENTS Polychromasia  1+       METABOLIC PANEL, BASIC    Collection Time: 10/12/20  4:00 AM   Result Value Ref Range    Sodium 141 136 - 145 mmol/L    Potassium 2.7 (LL) 3.5 - 5.1 mmol/L    Chloride 106 97 - 108 mmol/L    CO2 30 21 - 32 mmol/L    Anion gap 5 5 - 15 mmol/L    Glucose 96 65 - 100 mg/dL    BUN 17 6 - 20 mg/dL    Creatinine 0.61 (L) 0.70 - 1.30 mg/dL    BUN/Creatinine ratio 28 (H) 12 - 20      GFR est AA >60 >60 ml/min/1.73m2    GFR est non-AA >60 >60 ml/min/1.73m2    Calcium 8.1 (L) 8.5 - 10.1 mg/dL           Assessment/     Acute hypoxic respiratory failure postsurgery. Self extubated 10/09/20. Reintubated 10/10 due to hypoxemia. Extubated 10/11    Acute kidney injury likely secondary to ATN from hypotension. Improved    Hypovolemic shock post surgery    Right lower lobe aspiration pneumonia improved    Urinary tract infection due to E. Coli    Abnormal Cardiolite stress test showing inferior ischemia. 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  Repleted    Hypothermia, probably largely environmental.  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 Plan:  Continue supportive care including abtics  Monitor electrolytes closely  Begin oral feeds if she passes swallow eval  Consider transfer to floor when off vasopressors  Will need aggressive PT OT once extubated  Clinical updates provided to wife at [address] [facility] discussed with: Patient/Family    Total time spent with patient: 30 minutes.     Rubi Ruiz MD

## 2020-10-12 NOTE — PROGRESS NOTES
SPEECH LANGUAGE PATHOLOGY BEDSIDE SWALLOW EVALUATIONS  Patient: Martina Cheadle (41 y.o. male)  Date: 10/12/2020  Primary Diagnosis: Metabolic encephalopathy [O57.46]  Procedure(s) (LRB):  Aorta Bifemoral Bypass (N/A) 6 Days Post-Op   Precautions: fall, aspiration    ASSESSMENT :  Based on the objective data described below, the patient presents with on-going pharyngeal dysphagia w/ possible negative impact of recent intubation. Patient continues w/ mild delay in swallow initiation (3-5 seconds), reduced hyolaryngeal excursion and protraction and need for multiple swallows. Clinical indicators of penetration/aspiration present w/ ice chip and thin liquid trials c/b throat clearing after the swallow. Reduced clinical indicators of aspiration/penetration noted w/ nectar thick trials via spoon and cup. Patient will benefit from skilled intervention to address the above impairments. Patients rehabilitation potential is considered to be Good     PLAN :  Recommendations and Planned Interventions:  Remove NG and initiate a Full, NECTAR thick diet. Trials of diet upgrades w/ SLP, however limited s/t baseline dysphagia. Aspiration precautions. May need PEG due to aspiration risk and reduced ability to meet nutritional needs. Frequency/Duration: Patient will be followed by speech-language pathology 5 times a week to address goals. Discharge Recommendations: To Be Determined     SUBJECTIVE:   Patient seen at bedside in the ICU. NG in place. Wife present in room. Patient states \" I want this thing out of my nose. \"    OBJECTIVE:     CXR Results  (Last 48 hours)                 10/11/20 1147  XR CHEST SNGL V Final result    Impression:  IMPRESSION: Breathing left basilar airspace disease. Narrative:  AP views of the chest compared to prior exam also dated 10/11/2020. Endotracheal   tube and enteric tube and central venous catheter remain in place.  Surgical   drain at the left lung base or left upper quadrant of the abdomen is also again   seen. A skin fold mimics a right-sided pneumothorax. No actual pneumothorax is   seen and is no evidence of pleural effusion. Left-sided airspace disease seen on   the prior exam is less prominent on this exam.           10/11/20 0320  XR CHEST PORT Final result    Impression:  IMPRESSION: Significant reexpansion of the left lung with persistent airspace   disease throughout much of the left lower lobe. Narrative:  AP upright view of the chest compared to prior exam dated 10/10/2020. There is   much improved aeration of the left lung. There is a prominent area of persistent   left lower lobe airspace disease. Small left pleural effusion is also suspected. The right lung remains clear and well-expanded. Heart size is normal.   Endotracheal tube and enteric tube, PICC line and surgical drain again noted.                   CT Results  (Last 48 hours)      None             Past Medical History:   Diagnosis Date    Myositis     Familial inclusion      Past Surgical History:   Procedure Laterality Date    HX ORTHOPAEDIC      disc infused, neck      Prior Level of Function/Home Situation:   Home Situation  Home Environment: Private residence  # Steps to Enter: 4  Rails to Enter: Yes  Hand Rails : Bilateral  Wheelchair Ramp: No  One/Two Story Residence: Two story  # of Interior Steps: (stair lift)  Living Alone: No  Support Systems: Spouse/Significant Other/Partner  Patient Expects to be Discharged to[de-identified] Private residence  Current DME Used/Available at Home: [employer] aides, Commode, bedside, Transfer bench, Walker, rollator, Walker, rolling, Other (comment)(stair lift)  Diet prior to admission: Patient was consuming a ground diet and nectar liquids prior to surgery and ICU transfer  Current Diet: NPO, NG   Cognitive and Communication Status:  Neurologic State: Alert  Orientation Level: Oriented to person, Oriented to situation, Oriented to place  Cognition: Follows commands Safety/Judgement: Decreased awareness of need for safety  Swallowing Evaluation:   Oral Assessment:  Oral Assessment  Labial: No impairment  Dentition: Intact  Lingual: Decreased rate; Incoordinated  P.O. Trials:  Patient Position: upright in bed  Vocal quality prior to P.O.: No impairment  Consistency Presented: Ice chips; Thin liquid; Nectar thick liquid  How Presented: Spoon;Cup/sip;SLP-fed/presented     Bolus Acceptance: No impairment  Bolus Formation/Control: No impairment     Propulsion: No impairment  Oral Residue: None  Initiation of Swallow: Delayed (# of seconds)  Laryngeal Elevation: Decreased  Aspiration Signs/Symptoms: Delayed cough/throat clear  Pharyngeal Phase Characteristics: Multiple swallows; Suspected pharyngeal residue  Effective Modifications: Double swallow          Oral Phase Severity: Minimal  Pharyngeal Phase Severity : Mild-moderate     Vocal Quality: No impairment    Pain:  Pain Scale 1: Numeric (0 - 10)  Pain Intensity 1: 6       After treatment:   Patient left in no apparent distress in bed    COMMUNICATION/EDUCATION:   Patient was educated regarding purpose of SLP assessment, POC, diet recs and sw safety precautions. Patient demonstrated Good understanding as evidenced by verbal understanding. The patient's plan of care including recommendations, planned interventions, and recommended diet changes were discussed with: Registered nurse. Patient/family have participated as able in goal setting and plan of care. Thank you for this referral.  Trice Pantoja M.S. CCC-SLP  Time Calculation: 20 mins      Problem: Dysphagia (Adult)  Goal: *Acute Goals and Plan of Care (Insert Text)  Description: Speech Therapy Goals  Initiated 9/28/2020  -Patient will participate in modified barium swallow study if indicated pending pt's progress.          [ ] Not met  Fabienne.Barbara ]  MET   [ ] Progressing  [ ] Nick Francis  -Patient will tolerate dysphagia 2 diet with nectar thick liquids without signs/symptoms of aspiration given moderate cues within 4 day(s). [ ] Not met  [ Jerod Nuñez  MET   [ ] Progressing  [ ] Darci Arguello  -Patient will demonstrate understanding of swallow safety precautions and aspiration precautions, diet recs with moderate cues within 7 day(s).         [ ] Not met  [ ]  MET   [ x] Progressing  [ ] Darci Arguello  -Patient will tolerate full NECTAR thick diet w/ reduced s/sx of aspiration and penetration given moderate cues within 5-7 days      Outcome: Not Progressing Towards Goal

## 2020-10-12 NOTE — PROGRESS NOTES
CARDIOLOGY PROGRESS NOTE    Patient seen and examined. This is a patient who is followed for Cardiac Clearance from aorta bifemoal bypass. Progressing well, pain is managed, tolerated extubation without complications. No family at bedside. No other complaints reported. Telemetry reviewed, there were no events noted in the past 24 hours. NSR with occasional PAC, no further NSVT. Pertinent review of systems items noted above, all other systems are negative. Current medications reviewed. Physical Examination  Vital signs are stable. Blood pressure 100/67, Pulse 84  Extubated, cooperative, No apparent distress. Heart is regular, rate and rhythm. Normal S1, S2, no murmurs are appreciated. Lungs are clear to diminished bilaterally, 2L NC  Abdomen is soft, nontender, normal bowel sounds. Extremities have  +2 edema    Labs reviewed:   Recent Results (from the past 12 hour(s))   CBC WITH AUTOMATED DIFF    Collection Time: 10/12/20  4:00 AM   Result Value Ref Range    WBC 20.0 (H) 4.1 - 11.1 K/uL    RBC 3.50 (L) 4.10 - 5.70 M/uL    HGB 10.8 (L) 12.1 - 17.0 g/dL    HCT 31.0 (L) 36.6 - 50.3 %    MCV 88.6 80.0 - 99.0 FL    MCH 30.9 26.0 - 34.0 PG    MCHC 34.8 30.0 - 36.5 g/dL    RDW 15.3 (H) 11.5 - 14.5 %    PLATELET 524 (L) 983 - 400 K/uL    MPV 10.8 8.9 - 12.9 FL    NRBC 5.8 (H) 0  WBC    ABSOLUTE NRBC 1.24 (H) 0.00 - 0.01 K/uL    NEUTROPHILS 89 (H) 32 - 75 %    LYMPHOCYTES 5 (L) 12 - 49 %    MONOCYTES 5 5 - 13 %    EOSINOPHILS 0 0 - 7 %    BASOPHILS 0 0 - 1 %    IMMATURE GRANULOCYTES 1 (H) 0.0 - 0.5 %    ABS. NEUTROPHILS 17.8 (H) 1.8 - 8.0 K/UL    ABS. LYMPHOCYTES 1.0 0.8 - 3.5 K/UL    ABS. MONOCYTES 1.0 0.0 - 1.0 K/UL    ABS. EOSINOPHILS 0.0 0.0 - 0.4 K/UL    ABS. BASOPHILS 0.0 0.0 - 0.1 K/UL    ABS. IMM.  GRANS. 0.2 K/UL    DF AUTOMATED      NRBC 6.0  WBC    ABSOLUTE NRBC 1.20 K/uL    RBC COMMENTS Polychromasia  1+       METABOLIC PANEL, BASIC    Collection Time: 10/12/20  4:00 AM   Result Value Ref Range    Sodium 141 136 - 145 mmol/L    Potassium 2.7 (LL) 3.5 - 5.1 mmol/L    Chloride 106 97 - 108 mmol/L    CO2 30 21 - 32 mmol/L    Anion gap 5 5 - 15 mmol/L    Glucose 96 65 - 100 mg/dL    BUN 17 6 - 20 mg/dL    Creatinine 0.61 (L) 0.70 - 1.30 mg/dL    BUN/Creatinine ratio 28 (H) 12 - 20      GFR est AA >60 >60 ml/min/1.73m2    GFR est non-AA >60 >60 ml/min/1.73m2    Calcium 8.1 (L) 8.5 - 10.1 mg/dL       Case discussed with Dr. Radha Peoples and our impression and recommendations are as follows:  1. Pre-op risk stratification: Progressing well; was deemed moderate risk for vascular surgery. Echo with normal EF. NST abnormal with a moderate area of ischemia. Cardiac last week shows nonobstructive CAD. 2. PAD: Continue ASA and zetia. Will require follow up with OP vascular surgery. 3. Hypotension: No longer requiring pressors. Splenic tear stabalized. WBC has increased; continue to monitor for sepsis. No changes in echocardiogram.   4. Nonobstructive CAD: 50% disease to the left cx. He is asymptomatic currently but unable to walk due to his PAD. Will monitor symptoms after surgery. Continue ASA and zetia. 5. NSVT: Improved, likely for hypokalemia;  Please keep serum potassium between 4-5 and serum magnesium > 2. Aggressively repleated today. Please do not hesitate to call me or Dr. Radha Peoples if additional questions arise.

## 2020-10-12 NOTE — PROGRESS NOTES
Pulmonology and Critical Care Progress Note    Subjective:     Chief Complaint:   Chief Complaint   Patient presents with    Altered mental status      Patient seen and examined at the bedside this afternoon. The patient underwent surgery, aortobifemoral bypass surgery 10/6/20. Post surgery he was left on the ventilator. Apparently blood loss was about 3.5 L. When he returned to the ICU he was on multiple pressors. Patient self-extubated on 10/9/20 early in the morning and was not doing well with ventimask, therefore was switched to BiPAP in the evening. Chest x-ray yesterday demonstrated complete left lung opacification due to mucous plugging. He is status post flexible bronchoscopy with suctioning of mucous plug and successful reinflation of left lung. Patient was successfully extubated. Patient is off pressors now. Cardiac catheterization was done on 10/5. Noted. His nuclear medicine cardiac cath showed ischemia of the inferior wall. Modified barium swallow test showed issues of dysphagia. At bedside today patient is alert and responding appropriately. He is overall weak. Wife present at bedside. Review of Systems:  Has chronic Reese catheter placement.   Had mild abdominal discomfort otherwise doing well    Current Facility-Administered Medications   Medication Dose Route Frequency Provider Last Rate Last Dose    potassium chloride 10 mEq in 100 ml IVPB  10 mEq IntraVENous Q1H Betty Olson  mL/hr at 10/12/20 0955 10 mEq at 10/12/20 0955    albumin human 25% (BUMINATE) solution 12.5 g  12.5 g IntraVENous BID Betty Olson MD   12.5 g at 10/12/20 0850    furosemide (LASIX) injection 40 mg  40 mg IntraVENous DAILY Asaf Alves MD   40 mg at 10/12/20 0851    potassium chloride (KLOR-CON) packet for solution 40 mEq  40 mEq Per NG tube DAILY Asaf Alves MD   40 mEq at 10/12/20 0850    fentaNYL (PF) 1,500 mcg/30 mL (50 mcg/mL) infusion  75 mcg/hr IntraVENous TITRATE Kathy Dick MD 0.8 mL/hr at 10/11/20 1520 40 mcg/hr at 10/11/20 1520    cefepime (MAXIPIME) 1 g in 0.9% sodium chloride (MBP/ADV) 50 mL MBP  1 g IntraVENous Q8H Johnny Santiago,  mL/hr at 10/12/20 0217 1 g at 10/12/20 0217    metroNIDAZOLE (FLAGYL) IVPB premix 500 mg  500 mg IntraVENous Q8H Johnny Santiago,  mL/hr at 10/12/20 0618 500 mg at 10/12/20 0618    albuterol CONCENTRATE 2.5mg/0.5 mL neb soln  2.5 mg Nebulization Q6H RT Johnny Santiago, DO   2.5 mg at 10/12/20 0815    LORazepam (ATIVAN) injection 1 mg  1 mg IntraVENous Q2H PRN Love Olson MD        dexmedeTOMidine [facility]) 400 mcg in 0.9% sodium chloride 100 mL infusion  0.2-1.4 mcg/kg/hr IntraVENous TITRATE Love Olson MD 5.3 mL/hr at 10/11/20 1200 0.3 mcg/kg/hr at 10/11/20 1200    sodium chloride 0.9% (NS) 3ml nebulizer solution  2.5 mL Nebulization PRN Jose Dubon MD   2.5 mL at 10/10/20 1148    midazolam (PF) (VERSED) injection 4 mg  4 mg IntraVENous Q2H PRN Love Olson MD   4 mg at 10/10/20 1430    hydrocortisone (CORTENEMA) 100 mg/60 mL enema 100 mg  100 mg Rectal Q12H Love Olson MD   100 mg at 10/12/20 1298    NOREPINephrine (LEVOPHED) 8 mg in 5% dextrose 250mL (32 mcg/mL) infusion  2 mcg/min IntraVENous TITRATE Love Olson MD        vasopressin (VASOSTRICT) 20 Units in 0.9% sodium chloride 100 mL infusion  0.04 Units/min IntraVENous CONTINUOUS Love Olson MD        PHENYLephrine (ALISTAIR-SYNEPHRINE) 30 mg in 0.9% sodium chloride 250 mL infusion   mcg/min IntraVENous CONTINUOUS Love Olson MD 50 mL/hr at 10/07/20 0050 100 mcg/min at 10/07/20 0050    pantoprazole (PROTONIX) 40 mg in 0.9% sodium chloride 10 mL injection  40 mg IntraVENous Q12H Jose Dubon MD   40 mg at 10/12/20 0850    influenza vaccine 2020-21 (4 yrs+)(PF) (FLUCELVAX QUAD) injection 0.5 mL  0.5 mL IntraMUSCular PRIOR TO DISCHARGE Zaina Berman MD   Stopped at 10/07/20 0900    diphenhydrAMINE (BENADRYL) capsule 50 mg  50 mg Oral BID PRN Ed Reyna NP   50 mg at 10/03/20 0129    phosphorus (K PHOS NEUTRAL) 250 mg tablet 2 Tab  2 Tab Oral BID Shalini Garcia MD   2 Tab at 10/12/20 0850    levoFLOXacin (LEVAQUIN) tablet 500 mg  500 mg Oral Q24H Shalini Garcia MD   500 mg at 10/11/20 1023    predniSONE (DELTASONE) tablet 20 mg  20 mg Oral DAILY WITH BREAKFAST Shalini Garcia MD   20 mg at 10/12/20 0851    oxyCODONE IR (ROXICODONE) tablet 15 mg  15 mg Oral Q4H PRN Shalini Garcia MD   15 mg at 10/06/20 1228    metoprolol tartrate (LOPRESSOR) tablet 50 mg  50 mg Oral BID Alis Mcnally MD   50 mg at 10/12/20 0850    enoxaparin (LOVENOX) injection 40 mg  40 mg SubCUTAneous Q24H Julissa Carter MD   Stopped at 10/05/20 2141    aspirin delayed-release tablet 81 mg  81 mg Oral DAILY Gisele Long NP   81 mg at 10/12/20 0851    acetaminophen (TYLENOL) tablet 650 mg  650 mg Oral Q6H PRN Gisele Long NP   650 mg at 20 0950    ondansetron (ZOFRAN) injection 4 mg  4 mg IntraVENous Q8H PRN Rashid Long NP   4 mg at 20 0402    guaiFENesin (ROBITUSSIN) 20 mg/mL oral liquid 400 mg  400 mg Oral Q6H PRN Nicolas SUTTON NP   Stopped at 20 1855            No Known Allergies        Objective:     Blood pressure 135/69, pulse (!) 102, temperature 97.8 °F (36.6 °C), resp. rate 21, height 6' (1.829 m), weight 71 kg (156 lb 8.4 oz), SpO2 100 %. Temp (24hrs), Av.3 °F (36.3 °C), Min:96.8 °F (36 °C), Max:97.8 °F (36.6 °C)      Intake and Output:  Current Shift: No intake/output data recorded. Last 3 Shifts: 10/10 1901 - 10/12 0700  In: 1441.1 [I.V.:728.9]  Out: 1118 [Urine:2340; Drains:580]    Physical Exam:     General: Lying in bed, on nasal cannula oxygen. Wide-awake and following commands. No acute distress  Throat and Neck: Supple. No JVD. He has a right subclavian central line. Lung:  Much improved aeration bilaterally, minimal rhonchi in the left base. Heart: S1+S2.   No murmurs  Abdomen: Status post surgery. He has a midline incision. He has 2 HELGA drains one on each side. Bowel sounds are hypoactive. Draining serosanguineous fluid. Extremities:  He has pitting edema. Distal pulses of the lower extremities are noted with Dopplers. Has skin breakdown on the dependent portion of heel. : Reese catheter in place. Making some urine output. Skin: No cyanosis  Neurologic: Wakes up easily, following commands. Lab/Data Review: All lab results for the last 24 hours reviewed. Recent Results (from the past 24 hour(s))   TYPE & SCREEN    Collection Time: 10/11/20  3:20 PM   Result Value Ref Range    Crossmatch Expiration 10/14/2020,2359     ABO/Rh(D) O Positive     Antibody screen Negative     Unit number [id number]     Blood component type RC LR     Unit division 00     Status of unit Issued,final     TRANSFUSION STATUS Ok to transfuse     Crossmatch result Compatible    CBC WITH AUTOMATED DIFF    Collection Time: 10/12/20  4:00 AM   Result Value Ref Range    WBC 20.0 (H) 4.1 - 11.1 K/uL    RBC 3.50 (L) 4.10 - 5.70 M/uL    HGB 10.8 (L) 12.1 - 17.0 g/dL    HCT 31.0 (L) 36.6 - 50.3 %    MCV 88.6 80.0 - 99.0 FL    MCH 30.9 26.0 - 34.0 PG    MCHC 34.8 30.0 - 36.5 g/dL    RDW 15.3 (H) 11.5 - 14.5 %    PLATELET 317 (L) 797 - 400 K/uL    MPV 10.8 8.9 - 12.9 FL    NRBC 5.8 (H) 0  WBC    ABSOLUTE NRBC 1.24 (H) 0.00 - 0.01 K/uL    NEUTROPHILS 89 (H) 32 - 75 %    LYMPHOCYTES 5 (L) 12 - 49 %    MONOCYTES 5 5 - 13 %    EOSINOPHILS 0 0 - 7 %    BASOPHILS 0 0 - 1 %    IMMATURE GRANULOCYTES 1 (H) 0.0 - 0.5 %    ABS. NEUTROPHILS 17.8 (H) 1.8 - 8.0 K/UL    ABS. LYMPHOCYTES 1.0 0.8 - 3.5 K/UL    ABS. MONOCYTES 1.0 0.0 - 1.0 K/UL    ABS. EOSINOPHILS 0.0 0.0 - 0.4 K/UL    ABS. BASOPHILS 0.0 0.0 - 0.1 K/UL    ABS. IMM.  GRANS. 0.2 K/UL    DF AUTOMATED      NRBC 6.0  WBC    ABSOLUTE NRBC 1.20 K/uL    RBC COMMENTS Polychromasia  1+       METABOLIC PANEL, BASIC    Collection Time: 10/12/20  4:00 AM   Result Value Ref Range    Sodium 141 136 - 145 mmol/L    Potassium 2.7 (LL) 3.5 - 5.1 mmol/L    Chloride 106 97 - 108 mmol/L    CO2 30 21 - 32 mmol/L    Anion gap 5 5 - 15 mmol/L    Glucose 96 65 - 100 mg/dL    BUN 17 6 - 20 mg/dL    Creatinine 0.61 (L) 0.70 - 1.30 mg/dL    BUN/Creatinine ratio 28 (H) 12 - 20      GFR est AA >60 >60 ml/min/1.73m2    GFR est non-AA >60 >60 ml/min/1.73m2    Calcium 8.1 (L) 8.5 - 10.1 mg/dL     chest X-ray    Chest x-ray results were reviewed which showed improvement in left lung inflation. Patient has airspace disease present. CT Results  (Last 48 hours)    None          Assessment:     1. Acute respiratory failure, after aortobifemoral bypass surgery on 10/6/2020. Patient self extubated himself early in the morning on 10/9/2020. Re-intubated on 10/10/20 for left-sided mucous plugging and complete left lung collapse and had flexible bronchoscopy done with suction of mucous plug. 2.  Acute metabolic encephalopathy, resolved  3. Aspiration pneumonia  4. Severe peripheral vascular disease  5. UTI  6. Hypertension  7. Familial polymyositis    Plan:     1.)    Status post acute Hypoxic respiratory failure. Patient had improved and had been on room air. On 10/6/2020 he underwent aortobifemoral bypass surgery. Post-surgery he was left on the ventilator. EBL was 3.5 L. When he arrived to the ICU he was on multiple pressors. He is now off pressors. Patient self extubated himself early in the AM on 10/9/20. Re-intubated on 10/10/20 in order to undergo bronchoscopy for left-lung collapse due to mucous plugging. S/p successful bronch with suctioning of mucous plugs on 10/10/20, bronchial washings sent for culture, currently pending. Repeat CXR this morning with significant improvement in left lung aeration. Currently on 2 L of nasal, oxygen. He is started on diuretics which I agree with. Nephrology input noted.     2.)  Aspiration/HAP pneumonia:   Started on Cefepime/Flagyl 10/10/20, white blood cell count slowly coming down. Also on prednisone, will need to wean this soon as well. On nebulized bronchodilator treatments. Modified barium swallow showed penetration and significant dysphagia. 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 Patient may require PEG tube near future. He had been on puréed diet with nectar thickened liquids. He will have reevaluation of his swallowing today. 3.)  Metabolic encephalopathy. He has a progressive neurologic disorder. Much improved mental status today. Brain MRI negative    Further recommendation per Neurology. 4.)  Dehydration and metabolic acidosis. Monitor kidney function after surgery. 5.  Possible urinary tract infection. 6.  Hypertension. Dyslipidemia and severe peripheral vascular disease. 7.  Myocardial ischemia:  He underwent nuclear stress testing which showed significant inferior and from will treat him with medical managememt    I will follow the patient closely with you. DVT and GI prophylaxis. He is on Lovenox and Protonix IV. Repeat all labs in the morning. Replenish electrolytes as needed. He discussed with the wife at the bedside. Patient is critically ill at this time. Discussed with the nursing team.  More than 45 minutes spent with patient care.   Thank you for involving me in the care of this patient      Hansa Trujillo MD  [facility]  10/12/2020

## 2020-10-13 ENCOUNTER — APPOINTMENT (OUTPATIENT)
Dept: GENERAL RADIOLOGY | Age: 55
DRG: 981 | End: 2020-10-13
Attending: THORACIC SURGERY (CARDIOTHORACIC VASCULAR SURGERY)
Payer: COMMERCIAL

## 2020-10-13 ENCOUNTER — APPOINTMENT (OUTPATIENT)
Dept: GENERAL RADIOLOGY | Age: 55
DRG: 981 | End: 2020-10-13
Attending: INTERNAL MEDICINE
Payer: COMMERCIAL

## 2020-10-13 LAB
ALBUMIN SERPL-MCNC: 2.3 G/DL (ref 3.5–5)
ANION GAP SERPL CALC-SCNC: 5 MMOL/L (ref 5–15)
ANION GAP SERPL CALC-SCNC: 6 MMOL/L (ref 5–15)
BASOPHILS # BLD: 0 K/UL (ref 0–0.1)
BASOPHILS NFR BLD: 0 % (ref 0–1)
BUN SERPL-MCNC: 15 MG/DL (ref 6–20)
BUN SERPL-MCNC: 16 MG/DL (ref 6–20)
BUN/CREAT SERPL: 24 (ref 12–20)
BUN/CREAT SERPL: 25 (ref 12–20)
CA-I BLD-MCNC: 8.1 MG/DL (ref 8.5–10.1)
CA-I BLD-MCNC: 8.3 MG/DL (ref 8.5–10.1)
CHLORIDE SERPL-SCNC: 104 MMOL/L (ref 97–108)
CHLORIDE SERPL-SCNC: 105 MMOL/L (ref 97–108)
CO2 SERPL-SCNC: 29 MMOL/L (ref 21–32)
CO2 SERPL-SCNC: 33 MMOL/L (ref 21–32)
CREAT SERPL-MCNC: 0.61 MG/DL (ref 0.7–1.3)
CREAT SERPL-MCNC: 0.66 MG/DL (ref 0.7–1.3)
DIFFERENTIAL METHOD BLD: ABNORMAL
EOSINOPHIL # BLD: 0 K/UL (ref 0–0.4)
EOSINOPHIL NFR BLD: 0 % (ref 0–7)
ERYTHROCYTE [DISTWIDTH] IN BLOOD BY AUTOMATED COUNT: 15.9 % (ref 11.5–14.5)
GLUCOSE SERPL-MCNC: 71 MG/DL (ref 65–100)
GLUCOSE SERPL-MCNC: 95 MG/DL (ref 65–100)
HCT VFR BLD AUTO: 28.9 % (ref 36.6–50.3)
HGB BLD-MCNC: 10.1 G/DL (ref 12.1–17)
IMM GRANULOCYTES # BLD AUTO: 0.1 K/UL (ref 0–0.04)
IMM GRANULOCYTES NFR BLD AUTO: 1 % (ref 0–0.5)
LYMPHOCYTES # BLD: 1 K/UL (ref 0.8–3.5)
LYMPHOCYTES NFR BLD: 5 % (ref 12–49)
MAGNESIUM SERPL-MCNC: 2.1 MG/DL (ref 1.6–2.4)
MCH RBC QN AUTO: 31.5 PG (ref 26–34)
MCHC RBC AUTO-ENTMCNC: 34.9 G/DL (ref 30–36.5)
MCV RBC AUTO: 90 FL (ref 80–99)
MONOCYTES # BLD: 2.4 K/UL (ref 0–1)
MONOCYTES NFR BLD: 13 % (ref 5–13)
NEUTS SEG # BLD: 14.5 K/UL (ref 1.8–8)
NEUTS SEG NFR BLD: 81 % (ref 32–75)
NRBC # BLD: 0.65 K/UL (ref 0–0.01)
NRBC BLD-RTO: 3.6 PER 100 WBC
PHOSPHATE SERPL-MCNC: 3.2 MG/DL (ref 2.6–4.7)
PLATELET # BLD AUTO: 126 K/UL (ref 150–400)
PMV BLD AUTO: 10.5 FL (ref 8.9–12.9)
POTASSIUM SERPL-SCNC: 3.2 MMOL/L (ref 3.5–5.1)
POTASSIUM SERPL-SCNC: 3.9 MMOL/L (ref 3.5–5.1)
RBC # BLD AUTO: 3.21 M/UL (ref 4.1–5.7)
SODIUM SERPL-SCNC: 140 MMOL/L (ref 136–145)
SODIUM SERPL-SCNC: 142 MMOL/L (ref 136–145)
WBC # BLD AUTO: 17.9 K/UL (ref 4.1–11.1)

## 2020-10-13 PROCEDURE — 99222 1ST HOSP IP/OBS MODERATE 55: CPT | Performed by: THORACIC SURGERY (CARDIOTHORACIC VASCULAR SURGERY)

## 2020-10-13 PROCEDURE — 94668 MNPJ CHEST WALL SBSQ: CPT

## 2020-10-13 PROCEDURE — 74011000258 HC RX REV CODE- 258: Performed by: INTERNAL MEDICINE

## 2020-10-13 PROCEDURE — 74011250637 HC RX REV CODE- 250/637: Performed by: INTERNAL MEDICINE

## 2020-10-13 PROCEDURE — 83735 ASSAY OF MAGNESIUM: CPT

## 2020-10-13 PROCEDURE — 71045 X-RAY EXAM CHEST 1 VIEW: CPT

## 2020-10-13 PROCEDURE — 80069 RENAL FUNCTION PANEL: CPT

## 2020-10-13 PROCEDURE — 74011250636 HC RX REV CODE- 250/636: Performed by: INTERNAL MEDICINE

## 2020-10-13 PROCEDURE — 97535 SELF CARE MNGMENT TRAINING: CPT

## 2020-10-13 PROCEDURE — 94640 AIRWAY INHALATION TREATMENT: CPT

## 2020-10-13 PROCEDURE — 74011000250 HC RX REV CODE- 250: Performed by: INTERNAL MEDICINE

## 2020-10-13 PROCEDURE — 74011250636 HC RX REV CODE- 250/636: Performed by: SURGERY

## 2020-10-13 PROCEDURE — 97530 THERAPEUTIC ACTIVITIES: CPT

## 2020-10-13 PROCEDURE — 65610000006 HC RM INTENSIVE CARE

## 2020-10-13 PROCEDURE — 85025 COMPLETE CBC W/AUTO DIFF WBC: CPT

## 2020-10-13 PROCEDURE — C9113 INJ PANTOPRAZOLE SODIUM, VIA: HCPCS | Performed by: INTERNAL MEDICINE

## 2020-10-13 PROCEDURE — 74011000250 HC RX REV CODE- 250

## 2020-10-13 PROCEDURE — P9047 ALBUMIN (HUMAN), 25%, 50ML: HCPCS | Performed by: SURGERY

## 2020-10-13 PROCEDURE — 74011636637 HC RX REV CODE- 636/637: Performed by: INTERNAL MEDICINE

## 2020-10-13 PROCEDURE — 74011250637 HC RX REV CODE- 250/637: Performed by: NURSE PRACTITIONER

## 2020-10-13 RX ORDER — POTASSIUM CHLORIDE 7.45 MG/ML
10 INJECTION INTRAVENOUS
Status: COMPLETED | OUTPATIENT
Start: 2020-10-13 | End: 2020-10-13

## 2020-10-13 RX ORDER — OXYCODONE HYDROCHLORIDE 5 MG/1
15 TABLET ORAL
Status: CANCELLED | OUTPATIENT
Start: 2020-10-13

## 2020-10-13 RX ORDER — BACLOFEN 10 MG/1
10 TABLET ORAL 3 TIMES DAILY
Status: CANCELLED | OUTPATIENT
Start: 2020-10-13

## 2020-10-13 RX ORDER — ASPIRIN 81 MG/1
81 TABLET ORAL DAILY
Status: CANCELLED | OUTPATIENT
Start: 2020-10-13

## 2020-10-13 RX ORDER — SODIUM CHLORIDE FOR INHALATION 0.9 %
VIAL, NEBULIZER (ML) INHALATION
Status: COMPLETED
Start: 2020-10-13 | End: 2020-10-13

## 2020-10-13 RX ORDER — PRAVASTATIN SODIUM 20 MG/1
20 TABLET ORAL
Status: CANCELLED | OUTPATIENT
Start: 2020-10-13

## 2020-10-13 RX ORDER — POTASSIUM CHLORIDE 1.5 G/1.77G
40 POWDER, FOR SOLUTION ORAL
Status: COMPLETED | OUTPATIENT
Start: 2020-10-13 | End: 2020-10-13

## 2020-10-13 RX ORDER — POTASSIUM CHLORIDE 20 MEQ/1
40 TABLET, EXTENDED RELEASE ORAL
Status: DISCONTINUED | OUTPATIENT
Start: 2020-10-13 | End: 2020-10-13

## 2020-10-13 RX ADMIN — SODIUM CHLORIDE 40 MG: 9 INJECTION, SOLUTION INTRAMUSCULAR; INTRAVENOUS; SUBCUTANEOUS at 22:13

## 2020-10-13 RX ADMIN — CEFEPIME 1 G: 1 INJECTION, POWDER, FOR SOLUTION INTRAMUSCULAR; INTRAVENOUS at 17:17

## 2020-10-13 RX ADMIN — ASPIRIN 81 MG: 81 TABLET, COATED ORAL at 09:33

## 2020-10-13 RX ADMIN — ALBUTEROL SULFATE 2.5 MG: 2.5 SOLUTION RESPIRATORY (INHALATION) at 19:33

## 2020-10-13 RX ADMIN — DIBASIC SODIUM PHOSPHATE, MONOBASIC POTASSIUM PHOSPHATE AND MONOBASIC SODIUM PHOSPHATE 2 TABLET: 852; 155; 130 TABLET ORAL at 11:14

## 2020-10-13 RX ADMIN — ALBUMIN HUMAN 12.5 G: 250 SOLUTION INTRAVENOUS at 22:14

## 2020-10-13 RX ADMIN — SODIUM CHLORIDE 40 MG: 9 INJECTION, SOLUTION INTRAMUSCULAR; INTRAVENOUS; SUBCUTANEOUS at 08:23

## 2020-10-13 RX ADMIN — CEFEPIME 1 G: 1 INJECTION, POWDER, FOR SOLUTION INTRAMUSCULAR; INTRAVENOUS at 10:00

## 2020-10-13 RX ADMIN — ISODIUM CHLORIDE 3 ML: 0.03 SOLUTION RESPIRATORY (INHALATION) at 14:02

## 2020-10-13 RX ADMIN — DIBASIC SODIUM PHOSPHATE, MONOBASIC POTASSIUM PHOSPHATE AND MONOBASIC SODIUM PHOSPHATE 2 TABLET: 852; 155; 130 TABLET ORAL at 00:01

## 2020-10-13 RX ADMIN — POTASSIUM CHLORIDE 40 MEQ: 1.5 FOR SOLUTION ORAL at 13:30

## 2020-10-13 RX ADMIN — POTASSIUM CHLORIDE 10 MEQ: 10 INJECTION, SOLUTION INTRAVENOUS at 16:00

## 2020-10-13 RX ADMIN — LEVOFLOXACIN 500 MG: 500 TABLET, FILM COATED ORAL at 11:14

## 2020-10-13 RX ADMIN — ALBUMIN HUMAN 12.5 G: 250 SOLUTION INTRAVENOUS at 08:25

## 2020-10-13 RX ADMIN — ISODIUM CHLORIDE 3 ML: 0.03 SOLUTION RESPIRATORY (INHALATION) at 08:19

## 2020-10-13 RX ADMIN — SPIRONOLACTONE 25 MG: 25 TABLET ORAL at 22:13

## 2020-10-13 RX ADMIN — PREDNISONE 20 MG: 20 TABLET ORAL at 09:33

## 2020-10-13 RX ADMIN — POTASSIUM CHLORIDE 10 MEQ: 10 INJECTION, SOLUTION INTRAVENOUS at 15:00

## 2020-10-13 RX ADMIN — MIDODRINE HYDROCHLORIDE 10 MG: 5 TABLET ORAL at 09:00

## 2020-10-13 RX ADMIN — ENOXAPARIN SODIUM 40 MG: 40 INJECTION SUBCUTANEOUS at 22:14

## 2020-10-13 RX ADMIN — METOPROLOL TARTRATE 50 MG: 50 TABLET, FILM COATED ORAL at 09:33

## 2020-10-13 RX ADMIN — ALBUTEROL SULFATE 2.5 MG: 2.5 SOLUTION RESPIRATORY (INHALATION) at 01:22

## 2020-10-13 RX ADMIN — METRONIDAZOLE 500 MG: 500 INJECTION, SOLUTION INTRAVENOUS at 06:23

## 2020-10-13 RX ADMIN — SPIRONOLACTONE 25 MG: 25 TABLET ORAL at 09:33

## 2020-10-13 RX ADMIN — ALBUTEROL SULFATE 2.5 MG: 2.5 SOLUTION RESPIRATORY (INHALATION) at 08:17

## 2020-10-13 RX ADMIN — OXYCODONE HYDROCHLORIDE 15 MG: 5 TABLET ORAL at 22:17

## 2020-10-13 RX ADMIN — ALBUTEROL SULFATE 2.5 MG: 2.5 SOLUTION RESPIRATORY (INHALATION) at 14:01

## 2020-10-13 RX ADMIN — POTASSIUM CHLORIDE 10 MEQ: 10 INJECTION, SOLUTION INTRAVENOUS at 17:00

## 2020-10-13 RX ADMIN — CEFEPIME 1 G: 1 INJECTION, POWDER, FOR SOLUTION INTRAMUSCULAR; INTRAVENOUS at 04:50

## 2020-10-13 RX ADMIN — METOPROLOL TARTRATE 50 MG: 50 TABLET, FILM COATED ORAL at 22:12

## 2020-10-13 RX ADMIN — METRONIDAZOLE 500 MG: 500 INJECTION, SOLUTION INTRAVENOUS at 14:31

## 2020-10-13 RX ADMIN — DIBASIC SODIUM PHOSPHATE, MONOBASIC POTASSIUM PHOSPHATE AND MONOBASIC SODIUM PHOSPHATE 2 TABLET: 852; 155; 130 TABLET ORAL at 22:35

## 2020-10-13 RX ADMIN — METRONIDAZOLE 500 MG: 500 INJECTION, SOLUTION INTRAVENOUS at 22:13

## 2020-10-13 RX ADMIN — FUROSEMIDE 40 MG: 10 INJECTION, SOLUTION INTRAMUSCULAR; INTRAVENOUS at 08:23

## 2020-10-13 NOTE — PROGRESS NOTES
Renal Progress Note    Patient: Yuri Simons MRN: [id number]  SSN: [id number]    YOB: 1965  Age: 48 y.o. Sex: male      Admit Date: 9/26/2020    LOS: 17 days     Subjective:   Patient seen at bedside. Alert and awake, no acute distress. Patient is feeling better, improving LEedema, on diuretics  No complaints of shortness of breath.    K is low at 3.2 today        Current Facility-Administered Medications   Medication Dose Route Frequency    spironolactone (ALDACTONE) tablet 25 mg  25 mg Oral BID    midodrine (PROAMATINE) tablet 10 mg  10 mg Oral BID    albumin human 25% (BUMINATE) solution 12.5 g  12.5 g IntraVENous BID    furosemide (LASIX) injection 40 mg  40 mg IntraVENous DAILY    potassium chloride (KLOR-CON) packet for solution 40 mEq  40 mEq Per NG tube DAILY    fentaNYL (PF) 1,500 mcg/30 mL (50 mcg/mL) infusion  75 mcg/hr IntraVENous TITRATE    cefepime (MAXIPIME) 1 g in 0.9% sodium chloride (MBP/ADV) 50 mL MBP  1 g IntraVENous Q8H    metroNIDAZOLE (FLAGYL) IVPB premix 500 mg  500 mg IntraVENous Q8H    albuterol CONCENTRATE 2.5mg/0.5 mL neb soln  2.5 mg Nebulization Q6H RT    LORazepam (ATIVAN) injection 1 mg  1 mg IntraVENous Q2H PRN    dexmedeTOMidine (PRECEDEX) 400 mcg in 0.9% sodium chloride 100 mL infusion  0.2-1.4 mcg/kg/hr IntraVENous TITRATE    sodium chloride 0.9% (NS) 3ml nebulizer solution  2.5 mL Nebulization PRN    midazolam (PF) (VERSED) injection 4 mg  4 mg IntraVENous Q2H PRN    NOREPINephrine (LEVOPHED) 8 mg in 5% dextrose 250mL (32 mcg/mL) infusion  2 mcg/min IntraVENous TITRATE    vasopressin (VASOSTRICT) 20 Units in 0.9% sodium chloride 100 mL infusion  0.04 Units/min IntraVENous CONTINUOUS    PHENYLephrine (ALISTAIR-SYNEPHRINE) 30 mg in 0.9% sodium chloride 250 mL infusion   mcg/min IntraVENous CONTINUOUS    pantoprazole (PROTONIX) 40 mg in 0.9% sodium chloride 10 mL injection  40 mg IntraVENous Q12H    influenza vaccine 2020-21 (4 yrs+)(PF) (FLUCELVAX QUAD) injection 0.5 mL  0.5 mL IntraMUSCular PRIOR TO DISCHARGE    diphenhydrAMINE (BENADRYL) capsule 50 mg  50 mg Oral BID PRN    phosphorus (K PHOS NEUTRAL) 250 mg tablet 2 Tab  2 Tab Oral BID    levoFLOXacin (LEVAQUIN) tablet 500 mg  500 mg Oral Q24H    predniSONE (DELTASONE) tablet 20 mg  20 mg Oral DAILY WITH BREAKFAST    oxyCODONE IR (ROXICODONE) tablet 15 mg  15 mg Oral Q4H PRN    metoprolol tartrate (LOPRESSOR) tablet 50 mg  50 mg Oral BID    enoxaparin (LOVENOX) injection 40 mg  40 mg SubCUTAneous Q24H    aspirin delayed-release tablet 81 mg  81 mg Oral DAILY    acetaminophen (TYLENOL) tablet 650 mg  650 mg Oral Q6H PRN    ondansetron (ZOFRAN) injection 4 mg  4 mg IntraVENous Q8H PRN    guaiFENesin (ROBITUSSIN) 20 mg/mL oral liquid 400 mg  400 mg Oral Q6H PRN        Vitals:    10/13/20 0600 10/13/20 0819 10/13/20 0823 10/13/20 0933   BP: (!) 145/69   117/65   Pulse: 82  84 84   Resp: 22      Temp:       SpO2: 100% 93%     Weight:       Height:         Objective:   General: alert awake well-oriented, no acute distress. HEENT: EOMI, no Icterus, no Pallor,  mucosa moist.  Neck: Neck is supple, No JVD  Lungs: decreased breathsounds at bases, no respiratory distress on inspection '  CVS: heart sounds normal,  no murmurs, no rubs. GI: soft, nontender, normal BS. Extremeties: no cyanosis,1+ dependent edema in ext   Neuro: Alert, awake, oriented x3, answering simple questions appropriately  Skin: normal skin turgor, no skin rashes. Intake and Output:  Current Shift: 10/13 0701 - 10/13 1900  In: -   Out: 2000 [Urine:2000]  Last three shifts: 10/11 1901 - 10/13 0700  In: 1612.2 [P.O.:200;  I.V.:850]  Out: 2140 [Urine:1730; Drains:410]      Lab/Data Review:  Recent Labs     10/13/20  0510 10/12/20  0400 10/11/20  0432   WBC 17.9* 20.0* 18.9*   HGB 10.1* 10.8* 8.9*   HCT 28.9* 31.0* 25.6*   * 107* 92*     Recent Labs     10/13/20  0510 10/12/20  0400 10/11/20  0432    141 138   K 3.2* 2.7* 2.7*    106 104   CO2 29 30 29   GLU 71 96 126*   BUN 15 17 17   CREA 0.61* 0.61* 0.68*   CA 8.1* 8.1* 7.6*   MG 2.1  --   --    PHOS 3.2  --   --    ALB 2.3*  --   --      Recent Labs     10/11/20  0556   PH 7.53*   PCO2 30*   PO2 83   HCO3 27*   FIO2 40     Recent Results (from the past 24 hour(s))   CBC WITH AUTOMATED DIFF    Collection Time: 10/13/20  5:10 AM   Result Value Ref Range    WBC 17.9 (H) 4.1 - 11.1 K/uL    RBC 3.21 (L) 4.10 - 5.70 M/uL    HGB 10.1 (L) 12.1 - 17.0 g/dL    HCT 28.9 (L) 36.6 - 50.3 %    MCV 90.0 80.0 - 99.0 FL    MCH 31.5 26.0 - 34.0 PG    MCHC 34.9 30.0 - 36.5 g/dL    RDW 15.9 (H) 11.5 - 14.5 %    PLATELET 369 (L) 391 - 400 K/uL    MPV 10.5 8.9 - 12.9 FL    NRBC 3.6 (H) 0  WBC    ABSOLUTE NRBC 0.65 (H) 0.00 - 0.01 K/uL    NEUTROPHILS 81 (H) 32 - 75 %    LYMPHOCYTES 5 (L) 12 - 49 %    MONOCYTES 13 5 - 13 %    EOSINOPHILS 0 0 - 7 %    BASOPHILS 0 0 - 1 %    IMMATURE GRANULOCYTES 1 (H) 0.0 - 0.5 %    ABS. NEUTROPHILS 14.5 (H) 1.8 - 8.0 K/UL    ABS. LYMPHOCYTES 1.0 0.8 - 3.5 K/UL    ABS. MONOCYTES 2.4 (H) 0.0 - 1.0 K/UL    ABS. EOSINOPHILS 0.0 0.0 - 0.4 K/UL    ABS. BASOPHILS 0.0 0.0 - 0.1 K/UL    ABS. IMM.  GRANS. 0.1 (H) 0.00 - 0.04 K/UL    DF AUTOMATED     RENAL FUNCTION PANEL    Collection Time: 10/13/20  5:10 AM   Result Value Ref Range    Sodium 140 136 - 145 mmol/L    Potassium 3.2 (L) 3.5 - 5.1 mmol/L    Chloride 105 97 - 108 mmol/L    CO2 29 21 - 32 mmol/L    Anion gap 6 5 - 15 mmol/L    Glucose 71 65 - 100 mg/dL    BUN 15 6 - 20 mg/dL    Creatinine 0.61 (L) 0.70 - 1.30 mg/dL    BUN/Creatinine ratio 25 (H) 12 - 20      GFR est AA >60 >60 ml/min/1.73m2    GFR est non-AA >60 >60 ml/min/1.73m2    Calcium 8.1 (L) 8.5 - 10.1 mg/dL    Phosphorus 3.2 2.6 - 4.7 mg/dL    Albumin 2.3 (L) 3.5 - 5.0 g/dL   MAGNESIUM    Collection Time: 10/13/20  5:10 AM   Result Value Ref Range    Magnesium 2.1 1.6 - 2.4 mg/dL        Assessment and Plan:       1. severe hypokalemia:  -from diuretics and metabolic alkalosis  Will give kcl 10 meqx 3 more  runs today  continue spironolactone 25 bid  Continue lasix  40 mg daily-  continue PO K supplements and will monitor K levels,  Mg is normal    2. Low urine output/ BALDEV, resolved  - likely prerenal from hypotension.    -improved with diuretics now      3. Edema: still significant edema+  -good diuresis +, continue once daily lasix     4. .  Metabolic alkalosis: sec to diuretics  Improving, monitor jerri/CO2    5.  Hypotension: improved hemodynamic status  Continue midodrine 10 mg po bid  Continue to monitor BPs     6 status post  infrarenal aorta endarterectomy, with aortic by common femoral artery bypass with 14 mm x 7 mm Hemosure graft     7. Spont. Left pneumothorax: thoracic surgery monitoring    8.  S/p CVA/debility: continue supportive care    Signed By: Isaiah Witt MD     October 13, 2020

## 2020-10-13 NOTE — PROGRESS NOTES
PHYSICAL THERAPY REEVALUATION  Patient: Moises Lindsay (6 y.o. male)  Date: 10/13/2020  Primary Diagnosis: Metabolic encephalopathy [Z84.72]  Procedure(s) (LRB):  Aorta Bifemoral Bypass (N/A) 7 Days Post-Op   Precautions: falls, bilateral abdominal drains         ASSESSMENT  Based on the objective data described below, the patient presents with sig LE weakness, pain with mobility, c/o sharp intermittent pain in LE left > right, impaired functional mobility, and inability to amb since July. Pt initially evalauted on 9/30/2020, followed by 2 sessions of skilled PT. Pt then underwent infrarenal aorta endarterectomy with aortobifemoral bypass, which was complicated by a splenic capsular tear requiring splenectomy performed on 10/6/2020. Pt developed left pneumothorax followed by bronchoscopy done 10/10/2020 with reexpansion of left lung. New PT orders were received on 10/12/2020. Pt semi-supine in bed upon PT/OT arrival, wife present and agreeable to RA. Pt required total Ax2 for bed mobility, presents with 0/5 MMT of bilateral ankles, 1/5 MMT of bilateral hip and knees with +3 pitting edema noted in bilateral dorsal feet. PROM and AAROM completed of bilateral LE followed by repositioning in the bed, bed left in chair position due to c/o increased pain with AAROM to improve tolerance to upright position and prepare pt for EOB activities. Per wife pt has not amb since July, DC discussed with pt and wife. If pt is able to tolerate 3 hours of therapy/day, IRF would be most appropriate, however, wife stated she was going to be taking pt home but she was agreeable to [facility] services. Current Level of Function Impacting Discharge (mobility/balance): inability to amb since July     Other factors to consider for discharge: acute medical state, multiple hospitalizations since July. Patient will benefit from skilled therapy intervention to address the above noted impairments.        PLAN :  Recommendations and Planned Interventions: bed mobility training, transfer training, gait training, therapeutic exercises, patient and family training/education, and therapeutic activities      Frequency/Duration: Patient will be followed by physical therapy:  5 times a week to address goals. Recommendation for discharge: (in order for the patient to meet his/her long term goals)  IRF    This discharge recommendation:  Has been made in collaboration with the attending provider and/or case management    Equipment recommendations for successful discharge (if) home: none         SUBJECTIVE:   Patient stated i've been getting sharp pains into my legs all day.     OBJECTIVE DATA SUMMARY:   HISTORY:    Past Medical History:   Diagnosis Date    Myositis     Familial inclusion      Past Surgical History:   Procedure Laterality Date    HX ORTHOPAEDIC      disc infused, neck      Hospital course since last seen and reason for reevaluation: transfer to ICU with prolonged length of stay and new surgery    Personal factors and/or comorbidities impacting plan of care: supportive wife, nonambulatory since July     Home Situation  Home Environment: Private residence  # Steps to Enter: 6  Rails to Enter: Yes  Hand Rails : Bilateral  Wheelchair Ramp: No  One/Two Story Residence: Two story(primary bedroom/bathroom on 2nd floor)  # of Interior Steps: (stair lift)  Lift Chair Available: Yes  Living Alone: No  Support Systems: Spouse/Significant Other/Partner  Patient Expects to be Discharged to[de-identified] Private residence  Current DME Used/Available at Home: Wheelchair, Walker, rolling    EXAMINATION/PRESENTATION/DECISION MAKING:   Critical Behavior:  Neurologic State: Alert  Orientation Level: Oriented to person, Oriented to situation, Oriented to place  Cognition: Follows commands  Safety/Judgement: Decreased awareness of need for safety  Hearing:   Auditory  Auditory Impairment: None  Skin:  dry but intact where visible   Edema: +3 pitting bilateral dorsum of feet Range Of Motion:  AROM: Grossly decreased, non-functional(grossly limited due to weakness)                       Strength:    Strength: Grossly decreased, non-functional(grossly 0-1/5)                    Tone & Sensation:                                  Coordination:     Vision:      Functional Mobility:  Bed Mobility:  Rolling: Total assistance;Assist x2        Scooting: Total assistance;Assist x2      Therapeutic Exercises:   Therapeutic Exercises:       EXERCISE   Sets   Reps   Active Active Assist   Passive Self ROM   Comments   Ankle Pumps 1 10 [] [] [x] [] Pain with left    heelslides 1 10 [] [x] [] []    Hip abduction/adduction 1 10 [] [x] [] []         Functional Measure:  [facility] [address] AM-PAC 6 Clicks         Basic Mobility Inpatient Short Form  How much difficulty does the patient currently have. .. Unable A Lot A Little None   1. Turning over in bed (including adjusting bedclothes, sheets and blankets)? [] 1   [x] 2   [] 3   [] 4   2. Sitting down on and standing up from a chair with arms ( e.g., wheelchair, bedside commode, etc.)   [x] 1   [] 2   [] 3   [] 4   3. Moving from lying on back to sitting on the side of the bed? [x] 1   [] 2   [] 3   [] 4          How much help from another person does the patient currently need. .. Total A Lot A Little None   4. Moving to and from a bed to a chair (including a wheelchair)? [x] 1   [] 2   [] 3   [] 4   5. Need to walk in hospital room? [x] 1   [] 2   [] 3   [] 4   6. Climbing 3-5 steps with a railing? [x] 1   [] 2   [] 3   [] 4   © , Trustees of [facility] [address] [address], under license to [employer]. All rights reserved     Score:  Initial:  Most Recent: X (Date: 10/13/2020)   Interpretation of Tool:  Represents activities that are increasingly more difficult (i.e. Bed mobility, Transfers, Gait).   Score 24 23 22-20 19-15 14-10 9-7 6   Modifier CH CI CJ CK CL CM CN          Pain Ratin-7/10    Activity Tolerance:   Poor, desaturates with exertion and requires oxygen, and requires rest breaks  Please refer to the flowsheet for vital signs taken during this treatment. After treatment patient left in no apparent distress:   Supine in bed, Call bell within reach, Bed / chair alarm activated, Caregiver / family present, and nsg in room    GOALS:  Problem: Mobility Impaired (Adult and Pediatric)  Goal: *Acute Goals and Plan of Care (Insert Text)  Description: Pt will be I with LE HEP in 7 days. Pt will perform bed mobility with mod A in 7 days. Pt will perform sit <> stand and stand pivot transfers with mod A in 7 days. Outcome: Not Met    COMMUNICATION/EDUCATION:   The patients plan of care was discussed with: Occupational therapist and Registered nurse. Fall prevention education was provided and the patient/caregiver indicated understanding., Patient/family have participated as able in goal setting and plan of care. , and Patient/family agree to work toward stated goals and plan of care. PT/OT sessions occurred together for increased safety of pt and clinician.     Thank you for this referral.  Devika Baez   Time Calculation: 23 mins

## 2020-10-13 NOTE — PROGRESS NOTES
Hospitalist Progress Note           Daily Progress Note: 10/13/2020    Chief complaint: Shortness of breath and weakness  Subjective: The patient is seen for follow  up. Postoperative day #5 status post infrarenal aorta endarterectomy with aortobifemoral bypass. Surgery was complicated by a splenic capsular tear requiring splenectomy. He was profoundly hypotensive postprocedure requiring 3 vasopressors. Off vasopressors now. He developed atelectasis of left lung postoperatively    Patient had bronchoscopy done 10/10 with reexpansion of left lung. Extubated 10/11 and now on room air, breathing comfortably    After I had initially seen patient, the radiologist called up to the ICU indicating patient has a 30 to 40% pneumothorax on the left. Patient has no central lines on that side.   He is breathing comfortably on room air and is not in any distress whatsoever      Problem List:  Problem List as of 10/13/2020 Date Reviewed: 9/17/2020          Codes Class Noted - Resolved    Metabolic encephalopathy APM-71-EO: G93.41  ICD-9-CM: 348.31  9/26/2020 - Present        UTI (urinary tract infection) ICD-10-CM: N39.0  ICD-9-CM: 599.0  9/26/2020 - Present        Aspiration pneumonitis ([address] ICD-10-CM: J69.0  ICD-9-CM: 507.0  9/26/2020 - Present        Essential hypertension ICD-10-CM: I10  ICD-9-CM: 401.9  9/26/2020 - Present        Acute dehydration ICD-10-CM: E86.0  ICD-9-CM: 276.51  9/26/2020 - Present              Medications reviewed  Current Facility-Administered Medications   Medication Dose Route Frequency    sodium chloride 0.9 % nebu        spironolactone (ALDACTONE) tablet 25 mg  25 mg Oral BID    midodrine (PROAMATINE) tablet 10 mg  10 mg Oral BID    albumin human 25% (BUMINATE) solution 12.5 g  12.5 g IntraVENous BID    furosemide (LASIX) injection 40 mg  40 mg IntraVENous DAILY    potassium chloride (KLOR-CON) packet for solution 40 mEq  40 mEq Per NG tube DAILY    fentaNYL (PF) 1,500 mcg/30 mL (50 mcg/mL) infusion  75 mcg/hr IntraVENous TITRATE    cefepime (MAXIPIME) 1 g in 0.9% sodium chloride (MBP/ADV) 50 mL MBP  1 g IntraVENous Q8H    metroNIDAZOLE (FLAGYL) IVPB premix 500 mg  500 mg IntraVENous Q8H    albuterol CONCENTRATE 2.5mg/0.5 mL neb soln  2.5 mg Nebulization Q6H RT    LORazepam (ATIVAN) injection 1 mg  1 mg IntraVENous Q2H PRN    dexmedeTOMidine (PRECEDEX) 400 mcg in 0.9% sodium chloride 100 mL infusion  0.2-1.4 mcg/kg/hr IntraVENous TITRATE    sodium chloride 0.9% (NS) 3ml nebulizer solution  2.5 mL Nebulization PRN    midazolam (PF) (VERSED) injection 4 mg  4 mg IntraVENous Q2H PRN    NOREPINephrine (LEVOPHED) 8 mg in 5% dextrose 250mL (32 mcg/mL) infusion  2 mcg/min IntraVENous TITRATE    vasopressin (VASOSTRICT) 20 Units in 0.9% sodium chloride 100 mL infusion  0.04 Units/min IntraVENous CONTINUOUS    PHENYLephrine (ALISTAIR-SYNEPHRINE) 30 mg in 0.9% sodium chloride 250 mL infusion   mcg/min IntraVENous CONTINUOUS    pantoprazole (PROTONIX) 40 mg in 0.9% sodium chloride 10 mL injection  40 mg IntraVENous Q12H    influenza vaccine 2020-21 (4 yrs+)(PF) (FLUCELVAX QUAD) injection 0.5 mL  0.5 mL IntraMUSCular PRIOR TO DISCHARGE    diphenhydrAMINE (BENADRYL) capsule 50 mg  50 mg Oral BID PRN    phosphorus (K PHOS NEUTRAL) 250 mg tablet 2 Tab  2 Tab Oral BID    levoFLOXacin (LEVAQUIN) tablet 500 mg  500 mg Oral Q24H    predniSONE (DELTASONE) tablet 20 mg  20 mg Oral DAILY WITH BREAKFAST    oxyCODONE IR (ROXICODONE) tablet 15 mg  15 mg Oral Q4H PRN    metoprolol tartrate (LOPRESSOR) tablet 50 mg  50 mg Oral BID    enoxaparin (LOVENOX) injection 40 mg  40 mg SubCUTAneous Q24H    aspirin delayed-release tablet 81 mg  81 mg Oral DAILY    acetaminophen (TYLENOL) tablet 650 mg  650 mg Oral Q6H PRN    ondansetron (ZOFRAN) injection 4 mg  4 mg IntraVENous Q8H PRN    guaiFENesin (ROBITUSSIN) 20 mg/mL oral liquid 400 mg  400 mg Oral Q6H PRN       Review of Systems:   Review of systems unable to be obtained because he is intubated    Objective:   Physical Exam:     Visit Vitals  BP (!) 145/69 (BP 1 Location: Left arm, BP Patient Position: At rest)   Pulse 82   Temp 98.2 °F (36.8 °C)   Resp 22   Ht 6' (1.829 m)   Wt 71 kg (156 lb 8.4 oz)   SpO2 100%   BMI 21.23 kg/m²    O2 Flow Rate (L/min): 3 l/min O2 Device: Nasal cannula    Temp (24hrs), Av.8 °F (36.6 °C), Min:97.5 °F (36.4 °C), Max:98.2 °F (36.8 °C)    No intake/output data recorded. 10/11 1901 - 10/13 0700  In: 1612.2 [P.O.:200; I.V.:850]  Out: 2140 [Urine:1730; Drains:410]    General:  awake and conversant, cooperative, no distress, appears older than stated age. Lungs:   Clear to auscultation bilaterally with diminished breath sounds bilateral bases. Chest wall:  No tenderness or deformity. Heart:  Regular rate and rhythm, S1, S2 normal, no murmur, click, rub or gallop. Abdomen:   Soft, non-tender. Diminished Bowel sounds. Dressings in place. Extremities: Extremities normal, atraumatic, positive edema bilaterally   Pulses: 2+ and symmetric all extremities. Skin: Skin color, texture, turgor normal. No rashes or lesions   Neurologic: CNII-XII intact.      Data Review:       Recent Days:  Recent Labs     10/13/20  0510 10/12/20  0400 10/11/20  0432   WBC 17.9* 20.0* 18.9*   HGB 10.1* 10.8* 8.9*   HCT 28.9* 31.0* 25.6*   * 107* 92*     Recent Labs     10/13/20  0510 10/12/20  0400 10/11/20  0432    141 138   K 3.2* 2.7* 2.7*    106 104   CO2 29 30 29   GLU 71 96 126*   BUN 15 17 17   CREA 0.61* 0.61* 0.68*   CA 8.1* 8.1* 7.6*   MG 2.1  --   --    PHOS 3.2  --   --    ALB 2.3*  --   --      Recent Labs     10/11/20  0556   PH 7.53*   PCO2 30*   PO2 83   HCO3 27*   FIO2 40       24 Hour Results:  Recent Results (from the past 24 hour(s))   CBC WITH AUTOMATED DIFF    Collection Time: 10/13/20  5:10 AM   Result Value Ref Range    WBC 17.9 (H) 4.1 - 11.1 K/uL    RBC 3.21 (L) 4.10 - 5.70 M/uL    HGB 10.1 (L) 12.1 - 17.0 g/dL    HCT 28.9 (L) 36.6 - 50.3 %    MCV 90.0 80.0 - 99.0 FL    MCH 31.5 26.0 - 34.0 PG    MCHC 34.9 30.0 - 36.5 g/dL    RDW 15.9 (H) 11.5 - 14.5 %    PLATELET 729 (L) 819 - 400 K/uL    MPV 10.5 8.9 - 12.9 FL    NRBC 3.6 (H) 0  WBC    ABSOLUTE NRBC 0.65 (H) 0.00 - 0.01 K/uL    NEUTROPHILS 81 (H) 32 - 75 %    LYMPHOCYTES 5 (L) 12 - 49 %    MONOCYTES 13 5 - 13 %    EOSINOPHILS 0 0 - 7 %    BASOPHILS 0 0 - 1 %    IMMATURE GRANULOCYTES 1 (H) 0.0 - 0.5 %    ABS. NEUTROPHILS 14.5 (H) 1.8 - 8.0 K/UL    ABS. LYMPHOCYTES 1.0 0.8 - 3.5 K/UL    ABS. MONOCYTES 2.4 (H) 0.0 - 1.0 K/UL    ABS. EOSINOPHILS 0.0 0.0 - 0.4 K/UL    ABS. BASOPHILS 0.0 0.0 - 0.1 K/UL    ABS. IMM. GRANS. 0.1 (H) 0.00 - 0.04 K/UL    DF AUTOMATED     RENAL FUNCTION PANEL    Collection Time: 10/13/20  5:10 AM   Result Value Ref Range    Sodium 140 136 - 145 mmol/L    Potassium 3.2 (L) 3.5 - 5.1 mmol/L    Chloride 105 97 - 108 mmol/L    CO2 29 21 - 32 mmol/L    Anion gap 6 5 - 15 mmol/L    Glucose 71 65 - 100 mg/dL    BUN 15 6 - 20 mg/dL    Creatinine 0.61 (L) 0.70 - 1.30 mg/dL    BUN/Creatinine ratio 25 (H) 12 - 20      GFR est AA >60 >60 ml/min/1.73m2    GFR est non-AA >60 >60 ml/min/1.73m2    Calcium 8.1 (L) 8.5 - 10.1 mg/dL    Phosphorus 3.2 2.6 - 4.7 mg/dL    Albumin 2.3 (L) 3.5 - 5.0 g/dL   MAGNESIUM    Collection Time: 10/13/20  5:10 AM   Result Value Ref Range    Magnesium 2.1 1.6 - 2.4 mg/dL           Assessment/     Acute hypoxic respiratory failure postsurgery. Self extubated 10/09/20. Reintubated 10/10 due to hypoxemia. Extubated 10/11    Left-sided pneumothorax    Acute kidney injury likely secondary to ATN from hypotension. Hypovolemic shock post surgery    Right lower lobe aspiration pneumonia improved    Urinary tract infection due to E. Coli    Abnormal Cardiolite stress test showing inferior ischemia.   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 Repleted    Hypothermia, probably largely environmental.  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 Plan:    IRF referral  Replete potassium  Consult Dr. Aundrea Hayes for possible chest tube, discussed with him        Care Plan discussed with: Patient/Family    Total time spent with patient: 30 minutes.     Juan J Mendoza MD

## 2020-10-13 NOTE — PROGRESS NOTES
CARDIOLOGY PROGRESS NOTE    Patient seen and examined. This is a patient who is followed for Cardiac Clearance from aorta bifemoal bypass. Progressing well, pain is managed, tolerated extubation without complications. Concerns for left pneumothorax today, comfortable on RA, in no distres. No family at bedside. No other complaints reported. Telemetry reviewed, there were no events noted in the past 24 hours. NSR with occasional PAC, no further NSVT. Pertinent review of systems items noted above, all other systems are negative. Current medications reviewed. Physical Examination  Vital signs are stable. Blood pressure 117/65, Pulse 84  Cooperative, No apparent distress. Heart is regular, rate and rhythm. Normal S1, S2, no murmurs are appreciated. Lungs are  diminished bilaterally, RA  Abdomen is soft, nontender, normal bowel sounds. Extremities have  +1 improved edema    Labs reviewed:   Recent Results (from the past 12 hour(s))   CBC WITH AUTOMATED DIFF    Collection Time: 10/13/20  5:10 AM   Result Value Ref Range    WBC 17.9 (H) 4.1 - 11.1 K/uL    RBC 3.21 (L) 4.10 - 5.70 M/uL    HGB 10.1 (L) 12.1 - 17.0 g/dL    HCT 28.9 (L) 36.6 - 50.3 %    MCV 90.0 80.0 - 99.0 FL    MCH 31.5 26.0 - 34.0 PG    MCHC 34.9 30.0 - 36.5 g/dL    RDW 15.9 (H) 11.5 - 14.5 %    PLATELET 093 (L) 560 - 400 K/uL    MPV 10.5 8.9 - 12.9 FL    NRBC 3.6 (H) 0  WBC    ABSOLUTE NRBC 0.65 (H) 0.00 - 0.01 K/uL    NEUTROPHILS 81 (H) 32 - 75 %    LYMPHOCYTES 5 (L) 12 - 49 %    MONOCYTES 13 5 - 13 %    EOSINOPHILS 0 0 - 7 %    BASOPHILS 0 0 - 1 %    IMMATURE GRANULOCYTES 1 (H) 0.0 - 0.5 %    ABS. NEUTROPHILS 14.5 (H) 1.8 - 8.0 K/UL    ABS. LYMPHOCYTES 1.0 0.8 - 3.5 K/UL    ABS. MONOCYTES 2.4 (H) 0.0 - 1.0 K/UL    ABS. EOSINOPHILS 0.0 0.0 - 0.4 K/UL    ABS. BASOPHILS 0.0 0.0 - 0.1 K/UL    ABS. IMM.  GRANS. 0.1 (H) 0.00 - 0.04 K/UL    DF AUTOMATED     RENAL FUNCTION PANEL    Collection Time: 10/13/20  5:10 AM   Result Value Ref Range    Sodium 140 136 - 145 mmol/L    Potassium 3.2 (L) 3.5 - 5.1 mmol/L    Chloride 105 97 - 108 mmol/L    CO2 29 21 - 32 mmol/L    Anion gap 6 5 - 15 mmol/L    Glucose 71 65 - 100 mg/dL    BUN 15 6 - 20 mg/dL    Creatinine 0.61 (L) 0.70 - 1.30 mg/dL    BUN/Creatinine ratio 25 (H) 12 - 20      GFR est AA >60 >60 ml/min/1.73m2    GFR est non-AA >60 >60 ml/min/1.73m2    Calcium 8.1 (L) 8.5 - 10.1 mg/dL    Phosphorus 3.2 2.6 - 4.7 mg/dL    Albumin 2.3 (L) 3.5 - 5.0 g/dL   MAGNESIUM    Collection Time: 10/13/20  5:10 AM   Result Value Ref Range    Magnesium 2.1 1.6 - 2.4 mg/dL       Case discussed with Dr. Liudmila Burton and our impression and recommendations are as follows:  1. Pre-op risk stratification: Progressing well; was deemed moderate risk for vascular surgery. Echo with normal EF. NST abnormal with a moderate area of ischemia. Cardiac last week shows nonobstructive CAD. 2. PAD: Continue ASA and zetia. Will require follow up with OP vascular surgery. 3. Hypotension: No longer requiring pressors, but continues on midodrine and albumin. Splenic tear stabalized. WBC has improved; continue to monitor for sepsis. No changes in echocardiogram.   4. Nonobstructive CAD: 50% disease to the left cx. He is asymptomatic currently but unable to walk due to his PAD. Will monitor symptoms after surgery. Continue ASA and zetia. 5.  NSVT: Improved, likely for hypokalemia; Continue to maintain electrolytes. Today K 3.2. Please keep serum potassium between 4-5 and serum magnesium > 2. Will sign off at this time; please re-consult if need arises. Please do not hesitate to call me or Dr. Liudmila Burton if additional questions arise.

## 2020-10-13 NOTE — H&P
History and Physical    Luis Blackwood is a 48 y.o. male who has left sided pneumothorax    Altered mental status    Associated symptoms include weakness (feet are paralyxied with minimal leg activity ). Pertinent negatives include no seizures, no hallucinations and no tingling. He is a VERY comlicated patient who has belkys sick at home for 2 months with stroke, dehydration etc and some muscle weakness partial leg paralysis. Who showed up at hospital last week unresponsive. 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 He has a a protracted course, but was going to be transferred to the floor today and as an incidental totally asymptomatic finding was find to have a 20-30% pneumothorax on the left this am.  He has been off the vent and has not been instrumented on the left and has not belkys coughing. So this is very strange!   He is a current smoker and is tall and thin and it seem to have just developed a left sided spontanous pneumo in the midst of all of his other problems     Past Medical History:   Diagnosis Date    Myositis     Familial inclusion        Past Surgical History:   Procedure Laterality Date    HX ORTHOPAEDIC      disc infused, neck        Family History   Problem Relation Age of Onset    Other Father     Other Sister     Other Brother        Social History     Socioeconomic History    Marital status:      Spouse name: Not on file    Number of children: Not on file    Years of education: Not on file    Highest education level: Not on file   Occupational History    Not on file   Social Needs    Financial resource strain: Not on file    Food insecurity     Worry: Not on file     Inability: Not on file    Transportation needs     Medical: Not on file     Non-medical: Not on file   Tobacco Use    Smoking status: Current Every Day Smoker     Packs/day: 0.50    Smokeless tobacco: Never Used   Substance and Sexual Activity    Alcohol use: Yes     Comment: occ    Drug use: Not on file    Sexual activity: Not on file   Lifestyle    Physical activity     Days per week: Not on file     Minutes per session: Not on file    Stress: Not on file   Relationships    Social connections     Talks on phone: Not on file     Gets together: Not on file     Attends Orthodoxy service: Not on file     Active member of club or organization: Not on file     Attends meetings of clubs or organizations: Not on file     Relationship status: Not on file    Intimate partner violence     Fear of current or ex partner: Not on file     Emotionally abused: Not on file     Physically abused: Not on file     Forced sexual activity: Not on file   Other Topics Concern    Not on file   Social History Narrative    Not on file       Patient has no known allergies.       Current Facility-Administered Medications   Medication Dose Route Frequency Provider Last Rate Last Dose    potassium chloride (K-DUR, KLOR-CON) SR tablet 40 mEq  40 mEq Oral NOW Adeline Zapata MD        spironolactone (ALDACTONE) tablet 25 mg  25 mg Oral BID Burton Coker MD   25 mg at 10/13/20 0933    midodrine (PROAMATINE) tablet 10 mg  10 mg Oral BID Burton Coker MD   10 mg at 10/12/20 2245    albumin human 25% (BUMINATE) solution 12.5 g  12.5 g IntraVENous BID Fannie Olson MD   12.5 g at 10/13/20 0825    furosemide (LASIX) injection 40 mg  40 mg IntraVENous DAILY Daryle Gamble, MD   40 mg at 10/13/20 8857    potassium chloride (KLOR-CON) packet for solution 40 mEq  40 mEq Per NG tube DAILY Daryle Gamble, MD   40 mEq at 10/12/20 0850    fentaNYL (PF) 1,500 mcg/30 mL (50 mcg/mL) infusion  75 mcg/hr IntraVENous TITRATE Fannie Olson MD 0.8 mL/hr at 10/11/20 1520 40 mcg/hr at 10/11/20 1520    cefepime (MAXIPIME) 1 g in 0.9% sodium chloride (MBP/ADV) 50 mL MBP  1 g IntraVENous Q8H Johnny Santiago,  mL/hr at 10/13/20 0823      metroNIDAZOLE (FLAGYL) IVPB premix 500 mg  500 mg IntraVENous Q8H Murney, Johnny,  mL/hr at 10/13/20 0623 500 mg at 10/13/20 0623    albuterol CONCENTRATE 2.5mg/0.5 mL neb soln  2.5 mg Nebulization Q6H RT Ilir Santiagon, DO   2.5 mg at 10/13/20 0817    LORazepam (ATIVAN) injection 1 mg  1 mg IntraVENous Q2H PRN Leia Olson MD        dexmedeTOMidine [facility]) 400 mcg in 0.9% sodium chloride 100 mL infusion  0.2-1.4 mcg/kg/hr IntraVENous TITRATE Leia Olson MD 5.3 mL/hr at 10/11/20 1200 0.3 mcg/kg/hr at 10/11/20 1200    sodium chloride 0.9% (NS) 3ml nebulizer solution  2.5 mL Nebulization PRN Cathy Betancourt MD   3 mL at 10/13/20 0819    midazolam (PF) (VERSED) injection 4 mg  4 mg IntraVENous Q2H PRN Leia Olson MD   4 mg at 10/10/20 1430    NOREPINephrine (LEVOPHED) 8 mg in 5% dextrose 250mL (32 mcg/mL) infusion  2 mcg/min IntraVENous TITRATE Leia Olson MD        vasopressin (VASOSTRICT) 20 Units in 0.9% sodium chloride 100 mL infusion  0.04 Units/min IntraVENous CONTINUOUS Leia Olson MD        PHENYLephrine (ALISTAIR-SYNEPHRINE) 30 mg in 0.9% sodium chloride 250 mL infusion   mcg/min IntraVENous CONTINUOUS Leia Olson MD 50 mL/hr at 10/07/20 0050 100 mcg/min at 10/07/20 0050    pantoprazole (PROTONIX) 40 mg in 0.9% sodium chloride 10 mL injection  40 mg IntraVENous Q12H Cathy Betancourt MD   40 mg at 10/13/20 9678    influenza vaccine 2020-21 (4 yrs+)(PF) (FLUCELVAX QUAD) injection 0.5 mL  0.5 mL IntraMUSCular PRIOR TO DISCHARGE Leah Mckenzie MD   Stopped at 10/07/20 0900    diphenhydrAMINE (BENADRYL) capsule 50 mg  50 mg Oral BID PRN Estela Dos Santos, PAGE   50 mg at 10/03/20 0129    phosphorus (K PHOS NEUTRAL) 250 mg tablet 2 Tab  2 Tab Oral BID Leah Mckenzie MD   2 Tab at 10/13/20 0001    levoFLOXacin (LEVAQUIN) tablet 500 mg  500 mg Oral Q24H Leah Mckenzie MD   500 mg at 10/12/20 1103  predniSONE (DELTASONE) tablet 20 mg  20 mg Oral DAILY WITH BREAKFAST Alonso Conley MD   20 mg at 10/13/20 0933    oxyCODONE IR (ROXICODONE) tablet 15 mg  15 mg Oral Q4H PRN Alonso Conley MD   15 mg at 10/12/20 1952    metoprolol tartrate (LOPRESSOR) tablet 50 mg  50 mg Oral BID Tera Tanner MD   50 mg at 10/13/20 0933    enoxaparin (LOVENOX) injection 40 mg  40 mg SubCUTAneous Q24H Minnie Capps MD   40 mg at 10/12/20 2244    aspirin delayed-release tablet 81 mg  81 mg Oral DAILY KibotGisele, NP   81 mg at 10/13/20 0933    acetaminophen (TYLENOL) tablet 650 mg  650 mg Oral Q6H PRN Gisele Long NP   650 mg at 09/30/20 0950    ondansetron (ZOFRAN) injection 4 mg  4 mg IntraVENous Q8H PRN Chantelle Long NP   4 mg at 09/30/20 0402    guaiFENesin (ROBITUSSIN) 20 mg/mL oral liquid 400 mg  400 mg Oral Q6H PRN Wesley Nair NP   Stopped at 09/26/20 1855       Review of Systems   Constitutional: Positive for weight loss. Negative for chills, diaphoresis, fever and malaise/fatigue. No decreased activity   HENT: Negative for congestion, ear pain, hearing loss, nosebleeds and sore throat. Eyes: Negative for blurred vision, double vision and discharge. No visual disturbances  No icterus   Respiratory: Negative for cough, hemoptysis, sputum production, shortness of breath, wheezing and stridor. No cyanosis  No sleep apnea   Cardiovascular: Negative for chest pain, palpitations, orthopnea, claudication, leg swelling and PND. No bradycardia  No tachycardia  No syncope   Gastrointestinal: Negative for abdominal pain, blood in stool, constipation, diarrhea, heartburn, melena, nausea and vomiting. Genitourinary: Negative for dysuria, flank pain, frequency, hematuria and urgency. No lesions  No uretheral discharge   Musculoskeletal: Negative for back pain, falls, joint pain, myalgias and neck pain.         No decreased range of motion   Skin: Negative for itching and rash. No abrasions,   No skin breakdown,   No burns  No dry skin  No petechia,   No skin lesions  No hypertrophic scar,   No keloid scar   Neurological: Positive for weakness (feet are paralyxied with minimal leg activity ). Negative for dizziness, tingling, tremors, speech change, focal weakness, seizures, loss of consciousness and headaches. Endo/Heme/Allergies: Negative for polydipsia. Does not bruise/bleed easily. No swollen lymph glands  No excessive thirst,  No heat intolerance  No excessive hunger  Not immuno compromised  No recurrent fevers  No recurrentt infections,    Psychiatric/Behavioral: Negative for depression, hallucinations and suicidal ideas. No suicidal  No Nitza        Patient Active Problem List   Diagnosis Code    Metabolic encephalopathy W44.59    UTI (urinary tract infection) N39.0    Aspiration pneumonitis (HCC) J69.0    Essential hypertension I10    Acute dehydration E86.0       /65   Pulse 84   Temp 98.2 °F (36.8 °C)   Resp 22   Ht 6' (1.829 m)   Wt 156 lb 8.4 oz (71 kg)   SpO2 93%   BMI 21.23 kg/m²     Physical Exam  Vitals signs and nursing note reviewed. Constitutional:       Appearance: Normal appearance. He is well-developed. Comments: Ambulating nromally,   HENT:      Mouth/Throat:      Lips: No lesions. Dentition: Normal dentition. No dental caries. Tongue: No lesions. Tongue does not deviate from midline. Palate: No mass and lesions. Pharynx: No posterior oropharyngeal erythema. Eyes:      General: Lids are normal.      Conjunctiva/sclera: Conjunctivae normal.      Right eye: Right conjunctiva is not injected. Left eye: Left conjunctiva is not injected. Pupils: Pupils are equal, round, and reactive to light. Comments: No Xanthelasma  No Ptosis  Sclera Non icteric   Neck:      Thyroid: No thyroid mass or thyromegaly. Vascular: No carotid bruit, hepatojugular reflux or JVD. Cardiovascular:      Rate and Rhythm: Normal rate and regular rhythm. Chest Wall: PMI is not displaced. Pulses: Normal pulses. Carotid pulses are 2+ on the right side and 2+ on the left side. Radial pulses are 2+ on the right side and 2+ on the left side. Femoral pulses are 2+ on the right side and 2+ on the left side. Popliteal pulses are 2+ on the right side and 2+ on the left side. Dorsalis pedis pulses are 2+ on the right side and 2+ on the left side. Posterior tibial pulses are 2+ on the right side and 2+ on the left side. Heart sounds: Normal heart sounds. No murmur. No friction rub. No gallop. Pulmonary:      Effort: Pulmonary effort is normal. No tachypnea, bradypnea, accessory muscle usage, respiratory distress or retractions. Breath sounds: Normal breath sounds. No stridor. Abdominal:      General: Bowel sounds are normal. There is no distension. Palpations: Abdomen is soft. There is no hepatomegaly, splenomegaly or mass. Tenderness: There is no abdominal tenderness. Comments: Liver Non Tender, No hepatomegaly  Spleen non tender, no splenomegaly   Musculoskeletal: Normal range of motion. Comments: Digits and Nails No cyanosis   Lymphadenopathy:      Cervical: No cervical adenopathy. Upper Body:      Right upper body: No supraclavicular adenopathy. Left upper body: No supraclavicular adenopathy. Skin:     Coloration: Skin is not jaundiced. Findings: No abrasion, bruising, ecchymosis, erythema, lesion or petechiae. Nails: There is no clubbing. Neurological:      General: No focal deficit present. Mental Status: He is alert and oriented to person, place, and time. Cranial Nerves: Cranial nerves are intact. Sensory: Sensation is intact. Motor: Weakness (feet are paralyzd and minimal knee movement) present. Coordination: Coordination is intact. Gait: Gait is intact. Gait normal.      Comments: Normal Stance   Psychiatric:         Attention and Perception: Attention normal.         Mood and Affect: Mood and affect normal.         Speech: Speech normal.         Behavior: Behavior normal.         Problem List Items Addressed This Visit     None      Visit Diagnoses     Encephalopathy acute    -  Primary    Acute occlusion of aortoiliac artery (HCC)        Pain in both lower extremities        TIA (transient ischemic attack)        Chest pain, unspecified type        Relevant Orders    CARDIAC PROCEDURE (Completed)          Assessment and Plan: left spontanus pneumothorax,  Will re check cxr now which is about 8 hours after first one and see what is going on and if he needs a tube or not. I have reviewed his films and agree with diagnosis of pneumothorax by radiology.      Aleksey Dwyer MD

## 2020-10-13 NOTE — PROGRESS NOTES
Pulmonology and Critical Care Progress Note    Subjective:     Chief Complaint:   Chief Complaint   Patient presents with    Altered mental status      Patient seen and examined at the bedside this afternoon. The patient underwent aortobifemoral bypass surgery 10/6/20. Post surgery he was left on the ventilator. Apparently blood loss was about 3.5 L. When he returned to the ICU he was on multiple pressors. Patient self-extubated on 10/9/20 early in the morning and was not doing well with ventimask, therefore was switched to BiPAP in the evening. Chest x-ray on 10/11/20 demonstrated complete left lung opacification due to mucous plugging. He is status post flexible bronchoscopy with suctioning of mucous plug and successful reinflation of left lung. Patient was successfully extubated. Cardiac catheterization was done on 10/5. Noted. His nuclear medicine cardiac cath showed ischemia of the inferior wall. Modified barium swallow test showed issues of dysphagia. Chest x-ray from this morning showed left 20- 30% pneumothorax. Pt not in any distress. At bedside today patient is alert and responding appropriately. He is overall weak. Wife present at bedside. Review of Systems:  Has chronic Reese catheter placement.   Had mild abdominal discomfort otherwise doing well    Current Facility-Administered Medications   Medication Dose Route Frequency Provider Last Rate Last Dose    potassium chloride 10 mEq in 100 ml IVPB  10 mEq IntraVENous Q1H Burton Coker  mL/hr at 10/13/20 1700 10 mEq at 10/13/20 1700    spironolactone (ALDACTONE) tablet 25 mg  25 mg Oral BID Burton Coker MD   25 mg at 10/13/20 0933    midodrine (PROAMATINE) tablet 10 mg  10 mg Oral BID Burton Coker MD   10 mg at 10/13/20 0900    albumin human 25% (BUMINATE) solution 12.5 g  12.5 g IntraVENous BID Betty Olson MD   12.5 g at 10/13/20 0825    furosemide (LASIX) injection 40 mg  40 mg IntraVENous DAILY Garry Gupta MD   40 mg at 10/13/20 6135    potassium chloride (KLOR-CON) packet for solution 40 mEq  40 mEq Per NG tube DAILY Garry Gupta MD   40 mEq at 10/12/20 0850    fentaNYL (PF) 1,500 mcg/30 mL (50 mcg/mL) infusion  75 mcg/hr IntraVENous TITRATE Alex Olson MD 0.8 mL/hr at 10/11/20 1520 40 mcg/hr at 10/11/20 1520    cefepime (MAXIPIME) 1 g in 0.9% sodium chloride (MBP/ADV) 50 mL MBP  1 g IntraVENous Q8H Johnny Santiago,  mL/hr at 10/13/20 1000 1 g at 10/13/20 1000    metroNIDAZOLE (FLAGYL) IVPB premix 500 mg  500 mg IntraVENous Q8H Johnny Santiago,  mL/hr at 10/13/20 1431 500 mg at 10/13/20 1431    albuterol CONCENTRATE 2.5mg/0.5 mL neb soln  2.5 mg Nebulization Q6H RT Johnny Santiago, DO   2.5 mg at 10/13/20 1401    LORazepam (ATIVAN) injection 1 mg  1 mg IntraVENous Q2H PRN Alex Olson MD        dexmedeTOMidine [facility]) 400 mcg in 0.9% sodium chloride 100 mL infusion  0.2-1.4 mcg/kg/hr IntraVENous TITRATE Alex Olson MD 5.3 mL/hr at 10/11/20 1200 0.3 mcg/kg/hr at 10/11/20 1200    sodium chloride 0.9% (NS) 3ml nebulizer solution  2.5 mL Nebulization PRN Nallely Malik MD   3 mL at 10/13/20 1402    midazolam (PF) (VERSED) injection 4 mg  4 mg IntraVENous Q2H PRN Alex Olson MD   4 mg at 10/10/20 1430    NOREPINephrine (LEVOPHED) 8 mg in 5% dextrose 250mL (32 mcg/mL) infusion  2 mcg/min IntraVENous TITRATE Aelx Olson MD        vasopressin (VASOSTRICT) 20 Units in 0.9% sodium chloride 100 mL infusion  0.04 Units/min IntraVENous CONTINUOUS Alex Olson MD        PHENYLephrine (ALISTAIR-SYNEPHRINE) 30 mg in 0.9% sodium chloride 250 mL infusion   mcg/min IntraVENous CONTINUOUS Wesley, Alex Snow MD 50 mL/hr at 10/07/20 0050 100 mcg/min at 10/07/20 0050    pantoprazole (PROTONIX) 40 mg in 0.9% sodium chloride 10 mL injection  40 mg IntraVENous Q12H Nallely Malik MD   40 mg at 10/13/20 0823    influenza vaccine 2020-21 (4 yrs+)(PF) (FLUCELVAX QUAD) injection 0.5 mL  0.5 mL IntraMUSCular PRIOR TO DISCHARGE Zaina Berman MD   Stopped at 10/07/20 0900    diphenhydrAMINE (BENADRYL) capsule 50 mg  50 mg Oral BID PRN Abraham Cook NP   50 mg at 10/03/20 0129    phosphorus (K PHOS NEUTRAL) 250 mg tablet 2 Tab  2 Tab Oral BID Zaina Berman MD   2 Tab at 10/13/20 1114    levoFLOXacin (LEVAQUIN) tablet 500 mg  500 mg Oral Q24H Zaina Berman MD   500 mg at 10/13/20 1114    predniSONE (DELTASONE) tablet 20 mg  20 mg Oral DAILY WITH BREAKFAST Zaina Berman MD   20 mg at 10/13/20 0933    oxyCODONE IR (ROXICODONE) tablet 15 mg  15 mg Oral Q4H PRN Zaina Berman MD   15 mg at 10/12/20 1952    metoprolol tartrate (LOPRESSOR) tablet 50 mg  50 mg Oral BID Mechelle Au MD   50 mg at 10/13/20 0933    enoxaparin (LOVENOX) injection 40 mg  40 mg SubCUTAneous Q24H Jose Dubon MD   40 mg at 10/12/20 2244    aspirin delayed-release tablet 81 mg  81 mg Oral DAILY Gisele Long NP   81 mg at 10/13/20 0933    acetaminophen (TYLENOL) tablet 650 mg  650 mg Oral Q6H PRN Carlota SUTTON NP   650 mg at 20 0950    ondansetron (ZOFRAN) injection 4 mg  4 mg IntraVENous Q8H PRN Yoly Long NP   4 mg at 20 0402    guaiFENesin (ROBITUSSIN) 20 mg/mL oral liquid 400 mg  400 mg Oral Q6H PRN Saranya Villa NP   Stopped at 20 1855            No Known Allergies        Objective:     Blood pressure 129/72, pulse 77, temperature 96.8 °F (36 °C), resp. rate 22, height 6' (1.829 m), weight 71 kg (156 lb 8.4 oz), SpO2 92 %. Temp (24hrs), Av.8 °F (36.6 °C), Min:96.8 °F (36 °C), Max:98.2 °F (36.8 °C)      Intake and Output:  Current Shift: 10/13 0701 - 10/13 1900  In: -   Out: 3896 [Urine:3100; Drains:40]  Last 3 Shifts: 10/11 1901 - 10/13 0700  In: 1612.2 [P.O.:200; I.V.:850]  Out: 2140 [Urine:1730; Drains:410]    Physical Exam:     General: Lying in bed, on nasal cannula oxygen. Wide-awake and following commands. No acute distress  Throat and Neck: Supple. No JVD. He has a right subclavian central line. Lung:  Much improved aeration bilaterally, minimal rhonchi in the left base. Heart: S1+S2. No murmurs  Abdomen: Status post surgery. He has a midline incision. He has 2 HELGA drains one on each side. Bowel sounds are hypoactive. Draining serosanguineous fluid. Extremities:  He has pitting edema. Distal pulses of the lower extremities are noted with Dopplers. Has skin breakdown on the dependent portion of heel. : Reese catheter in place. Making some urine output. Skin: No cyanosis  Neurologic: Wakes up easily, following commands. Lab/Data Review: All lab results for the last 24 hours reviewed. Recent Results (from the past 24 hour(s))   CBC WITH AUTOMATED DIFF    Collection Time: 10/13/20  5:10 AM   Result Value Ref Range    WBC 17.9 (H) 4.1 - 11.1 K/uL    RBC 3.21 (L) 4.10 - 5.70 M/uL    HGB 10.1 (L) 12.1 - 17.0 g/dL    HCT 28.9 (L) 36.6 - 50.3 %    MCV 90.0 80.0 - 99.0 FL    MCH 31.5 26.0 - 34.0 PG    MCHC 34.9 30.0 - 36.5 g/dL    RDW 15.9 (H) 11.5 - 14.5 %    PLATELET 898 (L) 294 - 400 K/uL    MPV 10.5 8.9 - 12.9 FL    NRBC 3.6 (H) 0  WBC    ABSOLUTE NRBC 0.65 (H) 0.00 - 0.01 K/uL    NEUTROPHILS 81 (H) 32 - 75 %    LYMPHOCYTES 5 (L) 12 - 49 %    MONOCYTES 13 5 - 13 %    EOSINOPHILS 0 0 - 7 %    BASOPHILS 0 0 - 1 %    IMMATURE GRANULOCYTES 1 (H) 0.0 - 0.5 %    ABS. NEUTROPHILS 14.5 (H) 1.8 - 8.0 K/UL    ABS. LYMPHOCYTES 1.0 0.8 - 3.5 K/UL    ABS. MONOCYTES 2.4 (H) 0.0 - 1.0 K/UL    ABS. EOSINOPHILS 0.0 0.0 - 0.4 K/UL    ABS. BASOPHILS 0.0 0.0 - 0.1 K/UL    ABS. IMM.  GRANS. 0.1 (H) 0.00 - 0.04 K/UL    DF AUTOMATED     RENAL FUNCTION PANEL    Collection Time: 10/13/20  5:10 AM   Result Value Ref Range    Sodium 140 136 - 145 mmol/L    Potassium 3.2 (L) 3.5 - 5.1 mmol/L    Chloride 105 97 - 108 mmol/L    CO2 29 21 - 32 mmol/L    Anion gap 6 5 - 15 mmol/L    Glucose 71 65 - 100 mg/dL    BUN 15 6 - 20 mg/dL    Creatinine 0.61 (L) 0.70 - 1.30 mg/dL    BUN/Creatinine ratio 25 (H) 12 - 20      GFR est AA >60 >60 ml/min/1.73m2    GFR est non-AA >60 >60 ml/min/1.73m2    Calcium 8.1 (L) 8.5 - 10.1 mg/dL    Phosphorus 3.2 2.6 - 4.7 mg/dL    Albumin 2.3 (L) 3.5 - 5.0 g/dL   MAGNESIUM    Collection Time: 10/13/20  5:10 AM   Result Value Ref Range    Magnesium 2.1 1.6 - 2.4 mg/dL     chest X-ray    Chest x-ray results were reviewed which showed improvement in left lung inflation. Patient has airspace disease present. CT Results  (Last 48 hours)    None          Assessment:     1. Acute respiratory failure, after aortobifemoral bypass surgery on 10/6/2020. Patient self extubated himself early in the morning on 10/9/2020. Re-intubated on 10/10/20 for left-sided mucous plugging and complete left lung collapse and had flexible bronchoscopy done with suction of mucous plug. 2.  Acute metabolic encephalopathy, resolved  3. Aspiration pneumonia  4. Severe peripheral vascular disease  5. UTI  6. Hypertension  7. Familial polymyositis    Plan:     1.)    Status post acute Hypoxic respiratory failure. Patient had improved and had been on room air. On 10/6/2020 he underwent aortobifemoral bypass surgery. Post-surgery he was left on the ventilator. EBL was 3.5 L. When he arrived to the ICU he was on multiple pressors. He is now off pressors. Patient self extubated himself early in the AM on 10/9/20. Re-intubated on 10/10/20 in order to undergo bronchoscopy for left-lung collapse due to mucous plugging. S/p successful bronch with suctioning of mucous plugs on 10/10/20, bronchial washings sent for culture, no growth noted. 2.  Left pneumothorax  Repeat CXR this morning showed left 20-30% pneumothorax. Is comfortable without any distress. There is no history of any procedure in left hemithorax. The chest x-ray will be done to see whether it is spontaneously reabsorbing. Currently on 2 L of nasal, oxygen.       3.)  Aspiration/HAP pneumonia:   Started on Cefepime/Flagyl 10/10/20, white blood cell count slowly coming down. Also on prednisone, will need to wean this soon as well. On nebulized bronchodilator treatments. Modified barium swallow showed penetration and significant dysphagia. 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 Patient may require PEG tube near future. He had been on puréed diet with nectar thickened liquids. He will have reevaluation of his swallowing today. 3.)  Metabolic encephalopathy. He has a progressive neurologic disorder. Much improved mental status today. Brain MRI negative    Further recommendation per Neurology. 4.)  Dehydration and metabolic acidosis. Monitor kidney function after surgery. 5.  Possible urinary tract infection. 6.  Hypertension. Dyslipidemia and severe peripheral vascular disease. 7.  Myocardial ischemia:  He underwent nuclear stress testing which showed significant inferior and from will treat him with medical managememt    I will follow the patient closely with you. DVT and GI prophylaxis. He is on Lovenox and Protonix IV. Repeat chest x-ray in the morning . discussed with the wife and covering nurse at the bedside. More than 45 minutes spent with patient care.   Thank you for involving me in the care of this patient      Lianne Ashraf MD  [facility]  10/13/2020

## 2020-10-13 NOTE — PROGRESS NOTES
OCCUPATIONAL THERAPY RE-EVALUATION  Patient: Kevin Olivares (82 y.o. male)  Date: 10/13/2020  Diagnosis: Metabolic encephalopathy [R08.22]   <principal problem not specified>  Procedure(s) (LRB):  Aorta Bifemoral Bypass (N/A) 7 Days Post-Op  Precautions:  Fall  Chart, occupational therapy assessment, plan of care, and goals were reviewed. ASSESSMENT  Based on the objective data described below, the patient presents with generalized deconditioning, pain with mobility, c/o sharp intermittent pain in LE left > right, impaired functional mobility and self care participation, and inability to amb since July per wife. Pt initially evalauted on 10/05/2020 and pt then underwent infrarenal aorta endarterectomy with aortobifemoral bypass, which was complicated by a splenic capsular tear requiring splenectomy performed on 10/6/2020. Pt developed left pneumothorax followed by bronchoscopy done 10/10/2020 with reexpansion of left lung. New OT orders were received on 10/12/2020. Pt semi-supine in bed upon OT/PT arrival, wife present and agreeable to RA. Pt required total Ax2 for bed mobility, SBA for simulated grooming and dependent LE dressing and bed level toileting. Patient participated in AROM of BUEs while supine and instructed on UE HEP to perform throughout day, wife and patient agreeable and verbalizing understanding, pt then repositioned in the bed, bed left in chair position to improve tolerance to upright position and prepare pt for EOB activities. Per wife pt has not amb since July, DC discussed with pt and wife. If pt is able to tolerate 3 hours of therapy/day, IRF would be most appropriate, however, wife stated she was going to be taking pt home but she was agreeable to [facility] services.      Current Level of Function Impacting Discharge (ADLs): total A for mobility and pain limiting function    Other factors to consider for discharge: time since onset, severity of deficits, family support         PLAN :  Recommendations and Planned Interventions: self care training, functional mobility training, therapeutic exercise, balance training, therapeutic activities, endurance activities and patient education    Frequency/Duration: Patient will be followed by occupational therapy 5 times a week to address goals. Recommend with staff: chair position as patient able to tolerate    Recommend next OT session: grooming EOB as appropriate    Recommendation for discharge: (in order for the patient to meet his/her long term goals)  IRF, if pt discharges home will need HHOT    This discharge recommendation:  Has been made in collaboration with the attending provider and/or case management    Equipment recommendations for successful discharge (if) home: patient owns DME required for discharge       SUBJECTIVE:   Patient stated I get these shooting pains in my legs.     OBJECTIVE DATA SUMMARY:     Cognitive/Behavioral Status:  Neurologic State: Alert  Orientation Level: Oriented X4  Cognition: Appropriate decision making    Skin: bruising on BUEs    Edema: pitting edema in dwight LEs    Hearing: Auditory  Auditory Impairment: None    Vision/Perceptual:    No patient complaints at this time    Range of Motion:  AROM in BUE grossly WFLs, somewhat limited by lines    Strength:  Grossly observed to be 3+/5 in BUEs    Tone & Sensation:  Tone: normal  Sensation: intact in UEs      Functional Mobility and Transfers for ADLs:  Bed Mobility:  Rolling: Total assistance;Assist x2  Scooting: Total assistance;Assist x2    Transfers:    ADL Assessment:    Oral Facial Hygiene/Grooming: Stand-by assistance(simulated)    Toileting: Total assistance(simulated)    ADL Intervention and task modifications:    Grooming  Grooming Assistance: Stand-by assistance(simulated)  Position Performed: Other (comment)(supine)    Lower Body Dressing Assistance  Dressing Assistance: Total assistance(dependent)  Socks:  Total assistance (dependent)    Toileting  Toileting Assistance: Total assistance(dependent)(bed level)    Therapeutic Exercises:   BUE AROM while supine in bed, pt and wife verbalized understanding of UE HEP to complete throughout day    Functional Measure:    [address] [place] [id number] AM-PACTM \"6 Clicks\"                                                       Daily Activity Inpatient Short Form  How much help from another person does the patient currently need. .. Total; A Lot A Little None   1. Putting on and taking off regular lower body clothing? [x]  1 []  2 []  3 []  4   2. Bathing (including washing, rinsing, drying)? []  1 [x]  2 []  3 []  4   3. Toileting, which includes using toilet, bedpan or urinal? [x] 1 []  2 []  3 []  4   4. Putting on and taking off regular upper body clothing? []  1 [x]  2 []  3 []  4   5. Taking care of personal grooming such as brushing teeth? []  1 []  2 [x]  3 []  4   6. Eating meals? []  1 []  2 [x]  3 []  4   © 2007, Trustees of [address] [place] [id number], under license to [employer]. All rights reserved     Score: 12/24     Interpretation of Tool:  Represents clinically-significant functional categories (i.e. Activities of daily living). Percentage of Impairment CH    0%   CI    1-19% CJ    20-39% CK    40-59% CL    60-79% CM    80-99% CN     100%   [facility]  Score 6-24 24 23 20-22 15-19 10-14 7-9 6       Pain:  7/10 at rest, increasing with mobility when shooting pain occurs    Activity Tolerance:   Poor    After treatment patient left in no apparent distress:   Supine in bed, Call bell within reach and Caregiver / family present    COMMUNICATION/COLLABORATION:   The patients plan of care was discussed with: Physical therapist and Registered nurse.      OT/PT sessions occurred together for increased patient and clinician safety as pt with poor activity and would not tolerate x2    Problem: Self Care Deficits Care Plan (Adult)  Goal: *Acute Goals and Plan of Care (Insert Text)  Description: 1.  patient will brush teeth/perform oral care, set up/SBA , bed level  2. Patient will wash face, SBA, set up, bed level  3. patient will be  mod  I  for eating/drinking   4.  patient will tolerate sitting at EOB, 1 - 2 extremity support, SBA, 5 min+, in prep  for ADL tasks  5.   Patient will engage in light EDGAR UE therapeutic exercises to promote > ADL and bed  mobility independence  Outcome: Progressing Towards Goal    Yg Harvey  Time Calculation: 24 mins

## 2020-10-14 ENCOUNTER — APPOINTMENT (OUTPATIENT)
Dept: GENERAL RADIOLOGY | Age: 55
DRG: 981 | End: 2020-10-14
Attending: THORACIC SURGERY (CARDIOTHORACIC VASCULAR SURGERY)
Payer: COMMERCIAL

## 2020-10-14 LAB
ALBUMIN SERPL-MCNC: 2.7 G/DL (ref 3.5–5)
ANION GAP SERPL CALC-SCNC: 6 MMOL/L (ref 5–15)
BASOPHILS # BLD: 0 K/UL (ref 0–0.1)
BASOPHILS NFR BLD: 0 % (ref 0–1)
BUN SERPL-MCNC: 16 MG/DL (ref 6–20)
BUN/CREAT SERPL: 25 (ref 12–20)
CA-I BLD-MCNC: 8.5 MG/DL (ref 8.5–10.1)
CHLORIDE SERPL-SCNC: 105 MMOL/L (ref 97–108)
CO2 SERPL-SCNC: 31 MMOL/L (ref 21–32)
CREAT SERPL-MCNC: 0.65 MG/DL (ref 0.7–1.3)
DIFFERENTIAL METHOD BLD: ABNORMAL
EOSINOPHIL # BLD: 0 K/UL (ref 0–0.4)
EOSINOPHIL NFR BLD: 0 % (ref 0–7)
ERYTHROCYTE [DISTWIDTH] IN BLOOD BY AUTOMATED COUNT: 16.9 % (ref 11.5–14.5)
GLUCOSE SERPL-MCNC: 86 MG/DL (ref 65–100)
HCT VFR BLD AUTO: 28.9 % (ref 36.6–50.3)
HGB BLD-MCNC: 9.8 G/DL (ref 12.1–17)
IMM GRANULOCYTES # BLD AUTO: 0.1 K/UL (ref 0–0.04)
IMM GRANULOCYTES NFR BLD AUTO: 1 % (ref 0–0.5)
LYMPHOCYTES # BLD: 1.1 K/UL (ref 0.8–3.5)
LYMPHOCYTES NFR BLD: 7 % (ref 12–49)
MCH RBC QN AUTO: 31.2 PG (ref 26–34)
MCHC RBC AUTO-ENTMCNC: 33.9 G/DL (ref 30–36.5)
MCV RBC AUTO: 92 FL (ref 80–99)
MONOCYTES # BLD: 2.3 K/UL (ref 0–1)
MONOCYTES NFR BLD: 14 % (ref 5–13)
NEUTS SEG # BLD: 12.6 K/UL (ref 1.8–8)
NEUTS SEG NFR BLD: 78 % (ref 32–75)
NRBC # BLD: 0.28 K/UL (ref 0–0.01)
NRBC BLD-RTO: 1.8 PER 100 WBC
PHOSPHATE SERPL-MCNC: 3.1 MG/DL (ref 2.6–4.7)
PLATELET # BLD AUTO: 154 K/UL (ref 150–400)
PMV BLD AUTO: 11 FL (ref 8.9–12.9)
POTASSIUM SERPL-SCNC: 3.2 MMOL/L (ref 3.5–5.1)
RBC # BLD AUTO: 3.14 M/UL (ref 4.1–5.7)
SODIUM SERPL-SCNC: 142 MMOL/L (ref 136–145)
WBC # BLD AUTO: 16.1 K/UL (ref 4.1–11.1)

## 2020-10-14 PROCEDURE — 74011250636 HC RX REV CODE- 250/636: Performed by: INTERNAL MEDICINE

## 2020-10-14 PROCEDURE — 74011250637 HC RX REV CODE- 250/637: Performed by: INTERNAL MEDICINE

## 2020-10-14 PROCEDURE — 77010033678 HC OXYGEN DAILY

## 2020-10-14 PROCEDURE — 36592 COLLECT BLOOD FROM PICC: CPT

## 2020-10-14 PROCEDURE — 74011636637 HC RX REV CODE- 636/637: Performed by: INTERNAL MEDICINE

## 2020-10-14 PROCEDURE — 71045 X-RAY EXAM CHEST 1 VIEW: CPT

## 2020-10-14 PROCEDURE — 36415 COLL VENOUS BLD VENIPUNCTURE: CPT

## 2020-10-14 PROCEDURE — 74011250636 HC RX REV CODE- 250/636: Performed by: SURGERY

## 2020-10-14 PROCEDURE — 94668 MNPJ CHEST WALL SBSQ: CPT

## 2020-10-14 PROCEDURE — 80069 RENAL FUNCTION PANEL: CPT

## 2020-10-14 PROCEDURE — 74011000258 HC RX REV CODE- 258: Performed by: INTERNAL MEDICINE

## 2020-10-14 PROCEDURE — 65610000006 HC RM INTENSIVE CARE

## 2020-10-14 PROCEDURE — 0W9B30Z DRAINAGE OF LEFT PLEURAL CAVITY WITH DRAINAGE DEVICE, PERCUTANEOUS APPROACH: ICD-10-PCS | Performed by: THORACIC SURGERY (CARDIOTHORACIC VASCULAR SURGERY)

## 2020-10-14 PROCEDURE — 94640 AIRWAY INHALATION TREATMENT: CPT

## 2020-10-14 PROCEDURE — 85025 COMPLETE CBC W/AUTO DIFF WBC: CPT

## 2020-10-14 PROCEDURE — 74011250637 HC RX REV CODE- 250/637: Performed by: NURSE PRACTITIONER

## 2020-10-14 PROCEDURE — 74011000250 HC RX REV CODE- 250: Performed by: INTERNAL MEDICINE

## 2020-10-14 PROCEDURE — 32554 ASPIRATE PLEURA W/O IMAGING: CPT | Performed by: THORACIC SURGERY (CARDIOTHORACIC VASCULAR SURGERY)

## 2020-10-14 PROCEDURE — P9047 ALBUMIN (HUMAN), 25%, 50ML: HCPCS | Performed by: SURGERY

## 2020-10-14 PROCEDURE — 92526 ORAL FUNCTION THERAPY: CPT

## 2020-10-14 PROCEDURE — C9113 INJ PANTOPRAZOLE SODIUM, VIA: HCPCS | Performed by: INTERNAL MEDICINE

## 2020-10-14 RX ORDER — POTASSIUM CHLORIDE 7.45 MG/ML
10 INJECTION INTRAVENOUS
Status: COMPLETED | OUTPATIENT
Start: 2020-10-14 | End: 2020-10-14

## 2020-10-14 RX ORDER — OXYCODONE HYDROCHLORIDE 5 MG/1
15 TABLET ORAL ONCE
Status: DISPENSED | OUTPATIENT
Start: 2020-10-14 | End: 2020-10-15

## 2020-10-14 RX ORDER — POTASSIUM CHLORIDE 1.5 G/1.77G
40 POWDER, FOR SOLUTION ORAL 2 TIMES DAILY WITH MEALS
Status: DISCONTINUED | OUTPATIENT
Start: 2020-10-14 | End: 2020-10-21

## 2020-10-14 RX ADMIN — METOPROLOL TARTRATE 50 MG: 50 TABLET, FILM COATED ORAL at 22:01

## 2020-10-14 RX ADMIN — SPIRONOLACTONE 25 MG: 25 TABLET ORAL at 22:04

## 2020-10-14 RX ADMIN — POTASSIUM CHLORIDE 10 MEQ: 7.46 INJECTION, SOLUTION INTRAVENOUS at 19:40

## 2020-10-14 RX ADMIN — PREDNISONE 20 MG: 20 TABLET ORAL at 08:01

## 2020-10-14 RX ADMIN — ALBUMIN HUMAN 12.5 G: 250 SOLUTION INTRAVENOUS at 21:59

## 2020-10-14 RX ADMIN — METRONIDAZOLE 500 MG: 500 INJECTION, SOLUTION INTRAVENOUS at 06:25

## 2020-10-14 RX ADMIN — ALBUTEROL SULFATE 2.5 MG: 2.5 SOLUTION RESPIRATORY (INHALATION) at 07:22

## 2020-10-14 RX ADMIN — OXYCODONE HYDROCHLORIDE 15 MG: 5 TABLET ORAL at 08:46

## 2020-10-14 RX ADMIN — POTASSIUM CHLORIDE 10 MEQ: 7.46 INJECTION, SOLUTION INTRAVENOUS at 17:31

## 2020-10-14 RX ADMIN — ALBUTEROL SULFATE 2.5 MG: 2.5 SOLUTION RESPIRATORY (INHALATION) at 19:55

## 2020-10-14 RX ADMIN — SODIUM CHLORIDE 40 MG: 9 INJECTION, SOLUTION INTRAMUSCULAR; INTRAVENOUS; SUBCUTANEOUS at 08:00

## 2020-10-14 RX ADMIN — ENOXAPARIN SODIUM 40 MG: 40 INJECTION SUBCUTANEOUS at 22:05

## 2020-10-14 RX ADMIN — DIBASIC SODIUM PHOSPHATE, MONOBASIC POTASSIUM PHOSPHATE AND MONOBASIC SODIUM PHOSPHATE 2 TABLET: 852; 155; 130 TABLET ORAL at 08:00

## 2020-10-14 RX ADMIN — DIBASIC SODIUM PHOSPHATE, MONOBASIC POTASSIUM PHOSPHATE AND MONOBASIC SODIUM PHOSPHATE 2 TABLET: 852; 155; 130 TABLET ORAL at 22:03

## 2020-10-14 RX ADMIN — SODIUM CHLORIDE 40 MG: 9 INJECTION, SOLUTION INTRAMUSCULAR; INTRAVENOUS; SUBCUTANEOUS at 22:06

## 2020-10-14 RX ADMIN — CEFEPIME 1 G: 1 INJECTION, POWDER, FOR SOLUTION INTRAMUSCULAR; INTRAVENOUS at 17:31

## 2020-10-14 RX ADMIN — POTASSIUM CHLORIDE 40 MEQ: 1.5 FOR SOLUTION ORAL at 17:31

## 2020-10-14 RX ADMIN — ALBUTEROL SULFATE 2.5 MG: 2.5 SOLUTION RESPIRATORY (INHALATION) at 12:33

## 2020-10-14 RX ADMIN — MIDODRINE HYDROCHLORIDE 10 MG: 5 TABLET ORAL at 22:03

## 2020-10-14 RX ADMIN — CEFEPIME 1 G: 1 INJECTION, POWDER, FOR SOLUTION INTRAMUSCULAR; INTRAVENOUS at 09:06

## 2020-10-14 RX ADMIN — MIDODRINE HYDROCHLORIDE 10 MG: 5 TABLET ORAL at 09:00

## 2020-10-14 RX ADMIN — OXYCODONE HYDROCHLORIDE 15 MG: 5 TABLET ORAL at 17:41

## 2020-10-14 RX ADMIN — POTASSIUM CHLORIDE 40 MEQ: 1.5 FOR SOLUTION ORAL at 08:00

## 2020-10-14 RX ADMIN — ISODIUM CHLORIDE 1 ML: 0.03 SOLUTION RESPIRATORY (INHALATION) at 07:23

## 2020-10-14 RX ADMIN — SPIRONOLACTONE 25 MG: 25 TABLET ORAL at 08:01

## 2020-10-14 RX ADMIN — FUROSEMIDE 40 MG: 10 INJECTION, SOLUTION INTRAMUSCULAR; INTRAVENOUS at 08:01

## 2020-10-14 RX ADMIN — METRONIDAZOLE 500 MG: 500 INJECTION, SOLUTION INTRAVENOUS at 14:09

## 2020-10-14 RX ADMIN — METRONIDAZOLE 500 MG: 500 INJECTION, SOLUTION INTRAVENOUS at 23:37

## 2020-10-14 RX ADMIN — ASPIRIN 81 MG: 81 TABLET, COATED ORAL at 08:01

## 2020-10-14 RX ADMIN — OXYCODONE HYDROCHLORIDE 15 MG: 5 TABLET ORAL at 04:42

## 2020-10-14 RX ADMIN — OXYCODONE HYDROCHLORIDE 15 MG: 5 TABLET ORAL at 13:13

## 2020-10-14 RX ADMIN — LEVOFLOXACIN 500 MG: 500 TABLET, FILM COATED ORAL at 10:52

## 2020-10-14 RX ADMIN — METOPROLOL TARTRATE 50 MG: 50 TABLET, FILM COATED ORAL at 08:01

## 2020-10-14 RX ADMIN — CEFEPIME 1 G: 1 INJECTION, POWDER, FOR SOLUTION INTRAMUSCULAR; INTRAVENOUS at 03:23

## 2020-10-14 RX ADMIN — ALBUTEROL SULFATE 2.5 MG: 2.5 SOLUTION RESPIRATORY (INHALATION) at 02:45

## 2020-10-14 RX ADMIN — OXYCODONE HYDROCHLORIDE 15 MG: 5 TABLET ORAL at 23:37

## 2020-10-14 RX ADMIN — ALBUMIN HUMAN 12.5 G: 250 SOLUTION INTRAVENOUS at 08:01

## 2020-10-14 RX ADMIN — POTASSIUM CHLORIDE 10 MEQ: 7.46 INJECTION, SOLUTION INTRAVENOUS at 18:16

## 2020-10-14 NOTE — PROGRESS NOTES
Post-OP Visit    Sheridan Washington is a 48 y.o. male with spontanous pneumo from yesterday, over yesterday after noon no nchange in size this am about 1 inch bigger maybe, hard to tell, but has more atelectasis in lung it self on this am film       /75 (BP 1 Location: Right arm, BP Patient Position: At rest)   Pulse 79   Temp 99 °F (37.2 °C)   Resp 19   Ht 6' (1.829 m)   Wt 146 lb 6.2 oz (66.4 kg)   SpO2 98%   BMI 19.85 kg/m²     Physical Exam lungs cler no distress    Problem List Items Addressed This Visit        Respiratory    Aspiration pneumonitis (HCC)    Relevant Medications    predniSONE (DELTASONE) 20 mg tablet      Other Visit Diagnoses     Encephalopathy acute    -  Primary    Acute occlusion of aortoiliac artery (HCC)        Pain in both lower extremities        TIA (transient ischemic attack)        Chest pain, unspecified type        Relevant Orders    CARDIAC PROCEDURE (Completed)          Assessment and Plan: spont anous pneumo. I have elected to aspirate the pneumo today and had no continous ai rleak  and will see how it goes on films today and in the am, if it reaccures I will place a small bore chest tube.      Vinay Gonzalez MD

## 2020-10-14 NOTE — PROGRESS NOTES
0500 4 EYE SKIN ASSESSMENT done with Chelsea Schlatter RN. Pt has surgical wound midline and dwight groins drsg D/I. Pt also has HELGA drain to right and left side. Left side drsg D/I. Right  side drsg changed serosangious noted skin intact under drsg. Right hip Stage 2 skin broken small amt drainage new Mepilex applied. Sacrum unstageable cleaned and new Mepilex applied. Pt also has mepilex to upper back-preventive. . No reddness noted. Bilateral arms with multiple skin tears. Adaptic gauze and kerlix wrapped around arms. Multiple areas bruising  noted to arms bilateral. Left heel red but blanchable new Mepilex applied. Right heel red and blanchable mepilex applied. Right lateral foot under little toe dark red area noted. Right ankle unstagable  noted mepilex applied . right shin red and blanchable.

## 2020-10-14 NOTE — PROGRESS NOTES
Patient is examined. Patient is stable from pulmonary wise. Apparently he was noted to have pneumothorax on the left side. Patient denies chest pain shortness of breath. Patient's oxygen average 91-92% on 2 L nasal cannula. Chest x-ray reviewed this morning. Dr. Bipin Aaron is currently involved for the care of pneumothorax. Patient's both feet is well-perfused. There is some movements on the left left foot and the right foot today. Once the pneumothorax is resolved we will have physical therapy aggressively work on strengthening and range of motion bilateral lower extremity. We will repeat swallow evaluation if patient can be fed soft mechanical diet. Patient is currently on nectar thick puréed diet.

## 2020-10-14 NOTE — PROGRESS NOTES
Renal Progress Note    Patient: Di Mehta MRN: [id number]  SSN: [id number]    YOB: 1965  Age: 48 y.o. Sex: male      Admit Date: 9/26/2020    LOS: 18 days     Subjective:   Patient seen at bedside. Alert and awake, no acute distress. improving LEedema, on diuretics  No complaints of shortness of breath.    S/p darinage of air for pneumothorax today  K is low at 3.2 today        Current Facility-Administered Medications   Medication Dose Route Frequency    potassium chloride (KLOR-CON) packet for solution 40 mEq  40 mEq Oral BID WITH MEALS    spironolactone (ALDACTONE) tablet 25 mg  25 mg Oral BID    midodrine (PROAMATINE) tablet 10 mg  10 mg Oral BID    albumin human 25% (BUMINATE) solution 12.5 g  12.5 g IntraVENous BID    furosemide (LASIX) injection 40 mg  40 mg IntraVENous DAILY    fentaNYL (PF) 1,500 mcg/30 mL (50 mcg/mL) infusion  75 mcg/hr IntraVENous TITRATE    cefepime (MAXIPIME) 1 g in 0.9% sodium chloride (MBP/ADV) 50 mL MBP  1 g IntraVENous Q8H    metroNIDAZOLE (FLAGYL) IVPB premix 500 mg  500 mg IntraVENous Q8H    albuterol CONCENTRATE 2.5mg/0.5 mL neb soln  2.5 mg Nebulization Q6H RT    LORazepam (ATIVAN) injection 1 mg  1 mg IntraVENous Q2H PRN    dexmedeTOMidine (PRECEDEX) 400 mcg in 0.9% sodium chloride 100 mL infusion  0.2-1.4 mcg/kg/hr IntraVENous TITRATE    sodium chloride 0.9% (NS) 3ml nebulizer solution  2.5 mL Nebulization PRN    midazolam (PF) (VERSED) injection 4 mg  4 mg IntraVENous Q2H PRN    NOREPINephrine (LEVOPHED) 8 mg in 5% dextrose 250mL (32 mcg/mL) infusion  2 mcg/min IntraVENous TITRATE    vasopressin (VASOSTRICT) 20 Units in 0.9% sodium chloride 100 mL infusion  0.04 Units/min IntraVENous CONTINUOUS    PHENYLephrine (ALISTAIR-SYNEPHRINE) 30 mg in 0.9% sodium chloride 250 mL infusion   mcg/min IntraVENous CONTINUOUS    pantoprazole (PROTONIX) 40 mg in 0.9% sodium chloride 10 mL injection  40 mg IntraVENous Q12H    influenza vaccine 2020-21 (4 yrs+)(PF) (FLUCELVAX QUAD) injection 0.5 mL  0.5 mL IntraMUSCular PRIOR TO DISCHARGE    diphenhydrAMINE (BENADRYL) capsule 50 mg  50 mg Oral BID PRN    phosphorus (K PHOS NEUTRAL) 250 mg tablet 2 Tab  2 Tab Oral BID    levoFLOXacin (LEVAQUIN) tablet 500 mg  500 mg Oral Q24H    predniSONE (DELTASONE) tablet 20 mg  20 mg Oral DAILY WITH BREAKFAST    oxyCODONE IR (ROXICODONE) tablet 15 mg  15 mg Oral Q4H PRN    metoprolol tartrate (LOPRESSOR) tablet 50 mg  50 mg Oral BID    enoxaparin (LOVENOX) injection 40 mg  40 mg SubCUTAneous Q24H    aspirin delayed-release tablet 81 mg  81 mg Oral DAILY    acetaminophen (TYLENOL) tablet 650 mg  650 mg Oral Q6H PRN    ondansetron (ZOFRAN) injection 4 mg  4 mg IntraVENous Q8H PRN    guaiFENesin (ROBITUSSIN) 20 mg/mL oral liquid 400 mg  400 mg Oral Q6H PRN        Vitals:    10/14/20 1234 10/14/20 1300 10/14/20 1400 10/14/20 1500   BP:  (!) 133/91 128/73 131/68   Pulse:   84 84   Resp:  18 17    Temp:    98.8 °F (37.1 °C)   SpO2: 98% 99% 97%    Weight:       Height:         Objective:   General: alert awake well-oriented, no acute distress. HEENT: EOMI, no Icterus, no Pallor,  mucosa moist.  Neck: Neck is supple, No JVD  Lungs: decreased breathsounds at bases, no respiratory distress on inspection '  CVS: heart sounds normal,  no murmurs, no rubs. GI: soft, nontender, normal BS. Extremeties: no cyanosis,1+ dependent edema in ext   Neuro: Alert, awake, oriented x3, answering simple questions appropriately  Skin: normal skin turgor, no skin rashes.         Intake and Output:  Current Shift: 10/14 0701 - 10/14 1900  In: -   Out: 1481 [Urine:2350; Drains:30]  Last three shifts: 10/12 1901 - 10/14 0700  In: -   Out: 4150 [Urine:4000; Drains:150]      Lab/Data Review:  Recent Labs     10/14/20  0430 10/13/20  0510 10/12/20  0400   WBC 16.1* 17.9* 20.0*   HGB 9.8* 10.1* 10.8*   HCT 28.9* 28.9* 31.0*    126* 107*     Recent Labs     10/14/20  0430 10/13/20  0510 10/12/20  1800    140 142   K 3.2* 3.2* 3.9    105 104   CO2 31 29 33*   GLU 86 71 95   BUN 16 15 16   CREA 0.65* 0.61* 0.66*   CA 8.5 8.1* 8.3*   MG  --  2.1  --    PHOS 3.1 3.2  --    ALB 2.7* 2.3*  --      No results for input(s): PH, PCO2, PO2, HCO3, FIO2 in the last 72 hours. Recent Results (from the past 24 hour(s))   CBC WITH AUTOMATED DIFF    Collection Time: 10/14/20  4:30 AM   Result Value Ref Range    WBC 16.1 (H) 4.1 - 11.1 K/uL    RBC 3.14 (L) 4.10 - 5.70 M/uL    HGB 9.8 (L) 12.1 - 17.0 g/dL    HCT 28.9 (L) 36.6 - 50.3 %    MCV 92.0 80.0 - 99.0 FL    MCH 31.2 26.0 - 34.0 PG    MCHC 33.9 30.0 - 36.5 g/dL    RDW 16.9 (H) 11.5 - 14.5 %    PLATELET 235 744 - 044 K/uL    MPV 11.0 8.9 - 12.9 FL    NRBC 1.8 (H) 0  WBC    ABSOLUTE NRBC 0.28 (H) 0.00 - 0.01 K/uL    NEUTROPHILS 78 (H) 32 - 75 %    LYMPHOCYTES 7 (L) 12 - 49 %    MONOCYTES 14 (H) 5 - 13 %    EOSINOPHILS 0 0 - 7 %    BASOPHILS 0 0 - 1 %    IMMATURE GRANULOCYTES 1 (H) 0.0 - 0.5 %    ABS. NEUTROPHILS 12.6 (H) 1.8 - 8.0 K/UL    ABS. LYMPHOCYTES 1.1 0.8 - 3.5 K/UL    ABS. MONOCYTES 2.3 (H) 0.0 - 1.0 K/UL    ABS. EOSINOPHILS 0.0 0.0 - 0.4 K/UL    ABS. BASOPHILS 0.0 0.0 - 0.1 K/UL    ABS. IMM.  GRANS. 0.1 (H) 0.00 - 0.04 K/UL    DF AUTOMATED     RENAL FUNCTION PANEL    Collection Time: 10/14/20  4:30 AM   Result Value Ref Range    Sodium 142 136 - 145 mmol/L    Potassium 3.2 (L) 3.5 - 5.1 mmol/L    Chloride 105 97 - 108 mmol/L    CO2 31 21 - 32 mmol/L    Anion gap 6 5 - 15 mmol/L    Glucose 86 65 - 100 mg/dL    BUN 16 6 - 20 mg/dL    Creatinine 0.65 (L) 0.70 - 1.30 mg/dL    BUN/Creatinine ratio 25 (H) 12 - 20      GFR est AA >60 >60 ml/min/1.73m2    GFR est non-AA >60 >60 ml/min/1.73m2    Calcium 8.5 8.5 - 10.1 mg/dL    Phosphorus 3.1 2.6 - 4.7 mg/dL    Albumin 2.7 (L) 3.5 - 5.0 g/dL        Assessment and Plan:       1. severe hypokalemia:  -from diuretics and metabolic alkalosis  Will give kcl 10 meqx 3 more runs today  continue spironolactone 25 bid  Continue lasix  40 mg daily-  continue PO K supplements and will monitor K levels,  Mg is normal    2. Low urine output/ BALDEV, resolved  - likely prerenal from hypotension.    -improved with diuretics now      3. Edema: improving with good diuresis +, continue once daily lasix     4. .  Metabolic alkalosis: sec to diuretics  Improving, monitor jerri/CO2    5.  Hypotension: improved hemodynamic status  Continue midodrine 10 mg po bid  Continue to monitor BPs     6 status post  infrarenal aorta endarterectomy, with aortic by common femoral artery bypass with 14 mm x 7 mm Hemosure graft     7. Spont. Left pneumothorax: thoracic surgery monitoring. S/p needle drainage today    8.  S/p CVA/debility: continue supportive care    Signed By: Sona Gupta MD     October 14, 2020

## 2020-10-14 NOTE — ROUTINE PROCESS
PT tx attempted at 10:01 am however pt awaiting procedure for chest tube placement, per nursing hold until after procedure completed. Will continue to follow patient and attempt PT at a later time. Thank you.

## 2020-10-14 NOTE — BRIEF OP NOTE
Brief Postoperative Note    Patient: Harley Melchor  YOB: 1965  MRN: [id number]    Date of Procedure: 10/14/2020     Pre-Op Diagnosis: left pneumothorax    Post-Op Diagnosis: same      Procedure(s): left thoracentesis   ana tena   Surgical Assistant: None    Anesthesia: 10 cc lidocain    Estimated Blood Loss (mL):0    Complications: 0    Findings: aspirated out 750 cc air, no more came out     Electronically Signed by Marco Marquez MD on 10/14/2020 at 12:11 PM

## 2020-10-14 NOTE — PROGRESS NOTES
Hospitalist Progress Note           Daily Progress Note: 10/14/2020    Chief complaint: Shortness of breath and weakness  Subjective: The patient is seen for follow  up. Chest x-ray today shows persistent pneumothorax along with partially collapsed and consolidated left lung.       Problem List:  Problem List as of 10/14/2020 Date Reviewed: 9/17/2020          Codes Class Noted - Resolved    Metabolic encephalopathy ZSP-81-NI: G93.41  ICD-9-CM: 348.31  9/26/2020 - Present        UTI (urinary tract infection) ICD-10-CM: N39.0  ICD-9-CM: 599.0  9/26/2020 - Present        Aspiration pneumonitis ([address] ICD-10-CM: J69.0  ICD-9-CM: 507.0  9/26/2020 - Present        Essential hypertension ICD-10-CM: I10  ICD-9-CM: 401.9  9/26/2020 - Present        Acute dehydration ICD-10-CM: E86.0  ICD-9-CM: 276.51  9/26/2020 - Present              Medications reviewed  Current Facility-Administered Medications   Medication Dose Route Frequency    spironolactone (ALDACTONE) tablet 25 mg  25 mg Oral BID    midodrine (PROAMATINE) tablet 10 mg  10 mg Oral BID    albumin human 25% (BUMINATE) solution 12.5 g  12.5 g IntraVENous BID    furosemide (LASIX) injection 40 mg  40 mg IntraVENous DAILY    potassium chloride (KLOR-CON) packet for solution 40 mEq  40 mEq Per NG tube DAILY    fentaNYL (PF) 1,500 mcg/30 mL (50 mcg/mL) infusion  75 mcg/hr IntraVENous TITRATE    cefepime (MAXIPIME) 1 g in 0.9% sodium chloride (MBP/ADV) 50 mL MBP  1 g IntraVENous Q8H    metroNIDAZOLE (FLAGYL) IVPB premix 500 mg  500 mg IntraVENous Q8H    albuterol CONCENTRATE 2.5mg/0.5 mL neb soln  2.5 mg Nebulization Q6H RT    LORazepam (ATIVAN) injection 1 mg  1 mg IntraVENous Q2H PRN    dexmedeTOMidine (PRECEDEX) 400 mcg in 0.9% sodium chloride 100 mL infusion  0.2-1.4 mcg/kg/hr IntraVENous TITRATE    sodium chloride 0.9% (NS) 3ml nebulizer solution  2.5 mL Nebulization PRN    midazolam (PF) (VERSED) injection 4 mg  4 mg IntraVENous Q2H PRN    NOREPINephrine (LEVOPHED) 8 mg in 5% dextrose 250mL (32 mcg/mL) infusion  2 mcg/min IntraVENous TITRATE    vasopressin (VASOSTRICT) 20 Units in 0.9% sodium chloride 100 mL infusion  0.04 Units/min IntraVENous CONTINUOUS    PHENYLephrine (ALISTAIR-SYNEPHRINE) 30 mg in 0.9% sodium chloride 250 mL infusion   mcg/min IntraVENous CONTINUOUS    pantoprazole (PROTONIX) 40 mg in 0.9% sodium chloride 10 mL injection  40 mg IntraVENous Q12H    influenza vaccine - (4 yrs+)(PF) (FLUCELVAX QUAD) injection 0.5 mL  0.5 mL IntraMUSCular PRIOR TO DISCHARGE    diphenhydrAMINE (BENADRYL) capsule 50 mg  50 mg Oral BID PRN    phosphorus (K PHOS NEUTRAL) 250 mg tablet 2 Tab  2 Tab Oral BID    levoFLOXacin (LEVAQUIN) tablet 500 mg  500 mg Oral Q24H    predniSONE (DELTASONE) tablet 20 mg  20 mg Oral DAILY WITH BREAKFAST    oxyCODONE IR (ROXICODONE) tablet 15 mg  15 mg Oral Q4H PRN    metoprolol tartrate (LOPRESSOR) tablet 50 mg  50 mg Oral BID    enoxaparin (LOVENOX) injection 40 mg  40 mg SubCUTAneous Q24H    aspirin delayed-release tablet 81 mg  81 mg Oral DAILY    acetaminophen (TYLENOL) tablet 650 mg  650 mg Oral Q6H PRN    ondansetron (ZOFRAN) injection 4 mg  4 mg IntraVENous Q8H PRN    guaiFENesin (ROBITUSSIN) 20 mg/mL oral liquid 400 mg  400 mg Oral Q6H PRN       Review of Systems:   Review of systems unable to be obtained because he is intubated    Objective:   Physical Exam:     Visit Vitals  BP (!) 150/81 (BP 1 Location: Right arm, BP Patient Position: At rest)   Pulse 93   Temp 98.1 °F (36.7 °C)   Resp 19   Ht 6' (1.829 m)   Wt 66.4 kg (146 lb 6.2 oz)   SpO2 94%   BMI 19.85 kg/m²    O2 Flow Rate (L/min): 2 l/min O2 Device: Nasal cannula    Temp (24hrs), Av °F (36.7 °C), Min:96.8 °F (36 °C), Max:98.6 °F (37 °C)    No intake/output data recorded.    10/12 1901 - 10/14 0700  In: -   Out: 0072 [Urine:4000; Drains:150]    General:  awake and conversant, cooperative, no distress, appears older than stated age. Lungs:   Clear to auscultation bilaterally with diminished breath sounds bilateral bases. Chest wall:  No tenderness or deformity. Heart:  Regular rate and rhythm, S1, S2 normal, no murmur, click, rub or gallop. Abdomen:   Soft, non-tender. Diminished Bowel sounds. Dressings in place. Extremities: Extremities normal, atraumatic, positive edema bilaterally   Pulses: 2+ and symmetric all extremities. Skin: Skin color, texture, turgor normal. No rashes or lesions   Neurologic: CNII-XII intact. Data Review:       Recent Days:  Recent Labs     10/14/20  0430 10/13/20  0510 10/12/20  0400   WBC 16.1* 17.9* 20.0*   HGB 9.8* 10.1* 10.8*   HCT 28.9* 28.9* 31.0*    126* 107*     Recent Labs     10/14/20  0430 10/13/20  0510 10/12/20  1800    140 142   K 3.2* 3.2* 3.9    105 104   CO2 31 29 33*   GLU 86 71 95   BUN 16 15 16   CREA 0.65* 0.61* 0.66*   CA 8.5 8.1* 8.3*   MG  --  2.1  --    PHOS 3.1 3.2  --    ALB 2.7* 2.3*  --      No results for input(s): PH, PCO2, PO2, HCO3, FIO2 in the last 72 hours. 24 Hour Results:  Recent Results (from the past 24 hour(s))   CBC WITH AUTOMATED DIFF    Collection Time: 10/14/20  4:30 AM   Result Value Ref Range    WBC 16.1 (H) 4.1 - 11.1 K/uL    RBC 3.14 (L) 4.10 - 5.70 M/uL    HGB 9.8 (L) 12.1 - 17.0 g/dL    HCT 28.9 (L) 36.6 - 50.3 %    MCV 92.0 80.0 - 99.0 FL    MCH 31.2 26.0 - 34.0 PG    MCHC 33.9 30.0 - 36.5 g/dL    RDW 16.9 (H) 11.5 - 14.5 %    PLATELET 220 115 - 446 K/uL    MPV 11.0 8.9 - 12.9 FL    NRBC 1.8 (H) 0  WBC    ABSOLUTE NRBC 0.28 (H) 0.00 - 0.01 K/uL    NEUTROPHILS 78 (H) 32 - 75 %    LYMPHOCYTES 7 (L) 12 - 49 %    MONOCYTES 14 (H) 5 - 13 %    EOSINOPHILS 0 0 - 7 %    BASOPHILS 0 0 - 1 %    IMMATURE GRANULOCYTES 1 (H) 0.0 - 0.5 %    ABS. NEUTROPHILS 12.6 (H) 1.8 - 8.0 K/UL    ABS. LYMPHOCYTES 1.1 0.8 - 3.5 K/UL    ABS. MONOCYTES 2.3 (H) 0.0 - 1.0 K/UL    ABS. EOSINOPHILS 0.0 0.0 - 0.4 K/UL    ABS. BASOPHILS 0.0 0.0 - 0.1 K/UL    ABS. IMM. GRANS. 0.1 (H) 0.00 - 0.04 K/UL    DF AUTOMATED     RENAL FUNCTION PANEL    Collection Time: 10/14/20  4:30 AM   Result Value Ref Range    Sodium 142 136 - 145 mmol/L    Potassium 3.2 (L) 3.5 - 5.1 mmol/L    Chloride 105 97 - 108 mmol/L    CO2 31 21 - 32 mmol/L    Anion gap 6 5 - 15 mmol/L    Glucose 86 65 - 100 mg/dL    BUN 16 6 - 20 mg/dL    Creatinine 0.65 (L) 0.70 - 1.30 mg/dL    BUN/Creatinine ratio 25 (H) 12 - 20      GFR est AA >60 >60 ml/min/1.73m2    GFR est non-AA >60 >60 ml/min/1.73m2    Calcium 8.5 8.5 - 10.1 mg/dL    Phosphorus 3.1 2.6 - 4.7 mg/dL    Albumin 2.7 (L) 3.5 - 5.0 g/dL           Assessment/     Acute hypoxic respiratory failure postsurgery. Self extubated 10/09/20. Reintubated 10/10 due to hypoxemia. Extubated 10/11    Left-sided pneumothorax, remains asymptomatic    Acute kidney injury likely secondary to ATN from hypotension. Hypovolemic shock post surgery    Right lower lobe aspiration pneumonia improved    Urinary tract infection due to E. Coli    Abnormal Cardiolite stress test showing inferior ischemia. 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 Repleted    Hypothermia, probably largely environmental.  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 Plan:  Continue to monitor closely  Replete potassium  Will discuss with Dr Ariana Franklin discussed with: Patient/Family    Total time spent with patient: 30 minutes.     Jenaro Truong MD

## 2020-10-14 NOTE — PROGRESS NOTES
D/C Plan:    -d/c home   -PCP follow up  -[place] provided by [facility]    When patient is medically stable for discharge, will go home w/spouse. Home health services to be provided by [facility]. SOC x 24-48 hours after discharge.         CLAUDIA Griffith

## 2020-10-14 NOTE — PROGRESS NOTES
Pulmonology and Critical Care Progress Note    Subjective:     Chief Complaint:   Chief Complaint   Patient presents with    Altered mental status      Patient seen and examined at the bedside this afternoon. The patient underwent aortobifemoral bypass surgery 10/6/20. Post surgery he was left on the ventilator. Apparently blood loss was about 3.5 L. When he returned to the ICU he was on multiple pressors. Patient self-extubated on 10/9/20 early in the morning and was not doing well with ventimask, therefore was switched to BiPAP in the evening. Chest x-ray on 10/11/20 demonstrated complete left lung opacification due to mucous plugging. He is status post flexible bronchoscopy with suctioning of mucous plug and successful reinflation of left lung. Patient was successfully extubated. Cardiac catheterization was done on 10/5. Results Noted. His nuclear medicine cardiac cath showed ischemia of the inferior wall. Modified barium swallow test showed issues of dysphagia. Chest x-ray from yesterday showed left 20- 30% pneumothorax. Repeated today which continue to show left pneumothorax  Underwent left chest tube placement today  Pt not in any distress. At bedside today patient is alert and responding appropriately. He is overall weak. Wife present at bedside. Review of Systems:  Has chronic Reese catheter placement.   Had mild abdominal discomfort otherwise doing well    Current Facility-Administered Medications   Medication Dose Route Frequency Provider Last Rate Last Dose    potassium chloride (KLOR-CON) packet for solution 40 mEq  40 mEq Oral BID WITH MEALS Elvis Zapata MD        spironolactone (ALDACTONE) tablet 25 mg  25 mg Oral BID Burton Coker MD   25 mg at 10/14/20 0801    midodrine (PROAMATINE) tablet 10 mg  10 mg Oral BID Burton Coker MD   10 mg at 10/14/20 0900    albumin human 25% (BUMINATE) solution 12.5 g  12.5 g IntraVENous BID Bree Olson MD   12.5 g at 10/14/20 0801    furosemide (LASIX) injection 40 mg  40 mg IntraVENous DAILY Garry Gupta MD   40 mg at 10/14/20 0801    fentaNYL (PF) 1,500 mcg/30 mL (50 mcg/mL) infusion  75 mcg/hr IntraVENous TITRATE Alex Olson MD 0.8 mL/hr at 10/11/20 1520 40 mcg/hr at 10/11/20 1520    cefepime (MAXIPIME) 1 g in 0.9% sodium chloride (MBP/ADV) 50 mL MBP  1 g IntraVENous Q8H Johnny Santiago,  mL/hr at 10/14/20 0906 1 g at 10/14/20 0906    metroNIDAZOLE (FLAGYL) IVPB premix 500 mg  500 mg IntraVENous Q8H Johnny Santiago,  mL/hr at 10/14/20 0625 500 mg at 10/14/20 0625    albuterol CONCENTRATE 2.5mg/0.5 mL neb soln  2.5 mg Nebulization Q6H RT Johnny Santiago, DO   2.5 mg at 10/14/20 1233    LORazepam (ATIVAN) injection 1 mg  1 mg IntraVENous Q2H PRN Alex Olson MD        dexmedeTOMidine [facility]) 400 mcg in 0.9% sodium chloride 100 mL infusion  0.2-1.4 mcg/kg/hr IntraVENous TITRATE Alex Olson MD 5.3 mL/hr at 10/11/20 1200 0.3 mcg/kg/hr at 10/11/20 1200    sodium chloride 0.9% (NS) 3ml nebulizer solution  2.5 mL Nebulization PRN Nallely Malik MD   1 mL at 10/14/20 0723    midazolam (PF) (VERSED) injection 4 mg  4 mg IntraVENous Q2H PRN Alex Olson MD   4 mg at 10/10/20 1430    NOREPINephrine (LEVOPHED) 8 mg in 5% dextrose 250mL (32 mcg/mL) infusion  2 mcg/min IntraVENous TITRATE Alex Olson MD        vasopressin (VASOSTRICT) 20 Units in 0.9% sodium chloride 100 mL infusion  0.04 Units/min IntraVENous CONTINUOUS Alex Olson MD        PHENYLephrine (ALISTAIR-SYNEPHRINE) 30 mg in 0.9% sodium chloride 250 mL infusion   mcg/min IntraVENous CONTINUOUS Wesley, Alex Snow MD 50 mL/hr at 10/07/20 0050 100 mcg/min at 10/07/20 0050    pantoprazole (PROTONIX) 40 mg in 0.9% sodium chloride 10 mL injection  40 mg IntraVENous Q12H Nallely Malik MD   40 mg at 10/14/20 0800    influenza vaccine 2020-21 (4 yrs+)(PF) (FLUCELVAX QUAD) injection 0.5 mL  0.5 mL IntraMUSCular PRIOR TO DISCHARGE Jodi Binu Han MD   Stopped at 10/07/20 0900    diphenhydrAMINE (BENADRYL) capsule 50 mg  50 mg Oral BID PRN Blessing Bird NP   50 mg at 10/03/20 0129    phosphorus (K PHOS NEUTRAL) 250 mg tablet 2 Tab  2 Tab Oral BID Crystal Reina MD   2 Tab at 10/14/20 0800    levoFLOXacin (LEVAQUIN) tablet 500 mg  500 mg Oral Q24H Crystal Reina MD   500 mg at 10/14/20 1052    predniSONE (DELTASONE) tablet 20 mg  20 mg Oral DAILY WITH BREAKFAST Crystal Reina MD   20 mg at 10/14/20 0801    oxyCODONE IR (ROXICODONE) tablet 15 mg  15 mg Oral Q4H PRN Crystal Reina MD   15 mg at 10/14/20 0846    metoprolol tartrate (LOPRESSOR) tablet 50 mg  50 mg Oral BID Yoko Bains MD   50 mg at 10/14/20 0801    enoxaparin (LOVENOX) injection 40 mg  40 mg SubCUTAneous Q24H Angela Preciado MD   40 mg at 10/13/20 2214    aspirin delayed-release tablet 81 mg  81 mg Oral DAILY Gisele Long NP   81 mg at 10/14/20 0801    acetaminophen (TYLENOL) tablet 650 mg  650 mg Oral Q6H PRN Loree Long NP   650 mg at 20 0950    ondansetron (ZOFRAN) injection 4 mg  4 mg IntraVENous Q8H PRN Loree Long NP   4 mg at 20 0402    guaiFENesin (ROBITUSSIN) 20 mg/mL oral liquid 400 mg  400 mg Oral Q6H PRN Keyla Wilkins NP   Stopped at 20 1855            No Known Allergies        Objective:     Blood pressure 131/75, pulse 79, temperature 98.4 °F (36.9 °C), resp. rate 19, height 6' (1.829 m), weight 66.4 kg (146 lb 6.2 oz), SpO2 98 %. Temp (24hrs), Av.1 °F (36.7 °C), Min:96.8 °F (36 °C), Max:98.6 °F (37 °C)      Intake and Output:  Current Shift: 10/14 0701 - 10/14 1900  In: -   Out: 9512 [WIOB]  Last 3 Shifts: 10/12 1901 - 10/14 0700  In: -   Out: 9355 [Urine:4000; Drains:150]    Physical Exam:     General: Lying in bed, on nasal cannula oxygen. Wide-awake and following commands. No acute distress  Throat and Neck: Supple. No JVD. He has a right subclavian central line.     Lung:  Much improved aeration bilaterally, minimal rhonchi in the left base. Heart: S1+S2. No murmurs  Abdomen: Status post surgery. He has a midline incision. He has 2 HELGA drains one on each side. Bowel sounds are hypoactive. Draining serosanguineous fluid. Extremities:  He has pitting edema. Distal pulses of the lower extremities are noted with Dopplers. Has skin breakdown on the dependent portion of heel. : Reese catheter in place. Making some urine output. Skin: No cyanosis  Neurologic: Wakes up easily, following commands. Lab/Data Review: All lab results for the last 24 hours reviewed. Recent Results (from the past 24 hour(s))   CBC WITH AUTOMATED DIFF    Collection Time: 10/14/20  4:30 AM   Result Value Ref Range    WBC 16.1 (H) 4.1 - 11.1 K/uL    RBC 3.14 (L) 4.10 - 5.70 M/uL    HGB 9.8 (L) 12.1 - 17.0 g/dL    HCT 28.9 (L) 36.6 - 50.3 %    MCV 92.0 80.0 - 99.0 FL    MCH 31.2 26.0 - 34.0 PG    MCHC 33.9 30.0 - 36.5 g/dL    RDW 16.9 (H) 11.5 - 14.5 %    PLATELET 448 782 - 513 K/uL    MPV 11.0 8.9 - 12.9 FL    NRBC 1.8 (H) 0  WBC    ABSOLUTE NRBC 0.28 (H) 0.00 - 0.01 K/uL    NEUTROPHILS 78 (H) 32 - 75 %    LYMPHOCYTES 7 (L) 12 - 49 %    MONOCYTES 14 (H) 5 - 13 %    EOSINOPHILS 0 0 - 7 %    BASOPHILS 0 0 - 1 %    IMMATURE GRANULOCYTES 1 (H) 0.0 - 0.5 %    ABS. NEUTROPHILS 12.6 (H) 1.8 - 8.0 K/UL    ABS. LYMPHOCYTES 1.1 0.8 - 3.5 K/UL    ABS. MONOCYTES 2.3 (H) 0.0 - 1.0 K/UL    ABS. EOSINOPHILS 0.0 0.0 - 0.4 K/UL    ABS. BASOPHILS 0.0 0.0 - 0.1 K/UL    ABS. IMM.  GRANS. 0.1 (H) 0.00 - 0.04 K/UL    DF AUTOMATED     RENAL FUNCTION PANEL    Collection Time: 10/14/20  4:30 AM   Result Value Ref Range    Sodium 142 136 - 145 mmol/L    Potassium 3.2 (L) 3.5 - 5.1 mmol/L    Chloride 105 97 - 108 mmol/L    CO2 31 21 - 32 mmol/L    Anion gap 6 5 - 15 mmol/L    Glucose 86 65 - 100 mg/dL    BUN 16 6 - 20 mg/dL    Creatinine 0.65 (L) 0.70 - 1.30 mg/dL    BUN/Creatinine ratio 25 (H) 12 - 20      GFR est AA >60 >60 ml/min/1.73m2    GFR est non-AA >60 >60 ml/min/1.73m2    Calcium 8.5 8.5 - 10.1 mg/dL    Phosphorus 3.1 2.6 - 4.7 mg/dL    Albumin 2.7 (L) 3.5 - 5.0 g/dL     chest X-ray    Chest x-ray results were reviewed which showed improvement in left lung inflation. Patient has airspace disease present. CT Results  (Last 48 hours)    None          Assessment:     1. Acute respiratory failure, after aortobifemoral bypass surgery on 10/6/2020. Patient self extubated himself early in the morning on 10/9/2020. Re-intubated on 10/10/20 for left-sided mucous plugging and complete left lung collapse and had flexible bronchoscopy done with suction of mucous plug. 2.  Acute metabolic encephalopathy, resolved  3. Aspiration pneumonia  4. Severe peripheral vascular disease   5. UTI   6. Hypertension  7. Familial polymyositis  8. Left spontaneous pneumothorax    Plan:     1.)    Status post acute Hypoxic respiratory failure. Patient had improved and had been on room air. On 10/6/2020 he underwent aortobifemoral bypass surgery. Post-surgery he was left on the ventilator. EBL was 3.5 L. When he arrived to the ICU he was on multiple pressors. He is now off pressors. Patient self extubated himself early in the AM on 10/9/20. Re-intubated on 10/10/20 in order to undergo bronchoscopy for left-lung collapse due to mucous plugging. S/p successful bronch with suctioning of mucous plugs on 10/10/20, bronchial washings sent for culture, no growth noted. 2.  Left pneumothorax  Repeat CXR yesterday morning showed left 20-30% pneumothorax. Is comfortable without any distress. Chest x-ray this morning continues to show left pneumothorax. There is no history of any procedure in left hemithorax. She underwent left chest tube placement today by thoracic surgery. Will repeat chest x-ray in the morning    3.)  Aspiration/HAP pneumonia:   Started on Cefepime/Flagyl 10/10/20, white blood cell count slowly coming down. Also on prednisone, will need to wean this soon as well. On nebulized bronchodilator treatments. Modified barium swallow showed penetration and significant dysphagia. 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 Patient may require PEG tube near future. He had been on puréed diet with nectar thickened liquids. He will have reevaluation of his swallowing today. 3.)  Metabolic encephalopathy. He has a progressive neurologic disorder. Much improved mental status today. Brain MRI negative    Further recommendation per Neurology. 4.)  Dehydration and metabolic acidosis. Monitor kidney function after surgery. 5.  Possible urinary tract infection. 6.  Hypertension. Dyslipidemia and severe peripheral vascular disease. 7.  Myocardial ischemia:  He underwent nuclear stress testing which showed significant inferior and from will treat him with medical managememt    I will follow the patient closely with you. DVT and GI prophylaxis. He is on Lovenox and Protonix IV. Repeat chest x-ray in the morning . discussed with the wife and covering nurse at the bedside. Discussed with wife at bedside. More than 45 minutes spent with patient care.   Thank you for involving me in the care of this patient      Ministerio Edwards MD  [facility]  10/14/2020

## 2020-10-14 NOTE — PROGRESS NOTES
DR. Nydia Alston IN AT 1115 TO PERFORM NEEDLE DECOMPRESSION OF PNEUMOTHORAX LEFT CHEST. PT TOLERATED  WITH SLIGHT DISCOMFORT. STAT CXR OBTAINED AFTER THIS WAS DONE.

## 2020-10-14 NOTE — PROGRESS NOTES
OT tx attempted at 10:02 am however pt awaiting procedure for chest tube placement, per nursing hold until after procedure completed. Will continue to follow patient and attempt OT at a later time. Thank you.

## 2020-10-14 NOTE — PROGRESS NOTES
SPEECH LANGUAGE PATHOLOGY DYSPHAGIA TREATMENT  Patient: Hailey Lowe (39 y.o. male)  Date: 10/14/2020  Diagnosis: Metabolic encephalopathy [Q58.67]   <principal problem not specified>  Procedure(s) (LRB):  Aorta Bifemoral Bypass (N/A) 8 Days Post-Op  Precautions: fall, aspiration    ASSESSMENT:  O2 saturations 91-93% on 3L NC. Minimal vocal wetness is noted. Encouraged patient to clear throat. Administered thin liquids via cup to trial. Patient w/ throat clear after the initial swallow of thin. Swallow initiation timely. HLE and protraction weak and reduced. W/ nectar thick liquids, multiple swallows elicited s/t presumptive pharyngeal residue w/ increase thickness and difficulty clearing. X1 throat clear noted. Administered soft solid cracker piece. Mastication functional. Multiple swallows elicited w/ use of nectar thick liquid wash. O2 saturations remained stable. Educated patient to use of effortful swallow response and multiple swallows. PLAN:  Recommendations and Planned Interventions:  Advance diet to NDD2 ( ground ), NECTAR. STRICT aspiration precautions. Repeat MBS warranted in 5-7 days. Encouraged nutritional supplements and p.o. intake. Again if, nutritional decline and aspiration continues, patient may need PEG. Patient continues to benefit from skilled intervention to address the above impairments. Continue treatment per established plan of care. Discharge Recommendations:  Home Health     SUBJECTIVE:   Patient seen at bedside. Per chart review, patient w/ left sided pneumothorax and partially collapsed L lung. Wife present in room. Patient continues w/ minimal intake of full nectar thick diet. OBJECTIVE:   Cognitive and Communication Status:  Neurologic State: Alert  Orientation Level: Oriented X4  Cognition: Appropriate decision making        Safety/Judgement: Decreased awareness of need for safety  Dysphagia Treatment:  Oral Assessment:     P.O.  Trials:     Vocal quality prior to P.O.:      Pain:  Pain Scale 1: Numeric (0 - 10)  Pain Intensity 1: 6  Pain Location 1: Leg    After treatment:   Patient left in no apparent distress in bed    COMMUNICATION/EDUCATION:   Patient was educated regarding his deficit(s) of swallow as this relates to his diagnosis of polymyositis. He demonstrated Good understanding as evidenced by appropriate questions asked and verbal understanding. The patient's plan of care including recommendations, planned interventions, and recommended diet changes were discussed with: Registered nurseModesto Hoffman M.S. CCC-SLP  Time Calculation: 20 mins        Problem: Dysphagia (Adult)  Goal: *Acute Goals and Plan of Care (Insert Text)  Description: Speech Therapy Goals  Initiated 9/28/2020  -Patient will participate in modified barium swallow study if indicated pending pt's progress. [ ] Not met  Candice ]  MET   [ ] Progressing  [ ] Quezada Christophl  -Patient will tolerate dysphagia 2 diet with nectar thick liquids without signs/symptoms of aspiration given moderate cues within 4 day(s). [ ] Not met  [ Brielle Fuelling  MET   [ ] Progressing  [ ] Pilar Carter  -Patient will demonstrate understanding of swallow safety precautions and aspiration precautions, diet recs with moderate cues within 7 day(s).         [ ] Not met  [ ]  MET   [ x] Progressing  [ ] Quezadaalda Carter  -Patient will tolerate full NECTAR thick diet w/ reduced s/sx of aspiration and penetration given moderate cues within 5-7 days [ REVISED]      Outcome: Progressing Towards Goal

## 2020-10-14 NOTE — PROGRESS NOTES
Spiritual Care Assessment/Progress Note  [facility]      NAME: Moises Lindsay      MRN: [id number]  AGE: 48 y.o. SEX: male  Yazidism Affiliation:    Language: English     10/14/2020     Total Time (in minutes): 10     Spiritual Assessment begun in [address] ICU through conversation with:         [x]Patient        [x] Family    [] Friend(s)        Reason for Consult: Initial/Spiritual assessment, critical care     Spiritual beliefs: (Please include comment if needed)     [] Identifies with a marcus tradition:         [] Supported by a marcus community:            [x] Claims no spiritual orientation:           [] Seeking spiritual identity:                [] Adheres to an individual form of spirituality:           [] Not able to assess:                           Identified resources for coping:      [] Prayer                               [] Music                  [] Guided Imagery     [] Family/friends                 [] Pet visits     [] Devotional reading                         [x] Unknown     [] Other:                                            Interventions offered during this visit: (See comments for more details)    Patient Interventions: Affirmation of emotions/emotional suffering, Normalization of emotional/spiritual concerns, Initial/Spiritual assessment, Critical care     Family/Friend(s):  Affirmation of emotions/emotional suffering, Initial Assessment     Plan of Care:     [] Support spiritual and/or cultural needs    [] Support AMD and/or advance care planning process      [] Support grieving process   [] Coordinate Rites and/or Rituals    [] Coordination with community clergy   [] No spiritual needs identified at this time   [] Detailed Plan of Care below (See Comments)  [] Make referral to Music Therapy  [] Make referral to Pet Therapy     [] Make referral to Addiction services  [] Make referral to [facility]  [] Make referral to Spiritual Care Partner  [] No future visits requested        [x] Follow up visits as needed     Comments: Initial ICU Critical Care Assessment visit. Patient and wife Holley Coronado were present during the visit. Patient shared about his understanding that he is doing great. Patient's wife and  reflected on her agreement with patient but after a few setbacks.  affirmed the sense of positive perspective and hopefulness.  provided words of caring and support, patient and wife expressed no needs at this time.  assured advised of  availability.     Visited by Malik Cisneros, [facility]

## 2020-10-15 ENCOUNTER — APPOINTMENT (OUTPATIENT)
Dept: GENERAL RADIOLOGY | Age: 55
DRG: 981 | End: 2020-10-15
Attending: THORACIC SURGERY (CARDIOTHORACIC VASCULAR SURGERY)
Payer: COMMERCIAL

## 2020-10-15 LAB
ALBUMIN SERPL-MCNC: 2.9 G/DL (ref 3.5–5)
ANION GAP SERPL CALC-SCNC: 6 MMOL/L (ref 5–15)
ANION GAP SERPL CALC-SCNC: 7 MMOL/L (ref 5–15)
BASOPHILS # BLD: 0 K/UL (ref 0–0.1)
BASOPHILS NFR BLD: 0 % (ref 0–1)
BUN SERPL-MCNC: 15 MG/DL (ref 6–20)
BUN SERPL-MCNC: 15 MG/DL (ref 6–20)
BUN/CREAT SERPL: 22 (ref 12–20)
BUN/CREAT SERPL: 23 (ref 12–20)
CA-I BLD-MCNC: 8.8 MG/DL (ref 8.5–10.1)
CA-I BLD-MCNC: 9 MG/DL (ref 8.5–10.1)
CHLORIDE SERPL-SCNC: 105 MMOL/L (ref 97–108)
CHLORIDE SERPL-SCNC: 106 MMOL/L (ref 97–108)
CO2 SERPL-SCNC: 30 MMOL/L (ref 21–32)
CO2 SERPL-SCNC: 31 MMOL/L (ref 21–32)
CREAT SERPL-MCNC: 0.66 MG/DL (ref 0.7–1.3)
CREAT SERPL-MCNC: 0.69 MG/DL (ref 0.7–1.3)
DIFFERENTIAL METHOD BLD: ABNORMAL
EOSINOPHIL # BLD: 0.1 K/UL (ref 0–0.4)
EOSINOPHIL NFR BLD: 1 % (ref 0–7)
ERYTHROCYTE [DISTWIDTH] IN BLOOD BY AUTOMATED COUNT: 17.7 % (ref 11.5–14.5)
GLUCOSE SERPL-MCNC: 101 MG/DL (ref 65–100)
GLUCOSE SERPL-MCNC: 102 MG/DL (ref 65–100)
HCT VFR BLD AUTO: 28.9 % (ref 36.6–50.3)
HGB BLD-MCNC: 9.7 G/DL (ref 12.1–17)
IMM GRANULOCYTES # BLD AUTO: 0.1 K/UL (ref 0–0.04)
IMM GRANULOCYTES NFR BLD AUTO: 1 % (ref 0–0.5)
LYMPHOCYTES # BLD: 1.2 K/UL (ref 0.8–3.5)
LYMPHOCYTES NFR BLD: 8 % (ref 12–49)
MAGNESIUM SERPL-MCNC: 2.2 MG/DL (ref 1.6–2.4)
MCH RBC QN AUTO: 31.5 PG (ref 26–34)
MCHC RBC AUTO-ENTMCNC: 33.6 G/DL (ref 30–36.5)
MCV RBC AUTO: 93.8 FL (ref 80–99)
MONOCYTES # BLD: 1.6 K/UL (ref 0–1)
MONOCYTES NFR BLD: 11 % (ref 5–13)
NEUTS SEG # BLD: 11.7 K/UL (ref 1.8–8)
NEUTS SEG NFR BLD: 79 % (ref 32–75)
NRBC # BLD: 0.13 K/UL (ref 0–0.01)
NRBC BLD-RTO: 0.9 PER 100 WBC
PHOSPHATE SERPL-MCNC: 2.8 MG/DL (ref 2.6–4.7)
PLATELET # BLD AUTO: 173 K/UL (ref 150–400)
PMV BLD AUTO: 10.7 FL (ref 8.9–12.9)
POTASSIUM SERPL-SCNC: 3.3 MMOL/L (ref 3.5–5.1)
POTASSIUM SERPL-SCNC: 3.3 MMOL/L (ref 3.5–5.1)
RBC # BLD AUTO: 3.08 M/UL (ref 4.1–5.7)
SODIUM SERPL-SCNC: 142 MMOL/L (ref 136–145)
SODIUM SERPL-SCNC: 143 MMOL/L (ref 136–145)
WBC # BLD AUTO: 14.5 K/UL (ref 4.1–11.1)

## 2020-10-15 PROCEDURE — 99231 SBSQ HOSP IP/OBS SF/LOW 25: CPT | Performed by: THORACIC SURGERY (CARDIOTHORACIC VASCULAR SURGERY)

## 2020-10-15 PROCEDURE — 94760 N-INVAS EAR/PLS OXIMETRY 1: CPT

## 2020-10-15 PROCEDURE — C9113 INJ PANTOPRAZOLE SODIUM, VIA: HCPCS | Performed by: INTERNAL MEDICINE

## 2020-10-15 PROCEDURE — 85025 COMPLETE CBC W/AUTO DIFF WBC: CPT

## 2020-10-15 PROCEDURE — 74011250637 HC RX REV CODE- 250/637: Performed by: INTERNAL MEDICINE

## 2020-10-15 PROCEDURE — 74011250636 HC RX REV CODE- 250/636: Performed by: INTERNAL MEDICINE

## 2020-10-15 PROCEDURE — 74011636637 HC RX REV CODE- 636/637: Performed by: INTERNAL MEDICINE

## 2020-10-15 PROCEDURE — 94640 AIRWAY INHALATION TREATMENT: CPT

## 2020-10-15 PROCEDURE — P9047 ALBUMIN (HUMAN), 25%, 50ML: HCPCS | Performed by: SURGERY

## 2020-10-15 PROCEDURE — 97530 THERAPEUTIC ACTIVITIES: CPT

## 2020-10-15 PROCEDURE — 83735 ASSAY OF MAGNESIUM: CPT

## 2020-10-15 PROCEDURE — 80069 RENAL FUNCTION PANEL: CPT

## 2020-10-15 PROCEDURE — 65270000029 HC RM PRIVATE

## 2020-10-15 PROCEDURE — 74011250637 HC RX REV CODE- 250/637: Performed by: NURSE PRACTITIONER

## 2020-10-15 PROCEDURE — 36415 COLL VENOUS BLD VENIPUNCTURE: CPT

## 2020-10-15 PROCEDURE — 94668 MNPJ CHEST WALL SBSQ: CPT

## 2020-10-15 PROCEDURE — 71045 X-RAY EXAM CHEST 1 VIEW: CPT

## 2020-10-15 PROCEDURE — 74011000250 HC RX REV CODE- 250: Performed by: INTERNAL MEDICINE

## 2020-10-15 PROCEDURE — 74011250636 HC RX REV CODE- 250/636: Performed by: SURGERY

## 2020-10-15 PROCEDURE — 74011000258 HC RX REV CODE- 258: Performed by: INTERNAL MEDICINE

## 2020-10-15 PROCEDURE — 92526 ORAL FUNCTION THERAPY: CPT

## 2020-10-15 PROCEDURE — 80048 BASIC METABOLIC PNL TOTAL CA: CPT

## 2020-10-15 RX ORDER — POTASSIUM CHLORIDE 7.45 MG/ML
10 INJECTION INTRAVENOUS
Status: COMPLETED | OUTPATIENT
Start: 2020-10-15 | End: 2020-10-15

## 2020-10-15 RX ORDER — MIDODRINE HYDROCHLORIDE 10 MG/1
10 TABLET ORAL 2 TIMES DAILY
Qty: 60 TAB | Refills: 0 | Status: SHIPPED | OUTPATIENT
Start: 2020-10-15 | End: 2020-10-27

## 2020-10-15 RX ORDER — SPIRONOLACTONE 25 MG/1
25 TABLET ORAL 3 TIMES DAILY
Status: DISCONTINUED | OUTPATIENT
Start: 2020-10-15 | End: 2020-10-17

## 2020-10-15 RX ORDER — METOPROLOL TARTRATE 25 MG/1
25 TABLET, FILM COATED ORAL 2 TIMES DAILY
Status: DISCONTINUED | OUTPATIENT
Start: 2020-10-15 | End: 2020-10-22

## 2020-10-15 RX ORDER — SPIRONOLACTONE 25 MG/1
25 TABLET ORAL 2 TIMES DAILY
Qty: 60 TAB | Refills: 0 | Status: SHIPPED | OUTPATIENT
Start: 2020-10-15 | End: 2020-10-27

## 2020-10-15 RX ORDER — PANTOPRAZOLE SODIUM 40 MG/1
40 TABLET, DELAYED RELEASE ORAL 2 TIMES DAILY
Status: DISCONTINUED | OUTPATIENT
Start: 2020-10-15 | End: 2020-10-17

## 2020-10-15 RX ORDER — MIDODRINE HYDROCHLORIDE 5 MG/1
5 TABLET ORAL 2 TIMES DAILY
Status: DISCONTINUED | OUTPATIENT
Start: 2020-10-15 | End: 2020-10-16

## 2020-10-15 RX ADMIN — ASPIRIN 81 MG: 81 TABLET, COATED ORAL at 08:48

## 2020-10-15 RX ADMIN — CEFEPIME 1 G: 1 INJECTION, POWDER, FOR SOLUTION INTRAMUSCULAR; INTRAVENOUS at 09:05

## 2020-10-15 RX ADMIN — POTASSIUM CHLORIDE 10 MEQ: 7.46 INJECTION, SOLUTION INTRAVENOUS at 19:00

## 2020-10-15 RX ADMIN — POTASSIUM CHLORIDE 10 MEQ: 7.46 INJECTION, SOLUTION INTRAVENOUS at 20:13

## 2020-10-15 RX ADMIN — POTASSIUM CHLORIDE 10 MEQ: 7.46 INJECTION, SOLUTION INTRAVENOUS at 17:19

## 2020-10-15 RX ADMIN — PANTOPRAZOLE SODIUM 40 MG: 40 TABLET, DELAYED RELEASE ORAL at 21:41

## 2020-10-15 RX ADMIN — OXYCODONE HYDROCHLORIDE 15 MG: 5 TABLET ORAL at 13:40

## 2020-10-15 RX ADMIN — ALBUTEROL SULFATE 2.5 MG: 2.5 SOLUTION RESPIRATORY (INHALATION) at 16:09

## 2020-10-15 RX ADMIN — LEVOFLOXACIN 500 MG: 500 TABLET, FILM COATED ORAL at 11:47

## 2020-10-15 RX ADMIN — SPIRONOLACTONE 25 MG: 25 TABLET ORAL at 21:41

## 2020-10-15 RX ADMIN — MIDODRINE HYDROCHLORIDE 10 MG: 5 TABLET ORAL at 08:47

## 2020-10-15 RX ADMIN — PREDNISONE 20 MG: 20 TABLET ORAL at 08:48

## 2020-10-15 RX ADMIN — ENOXAPARIN SODIUM 40 MG: 40 INJECTION SUBCUTANEOUS at 21:41

## 2020-10-15 RX ADMIN — ALBUMIN HUMAN 12.5 G: 250 SOLUTION INTRAVENOUS at 21:41

## 2020-10-15 RX ADMIN — POTASSIUM CHLORIDE 40 MEQ: 1.5 FOR SOLUTION ORAL at 17:20

## 2020-10-15 RX ADMIN — METRONIDAZOLE 500 MG: 500 INJECTION, SOLUTION INTRAVENOUS at 07:56

## 2020-10-15 RX ADMIN — OXYCODONE HYDROCHLORIDE 15 MG: 5 TABLET ORAL at 04:21

## 2020-10-15 RX ADMIN — OXYCODONE HYDROCHLORIDE 15 MG: 5 TABLET ORAL at 08:45

## 2020-10-15 RX ADMIN — METOPROLOL TARTRATE 25 MG: 25 TABLET, FILM COATED ORAL at 21:41

## 2020-10-15 RX ADMIN — METRONIDAZOLE 500 MG: 500 INJECTION, SOLUTION INTRAVENOUS at 23:12

## 2020-10-15 RX ADMIN — ALBUTEROL SULFATE 2.5 MG: 2.5 SOLUTION RESPIRATORY (INHALATION) at 07:34

## 2020-10-15 RX ADMIN — ALBUTEROL SULFATE 2.5 MG: 2.5 SOLUTION RESPIRATORY (INHALATION) at 01:44

## 2020-10-15 RX ADMIN — OXYCODONE HYDROCHLORIDE 15 MG: 5 TABLET ORAL at 19:44

## 2020-10-15 RX ADMIN — DIBASIC SODIUM PHOSPHATE, MONOBASIC POTASSIUM PHOSPHATE AND MONOBASIC SODIUM PHOSPHATE 2 TABLET: 852; 155; 130 TABLET ORAL at 08:46

## 2020-10-15 RX ADMIN — DIBASIC SODIUM PHOSPHATE, MONOBASIC POTASSIUM PHOSPHATE AND MONOBASIC SODIUM PHOSPHATE 2 TABLET: 852; 155; 130 TABLET ORAL at 23:14

## 2020-10-15 RX ADMIN — CEFEPIME 1 G: 1 INJECTION, POWDER, FOR SOLUTION INTRAMUSCULAR; INTRAVENOUS at 03:19

## 2020-10-15 RX ADMIN — FUROSEMIDE 40 MG: 10 INJECTION, SOLUTION INTRAMUSCULAR; INTRAVENOUS at 08:48

## 2020-10-15 RX ADMIN — POTASSIUM CHLORIDE 40 MEQ: 1.5 FOR SOLUTION ORAL at 08:46

## 2020-10-15 RX ADMIN — CEFEPIME 1 G: 1 INJECTION, POWDER, FOR SOLUTION INTRAMUSCULAR; INTRAVENOUS at 17:19

## 2020-10-15 RX ADMIN — ALBUTEROL SULFATE 2.5 MG: 2.5 SOLUTION RESPIRATORY (INHALATION) at 20:43

## 2020-10-15 RX ADMIN — METRONIDAZOLE 500 MG: 500 INJECTION, SOLUTION INTRAVENOUS at 15:00

## 2020-10-15 RX ADMIN — SODIUM CHLORIDE 40 MG: 9 INJECTION, SOLUTION INTRAMUSCULAR; INTRAVENOUS; SUBCUTANEOUS at 08:46

## 2020-10-15 RX ADMIN — ALBUMIN HUMAN 12.5 G: 250 SOLUTION INTRAVENOUS at 08:46

## 2020-10-15 RX ADMIN — METOPROLOL TARTRATE 50 MG: 50 TABLET, FILM COATED ORAL at 08:48

## 2020-10-15 RX ADMIN — SPIRONOLACTONE 25 MG: 25 TABLET ORAL at 08:47

## 2020-10-15 NOTE — DISCHARGE SUMMARY
Physician Discharge Summary     Patient ID:    Sheridan Washington  [id number]  48 y.o.  1965    Admit date: 9/26/2020    Discharge date : 10/15/2020    Chronic Diagnoses:    Problem List as of 10/15/2020 Date Reviewed: 9/17/2020          Codes Class Noted - Resolved    Metabolic encephalopathy NSU-09-QD: G93.41  ICD-9-CM: 348.31  9/26/2020 - Present        UTI (urinary tract infection) ICD-10-CM: N39.0  ICD-9-CM: 599.0  9/26/2020 - Present        Aspiration pneumonitis ([address] ICD-10-CM: J69.0  ICD-9-CM: 507.0  9/26/2020 - Present        Essential hypertension ICD-10-CM: I10  ICD-9-CM: 401.9  9/26/2020 - Present        Acute dehydration ICD-10-CM: E86.0  ICD-9-CM: 276.51  9/26/2020 - Present          22    Final Diagnoses:   Metabolic encephalopathy [J19.65]     Acute hypoxic respiratory failure postsurgery. Self extubated 10/09/20. Reintubated 10/10 due to hypoxemia. Extubated 10/11     Left-sided pneumothorax, overall improved     Acute kidney injury likely secondary to ATN from hypotension. Improved     Hypovolemic shock post surgery. Improved     Right lower lobe aspiration pneumonia improved     Urinary tract infection due to E. Coli     Abnormal Cardiolite stress test showing inferior ischemia. 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 Repleted     Hypothermia, probably largely environmental.  Improved     Mid abdominal aortic occlusion status post infrarenal aortic endarterectomy with aortobifemoral bypass on 10/6     High-grade right renal artery stenosis     Severe dehydration due to poor oral intake, improved     Benign essential hypertension     History of inclusion body myositis     Generalized debilitation from medical illness     Moderate protein calorie malnutrition     Metabolic acidosis.      Reason for Hospitalization:  Patient is a 67-year-old male with a history of inclusion body myositis who was admitted on 9/26 after presenting with generalized weakness for several weeks as well as poor oral intake. More acutely, he developed altered mental status and severe lethargy. He was felt to have aspirated while in the ED and was started on BiPAP. He was noted to have hypoxia and hypercapnia. Urinalysis suggested a UTI    His wife had actually noted a progressive decline since July in terms of his weakness. Hospital Course:   He was admitted to the medical floor. CTA was obtained which showed a right lower lobe pneumonia. Culture grew E. coli which was fairly pan susceptible and was treated with appropriate antibiotic therapy    With IV fluids and antibiotics his mentation steadily improved and ultimately returned to baseline    Patient remained very weak, especially in the lower extremities    MRI of the brain was obtained which was negative    Initial CTA of the chest also showed the incidental finding of decreased attenuation of the right kidney which was concerning for medical renal disease including ischemia. For that reason, a CTA of the abdomen pelvis was done which demonstrated a mid abdominal aortic occlusion with collateralization to the legs. There was high-grade stenosis of the right renal artery at its origin. Vascular surgery was consulted. Although this was felt to be a chronic process in light of the collateralization, his leg weakness was felt likely largely due to arterial insufficiency to the legs. Revascularization was recommended    Cardiology was consulted for preoperative evaluation. His echocardiogram was unremarkable    Nuclear medicine stress test was obtained which demonstrated areas of ischemia in the inferior wall. Cardiac catheterization was then recommended and demonstrated no obstructive coronary artery disease    Patient underwent infrarenal aortic endarterectomy and aortobifemoral bypass on October 8.   He was profoundly hypotensive immediately post procedure and required 3 vasopressors at 1 point. Patient developed atelectasis of the left lung postoperatively and underwent bronchoscopy on 10/10 with reexpansion of the lung. He was extubated on 10/11 and quickly weaned to room air    His renal function remained stable throughout. He did have some issues with hypokalemia    On 10/13 a routine chest x-ray demonstrated a 30 to 40% pneumothorax on the left. This was felt to be spontaneous as patient had no lines or procedures on that side. Thoracic surgery was consulted and this was initially just monitored. Patient was completely asymptomatic and with normal oxygenation    On 10/14 Dr. Shima Gardner performed aspiration of the pneumothorax. There was no evidence for any air leak. Pneumothorax did seem to improve somewhat. Patient does appear to have some degree of fibrotic lung which may explain the poor reexpansion    It was Dr. Genaro Butt opinion that patient simply be followed in the future and that this should not require him to stay in the hospital.  He would like to see the patient in 1 week    Patient was felt medically stable for discharge on 10/15. Although we recommended inpatient rehabilitation, patient and his wife refused and opted to go home instead            Discharge Medications:   Current Discharge Medication List      START taking these medications    Details   spironolactone (ALDACTONE) 25 mg tablet Take 1 Tab by mouth two (2) times a day. Qty: 60 Tab, Refills: 0      midodrine (PROAMATINE) 10 mg tablet Take 1 Tab by mouth two (2) times a day for 30 days. Qty: 60 Tab, Refills: 0      !! lisinopriL (PRINIVIL, ZESTRIL) 20 mg tablet Take 1 Tab by mouth daily. Qty: 30 Tab, Refills: 0      metoprolol tartrate (LOPRESSOR) 50 mg tablet Take 1 Tab by mouth two (2) times a day. Qty: 30 Tab, Refills: 0      levoFLOXacin (LEVAQUIN) 500 mg tablet Take 1 Tab by mouth every twenty-four (24) hours for 3 days.   Qty: 3 Tab, Refills: 0       !! - Potential duplicate medications found. Please discuss with provider. CONTINUE these medications which have NOT CHANGED    Details   baclofen (LIORESAL) 10 mg tablet Take 1 Tab by mouth three (3) times daily for 30 days. Qty: 90 Tab, Refills: 1    Associated Diagnoses: Inclusion body myositis; Muscle spasms of both lower extremities      oxyCODONE IR (OXY-IR) 15 mg immediate release tablet Take 15 mg by mouth every four (4) hours as needed for Pain. aspirin delayed-release 81 mg tablet Take  by mouth daily. mirtazapine (REMERON) 15 mg tablet Take 7.5 mg by mouth nightly. pravastatin (PRAVACHOL) 20 mg tablet Take 20 mg by mouth nightly. !! lisinopriL (PRINIVIL, ZESTRIL) 5 mg tablet Take 1 Tab by mouth daily for 60 days. Qty: 30 Tab, Refills: 1    Associated Diagnoses: Essential hypertension      food supplemt, lactose-reduced (Ensure High Protein) liqd Take 237 mL by mouth two (2) times a day for 60 days. Qty: 42658 mL, Refills: 1    Comments: Please send PA request is necessary. Thank you  Associated Diagnoses: Weight loss, abnormal; Atrophy of muscle of multiple sites; Poor appetite       !! - Potential duplicate medications found. Please discuss with provider. Follow up Care:    1. Luz Alexandra MD in 1-2 weeks. Please call to set up an appointment shortly after discharge. Diet:  Cardiac Diet    Disposition:  Home. Advanced Directive:   FULL x   DNR      Discharge Exam:  General:  Alert, cooperative, no distress, appears stated age. Lungs:   Clear to auscultation bilaterally. Chest wall:  No tenderness or deformity. Heart:  Regular rate and rhythm, S1, S2 normal, no murmur, click, rub or gallop. Abdomen:   Soft, non-tender. Bowel sounds normal. No masses,  No organomegaly. Extremities: Extremities normal, atraumatic, no cyanosis or edema. Pulses: 2+ and symmetric all extremities.    Skin: Skin color, texture, turgor normal. No rashes or lesions   Neurologic: CNII-XII intact. No gross sensory or motor deficits        CONSULTATIONS: Vascular Surgery    Significant Diagnostic Studies:   9/26/2020: BUN 10 mg/dL (Ref range: 6 - 20 mg/dL); Calcium 7.7 mg/dL* (Ref range: 8.5 - 10.1 mg/dL); CO2 17 mmol/L* (Ref range: 21 - 32 mmol/L); Creatinine 0.61 mg/dL* (Ref range: 0.70 - 1.30 mg/dL); Glucose 87 mg/dL (Ref range: 65 - 100 mg/dL); HCT 31.6 %* (Ref range: 36.6 - 50.3 %); HGB 10.8 %* (Ref range: 12.1 - 17.0 %); Potassium 3.8 mmol/L (Ref range: 3.5 - 5.1 mmol/L); Sodium 138 mmol/L (Ref range: 136 - 145 mmol/L)  9/27/2020: BUN 11 mg/dL (Ref range: 6 - 20 mg/dL); Calcium 7.1 mg/dL* (Ref range: 8.5 - 10.1 mg/dL); CO2 23 mmol/L (Ref range: 21 - 32 mmol/L); Creatinine 0.55 mg/dL* (Ref range: 0.70 - 1.30 mg/dL); Glucose 92 mg/dL (Ref range: 65 - 100 mg/dL); HCT 29.2 %* (Ref range: 36.6 - 50.3 %); HGB 10.0 %* (Ref range: 12.1 - 17.0 %); Potassium 2.9 mmol/L* (Ref range: 3.5 - 5.1 mmol/L); Sodium 146 mmol/L* (Ref range: 136 - 145 mmol/L)  Recent Labs     10/15/20  0400 10/14/20  0430   WBC 14.5* 16.1*   HGB 9.7* 9.8*   HCT 28.9* 28.9*    154     Recent Labs     10/15/20  0400 10/14/20  0430 10/13/20  0510     142 142 140   K 3.3*  3.3* 3.2* 3.2*     105 105 105   CO2 30  31 31 29   BUN 15  15 16 15   CREA 0.69*  0.66* 0.65* 0.61*   *  101* 86 71   CA 9.0  8.8 8.5 8.1*   MG 2.2  --  2.1   PHOS 2.8 3.1 3.2     Recent Labs     10/15/20  0400 10/14/20  0430 10/13/20  0510   ALB 2.9* 2.7* 2.3*     No results for input(s): INR, PTP, APTT, INREXT in the last 72 hours. No results for input(s): FE, TIBC, PSAT, FERR in the last 72 hours. No results for input(s): PH, PCO2, PO2 in the last 72 hours. No results for input(s): CPK, CKMB in the last 72 hours.     No lab exists for component: TROPONINI  Lab Results   Component Value Date/Time    Glucose (POC) 98 09/28/2020 08:10 AM       Discharge time spent 35 minutes    Signed:  Shelby Tejada Vaishali Weiss MD  10/15/2020  1:01 PM

## 2020-10-15 NOTE — PROGRESS NOTES
CM met with patient and wife at bedside. Wife is aware that Gregorio Parmjit will call her in a little while to discuss a co pay. CM unable to find a [address] at this time that can handle the speech therapy. Dr. Rose Zamorano is aware. Will continue to monitor for discharge needs.

## 2020-10-15 NOTE — PROGRESS NOTES
Report given to Elvis on [facility] 0652.  Patient transferred to room 208. Patient's spouse, Jose Carrasco, present at bedside. No personal belongings left in room.

## 2020-10-15 NOTE — PROGRESS NOTES
CM spoke with wife and patient at bedside. CM confirmed that the patient would be going home with her with [place]. She will- need PT/OT/Speech/SN and HHA if available. Wife voiced concerns about taking the patient home since she hasn't been told by Dr. Helder Hyde or Dr. Jordana Diaz that the patient was ready to be discharged. \"Dr. Jasson Burton came in and told us that he was going to be discharged, but that's now what Dr. Jordana Diaz told me earlier today. \"  \" He said that he would be taking more x-rays to follow up on the lung issue. Dr. Helder Hyde comes in everyday and see him, but hasn't said anything to me about him being ready to go home. CM states that she would talk to Dr. Jasson Burton and Dr. Jordana Diaz. CM contacted Dr. Jasson Burton to tell him about her apprehensions with taking him home so soon. CM spoke with Dr. Jordana Diaz regarding disposition. He states \" I have little involvement with this -patient and it is up to the medical physician as to when he can be discharged. CM attempted to call Wife Paula Clancy at [phone]. Message left. Paula Clancy returned call. CM explained that per Dr. Jasson Burton and Dr. Jordana Diaz that patient is cleared for discharge. CM explained that if the patient needs to be transported home, we can arrange that as well. She voiced understanding and wanted to speak with a couple people prior to us setting that up. CM called [address] [place] [address] to confirm that they would have speech available for the patient. Due to his address, they do not have speech available. They stated that they would have to decline. Additional referral sent to [facility].   Will continue to monitor for discharge needs

## 2020-10-15 NOTE — PROCEDURES
[facility]  PROCEDURE NOTE    Name:  Tessa Guthrie  MR#:  [id number]  :  1965  ACCOUNT #:  [de-identified]  DATE OF SERVICE:  10/14/2020    PREOPERATIVE DIAGNOSIS:  Left pneumothorax. POSTOPERATIVE DIAGNOSIS:  Left pneumothorax. PROCEDURE PERFORMED:  Left thoracentesis. SURGEON:  Salvatore Sandhu MD.    ASSISTANT:  none    ANESTHESIA:  local    ESTIMATED BLOOD LOSS:  none    SPECIMENS REMOVED:  none    COMPLICATIONS:  none    IMPLANTS:  none    INDICATIONS FOR PROCEDURE:  The patient is a 21-year-old man who is in the hospital for multiple other conditions and had abdominal surgery. He is in the intensive care unit and developed a left spontaneous pneumothorax on the side that had not been instrumented at all. He was completely asymptomatic, not tachypneic, 100% saturated on room air. I have been following this for a day. It seemed to get just a little bit bigger, but was stable and I really felt he was not leaking air from it anymore, but we will aspirate it. TECHNIQUE:  At the beside after obtaining informed consent, a thoracentesis of the left chest was performed. I aspirated about 250 mL of air. I sat there for about 60 minutes, and actually got no more air out whatsoever. Interestingly, postoperative film did not look any different than before I had aspirated it. His lung must be somewhat stiff preventing it from coming up. Although it looked like there might be a little fluid in the chest, there was no such fluid that came out by thoracentesis. The patient tolerated the procedure without difficulty. I discussed further followup following the film in the next 24 hours. By the time I am dictating this,  no change in size of this pneumothorax.     Sherice Montenegro MD    [id number]  D:  10/15/2020 10:07  T:  10/15/2020 16:44  JOB #:  [id number]

## 2020-10-15 NOTE — PROGRESS NOTES
SPEECH LANGUAGE PATHOLOGY DYSPHAGIA TREATMENT  Patient: Candace Batres (22 y.o. male)  Date: 10/15/2020  Diagnosis: Metabolic encephalopathy [O59.65]   <principal problem not specified>  Procedure(s) (LRB):  Aorta Bifemoral Bypass (N/A) 9 Days Post-Op  Precautions: aspiration    ASSESSMENT:  Patient w/ discharge orders. Addressed patient and wife's concerns about his dysphagia, prognosis, aspiration, diet recommendations and thickener. Provided examples of mechanical diet. Reviewed s/sx of aspiration and aspiration PNA. Educated to thickener types and how to thicken. Patient decline p.o. trials at this time, but was receptive to education. PLAN:  Recommendations and Planned Interventions:  Continue w/ ground diet and nectar thick liquids. STRICT aspiration precautions. Use of nutritional supplements. Frequent smaller meals through out the day vs 3 large meals. Dicussed PEG option. Patient continues to benefit from skilled intervention to address the above impairments. Continue treatment per established plan of care. Discharge Recommendations:  Home Health and Outpatient     SUBJECTIVE:   Patient seen at bedside. He is resting. Did not consume lunch. Wife concerned about his discharge. He continues to have pain in his legs. OBJECTIVE:     CXR Results  (Last 48 hours)                 10/15/20 0435  XR CHEST PORT Final result    Narrative:  Chest single view. Comparison single view chest October 14, 2020       Stable right neck central venous catheter. Unchanged left sided hydropneumothorax. This is estimated to occupy 30-40% of   the volume of the left hemithorax. Right lung aerated. Cardiac and mediastinal   structures unchanged. Staples and drain present through imaged left upper quadrant abdomen. Partially   imaged cervical hardware noted. 10/14/20 1712  XR CHEST PORT Final result    Narrative:  Chest single view.        Comparison single view chest October 14, 2020 at 11:41 AM       Unchanged right neck central venous catheter. Right lung aerated. Moderate volume left side hydropneumothorax. Estimated to occupy 30-40% volume   left hemithorax. LLL atelectasis again noted. Report called to ICU. Spoke with nurse Maisha Zarco. 10/14/20 1148  XR CHEST PORT Final result    Narrative:  1 view at 1141       Left pneumothorax is smaller, both apical and basilar portions, now 10 or 15%,   as visualized. The lung is partially reexpanded. Right lung remains clear. Normal heart and midline mediastinal       10/14/20 0350  XR CHEST PORT Final result    Narrative:  1 view comparison yesterday       Central line remains       Left pneumothorax somewhat redistributed, apical component larger, 20-25% as   visualized here. And partially collapsed consolidated left lung, and effusion. Right lung remains clear. Normal heart and stable mediastinal       10/13/20 1710  XR CHEST PORT Final result    Narrative:  1 view 0343 comparison earlier same day       Left pneumothorax appears slightly improved and the lung is better expanded. The   apical component is clearly improved. Pleural effusion has redistributed,   appearing slightly larger. Underlying consolidation. Right lung remains clear. Normal heart and midline mediastinal             CT Results  (Last 48 hours)      None           Cognitive and Communication Status:  Neurologic State: Alert, Confused  Orientation Level: Oriented to person  Cognition: Follows commands        Safety/Judgement: Decreased awareness of need for safety  Pain:  Pain Scale 1: Numeric (0 - 10)  Pain Intensity 1: 7  Pain Location 1: Leg    After treatment:   Patient left in no apparent distress in bed    COMMUNICATION/EDUCATION:   Patient was educated regarding his deficit(s) of dysphagia, swallow safety precautions, diet recs and POC. He demonstrated Good understanding as evidenced by appropriate questions asked. Wife present.       Penelope Chicas M.S. CCC-SLP  Time Calculation: 15 mins      Problem: Dysphagia (Adult)  Goal: *Acute Goals and Plan of Care (Insert Text)  Description: Speech Therapy Goals  Initiated 9/28/2020  -Patient will participate in modified barium swallow study if indicated pending pt's progress. [ ] Not met  [facility] ]  MET   [ ] Progressing  [ ] Saúl Cabello  -Patient will tolerate dysphagia 2 diet with nectar thick liquids without signs/symptoms of aspiration given moderate cues within 4 day(s). [ ] Not met  [ ]  MET   [ Linda Owen  [ ] Discontinue  -Patient will demonstrate understanding of swallow safety precautions and aspiration precautions, diet recs with moderate cues within 7 day(s).         [ ] Not met  [ ]  MET   [ x] Progressing  [ ] Saúl Cabello  -Patient will tolerate full NECTAR thick diet w/ reduced s/sx of aspiration and penetration given moderate cues within 5-7 days [ MET]      Outcome: Progressing Towards Goal

## 2020-10-15 NOTE — PROGRESS NOTES
10/15/2020    Pharmacist has reviewed antibiotic current therapy. Patient is on day #16 of Levaquin therapy for UTI (start 09/30/2020). Cultures are negative. Consider d/c Levaquin as patient has completed an adequate duration of therapy.     Becky WarnerD

## 2020-10-15 NOTE — PROGRESS NOTES
Pulmonology and Critical Care Progress Note    Subjective:     Chief Complaint:   Chief Complaint   Patient presents with    Altered mental status      Patient seen and examined at the bedside this morning. The patient underwent aortobifemoral bypass surgery 10/6/20. Post surgery he was left on the ventilator. Apparently blood loss was about 3.5 L. When he returned to the ICU he was on multiple pressors. Patient self-extubated on 10/9/20 early in the morning and was not doing well with ventimask, therefore was switched to BiPAP in the evening. Chest x-ray on 10/11/20 demonstrated complete left lung opacification due to mucous plugging. He is status post flexible bronchoscopy with suctioning of mucous plug and successful reinflation of left lung. Patient was successfully extubated. Modified barium swallow test showed issues of dysphagia. Developed spontaneous left pneumothorax. Yesterday thoracic surgery aspiration of his left pneumothorax. No chest tube insertion was required. Looks comfortable today. At bedside today patient is alert and responding appropriately. He is overall weak. Wife present at bedside. Review of Systems:  Has chronic Reese catheter placement.   Had mild abdominal discomfort otherwise doing well    Current Facility-Administered Medications   Medication Dose Route Frequency Provider Last Rate Last Dose    pantoprazole (PROTONIX) tablet 40 mg  40 mg Oral BID Anton Puri MD        potassium chloride (KLOR-CON) packet for solution 40 mEq  40 mEq Oral BID WITH MEALS Jania Valle MD   40 mEq at 10/15/20 0846    spironolactone (ALDACTONE) tablet 25 mg  25 mg Oral BID Burton Coker MD   25 mg at 10/15/20 0847    midodrine (PROAMATINE) tablet 10 mg  10 mg Oral BID Burton Coker MD   10 mg at 10/15/20 0847    albumin human 25% (BUMINATE) solution 12.5 g  12.5 g IntraVENous BID Freeman Olson MD   12.5 g at 10/15/20 0846    furosemide (LASIX) injection 40 mg  40 mg IntraVENous DAILY Armando Aguilar MD   40 mg at 10/15/20 0848    cefepime (MAXIPIME) 1 g in 0.9% sodium chloride (MBP/ADV) 50 mL MBP  1 g IntraVENous Q8H Johnny Santiago,  mL/hr at 10/15/20 0905 1 g at 10/15/20 0905    metroNIDAZOLE (FLAGYL) IVPB premix 500 mg  500 mg IntraVENous Q8H Johnny Santiago,  mL/hr at 10/15/20 1500 500 mg at 10/15/20 1500    albuterol CONCENTRATE 2.5mg/0.5 mL neb soln  2.5 mg Nebulization Q6H RT Johnny Santiago DO   2.5 mg at 10/15/20 0734    LORazepam (ATIVAN) injection 1 mg  1 mg IntraVENous Q2H PRN Sarah Beth Olson MD        sodium chloride 0.9% (NS) 3ml nebulizer solution  2.5 mL Nebulization PRN Minal Nunes MD   1 mL at 10/14/20 0723    influenza vaccine 2020-21 (4 yrs+)(PF) (FLUCELVAX QUAD) injection 0.5 mL  0.5 mL IntraMUSCular PRIOR TO DISCHARGE Quirino Alanis MD   Stopped at 10/07/20 0900    diphenhydrAMINE (BENADRYL) capsule 50 mg  50 mg Oral BID PRN Clare Madrid NP   50 mg at 10/03/20 0129    phosphorus (K PHOS NEUTRAL) 250 mg tablet 2 Tab  2 Tab Oral BID Quirino Alanis MD   2 Tab at 10/15/20 0846    levoFLOXacin (LEVAQUIN) tablet 500 mg  500 mg Oral Q24H Quirino Alanis MD   500 mg at 10/15/20 1147    predniSONE (DELTASONE) tablet 20 mg  20 mg Oral DAILY WITH BREAKFAST Quirino Alanis MD   20 mg at 10/15/20 0848    oxyCODONE IR (ROXICODONE) tablet 15 mg  15 mg Oral Q4H PRN Quirino Alanis MD   15 mg at 10/15/20 1340    metoprolol tartrate (LOPRESSOR) tablet 50 mg  50 mg Oral BID Corie Diaz MD   50 mg at 10/15/20 0848    enoxaparin (LOVENOX) injection 40 mg  40 mg SubCUTAneous Q24H Minal Nunes MD   40 mg at 10/14/20 2205    aspirin delayed-release tablet 81 mg  81 mg Oral DAILY Gisele Long NP   81 mg at 10/15/20 0848    acetaminophen (TYLENOL) tablet 650 mg  650 mg Oral Q6H PRN Gisele Long NP   650 mg at 09/30/20 0950    ondansetron (ZOFRAN) injection 4 mg  4 mg IntraVENous Q8H PRN Smith SUTTON NP   4 mg at 20 0402    guaiFENesin (ROBITUSSIN) 20 mg/mL oral liquid 400 mg  400 mg Oral Q6H PRN Anabel SUTTON NP   Stopped at 20            No Known Allergies        Objective:     Blood pressure (!) 144/76, pulse 71, temperature 98.1 °F (36.7 °C), resp. rate 20, height 6' (1.829 m), weight 59.6 kg (131 lb 6.3 oz), SpO2 100 %. Temp (24hrs), Av.3 °F (36.8 °C), Min:98 °F (36.7 °C), Max:98.8 °F (37.1 °C)      Intake and Output:  Current Shift: 10/15 0701 - 10/15 1900  In: 200 [I.V.:200]  Out: 1635 [Urine:1050; Drains:14]  Last 3 Shifts: 10/13 1901 - 10/15 0700  In: 410 [P.O.:360; I.V.:50]  Out: 5711 [Urine:5050; Drains:180]    Physical Exam:     General: Lying in bed, on nasal cannula oxygen. Wide-awake and following commands. No acute distress  Throat and Neck: Supple. No JVD. He has a right subclavian central line. Lung:  Much improved aeration bilaterally, minimal rhonchi in the left base. Heart: S1+S2. No murmurs  Abdomen: Status post surgery. He has a midline incision. He has HELGA drains one on each side. Bowel sounds are hypoactive. Draining serosanguineous fluid. Extremities:  He has pitting edema. Distal pulses of the lower extremities are noted with Dopplers. Has skin breakdown on the dependent portion of heel. : Reese catheter in place. Making some urine output. Skin: No cyanosis  Neurologic: Wakes up easily, following commands. Lab/Data Review: All lab results for the last 24 hours reviewed. Unchanged left sided hydropneumothorax. This is estimated to occupy 30-40% of  the volume of the left hemithorax. Right lung aerated. Cardiac and mediastinal  structures unchanged.     Recent Results (from the past 24 hour(s))   CBC WITH AUTOMATED DIFF    Collection Time: 10/15/20  4:00 AM   Result Value Ref Range    WBC 14.5 (H) 4.1 - 11.1 K/uL    RBC 3.08 (L) 4.10 - 5.70 M/uL    HGB 9.7 (L) 12.1 - 17.0 g/dL    HCT 28.9 (L) 36.6 - 50.3 %    MCV 93.8 80.0 - 99.0 FL MCH 31.5 26.0 - 34.0 PG    MCHC 33.6 30.0 - 36.5 g/dL    RDW 17.7 (H) 11.5 - 14.5 %    PLATELET 490 697 - 357 K/uL    MPV 10.7 8.9 - 12.9 FL    NRBC 0.9 (H) 0  WBC    ABSOLUTE NRBC 0.13 (H) 0.00 - 0.01 K/uL    NEUTROPHILS 79 (H) 32 - 75 %    LYMPHOCYTES 8 (L) 12 - 49 %    MONOCYTES 11 5 - 13 %    EOSINOPHILS 1 0 - 7 %    BASOPHILS 0 0 - 1 %    IMMATURE GRANULOCYTES 1 (H) 0.0 - 0.5 %    ABS. NEUTROPHILS 11.7 (H) 1.8 - 8.0 K/UL    ABS. LYMPHOCYTES 1.2 0.8 - 3.5 K/UL    ABS. MONOCYTES 1.6 (H) 0.0 - 1.0 K/UL    ABS. EOSINOPHILS 0.1 0.0 - 0.4 K/UL    ABS. BASOPHILS 0.0 0.0 - 0.1 K/UL    ABS. IMM. GRANS. 0.1 (H) 0.00 - 0.04 K/UL    DF AUTOMATED     METABOLIC PANEL, BASIC    Collection Time: 10/15/20  4:00 AM   Result Value Ref Range    Sodium 143 136 - 145 mmol/L    Potassium 3.3 (L) 3.5 - 5.1 mmol/L    Chloride 106 97 - 108 mmol/L    CO2 30 21 - 32 mmol/L    Anion gap 7 5 - 15 mmol/L    Glucose 102 (H) 65 - 100 mg/dL    BUN 15 6 - 20 mg/dL    Creatinine 0.69 (L) 0.70 - 1.30 mg/dL    BUN/Creatinine ratio 22 (H) 12 - 20      GFR est AA >60 >60 ml/min/1.73m2    GFR est non-AA >60 >60 ml/min/1.73m2    Calcium 9.0 8.5 - 10.1 mg/dL   RENAL FUNCTION PANEL    Collection Time: 10/15/20  4:00 AM   Result Value Ref Range    Sodium 142 136 - 145 mmol/L    Potassium 3.3 (L) 3.5 - 5.1 mmol/L    Chloride 105 97 - 108 mmol/L    CO2 31 21 - 32 mmol/L    Anion gap 6 5 - 15 mmol/L    Glucose 101 (H) 65 - 100 mg/dL    BUN 15 6 - 20 mg/dL    Creatinine 0.66 (L) 0.70 - 1.30 mg/dL    BUN/Creatinine ratio 23 (H) 12 - 20      GFR est AA >60 >60 ml/min/1.73m2    GFR est non-AA >60 >60 ml/min/1.73m2    Calcium 8.8 8.5 - 10.1 mg/dL    Phosphorus 2.8 2.6 - 4.7 mg/dL    Albumin 2.9 (L) 3.5 - 5.0 g/dL   MAGNESIUM    Collection Time: 10/15/20  4:00 AM   Result Value Ref Range    Magnesium 2.2 1.6 - 2.4 mg/dL       CT Results  (Last 48 hours)    None          Assessment:     1.   Acute respiratory failure, after aortobifemoral bypass surgery on 10/6/2020. Patient self extubated himself early in the morning on 10/9/2020. Re-intubated on 10/10/20 for left-sided mucous plugging and complete left lung collapse and had flexible bronchoscopy done with suction of mucous plug. 2.  Acute metabolic encephalopathy, resolved  3. Aspiration pneumonia  4. Severe peripheral vascular disease   5. UTI   6. Hypertension  7. Familial polymyositis  8. Left spontaneous pneumothorax    Plan:     1.)    Status post acute Hypoxic respiratory failure. Did well. On room air now. 2.  Left pneumothorax  Repeat CXR yesterday morning showed left 20-30% pneumothorax. Is comfortable without any distress. Patient had aspiration of left pneumothorax done yesterday. Today's chest x-ray still showed about 30% pneumothorax but patient is comfortable. 3.)  Aspiration/HAP pneumonia:   Started on Cefepime/Flagyl 10/10/20, white blood cell count slowly coming down. Also on prednisone, will need to wean this soon as well. On nebulized bronchodilator treatments. Modified barium swallow showed penetration and significant dysphagia. 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 Patient may require PEG tube near future. He had been on puréed diet with nectar thickened liquids. He will have reevaluation of his swallowing today. 3.)  Metabolic encephalopathy. He has a progressive neurologic disorder. Much improved mental status today. Brain MRI negative    Further recommendation per Neurology. 4.)  Dehydration and metabolic acidosis. Monitor kidney function after surgery. 5.  Possible urinary tract infection. 6.  Hypertension. Dyslipidemia and severe peripheral vascular disease. 7.  Myocardial ischemia:  He underwent nuclear stress testing which showed significant inferior and from will treat him with medical managememt    I will follow the patient closely with you. DVT and GI prophylaxis. He is on Lovenox and Protonix IV.   Repeat chest x-ray in the morning . discussed with the wife and covering nurse at the bedside. Discussed with wife at bedside. Patient is getting discharged home today. Follow-up with thoracic surgery as an outpatient.     Ministerio Edwards MD  [facility]  10/15/2020

## 2020-10-15 NOTE — PROGRESS NOTES
PHYSICAL THERAPY TREATMENT  Patient: Mauri Stuart (89 y.o. male)  Date: 10/15/2020  Diagnosis: Metabolic encephalopathy [M99.18]   <principal problem not specified>  Procedure(s) (LRB):  Aorta Bifemoral Bypass (N/A) 9 Days Post-Op  Precautions:    Chart, physical therapy assessment, plan of care and goals were reviewed. ASSESSMENT  Patient continues with skilled PT services and is progressing towards goals. Pt semi supine in bed upon PT/OT arrival and agreeable to session. Pt reports having 9/10 BLE pain, and any time therapist moved BLE patient would say \"ow\". Performed sup>sit with max A x 2. Sat EOB 10 minutes initially requiring max A x1 for static sitting balance, but progressed to CGAx1. Pt reports fatigue and wanting to return to semi supine position. Current Level of Function Impacting Discharge (mobility/balance): level of assistance required for all mobility           PLAN :  Patient continues to benefit from skilled intervention to address the above impairments. Continue treatment per established plan of care. to address goals. Recommendation for discharge: (in order for the patient to meet his/her long term goals)  Therapy 3 hours per day 5-7 days per week    This discharge recommendation:  Has been made in collaboration with the attending provider and/or case management         SUBJECTIVE:   Patient stated my legs really hurt    OBJECTIVE DATA SUMMARY:   Critical Behavior:  Neurologic State: Alert, Confused  Orientation Level: Oriented to person  Cognition: Follows commands  Safety/Judgement: Decreased awareness of need for safety  Functional Mobility Training:  Bed Mobility:  Rolling: Maximum assistance;Assist x2  Supine to Sit: Maximum assistance;Assist x2  Sit to Supine: Maximum assistance;Assist x2             Balance:  Sitting: Impaired; With support; Without support  Sitting - Static: Fair (occasional)      Pain Ratin/10 ble pain    Activity Tolerance:   Poor  Please refer to the flowsheet for vital signs taken during this treatment. After treatment patient left in no apparent distress:   Supine in bed, Heels elevated for pressure relief, Call bell within reach, and Caregiver / family present    COMMUNICATION/COLLABORATION:   The patients plan of care was discussed with: Occupational therapy assistant. Problem: Mobility Impaired (Adult and Pediatric)  Goal: *Acute Goals and Plan of Care (Insert Text)  Description: Pt will be I with LE HEP in 7 days. Pt will perform bed mobility with mod A in 7 days. Pt will perform sit <> stand and stand pivot transfers with mod A in 7 days.         Outcome: Progressing Towards Goal       Nay Naylor PTA   Time Calculation: 23 mins

## 2020-10-15 NOTE — PROGRESS NOTES
Hospitalist Progress Note           Daily Progress Note: 10/15/2020    Chief complaint: Shortness of breath and weakness  Subjective: The patient is seen for follow  up. Dr Diego Castro aspirated the pneumothorax yesterday and x-ray immediately following that was significantly improved. Looks like the pneumothorax was slightly larger yesterday evening but appears unchanged overnight on this morning's film. Patient continues to deny any shortness of breath or chest pain.     Problem List:  Problem List as of 10/15/2020 Date Reviewed: 9/17/2020          Codes Class Noted - Resolved    Metabolic encephalopathy LQD-82-OF: G93.41  ICD-9-CM: 348.31  9/26/2020 - Present        UTI (urinary tract infection) ICD-10-CM: N39.0  ICD-9-CM: 599.0  9/26/2020 - Present        Aspiration pneumonitis ([address] ICD-10-CM: J69.0  ICD-9-CM: 507.0  9/26/2020 - Present        Essential hypertension ICD-10-CM: I10  ICD-9-CM: 401.9  9/26/2020 - Present        Acute dehydration ICD-10-CM: E86.0  ICD-9-CM: 276.51  9/26/2020 - Present              Medications reviewed  Current Facility-Administered Medications   Medication Dose Route Frequency    potassium chloride (KLOR-CON) packet for solution 40 mEq  40 mEq Oral BID WITH MEALS    oxyCODONE IR (ROXICODONE) tablet 15 mg  15 mg Oral ONCE    spironolactone (ALDACTONE) tablet 25 mg  25 mg Oral BID    midodrine (PROAMATINE) tablet 10 mg  10 mg Oral BID    albumin human 25% (BUMINATE) solution 12.5 g  12.5 g IntraVENous BID    furosemide (LASIX) injection 40 mg  40 mg IntraVENous DAILY    fentaNYL (PF) 1,500 mcg/30 mL (50 mcg/mL) infusion  75 mcg/hr IntraVENous TITRATE    cefepime (MAXIPIME) 1 g in 0.9% sodium chloride (MBP/ADV) 50 mL MBP  1 g IntraVENous Q8H    metroNIDAZOLE (FLAGYL) IVPB premix 500 mg  500 mg IntraVENous Q8H    albuterol CONCENTRATE 2.5mg/0.5 mL neb soln  2.5 mg Nebulization Q6H RT    LORazepam (ATIVAN) injection 1 mg  1 mg IntraVENous Q2H PRN  dexmedeTOMidine (PRECEDEX) 400 mcg in 0.9% sodium chloride 100 mL infusion  0.2-1.4 mcg/kg/hr IntraVENous TITRATE    sodium chloride 0.9% (NS) 3ml nebulizer solution  2.5 mL Nebulization PRN    midazolam (PF) (VERSED) injection 4 mg  4 mg IntraVENous Q2H PRN    NOREPINephrine (LEVOPHED) 8 mg in 5% dextrose 250mL (32 mcg/mL) infusion  2 mcg/min IntraVENous TITRATE    vasopressin (VASOSTRICT) 20 Units in 0.9% sodium chloride 100 mL infusion  0.04 Units/min IntraVENous CONTINUOUS    PHENYLephrine (ALISTAIR-SYNEPHRINE) 30 mg in 0.9% sodium chloride 250 mL infusion   mcg/min IntraVENous CONTINUOUS    pantoprazole (PROTONIX) 40 mg in 0.9% sodium chloride 10 mL injection  40 mg IntraVENous Q12H    influenza vaccine 2020-21 (4 yrs+)(PF) (FLUCELVAX QUAD) injection 0.5 mL  0.5 mL IntraMUSCular PRIOR TO DISCHARGE    diphenhydrAMINE (BENADRYL) capsule 50 mg  50 mg Oral BID PRN    phosphorus (K PHOS NEUTRAL) 250 mg tablet 2 Tab  2 Tab Oral BID    levoFLOXacin (LEVAQUIN) tablet 500 mg  500 mg Oral Q24H    predniSONE (DELTASONE) tablet 20 mg  20 mg Oral DAILY WITH BREAKFAST    oxyCODONE IR (ROXICODONE) tablet 15 mg  15 mg Oral Q4H PRN    metoprolol tartrate (LOPRESSOR) tablet 50 mg  50 mg Oral BID    enoxaparin (LOVENOX) injection 40 mg  40 mg SubCUTAneous Q24H    aspirin delayed-release tablet 81 mg  81 mg Oral DAILY    acetaminophen (TYLENOL) tablet 650 mg  650 mg Oral Q6H PRN    ondansetron (ZOFRAN) injection 4 mg  4 mg IntraVENous Q8H PRN    guaiFENesin (ROBITUSSIN) 20 mg/mL oral liquid 400 mg  400 mg Oral Q6H PRN       Review of Systems:   Review of systems unable to be obtained because he is intubated    Objective:   Physical Exam:     Visit Vitals  BP (!) 144/74 (BP 1 Location: Right arm, BP Patient Position: At rest;Head of bed elevated (Comment degrees))   Pulse 80   Temp 98.2 °F (36.8 °C)   Resp 20   Ht 6' (1.829 m)   Wt 59.6 kg (131 lb 6.3 oz)   SpO2 100%   BMI 17.82 kg/m²    O2 Flow Rate (L/min): 2 l/min O2 Device: Room air    Temp (24hrs), Av.4 °F (36.9 °C), Min:98 °F (36.7 °C), Max:99 °F (37.2 °C)    No intake/output data recorded. 10/13 1901 - 10/15 0700  In: 410 [P.O.:360; I.V.:50]  Out: 5220 [Urine:5050; Drains:170]    General:  awake and conversant, cooperative, no distress, appears older than stated age. Lungs:   Clear to auscultation bilaterally with diminished breath sounds bilateral bases. Chest wall:  No tenderness or deformity. Heart:  Regular rate and rhythm, S1, S2 normal, no murmur, click, rub or gallop. Abdomen:   Soft, non-tender. Diminished Bowel sounds. Dressings in place. Extremities: Extremities normal, atraumatic, positive edema bilaterally   Pulses: 2+ and symmetric all extremities. Skin: Skin color, texture, turgor normal. No rashes or lesions   Neurologic: CNII-XII intact. Data Review:       Recent Days:  Recent Labs     10/15/20  0400 10/14/20  0430 10/13/20  0510   WBC 14.5* 16.1* 17.9*   HGB 9.7* 9.8* 10.1*   HCT 28.9* 28.9* 28.9*    154 126*     Recent Labs     10/15/20  0400 10/14/20  0430 10/13/20  0510    142 140   K 3.3* 3.2* 3.2*    105 105   CO2 31 31 29   * 86 71   BUN 15 16 15   CREA 0.66* 0.65* 0.61*   CA 8.8 8.5 8.1*   MG 2.2  --  2.1   PHOS 2.8 3.1 3.2   ALB 2.9* 2.7* 2.3*     No results for input(s): PH, PCO2, PO2, HCO3, FIO2 in the last 72 hours.     24 Hour Results:  Recent Results (from the past 24 hour(s))   CBC WITH AUTOMATED DIFF    Collection Time: 10/15/20  4:00 AM   Result Value Ref Range    WBC 14.5 (H) 4.1 - 11.1 K/uL    RBC 3.08 (L) 4.10 - 5.70 M/uL    HGB 9.7 (L) 12.1 - 17.0 g/dL    HCT 28.9 (L) 36.6 - 50.3 %    MCV 93.8 80.0 - 99.0 FL    MCH 31.5 26.0 - 34.0 PG    MCHC 33.6 30.0 - 36.5 g/dL    RDW 17.7 (H) 11.5 - 14.5 %    PLATELET 679 256 - 916 K/uL    MPV 10.7 8.9 - 12.9 FL    NRBC 0.9 (H) 0  WBC    ABSOLUTE NRBC 0.13 (H) 0.00 - 0.01 K/uL    NEUTROPHILS 79 (H) 32 - 75 %    LYMPHOCYTES 8 (L) 12 - 49 % MONOCYTES 11 5 - 13 %    EOSINOPHILS 1 0 - 7 %    BASOPHILS 0 0 - 1 %    IMMATURE GRANULOCYTES 1 (H) 0.0 - 0.5 %    ABS. NEUTROPHILS 11.7 (H) 1.8 - 8.0 K/UL    ABS. LYMPHOCYTES 1.2 0.8 - 3.5 K/UL    ABS. MONOCYTES 1.6 (H) 0.0 - 1.0 K/UL    ABS. EOSINOPHILS 0.1 0.0 - 0.4 K/UL    ABS. BASOPHILS 0.0 0.0 - 0.1 K/UL    ABS. IMM. GRANS. 0.1 (H) 0.00 - 0.04 K/UL    DF AUTOMATED     RENAL FUNCTION PANEL    Collection Time: 10/15/20  4:00 AM   Result Value Ref Range    Sodium 142 136 - 145 mmol/L    Potassium 3.3 (L) 3.5 - 5.1 mmol/L    Chloride 105 97 - 108 mmol/L    CO2 31 21 - 32 mmol/L    Anion gap 6 5 - 15 mmol/L    Glucose 101 (H) 65 - 100 mg/dL    BUN 15 6 - 20 mg/dL    Creatinine 0.66 (L) 0.70 - 1.30 mg/dL    BUN/Creatinine ratio 23 (H) 12 - 20      GFR est AA >60 >60 ml/min/1.73m2    GFR est non-AA >60 >60 ml/min/1.73m2    Calcium 8.8 8.5 - 10.1 mg/dL    Phosphorus 2.8 2.6 - 4.7 mg/dL    Albumin 2.9 (L) 3.5 - 5.0 g/dL   MAGNESIUM    Collection Time: 10/15/20  4:00 AM   Result Value Ref Range    Magnesium 2.2 1.6 - 2.4 mg/dL           Assessment/     Acute hypoxic respiratory failure postsurgery. Self extubated 10/09/20. Reintubated 10/10 due to hypoxemia. Extubated 10/11    Left-sided pneumothorax, overall improved    Acute kidney injury likely secondary to ATN from hypotension. Improved    Hypovolemic shock post surgery. Improved    Right lower lobe aspiration pneumonia improved    Urinary tract infection due to E. Coli    Abnormal Cardiolite stress test showing inferior ischemia. 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  Repleted    Hypothermia, probably largely environmental.  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 Plan:  Transfer to floor continue to  Continue to monitor closely  [address] discussed with: Patient/Family     **This patient has a medical condition which requires positioning of the body in ways not feasible with an ordinary bed. In addition he requires the head of the bed to be elevated more than 30 degrees most of the time due to problems with possible aspiration. Pillows and wedges have been considered and ruled out    Total time spent with patient: 30 minutes.     Reynold Porter MD

## 2020-10-15 NOTE — PROGRESS NOTES
Problem: Self Care Deficits Care Plan (Adult)  Goal: *Acute Goals and Plan of Care (Insert Text)  Description: 1.  patient will brush teeth/perform oral care, set up/SBA , bed level  2. Patient will wash face, SBA, set up, bed level  3. patient will be  mod  I  for eating/drinking   4.  patient will tolerate sitting at EOB, 1 - 2 extremity support, SBA, 5 min+, in prep  for ADL tasks  5. Patient will engage in light EDGAR UE therapeutic exercises to promote > ADL and bed  mobility independence  Outcome: Progressing Towards Goal   OCCUPATIONAL THERAPY TREATMENT  Patient: Yuri Simons (46 y.o. male)  Date: 10/15/2020  Diagnosis: Metabolic encephalopathy [J90.74]   <principal problem not specified>  Procedure(s) (LRB):  Aorta Bifemoral Bypass (N/A) 9 Days Post-Op  Precautions:    Chart, occupational therapy assessment, plan of care, and goals were reviewed. ASSESSMENT  Patient continues with skilled OT services and is progressing towards goals. Pt semi supine in bed upon arrival and agreeable to session. Pt reports having 9/10 BLE pain with movement and patient would grimace. Pt Performed sup>sit with max A x 2. Sat EOB 10 minutes initially requiring max A x1 for static sitting balance, but progressed to CGAx1. Pt tolerated UE therex while sitting at EOB with CGA for balance 2 sets of 5 reps to increase endurance and strength. Pt. Demonstrated decreased ROM in RUE. Pt. Able to follow commands throughout therapy session. Pt able to perform ROM with looking from R/L while seated at EOB and maintain seated balance. Therapist provided education on importance of up-right position and posture. Pt. Requested to lay back into bed after 10 minutes seated at EOB. PLAN :  Patient continues to benefit from skilled intervention to address the above impairments. Continue treatment per established plan of care. to address goals.     Recommendation for discharge: (in order for the patient to meet his/her long term goals)  Therapy 3 hours per day 5-7 days per week    This discharge recommendation:  Has been made in collaboration with the attending provider and/or case management    IF patient discharges home will need the following DME: TBD       SUBJECTIVE:   Patient stated  my legs hurt    OBJECTIVE DATA SUMMARY:   Cognitive/Behavioral Status:  Neurologic State: Alert;Confused  Orientation Level: Oriented to person  Cognition: Follows commands      Functional Mobility and Transfers for ADLs:  Bed Mobility:  Rolling: Maximum assistance;Assist x2  Supine to Sit: Maximum assistance;Assist x2  Sit to Supine: Maximum assistance;Assist x2      Balance:  Sitting: Impaired; With support; Without support  Sitting - Static: Fair (occasional)      Therapeutic Exercises:   2 x 5 reps ( shoulder flex/ex, elbow flex/ex)     Pain:  9/10 dwight LUE's    Activity Tolerance:   Poor  Please refer to the flowsheet for vital signs taken during this treatment. After treatment patient left in no apparent distress:   Supine in bed, Heels elevated for pressure relief, Bed / chair alarm activated, and Caregiver / family present    COMMUNICATION/COLLABORATION:   The patients plan of care was discussed with: Physical therapy assistant.    Co-tx with PTA for increased safety with bed mobility and sitting balance    Sandee Whiteside  Time Calculation: 23 mins

## 2020-10-15 NOTE — PROGRESS NOTES
Post-OP Visit    Khalida Romo is a 48 y.o. male who has left spontaanous pneumothorax. Yesterday chest aspirated for air no additional aie came out. Nofluid came out. CXR done immmediatetly afterwards was un cahged or slightly better. Repeat film at 5pm was un changed nad this am film is slightly better      BP (!) 141/85   Pulse (!) 102   Temp 98.2 °F (36.8 °C)   Resp 20   Ht 6' (1.829 m)   Wt 131 lb 6.3 oz (59.6 kg)   SpO2 100%   BMI 17.82 kg/m²     Physical Exam lungs clear no distress 100% saturated    Problem List Items Addressed This Visit        Respiratory    Aspiration pneumonitis (HCC)    Relevant Medications    predniSONE (DELTASONE) 20 mg tablet      Other Visit Diagnoses     Encephalopathy acute    -  Primary    Acute occlusion of aortoiliac artery (HCC)        Pain in both lower extremities        TIA (transient ischemic attack)        Chest pain, unspecified type        Relevant Orders    CARDIAC PROCEDURE (Completed)          Assessment and Plan: He has spontanous pneumo about 20%, no fluid in the chest.  It is stable not increasing and I think lung is somewhat fibrtic preventing it from coming up as fast.  At this point I dont fee that he needs a tube and just watch it and it will resolve over time. Especially as he is 100% asymptomatic, 100% sat on room air. From my standpoint he does nto neeed to be in the ICU. I will look at films over next two days.      Luisa Dueñas MD

## 2020-10-15 NOTE — PROGRESS NOTES
I have spoken to attending and pateints wife. Patient is ok to go home and follow up with me in the office that I have put in his discharge summary. I have encouraged his wife that he would be getter served by going to rehab rather than home.        [facility]

## 2020-10-16 ENCOUNTER — APPOINTMENT (OUTPATIENT)
Dept: GENERAL RADIOLOGY | Age: 55
DRG: 981 | End: 2020-10-16
Attending: INTERNAL MEDICINE
Payer: COMMERCIAL

## 2020-10-16 LAB
ALBUMIN SERPL-MCNC: 3.2 G/DL (ref 3.5–5)
ANION GAP SERPL CALC-SCNC: 6 MMOL/L (ref 5–15)
BUN SERPL-MCNC: 16 MG/DL (ref 6–20)
BUN/CREAT SERPL: 24 (ref 12–20)
CA-I BLD-MCNC: 9.1 MG/DL (ref 8.5–10.1)
CHLORIDE SERPL-SCNC: 105 MMOL/L (ref 97–108)
CO2 SERPL-SCNC: 30 MMOL/L (ref 21–32)
CREAT SERPL-MCNC: 0.68 MG/DL (ref 0.7–1.3)
GLUCOSE SERPL-MCNC: 87 MG/DL (ref 65–100)
PHOSPHATE SERPL-MCNC: 2.7 MG/DL (ref 2.6–4.7)
POTASSIUM SERPL-SCNC: 2.9 MMOL/L (ref 3.5–5.1)
SODIUM SERPL-SCNC: 141 MMOL/L (ref 136–145)

## 2020-10-16 PROCEDURE — 77010033678 HC OXYGEN DAILY

## 2020-10-16 PROCEDURE — 97530 THERAPEUTIC ACTIVITIES: CPT

## 2020-10-16 PROCEDURE — 94640 AIRWAY INHALATION TREATMENT: CPT

## 2020-10-16 PROCEDURE — 74011250637 HC RX REV CODE- 250/637: Performed by: INTERNAL MEDICINE

## 2020-10-16 PROCEDURE — 80069 RENAL FUNCTION PANEL: CPT

## 2020-10-16 PROCEDURE — 36415 COLL VENOUS BLD VENIPUNCTURE: CPT

## 2020-10-16 PROCEDURE — 94760 N-INVAS EAR/PLS OXIMETRY 1: CPT

## 2020-10-16 PROCEDURE — 74011636637 HC RX REV CODE- 636/637: Performed by: INTERNAL MEDICINE

## 2020-10-16 PROCEDURE — 71045 X-RAY EXAM CHEST 1 VIEW: CPT

## 2020-10-16 PROCEDURE — 65270000029 HC RM PRIVATE

## 2020-10-16 PROCEDURE — 92526 ORAL FUNCTION THERAPY: CPT

## 2020-10-16 PROCEDURE — 74011250637 HC RX REV CODE- 250/637: Performed by: NURSE PRACTITIONER

## 2020-10-16 PROCEDURE — 74011000250 HC RX REV CODE- 250: Performed by: INTERNAL MEDICINE

## 2020-10-16 PROCEDURE — 94668 MNPJ CHEST WALL SBSQ: CPT

## 2020-10-16 PROCEDURE — 74011000258 HC RX REV CODE- 258: Performed by: INTERNAL MEDICINE

## 2020-10-16 PROCEDURE — 99231 SBSQ HOSP IP/OBS SF/LOW 25: CPT | Performed by: THORACIC SURGERY (CARDIOTHORACIC VASCULAR SURGERY)

## 2020-10-16 PROCEDURE — 74011250636 HC RX REV CODE- 250/636: Performed by: INTERNAL MEDICINE

## 2020-10-16 PROCEDURE — 74011250636 HC RX REV CODE- 250/636: Performed by: SURGERY

## 2020-10-16 RX ADMIN — FUROSEMIDE 40 MG: 10 INJECTION, SOLUTION INTRAMUSCULAR; INTRAVENOUS at 09:43

## 2020-10-16 RX ADMIN — ISODIUM CHLORIDE 2.5 ML: 0.03 SOLUTION RESPIRATORY (INHALATION) at 13:02

## 2020-10-16 RX ADMIN — PANTOPRAZOLE SODIUM 40 MG: 40 TABLET, DELAYED RELEASE ORAL at 21:50

## 2020-10-16 RX ADMIN — OXYCODONE HYDROCHLORIDE 15 MG: 5 TABLET ORAL at 18:48

## 2020-10-16 RX ADMIN — METRONIDAZOLE 500 MG: 500 INJECTION, SOLUTION INTRAVENOUS at 23:34

## 2020-10-16 RX ADMIN — OXYCODONE HYDROCHLORIDE 15 MG: 5 TABLET ORAL at 14:05

## 2020-10-16 RX ADMIN — ISODIUM CHLORIDE: 0.03 SOLUTION RESPIRATORY (INHALATION) at 07:06

## 2020-10-16 RX ADMIN — MIDODRINE HYDROCHLORIDE 5 MG: 5 TABLET ORAL at 09:44

## 2020-10-16 RX ADMIN — SPIRONOLACTONE 25 MG: 25 TABLET ORAL at 17:39

## 2020-10-16 RX ADMIN — PANTOPRAZOLE SODIUM 40 MG: 40 TABLET, DELAYED RELEASE ORAL at 09:44

## 2020-10-16 RX ADMIN — OXYCODONE HYDROCHLORIDE 15 MG: 5 TABLET ORAL at 09:49

## 2020-10-16 RX ADMIN — METRONIDAZOLE 500 MG: 500 INJECTION, SOLUTION INTRAVENOUS at 05:56

## 2020-10-16 RX ADMIN — LEVOFLOXACIN 500 MG: 500 TABLET, FILM COATED ORAL at 12:15

## 2020-10-16 RX ADMIN — PREDNISONE 20 MG: 20 TABLET ORAL at 09:45

## 2020-10-16 RX ADMIN — METOPROLOL TARTRATE 25 MG: 25 TABLET, FILM COATED ORAL at 21:50

## 2020-10-16 RX ADMIN — ALBUTEROL SULFATE 2.5 MG: 2.5 SOLUTION RESPIRATORY (INHALATION) at 13:02

## 2020-10-16 RX ADMIN — ALBUTEROL SULFATE 2.5 MG: 2.5 SOLUTION RESPIRATORY (INHALATION) at 07:06

## 2020-10-16 RX ADMIN — CEFEPIME 1 G: 1 INJECTION, POWDER, FOR SOLUTION INTRAMUSCULAR; INTRAVENOUS at 17:39

## 2020-10-16 RX ADMIN — SODIUM CHLORIDE: 9 INJECTION, SOLUTION INTRAVENOUS at 13:15

## 2020-10-16 RX ADMIN — POTASSIUM CHLORIDE 40 MEQ: 1.5 FOR SOLUTION ORAL at 09:43

## 2020-10-16 RX ADMIN — ASPIRIN 81 MG: 81 TABLET, COATED ORAL at 09:44

## 2020-10-16 RX ADMIN — ALBUTEROL SULFATE 2.5 MG: 2.5 SOLUTION RESPIRATORY (INHALATION) at 01:23

## 2020-10-16 RX ADMIN — ENOXAPARIN SODIUM 40 MG: 40 INJECTION SUBCUTANEOUS at 21:50

## 2020-10-16 RX ADMIN — LORAZEPAM 1 MG: 2 INJECTION, SOLUTION INTRAMUSCULAR; INTRAVENOUS at 21:51

## 2020-10-16 RX ADMIN — POTASSIUM CHLORIDE 40 MEQ: 1.5 FOR SOLUTION ORAL at 17:39

## 2020-10-16 RX ADMIN — SPIRONOLACTONE 25 MG: 25 TABLET ORAL at 09:44

## 2020-10-16 RX ADMIN — DIBASIC SODIUM PHOSPHATE, MONOBASIC POTASSIUM PHOSPHATE AND MONOBASIC SODIUM PHOSPHATE 2 TABLET: 852; 155; 130 TABLET ORAL at 21:50

## 2020-10-16 RX ADMIN — METOPROLOL TARTRATE 25 MG: 25 TABLET, FILM COATED ORAL at 09:45

## 2020-10-16 RX ADMIN — METRONIDAZOLE 500 MG: 500 INJECTION, SOLUTION INTRAVENOUS at 14:11

## 2020-10-16 RX ADMIN — DIBASIC SODIUM PHOSPHATE, MONOBASIC POTASSIUM PHOSPHATE AND MONOBASIC SODIUM PHOSPHATE 2 TABLET: 852; 155; 130 TABLET ORAL at 09:44

## 2020-10-16 RX ADMIN — SPIRONOLACTONE 25 MG: 25 TABLET ORAL at 21:50

## 2020-10-16 RX ADMIN — CEFEPIME 1 G: 1 INJECTION, POWDER, FOR SOLUTION INTRAMUSCULAR; INTRAVENOUS at 03:19

## 2020-10-16 RX ADMIN — ALBUTEROL SULFATE 2.5 MG: 2.5 SOLUTION RESPIRATORY (INHALATION) at 20:34

## 2020-10-16 RX ADMIN — CEFEPIME 1 G: 1 INJECTION, POWDER, FOR SOLUTION INTRAMUSCULAR; INTRAVENOUS at 09:43

## 2020-10-16 NOTE — PROGRESS NOTES
SPEECH LANGUAGE PATHOLOGY DYSPHAGIA TREATMENT  Patient: Mika Ferreira (39 y.o. male)  Date: 10/16/2020  Diagnosis: Metabolic encephalopathy [E66.44]   <principal problem not specified>  Procedure(s) (LRB):  Aorta Bifemoral Bypass (N/A) 10 Days Post-Op  Precautions: aspiration    ASSESSMENT:  Administered thin liquids to trial via cup. Reduced bolus control resulting in rapid A-P transit and gulping of swallow. HLE and protraction reduced to digital palpation. Delay in initiation of swallow. Multiple swallows elicited. Overt s/sx of aspiration present c/b throat clearing and cough after all thin liquid trials. Bolus control improved w/ nectar thick w/ improved HLE and protraction to digital palpation. Reduced clinical indicators of penetration/aspiration, however still present c/b intermittent throat clear. Administered hard solid cracker. Mastication WFL for small bite. HLE and protraction reduced. No overt s/sx of aspiration observed. PLAN:  Recommendations and Planned Interventions:  Ground diet and nectar thick liquids. Patient continues to be an aspiration and nutritional risk and would benefit from PEG placement. Patient continues to benefit from skilled intervention to address the above impairments. Continue treatment per established plan of care. Discharge Recommendations:  Inpatient Rehab     SUBJECTIVE:   Patient seen at bedside. Patient reported he is eating \"some. \"    OBJECTIVE:     CXR Results  (Last 48 hours)                 10/16/20 0844  XR CHEST PORT Final result    Narrative:  Chest single view. Comparison single view chest October 15, 2020       Left-sided hydropneumothorax now occupying estimated 40-50% volume left   hemithorax. Stable right neck central venous catheter. Postsurgical changes left upper   quadrant abdomen. Cervical neck hardware. 10/15/20 0435  XR CHEST PORT Final result    Narrative:  Chest single view.        Comparison single view chest October 14, 2020       Stable right neck central venous catheter. Unchanged left sided hydropneumothorax. This is estimated to occupy 30-40% of   the volume of the left hemithorax. Right lung aerated. Cardiac and mediastinal   structures unchanged. Staples and drain present through imaged left upper quadrant abdomen. Partially   imaged cervical hardware noted. 10/14/20 1712  XR CHEST PORT Final result    Narrative:  Chest single view. Comparison single view chest October 14, 2020 at 11:41 AM       Unchanged right neck central venous catheter. Right lung aerated. Moderate volume left side hydropneumothorax. Estimated to occupy 30-40% volume   left hemithorax. LLL atelectasis again noted. Report called to ICU. Spoke with nurse Libia Pedraza. CT Results  (Last 48 hours)      None           Cognitive and Communication Status:  Neurologic State: Alert, Confused  Orientation Level: Oriented to place, Oriented to person, Oriented to situation  Cognition: Follows commands        Safety/Judgement: Decreased awareness of need for safety       V  Pain:  Pain Scale 1: Numeric (0 - 10)  Pain: 8 - in legs       After treatment:   Patient left in no apparent distress in bed    COMMUNICATION/EDUCATION:   Patient was educated regarding his deficit(s) of dysphagia, swallow safety precautions, diet recs and POC. He demonstrated Fair understanding as evidenced by decreased insight to severity of his dysphagia and aspiration risk. Kg Batista M.S., CCC-SLP  Time Calculation: 18 mins        Problem: Dysphagia (Adult)  Goal: *Acute Goals and Plan of Care (Insert Text)  Description: Speech Therapy Goals  Initiated 9/28/2020  -Patient will participate in modified barium swallow study if indicated pending pt's progress.          [ ] Not met  [facility] ]  MET   [ ] Progressing  [ ] Saúl Cabello  -Patient will tolerate dysphagia 2 diet with nectar thick liquids without signs/symptoms of aspiration given moderate cues within 4 day(s). [ ] Not met  [ [place]  MET   [ ] Progressing  [ ] Mimi Giron  -Patient will demonstrate understanding of swallow safety precautions and aspiration precautions, diet recs with moderate cues within 7 day(s).         [ ] Not met  [ ]  MET   [ x] Progressing  [ ] Mimi Giron  -Patient will tolerate full NECTAR thick diet w/ reduced s/sx of aspiration and penetration given moderate cues within 5-7 days [ REVISED]      Outcome: Progressing Towards Goal

## 2020-10-16 NOTE — PROGRESS NOTES
CM received a call from patient's wife Holley Coronado. She states that she doesn't think she can take care of the patient at home. She wants him to go to rehab. CM discussed different rehab facilities for the recommendation of IRF. She states that she wants him to go to [facility]. Referral sent. Patient will need authorization prior to discharge. Dr. Tillman Larger aware of plan. Will continue to monitor for discharge needs.

## 2020-10-16 NOTE — PROGRESS NOTES
Pulmonology and Critical Care Progress Note    Subjective:     Chief Complaint:   Chief Complaint   Patient presents with    Altered mental status      Patient seen and examined at the bedside this morning. The patient underwent aortobifemoral bypass surgery 10/6/20. Post surgery he was left on the ventilator. Apparently blood loss was about 3.5 L. When he returned to the ICU he was on multiple pressors. Developed spontaneous left pneumothorax. thoracic surgery did aspiration of his left pneumothorax. No chest tube insertion was required. Looks comfortable today. Transferred to stepdown unit. At bedside today patient is alert and responding appropriately. He is overall weak. Wife present at bedside. Review of Systems:  Has chronic Reese catheter placement.   Had mild abdominal discomfort otherwise doing well    Current Facility-Administered Medications   Medication Dose Route Frequency Provider Last Rate Last Dose    0.9% sodium chloride 500 mL with potassium chloride 50 mEq infusion   IntraVENous CONTINUOUS Burton Coker  mL/hr at 10/16/20 1315      pantoprazole (PROTONIX) tablet 40 mg  40 mg Oral BID Minnie Capps MD   40 mg at 10/16/20 0944    metoprolol tartrate (LOPRESSOR) tablet 25 mg  25 mg Oral BID Burton Coker MD   25 mg at 10/16/20 0945    midodrine (PROAMATINE) tablet 5 mg  5 mg Oral BID Burton Coker MD   5 mg at 10/16/20 0944    spironolactone (ALDACTONE) tablet 25 mg  25 mg Oral TID Tiana Genao MD   25 mg at 10/16/20 0944    potassium chloride (KLOR-CON) packet for solution 40 mEq  40 mEq Oral BID WITH MEALS Alonso Conley MD   40 mEq at 10/16/20 0943    furosemide (LASIX) injection 40 mg  40 mg IntraVENous DAILY Tania Sims MD   40 mg at 10/16/20 0943    cefepime (MAXIPIME) 1 g in 0.9% sodium chloride (MBP/ADV) 50 mL MBP  1 g IntraVENous Q8H Johnny Santiago  mL/hr at 10/16/20 0943 1 g at 10/16/20 0943    metroNIDAZOLE (FLAGYL) IVPB premix 500 mg  500 mg IntraVENous Q8H Johnny Santiago,  mL/hr at 10/16/20 1411 500 mg at 10/16/20 1411    albuterol CONCENTRATE 2.5mg/0.5 mL neb soln  2.5 mg Nebulization Q6H RT Johnny Santiago DO   2.5 mg at 10/16/20 1302    LORazepam (ATIVAN) injection 1 mg  1 mg IntraVENous Q2H PRN Sarah Beth Olson MD        sodium chloride 0.9% (NS) 3ml nebulizer solution  2.5 mL Nebulization PRN Minal Nunes MD   2.5 mL at 10/16/20 1302    influenza vaccine 2020- (4 yrs+)(PF) (FLUCELVAX QUAD) injection 0.5 mL  0.5 mL IntraMUSCular PRIOR TO DISCHARGE Quirino Alanis MD   Stopped at 10/07/20 0900    diphenhydrAMINE (BENADRYL) capsule 50 mg  50 mg Oral BID PRN Clare Madrid, NP   50 mg at 10/03/20 0129    phosphorus (K PHOS NEUTRAL) 250 mg tablet 2 Tab  2 Tab Oral BID Quirino Alanis MD   2 Tab at 10/16/20 0944    levoFLOXacin (LEVAQUIN) tablet 500 mg  500 mg Oral Q24H Quirino Alanis MD   500 mg at 10/16/20 1215    predniSONE (DELTASONE) tablet 20 mg  20 mg Oral DAILY WITH BREAKFAST Quirino Alanis MD   20 mg at 10/16/20 0945    oxyCODONE IR (ROXICODONE) tablet 15 mg  15 mg Oral Q4H PRN Quirino Alanis MD   15 mg at 10/16/20 1405    enoxaparin (LOVENOX) injection 40 mg  40 mg SubCUTAneous Q24H Minal Nunes MD   40 mg at 10/15/20 2141    aspirin delayed-release tablet 81 mg  81 mg Oral DAILY Kibot, Edwan T, NP   81 mg at 10/16/20 0944    acetaminophen (TYLENOL) tablet 650 mg  650 mg Oral Q6H PRN Kibot, Mal [place] T, NP   650 mg at 20 0950    ondansetron (ZOFRAN) injection 4 mg  4 mg IntraVENous Q8H PRN Kibot, Mal  T, NP   4 mg at 20 0402    guaiFENesin (ROBITUSSIN) 20 mg/mL oral liquid 400 mg  400 mg Oral Q6H PRN Priscila Galvan NP   Stopped at 20 6935            No Known Allergies        Objective:     Blood pressure 121/76, pulse 98, temperature 98.4 °F (36.9 °C), resp. rate 18, height 6' (1.829 m), weight 59.6 kg (131 lb 6.3 oz), SpO2 94 %.  Temp (24hrs), Av.5 °F (36.9 °C), Min:97.9 °F (36.6 °C), Max:99.3 °F (37.4 °C)      Intake and Output:  Current Shift: No intake/output data recorded. Last 3 Shifts: 10/14 1901 - 10/16 0700  In: 56 [P.O.:360; I.V.:250]  Out: 4921 [Urine:4850; Drains:71]    Physical Exam:     General: Lying in bed, on nasal cannula oxygen. Wide-awake and following commands. No acute distress  Throat and Neck: Supple. No JVD. He has a right subclavian central line. Lung:  Much improved aeration bilaterally, minimal rhonchi in the left base. Heart: S1+S2. No murmurs  Abdomen: Status post surgery. He has a midline incision. He has HELGA drains one on each side. Bowel sounds are hypoactive. Draining serosanguineous fluid. Extremities:  He has pitting edema. Distal pulses of the lower extremities are noted with Dopplers. Has skin breakdown on the dependent portion of heel. : Reese catheter in place. Making some urine output. Skin: No cyanosis  Neurologic: Wakes up easily, following commands. Lab/Data Review: All lab results for the last 24 hours reviewed. Chest x-ray results from today were reviewed which showed  Left-sided hydropneumothorax now occupying estimated 40-50% volume left  hemithorax. Stable right neck central venous catheter. Postsurgical changes left upper  quadrant abdomen. Cervical neck hardware.       Recent Results (from the past 24 hour(s))   RENAL FUNCTION PANEL    Collection Time: 10/16/20  8:50 AM   Result Value Ref Range    Sodium 141 136 - 145 mmol/L    Potassium 2.9 (L) 3.5 - 5.1 mmol/L    Chloride 105 97 - 108 mmol/L    CO2 30 21 - 32 mmol/L    Anion gap 6 5 - 15 mmol/L    Glucose 87 65 - 100 mg/dL    BUN 16 6 - 20 mg/dL    Creatinine 0.68 (L) 0.70 - 1.30 mg/dL    BUN/Creatinine ratio 24 (H) 12 - 20      GFR est AA >60 >60 ml/min/1.73m2    GFR est non-AA >60 >60 ml/min/1.73m2    Calcium 9.1 8.5 - 10.1 mg/dL    Phosphorus 2.7 2.6 - 4.7 mg/dL    Albumin 3.2 (L) 3.5 - 5.0 g/dL       CT Results  (Last 48 hours)    None Assessment:     1. Acute respiratory failure, after aortobifemoral bypass surgery on 10/6/2020. Patient self extubated himself early in the morning on 10/9/2020. Re-intubated on 10/10/20 for left-sided mucous plugging and complete left lung collapse and had flexible bronchoscopy done with suction of mucous plug. 2.  Acute metabolic encephalopathy, resolved  3. Aspiration pneumonia  4. Severe peripheral vascular disease   5. UTI   6. Hypertension  7. Familial polymyositis  8. Left spontaneous pneumothorax    Plan:     1.)    Status post acute Hypoxic respiratory failure. Did well. On room air now. 2.  Left pneumothorax  Repeat CXR yesterday morning showed left 30-40% pneumothorax. Is comfortable without any distress. Patient had aspiration of left pneumothorax done yesterday. Today's chest x-ray still showed about 30-40% pneumothorax but patient is comfortable. Thoracic surgery service has been following patient    3.)  Aspiration/HAP pneumonia:   Started on Cefepime/Flagyl 10/10/20, white blood cell count slowly coming down. Also on prednisone, will need to wean this soon as well. On nebulized bronchodilator treatments. Modified barium swallow showed penetration and significant dysphagia. 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 Patient may require PEG tube near future. He had been on puréed diet with nectar thickened liquids. He will have reevaluation of his swallowing today. 3.)  Metabolic encephalopathy. He has a progressive neurologic disorder. Much improved mental status today. Brain MRI negative    Further recommendation per Neurology. 4.)  Dehydration and metabolic acidosis. Monitor kidney function after surgery. 5.  Possible urinary tract infection. 6.  Hypertension. Dyslipidemia and severe peripheral vascular disease.     7.  Myocardial ischemia:  He underwent nuclear stress testing which showed significant inferior and from will treat him with medical managememt    I will follow the patient closely with you. DVT and GI prophylaxis. He is on Lovenox and Protonix IV. Repeat chest x-ray in the morning . discussed with the wife and covering nurse at the bedside. Discussed with wife at bedside. Patient is getting discharged home today. Follow-up with thoracic surgery as an outpatient.     Megan Burgos MD  [facility]  10/16/2020

## 2020-10-16 NOTE — PROGRESS NOTES
Renal Progress Note    Patient: Khalida Romo MRN: [id number]  SSN: [id number]    YOB: 1965  Age: 48 y.o. Sex: male      Admit Date: 9/26/2020    LOS: 20 days     Subjective:   Patient seen at bedside. Alert and awake, no acute distress. No edema, on diuretics  No complaints of shortness of breath.    K is 2.9 today      Current Facility-Administered Medications   Medication Dose Route Frequency    pantoprazole (PROTONIX) tablet 40 mg  40 mg Oral BID    metoprolol tartrate (LOPRESSOR) tablet 25 mg  25 mg Oral BID    midodrine (PROAMATINE) tablet 5 mg  5 mg Oral BID    spironolactone (ALDACTONE) tablet 25 mg  25 mg Oral TID    potassium chloride (KLOR-CON) packet for solution 40 mEq  40 mEq Oral BID WITH MEALS    furosemide (LASIX) injection 40 mg  40 mg IntraVENous DAILY    cefepime (MAXIPIME) 1 g in 0.9% sodium chloride (MBP/ADV) 50 mL MBP  1 g IntraVENous Q8H    metroNIDAZOLE (FLAGYL) IVPB premix 500 mg  500 mg IntraVENous Q8H    albuterol CONCENTRATE 2.5mg/0.5 mL neb soln  2.5 mg Nebulization Q6H RT    LORazepam (ATIVAN) injection 1 mg  1 mg IntraVENous Q2H PRN    sodium chloride 0.9% (NS) 3ml nebulizer solution  2.5 mL Nebulization PRN    influenza vaccine 2020-21 (4 yrs+)(PF) (FLUCELVAX QUAD) injection 0.5 mL  0.5 mL IntraMUSCular PRIOR TO DISCHARGE    diphenhydrAMINE (BENADRYL) capsule 50 mg  50 mg Oral BID PRN    phosphorus (K PHOS NEUTRAL) 250 mg tablet 2 Tab  2 Tab Oral BID    levoFLOXacin (LEVAQUIN) tablet 500 mg  500 mg Oral Q24H    predniSONE (DELTASONE) tablet 20 mg  20 mg Oral DAILY WITH BREAKFAST    oxyCODONE IR (ROXICODONE) tablet 15 mg  15 mg Oral Q4H PRN    enoxaparin (LOVENOX) injection 40 mg  40 mg SubCUTAneous Q24H    aspirin delayed-release tablet 81 mg  81 mg Oral DAILY    acetaminophen (TYLENOL) tablet 650 mg  650 mg Oral Q6H PRN    ondansetron (ZOFRAN) injection 4 mg  4 mg IntraVENous Q8H PRN    guaiFENesin (ROBITUSSIN) 20 mg/mL oral liquid 400 mg 400 mg Oral Q6H PRN        Vitals:    10/16/20 0820 10/16/20 1253 10/16/20 1357 10/16/20 1630   BP: (!) 155/88 121/76  (!) 149/81   Pulse: 95 98  94   Resp: 18 18  18   Temp: 98.3 °F (36.8 °C) 98.4 °F (36.9 °C)  98.7 °F (37.1 °C)   SpO2: 97% 94%  97%   Weight:       Height:   6' (1.829 m)      Objective:   General: alert awake well-oriented, no acute distress. HEENT: EOMI, no Icterus, no Pallor,  mucosa moist.  Neck: Neck is supple, No JVD  Lungs: decreased breathsounds at bases, no respiratory distress on inspection '  CVS: heart sounds normal,  no murmurs, no rubs. GI: soft, nontender, normal BS. Extremeties: no cyanosis,no edema in ext   Neuro: Alert, awake, oriented x3, answering simple questions appropriately  Skin: normal skin turgor, no skin rashes. Intake and Output:  Current Shift: No intake/output data recorded. Last three shifts: 10/14 1901 - 10/16 0700  In: 610 [P.O.:360; I.V.:250]  Out: 4921 [Urine:4850; Drains:71]      Lab/Data Review:  Recent Labs     10/15/20  0400 10/14/20  0430   WBC 14.5* 16.1*   HGB 9.7* 9.8*   HCT 28.9* 28.9*    154     Recent Labs     10/16/20  0850 10/15/20  0400 10/14/20  0430    143  142 142   K 2.9* 3.3*  3.3* 3.2*    106  105 105   CO2 30 30  31 31   GLU 87 102*  101* 86   BUN 16 15  15 16   CREA 0.68* 0.69*  0.66* 0.65*   CA 9.1 9.0  8.8 8.5   MG  --  2.2  --    PHOS 2.7 2.8 3.1   ALB 3.2* 2.9* 2.7*     No results for input(s): PH, PCO2, PO2, HCO3, FIO2 in the last 72 hours.   Recent Results (from the past 24 hour(s))   RENAL FUNCTION PANEL    Collection Time: 10/16/20  8:50 AM   Result Value Ref Range    Sodium 141 136 - 145 mmol/L    Potassium 2.9 (L) 3.5 - 5.1 mmol/L    Chloride 105 97 - 108 mmol/L    CO2 30 21 - 32 mmol/L    Anion gap 6 5 - 15 mmol/L    Glucose 87 65 - 100 mg/dL    BUN 16 6 - 20 mg/dL    Creatinine 0.68 (L) 0.70 - 1.30 mg/dL    BUN/Creatinine ratio 24 (H) 12 - 20      GFR est AA >60 >60 ml/min/1.73m2    GFR est non-AA >60 >60 ml/min/1.73m2    Calcium 9.1 8.5 - 10.1 mg/dL    Phosphorus 2.7 2.6 - 4.7 mg/dL    Albumin 3.2 (L) 3.5 - 5.0 g/dL        Assessment and Plan:       1. severe hypokalemia:  -from diuretics and metabolic alkalosis,   Had significant outputs 4L+  Will dc lasix, no need for aggressive diuresis as edema has significantly better  continue spironolactone to maintain diuresis  Will give kcl 50 meq x1 dose today IV  Continue kcl 40 bid  continue spironolactone 25 tid  monitor K levels,  Mg is normal    2. Low urine output/ BALDEV, resolved  - likely prerenal from hypotension.    -improved with diuretics now      3. Edema: improved with good diuresis +, dc lasix     4. .  Metabolic alkalosis: sec to diuretics  Improving, monitor jerri/CO2    5.  Hypotension: improved hemodynamic status  decrease motprolol to 25 bid, dc mododrine to 5 mg tid  Continue to monitor BPs     6 status post  infrarenal aorta endarterectomy, with aortic by common femoral artery bypass with 14 mm x 7 mm Hemosure graft     7. Spont. Left pneumothorax: thoracic surgery monitoring. Cleared for discharge  8.  S/p CVA/debility: continue supportive care    Signed By: Moriah Garrett MD     October 16, 2020

## 2020-10-16 NOTE — PROGRESS NOTES
Hospitalist Progress Note           Daily Progress Note: 10/16/2020    Chief complaint: Shortness of breath and weakness  Subjective: The patient is seen for follow  up. He and his wife initially were going to go home yesterday but they have now changed her mind and have decided to go to rehab. This will require prior authorization so he will be here through the weekend.   Chest x-ray this morning is still pending    Problem List:  Problem List as of 10/16/2020 Date Reviewed: 9/17/2020          Codes Class Noted - Resolved    Metabolic encephalopathy QPI-01-FN: G93.41  ICD-9-CM: 348.31  9/26/2020 - Present        UTI (urinary tract infection) ICD-10-CM: N39.0  ICD-9-CM: 599.0  9/26/2020 - Present        Aspiration pneumonitis ([address] ICD-10-CM: J69.0  ICD-9-CM: 507.0  9/26/2020 - Present        Essential hypertension ICD-10-CM: I10  ICD-9-CM: 401.9  9/26/2020 - Present        Acute dehydration ICD-10-CM: E86.0  ICD-9-CM: 276.51  9/26/2020 - Present              Medications reviewed  Current Facility-Administered Medications   Medication Dose Route Frequency    pantoprazole (PROTONIX) tablet 40 mg  40 mg Oral BID    metoprolol tartrate (LOPRESSOR) tablet 25 mg  25 mg Oral BID    midodrine (PROAMATINE) tablet 5 mg  5 mg Oral BID    spironolactone (ALDACTONE) tablet 25 mg  25 mg Oral TID    potassium chloride (KLOR-CON) packet for solution 40 mEq  40 mEq Oral BID WITH MEALS    furosemide (LASIX) injection 40 mg  40 mg IntraVENous DAILY    cefepime (MAXIPIME) 1 g in 0.9% sodium chloride (MBP/ADV) 50 mL MBP  1 g IntraVENous Q8H    metroNIDAZOLE (FLAGYL) IVPB premix 500 mg  500 mg IntraVENous Q8H    albuterol CONCENTRATE 2.5mg/0.5 mL neb soln  2.5 mg Nebulization Q6H RT    LORazepam (ATIVAN) injection 1 mg  1 mg IntraVENous Q2H PRN    sodium chloride 0.9% (NS) 3ml nebulizer solution  2.5 mL Nebulization PRN    influenza vaccine 2020-21 (4 yrs+)(PF) (FLUCELVAX QUAD) injection 0.5 mL  0.5 mL IntraMUSCular PRIOR TO DISCHARGE    diphenhydrAMINE (BENADRYL) capsule 50 mg  50 mg Oral BID PRN    phosphorus (K PHOS NEUTRAL) 250 mg tablet 2 Tab  2 Tab Oral BID    levoFLOXacin (LEVAQUIN) tablet 500 mg  500 mg Oral Q24H    predniSONE (DELTASONE) tablet 20 mg  20 mg Oral DAILY WITH BREAKFAST    oxyCODONE IR (ROXICODONE) tablet 15 mg  15 mg Oral Q4H PRN    enoxaparin (LOVENOX) injection 40 mg  40 mg SubCUTAneous Q24H    aspirin delayed-release tablet 81 mg  81 mg Oral DAILY    acetaminophen (TYLENOL) tablet 650 mg  650 mg Oral Q6H PRN    ondansetron (ZOFRAN) injection 4 mg  4 mg IntraVENous Q8H PRN    guaiFENesin (ROBITUSSIN) 20 mg/mL oral liquid 400 mg  400 mg Oral Q6H PRN       Review of Systems:   Review of systems unable to be obtained because he is intubated    Objective:   Physical Exam:     Visit Vitals  BP (!) 155/88 (BP 1 Location: Left arm, BP Patient Position: At rest)   Pulse 95   Temp 98.3 °F (36.8 °C)   Resp 18   Ht 6' (1.829 m)   Wt 59.6 kg (131 lb 6.3 oz)   SpO2 96%   BMI 17.82 kg/m²    O2 Flow Rate (L/min): 2 l/min O2 Device: Room air    Temp (24hrs), Av.4 °F (36.9 °C), Min:97.9 °F (36.6 °C), Max:99.3 °F (37.4 °C)    No intake/output data recorded. 10/14 1901 - 10/16 0700  In: 56 [P.O.:360; I.V.:250]  Out: 4921 [Urine:4850; Drains:71]    General:  awake and conversant, cooperative, no distress, appears older than stated age. Lungs:   Clear to auscultation bilaterally with diminished breath sounds bilateral bases. Chest wall:  No tenderness or deformity. Heart:  Regular rate and rhythm, S1, S2 normal, no murmur, click, rub or gallop. Abdomen:   Soft, non-tender. Diminished Bowel sounds. Dressings in place. Extremities: Extremities normal, atraumatic, positive edema bilaterally   Pulses: 2+ and symmetric all extremities. Skin: Skin color, texture, turgor normal. No rashes or lesions   Neurologic: CNII-XII intact.      Data Review:       Recent Days:  Recent Labs 10/15/20  0400 10/14/20  0430   WBC 14.5* 16.1*   HGB 9.7* 9.8*   HCT 28.9* 28.9*    154     Recent Labs     10/15/20  0400 10/14/20  0430     142 142   K 3.3*  3.3* 3.2*     105 105   CO2 30  31 31   *  101* 86   BUN 15  15 16   CREA 0.69*  0.66* 0.65*   CA 9.0  8.8 8.5   MG 2.2  --    PHOS 2.8 3.1   ALB 2.9* 2.7*     No results for input(s): PH, PCO2, PO2, HCO3, FIO2 in the last 72 hours. 24 Hour Results:  No results found for this or any previous visit (from the past 24 hour(s)). Assessment/     Acute hypoxic respiratory failure postsurgery. Self extubated 10/09/20. Reintubated 10/10 due to hypoxemia. Extubated 10/11    Left-sided pneumothorax, overall improved    Acute kidney injury likely secondary to ATN from hypotension. Improved    Hypovolemic shock post surgery. Improved    Right lower lobe aspiration pneumonia improved    Urinary tract infection due to E. Coli    Abnormal Cardiolite stress test showing inferior ischemia. 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 Repleted    Hypothermia, probably largely environmental.  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 Plan:  Await chest x-ray  Referral to inpatient rehab underway    Care Plan discussed with: Patient/Family       Total time spent with patient: 30 minutes.     Lilian Candelaria MD

## 2020-10-16 NOTE — PROGRESS NOTES
Post-OP Visit    Bethann Severance is a 48 y.o. male who has stable mild/moderate left pneumo that is stable and I have elected to let just resolve on its own. He is not short of breath and well saturated on room air. /76   Pulse 98   Temp 98.4 °F (36.9 °C)   Resp 18   Ht 6' (1.829 m)   Wt 131 lb 6.3 oz (59.6 kg)   SpO2 94%   BMI 17.82 kg/m²     Physical Exam  Lungs clear,   Problem List Items Addressed This Visit        Respiratory    Aspiration pneumonitis (HCC)      Other Visit Diagnoses     Encephalopathy acute    -  Primary    Relevant Medications    midodrine (PROAMATINE) 10 mg tablet    Acute occlusion of aortoiliac artery (HCC)        Pain in both lower extremities        TIA (transient ischemic attack)        Chest pain, unspecified type        Relevant Orders    CARDIAC PROCEDURE (Completed)          Assessment and Plan: left spont pneumo, cxr is stable, CPM  Ok to be discharged at any time.      Geovani Ray MD

## 2020-10-16 NOTE — PROGRESS NOTES
PHYSICAL THERAPY TREATMENT  Patient: Kevin Olivares (60 y.o. male)  Date: 10/16/2020  Diagnosis: Metabolic encephalopathy [O05.16]   <principal problem not specified>  Procedure(s) (LRB):  Aorta Bifemoral Bypass (N/A) 10 Days Post-Op  Precautions:    Chart, physical therapy assessment, plan of care and goals were reviewed. ASSESSMENT  Patient continues with skilled PT services and is progressing towards goals. Pt semi supine in bed upon arrival and agreeable to session. Performed bed mobility with max A x 1. Able to sit EOB ~5 minutes with CGAx1 once situated before laying himself onto his side impulsively without warning. Stated he wanted to lay down. Performed PROM on BLE, which patient stated he had more pain in the LLE than RLE. Patient was only able to tolerate about 5 reps of each exercise. Pt said \"ow\" throughout entire session anytime his legs were touched. Pt demos decreased activity tolerance and decreased safety awareness. .     Current Level of Function Impacting Discharge (mobility/balance): level of assistance needed for mobility and sitting balance         PLAN :  Patient continues to benefit from skilled intervention to address the above impairments. Continue treatment per established plan of care. to address goals. Recommendation for discharge: (in order for the patient to meet his/her long term goals)  Therapy 3 hours per day 5-7 days per week    This discharge recommendation:  Has been made in collaboration with the attending provider and/or case management    IF patient discharges home will need the following DME: to be determined (TBD)       SUBJECTIVE:   Patient stated this is what happens when you get old.     OBJECTIVE DATA SUMMARY:   Critical Behavior:  Neurologic State: Alert, Confused  Orientation Level: Oriented to place, Oriented to person, Oriented to situation  Cognition: Follows commands  Safety/Judgement: Decreased awareness of need for safety  Functional Mobility Training:  Bed Mobility:     Supine to Sit: Maximum assistance  Sit to Supine: Maximum assistance  Scooting: Maximum assistance    Balance:  Sitting: Impaired  Sitting - Static: Poor (constant support)      Therapeutic Exercises:       EXERCISE   Sets   Reps   Active Active Assist   Passive Self ROM   Comments   Ankle Pumps 1 5 [] [] [x] []    Hip ab/ad 1 5 [] [] [x] []    SLR 1 5 [] [] [x] []    Heel slides 1 5 [] [] [x] []        Pain Ratin-9/10 on LLE  4-5/10 on RLE    Activity Tolerance:   Poor  Please refer to the flowsheet for vital signs taken during this treatment. After treatment patient left in no apparent distress:   Supine in bed, Heels elevated for pressure relief, Call bell within reach, and Caregiver / family present    COMMUNICATION/COLLABORATION:   The patients plan of care was discussed with: Physical therapist and Occupational therapist.       Problem: Mobility Impaired (Adult and Pediatric)  Goal: *Acute Goals and Plan of Care (Insert Text)  Description: Pt will be I with LE HEP in 7 days. Pt will perform bed mobility with mod A in 7 days. Pt will perform sit <> stand and stand pivot transfers with mod A in 7 days.         Outcome: Progressing Towards Goal       Rosana Kuo, PTA   Time Calculation: 30 mins

## 2020-10-16 NOTE — PROGRESS NOTES
Comprehensive Nutrition Assessment    Type and Reason for Visit: Reassess    Nutrition Recommendations/Plan:   Continue Ground/ NTL diet  Continue Ensure Enlive (NTL) TID  Add Ensure Pdg TID, Add Benepro in Applesauce x2 TID    Please document all PO intakes in EMR    Nutrition Assessment:  Admitted to ICU 2/2 aspiration event in ED. Previously intubated, requiring pressors and sedation; extubated 10/11. L sided pneumothorax noted on CXR (10/13), pt asymptomatic; s/p needle decompression (10/14) and thoracentesis (10/15) without results. Transferred to med unit yesterday. Plans to d/c to rehab. Briefly received TPN d/t concern for GI bleed. Then briefly received TF (Osmolite 1.2) prior to extubation. Diet adanced to Full/NTL (10/12) per SLP recs. Further advanced to Ground/NTL (10/14), SLP rec'd continue dysphagia diet s/p d/c. Per SLP note, no PO intake consumed at lunch yesterday. No PO intakes documented in EMR. Spoke with RN, who reports pt with limited PO intakes d/t dislike of food texture. Consuming only applesauce. RN denies seeing Ensure supplementation on tray. RD to increase supplementation to promote intakes. Labs: H/H 9.7/28.9, K 2.9. Meds: cefepime, furosemide, spironolactone, levaquin, flagyl, PPI, KPhos, KCl.    AMS pta. Dx dehydration, UTI on admit. Per chart review; pt typically consumes regular texture diet at home. SLP rec'd NPO on admit. S/p cardiac cath. 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 Returned to ICU intubated s/p procedure, requiring high volume pressors. Pressors requirements reduced and d/c with extubation. Reintubated for worsening resp status (10/10) with bronch for mucous plugging same day. Extubated 10/11. TF not initiated on first intubation d/t plans for extubation, concern for blood via NG. Standard TPN (10/8-11) provided ~50% of est needs; ordered d/t concern for GI bleed however not substantiated so d/c once TF initiated.  When intubated second time, pt ordered TF of Osmolite 1.2 at goal 68mL/hr continuous with 75mL free water q4hr- TF provided 1958kcal, 91g protein, 1788mL fluid (adequate to meet EENs on vent). Malnutrition Assessment:  Malnutrition Status: Moderate malnutrition      Estimated Daily Nutrient Needs:  Energy (kcal):  2000kcal (28kcal/kg)  Protein (g):  85g (1.2g/kg)(stress)       Fluid (ml/day):  1775mL (25mL/kg)    Nutrition Related Findings:  Previously noted pt thin with muscle wasting. No n/v. Last BM yesterday, soft. Dysphagia noted, SLP following. Wounds:    Multiple(multiple pressure injuries, no open wounds)       Current Nutrition Therapies:  DIET NUTRITIONAL SUPPLEMENTS Breakfast, Lunch, Dinner; [employer] (NTL)  DIET DYSPHAGIA MECH ALTERED (NDD2) 2 [place]/2 Mildly Thick    Anthropometric Measures:  · Height:  6' (182.9 cm)  · Current Body Wt:  59.6 kg (131 lb 6.3 oz)(10/15)   · Admission Body Wt:  15 lb 15.4 oz(pt stated)    · Ideal Body Wt:  178 lbs:  73.8 %   · BMI Category:  Underweight (BMI less than 18.5)       Nutrition Diagnosis:   · Predicted inadequate energy intake related to swallowing difficulty, increased demand for energy/nutrients, catabolic illness as evidenced by swallowing study results      Nutrition Interventions:   Food and/or Nutrient Delivery: Continue current diet, Start oral nutrition supplement  Nutrition Education and Counseling: No recommendations at this time  Coordination of Nutrition Care: Continued inpatient monitoring    Goals:  Initiate means of nutrition to meet >75% EENs in 7 days  Wt gain 1lb +/- 0.5lb per week  Na and lytes wnl  Improved skin integrity       Nutrition Monitoring and Evaluation:   Behavioral-Environmental Outcomes:  N/A  Food/Nutrient Intake Outcomes: Food and nutrient intake, Supplement intake  Physical Signs/Symptoms Outcomes: Chewing or swallowing, Skin, Weight    Discharge Planning:     Too soon to determine     Electronically signed by Zeyad Urbina on 10/16/2020 at 2:40 PM    Contact:

## 2020-10-16 NOTE — PROGRESS NOTES
CM notes that the patient's wife has decided she is unable to care for the patient at home and that she would like for him to go to [facility] IRF. Donna French RN/DENIS sent a referral to [facility] this morning. DENIS notes that [facility] acknowledged receipt of the referral and reports that they are reviewing the patients information for consideration for admission. Patient has [facility] and will likely need auth prior to acceptance. CM will continue to follow for discharge planning needs.

## 2020-10-17 ENCOUNTER — APPOINTMENT (OUTPATIENT)
Dept: GENERAL RADIOLOGY | Age: 55
DRG: 981 | End: 2020-10-17
Attending: THORACIC SURGERY (CARDIOTHORACIC VASCULAR SURGERY)
Payer: COMMERCIAL

## 2020-10-17 ENCOUNTER — ANESTHESIA (OUTPATIENT)
Dept: MEDSURG UNIT | Age: 55
DRG: 981 | End: 2020-10-17
Payer: COMMERCIAL

## 2020-10-17 ENCOUNTER — APPOINTMENT (OUTPATIENT)
Dept: GENERAL RADIOLOGY | Age: 55
DRG: 981 | End: 2020-10-17
Attending: INTERNAL MEDICINE
Payer: COMMERCIAL

## 2020-10-17 ENCOUNTER — ANESTHESIA EVENT (OUTPATIENT)
Dept: MEDSURG UNIT | Age: 55
DRG: 981 | End: 2020-10-17
Payer: COMMERCIAL

## 2020-10-17 ENCOUNTER — APPOINTMENT (OUTPATIENT)
Dept: GENERAL RADIOLOGY | Age: 55
DRG: 981 | End: 2020-10-17
Attending: HOSPITALIST
Payer: COMMERCIAL

## 2020-10-17 LAB
ABO + RH BLD: NORMAL
ALBUMIN SERPL-MCNC: 3.1 G/DL (ref 3.5–5)
ALBUMIN/GLOB SERPL: 1.3 {RATIO} (ref 1.1–2.2)
ALP SERPL-CCNC: 102 U/L (ref 45–117)
ALT SERPL-CCNC: 14 U/L (ref 12–78)
AMMONIA PLAS-SCNC: <10 UMOL/L
ANION GAP SERPL CALC-SCNC: 6 MMOL/L (ref 5–15)
APTT PPP: 35.6 SEC (ref 23–35.7)
ARTERIAL PATENCY WRIST A: ABNORMAL
ARTERIAL PATENCY WRIST A: YES
AST SERPL W P-5'-P-CCNC: 15 U/L (ref 15–37)
BASE EXCESS BLDA CALC-SCNC: 2 MMOL/L (ref 0–2)
BASE EXCESS BLDA CALC-SCNC: 2.6 MMOL/L (ref 0–2)
BASOPHILS # BLD: 0 K/UL (ref 0–0.1)
BASOPHILS NFR BLD: 0 % (ref 0–1)
BDY SITE: ABNORMAL
BDY SITE: ABNORMAL
BILIRUB SERPL-MCNC: 1.7 MG/DL (ref 0.2–1)
BLOOD GROUP ANTIBODIES SERPL: NEGATIVE
BUN SERPL-MCNC: 18 MG/DL (ref 6–20)
BUN/CREAT SERPL: 23 (ref 12–20)
CA-I BLD-MCNC: 8.8 MG/DL (ref 8.5–10.1)
CHLORIDE SERPL-SCNC: 109 MMOL/L (ref 97–108)
CO2 SERPL-SCNC: 29 MMOL/L (ref 21–32)
CREAT SERPL-MCNC: 0.8 MG/DL (ref 0.7–1.3)
DIFFERENTIAL METHOD BLD: ABNORMAL
EOSINOPHIL # BLD: 0 K/UL (ref 0–0.4)
EOSINOPHIL NFR BLD: 0 % (ref 0–7)
EPAP/CPAP/PEEP, PAPEEP: 5
EPAP/CPAP/PEEP, PAPEEP: 8
ERYTHROCYTE [DISTWIDTH] IN BLOOD BY AUTOMATED COUNT: 17.9 % (ref 11.5–14.5)
FIO2 ON VENT: 100 %
FIO2 ON VENT: 80 %
GAS FLOW.O2 SETTING OXYMISER: 12 L/MIN
GAS FLOW.O2 SETTING OXYMISER: 12 L/MIN
GLOBULIN SER CALC-MCNC: 2.3 G/DL (ref 2–4)
GLUCOSE SERPL-MCNC: 151 MG/DL (ref 65–100)
HCO3 BLDA-SCNC: 26 MMOL/L (ref 22–26)
HCO3 BLDA-SCNC: 26 MMOL/L (ref 22–26)
HCT VFR BLD AUTO: 32 % (ref 36.6–50.3)
HGB BLD-MCNC: 10.6 G/DL (ref 12.1–17)
IMM GRANULOCYTES # BLD AUTO: 0.1 K/UL (ref 0–0.04)
IMM GRANULOCYTES NFR BLD AUTO: 1 % (ref 0–0.5)
INR PPP: 1.5 (ref 0.9–1.1)
IPAP/PIP, IPAPIP: 0
IPAP/PIP, IPAPIP: 29
LACTATE SERPL-SCNC: 1.9 MMOL/L (ref 0.4–2)
LYMPHOCYTES # BLD: 0.6 K/UL (ref 0.8–3.5)
LYMPHOCYTES NFR BLD: 5 % (ref 12–49)
MAGNESIUM SERPL-MCNC: 1.9 MG/DL (ref 1.6–2.4)
MCH RBC QN AUTO: 31.5 PG (ref 26–34)
MCHC RBC AUTO-ENTMCNC: 33.1 G/DL (ref 30–36.5)
MCV RBC AUTO: 95.2 FL (ref 80–99)
MONOCYTES # BLD: 1.4 K/UL (ref 0–1)
MONOCYTES NFR BLD: 11 % (ref 5–13)
NEUTS SEG # BLD: 10.8 K/UL (ref 1.8–8)
NEUTS SEG NFR BLD: 83 % (ref 32–75)
PCO2 BLDA: 35 MMHG (ref 35–45)
PCO2 BLDA: 44 MMHG (ref 35–45)
PH BLDA: 7.39 [PH] (ref 7.35–7.45)
PH BLDA: 7.47 [PH] (ref 7.35–7.45)
PLATELET # BLD AUTO: 285 K/UL (ref 150–400)
PMV BLD AUTO: 10.8 FL (ref 8.9–12.9)
PO2 BLDA: 70 MMHG (ref 75–100)
PO2 BLDA: 78 MMHG (ref 75–100)
POTASSIUM SERPL-SCNC: 4 MMOL/L (ref 3.5–5.1)
PROT SERPL-MCNC: 5.4 G/DL (ref 6.4–8.2)
PROTHROMBIN TIME: 18 SEC (ref 11.9–14.7)
RBC # BLD AUTO: 3.36 M/UL (ref 4.1–5.7)
SAO2 % BLD: 95 %
SAO2 % BLD: 96 %
SAO2% DEVICE SAO2% SENSOR NAME: ABNORMAL
SAO2% DEVICE SAO2% SENSOR NAME: ABNORMAL
SODIUM SERPL-SCNC: 144 MMOL/L (ref 136–145)
SPECIMEN EXP DATE BLD: NORMAL
SPECIMEN SITE: ABNORMAL
THERAPEUTIC RANGE,PTTT: NORMAL SEC (ref 68–109)
VENTILATION MODE VENT: ABNORMAL
VENTILATION MODE VENT: ABNORMAL
VT SETTING VENT: 500 ML
WBC # BLD AUTO: 12.8 K/UL (ref 4.1–11.1)

## 2020-10-17 PROCEDURE — 94668 MNPJ CHEST WALL SBSQ: CPT

## 2020-10-17 PROCEDURE — 71045 X-RAY EXAM CHEST 1 VIEW: CPT

## 2020-10-17 PROCEDURE — 80053 COMPREHEN METABOLIC PANEL: CPT

## 2020-10-17 PROCEDURE — 74011250636 HC RX REV CODE- 250/636: Performed by: INTERNAL MEDICINE

## 2020-10-17 PROCEDURE — 83735 ASSAY OF MAGNESIUM: CPT

## 2020-10-17 PROCEDURE — 82803 BLOOD GASES ANY COMBINATION: CPT

## 2020-10-17 PROCEDURE — 82140 ASSAY OF AMMONIA: CPT

## 2020-10-17 PROCEDURE — 36600 WITHDRAWAL OF ARTERIAL BLOOD: CPT

## 2020-10-17 PROCEDURE — P9047 ALBUMIN (HUMAN), 25%, 50ML: HCPCS | Performed by: SURGERY

## 2020-10-17 PROCEDURE — 74011250637 HC RX REV CODE- 250/637: Performed by: INTERNAL MEDICINE

## 2020-10-17 PROCEDURE — 5A1945Z RESPIRATORY VENTILATION, 24-96 CONSECUTIVE HOURS: ICD-10-PCS | Performed by: INTERNAL MEDICINE

## 2020-10-17 PROCEDURE — 0W9930Z DRAINAGE OF RIGHT PLEURAL CAVITY WITH DRAINAGE DEVICE, PERCUTANEOUS APPROACH: ICD-10-PCS | Performed by: THORACIC SURGERY (CARDIOTHORACIC VASCULAR SURGERY)

## 2020-10-17 PROCEDURE — 0BH17EZ INSERTION OF ENDOTRACHEAL AIRWAY INTO TRACHEA, VIA NATURAL OR ARTIFICIAL OPENING: ICD-10-PCS | Performed by: ANESTHESIOLOGY

## 2020-10-17 PROCEDURE — 94400 HC END TIDAL CO2 RESPONSE CURVE: CPT

## 2020-10-17 PROCEDURE — 0W9B30Z DRAINAGE OF LEFT PLEURAL CAVITY WITH DRAINAGE DEVICE, PERCUTANEOUS APPROACH: ICD-10-PCS | Performed by: THORACIC SURGERY (CARDIOTHORACIC VASCULAR SURGERY)

## 2020-10-17 PROCEDURE — 32551 INSERTION OF CHEST TUBE: CPT | Performed by: THORACIC SURGERY (CARDIOTHORACIC VASCULAR SURGERY)

## 2020-10-17 PROCEDURE — 85730 THROMBOPLASTIN TIME PARTIAL: CPT

## 2020-10-17 PROCEDURE — 74011000250 HC RX REV CODE- 250: Performed by: INTERNAL MEDICINE

## 2020-10-17 PROCEDURE — 94003 VENT MGMT INPAT SUBQ DAY: CPT

## 2020-10-17 PROCEDURE — 94640 AIRWAY INHALATION TREATMENT: CPT

## 2020-10-17 PROCEDURE — 74011000258 HC RX REV CODE- 258: Performed by: INTERNAL MEDICINE

## 2020-10-17 PROCEDURE — 83605 ASSAY OF LACTIC ACID: CPT

## 2020-10-17 PROCEDURE — 74011250636 HC RX REV CODE- 250/636: Performed by: HOSPITALIST

## 2020-10-17 PROCEDURE — 85025 COMPLETE CBC W/AUTO DIFF WBC: CPT

## 2020-10-17 PROCEDURE — 74011000250 HC RX REV CODE- 250: Performed by: NURSE ANESTHETIST, CERTIFIED REGISTERED

## 2020-10-17 PROCEDURE — 74011250636 HC RX REV CODE- 250/636: Performed by: SURGERY

## 2020-10-17 PROCEDURE — 36415 COLL VENOUS BLD VENIPUNCTURE: CPT

## 2020-10-17 PROCEDURE — 85610 PROTHROMBIN TIME: CPT

## 2020-10-17 PROCEDURE — 74011250637 HC RX REV CODE- 250/637: Performed by: NURSE PRACTITIONER

## 2020-10-17 PROCEDURE — 65610000006 HC RM INTENSIVE CARE

## 2020-10-17 PROCEDURE — 77010033678 HC OXYGEN DAILY

## 2020-10-17 PROCEDURE — 86900 BLOOD TYPING SEROLOGIC ABO: CPT

## 2020-10-17 PROCEDURE — 74011250636 HC RX REV CODE- 250/636: Performed by: THORACIC SURGERY (CARDIOTHORACIC VASCULAR SURGERY)

## 2020-10-17 RX ORDER — PROPOFOL 10 MG/ML
10 VIAL (ML) INTRAVENOUS
Status: DISCONTINUED | OUTPATIENT
Start: 2020-10-17 | End: 2020-10-21

## 2020-10-17 RX ORDER — MIDODRINE HYDROCHLORIDE 5 MG/1
10 TABLET ORAL 2 TIMES DAILY
Status: DISCONTINUED | OUTPATIENT
Start: 2020-10-17 | End: 2020-10-22

## 2020-10-17 RX ORDER — PREDNISONE 20 MG/1
TABLET ORAL
Status: DISCONTINUED
Start: 2020-10-17 | End: 2020-10-17 | Stop reason: SDUPTHER

## 2020-10-17 RX ORDER — PANTOPRAZOLE SODIUM 40 MG/1
40 GRANULE, DELAYED RELEASE ORAL
Status: DISCONTINUED | OUTPATIENT
Start: 2020-10-18 | End: 2020-10-26

## 2020-10-17 RX ORDER — PIPERACILLIN SODIUM, TAZOBACTAM SODIUM 3; .375 G/15ML; G/15ML
INJECTION, POWDER, LYOPHILIZED, FOR SOLUTION INTRAVENOUS
Status: DISCONTINUED
Start: 2020-10-17 | End: 2020-10-17 | Stop reason: SDUPTHER

## 2020-10-17 RX ORDER — ALBUMIN HUMAN 250 G/1000ML
25 SOLUTION INTRAVENOUS 2 TIMES DAILY
Status: COMPLETED | OUTPATIENT
Start: 2020-10-17 | End: 2020-10-17

## 2020-10-17 RX ORDER — MORPHINE SULFATE 2 MG/ML
2 INJECTION, SOLUTION INTRAMUSCULAR; INTRAVENOUS
Status: DISPENSED | OUTPATIENT
Start: 2020-10-17 | End: 2020-10-19

## 2020-10-17 RX ORDER — ETOMIDATE 2 MG/ML
INJECTION INTRAVENOUS AS NEEDED
Status: DISCONTINUED | OUTPATIENT
Start: 2020-10-17 | End: 2020-10-17 | Stop reason: HOSPADM

## 2020-10-17 RX ADMIN — ALBUMIN (HUMAN) 25 G: 0.25 INJECTION, SOLUTION INTRAVENOUS at 09:49

## 2020-10-17 RX ADMIN — ALBUTEROL SULFATE 2.5 MG: 2.5 SOLUTION RESPIRATORY (INHALATION) at 20:10

## 2020-10-17 RX ADMIN — PROPOFOL 10 MCG/KG/MIN: 10 INJECTION, EMULSION INTRAVENOUS at 02:38

## 2020-10-17 RX ADMIN — METOPROLOL TARTRATE 25 MG: 25 TABLET, FILM COATED ORAL at 09:50

## 2020-10-17 RX ADMIN — METHYLPREDNISOLONE SODIUM SUCCINATE 40 MG: 40 INJECTION, POWDER, FOR SOLUTION INTRAMUSCULAR; INTRAVENOUS at 22:16

## 2020-10-17 RX ADMIN — PROPOFOL 10 MCG/KG/MIN: 10 INJECTION, EMULSION INTRAVENOUS at 22:16

## 2020-10-17 RX ADMIN — METOPROLOL TARTRATE 25 MG: 25 TABLET, FILM COATED ORAL at 20:40

## 2020-10-17 RX ADMIN — DIBASIC SODIUM PHOSPHATE, MONOBASIC POTASSIUM PHOSPHATE AND MONOBASIC SODIUM PHOSPHATE 2 TABLET: 852; 155; 130 TABLET ORAL at 20:40

## 2020-10-17 RX ADMIN — MORPHINE SULFATE 2 MG: 2 INJECTION, SOLUTION INTRAMUSCULAR; INTRAVENOUS at 20:39

## 2020-10-17 RX ADMIN — METRONIDAZOLE 500 MG: 500 INJECTION, SOLUTION INTRAVENOUS at 06:37

## 2020-10-17 RX ADMIN — METHYLPREDNISOLONE SODIUM SUCCINATE 40 MG: 40 INJECTION, POWDER, FOR SOLUTION INTRAMUSCULAR; INTRAVENOUS at 14:32

## 2020-10-17 RX ADMIN — ALBUMIN (HUMAN) 25 G: 0.25 INJECTION, SOLUTION INTRAVENOUS at 20:41

## 2020-10-17 RX ADMIN — MIDODRINE HYDROCHLORIDE 10 MG: 5 TABLET ORAL at 20:40

## 2020-10-17 RX ADMIN — ALBUTEROL SULFATE 2.5 MG: 2.5 SOLUTION RESPIRATORY (INHALATION) at 00:14

## 2020-10-17 RX ADMIN — MIDODRINE HYDROCHLORIDE 10 MG: 5 TABLET ORAL at 14:33

## 2020-10-17 RX ADMIN — PANTOPRAZOLE SODIUM 40 MG: 40 TABLET, DELAYED RELEASE ORAL at 09:50

## 2020-10-17 RX ADMIN — DIBASIC SODIUM PHOSPHATE, MONOBASIC POTASSIUM PHOSPHATE AND MONOBASIC SODIUM PHOSPHATE 2 TABLET: 852; 155; 130 TABLET ORAL at 09:50

## 2020-10-17 RX ADMIN — ALBUTEROL SULFATE 2.5 MG: 2.5 SOLUTION RESPIRATORY (INHALATION) at 13:27

## 2020-10-17 RX ADMIN — POTASSIUM CHLORIDE 40 MEQ: 1.5 FOR SOLUTION ORAL at 09:56

## 2020-10-17 RX ADMIN — ISODIUM CHLORIDE 2.5 ML: 0.03 SOLUTION RESPIRATORY (INHALATION) at 13:27

## 2020-10-17 RX ADMIN — MORPHINE SULFATE 2 MG: 2 INJECTION, SOLUTION INTRAMUSCULAR; INTRAVENOUS at 18:11

## 2020-10-17 RX ADMIN — PIPERACILLIN AND TAZOBACTAM 3.38 G: 3; .375 INJECTION, POWDER, LYOPHILIZED, FOR SOLUTION INTRAVENOUS at 16:39

## 2020-10-17 RX ADMIN — ISODIUM CHLORIDE 2.5 ML: 0.03 SOLUTION RESPIRATORY (INHALATION) at 08:52

## 2020-10-17 RX ADMIN — PROPOFOL 10 MCG/KG/MIN: 10 INJECTION, EMULSION INTRAVENOUS at 14:32

## 2020-10-17 RX ADMIN — ALBUTEROL SULFATE 2.5 MG: 2.5 SOLUTION RESPIRATORY (INHALATION) at 08:52

## 2020-10-17 RX ADMIN — CEFEPIME 1 G: 1 INJECTION, POWDER, FOR SOLUTION INTRAMUSCULAR; INTRAVENOUS at 02:47

## 2020-10-17 RX ADMIN — PIPERACILLIN AND TAZOBACTAM 3.38 G: 3; .375 INJECTION, POWDER, LYOPHILIZED, FOR SOLUTION INTRAVENOUS at 09:50

## 2020-10-17 RX ADMIN — ENOXAPARIN SODIUM 40 MG: 40 INJECTION SUBCUTANEOUS at 20:40

## 2020-10-17 RX ADMIN — ETOMIDATE 20 MG: 2 INJECTION INTRAVENOUS at 01:18

## 2020-10-17 RX ADMIN — METHYLPREDNISOLONE SODIUM SUCCINATE 40 MG: 40 INJECTION, POWDER, FOR SOLUTION INTRAMUSCULAR; INTRAVENOUS at 09:56

## 2020-10-17 RX ADMIN — ASPIRIN 81 MG: 81 TABLET, COATED ORAL at 09:50

## 2020-10-17 RX ADMIN — POTASSIUM CHLORIDE 40 MEQ: 1.5 FOR SOLUTION ORAL at 16:39

## 2020-10-17 NOTE — PROGRESS NOTES
Nutrition Note    Pt emergently intubated this AM for hypoxia, noted this is pt 3rd intubation this admit. Per MD notes, hemodynamically stable, requiring no pressors, plans to start TF today. TF recs as follows:    Initiate TF via NG of Promote at 30mL/hr   Increase by 20mL q4hr to goal rate of 60mL/hr, continuous   Flush with 50mL free water q4hr  Goal feeds provide 1728kcal, 80g protein, 1481mL fluid (meeting ~100% est needs)   *pt receiving Propofol at 3.6ml/hr (95kcals/d)    Estimated Daily Nutrient Needs:  Energy (kcal):  1738kcals(PSU 2003b)  Protein (g):  78g(1.4g/kg)       Fluid (ml/day):  1400(25m/kg)   Needs for intubation, multiple wounds, stress    Current Nutrition Therapies:  DIET NUTRITIONAL SUPPLEMENTS Breakfast, Lunch, Dinner; [employer] (NTL)  DIET NUTRITIONAL SUPPLEMENTS Breakfast, Lunch, Dinner; [employer]  DIET NUTRITIONAL SUPPLEMENTS Breakfast, Lunch, Dinner; Beneprotein  DIET TUBE FEEDING Promote  Current Tube Feeding (TF) Orders:   · Goal TF & Flush Orders Provides: rec'd Osmolite 1.2 at goal of 60ml/hr continuous, flush with 50ml water q4h; Provides 1728kcals, 80g protein, and 1481ml fluid.     Anthropometric Measures:  · Height:  6' 0.01\" (182.9 cm)  · Current Body Wt:  56 kg (123 lb 7.3 oz)(10/17)   · Admission Body Wt:  15 lb 15.4 oz(pt stated)    · Ideal Body Wt:  178 lbs:  69.4 %   · BMI Category:  Underweight (BMI less than 18.5)       Electronically signed by Coleen Rosario on 10/17/2020 at 3:18 PM    Contact: Ext [phone]

## 2020-10-17 NOTE — BRIEF OP NOTE
Brief Postoperative Note    Patient: Jennifer Orellana  YOB: 1965  MRN: [id number]    Date of Procedure: 10/6/2020     Pre-Op Diagnosis: bilat pneumothorax    Post-Op Diagnosis:  same    Procedure(s):  Bilateral chest tubes  Surgeon Aleksey Dwyer  Surgical Assistant: None    Anesthesia: local    Estimated Blood Loss (mL): 0    Complications: none    Findings: bilat pneumo  I have tried to call wife multiple times this am to get consent for emergent procedure and multiple times after the procedure and no answer, left message no call back. Procedure was done with my substituted consent for the patient under emergency conditions.    Electronically Signed by Aleksey Dwyer MD on 10/17/2020 at 6:40 AM

## 2020-10-17 NOTE — OP NOTES
[facility]  OPERATIVE REPORT    Name:  Lucero Bauman  MR#:  [id number]  :  1965  ACCOUNT #:  [de-identified]  DATE OF SERVICE:  10/17/2020    SURGEON:  Nani Noyola MD    ASSISTANT:  None. ANESTHESIA:  Local.    PREOPERATIVE DIAGNOSIS:  Bilateral pneumothoraces. POSTOPERATIVE DIAGNOSIS:  Bilateral pneumothoraces. PROCEDURE PERFORMED:  Bilateral chest tube placement. COMPLICATIONS:  None. SPECIMENS REMOVED:  none    IMPLANTS:  none  ESTIMATED BLOOD LOSS:  Zero. INDICATIONS FOR PROCEDURE:  The patient is a complicated patient. He has been in the hospital for some time. Underwent emergent aortobifem for an occluded aorta. About 4 days ago, while in the intensive care unit, extubated, otherwise doing just fine, was noted to have a mild to moderate size left pneumothorax on his morning chest x-ray. He was 100% saturated on room air and completely asymptomatic from this. I was asked to see him. We repeated some films and watched it for the day. The next day, it seemed to be trivially bigger, again completely asymptomatic, saturating 100% on room air and I elected to aspirate it. I aspirated out all the air. We had no additional air and no ongoing air leak, although his postprocedure x-ray from that was completely unchanged. Continued to watch the film over the next few days. There was really essentially no change in size and had mild to moderate pneumothorax, and again, the patient remained completely asymptomatic and 100% saturated on room air with absolutely no tachypnea. The plan was for him to go to rehab. He, however, had not gone and was still in the hospital.    About 1:30 this morning, he was short of breath. An RFT was called and he was intubated and brought down to the intensive care unit. He had multiple films in the intensive care unit. I was called to see him at about 4:30 this morning and I came in to see him.   I found him to have essentially the same, if not trivially, enlarged left-sided pneumothorax. However, on the right side, which was not commented on by Radiology, he appeared to have one film where the lung was about centimeters though off the chest wall, and there appeared to be a large anterior pneumothorax with air going down over the diaphragm area. At the time that I had seen him, he was 100% saturated on 100% oxygen. His blood pressure was in the 70s. I attempted to call his wife for informed consent; however, after multiple phone calls, she was not answering. Therefore, I used substitutive consent for the procedure. DESCRIPTION OF PROCEDURE:  In the intensive care unit, he was already positioned with his left side up. He was prepped and draped, and a 24-Ivorian chest tube was placed at the fifth intercostal space. The area that we had known from his somewhat chronic left pneumothorax was evacuated, did not appear to be under any tension, and there did not appear to be any ongoing air leak after the tube was placed, and the air was sucked out. His hemodynamics had not changed at all with that. We then rotated, and with his right side up, I placed a right-sided chest tube. Again, this was the new side. In the fifth intercostal space in a more anterior position, he appeared to have an anterior pneumothorax, because potentially the lung was stuck up laterally. Upon placing that tube, there was a huge rush of air that came out for several seconds consistent with a tension pneumothorax on the right side. A 24-Ivorian chest tube was placed. There was ongoing bubbling from that, and his hemodynamics immediately improved from a blood pressure of 77 to 530 systolic. Postoperative film shows both tubes are in good position. Both lungs are expanded, and he appears to have ongoing air leak from the right side, the new side, and no ongoing air leak from the left side.       MD JENNIFER [id number]  D:  10/17/2020 7:23  T:  10/17/2020 14:49  JOB #:  [id number]

## 2020-10-17 NOTE — PROGRESS NOTES
Mr. Munguia Bank examined this morning. Patient back in ICU. Events noted last night. Patient currently have bilateral chest tube. He is currently sedated. His hemodynamics have been marginal.  His blood pressure 730H and 02Z systolic. We will resume CVP measurements. I will start him on albumin scheduled dose twice daily. Otherwise his abdomen incision clean and dry both groin incision looks clean and dry. These wounds need to be covered with Aquacel Ag and a 4 x 4 and tape. Hopefully patient will recover from bilateral pneumothorax with expected management. Dr. Lai Sat input much appreciated. We will continue monitor his progress closely. And we will start tube feeds today as well.

## 2020-10-17 NOTE — ANESTHESIA PROCEDURE NOTES
Emergent Intubation  Performed by: Richardson Baum CRNA  Authorized by: Richardson Baum CRNA     Emergent Intubation:   Patient location: Room 208. Date/Time:  10/17/2020 1:17 AM  Indications:  Respiratory failure    Spontaneous Ventilation: absent    Level of Consciousness: unresponsive  Preoxygenated: Yes      Airway Documentation:   Airway:  ETT - Cuffed  Technique:  Intubating stylet  Insertion Site:  Oral  Blade Type:  Selam  Blade Size:  4  ETT size (mm):  8.0  ETT Line Jules:  Lips  ETT Insertion depth (cm):  23  Placement verified by: auscultation, EtCO2 and BBS    Attempts:  1  Difficult airway: No    Anesthesia called for urgent/emergent intubation. Patient was identified using two patient identification methods. 100% oxygen via Ambu bag, wall suction, emergency medications, and intubating supplies were readily available. Patient pre-oxygenated with 100% oxygen via Ambu bag. Rapid sequence induction was performed using anesthesia induction agents and muscle relaxants as needed. Patient was intubated with endotracheal tube. End tidal carbon dioxide was confirmed via colorimetry, capnography, bilateral breath sounds, and chest x-ray as needed per the primary team. Patient's vital signs were as expected after induction and/or endotracheal intubation. Sedation recommendations were given to the primary team. The patient tolerated the procedure well and no complications were observed.

## 2020-10-17 NOTE — PROGRESS NOTES
Rogelio Donald is a 48 y.o. male I have been following for a left spontanous pneumo. Was intubated tonight and taken to the ICU       /80 (BP 1 Location: Left arm, BP Patient Position: At rest)   Pulse 65   Temp 98.3 °F (36.8 °C)   Resp 26   Ht 6' (1.829 m)   Wt 123 lb 7.3 oz (56 kg)   SpO2 92%   BMI 16.74 kg/m²   ROS unable to be obtained on propofal on the vent    Physical Exam  Decreased breath sound bilaterally,     Problem List Items Addressed This Visit        Respiratory    Aspiration pneumonitis (HCC)      Other Visit Diagnoses     Encephalopathy acute    -  Primary    Relevant Medications    midodrine (PROAMATINE) 10 mg tablet    Acute occlusion of aortoiliac artery (HCC)        Pain in both lower extremities        TIA (transient ischemic attack)        Chest pain, unspecified type        Relevant Orders    CARDIAC PROCEDURE (Completed)          Assessment and Plan: bilateral pneumothoracies. I have been following him since he developed a spontanous left sided pneumothorax on Wednesday, is was moderate in size, he was completely and totally asymptomatic with it 100 Sat on room air. And not changing in size significantly up or down for three days. And was cleared to go rehab, but had not gone yet    He had resp distress this evening and was intubated and taken to the ICU at 1:30 am I was called at 4:30 am to come and evaluate him. On the vent on his CXR he has the same left sided pneumothorax, abet a little bigger (minimally ) than before. But of note and NOT commented on by radiology is he appears to have a large anterior pneumo on the OTHER SIDE. One on film one can see the lung off the chest wall a 1 cm and then a large anterior air area collection.       I placed a Left chest tube, got out some air with no continued air leak and no change in hemodynamics. I place a Right chest tube (the new side) with large air rush and on going air leak and immediate improvement in SBP from 77 to 110    So actually what go him into trouble tonight is a tension pneumo on the right (new side) Not the moderate stable pneumo that we have been watching. Both tubes need to stay in until of the vent and not leaking air for days. I would resume he can get weaned and extubated as possible but is still on 100% with with only 70 PO2 prior to the tubes.            Pastor Robert MD

## 2020-10-17 NOTE — ANESTHESIA PREPROCEDURE EVALUATION
Relevant Problems   No relevant active problems       Anesthetic History               Review of Systems / Medical History      Pulmonary                   Neuro/Psych              Cardiovascular                    Comments: Emergent intubation   GI/Hepatic/Renal                Endo/Other             Other Findings              Physical Exam    Airway             Cardiovascular               Dental         Pulmonary                 Abdominal        Comments: Emergent intubation Other Findings            Anesthetic Plan                      Emergent intubation

## 2020-10-17 NOTE — PROGRESS NOTES
Dr. Zahra Lopez in to see the patient. PEEP decreased to 5 and fio2 decreased to 80%. Bilateral chest tube in place. Right chest tube size 28. Left chest tube

## 2020-10-17 NOTE — PROGRESS NOTES
Patient noted to be having labored breathing and difficult to arouse RT [facility]  notified nurse about patient status. Patient appear pale and )2 reading on room air was 65%. Patient placed on high ruchi Ox via non re breather mask. A rapid response was called and patient intubated at bedside. Patient transfer to ICU room 286. Report given to Ginger Crandall RN. Patient spouse Rocelestela Score notified of transfer.

## 2020-10-17 NOTE — PROGRESS NOTES
Pulmonology and Critical Care Progress Note    Subjective:     Chief Complaint:   Chief Complaint   Patient presents with    Altered mental status      Patient seen and examined at the bedside this morning. The patient underwent aortobifemoral bypass surgery 10/6/20. Post surgery he was left on the ventilator. Apparently blood loss was about 3.5 L. When he returned to the ICU he was on multiple pressors. Developed spontaneous left pneumothorax. thoracic surgery did aspiration of his left pneumothorax. No chest tube insertion was required. Patient developed increasing respiratory distress starting at around midnight. Chest x-ray was obtained which was read as showing only the known left-sided pneumothorax. Patient ended up requiring intubation. Repeat x-ray actually showed bilateral pneumothoraces although again it was missed on the right in the radiology report.     Dr. Nima Ferrera came in to evaluate the patient and found him to have bilateral pneumothoraces and placed bilateral chest tubes     Continues on mechanical ventilation this morning. He is sedated. FiO2 at 80% presently    Review of Systems:  Has chronic Reese catheter placement.   Had mild abdominal discomfort otherwise doing well    Current Facility-Administered Medications   Medication Dose Route Frequency Provider Last Rate Last Dose    propofol (DIPRIVAN) 10 mg/mL infusion  10 mcg/kg/min IntraVENous TITRATE Poli Quiles MD 3.6 mL/hr at 10/17/20 1432 10 mcg/kg/min at 10/17/20 1432    morphine injection 2 mg  2 mg IntraVENous Q1H PRN Kylie Bowers MD        albumin human 25% (BUMINATE) solution 25 g  25 g IntraVENous BID Derek Olson MD   25 g at 10/17/20 0949    methylPREDNISolone (PF) (SOLU-MEDROL) injection 40 mg  40 mg IntraVENous Q8H Raffi Bernal MD   40 mg at 10/17/20 1432    piperacillin-tazobactam (ZOSYN) 3.375 g in 0.9% sodium chloride (MBP/ADV) 100 mL MBP  3.375 g IntraVENous Q8H Jennifer Pacheco MD CASH 25 mL/hr at 10/17/20 0950 3.375 g at 10/17/20 0950    midodrine (PROAMATINE) tablet 10 mg  10 mg Oral BID Burton Coker MD   10 mg at 10/17/20 1433    pantoprazole (PROTONIX) tablet 40 mg  40 mg Oral BID Linda Cottrell MD   40 mg at 10/17/20 0950    metoprolol tartrate (LOPRESSOR) tablet 25 mg  25 mg Oral BID Burton Coker MD   25 mg at 10/17/20 0950    potassium chloride (KLOR-CON) packet for solution 40 mEq  40 mEq Oral BID WITH MEALS Bernie Velasco MD   40 mEq at 10/17/20 0956    albuterol CONCENTRATE 2.5mg/0.5 mL neb soln  2.5 mg Nebulization Q6H RT Johnny Santiago DO   2.5 mg at 10/17/20 1327    LORazepam (ATIVAN) injection 1 mg  1 mg IntraVENous Q2H PRN Caro Olson MD   1 mg at 10/16/20 2151    sodium chloride 0.9% (NS) 3ml nebulizer solution  2.5 mL Nebulization PRN Linda Cottrell MD   2.5 mL at 10/17/20 1327    influenza vaccine 2020-21 (4 yrs+)(PF) (FLUCELVAX QUAD) injection 0.5 mL  0.5 mL IntraMUSCular PRIOR TO DISCHARGE Bernie Velasco MD   Stopped at 10/07/20 0900    diphenhydrAMINE (BENADRYL) capsule 50 mg  50 mg Oral BID PRN Prince Edgar NP   50 mg at 10/03/20 0129    phosphorus (K PHOS NEUTRAL) 250 mg tablet 2 Tab  2 Tab Oral BID Bernie Velasco MD   2 Tab at 10/17/20 0950    oxyCODONE IR (ROXICODONE) tablet 15 mg  15 mg Oral Q4H PRN Bernie Velasco MD   15 mg at 10/16/20 1848    enoxaparin (LOVENOX) injection 40 mg  40 mg SubCUTAneous Q24H Linda Cottrell MD   40 mg at 10/16/20 2150    aspirin delayed-release tablet 81 mg  81 mg Oral DAILY Gisele Long NP   81 mg at 10/17/20 0950    acetaminophen (TYLENOL) tablet 650 mg  650 mg Oral Q6H PRN Lisandro Long NP   650 mg at 09/30/20 0950    ondansetron (ZOFRAN) injection 4 mg  4 mg IntraVENous Q8H PRN Lisandro Long NP   4 mg at 09/30/20 0402    guaiFENesin (ROBITUSSIN) 20 mg/mL oral liquid 400 mg  400 mg Oral Q6H PRN Isaias Marquis NP   Stopped at 09/26/20 4884            No Known Allergies        Objective:     Blood pressure 109/66, pulse 84, temperature 98.2 °F (36.8 °C), resp. rate 18, height 6' 0.01\" (1.829 m), weight 56 kg (123 lb 7.3 oz), SpO2 100 %. Temp (24hrs), Av.3 °F (36.8 °C), Min:98 °F (36.7 °C), Max:98.7 °F (37.1 °C)      Intake and Output:  Current Shift: 10/17 0701 - 10/17 1900  In: -   Out: 185 [Urine:160; Drains:25]  Last 3 Shifts: 10/15 1901 - 10/17 0700  In: 150 [P.O.:150]  Out: 3254 [Urine:3225; Drains:37]    Physical Exam:     General:  Lying in bed, intubated on mechanical ventilation  Throat and Neck: Supple. No JVD. He has a right subclavian central line. Oral ET tube in place  Lung:  Much improved aeration bilaterally, minimal rhonchi in the left base. Bilateral chest tubes present. Heart: S1+S2. No murmurs  Abdomen: Status post surgery. He has a midline incision. He has HELGA drains one on each side. Bowel sounds are hypoactive. Draining serosanguineous fluid. Extremities:  He has pitting edema. Distal pulses of the lower extremities are noted with Dopplers. Has skin breakdown on the dependent portion of heel. : Reese catheter in place. Making some urine output. Skin: No cyanosis  Neurologic: Wakes up easily, following commands. Recent Results (from the past 24 hour(s))   CBC WITH AUTOMATED DIFF    Collection Time: 10/17/20  2:05 AM   Result Value Ref Range    WBC 12.8 (H) 4.1 - 11.1 K/uL    RBC 3.36 (L) 4.10 - 5.70 M/uL    HGB 10.6 (L) 12.1 - 17.0 g/dL    HCT 32.0 (L) 36.6 - 50.3 %    MCV 95.2 80.0 - 99.0 FL    MCH 31.5 26.0 - 34.0 PG    MCHC 33.1 30.0 - 36.5 g/dL    RDW 17.9 (H) 11.5 - 14.5 %    PLATELET 601 880 - 430 K/uL    MPV 10.8 8.9 - 12.9 FL    NEUTROPHILS 83 (H) 32 - 75 %    LYMPHOCYTES 5 (L) 12 - 49 %    MONOCYTES 11 5 - 13 %    EOSINOPHILS 0 0 - 7 %    BASOPHILS 0 0 - 1 %    IMMATURE GRANULOCYTES 1 (H) 0.0 - 0.5 %    ABS. NEUTROPHILS 10.8 (H) 1.8 - 8.0 K/UL    ABS. LYMPHOCYTES 0.6 (L) 0.8 - 3.5 K/UL    ABS.  MONOCYTES 1.4 (H) 0.0 - 1.0 K/UL    ABS. EOSINOPHILS 0.0 0.0 - 0.4 K/UL    ABS. BASOPHILS 0.0 0.0 - 0.1 K/UL    ABS. IMM. GRANS. 0.1 (H) 0.00 - 0.04 K/UL    DF AUTOMATED     METABOLIC PANEL, COMPREHENSIVE    Collection Time: 10/17/20  2:05 AM   Result Value Ref Range    Sodium 144 136 - 145 mmol/L    Potassium 4.0 3.5 - 5.1 mmol/L    Chloride 109 (H) 97 - 108 mmol/L    CO2 29 21 - 32 mmol/L    Anion gap 6 5 - 15 mmol/L    Glucose 151 (H) 65 - 100 mg/dL    BUN 18 6 - 20 mg/dL    Creatinine 0.80 0.70 - 1.30 mg/dL    BUN/Creatinine ratio 23 (H) 12 - 20      GFR est AA >60 >60 ml/min/1.73m2    GFR est non-AA >60 >60 ml/min/1.73m2    Calcium 8.8 8.5 - 10.1 mg/dL    Bilirubin, total 1.7 (H) 0.2 - 1.0 mg/dL    AST (SGOT) 15 15 - 37 U/L    ALT (SGPT) 14 12 - 78 U/L    Alk.  phosphatase 102 45 - 117 U/L    Protein, total 5.4 (L) 6.4 - 8.2 g/dL    Albumin 3.1 (L) 3.5 - 5.0 g/dL    Globulin 2.3 2.0 - 4.0 g/dL    A-G Ratio 1.3 1.1 - 2.2     MAGNESIUM    Collection Time: 10/17/20  2:05 AM   Result Value Ref Range    Magnesium 1.9 1.6 - 2.4 mg/dL   PTT    Collection Time: 10/17/20  2:05 AM   Result Value Ref Range    aPTT 35.6 23.0 - 35.7 sec    aPTT, therapeutic range   68 - 109 sec   PROTHROMBIN TIME + INR    Collection Time: 10/17/20  2:05 AM   Result Value Ref Range    Prothrombin time 18.0 (H) 11.9 - 14.7 sec    INR 1.5 (H) 0.9 - 1.1     TYPE & SCREEN    Collection Time: 10/17/20  2:05 AM   Result Value Ref Range    Crossmatch Expiration 10/20/2020,2359     ABO/Rh(D) Leva Pean Positive     Antibody screen Negative    AMMONIA    Collection Time: 10/17/20  2:05 AM   Result Value Ref Range    Ammonia <10 <32 umol/L   LACTIC ACID    Collection Time: 10/17/20  2:05 AM   Result Value Ref Range    Lactic acid 1.9 0.4 - 2.0 mmol/L   BLOOD GAS, ARTERIAL    Collection Time: 10/17/20  2:38 AM   Result Value Ref Range    pH 7.39 7.35 - 7.45      PCO2 44 35 - 45 mmHg    PO2 78 75 - 100 mmHg    O2 SAT 95 (L) >95 %    BICARBONATE 26 22 - 26 mmol/L    BASE EXCESS 2.6 (H) 0 - 2 mmol/L    O2 METHOD VENT      FIO2 100 %    MODE AssistControl/Pressure Control      SET RATE 12      IPAP/PIP 29      EPAP/CPAP/PEEP 8      Sample source Arterial      SITE Right Brachial      ENIO'S TEST YES     BLOOD GAS, ARTERIAL    Collection Time: 10/17/20  7:40 AM   Result Value Ref Range    pH 7.472 (H) 7.35 - 7.45      PCO2 35 35 - 45 mmHg    PO2 70 (L) 75 - 100 mmHg    O2 SAT 96 >95 %    BICARBONATE 26 22 - 26 mmol/L    BASE EXCESS 2.0 0 - 2 mmol/L    O2 METHOD VENT      FIO2 80.0 %    MODE Assist Control/Volume Control      Tidal volume 500      SET RATE 12      IPAP/PIP 0      EPAP/CPAP/PEEP 5.0      SITE Right Radial      ENIO'S TEST PASS         CT Results  (Last 48 hours)    None          Assessment:     1. Acute respiratory failure, after aortobifemoral bypass surgery on 10/6/2020. Patient self extubated himself early in the morning on 10/9/2020. Re-intubated on 10/10/20 for left-sided mucous plugging and complete left lung collapse and had flexible bronchoscopy done with suction of mucous plug. 2.  Acute metabolic encephalopathy, resolved  3. Aspiration pneumonia  4. Severe peripheral vascular disease   5. UTI   6. Hypertension  7. Familial polymyositis  8. Left spontaneous pneumothorax    Plan:     1.)    Status post acute Hypoxic respiratory failure. Intubated on mechanical ventilation  Currently on assist control rate of 12, tidal volume 500, FiO2 80%, PEEP of 5  ABGs and chest x-ray reviewed  FiO2 decreased to 60%  Further changes in ventilator settings based on clinical response and ABG results    2. Bilateral pneumothoraces  Bilateral chest tubes placed  Stable chest x-ray    3.)  Aspiration/HAP pneumonia:  Continue Zosyn  Continue steroids  On nebulized bronchodilator treatments. Modified barium swallow showed penetration and significant dysphagia. 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 Patient may require PEG tube near future.     3.)  Metabolic encephalopathy. He has a progressive neurologic disorder. Much improved mental status today. Brain MRI negative    Further recommendation per Neurology. 4.)  Dehydration and metabolic acidosis. Monitor kidney function after surgery. 5.  Possible urinary tract infection. 6.  Hypertension. Dyslipidemia and severe peripheral vascular disease. 7.  Myocardial ischemia:  He underwent nuclear stress testing which showed significant inferior and from will treat him with medical managememt   I will follow the patient closely with you. DVT and GI prophylaxis. He is on Lovenox and Protonix IV. Questions of wife and answered at bedside in detail  Case discussed in detail with RN, RT, and care team in ICU  Thank you for involving me in the care of this patient  I will follow with you closely during hospitalization    Spent more than 55 minutes in direct patient care with no overlap reviewing results and records, decision making, and answering questions.     Stanley Calix MD  [facility]  10/17/2020

## 2020-10-17 NOTE — PROGRESS NOTES
Hospitalist Progress Note           Daily Progress Note: 10/17/2020    Chief complaint: Shortness of breath and weakness  Subjective: The patient is seen for follow  up. Patient developed increasing respiratory distress starting at around midnight. Chest x-ray was obtained which was read as showing only the known left-sided pneumothorax. Patient ended up requiring intubation. Repeat x-ray actually showed bilateral pneumothoraces although again it was missed on the right in the radiology report. Dr. Diego Castro came in to evaluate the patient and found him to have bilateral pneumothoraces and placed bilateral chest tubes    Continues on mechanical ventilation this morning. He is sedated.   FiO2 at 80% presently    Problem List:  Problem List as of 10/17/2020 Date Reviewed: 9/17/2020          Codes Class Noted - Resolved    Metabolic encephalopathy TQM-36-TC: G93.41  ICD-9-CM: 348.31  9/26/2020 - Present        UTI (urinary tract infection) ICD-10-CM: N39.0  ICD-9-CM: 599.0  9/26/2020 - Present        Aspiration pneumonitis ([address] ICD-10-CM: J69.0  ICD-9-CM: 507.0  9/26/2020 - Present        Essential hypertension ICD-10-CM: I10  ICD-9-CM: 401.9  9/26/2020 - Present        Acute dehydration ICD-10-CM: E86.0  ICD-9-CM: 276.51  9/26/2020 - Present              Medications reviewed  Current Facility-Administered Medications   Medication Dose Route Frequency    propofol (DIPRIVAN) 10 mg/mL infusion  10 mcg/kg/min IntraVENous TITRATE    morphine injection 2 mg  2 mg IntraVENous Q1H PRN    albumin human 25% (BUMINATE) solution 25 g  25 g IntraVENous BID    pantoprazole (PROTONIX) tablet 40 mg  40 mg Oral BID    metoprolol tartrate (LOPRESSOR) tablet 25 mg  25 mg Oral BID    spironolactone (ALDACTONE) tablet 25 mg  25 mg Oral TID    potassium chloride (KLOR-CON) packet for solution 40 mEq  40 mEq Oral BID WITH MEALS    cefepime (MAXIPIME) 1 g in 0.9% sodium chloride (MBP/ADV) 50 mL MBP 1 g IntraVENous Q8H    metroNIDAZOLE (FLAGYL) IVPB premix 500 mg  500 mg IntraVENous Q8H    albuterol CONCENTRATE 2.5mg/0.5 mL neb soln  2.5 mg Nebulization Q6H RT    LORazepam (ATIVAN) injection 1 mg  1 mg IntraVENous Q2H PRN    sodium chloride 0.9% (NS) 3ml nebulizer solution  2.5 mL Nebulization PRN    influenza vaccine 2020-21 (4 yrs+)(PF) (FLUCELVAX QUAD) injection 0.5 mL  0.5 mL IntraMUSCular PRIOR TO DISCHARGE    diphenhydrAMINE (BENADRYL) capsule 50 mg  50 mg Oral BID PRN    phosphorus (K PHOS NEUTRAL) 250 mg tablet 2 Tab  2 Tab Oral BID    levoFLOXacin (LEVAQUIN) tablet 500 mg  500 mg Oral Q24H    predniSONE (DELTASONE) tablet 20 mg  20 mg Oral DAILY WITH BREAKFAST    oxyCODONE IR (ROXICODONE) tablet 15 mg  15 mg Oral Q4H PRN    enoxaparin (LOVENOX) injection 40 mg  40 mg SubCUTAneous Q24H    aspirin delayed-release tablet 81 mg  81 mg Oral DAILY    acetaminophen (TYLENOL) tablet 650 mg  650 mg Oral Q6H PRN    ondansetron (ZOFRAN) injection 4 mg  4 mg IntraVENous Q8H PRN    guaiFENesin (ROBITUSSIN) 20 mg/mL oral liquid 400 mg  400 mg Oral Q6H PRN       Review of Systems:   Review of systems unable to be obtained because he is intubated    Objective:   Physical Exam:     Visit Vitals  /80 (BP 1 Location: Left arm, BP Patient Position: At rest)   Pulse 82   Temp 98.3 °F (36.8 °C)   Resp 22   Ht 6' (1.829 m)   Wt 56 kg (123 lb 7.3 oz)   SpO2 100%   BMI 16.74 kg/m²    O2 Flow Rate (L/min): 2 l/min O2 Device: Ventilator    Temp (24hrs), Av.4 °F (36.9 °C), Min:98 °F (36.7 °C), Max:98.7 °F (37.1 °C)    10/17 0701 - 10/17 1900  In: -   Out: 25 [Drains:25]   10/15 1901 - 10/17 0700  In: 150 [P.O.:150]  Out: 3262 [Urine:3225; Drains:37]    General:   Intubated and sedated   Lungs:   Clear to auscultation bilaterally with diminished breath sounds bilateral bases. Chest wall:  No tenderness or deformity. Heart:  Regular rate and rhythm, S1, S2 normal, no murmur, click, rub or gallop. Abdomen:   Soft, non-tender. Diminished Bowel sounds. Dressings in place. Extremities: Extremities normal, atraumatic, positive edema bilaterally   Pulses: 2+ and symmetric all extremities. Skin: Skin color, texture, turgor normal. No rashes or lesions   Neurologic: CNII-XII intact.      Data Review:       Recent Days:  Recent Labs     10/17/20  0205 10/15/20  0400   WBC 12.8* 14.5*   HGB 10.6* 9.7*   HCT 32.0* 28.9*    173     Recent Labs     10/17/20  0205 10/16/20  0850 10/15/20  0400    141 143  142   K 4.0 2.9* 3.3*  3.3*   * 105 106  105   CO2 29 30 30  31   * 87 102*  101*   BUN 18 16 15  15   CREA 0.80 0.68* 0.69*  0.66*   CA 8.8 9.1 9.0  8.8   MG 1.9  --  2.2   PHOS  --  2.7 2.8   ALB 3.1* 3.2* 2.9*   TBILI 1.7*  --   --    ALT 14  --   --    INR 1.5*  --   --      Recent Labs     10/17/20  0740 10/17/20  0238   PH 7.472* 7.39   PCO2 35 44   PO2 70* 78   HCO3 26 26   FIO2 80.0 100       24 Hour Results:  Recent Results (from the past 24 hour(s))   RENAL FUNCTION PANEL    Collection Time: 10/16/20  8:50 AM   Result Value Ref Range    Sodium 141 136 - 145 mmol/L    Potassium 2.9 (L) 3.5 - 5.1 mmol/L    Chloride 105 97 - 108 mmol/L    CO2 30 21 - 32 mmol/L    Anion gap 6 5 - 15 mmol/L    Glucose 87 65 - 100 mg/dL    BUN 16 6 - 20 mg/dL    Creatinine 0.68 (L) 0.70 - 1.30 mg/dL    BUN/Creatinine ratio 24 (H) 12 - 20      GFR est AA >60 >60 ml/min/1.73m2    GFR est non-AA >60 >60 ml/min/1.73m2    Calcium 9.1 8.5 - 10.1 mg/dL    Phosphorus 2.7 2.6 - 4.7 mg/dL    Albumin 3.2 (L) 3.5 - 5.0 g/dL   CBC WITH AUTOMATED DIFF    Collection Time: 10/17/20  2:05 AM   Result Value Ref Range    WBC 12.8 (H) 4.1 - 11.1 K/uL    RBC 3.36 (L) 4.10 - 5.70 M/uL    HGB 10.6 (L) 12.1 - 17.0 g/dL    HCT 32.0 (L) 36.6 - 50.3 %    MCV 95.2 80.0 - 99.0 FL    MCH 31.5 26.0 - 34.0 PG    MCHC 33.1 30.0 - 36.5 g/dL    RDW 17.9 (H) 11.5 - 14.5 %    PLATELET 042 808 - 663 K/uL    MPV 10.8 8.9 - 12.9 FL NEUTROPHILS 83 (H) 32 - 75 %    LYMPHOCYTES 5 (L) 12 - 49 %    MONOCYTES 11 5 - 13 %    EOSINOPHILS 0 0 - 7 %    BASOPHILS 0 0 - 1 %    IMMATURE GRANULOCYTES 1 (H) 0.0 - 0.5 %    ABS. NEUTROPHILS 10.8 (H) 1.8 - 8.0 K/UL    ABS. LYMPHOCYTES 0.6 (L) 0.8 - 3.5 K/UL    ABS. MONOCYTES 1.4 (H) 0.0 - 1.0 K/UL    ABS. EOSINOPHILS 0.0 0.0 - 0.4 K/UL    ABS. BASOPHILS 0.0 0.0 - 0.1 K/UL    ABS. IMM. GRANS. 0.1 (H) 0.00 - 0.04 K/UL    DF AUTOMATED     METABOLIC PANEL, COMPREHENSIVE    Collection Time: 10/17/20  2:05 AM   Result Value Ref Range    Sodium 144 136 - 145 mmol/L    Potassium 4.0 3.5 - 5.1 mmol/L    Chloride 109 (H) 97 - 108 mmol/L    CO2 29 21 - 32 mmol/L    Anion gap 6 5 - 15 mmol/L    Glucose 151 (H) 65 - 100 mg/dL    BUN 18 6 - 20 mg/dL    Creatinine 0.80 0.70 - 1.30 mg/dL    BUN/Creatinine ratio 23 (H) 12 - 20      GFR est AA >60 >60 ml/min/1.73m2    GFR est non-AA >60 >60 ml/min/1.73m2    Calcium 8.8 8.5 - 10.1 mg/dL    Bilirubin, total 1.7 (H) 0.2 - 1.0 mg/dL    AST (SGOT) 15 15 - 37 U/L    ALT (SGPT) 14 12 - 78 U/L    Alk.  phosphatase 102 45 - 117 U/L    Protein, total 5.4 (L) 6.4 - 8.2 g/dL    Albumin 3.1 (L) 3.5 - 5.0 g/dL    Globulin 2.3 2.0 - 4.0 g/dL    A-G Ratio 1.3 1.1 - 2.2     MAGNESIUM    Collection Time: 10/17/20  2:05 AM   Result Value Ref Range    Magnesium 1.9 1.6 - 2.4 mg/dL   PTT    Collection Time: 10/17/20  2:05 AM   Result Value Ref Range    aPTT 35.6 23.0 - 35.7 sec    aPTT, therapeutic range   68 - 109 sec   PROTHROMBIN TIME + INR    Collection Time: 10/17/20  2:05 AM   Result Value Ref Range    Prothrombin time 18.0 (H) 11.9 - 14.7 sec    INR 1.5 (H) 0.9 - 1.1     TYPE & SCREEN    Collection Time: 10/17/20  2:05 AM   Result Value Ref Range    Crossmatch Expiration 10/20/2020,2359     ABO/Rh(D) Christina Bharath Positive     Antibody screen Negative    AMMONIA    Collection Time: 10/17/20  2:05 AM   Result Value Ref Range    Ammonia <10 <32 umol/L   LACTIC ACID    Collection Time: 10/17/20  2:05 AM Result Value Ref Range    Lactic acid 1.9 0.4 - 2.0 mmol/L   BLOOD GAS, ARTERIAL    Collection Time: 10/17/20  2:38 AM   Result Value Ref Range    pH 7.39 7.35 - 7.45      PCO2 44 35 - 45 mmHg    PO2 78 75 - 100 mmHg    O2 SAT 95 (L) >95 %    BICARBONATE 26 22 - 26 mmol/L    BASE EXCESS 2.6 (H) 0 - 2 mmol/L    O2 METHOD VENT      FIO2 100 %    MODE AssistControl/Pressure Control      SET RATE 12      IPAP/PIP 29      EPAP/CPAP/PEEP 8      Sample source Arterial      SITE Right Brachial      ENIO'S TEST YES     BLOOD GAS, ARTERIAL    Collection Time: 10/17/20  7:40 AM   Result Value Ref Range    pH 7.472 (H) 7.35 - 7.45      PCO2 35 35 - 45 mmHg    PO2 70 (L) 75 - 100 mmHg    O2 SAT 96 >95 %    BICARBONATE 26 22 - 26 mmol/L    BASE EXCESS 2.0 0 - 2 mmol/L    O2 METHOD VENT      FIO2 80.0 %    MODE Assist Control/Volume Control      Tidal volume 500      SET RATE 12      IPAP/PIP 0      EPAP/CPAP/PEEP 5.0      SITE Right Radial      ENIO'S TEST PASS             Assessment/     Acute hypoxic respiratory failure postsurgery. Self extubated 10/09/20. Reintubated 10/10 due to hypoxemia. Extubated 10/11. Reintubated 10/17 for bilateral pneumothoraces    Bilateral pneumothoraces, status post chest tubes bilateral    Acute kidney injury likely secondary to ATN from hypotension. Improved    Hypovolemic shock post surgery. Improved    Right lower lobe aspiration pneumonia improved    Urinary tract infection due to E. Coli    Abnormal Cardiolite stress test showing inferior ischemia. 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  Repleted    Hypothermia, probably largely environmental.  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 Plan:  Continue mechanical ventilation, chest tubes  Continue IV antibiotics and supportive care  Start tube feeds  Simplify his antibiotic regimen with Zosyn monotherapy  Change to IV steroids      Care Plan discussed with: Patient/Family       Total time spent with patient: 30 minutes.     Jeimy Hernandez MD

## 2020-10-17 NOTE — PROGRESS NOTES
Patient received to ICU s/p RRT. He arrived intubated with 8.0 ETT 23cm at the lip. InitIial SBP 85. He is responsive to his name. Report from previous nurse indicates a sudden inset of SOB and decrease in oxygen saturation. Both lungs are somewhat diminished with left more than right. Sats are as low as 60's on the ventilator with 100% fio2. RT in and adjustments made to to vent settings. After repositioning to far right side, chest PT, suctioning, and vent settings of PC29/AC12 TI 1.3, PEEP 8, fio2 100% he finally was able to keep sats up greater than 93%. Propofol started at 10mcg and soft wrist restraints applied. Repeat PCXR done during this time as well.    0230- spoke with the patients wife Jeanne Rudolph

## 2020-10-17 NOTE — ANESTHESIA POSTPROCEDURE EVALUATION
* No procedures listed *. No value filed. Anesthesia Post Evaluation        Comments: Emergent intubation        INITIAL Post-op Vital signs: No vitals data found for the desired time range.

## 2020-10-17 NOTE — PROGRESS NOTES
Dr. Juan A Chowdhury arrives and reports that the patient has bilateral pneumothorax and will require bilateral chest tubes. Supplies given to him and he says he can insert them independently.

## 2020-10-17 NOTE — PROGRESS NOTES
Dr. Margarito Rivero called to inquire about the patient. He was updated on the decrease sats etc. Ordered to call Dr. Dalton Nayak with these findings and request a chest tube. Spoke wtih Dr. Dalton Nayak he says \" I'll look at the film and deal with it. \" Dr. Margarito Rivero informed of his response at which time he says he will come in to see the patient.

## 2020-10-17 NOTE — PROGRESS NOTES
Renal Progress Note    Patient: Khalida Romo MRN: [id number]  SSN: [id number]    YOB: 1965  Age: 48 y.o.   Sex: male      Admit Date: 9/26/2020    LOS: 21 days     Subjective:   Patient seen in ICU, transferred to ICU for acute SOB  Intubated, sedated  S/p dwight chest tube placements for dwight pneumo    No edema  K is 4.0 today    Current Facility-Administered Medications   Medication Dose Route Frequency    propofol (DIPRIVAN) 10 mg/mL infusion  10 mcg/kg/min IntraVENous TITRATE    morphine injection 2 mg  2 mg IntraVENous Q1H PRN    albumin human 25% (BUMINATE) solution 25 g  25 g IntraVENous BID    methylPREDNISolone (PF) (SOLU-MEDROL) injection 40 mg  40 mg IntraVENous Q8H    piperacillin-tazobactam (ZOSYN) 3.375 g in 0.9% sodium chloride (MBP/ADV) 100 mL MBP  3.375 g IntraVENous Q8H    pantoprazole (PROTONIX) tablet 40 mg  40 mg Oral BID    metoprolol tartrate (LOPRESSOR) tablet 25 mg  25 mg Oral BID    potassium chloride (KLOR-CON) packet for solution 40 mEq  40 mEq Oral BID WITH MEALS    albuterol CONCENTRATE 2.5mg/0.5 mL neb soln  2.5 mg Nebulization Q6H RT    LORazepam (ATIVAN) injection 1 mg  1 mg IntraVENous Q2H PRN    sodium chloride 0.9% (NS) 3ml nebulizer solution  2.5 mL Nebulization PRN    influenza vaccine 2020-21 (4 yrs+)(PF) (FLUCELVAX QUAD) injection 0.5 mL  0.5 mL IntraMUSCular PRIOR TO DISCHARGE    diphenhydrAMINE (BENADRYL) capsule 50 mg  50 mg Oral BID PRN    phosphorus (K PHOS NEUTRAL) 250 mg tablet 2 Tab  2 Tab Oral BID    oxyCODONE IR (ROXICODONE) tablet 15 mg  15 mg Oral Q4H PRN    enoxaparin (LOVENOX) injection 40 mg  40 mg SubCUTAneous Q24H    aspirin delayed-release tablet 81 mg  81 mg Oral DAILY    acetaminophen (TYLENOL) tablet 650 mg  650 mg Oral Q6H PRN    ondansetron (ZOFRAN) injection 4 mg  4 mg IntraVENous Q8H PRN    guaiFENesin (ROBITUSSIN) 20 mg/mL oral liquid 400 mg  400 mg Oral Q6H PRN        Vitals:    10/17/20 1000 10/17/20 1112 10/17/20 1200 10/17/20 1325   BP: 129/88 112/71 111/65 126/71   Pulse: 86 81 73 79   Resp: 19 23 24    Temp:       SpO2: 100% 100% 100% 100%   Weight:       Height:         Objective:   General: ON VENT, sedated, no acute distress. HEENT: EOMI, no Icterus, no Pallor, ET tube in place   neck: Neck is supple, No JVD  Lungs: decreased breathsounds, bilateral chest tubes present  CVS: heart sounds normal,  no murmurs, no rubs. GI: soft, nontender, normal BS. Extremeties: no cyanosis,no edema in ext   Neuro: On ventilator and sedated  Skin: normal skin turgor, no skin rashes.       Intake and Output:  Current Shift: 10/17 0701 - 10/17 1900  In: -   Out: 25 [Drains:25]  Last three shifts: 10/15 1901 - 10/17 0700  In: 150 [P.O.:150]  Out: 6855 [Urine:3225; Drains:37]      Lab/Data Review:  Recent Labs     10/17/20  0205 10/15/20  0400   WBC 12.8* 14.5*   HGB 10.6* 9.7*   HCT 32.0* 28.9*    173     Recent Labs     10/17/20  0205 10/16/20  0850 10/15/20  0400    141 143  142   K 4.0 2.9* 3.3*  3.3*   * 105 106  105   CO2 29 30 30  31   * 87 102*  101*   BUN 18 16 15  15   CREA 0.80 0.68* 0.69*  0.66*   CA 8.8 9.1 9.0  8.8   MG 1.9  --  2.2   PHOS  --  2.7 2.8   ALB 3.1* 3.2* 2.9*   TBILI 1.7*  --   --    ALT 14  --   --    INR 1.5*  --   --      Recent Labs     10/17/20  0740 10/17/20  0238   PH 7.472* 7.39   PCO2 35 44   PO2 70* 78   HCO3 26 26   FIO2 80.0 100     Recent Results (from the past 24 hour(s))   CBC WITH AUTOMATED DIFF    Collection Time: 10/17/20  2:05 AM   Result Value Ref Range    WBC 12.8 (H) 4.1 - 11.1 K/uL    RBC 3.36 (L) 4.10 - 5.70 M/uL    HGB 10.6 (L) 12.1 - 17.0 g/dL    HCT 32.0 (L) 36.6 - 50.3 %    MCV 95.2 80.0 - 99.0 FL    MCH 31.5 26.0 - 34.0 PG    MCHC 33.1 30.0 - 36.5 g/dL    RDW 17.9 (H) 11.5 - 14.5 %    PLATELET 429 076 - 866 K/uL    MPV 10.8 8.9 - 12.9 FL    NEUTROPHILS 83 (H) 32 - 75 %    LYMPHOCYTES 5 (L) 12 - 49 %    MONOCYTES 11 5 - 13 %    EOSINOPHILS 0 0 - 7 % BASOPHILS 0 0 - 1 %    IMMATURE GRANULOCYTES 1 (H) 0.0 - 0.5 %    ABS. NEUTROPHILS 10.8 (H) 1.8 - 8.0 K/UL    ABS. LYMPHOCYTES 0.6 (L) 0.8 - 3.5 K/UL    ABS. MONOCYTES 1.4 (H) 0.0 - 1.0 K/UL    ABS. EOSINOPHILS 0.0 0.0 - 0.4 K/UL    ABS. BASOPHILS 0.0 0.0 - 0.1 K/UL    ABS. IMM. GRANS. 0.1 (H) 0.00 - 0.04 K/UL    DF AUTOMATED     METABOLIC PANEL, COMPREHENSIVE    Collection Time: 10/17/20  2:05 AM   Result Value Ref Range    Sodium 144 136 - 145 mmol/L    Potassium 4.0 3.5 - 5.1 mmol/L    Chloride 109 (H) 97 - 108 mmol/L    CO2 29 21 - 32 mmol/L    Anion gap 6 5 - 15 mmol/L    Glucose 151 (H) 65 - 100 mg/dL    BUN 18 6 - 20 mg/dL    Creatinine 0.80 0.70 - 1.30 mg/dL    BUN/Creatinine ratio 23 (H) 12 - 20      GFR est AA >60 >60 ml/min/1.73m2    GFR est non-AA >60 >60 ml/min/1.73m2    Calcium 8.8 8.5 - 10.1 mg/dL    Bilirubin, total 1.7 (H) 0.2 - 1.0 mg/dL    AST (SGOT) 15 15 - 37 U/L    ALT (SGPT) 14 12 - 78 U/L    Alk.  phosphatase 102 45 - 117 U/L    Protein, total 5.4 (L) 6.4 - 8.2 g/dL    Albumin 3.1 (L) 3.5 - 5.0 g/dL    Globulin 2.3 2.0 - 4.0 g/dL    A-G Ratio 1.3 1.1 - 2.2     MAGNESIUM    Collection Time: 10/17/20  2:05 AM   Result Value Ref Range    Magnesium 1.9 1.6 - 2.4 mg/dL   PTT    Collection Time: 10/17/20  2:05 AM   Result Value Ref Range    aPTT 35.6 23.0 - 35.7 sec    aPTT, therapeutic range   68 - 109 sec   PROTHROMBIN TIME + INR    Collection Time: 10/17/20  2:05 AM   Result Value Ref Range    Prothrombin time 18.0 (H) 11.9 - 14.7 sec    INR 1.5 (H) 0.9 - 1.1     TYPE & SCREEN    Collection Time: 10/17/20  2:05 AM   Result Value Ref Range    Crossmatch Expiration 10/20/2020,2359     ABO/Rh(D) [place] Positive     Antibody screen Negative    AMMONIA    Collection Time: 10/17/20  2:05 AM   Result Value Ref Range    Ammonia <10 <32 umol/L   LACTIC ACID    Collection Time: 10/17/20  2:05 AM   Result Value Ref Range    Lactic acid 1.9 0.4 - 2.0 mmol/L   BLOOD GAS, ARTERIAL    Collection Time: 10/17/20 2:38 AM   Result Value Ref Range    pH 7.39 7.35 - 7.45      PCO2 44 35 - 45 mmHg    PO2 78 75 - 100 mmHg    O2 SAT 95 (L) >95 %    BICARBONATE 26 22 - 26 mmol/L    BASE EXCESS 2.6 (H) 0 - 2 mmol/L    O2 METHOD VENT      FIO2 100 %    MODE AssistControl/Pressure Control      SET RATE 12      IPAP/PIP 29      EPAP/CPAP/PEEP 8      Sample source Arterial      SITE Right Brachial      ENIO'S TEST YES     BLOOD GAS, ARTERIAL    Collection Time: 10/17/20  7:40 AM   Result Value Ref Range    pH 7.472 (H) 7.35 - 7.45      PCO2 35 35 - 45 mmHg    PO2 70 (L) 75 - 100 mmHg    O2 SAT 96 >95 %    BICARBONATE 26 22 - 26 mmol/L    BASE EXCESS 2.0 0 - 2 mmol/L    O2 METHOD VENT      FIO2 80.0 %    MODE Assist Control/Volume Control      Tidal volume 500      SET RATE 12      IPAP/PIP 0      EPAP/CPAP/PEEP 5.0      SITE Right Radial      ENIO'S TEST PASS          Assessment and Plan:       1. severe hypokalemia:  -from diuretics and metabolic alkalosis,   Improved K to 4.0 today  Off lasix now, no need for aggressive diuresis as edema is significantly better  continue spironolactone to maintain diuresis  Continue kcl 40 bid  continue spironolactone 25 tid  monitor K levels,  Mg is normal    2.  Low urine output/ BALDEV, resolved  - likely prerenal from hypotension.    -improved with diuretics now      3. Edema: improved with good diuresis +, off lasix     4..  acute resp.failure: dwight pneumo+  On vent, had dwight CT placements  pulm and CT surgery following    5.  Hypotension: borderline low Bps  resume mododrine 10 mg bid  Continue to monitor BPs     6 status post  infrarenal aorta endarterectomy, with aortic by common femoral artery bypass with 14 mm x 7 mm Hemosure graft       Signed By: Gaston Bowen MD     October 17, 2020

## 2020-10-17 NOTE — PROGRESS NOTES
#16 OGT placed and it was verified by air bolus with 2 other RN's. No gastric return though. PCXR performed to confirm placement.

## 2020-10-18 ENCOUNTER — APPOINTMENT (OUTPATIENT)
Dept: GENERAL RADIOLOGY | Age: 55
DRG: 981 | End: 2020-10-18
Attending: INTERNAL MEDICINE
Payer: COMMERCIAL

## 2020-10-18 LAB
ALBUMIN SERPL-MCNC: 3.2 G/DL (ref 3.5–5)
ANION GAP SERPL CALC-SCNC: 5 MMOL/L (ref 5–15)
ARTERIAL PATENCY WRIST A: YES
BASE EXCESS BLDA CALC-SCNC: 1.6 MMOL/L (ref 0–2)
BASOPHILS # BLD: 0 K/UL (ref 0–0.1)
BASOPHILS NFR BLD: 0 % (ref 0–1)
BDY SITE: ABNORMAL
BUN SERPL-MCNC: 20 MG/DL (ref 6–20)
BUN/CREAT SERPL: 31 (ref 12–20)
CA-I BLD-MCNC: 8.8 MG/DL (ref 8.5–10.1)
CHLORIDE SERPL-SCNC: 112 MMOL/L (ref 97–108)
CO2 SERPL-SCNC: 28 MMOL/L (ref 21–32)
CREAT SERPL-MCNC: 0.65 MG/DL (ref 0.7–1.3)
DIFFERENTIAL METHOD BLD: ABNORMAL
EOSINOPHIL # BLD: 0 K/UL (ref 0–0.4)
EOSINOPHIL NFR BLD: 0 % (ref 0–7)
EPAP/CPAP/PEEP, PAPEEP: 5
ERYTHROCYTE [DISTWIDTH] IN BLOOD BY AUTOMATED COUNT: 18.1 % (ref 11.5–14.5)
FIO2 ON VENT: 55 %
GAS FLOW.O2 SETTING OXYMISER: 12 L/MIN
GLUCOSE SERPL-MCNC: 151 MG/DL (ref 65–100)
HCO3 BLDA-SCNC: 26 MMOL/L (ref 22–26)
HCT VFR BLD AUTO: 27.7 % (ref 36.6–50.3)
HGB BLD-MCNC: 9 G/DL (ref 12.1–17)
IMM GRANULOCYTES # BLD AUTO: 0.1 K/UL (ref 0–0.04)
IMM GRANULOCYTES NFR BLD AUTO: 1 % (ref 0–0.5)
LYMPHOCYTES # BLD: 0.6 K/UL (ref 0.8–3.5)
LYMPHOCYTES NFR BLD: 2 % (ref 12–49)
MCH RBC QN AUTO: 31 PG (ref 26–34)
MCHC RBC AUTO-ENTMCNC: 32.5 G/DL (ref 30–36.5)
MCV RBC AUTO: 95.5 FL (ref 80–99)
MONOCYTES # BLD: 1 K/UL (ref 0–1)
MONOCYTES NFR BLD: 4 % (ref 5–13)
NEUTS SEG # BLD: 24.6 K/UL (ref 1.8–8)
NEUTS SEG NFR BLD: 93 % (ref 32–75)
PCO2 BLDA: 35 MMHG (ref 35–45)
PH BLDA: 7.46 [PH] (ref 7.35–7.45)
PHOSPHATE SERPL-MCNC: 2.7 MG/DL (ref 2.6–4.7)
PLATELET # BLD AUTO: 273 K/UL (ref 150–400)
PMV BLD AUTO: 10.8 FL (ref 8.9–12.9)
PO2 BLDA: 99 MMHG (ref 75–100)
POTASSIUM SERPL-SCNC: 3.3 MMOL/L (ref 3.5–5.1)
RBC # BLD AUTO: 2.9 M/UL (ref 4.1–5.7)
SAO2 % BLD: 99 %
SAO2% DEVICE SAO2% SENSOR NAME: ABNORMAL
SODIUM SERPL-SCNC: 145 MMOL/L (ref 136–145)
SPECIMEN SITE: ABNORMAL
VENTILATION MODE VENT: ABNORMAL
VT SETTING VENT: 500 ML
WBC # BLD AUTO: 26.2 K/UL (ref 4.1–11.1)

## 2020-10-18 PROCEDURE — 65610000006 HC RM INTENSIVE CARE

## 2020-10-18 PROCEDURE — 94640 AIRWAY INHALATION TREATMENT: CPT

## 2020-10-18 PROCEDURE — 74011250637 HC RX REV CODE- 250/637: Performed by: INTERNAL MEDICINE

## 2020-10-18 PROCEDURE — 74011000250 HC RX REV CODE- 250: Performed by: INTERNAL MEDICINE

## 2020-10-18 PROCEDURE — 71045 X-RAY EXAM CHEST 1 VIEW: CPT

## 2020-10-18 PROCEDURE — 65620000000 HC RM CCU GENERAL

## 2020-10-18 PROCEDURE — 94668 MNPJ CHEST WALL SBSQ: CPT

## 2020-10-18 PROCEDURE — 82803 BLOOD GASES ANY COMBINATION: CPT

## 2020-10-18 PROCEDURE — 74011250636 HC RX REV CODE- 250/636: Performed by: INTERNAL MEDICINE

## 2020-10-18 PROCEDURE — 80069 RENAL FUNCTION PANEL: CPT

## 2020-10-18 PROCEDURE — 74011000258 HC RX REV CODE- 258: Performed by: INTERNAL MEDICINE

## 2020-10-18 PROCEDURE — 36600 WITHDRAWAL OF ARTERIAL BLOOD: CPT

## 2020-10-18 PROCEDURE — 74011250636 HC RX REV CODE- 250/636: Performed by: HOSPITALIST

## 2020-10-18 PROCEDURE — 85025 COMPLETE CBC W/AUTO DIFF WBC: CPT

## 2020-10-18 PROCEDURE — 36415 COLL VENOUS BLD VENIPUNCTURE: CPT

## 2020-10-18 PROCEDURE — 94400 HC END TIDAL CO2 RESPONSE CURVE: CPT

## 2020-10-18 PROCEDURE — 77010033678 HC OXYGEN DAILY

## 2020-10-18 PROCEDURE — 94003 VENT MGMT INPAT SUBQ DAY: CPT

## 2020-10-18 PROCEDURE — 74011250636 HC RX REV CODE- 250/636: Performed by: THORACIC SURGERY (CARDIOTHORACIC VASCULAR SURGERY)

## 2020-10-18 RX ORDER — POTASSIUM CHLORIDE 7.45 MG/ML
10 INJECTION INTRAVENOUS
Status: COMPLETED | OUTPATIENT
Start: 2020-10-18 | End: 2020-10-18

## 2020-10-18 RX ORDER — SPIRONOLACTONE 25 MG/1
25 TABLET ORAL 2 TIMES DAILY
Status: DISCONTINUED | OUTPATIENT
Start: 2020-10-18 | End: 2020-10-26

## 2020-10-18 RX ORDER — GUAIFENESIN 100 MG/5ML
81 LIQUID (ML) ORAL DAILY
Status: DISCONTINUED | OUTPATIENT
Start: 2020-10-18 | End: 2020-10-23

## 2020-10-18 RX ADMIN — POTASSIUM CHLORIDE 10 MEQ: 7.46 INJECTION, SOLUTION INTRAVENOUS at 11:43

## 2020-10-18 RX ADMIN — ALBUTEROL SULFATE 2.5 MG: 2.5 SOLUTION RESPIRATORY (INHALATION) at 02:18

## 2020-10-18 RX ADMIN — OXYCODONE HYDROCHLORIDE 15 MG: 5 TABLET ORAL at 09:24

## 2020-10-18 RX ADMIN — METHYLPREDNISOLONE SODIUM SUCCINATE 40 MG: 40 INJECTION, POWDER, FOR SOLUTION INTRAMUSCULAR; INTRAVENOUS at 13:40

## 2020-10-18 RX ADMIN — POTASSIUM CHLORIDE 40 MEQ: 1.5 FOR SOLUTION ORAL at 08:00

## 2020-10-18 RX ADMIN — POTASSIUM CHLORIDE 10 MEQ: 7.46 INJECTION, SOLUTION INTRAVENOUS at 13:40

## 2020-10-18 RX ADMIN — METHYLPREDNISOLONE SODIUM SUCCINATE 40 MG: 40 INJECTION, POWDER, FOR SOLUTION INTRAMUSCULAR; INTRAVENOUS at 05:51

## 2020-10-18 RX ADMIN — PROPOFOL 10 MCG/KG/MIN: 10 INJECTION, EMULSION INTRAVENOUS at 05:50

## 2020-10-18 RX ADMIN — METOPROLOL TARTRATE 25 MG: 25 TABLET, FILM COATED ORAL at 09:24

## 2020-10-18 RX ADMIN — MIDODRINE HYDROCHLORIDE 10 MG: 5 TABLET ORAL at 09:00

## 2020-10-18 RX ADMIN — PIPERACILLIN AND TAZOBACTAM 3.38 G: 3; .375 INJECTION, POWDER, LYOPHILIZED, FOR SOLUTION INTRAVENOUS at 09:24

## 2020-10-18 RX ADMIN — ALBUTEROL SULFATE 2.5 MG: 2.5 SOLUTION RESPIRATORY (INHALATION) at 13:48

## 2020-10-18 RX ADMIN — PROPOFOL 35 MCG/KG/MIN: 10 INJECTION, EMULSION INTRAVENOUS at 16:21

## 2020-10-18 RX ADMIN — MORPHINE SULFATE 2 MG: 2 INJECTION, SOLUTION INTRAMUSCULAR; INTRAVENOUS at 09:24

## 2020-10-18 RX ADMIN — ENOXAPARIN SODIUM 40 MG: 40 INJECTION SUBCUTANEOUS at 21:53

## 2020-10-18 RX ADMIN — MORPHINE SULFATE 2 MG: 2 INJECTION, SOLUTION INTRAMUSCULAR; INTRAVENOUS at 05:51

## 2020-10-18 RX ADMIN — PANTOPRAZOLE SODIUM 40 MG: 40 GRANULE, DELAYED RELEASE ORAL at 09:24

## 2020-10-18 RX ADMIN — METOPROLOL TARTRATE 25 MG: 25 TABLET, FILM COATED ORAL at 21:53

## 2020-10-18 RX ADMIN — DIBASIC SODIUM PHOSPHATE, MONOBASIC POTASSIUM PHOSPHATE AND MONOBASIC SODIUM PHOSPHATE 2 TABLET: 852; 155; 130 TABLET ORAL at 21:53

## 2020-10-18 RX ADMIN — MORPHINE SULFATE 2 MG: 2 INJECTION, SOLUTION INTRAMUSCULAR; INTRAVENOUS at 21:53

## 2020-10-18 RX ADMIN — METHYLPREDNISOLONE SODIUM SUCCINATE 40 MG: 40 INJECTION, POWDER, FOR SOLUTION INTRAMUSCULAR; INTRAVENOUS at 21:53

## 2020-10-18 RX ADMIN — MIDODRINE HYDROCHLORIDE 10 MG: 5 TABLET ORAL at 21:53

## 2020-10-18 RX ADMIN — POTASSIUM CHLORIDE 40 MEQ: 1.5 FOR SOLUTION ORAL at 16:23

## 2020-10-18 RX ADMIN — ISODIUM CHLORIDE 2.5 ML: 0.03 SOLUTION RESPIRATORY (INHALATION) at 08:19

## 2020-10-18 RX ADMIN — ISODIUM CHLORIDE 2.5 ML: 0.03 SOLUTION RESPIRATORY (INHALATION) at 13:48

## 2020-10-18 RX ADMIN — POTASSIUM CHLORIDE 10 MEQ: 7.46 INJECTION, SOLUTION INTRAVENOUS at 09:26

## 2020-10-18 RX ADMIN — DIBASIC SODIUM PHOSPHATE, MONOBASIC POTASSIUM PHOSPHATE AND MONOBASIC SODIUM PHOSPHATE 2 TABLET: 852; 155; 130 TABLET ORAL at 09:24

## 2020-10-18 RX ADMIN — ALBUTEROL SULFATE 2.5 MG: 2.5 SOLUTION RESPIRATORY (INHALATION) at 20:46

## 2020-10-18 RX ADMIN — POTASSIUM CHLORIDE 10 MEQ: 7.46 INJECTION, SOLUTION INTRAVENOUS at 11:36

## 2020-10-18 RX ADMIN — ALBUTEROL SULFATE 2.5 MG: 2.5 SOLUTION RESPIRATORY (INHALATION) at 08:18

## 2020-10-18 RX ADMIN — PIPERACILLIN AND TAZOBACTAM 3.38 G: 3; .375 INJECTION, POWDER, LYOPHILIZED, FOR SOLUTION INTRAVENOUS at 16:22

## 2020-10-18 RX ADMIN — PROPOFOL 10 MCG/KG/MIN: 10 INJECTION, EMULSION INTRAVENOUS at 22:14

## 2020-10-18 RX ADMIN — SPIRONOLACTONE 25 MG: 25 TABLET ORAL at 21:53

## 2020-10-18 RX ADMIN — PIPERACILLIN AND TAZOBACTAM 3.38 G: 3; .375 INJECTION, POWDER, LYOPHILIZED, FOR SOLUTION INTRAVENOUS at 01:44

## 2020-10-18 RX ADMIN — ASPIRIN 81 MG CHEWABLE TABLET 81 MG: 81 TABLET CHEWABLE at 11:34

## 2020-10-18 NOTE — PROGRESS NOTES
Renal Progress Note    Patient: Lucita Valentino MRN: [id number]  SSN: [id number]    YOB: 1965  Age: 48 y.o.   Sex: male      Admit Date: 9/26/2020    LOS: 22 days     Subjective:   Patient seen in ICU,Intubated, sedated  S/p dwight chest tube placements for dwight pneumo   No edema  K is 3.3 today    Current Facility-Administered Medications   Medication Dose Route Frequency    aspirin chewable tablet 81 mg  81 mg Oral DAILY    propofol (DIPRIVAN) 10 mg/mL infusion  10 mcg/kg/min IntraVENous TITRATE    morphine injection 2 mg  2 mg IntraVENous Q1H PRN    methylPREDNISolone (PF) (SOLU-MEDROL) injection 40 mg  40 mg IntraVENous Q8H    piperacillin-tazobactam (ZOSYN) 3.375 g in 0.9% sodium chloride (MBP/ADV) 100 mL MBP  3.375 g IntraVENous Q8H    midodrine (PROAMATINE) tablet 10 mg  10 mg Oral BID    pantoprazole (PROTONIX) granules for oral suspension 40 mg  40 mg Per NG tube ACB    metoprolol tartrate (LOPRESSOR) tablet 25 mg  25 mg Oral BID    potassium chloride (KLOR-CON) packet for solution 40 mEq  40 mEq Oral BID WITH MEALS    albuterol CONCENTRATE 2.5mg/0.5 mL neb soln  2.5 mg Nebulization Q6H RT    LORazepam (ATIVAN) injection 1 mg  1 mg IntraVENous Q2H PRN    sodium chloride 0.9% (NS) 3ml nebulizer solution  2.5 mL Nebulization PRN    influenza vaccine 2020-21 (4 yrs+)(PF) (FLUCELVAX QUAD) injection 0.5 mL  0.5 mL IntraMUSCular PRIOR TO DISCHARGE    diphenhydrAMINE (BENADRYL) capsule 50 mg  50 mg Oral BID PRN    phosphorus (K PHOS NEUTRAL) 250 mg tablet 2 Tab  2 Tab Oral BID    oxyCODONE IR (ROXICODONE) tablet 15 mg  15 mg Oral Q4H PRN    enoxaparin (LOVENOX) injection 40 mg  40 mg SubCUTAneous Q24H    acetaminophen (TYLENOL) tablet 650 mg  650 mg Oral Q6H PRN    ondansetron (ZOFRAN) injection 4 mg  4 mg IntraVENous Q8H PRN    guaiFENesin (ROBITUSSIN) 20 mg/mL oral liquid 400 mg  400 mg Oral Q6H PRN        Vitals:    10/18/20 1100 10/18/20 1119 10/18/20 1353 10/18/20 1541   BP: Pulse:  79 79 79   Resp:  19 13 13   Temp: 97.8 °F (36.6 °C)      SpO2:  100% 100% 100%   Weight:       Height:         Objective:   General: ON VENT, sedated, no acute distress. HEENT: EOMI, no Icterus, no Pallor, ET tube in place   neck: Neck is supple, No JVD  Lungs: decreased breathsounds, bilateral chest tubes present  CVS: heart sounds normal,  no murmurs, no rubs. GI: soft, nontender, normal BS. Extremeties: no cyanosis,no edema in ext   Neuro: On ventilator and sedated  Skin: normal skin turgor, no skin rashes. Intake and Output:  Current Shift: No intake/output data recorded.   Last three shifts: 10/17 0701 - 10/18 1900  In: -   Out: 702 [Urine:580; Drains:110]      Lab/Data Review:  Recent Labs     10/18/20  0400 10/17/20  0205   WBC 26.2* 12.8*   HGB 9.0* 10.6*   HCT 27.7* 32.0*    285     Recent Labs     10/18/20  0455 10/17/20  0205 10/16/20  0850    144 141   K 3.3* 4.0 2.9*   * 109* 105   CO2 28 29 30   * 151* 87   BUN 20 18 16   CREA 0.65* 0.80 0.68*   CA 8.8 8.8 9.1   MG  --  1.9  --    PHOS 2.7  --  2.7   ALB 3.2* 3.1* 3.2*   TBILI  --  1.7*  --    ALT  --  14  --    INR  --  1.5*  --      Recent Labs     10/18/20  0335 10/17/20  0740 10/17/20  0238   PH 7.46* 7.472* 7.39   PCO2 35 35 44   PO2 99 70* 78   HCO3 26 26 26   FIO2 55 80.0 100     Recent Results (from the past 24 hour(s))   BLOOD GAS, ARTERIAL    Collection Time: 10/18/20  3:35 AM   Result Value Ref Range    pH 7.46 (H) 7.35 - 7.45      PCO2 35 35 - 45 mmHg    PO2 99 75 - 100 mmHg    O2 SAT 99 >95 %    BICARBONATE 26 22 - 26 mmol/L    BASE EXCESS 1.6 0 - 2 mmol/L    O2 METHOD VENT      FIO2 55 %    MODE Assist Control/Volume Control      Tidal volume 500      SET RATE 12      EPAP/CPAP/PEEP 5      Sample source Arterial      SITE Right Brachial      ENIO'S TEST YES     CBC WITH AUTOMATED DIFF    Collection Time: 10/18/20  4:00 AM   Result Value Ref Range    WBC 26.2 (H) 4.1 - 11.1 K/uL    RBC 2.90 (L) 4.10 - 5.70 M/uL    HGB 9.0 (L) 12.1 - 17.0 g/dL    HCT 27.7 (L) 36.6 - 50.3 %    MCV 95.5 80.0 - 99.0 FL    MCH 31.0 26.0 - 34.0 PG    MCHC 32.5 30.0 - 36.5 g/dL    RDW 18.1 (H) 11.5 - 14.5 %    PLATELET 248 155 - 195 K/uL    MPV 10.8 8.9 - 12.9 FL    NEUTROPHILS 93 (H) 32 - 75 %    LYMPHOCYTES 2 (L) 12 - 49 %    MONOCYTES 4 (L) 5 - 13 %    EOSINOPHILS 0 0 - 7 %    BASOPHILS 0 0 - 1 %    IMMATURE GRANULOCYTES 1 (H) 0.0 - 0.5 %    ABS. NEUTROPHILS 24.6 (H) 1.8 - 8.0 K/UL    ABS. LYMPHOCYTES 0.6 (L) 0.8 - 3.5 K/UL    ABS. MONOCYTES 1.0 0.0 - 1.0 K/UL    ABS. EOSINOPHILS 0.0 0.0 - 0.4 K/UL    ABS. BASOPHILS 0.0 0.0 - 0.1 K/UL    ABS. IMM. GRANS. 0.1 (H) 0.00 - 0.04 K/UL    DF AUTOMATED     RENAL FUNCTION PANEL    Collection Time: 10/18/20  4:55 AM   Result Value Ref Range    Sodium 145 136 - 145 mmol/L    Potassium 3.3 (L) 3.5 - 5.1 mmol/L    Chloride 112 (H) 97 - 108 mmol/L    CO2 28 21 - 32 mmol/L    Anion gap 5 5 - 15 mmol/L    Glucose 151 (H) 65 - 100 mg/dL    BUN 20 6 - 20 mg/dL    Creatinine 0.65 (L) 0.70 - 1.30 mg/dL    BUN/Creatinine ratio 31 (H) 12 - 20      GFR est AA >60 >60 ml/min/1.73m2    GFR est non-AA >60 >60 ml/min/1.73m2    Calcium 8.8 8.5 - 10.1 mg/dL    Phosphorus 2.7 2.6 - 4.7 mg/dL    Albumin 3.2 (L) 3.5 - 5.0 g/dL        Assessment and Plan:       1. severe hypokalemia:  -from diuretics and metabolic alkalosis,   Improved K to 4.0 and down to 3.3 today  Off lasix now, will resume spironolactone and add additional KCl supplements today  Continue kcl 40 bid  monitor K levels,  Mg is normal    2.  Low urine output/ BALDEV, resolved  - likely prerenal from hypotension.    -improved with diuretics now      3. Edema: improved with good diuresis +, off lasix     4..  acute resp.failure: dwight pneumo+  On vent, had dwight CT placements  pulm and CT surgery following    5.  Hypotension: borderline low Bps  resumed mododrine 10 mg bid  Continue to monitor BPs     6 status post  infrarenal aorta endarterectomy, with aortic by common femoral artery bypass with 14 mm x 7 mm Hemosure graft       Signed By: Jennifer Gordillo MD     October 18, 2020

## 2020-10-18 NOTE — PROGRESS NOTES
Hospitalist Progress Note           Daily Progress Note: 10/18/2020    Chief complaint: Shortness of breath and weakness  Subjective: The patient is seen for follow  up. Patient continues on mechanical ventilation. He is awake and responsive    White count today is 26,000, may be largely due to steroid effect as he was transitioned to IV steroids    X-ray shows left lower lobe atelectasis with unchanged small volume left sided hydropneumothorax. No right-sided pneumothorax noted.     Problem List:  Problem List as of 10/18/2020 Date Reviewed: 9/17/2020          Codes Class Noted - Resolved    Metabolic encephalopathy REP-73-KS: G93.41  ICD-9-CM: 348.31  9/26/2020 - Present        UTI (urinary tract infection) ICD-10-CM: N39.0  ICD-9-CM: 599.0  9/26/2020 - Present        Aspiration pneumonitis ([address] ICD-10-CM: J69.0  ICD-9-CM: 507.0  9/26/2020 - Present        Essential hypertension ICD-10-CM: I10  ICD-9-CM: 401.9  9/26/2020 - Present        Acute dehydration ICD-10-CM: E86.0  ICD-9-CM: 276.51  9/26/2020 - Present              Medications reviewed  Current Facility-Administered Medications   Medication Dose Route Frequency    propofol (DIPRIVAN) 10 mg/mL infusion  10 mcg/kg/min IntraVENous TITRATE    morphine injection 2 mg  2 mg IntraVENous Q1H PRN    methylPREDNISolone (PF) (SOLU-MEDROL) injection 40 mg  40 mg IntraVENous Q8H    piperacillin-tazobactam (ZOSYN) 3.375 g in 0.9% sodium chloride (MBP/ADV) 100 mL MBP  3.375 g IntraVENous Q8H    midodrine (PROAMATINE) tablet 10 mg  10 mg Oral BID    pantoprazole (PROTONIX) granules for oral suspension 40 mg  40 mg Per NG tube ACB    metoprolol tartrate (LOPRESSOR) tablet 25 mg  25 mg Oral BID    potassium chloride (KLOR-CON) packet for solution 40 mEq  40 mEq Oral BID WITH MEALS    albuterol CONCENTRATE 2.5mg/0.5 mL neb soln  2.5 mg Nebulization Q6H RT    LORazepam (ATIVAN) injection 1 mg  1 mg IntraVENous Q2H PRN    sodium chloride 0.9% (NS) 3ml nebulizer solution  2.5 mL Nebulization PRN    influenza vaccine 2020- (4 yrs+)(PF) (FLUCELVAX QUAD) injection 0.5 mL  0.5 mL IntraMUSCular PRIOR TO DISCHARGE    diphenhydrAMINE (BENADRYL) capsule 50 mg  50 mg Oral BID PRN    phosphorus (K PHOS NEUTRAL) 250 mg tablet 2 Tab  2 Tab Oral BID    oxyCODONE IR (ROXICODONE) tablet 15 mg  15 mg Oral Q4H PRN    enoxaparin (LOVENOX) injection 40 mg  40 mg SubCUTAneous Q24H    aspirin delayed-release tablet 81 mg  81 mg Oral DAILY    acetaminophen (TYLENOL) tablet 650 mg  650 mg Oral Q6H PRN    ondansetron (ZOFRAN) injection 4 mg  4 mg IntraVENous Q8H PRN    guaiFENesin (ROBITUSSIN) 20 mg/mL oral liquid 400 mg  400 mg Oral Q6H PRN       Review of Systems:   Review of systems unable to be obtained because he is intubated    Objective:   Physical Exam:     Visit Vitals  /73   Pulse 72   Temp 97.5 °F (36.4 °C)   Resp 16   Ht 6' 0.01\" (1.829 m)   Wt 61.9 kg (136 lb 7.4 oz)   SpO2 100%   BMI 18.50 kg/m²    O2 Flow Rate (L/min): 2 l/min O2 Device: Ventilator    Temp (24hrs), Av.8 °F (36.6 °C), Min:97.5 °F (36.4 °C), Max:98.2 °F (36.8 °C)    No intake/output data recorded. 10/16 1901 - 10/18 0700  In: -   Out: 3434 [Urine:1005; Drains:110]    General:   Intubated awake and alert today   Lungs:   Clear to auscultation bilaterally with diminished breath sounds bilateral bases. Chest wall:  No tenderness or deformity. Heart:  Regular rate and rhythm, S1, S2 normal, no murmur, click, rub or gallop. Abdomen:   Soft, non-tender. Diminished Bowel sounds. Dressings in place. Extremities: Extremities normal, atraumatic, positive edema bilaterally   Pulses: 2+ and symmetric all extremities. Skin: Skin color, texture, turgor normal. No rashes or lesions   Neurologic: CNII-XII intact.      Data Review:       Recent Days:  Recent Labs     10/18/20  0400 10/17/20  0205   WBC 26.2* 12.8*   HGB 9.0* 10.6*   HCT 27.7* 32.0*    285 Recent Labs     10/18/20  0455 10/17/20  0205 10/16/20  0850    144 141   K 3.3* 4.0 2.9*   * 109* 105   CO2 28 29 30   * 151* 87   BUN 20 18 16   CREA 0.65* 0.80 0.68*   CA 8.8 8.8 9.1   MG  --  1.9  --    PHOS 2.7  --  2.7   ALB 3.2* 3.1* 3.2*   TBILI  --  1.7*  --    ALT  --  14  --    INR  --  1.5*  --      Recent Labs     10/18/20  0335 10/17/20  0740 10/17/20  0238   PH 7.46* 7.472* 7.39   PCO2 35 35 44   PO2 99 70* 78   HCO3 26 26 26   FIO2 55 80.0 100       24 Hour Results:  Recent Results (from the past 24 hour(s))   BLOOD GAS, ARTERIAL    Collection Time: 10/18/20  3:35 AM   Result Value Ref Range    pH 7.46 (H) 7.35 - 7.45      PCO2 35 35 - 45 mmHg    PO2 99 75 - 100 mmHg    O2 SAT 99 >95 %    BICARBONATE 26 22 - 26 mmol/L    BASE EXCESS 1.6 0 - 2 mmol/L    O2 METHOD VENT      FIO2 55 %    MODE Assist Control/Volume Control      Tidal volume 500      SET RATE 12      EPAP/CPAP/PEEP 5      Sample source Arterial      SITE Right Brachial      ENIO'S TEST YES     CBC WITH AUTOMATED DIFF    Collection Time: 10/18/20  4:00 AM   Result Value Ref Range    WBC 26.2 (H) 4.1 - 11.1 K/uL    RBC 2.90 (L) 4.10 - 5.70 M/uL    HGB 9.0 (L) 12.1 - 17.0 g/dL    HCT 27.7 (L) 36.6 - 50.3 %    MCV 95.5 80.0 - 99.0 FL    MCH 31.0 26.0 - 34.0 PG    MCHC 32.5 30.0 - 36.5 g/dL    RDW 18.1 (H) 11.5 - 14.5 %    PLATELET 228 312 - 614 K/uL    MPV 10.8 8.9 - 12.9 FL    NEUTROPHILS 93 (H) 32 - 75 %    LYMPHOCYTES 2 (L) 12 - 49 %    MONOCYTES 4 (L) 5 - 13 %    EOSINOPHILS 0 0 - 7 %    BASOPHILS 0 0 - 1 %    IMMATURE GRANULOCYTES 1 (H) 0.0 - 0.5 %    ABS. NEUTROPHILS 24.6 (H) 1.8 - 8.0 K/UL    ABS. LYMPHOCYTES 0.6 (L) 0.8 - 3.5 K/UL    ABS. MONOCYTES 1.0 0.0 - 1.0 K/UL    ABS. EOSINOPHILS 0.0 0.0 - 0.4 K/UL    ABS. BASOPHILS 0.0 0.0 - 0.1 K/UL    ABS. IMM.  GRANS. 0.1 (H) 0.00 - 0.04 K/UL    DF AUTOMATED     RENAL FUNCTION PANEL    Collection Time: 10/18/20  4:55 AM   Result Value Ref Range    Sodium 145 136 - 145 mmol/L    Potassium 3.3 (L) 3.5 - 5.1 mmol/L    Chloride 112 (H) 97 - 108 mmol/L    CO2 28 21 - 32 mmol/L    Anion gap 5 5 - 15 mmol/L    Glucose 151 (H) 65 - 100 mg/dL    BUN 20 6 - 20 mg/dL    Creatinine 0.65 (L) 0.70 - 1.30 mg/dL    BUN/Creatinine ratio 31 (H) 12 - 20      GFR est AA >60 >60 ml/min/1.73m2    GFR est non-AA >60 >60 ml/min/1.73m2    Calcium 8.8 8.5 - 10.1 mg/dL    Phosphorus 2.7 2.6 - 4.7 mg/dL    Albumin 3.2 (L) 3.5 - 5.0 g/dL           Assessment/     Acute hypoxic respiratory failure postsurgery. Self extubated 10/09/20. Reintubated 10/10 due to hypoxemia. Extubated 10/11. Reintubated 10/17 for bilateral pneumothoraces    Bilateral pneumothoraces, status post chest tubes bilateral    Acute kidney injury likely secondary to ATN from hypotension. Improved    Hypovolemic shock post surgery. Improved    Right lower lobe aspiration pneumonia improved    Urinary tract infection due to E. Coli    Abnormal Cardiolite stress test showing inferior ischemia. 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 Repleted    Hypothermia, probably largely environmental.  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 Plan:  Continue mechanical ventilation, chest tubes  Continue IV antibiotics and supportive care  Continue tube feeds  Replete potassium  Ventilator weaning per pulmonology    Care Plan discussed with: Patient/Family       Total time spent with patient: 30 minutes.     Gaby Babb MD

## 2020-10-18 NOTE — PROGRESS NOTES
Pulmonology and Critical Care Progress Note    Subjective:     Chief Complaint:   Chief Complaint   Patient presents with    Altered mental status      Patient seen and examined at the bedside this morning. The patient underwent aortobifemoral bypass surgery 10/6/20. Post surgery he was left on the ventilator. Apparently blood loss was about 3.5 L. When he returned to the ICU he was on multiple pressors. Developed spontaneous left pneumothorax. thoracic surgery did aspiration of his left pneumothorax. No chest tube insertion was required. Patient developed increasing respiratory distress starting at around midnight. Chest x-ray was obtained which was read as showing only the known left-sided pneumothorax. Patient ended up requiring intubation.     Repeat x-ray actually showed bilateral pneumothoraces although again it was missed on the right in the radiology report.     Dr. Zak Keen came in to evaluate the patient and found him to have bilateral pneumothoraces and placed bilateral chest tubes     Patient seen and examined in ICU  Overnight events noted    Lying in bed comfortably  Lightly sedated but seems to wake up  Stable hemodynamics  Chest x-ray stable with chest tubes in good position    ABGs stable  We will decrease FiO2 to 40%  Initiate weaning with SIMV and pressure support    Review of Systems:  Unable to perform due to patient's condition    Current Facility-Administered Medications   Medication Dose Route Frequency Provider Last Rate Last Dose    aspirin chewable tablet 81 mg  81 mg Oral DAILY Angelica Nunez MD   81 mg at 10/18/20 1134    propofol (DIPRIVAN) 10 mg/mL infusion  10 mcg/kg/min IntraVENous TITRATE Jade Blankenship MD 3.6 mL/hr at 10/18/20 0550 10 mcg/kg/min at 10/18/20 0550    morphine injection 2 mg  2 mg IntraVENous Q1H PRN Cesar Gomez MD   2 mg at 10/18/20 0924    methylPREDNISolone (PF) (SOLU-MEDROL) injection 40 mg  40 mg IntraVENous Q8H Jodi Rene Guo MD   40 mg at 10/18/20 0551    piperacillin-tazobactam (ZOSYN) 3.375 g in 0.9% sodium chloride (MBP/ADV) 100 mL MBP  3.375 g IntraVENous Q8H Quirino Alanis MD 25 mL/hr at 10/18/20 0924 3.375 g at 10/18/20 0924    midodrine (PROAMATINE) tablet 10 mg  10 mg Oral BID Burton Coker MD   10 mg at 10/18/20 0900    pantoprazole (PROTONIX) granules for oral suspension 40 mg  40 mg Per NG tube ACB Quirino Alanis MD   40 mg at 10/18/20 0924    metoprolol tartrate (LOPRESSOR) tablet 25 mg  25 mg Oral BID Sonny Lopez MD   25 mg at 10/18/20 3108    potassium chloride (KLOR-CON) packet for solution 40 mEq  40 mEq Oral BID WITH MEALS Quirino Alanis MD   40 mEq at 10/18/20 0800    albuterol CONCENTRATE 2.5mg/0.5 mL neb soln  2.5 mg Nebulization Q6H RT Johnny Santiago DO   2.5 mg at 10/18/20 0818    LORazepam (ATIVAN) injection 1 mg  1 mg IntraVENous Q2H PRN Sarah Beth Olson MD   1 mg at 10/16/20 2151    sodium chloride 0.9% (NS) 3ml nebulizer solution  2.5 mL Nebulization PRN Minal Nunes MD   2.5 mL at 10/18/20 0819    influenza vaccine 2020-21 (4 yrs+)(PF) (FLUCELVAX QUAD) injection 0.5 mL  0.5 mL IntraMUSCular PRIOR TO DISCHARGE Qiurino Alanis MD   Stopped at 10/07/20 0900    diphenhydrAMINE (BENADRYL) capsule 50 mg  50 mg Oral BID PRN Clare Madrid NP   50 mg at 10/03/20 0129    phosphorus (K PHOS NEUTRAL) 250 mg tablet 2 Tab  2 Tab Oral BID Quirino Alanis MD   2 Tab at 10/18/20 0924    oxyCODONE IR (ROXICODONE) tablet 15 mg  15 mg Oral Q4H PRN Quirino Alanis MD   15 mg at 10/18/20 0924    enoxaparin (LOVENOX) injection 40 mg  40 mg SubCUTAneous Q24H Minal Nunes MD   40 mg at 10/17/20 2040    acetaminophen (TYLENOL) tablet 650 mg  650 mg Oral Q6H PRN Smith SUTTON NP   650 mg at 09/30/20 0950    ondansetron (ZOFRAN) injection 4 mg  4 mg IntraVENous Q8H PRN Seamus Long NP   4 mg at 09/30/20 0402    guaiFENesin (ROBITUSSIN) 20 mg/mL oral liquid 400 mg  400 mg Oral Q6H PRN Cklori Matta NP   Stopped at 20 1855            No Known Allergies        Objective:     Blood pressure 128/73, pulse 79, temperature 97.8 °F (36.6 °C), resp. rate 19, height 6' 0.01\" (1.829 m), weight 61.9 kg (136 lb 7.4 oz), SpO2 100 %. Temp (24hrs), Av.8 °F (36.6 °C), Min:97.5 °F (36.4 °C), Max:98.2 °F (36.8 °C)      Intake and Output:  Current Shift: No intake/output data recorded. Last 3 Shifts: 10/16 1901 - 10/18 0700  In: -   Out: 2780 [Urine:1005; Drains:110]    Physical Exam:     General:  Lying in bed, intubated on mechanical ventilation  Throat and Neck: Supple. No JVD. He has a right subclavian central line. Oral ET tube in place  Lung:  Much improved aeration bilaterally, minimal rhonchi in the left base. Bilateral chest tubes present. Heart: S1+S2. No murmurs  Abdomen: Status post surgery. He has a midline incision. He has HELGA drains one on each side. Bowel sounds are hypoactive. Draining serosanguineous fluid. Extremities:  He has pitting edema. Distal pulses of the lower extremities are noted with Dopplers. Has skin breakdown on the dependent portion of heel. : Reese catheter in place. Making some urine output. Skin: No cyanosis  Neurologic: Wakes up easily, following commands.          Recent Results (from the past 24 hour(s))   BLOOD GAS, ARTERIAL    Collection Time: 10/18/20  3:35 AM   Result Value Ref Range    pH 7.46 (H) 7.35 - 7.45      PCO2 35 35 - 45 mmHg    PO2 99 75 - 100 mmHg    O2 SAT 99 >95 %    BICARBONATE 26 22 - 26 mmol/L    BASE EXCESS 1.6 0 - 2 mmol/L    O2 METHOD VENT      FIO2 55 %    MODE Assist Control/Volume Control      Tidal volume 500      SET RATE 12      EPAP/CPAP/PEEP 5      Sample source Arterial      SITE Right Brachial      ENIO'S TEST YES     CBC WITH AUTOMATED DIFF    Collection Time: 10/18/20  4:00 AM   Result Value Ref Range    WBC 26.2 (H) 4.1 - 11.1 K/uL    RBC 2.90 (L) 4.10 - 5.70 M/uL    HGB 9.0 (L) 12.1 - 17.0 g/dL HCT 27.7 (L) 36.6 - 50.3 %    MCV 95.5 80.0 - 99.0 FL    MCH 31.0 26.0 - 34.0 PG    MCHC 32.5 30.0 - 36.5 g/dL    RDW 18.1 (H) 11.5 - 14.5 %    PLATELET 324 658 - 305 K/uL    MPV 10.8 8.9 - 12.9 FL    NEUTROPHILS 93 (H) 32 - 75 %    LYMPHOCYTES 2 (L) 12 - 49 %    MONOCYTES 4 (L) 5 - 13 %    EOSINOPHILS 0 0 - 7 %    BASOPHILS 0 0 - 1 %    IMMATURE GRANULOCYTES 1 (H) 0.0 - 0.5 %    ABS. NEUTROPHILS 24.6 (H) 1.8 - 8.0 K/UL    ABS. LYMPHOCYTES 0.6 (L) 0.8 - 3.5 K/UL    ABS. MONOCYTES 1.0 0.0 - 1.0 K/UL    ABS. EOSINOPHILS 0.0 0.0 - 0.4 K/UL    ABS. BASOPHILS 0.0 0.0 - 0.1 K/UL    ABS. IMM. GRANS. 0.1 (H) 0.00 - 0.04 K/UL    DF AUTOMATED     RENAL FUNCTION PANEL    Collection Time: 10/18/20  4:55 AM   Result Value Ref Range    Sodium 145 136 - 145 mmol/L    Potassium 3.3 (L) 3.5 - 5.1 mmol/L    Chloride 112 (H) 97 - 108 mmol/L    CO2 28 21 - 32 mmol/L    Anion gap 5 5 - 15 mmol/L    Glucose 151 (H) 65 - 100 mg/dL    BUN 20 6 - 20 mg/dL    Creatinine 0.65 (L) 0.70 - 1.30 mg/dL    BUN/Creatinine ratio 31 (H) 12 - 20      GFR est AA >60 >60 ml/min/1.73m2    GFR est non-AA >60 >60 ml/min/1.73m2    Calcium 8.8 8.5 - 10.1 mg/dL    Phosphorus 2.7 2.6 - 4.7 mg/dL    Albumin 3.2 (L) 3.5 - 5.0 g/dL       CT Results  (Last 48 hours)    None          Assessment:     1. Acute respiratory failure, after aortobifemoral bypass surgery on 10/6/2020. Patient self extubated himself early in the morning on 10/9/2020. Re-intubated on 10/10/20 for left-sided mucous plugging and complete left lung collapse and had flexible bronchoscopy done with suction of mucous plug. 2.  Acute metabolic encephalopathy, resolved  3. Aspiration pneumonia  4. Severe peripheral vascular disease   5. UTI   6. Hypertension  7. Familial polymyositis  8. Left spontaneous pneumothorax    Plan:     1.)    Status post acute Hypoxic respiratory failure.     Intubated on mechanical ventilation  Currently on assist control rate of 12, tidal volume 500, FiO2 55%, PEEP of 5  ABGs and chest x-ray reviewed  FiO2 decreased to 40%  Further changes in ventilator settings based on clinical response and ABG results    Initiate weaning with SIMV and pressure support  Consider extubation if tolerated    2. Bilateral pneumothoraces  Bilateral chest tubes placed  Stable chest x-ray    3.)  Aspiration/HAP pneumonia:  Continue Zosyn  Continue steroids  On nebulized bronchodilator treatments. Modified barium swallow showed penetration and significant dysphagia. 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 Patient may require PEG tube near future. 3.)  Metabolic encephalopathy. He has a progressive neurologic disorder. Much improved mental status today. Brain MRI negative    Further recommendation per Neurology. 4.)  Dehydration and metabolic acidosis. Monitor kidney function after surgery. 5.  Possible urinary tract infection. 6.  Hypertension. Dyslipidemia and severe peripheral vascular disease. 7.  Myocardial ischemia:  He underwent nuclear stress testing which showed significant inferior and from will treat him with medical managememt   I will follow the patient closely with you. DVT and GI prophylaxis. He is on Lovenox and Protonix IV. Questions of wife and answered at bedside in detail  Case discussed in detail with RN, RT, and care team in ICU  Thank you for involving me in the care of this patient  I will follow with you closely during hospitalization    Spent more than 55 minutes in direct patient care with no overlap reviewing results and records, decision making, and answering questions.     Adebayo Ace MD  [facility]  10/18/2020

## 2020-10-19 ENCOUNTER — APPOINTMENT (OUTPATIENT)
Dept: GENERAL RADIOLOGY | Age: 55
DRG: 981 | End: 2020-10-19
Attending: INTERNAL MEDICINE
Payer: COMMERCIAL

## 2020-10-19 LAB
ALBUMIN SERPL-MCNC: 2.6 G/DL (ref 3.5–5)
ANION GAP SERPL CALC-SCNC: 6 MMOL/L (ref 5–15)
ARTERIAL PATENCY WRIST A: POSITIVE
BASE EXCESS BLDA CALC-SCNC: 2.5 MMOL/L (ref 0–2)
BUN SERPL-MCNC: 23 MG/DL (ref 6–20)
BUN/CREAT SERPL: 39 (ref 12–20)
CA-I BLD-MCNC: 8.9 MG/DL (ref 8.5–10.1)
CHLORIDE SERPL-SCNC: 113 MMOL/L (ref 97–108)
CO2 SERPL-SCNC: 26 MMOL/L (ref 21–32)
CREAT SERPL-MCNC: 0.59 MG/DL (ref 0.7–1.3)
EPAP/CPAP/PEEP, PAPEEP: 5
FIO2 ON VENT: 40 %
GAS FLOW.O2 SETTING OXYMISER: 8 L/MIN
GLUCOSE SERPL-MCNC: 140 MG/DL (ref 65–100)
HCO3 BLDA-SCNC: 26 MMOL/L (ref 22–26)
MAGNESIUM SERPL-MCNC: 1.9 MG/DL (ref 1.6–2.4)
PCO2 BLDA: 32 MMHG (ref 35–45)
PH BLDA: 7.51 [PH] (ref 7.35–7.45)
PHOSPHATE SERPL-MCNC: 2 MG/DL (ref 2.6–4.7)
PO2 BLDA: 51 MMHG (ref 75–100)
POTASSIUM SERPL-SCNC: 3.6 MMOL/L (ref 3.5–5.1)
SAO2 % BLD: 90 %
SAO2% DEVICE SAO2% SENSOR NAME: ABNORMAL
SODIUM SERPL-SCNC: 145 MMOL/L (ref 136–145)
VT SETTING VENT: 500 ML

## 2020-10-19 PROCEDURE — 94003 VENT MGMT INPAT SUBQ DAY: CPT

## 2020-10-19 PROCEDURE — 74011250637 HC RX REV CODE- 250/637: Performed by: INTERNAL MEDICINE

## 2020-10-19 PROCEDURE — 82803 BLOOD GASES ANY COMBINATION: CPT

## 2020-10-19 PROCEDURE — 94400 HC END TIDAL CO2 RESPONSE CURVE: CPT

## 2020-10-19 PROCEDURE — 74011000258 HC RX REV CODE- 258: Performed by: INTERNAL MEDICINE

## 2020-10-19 PROCEDURE — 74011000250 HC RX REV CODE- 250: Performed by: INTERNAL MEDICINE

## 2020-10-19 PROCEDURE — 74011250636 HC RX REV CODE- 250/636: Performed by: INTERNAL MEDICINE

## 2020-10-19 PROCEDURE — 94640 AIRWAY INHALATION TREATMENT: CPT

## 2020-10-19 PROCEDURE — 71045 X-RAY EXAM CHEST 1 VIEW: CPT

## 2020-10-19 PROCEDURE — 77010033678 HC OXYGEN DAILY

## 2020-10-19 PROCEDURE — P9047 ALBUMIN (HUMAN), 25%, 50ML: HCPCS | Performed by: SURGERY

## 2020-10-19 PROCEDURE — 36415 COLL VENOUS BLD VENIPUNCTURE: CPT

## 2020-10-19 PROCEDURE — 65610000006 HC RM INTENSIVE CARE

## 2020-10-19 PROCEDURE — 74011250636 HC RX REV CODE- 250/636: Performed by: SURGERY

## 2020-10-19 PROCEDURE — 94668 MNPJ CHEST WALL SBSQ: CPT

## 2020-10-19 PROCEDURE — 80069 RENAL FUNCTION PANEL: CPT

## 2020-10-19 PROCEDURE — 74011250636 HC RX REV CODE- 250/636: Performed by: HOSPITALIST

## 2020-10-19 PROCEDURE — 83735 ASSAY OF MAGNESIUM: CPT

## 2020-10-19 RX ORDER — DEXMEDETOMIDINE HYDROCHLORIDE 4 UG/ML
.1-1.5 INJECTION, SOLUTION INTRAVENOUS
Status: DISCONTINUED | OUTPATIENT
Start: 2020-10-19 | End: 2020-10-19

## 2020-10-19 RX ORDER — ALBUTEROL SULFATE 2.5 MG/.5ML
SOLUTION RESPIRATORY (INHALATION)
Status: DISPENSED
Start: 2020-10-19 | End: 2020-10-19

## 2020-10-19 RX ORDER — ALBUMIN HUMAN 250 G/1000ML
25 SOLUTION INTRAVENOUS 2 TIMES DAILY
Status: DISCONTINUED | OUTPATIENT
Start: 2020-10-19 | End: 2020-10-23

## 2020-10-19 RX ORDER — DEXMEDETOMIDINE HYDROCHLORIDE 4 UG/ML
.1-1.5 INJECTION, SOLUTION INTRAVENOUS CONTINUOUS
Status: DISCONTINUED | OUTPATIENT
Start: 2020-10-19 | End: 2020-10-19

## 2020-10-19 RX ADMIN — DIBASIC SODIUM PHOSPHATE, MONOBASIC POTASSIUM PHOSPHATE AND MONOBASIC SODIUM PHOSPHATE 2 TABLET: 852; 155; 130 TABLET ORAL at 09:11

## 2020-10-19 RX ADMIN — DIBASIC SODIUM PHOSPHATE, MONOBASIC POTASSIUM PHOSPHATE AND MONOBASIC SODIUM PHOSPHATE 2 TABLET: 852; 155; 130 TABLET ORAL at 22:04

## 2020-10-19 RX ADMIN — DEXMEDETOMIDINE HYDROCHLORIDE 0.4 MCG/KG/HR: 4 INJECTION, SOLUTION INTRAVENOUS at 11:00

## 2020-10-19 RX ADMIN — POTASSIUM CHLORIDE 40 MEQ: 1.5 FOR SOLUTION ORAL at 09:12

## 2020-10-19 RX ADMIN — METHYLPREDNISOLONE SODIUM SUCCINATE 40 MG: 40 INJECTION, POWDER, FOR SOLUTION INTRAMUSCULAR; INTRAVENOUS at 06:44

## 2020-10-19 RX ADMIN — PANTOPRAZOLE SODIUM 40 MG: 40 GRANULE, DELAYED RELEASE ORAL at 06:44

## 2020-10-19 RX ADMIN — ENOXAPARIN SODIUM 40 MG: 40 INJECTION SUBCUTANEOUS at 21:53

## 2020-10-19 RX ADMIN — METOPROLOL TARTRATE 25 MG: 25 TABLET, FILM COATED ORAL at 21:55

## 2020-10-19 RX ADMIN — OXYCODONE HYDROCHLORIDE 15 MG: 5 TABLET ORAL at 20:01

## 2020-10-19 RX ADMIN — ALBUTEROL SULFATE 2.5 MG: 2.5 SOLUTION RESPIRATORY (INHALATION) at 13:09

## 2020-10-19 RX ADMIN — ALBUMIN (HUMAN) 25 G: 0.25 INJECTION, SOLUTION INTRAVENOUS at 21:56

## 2020-10-19 RX ADMIN — SPIRONOLACTONE 25 MG: 25 TABLET ORAL at 09:00

## 2020-10-19 RX ADMIN — SPIRONOLACTONE 25 MG: 25 TABLET ORAL at 21:55

## 2020-10-19 RX ADMIN — MIDODRINE HYDROCHLORIDE 10 MG: 5 TABLET ORAL at 22:04

## 2020-10-19 RX ADMIN — POTASSIUM CHLORIDE 40 MEQ: 1.5 FOR SOLUTION ORAL at 16:32

## 2020-10-19 RX ADMIN — ASPIRIN 81 MG CHEWABLE TABLET 81 MG: 81 TABLET CHEWABLE at 09:12

## 2020-10-19 RX ADMIN — ALBUTEROL SULFATE 2.5 MG: 2.5 SOLUTION RESPIRATORY (INHALATION) at 07:54

## 2020-10-19 RX ADMIN — DEXMEDETOMIDINE HYDROCHLORIDE 0.4 MCG/KG/HR: 100 INJECTION, SOLUTION, CONCENTRATE INTRAVENOUS at 13:37

## 2020-10-19 RX ADMIN — ALBUTEROL SULFATE 2.5 MG: 2.5 SOLUTION RESPIRATORY (INHALATION) at 19:38

## 2020-10-19 RX ADMIN — PIPERACILLIN AND TAZOBACTAM 3.38 G: 3; .375 INJECTION, POWDER, LYOPHILIZED, FOR SOLUTION INTRAVENOUS at 16:34

## 2020-10-19 RX ADMIN — MIDODRINE HYDROCHLORIDE 10 MG: 5 TABLET ORAL at 09:11

## 2020-10-19 RX ADMIN — SODIUM CHLORIDE 1000 ML: 9 INJECTION, SOLUTION INTRAVENOUS at 16:33

## 2020-10-19 RX ADMIN — ISODIUM CHLORIDE 2.5 ML: 0.03 SOLUTION RESPIRATORY (INHALATION) at 19:38

## 2020-10-19 RX ADMIN — PROPOFOL 50 MCG/KG/MIN: 10 INJECTION, EMULSION INTRAVENOUS at 13:36

## 2020-10-19 RX ADMIN — PIPERACILLIN AND TAZOBACTAM 3.38 G: 3; .375 INJECTION, POWDER, LYOPHILIZED, FOR SOLUTION INTRAVENOUS at 02:25

## 2020-10-19 RX ADMIN — METOPROLOL TARTRATE 25 MG: 25 TABLET, FILM COATED ORAL at 09:12

## 2020-10-19 RX ADMIN — ALBUTEROL SULFATE 2.5 MG: 2.5 SOLUTION RESPIRATORY (INHALATION) at 02:01

## 2020-10-19 RX ADMIN — PROPOFOL 10 MCG/KG/MIN: 10 INJECTION, EMULSION INTRAVENOUS at 06:43

## 2020-10-19 RX ADMIN — PIPERACILLIN AND TAZOBACTAM 3.38 G: 3; .375 INJECTION, POWDER, LYOPHILIZED, FOR SOLUTION INTRAVENOUS at 09:12

## 2020-10-19 NOTE — PROGRESS NOTES
Patient 's chart reviewed, He has been ICU transferred and intubated. will need new orders when appropriate for therapy thank you.

## 2020-10-19 NOTE — PROGRESS NOTES
Today his chest tubes were on suction even the order says water seal, and they were not on suction when I placed them  I have instructed the nurse and removed the suction. Currently he is still on the vent and has no air leaks from either side on the vent which is suprisingly good. Tubes will remain in for a couple of days after he gets extubated.  And then I will clamp and pull htem if he has no resp problems an no iar leaks the end of the week

## 2020-10-19 NOTE — PROGRESS NOTES
Hospitalist Progress Note           Daily Progress Note: 10/19/2020    Chief complaint: Shortness of breath and weakness  Subjective: The patient is seen for follow  up. Patient continues on mechanical ventilation. Currently sedated. Chest tubes without air leaks.     Ventilator weaning is underway      Problem List:  Problem List as of 10/19/2020 Date Reviewed: 9/17/2020          Codes Class Noted - Resolved    Metabolic encephalopathy FST-91-AU: G93.41  ICD-9-CM: 348.31  9/26/2020 - Present        UTI (urinary tract infection) ICD-10-CM: N39.0  ICD-9-CM: 599.0  9/26/2020 - Present        Aspiration pneumonitis ([address] ICD-10-CM: J69.0  ICD-9-CM: 507.0  9/26/2020 - Present        Essential hypertension ICD-10-CM: I10  ICD-9-CM: 401.9  9/26/2020 - Present        Acute dehydration ICD-10-CM: E86.0  ICD-9-CM: 276.51  9/26/2020 - Present              Medications reviewed  Current Facility-Administered Medications   Medication Dose Route Frequency    albuterol sulfate (PROVENTIL;VENTOLIN) 2.5 mg/0.5 mL nebulizing solution        aspirin chewable tablet 81 mg  81 mg Oral DAILY    spironolactone (ALDACTONE) tablet 25 mg  25 mg Oral BID    propofol (DIPRIVAN) 10 mg/mL infusion  10 mcg/kg/min IntraVENous TITRATE    morphine injection 2 mg  2 mg IntraVENous Q1H PRN    methylPREDNISolone (PF) (SOLU-MEDROL) injection 40 mg  40 mg IntraVENous Q8H    piperacillin-tazobactam (ZOSYN) 3.375 g in 0.9% sodium chloride (MBP/ADV) 100 mL MBP  3.375 g IntraVENous Q8H    midodrine (PROAMATINE) tablet 10 mg  10 mg Oral BID    pantoprazole (PROTONIX) granules for oral suspension 40 mg  40 mg Per NG tube ACB    metoprolol tartrate (LOPRESSOR) tablet 25 mg  25 mg Oral BID    potassium chloride (KLOR-CON) packet for solution 40 mEq  40 mEq Oral BID WITH MEALS    albuterol CONCENTRATE 2.5mg/0.5 mL neb soln  2.5 mg Nebulization Q6H RT    LORazepam (ATIVAN) injection 1 mg  1 mg IntraVENous Q2H PRN  sodium chloride 0.9% (NS) 3ml nebulizer solution  2.5 mL Nebulization PRN    influenza vaccine 2020- (4 yrs+)(PF) (FLUCELVAX QUAD) injection 0.5 mL  0.5 mL IntraMUSCular PRIOR TO DISCHARGE    diphenhydrAMINE (BENADRYL) capsule 50 mg  50 mg Oral BID PRN    phosphorus (K PHOS NEUTRAL) 250 mg tablet 2 Tab  2 Tab Oral BID    oxyCODONE IR (ROXICODONE) tablet 15 mg  15 mg Oral Q4H PRN    enoxaparin (LOVENOX) injection 40 mg  40 mg SubCUTAneous Q24H    acetaminophen (TYLENOL) tablet 650 mg  650 mg Oral Q6H PRN    ondansetron (ZOFRAN) injection 4 mg  4 mg IntraVENous Q8H PRN    guaiFENesin (ROBITUSSIN) 20 mg/mL oral liquid 400 mg  400 mg Oral Q6H PRN       Review of Systems:   Review of systems unable to be obtained because he is intubated    Objective:   Physical Exam:     Visit Vitals  /73 (BP 1 Location: Left arm, BP Patient Position: At rest;Head of bed elevated (Comment degrees))   Pulse 86   Temp 97 °F (36.1 °C)   Resp 28   Ht 6' 0.01\" (1.829 m)   Wt 60.4 kg (133 lb 2.5 oz)   SpO2 96%   BMI 18.06 kg/m²    O2 Flow Rate (L/min): 2 l/min O2 Device: Ventilator    Temp (24hrs), Av.3 °F (36.8 °C), Min:97 °F (36.1 °C), Max:99 °F (37.2 °C)    No intake/output data recorded. 10/17 1901 - 10/19 0700  In: 5802 [I.V.:1174]  Out: 2523 [Urine:1000; Drains:255]    General:   Intubated awake and alert today   Lungs:   Clear to auscultation bilaterally with diminished breath sounds bilateral bases. Chest wall:  No tenderness or deformity. Heart:  Regular rate and rhythm, S1, S2 normal, no murmur, click, rub or gallop. Abdomen:   Soft, non-tender. Diminished Bowel sounds. Dressings in place. Extremities: Extremities normal, atraumatic, positive edema bilaterally   Pulses: 2+ and symmetric all extremities. Skin: Skin color, texture, turgor normal. No rashes or lesions   Neurologic: CNII-XII intact.      Data Review:       Recent Days:  Recent Labs     10/18/20  0400 10/17/20  0205   WBC 26.2* 12.8* HGB 9.0* 10.6*   HCT 27.7* 32.0*    285     Recent Labs     10/19/20  0450 10/18/20  0455 10/17/20  0205 10/16/20  0850    145 144 141   K 3.6 3.3* 4.0 2.9*   * 112* 109* 105   CO2 26 28 29 30   * 151* 151* 87   BUN 23* 20 18 16   CREA 0.59* 0.65* 0.80 0.68*   CA 8.9 8.8 8.8 9.1   MG 1.9  --  1.9  --    PHOS 2.0* 2.7  --  2.7   ALB 2.6* 3.2* 3.1* 3.2*   TBILI  --   --  1.7*  --    ALT  --   --  14  --    INR  --   --  1.5*  --      Recent Labs     10/19/20  0515 10/18/20  0335 10/17/20  0740   PH 7.51* 7.46* 7.472*   PCO2 32* 35 35   PO2 51* 99 70*   HCO3 26 26 26   FIO2 40 55 80.0       24 Hour Results:  Recent Results (from the past 24 hour(s))   RENAL FUNCTION PANEL    Collection Time: 10/19/20  4:50 AM   Result Value Ref Range    Sodium 145 136 - 145 mmol/L    Potassium 3.6 3.5 - 5.1 mmol/L    Chloride 113 (H) 97 - 108 mmol/L    CO2 26 21 - 32 mmol/L    Anion gap 6 5 - 15 mmol/L    Glucose 140 (H) 65 - 100 mg/dL    BUN 23 (H) 6 - 20 mg/dL    Creatinine 0.59 (L) 0.70 - 1.30 mg/dL    BUN/Creatinine ratio 39 (H) 12 - 20      GFR est AA >60 >60 ml/min/1.73m2    GFR est non-AA >60 >60 ml/min/1.73m2    Calcium 8.9 8.5 - 10.1 mg/dL    Phosphorus 2.0 (L) 2.6 - 4.7 mg/dL    Albumin 2.6 (L) 3.5 - 5.0 g/dL   MAGNESIUM    Collection Time: 10/19/20  4:50 AM   Result Value Ref Range    Magnesium 1.9 1.6 - 2.4 mg/dL   BLOOD GAS, ARTERIAL    Collection Time: 10/19/20  5:15 AM   Result Value Ref Range    pH 7.51 (H) 7.35 - 7.45      PCO2 32 (L) 35 - 45 mmHg    PO2 51 (L) 75 - 100 mmHg    O2 SAT 90 (L) >95 %    BICARBONATE 26 22 - 26 mmol/L    BASE EXCESS 2.5 (H) 0 - 2 mmol/L    O2 METHOD VENT      FIO2 40 %    Tidal volume 500      SET RATE 8      EPAP/CPAP/PEEP 5      ENIO'S TEST Positive             Assessment/     Acute hypoxic respiratory failure postsurgery. Self extubated 10/09/20. Reintubated 10/10 due to hypoxemia. Extubated 10/11.   Reintubated 10/17 for bilateral pneumothoraces    Bilateral pneumothoraces, status post chest tubes bilateral    Acute kidney injury likely secondary to ATN from hypotension. Improved    Hypovolemic shock post surgery. Improved    Right lower lobe aspiration pneumonia improved    Urinary tract infection due to E. Coli    Abnormal Cardiolite stress test showing inferior ischemia. 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 Repleted    Hypothermia, probably largely environmental.  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 Plan:  Continue mechanical ventilation, chest tubes  Continue IV antibiotics and supportive care  Continue tube feeds  Ventilator weaning per pulmonology    Care Plan discussed with: Patient/Family       Total time spent with patient: 30 minutes.     Kayden Morley MD

## 2020-10-19 NOTE — PROGRESS NOTES
Patient is examined this morning. Patient currently intubated and sedated. He has bilateral chest tubes. He has both lower extremities well perfused he now has palpable pedal pulses. Incision on the abdomen both groins looks clean and dry. HELGA drain present minimal.  I will start removing HELGA drain one by one sometime this week. Hopefully we can extubate him next day or 2. I will continue monitor his progress.

## 2020-10-19 NOTE — PROGRESS NOTES
Pulmonology and Critical Care Progress Note    Subjective:     Chief Complaint:   Chief Complaint   Patient presents with    Altered mental status     The patient underwent aortobifemoral bypass surgery 10/6/20. Post surgery he was left on the ventilator. Apparently blood loss was about 3.5 L. When he returned to the ICU he was on multiple pressors. Successfully extubated after suctioning of mucous plugs from the left. Unfortunately, he developed spontaneous left pneumothorax. Thoracic surgery did aspiration of his left pneumothorax. No chest tube insertion was required. Patient developed increasing respiratory distress secondary to a tension right-sided PTX. Patient ended up requiring intubation and Dr. Ghislaine Pro came in to evaluate the patient and found him to have bilateral pneumothoraces and placed bilateral chest tubes.     Patient seen and examined at the bedside this morning in the ICU. I discussed the case in detail with the bedside nurse, respiratory therapy, and his wife who was sitting at the bedside with him. Lying in bed comfortably; where the nurse, he failed a spontaneous awakening trial today. Apparently propofol which was at 50, was turned off for about 15 to 20 minutes and he became very tachycardic and tachypneic. Propofol is now back at 50 and he is more sedated but still tachypneic in the 30s. Stable hemodynamics  Chest x-ray stable with chest tubes in good position with some mild stable LLL atelectasis. ABG reviewed this morning shows 7.51/32/51; patient is currently on the ventilator on SIMV//8/50%/5.       Review of Systems:  Unable to perform due to patient's condition    Current Facility-Administered Medications   Medication Dose Route Frequency Provider Last Rate Last Dose    albuterol sulfate (PROVENTIL;VENTOLIN) 2.5 mg/0.5 mL nebulizing solution        Stopped at 10/19/20 0300    dexmedeTOMidine in 0.9 % NaCl (PRECEDEX) 400 mcg/100 mL (4 mcg/mL) infusion soln 0.1-1.5 mcg/kg/hr IntraVENous CONTINUOUS Alvarez Johnny Valenzuela DO        aspirin chewable tablet 81 mg  81 mg Oral DAILY Jermaine Daniel MD   81 mg at 10/19/20 0912    spironolactone (ALDACTONE) tablet 25 mg  25 mg Oral BID Burton Coker MD   25 mg at 10/19/20 0900    propofol (DIPRIVAN) 10 mg/mL infusion  10 mcg/kg/min IntraVENous TITRATE Radha Ricketts MD 3.6 mL/hr at 10/19/20 0643 10 mcg/kg/min at 10/19/20 0643    morphine injection 2 mg  2 mg IntraVENous Q1H PRN Kim Knight MD   2 mg at 10/18/20 2153    methylPREDNISolone (PF) (SOLU-MEDROL) injection 40 mg  40 mg IntraVENous Q8H Jermaine Daniel MD   40 mg at 10/19/20 0644    piperacillin-tazobactam (ZOSYN) 3.375 g in 0.9% sodium chloride (MBP/ADV) 100 mL MBP  3.375 g IntraVENous Q8H Jermaine Daniel MD 25 mL/hr at 10/19/20 0912 3.375 g at 10/19/20 0912    midodrine (PROAMATINE) tablet 10 mg  10 mg Oral BID Burton Coker MD   10 mg at 10/19/20 0911    pantoprazole (PROTONIX) granules for oral suspension 40 mg  40 mg Per NG tube ACB Jermaine Daniel MD   40 mg at 10/19/20 0644    metoprolol tartrate (LOPRESSOR) tablet 25 mg  25 mg Oral BID Burton Coker MD   25 mg at 10/19/20 0912    potassium chloride (KLOR-CON) packet for solution 40 mEq  40 mEq Oral BID WITH MEALS Jermaine Daniel MD   40 mEq at 10/19/20 0912    albuterol CONCENTRATE 2.5mg/0.5 mL neb soln  2.5 mg Nebulization Q6H RT Johnny Santiago DO   2.5 mg at 10/19/20 0754    LORazepam (ATIVAN) injection 1 mg  1 mg IntraVENous Q2H PRN Fannie Olson MD   1 mg at 10/16/20 2151    sodium chloride 0.9% (NS) 3ml nebulizer solution  2.5 mL Nebulization PRN Lan Cabello MD   2.5 mL at 10/18/20 1348    influenza vaccine 2020-21 (4 yrs+)(PF) (FLUCELVAX QUAD) injection 0.5 mL  0.5 mL IntraMUSCular PRIOR TO DISCHARGE Jermaine Daniel MD   Stopped at 10/07/20 0900    diphenhydrAMINE (BENADRYL) capsule 50 mg  50 mg Oral BID PRN Kobi Hernández NP   50 mg at 10/03/20 0129    phosphorus (K PHOS NEUTRAL) 250 mg tablet 2 Tab  2 Tab Oral BID Elías Livingston MD   2 Tab at 10/19/20 0911    oxyCODONE IR (ROXICODONE) tablet 15 mg  15 mg Oral Q4H PRN Elías Livingston MD   15 mg at 10/18/20 0924    enoxaparin (LOVENOX) injection 40 mg  40 mg SubCUTAneous Q24H Barry Frost MD   40 mg at 10/18/20 2153    acetaminophen (TYLENOL) tablet 650 mg  650 mg Oral Q6H PRN Gisele Long NP   650 mg at 20 0950    ondansetron (ZOFRAN) injection 4 mg  4 mg IntraVENous Q8H PRN Gisele Long NP   4 mg at 20 0402    guaiFENesin (ROBITUSSIN) 20 mg/mL oral liquid 400 mg  400 mg Oral Q6H PRN Carey Araujo NP   Stopped at 20 1855            No Known Allergies        Objective:     Blood pressure 116/71, pulse 97, temperature (!) 96.6 °F (35.9 °C), resp. rate 23, height 6' 0.01\" (1.829 m), weight 60.4 kg (133 lb 2.5 oz), SpO2 97 %. Temp (24hrs), Av.1 °F (36.7 °C), Min:96.6 °F (35.9 °C), Max:99 °F (37.2 °C)      Intake and Output:  Current Shift: No intake/output data recorded. Last 3 Shifts: 10/17 1901 - 10/19 0700  In: 1355 [I.V.:1174]  Out: 9233 [Urine:1000; Drains:255]    Physical Exam:     General:  Lying in bed, intubated on mechanical ventilation. Sedated. Throat and Neck: Supple. No JVD. He has a right subclavian central line. Oral ET tube in place  Lung:  Much improved aeration bilaterally, minimal rhonchi in the left base. Bilateral chest tubes present on suction without air leaks. Heart: S1+S2. No murmurs  Abdomen: Status post surgery. He has a midline incision. He has HELGA drains one on each side. Bowel sounds are hypoactive. Draining minimal fluid. Extremities:  He has pitting edema. Distal pulses of the lower extremities are noted with Dopplers. Has skin breakdown on the dependent portion of heel. : Reese catheter in place. Making some urine output. Skin: No cyanosis  Neurologic: Wakes up easily, following commands.          Recent Results (from the past 24 hour(s))   RENAL FUNCTION PANEL    Collection Time: 10/19/20  4:50 AM   Result Value Ref Range    Sodium 145 136 - 145 mmol/L    Potassium 3.6 3.5 - 5.1 mmol/L    Chloride 113 (H) 97 - 108 mmol/L    CO2 26 21 - 32 mmol/L    Anion gap 6 5 - 15 mmol/L    Glucose 140 (H) 65 - 100 mg/dL    BUN 23 (H) 6 - 20 mg/dL    Creatinine 0.59 (L) 0.70 - 1.30 mg/dL    BUN/Creatinine ratio 39 (H) 12 - 20      GFR est AA >60 >60 ml/min/1.73m2    GFR est non-AA >60 >60 ml/min/1.73m2    Calcium 8.9 8.5 - 10.1 mg/dL    Phosphorus 2.0 (L) 2.6 - 4.7 mg/dL    Albumin 2.6 (L) 3.5 - 5.0 g/dL   MAGNESIUM    Collection Time: 10/19/20  4:50 AM   Result Value Ref Range    Magnesium 1.9 1.6 - 2.4 mg/dL   BLOOD GAS, ARTERIAL    Collection Time: 10/19/20  5:15 AM   Result Value Ref Range    pH 7.51 (H) 7.35 - 7.45      PCO2 32 (L) 35 - 45 mmHg    PO2 51 (L) 75 - 100 mmHg    O2 SAT 90 (L) >95 %    BICARBONATE 26 22 - 26 mmol/L    BASE EXCESS 2.5 (H) 0 - 2 mmol/L    O2 METHOD VENT      FIO2 40 %    Tidal volume 500      SET RATE 8      EPAP/CPAP/PEEP 5      ENIO'S TEST Positive         CT Results  (Last 48 hours)    None          Assessment:     1. Acute respiratory failure, after aortobifemoral bypass surgery on 10/6/2020. Patient self extubated himself early in the morning on 10/9/2020. Re-intubated on 10/10/20 for left-sided mucous plugging and complete left lung collapse and had flexible bronchoscopy done with suction of mucous plug. 2.  Acute metabolic encephalopathy, resolved  3. Aspiration pneumonia  4. Severe peripheral vascular disease   5. UTI   6. Hypertension  7. Familial polymyositis  8. Left spontaneous pneumothorax    Plan:     1.)  Acute Hypoxic respiratory failure. Intubated on mechanical ventilation  Currently on SIMV//8/50%/5  ABGs and chest x-ray reviewed as above. Repeat in the morning. Re-attempt SAT/SBT in the morning after getting his sedation under better control.   Currently on propofol at 50, will attempt to wean this down today after addition of a Precedex drip. Further changes in ventilator settings based on clinical response and ABG results tomorrow. If patient sedation is controlled and he is able to tolerate an SAT/SBT tomorrow morning, will consider extubation as early as tomorrow. Guerra I expect extubation within the next 48 hours. 2.)  Bilateral pneumothoraces  Bilateral chest tubes placed, no airleak  Stable chest x-ray    3.)  Aspiration/HAP pneumonia:  Continue Zosyn  Unclear why he is on steroids, decrease to Solumedrol 40 mg IV daily today, likely switch to prednisone over the next 24 hours. On scheduled nebulized bronchodilator treatments. Modified barium swallow showed penetration and significant dysphagia. 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 Patient may require PEG tube in the near future. 4.)  Metabolic encephalopathy. He has a progressive neurologic disorder. Brain MRI negative    Further recommendation per Neurology. 5.)  Myocardial ischemia:  He underwent nuclear stress testing which showed significant inferior and from will treat him with medical managememt   I will follow the patient closely with you. DVT and GI prophylaxis. He is on Lovenox and Protonix IV. Questions of wife and answered at bedside in detail  Case discussed in detail with RN, RT, and care team in ICU  Thank you for involving me in the care of this patient  I will follow with you closely during hospitalization    Critical Care Time Spent more than 55 minutes in direct patient care with no overlap reviewing results and records, decision making, and answering questions.       Cale Jasmine DO  [facility]  10/19/2020

## 2020-10-19 NOTE — PROGRESS NOTES
Per chart review, patient w/ ICU transfer and is now intubated. Please obtain new orders for ST to evaluate and treat when patient is appropriate. Consider PEG placement.

## 2020-10-19 NOTE — PROGRESS NOTES
Renal Progress Note    Patient: Martina Cheadle MRN: [id number]  SSN: [id number]    YOB: 1965  Age: 48 y.o.   Sex: male      Admit Date: 9/26/2020    LOS: 23 days     Subjective:   Patient seen in ICU,Intubated, sedated with propofol  Wife at bedside  S/p dwight chest tube placements for dwight pneumo  No edema  K is 3.6 today    Current Facility-Administered Medications   Medication Dose Route Frequency    albuterol sulfate (PROVENTIL;VENTOLIN) 2.5 mg/0.5 mL nebulizing solution        [START ON 10/20/2020] methylPREDNISolone (PF) (SOLU-MEDROL) injection 40 mg  40 mg IntraVENous DAILY    dexmedeTOMidine (PRECEDEX) 400 mcg in 0.9% sodium chloride 104 mL infusion  0.1-1.5 mcg/kg/hr IntraVENous TITRATE    aspirin chewable tablet 81 mg  81 mg Oral DAILY    spironolactone (ALDACTONE) tablet 25 mg  25 mg Oral BID    propofol (DIPRIVAN) 10 mg/mL infusion  10 mcg/kg/min IntraVENous TITRATE    piperacillin-tazobactam (ZOSYN) 3.375 g in 0.9% sodium chloride (MBP/ADV) 100 mL MBP  3.375 g IntraVENous Q8H    midodrine (PROAMATINE) tablet 10 mg  10 mg Oral BID    pantoprazole (PROTONIX) granules for oral suspension 40 mg  40 mg Per NG tube ACB    metoprolol tartrate (LOPRESSOR) tablet 25 mg  25 mg Oral BID    potassium chloride (KLOR-CON) packet for solution 40 mEq  40 mEq Oral BID WITH MEALS    albuterol CONCENTRATE 2.5mg/0.5 mL neb soln  2.5 mg Nebulization Q6H RT    LORazepam (ATIVAN) injection 1 mg  1 mg IntraVENous Q2H PRN    sodium chloride 0.9% (NS) 3ml nebulizer solution  2.5 mL Nebulization PRN    influenza vaccine 2020-21 (4 yrs+)(PF) (FLUCELVAX QUAD) injection 0.5 mL  0.5 mL IntraMUSCular PRIOR TO DISCHARGE    diphenhydrAMINE (BENADRYL) capsule 50 mg  50 mg Oral BID PRN    phosphorus (K PHOS NEUTRAL) 250 mg tablet 2 Tab  2 Tab Oral BID    oxyCODONE IR (ROXICODONE) tablet 15 mg  15 mg Oral Q4H PRN    enoxaparin (LOVENOX) injection 40 mg  40 mg SubCUTAneous Q24H    acetaminophen (TYLENOL) tablet 650 mg  650 mg Oral Q6H PRN    ondansetron (ZOFRAN) injection 4 mg  4 mg IntraVENous Q8H PRN    guaiFENesin (ROBITUSSIN) 20 mg/mL oral liquid 400 mg  400 mg Oral Q6H PRN        Vitals:    10/19/20 0800 10/19/20 0908 10/19/20 1000 10/19/20 1100   BP: 133/71 116/71 120/79 130/74   Pulse: 97      Resp: 25 23 24 22   Temp: (!) 96.6 °F (35.9 °C)   96.8 °F (36 °C)   SpO2: 97% 97% 97% 97%   Weight:       Height:         Objective:   General: ON VENT, sedated, no acute distress. HEENT: EOMI, no Icterus, no Pallor, ET tube in place   neck: Neck is supple, No JVD  Lungs: decreased breathsounds, bilateral chest tubes present  CVS: heart sounds normal,  no murmurs, no rubs. GI: soft, nontender, normal BS. Extremeties: no cyanosis,no edema in ext   Neuro: On ventilator and sedated  Skin: normal skin turgor, no skin rashes. Intake and Output:  Current Shift: No intake/output data recorded.   Last three shifts: 10/17 1901 - 10/19 0700  In: 1174 [I.V.:1174]  Out: 1563 [Urine:1150; Drains:255]      Lab/Data Review:  Recent Labs     10/18/20  0400 10/17/20  0205   WBC 26.2* 12.8*   HGB 9.0* 10.6*   HCT 27.7* 32.0*    285     Recent Labs     10/19/20  0450 10/18/20  0455 10/17/20  0205    145 144   K 3.6 3.3* 4.0   * 112* 109*   CO2 26 28 29   * 151* 151*   BUN 23* 20 18   CREA 0.59* 0.65* 0.80   CA 8.9 8.8 8.8   MG 1.9  --  1.9   PHOS 2.0* 2.7  --    ALB 2.6* 3.2* 3.1*   TBILI  --   --  1.7*   ALT  --   --  14   INR  --   --  1.5*     Recent Labs     10/19/20  0515 10/18/20  0335 10/17/20  0740   PH 7.51* 7.46* 7.472*   PCO2 32* 35 35   PO2 51* 99 70*   HCO3 26 26 26   FIO2 40 55 80.0     Recent Results (from the past 24 hour(s))   RENAL FUNCTION PANEL    Collection Time: 10/19/20  4:50 AM   Result Value Ref Range    Sodium 145 136 - 145 mmol/L    Potassium 3.6 3.5 - 5.1 mmol/L    Chloride 113 (H) 97 - 108 mmol/L    CO2 26 21 - 32 mmol/L    Anion gap 6 5 - 15 mmol/L    Glucose 140 (H) 65 - 100 mg/dL    BUN 23 (H) 6 - 20 mg/dL    Creatinine 0.59 (L) 0.70 - 1.30 mg/dL    BUN/Creatinine ratio 39 (H) 12 - 20      GFR est AA >60 >60 ml/min/1.73m2    GFR est non-AA >60 >60 ml/min/1.73m2    Calcium 8.9 8.5 - 10.1 mg/dL    Phosphorus 2.0 (L) 2.6 - 4.7 mg/dL    Albumin 2.6 (L) 3.5 - 5.0 g/dL   MAGNESIUM    Collection Time: 10/19/20  4:50 AM   Result Value Ref Range    Magnesium 1.9 1.6 - 2.4 mg/dL   BLOOD GAS, ARTERIAL    Collection Time: 10/19/20  5:15 AM   Result Value Ref Range    pH 7.51 (H) 7.35 - 7.45      PCO2 32 (L) 35 - 45 mmHg    PO2 51 (L) 75 - 100 mmHg    O2 SAT 90 (L) >95 %    BICARBONATE 26 22 - 26 mmol/L    BASE EXCESS 2.5 (H) 0 - 2 mmol/L    O2 METHOD VENT      FIO2 40 %    Tidal volume 500      SET RATE 8      EPAP/CPAP/PEEP 5      ENIO'S TEST Positive          Assessment and Plan:       1. severe hypokalemia:  -from diuretics and metabolic alkalosis,   -Improved K to 3.6 today  -Off lasix now. resumed spironolactone and added KCl supplements 20 BID  -Continue kcl 40 bid and 25 twice daily of spironolactone  -monitor K levels,    -Mg is normal    2. Low urine output/ BALDEV, resolved  - likely prerenal from hypotension.    -improved with diuretics now      3. Edema: improved with good diuresis +, off lasix  -Continue same dose of spironolactone     4. .  acute resp.failure: dwight pneumo+  -On vent, had bilateral chest tube placements because of bilateral pneumothorax  -pulm and CT surgery following    5.  Hypotension:  -It is better now.   Tolerating spironolactone so far   -resumed mododrine 10 mg bid  -Continue to monitor BPs     6 status post  infrarenal aorta endarterectomy, with aortic by common femoral artery bypass with 14 mm x 7 mm Hemosure graft       Signed By: Stephen Marks MD     October 19, 2020

## 2020-10-19 NOTE — PROGRESS NOTES
Pt is currently intubated and sedated and will require new OT orders when pt is medically appropriate for therapy.  Thank you

## 2020-10-20 ENCOUNTER — APPOINTMENT (OUTPATIENT)
Dept: GENERAL RADIOLOGY | Age: 55
DRG: 981 | End: 2020-10-20
Attending: INTERNAL MEDICINE
Payer: COMMERCIAL

## 2020-10-20 ENCOUNTER — APPOINTMENT (OUTPATIENT)
Dept: ULTRASOUND IMAGING | Age: 55
DRG: 981 | End: 2020-10-20
Attending: INTERNAL MEDICINE
Payer: COMMERCIAL

## 2020-10-20 LAB
ARTERIAL PATENCY WRIST A: POSITIVE
BASE DEFICIT BLDA-SCNC: 0.5 MMOL/L (ref 0–2)
BASOPHILS # BLD: 0 K/UL (ref 0–0.1)
BASOPHILS NFR BLD: 0 % (ref 0–1)
BDY SITE: ABNORMAL
DIFFERENTIAL METHOD BLD: ABNORMAL
EOSINOPHIL # BLD: 0 K/UL (ref 0–0.4)
EOSINOPHIL NFR BLD: 0 % (ref 0–7)
EPAP/CPAP/PEEP, PAPEEP: 5
ERYTHROCYTE [DISTWIDTH] IN BLOOD BY AUTOMATED COUNT: 19.1 % (ref 11.5–14.5)
FIO2 ON VENT: 40 %
HCO3 BLDA-SCNC: 24 MMOL/L (ref 22–26)
HCT VFR BLD AUTO: 27.2 % (ref 36.6–50.3)
HGB BLD-MCNC: 9.2 G/DL (ref 12.1–17)
IMM GRANULOCYTES # BLD AUTO: 0 K/UL
IMM GRANULOCYTES NFR BLD AUTO: 0 %
LYMPHOCYTES # BLD: 0.8 K/UL (ref 0.8–3.5)
LYMPHOCYTES NFR BLD: 3 % (ref 12–49)
MAGNESIUM SERPL-MCNC: 1.9 MG/DL (ref 1.6–2.4)
MCH RBC QN AUTO: 31.7 PG (ref 26–34)
MCHC RBC AUTO-ENTMCNC: 33.8 G/DL (ref 30–36.5)
MCV RBC AUTO: 93.8 FL (ref 80–99)
MONOCYTES # BLD: 1.8 K/UL (ref 0–1)
MONOCYTES NFR BLD: 7 % (ref 5–13)
NEUTS SEG # BLD: 22.8 K/UL (ref 1.8–8)
NEUTS SEG NFR BLD: 90 % (ref 32–75)
PCO2 BLDA: 32 MMHG (ref 35–45)
PH BLDA: 7.46 [PH] (ref 7.35–7.45)
PLATELET # BLD AUTO: 321 K/UL (ref 150–400)
PMV BLD AUTO: 11.3 FL (ref 8.9–12.9)
PO2 BLDA: 98 MMHG (ref 75–100)
PRESSURE SUPPORT SETTING VENT: 12 CM[H2O]
RBC # BLD AUTO: 2.9 M/UL (ref 4.1–5.7)
RBC MORPH BLD: ABNORMAL
SAO2 % BLD: 98 %
SAO2% DEVICE SAO2% SENSOR NAME: ABNORMAL
VENTILATION MODE VENT: ABNORMAL
VT SETTING VENT: 500 ML
WBC # BLD AUTO: 25.4 K/UL (ref 4.1–11.1)

## 2020-10-20 PROCEDURE — P9047 ALBUMIN (HUMAN), 25%, 50ML: HCPCS | Performed by: SURGERY

## 2020-10-20 PROCEDURE — 36415 COLL VENOUS BLD VENIPUNCTURE: CPT

## 2020-10-20 PROCEDURE — 71045 X-RAY EXAM CHEST 1 VIEW: CPT

## 2020-10-20 PROCEDURE — 74011250636 HC RX REV CODE- 250/636: Performed by: SURGERY

## 2020-10-20 PROCEDURE — 74011250637 HC RX REV CODE- 250/637: Performed by: INTERNAL MEDICINE

## 2020-10-20 PROCEDURE — 74011000258 HC RX REV CODE- 258: Performed by: INTERNAL MEDICINE

## 2020-10-20 PROCEDURE — 65610000006 HC RM INTENSIVE CARE

## 2020-10-20 PROCEDURE — 94640 AIRWAY INHALATION TREATMENT: CPT

## 2020-10-20 PROCEDURE — 74011000250 HC RX REV CODE- 250: Performed by: INTERNAL MEDICINE

## 2020-10-20 PROCEDURE — 85025 COMPLETE CBC W/AUTO DIFF WBC: CPT

## 2020-10-20 PROCEDURE — 94668 MNPJ CHEST WALL SBSQ: CPT

## 2020-10-20 PROCEDURE — 74011250636 HC RX REV CODE- 250/636: Performed by: INTERNAL MEDICINE

## 2020-10-20 PROCEDURE — 77010033678 HC OXYGEN DAILY

## 2020-10-20 PROCEDURE — 74011000250 HC RX REV CODE- 250

## 2020-10-20 PROCEDURE — 74011250636 HC RX REV CODE- 250/636: Performed by: HOSPITALIST

## 2020-10-20 PROCEDURE — 83735 ASSAY OF MAGNESIUM: CPT

## 2020-10-20 PROCEDURE — 94400 HC END TIDAL CO2 RESPONSE CURVE: CPT

## 2020-10-20 PROCEDURE — 74011250637 HC RX REV CODE- 250/637: Performed by: NURSE PRACTITIONER

## 2020-10-20 PROCEDURE — 94003 VENT MGMT INPAT SUBQ DAY: CPT

## 2020-10-20 PROCEDURE — 82803 BLOOD GASES ANY COMBINATION: CPT

## 2020-10-20 RX ADMIN — ALBUMIN (HUMAN) 25 G: 0.25 INJECTION, SOLUTION INTRAVENOUS at 20:49

## 2020-10-20 RX ADMIN — PROPOFOL 10 MCG/KG/MIN: 10 INJECTION, EMULSION INTRAVENOUS at 00:15

## 2020-10-20 RX ADMIN — ALBUTEROL SULFATE 2.5 MG: 2.5 SOLUTION RESPIRATORY (INHALATION) at 13:52

## 2020-10-20 RX ADMIN — PIPERACILLIN AND TAZOBACTAM 3.38 G: 3; .375 INJECTION, POWDER, LYOPHILIZED, FOR SOLUTION INTRAVENOUS at 00:15

## 2020-10-20 RX ADMIN — METHYLPREDNISOLONE SODIUM SUCCINATE 40 MG: 40 INJECTION, POWDER, FOR SOLUTION INTRAMUSCULAR; INTRAVENOUS at 09:07

## 2020-10-20 RX ADMIN — DEXMEDETOMIDINE HYDROCHLORIDE 0.5 MCG/KG/HR: 100 INJECTION, SOLUTION, CONCENTRATE INTRAVENOUS at 17:14

## 2020-10-20 RX ADMIN — PANTOPRAZOLE SODIUM 40 MG: 40 GRANULE, DELAYED RELEASE ORAL at 06:31

## 2020-10-20 RX ADMIN — ENOXAPARIN SODIUM 40 MG: 40 INJECTION SUBCUTANEOUS at 20:48

## 2020-10-20 RX ADMIN — POTASSIUM CHLORIDE 40 MEQ: 1.5 FOR SOLUTION ORAL at 09:10

## 2020-10-20 RX ADMIN — ISODIUM CHLORIDE 2.5 ML: 0.03 SOLUTION RESPIRATORY (INHALATION) at 13:52

## 2020-10-20 RX ADMIN — PIPERACILLIN AND TAZOBACTAM 3.38 G: 3; .375 INJECTION, POWDER, LYOPHILIZED, FOR SOLUTION INTRAVENOUS at 17:15

## 2020-10-20 RX ADMIN — ISODIUM CHLORIDE 2.5 ML: 0.03 SOLUTION RESPIRATORY (INHALATION) at 07:37

## 2020-10-20 RX ADMIN — PIPERACILLIN AND TAZOBACTAM 3.38 G: 3; .375 INJECTION, POWDER, LYOPHILIZED, FOR SOLUTION INTRAVENOUS at 09:08

## 2020-10-20 RX ADMIN — ALBUTEROL SULFATE 2.5 MG: 2.5 SOLUTION RESPIRATORY (INHALATION) at 19:18

## 2020-10-20 RX ADMIN — ACETAMINOPHEN 650 MG: 325 TABLET, FILM COATED ORAL at 04:32

## 2020-10-20 RX ADMIN — DIBASIC SODIUM PHOSPHATE, MONOBASIC POTASSIUM PHOSPHATE AND MONOBASIC SODIUM PHOSPHATE 2 TABLET: 852; 155; 130 TABLET ORAL at 09:07

## 2020-10-20 RX ADMIN — ALBUTEROL SULFATE 2.5 MG: 2.5 SOLUTION RESPIRATORY (INHALATION) at 01:57

## 2020-10-20 RX ADMIN — METOPROLOL TARTRATE 25 MG: 25 TABLET, FILM COATED ORAL at 09:07

## 2020-10-20 RX ADMIN — SPIRONOLACTONE 25 MG: 25 TABLET ORAL at 09:07

## 2020-10-20 RX ADMIN — DEXMEDETOMIDINE HYDROCHLORIDE 0.5 MCG/KG/HR: 100 INJECTION, SOLUTION, CONCENTRATE INTRAVENOUS at 00:18

## 2020-10-20 RX ADMIN — OXYCODONE HYDROCHLORIDE 15 MG: 5 TABLET ORAL at 07:28

## 2020-10-20 RX ADMIN — ASPIRIN 81 MG CHEWABLE TABLET 81 MG: 81 TABLET CHEWABLE at 09:07

## 2020-10-20 RX ADMIN — ALBUMIN (HUMAN) 25 G: 0.25 INJECTION, SOLUTION INTRAVENOUS at 09:16

## 2020-10-20 RX ADMIN — ISODIUM CHLORIDE 2.5 ML: 0.03 SOLUTION RESPIRATORY (INHALATION) at 01:57

## 2020-10-20 RX ADMIN — OXYCODONE HYDROCHLORIDE 15 MG: 5 TABLET ORAL at 00:53

## 2020-10-20 RX ADMIN — ISODIUM CHLORIDE 2.5 ML: 0.03 SOLUTION RESPIRATORY (INHALATION) at 19:18

## 2020-10-20 RX ADMIN — MIDODRINE HYDROCHLORIDE 10 MG: 5 TABLET ORAL at 09:07

## 2020-10-20 NOTE — PROGRESS NOTES
Patient examined this morning. Patient still intubated and sedated. Patient awake with some stimulation. Is following command. I removed left-sided drain this morning. His abdomen soft incisions clean and dry both appears well perfused with a palpable pedal pulses. He has bilateral chest tubes managed by Dr. Shima Gardner. Hopefully we can extubate this patient next day or 2. Continue optimize nutritional support with tube feeds for now. Patient is going scheduled dose of albumin. I will continue monitor his progress.

## 2020-10-20 NOTE — PROGRESS NOTES
Hospitalist Progress Note           Daily Progress Note: 10/20/2020    Chief complaint: Shortness of breath and weakness  Subjective: The patient is seen for follow  up. Patient continues on mechanical ventilation. Currently sedated. Chest tubes without air leaks.  Wife  at bedside during this evaluation        Problem List:  Problem List as of 10/20/2020 Date Reviewed: 9/17/2020          Codes Class Noted - Resolved    Metabolic encephalopathy MGM-55-IN: G93.41  ICD-9-CM: 348.31  9/26/2020 - Present        UTI (urinary tract infection) ICD-10-CM: N39.0  ICD-9-CM: 599.0  9/26/2020 - Present        Aspiration pneumonitis ([address] ICD-10-CM: J69.0  ICD-9-CM: 507.0  9/26/2020 - Present        Essential hypertension ICD-10-CM: I10  ICD-9-CM: 401.9  9/26/2020 - Present        Acute dehydration ICD-10-CM: E86.0  ICD-9-CM: 276.51  9/26/2020 - Present              Medications reviewed  Current Facility-Administered Medications   Medication Dose Route Frequency    methylPREDNISolone (PF) (SOLU-MEDROL) injection 40 mg  40 mg IntraVENous DAILY    dexmedeTOMidine (PRECEDEX) 400 mcg in 0.9% sodium chloride 104 mL infusion  0.1-1.5 mcg/kg/hr IntraVENous TITRATE    albumin human 25% (BUMINATE) solution 25 g  25 g IntraVENous BID    aspirin chewable tablet 81 mg  81 mg Oral DAILY    spironolactone (ALDACTONE) tablet 25 mg  25 mg Oral BID    propofol (DIPRIVAN) 10 mg/mL infusion  10 mcg/kg/min IntraVENous TITRATE    piperacillin-tazobactam (ZOSYN) 3.375 g in 0.9% sodium chloride (MBP/ADV) 100 mL MBP  3.375 g IntraVENous Q8H    midodrine (PROAMATINE) tablet 10 mg  10 mg Oral BID    pantoprazole (PROTONIX) granules for oral suspension 40 mg  40 mg Per NG tube ACB    metoprolol tartrate (LOPRESSOR) tablet 25 mg  25 mg Oral BID    potassium chloride (KLOR-CON) packet for solution 40 mEq  40 mEq Oral BID WITH MEALS    albuterol CONCENTRATE 2.5mg/0.5 mL neb soln  2.5 mg Nebulization Q6H RT  LORazepam (ATIVAN) injection 1 mg  1 mg IntraVENous Q2H PRN    sodium chloride 0.9% (NS) 3ml nebulizer solution  2.5 mL Nebulization PRN    influenza vaccine 2020-21 (4 yrs+)(PF) (FLUCELVAX QUAD) injection 0.5 mL  0.5 mL IntraMUSCular PRIOR TO DISCHARGE    diphenhydrAMINE (BENADRYL) capsule 50 mg  50 mg Oral BID PRN    phosphorus (K PHOS NEUTRAL) 250 mg tablet 2 Tab  2 Tab Oral BID    oxyCODONE IR (ROXICODONE) tablet 15 mg  15 mg Oral Q4H PRN    enoxaparin (LOVENOX) injection 40 mg  40 mg SubCUTAneous Q24H    acetaminophen (TYLENOL) tablet 650 mg  650 mg Oral Q6H PRN    ondansetron (ZOFRAN) injection 4 mg  4 mg IntraVENous Q8H PRN    guaiFENesin (ROBITUSSIN) 20 mg/mL oral liquid 400 mg  400 mg Oral Q6H PRN       Review of Systems:   Review of systems unable to be obtained because he is intubated    Objective:   Physical Exam:     Visit Vitals  /71   Pulse (!) 54   Temp 99.9 °F (37.7 °C)   Resp 15   Ht 6' (1.829 m)   Wt 64.3 kg (141 lb 12.1 oz)   SpO2 100%   BMI 19.23 kg/m²    O2 Flow Rate (L/min): 2 l/min O2 Device: Ventilator    Temp (24hrs), Av.4 °F (36.9 °C), Min:96.3 °F (35.7 °C), Max:100.8 °F (38.2 °C)    No intake/output data recorded. 10/18 1901 - 10/20 0700  In: 791 [I.V.:791]  Out: 7083 [Urine:1100; Drains:175]    General:   Intubated and sedated   Lungs:   Clear to auscultation bilaterally with diminished breath sounds bilateral bases. Chest wall:  No tenderness or deformity. Heart:  Regular rate and rhythm, S1, S2 normal, no murmur, click, rub or gallop. Abdomen:   Soft, non-tender. Diminished Bowel sounds. Dressings in place. Extremities: Extremities normal, atraumatic, positive edema bilaterally   Pulses: 2+ and symmetric all extremities. Skin: Skin color, texture, turgor normal. No rashes or lesions   Neurologic: CNII-XII intact.      Data Review:       Recent Days:  Recent Labs     10/20/20  0445 10/18/20  0400   WBC 25.4* 26.2*   HGB 9.2* 9.0*   HCT 27.2* 27.7*   PLT [address]     10/20/20  0445 10/19/20  0450 10/18/20  0455   NA  --  145 145   K  --  3.6 3.3*   CL  --  113* 112*   CO2  --  26 28   GLU  --  140* 151*   BUN  --  23* 20   CREA  --  0.59* 0.65*   CA  --  8.9 8.8   MG 1.9 1.9  --    PHOS  --  2.0* 2.7   ALB  --  2.6* 3.2*     Recent Labs     10/20/20  0520 10/19/20  0515 10/18/20  0335   PH 7.46* 7.51* 7.46*   PCO2 32* 32* 35   PO2 98 51* 99   HCO3 24 26 26   FIO2 40 40 55       24 Hour Results:  Recent Results (from the past 24 hour(s))   CBC WITH AUTOMATED DIFF    Collection Time: 10/20/20  4:45 AM   Result Value Ref Range    WBC 25.4 (H) 4.1 - 11.1 K/uL    RBC 2.90 (L) 4.10 - 5.70 M/uL    HGB 9.2 (L) 12.1 - 17.0 g/dL    HCT 27.2 (L) 36.6 - 50.3 %    MCV 93.8 80.0 - 99.0 FL    MCH 31.7 26.0 - 34.0 PG    MCHC 33.8 30.0 - 36.5 g/dL    RDW 19.1 (H) 11.5 - 14.5 %    PLATELET 539 560 - 242 K/uL    MPV 11.3 8.9 - 12.9 FL    NEUTROPHILS 90 (H) 32 - 75 %    LYMPHOCYTES 3 (L) 12 - 49 %    MONOCYTES 7 5 - 13 %    EOSINOPHILS 0 0 - 7 %    BASOPHILS 0 0 - 1 %    IMMATURE GRANULOCYTES 0 %    ABS. NEUTROPHILS 22.8 (H) 1.8 - 8.0 K/UL    ABS. LYMPHOCYTES 0.8 0.8 - 3.5 K/UL    ABS. MONOCYTES 1.8 (H) 0.0 - 1.0 K/UL    ABS. EOSINOPHILS 0.0 0.0 - 0.4 K/UL    ABS. BASOPHILS 0.0 0.0 - 0.1 K/UL    ABS. IMM.  GRANS. 0.0 K/UL    DF Manual      RBC COMMENTS Target Cells  1+        RBC COMMENTS Hypochromia  2+        RBC COMMENTS Anisocytosis  1+        RBC COMMENTS Basophilic Stippling  1+       MAGNESIUM    Collection Time: 10/20/20  4:45 AM   Result Value Ref Range    Magnesium 1.9 1.6 - 2.4 mg/dL   BLOOD GAS, ARTERIAL    Collection Time: 10/20/20  5:20 AM   Result Value Ref Range    pH 7.46 (H) 7.35 - 7.45      PCO2 32 (L) 35 - 45 mmHg    PO2 98 75 - 100 mmHg    O2 SAT 98 >95 %    BICARBONATE 24 22 - 26 mmol/L    BASE DEFICIT 0.5 0 - 2 mmol/L    O2 METHOD VENT      FIO2 40 %    MODE SIMV      Tidal volume 500      PRESSURE SUPPORT 12      EPAP/CPAP/PEEP 5      SITE Right Radial      ENIO'S TEST Positive             Assessment/     Acute hypoxic respiratory failure postsurgery. Self extubated 10/09/20. Reintubated 10/10 due to hypoxemia. Extubated 10/11. Reintubated 10/17 for bilateral pneumothoraces    Bilateral pneumothoraces, status post chest tubes bilateral    Acute kidney injury likely secondary to ATN from hypotension. Hypovolemic shock post surgery. Improved    Right lower lobe aspiration pneumonia improved    Urinary tract infection due to E. Coli    Abnormal Cardiolite stress test showing inferior ischemia. 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 Repleted    Hypothermia, probably largely environmental.  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 Plan:  Continue mechanical ventilation, chest tubes  Continue IV antibiotics and supportive care  Continue tube feeds and increase freewater to 100cc every 4 hrs  Ventilator weaning per pulmonology    Care Plan discussed with: Patient/Family       Total time spent with patient: 30 minutes.     Randall Doyle MD

## 2020-10-20 NOTE — PROGRESS NOTES
Pulmonology and Critical Care Progress Note    Subjective:     Chief Complaint:   Chief Complaint   Patient presents with    Altered mental status     The patient underwent aortobifemoral bypass surgery 10/6/20. Post surgery he was left on the ventilator. Apparently blood loss was about 3.5 L. When he returned to the ICU he was on multiple pressors. Successfully extubated after suctioning of mucous plugs from the left. Unfortunately, he developed spontaneous left pneumothorax. Thoracic surgery did aspiration of his left pneumothorax. No chest tube insertion was required. Patient developed increasing respiratory distress secondary to a tension right-sided PTX. Patient ended up requiring intubation and Dr. Priyanka Cruz came in to evaluate the patient and found him to have bilateral pneumothoraces and placed bilateral chest tubes.     Patient seen and examined at the bedside this morning in the ICU. I discussed the case in detail with the bedside nurse, respiratory therapy, and his wife who was sitting at the bedside with him. Lying in bed comfortably (sedated on propofol at 25 and precedex at 0.7). HR in the 40's-50's; doing great on the vent with sats of 100% on SIMV//8/40%/5. I discussed the case in detail with the respiratory therapist and the bedside nurse, we will decrease his propofol to 10 now and Precedex 0.5. After an hour, we will turn his propofol off and attempt a spontaneous breathing trial.  Potential extubation later today. Chest x-ray stable with chest tubes in good position and resolution of LLL atelectasis. ABG reviewed this morning shows 7. 6/32/98.       Review of Systems:  Unable to perform due to patient's condition    Current Facility-Administered Medications   Medication Dose Route Frequency Provider Last Rate Last Dose    methylPREDNISolone (PF) (SOLU-MEDROL) injection 40 mg  40 mg IntraVENous DAILY Johnny Santiago, DO   40 mg at 10/20/20 0907    dexmedeTOMidine (PRECEDEX) 400 mcg in 0.9% sodium chloride 104 mL infusion  0.1-1.5 mcg/kg/hr IntraVENous TITRATE [place] Fish, DO 7.9 mL/hr at 10/20/20 0018 0.5 mcg/kg/hr at 10/20/20 0018    albumin human 25% (BUMINATE) solution 25 g  25 g IntraVENous BID Raymundo Olson MD 50 mL/hr at 10/20/20 0916 25 g at 10/20/20 6619    aspirin chewable tablet 81 mg  81 mg Oral DAILY Shalini Garcia MD   81 mg at 10/20/20 5821    spironolactone (ALDACTONE) tablet 25 mg  25 mg Oral BID Debbi Conley MD   25 mg at 10/20/20 9282    propofol (DIPRIVAN) 10 mg/mL infusion  10 mcg/kg/min IntraVENous TITRATE Latia Lowery MD 8.9 mL/hr at 10/20/20 0933 25 mcg/kg/min at 10/20/20 0933    piperacillin-tazobactam (ZOSYN) 3.375 g in 0.9% sodium chloride (MBP/ADV) 100 mL MBP  3.375 g IntraVENous Q8H Shalini Garcia MD 25 mL/hr at 10/20/20 0908 3.375 g at 10/20/20 0908    midodrine (PROAMATINE) tablet 10 mg  10 mg Oral BID Farhan Coker MD   10 mg at 10/20/20 0907    pantoprazole (PROTONIX) granules for oral suspension 40 mg  40 mg Per NG tube ACB Shalini Garcia MD   40 mg at 10/20/20 0631    metoprolol tartrate (LOPRESSOR) tablet 25 mg  25 mg Oral BID Debbi Conley MD   25 mg at 10/20/20 3963    potassium chloride (KLOR-CON) packet for solution 40 mEq  40 mEq Oral BID WITH MEALS Shalini Garcia MD   40 mEq at 10/20/20 0910    albuterol CONCENTRATE 2.5mg/0.5 mL neb soln  2.5 mg Nebulization Q6H RT Johnny Santiago DO   2.5 mg at 10/20/20 0157    LORazepam (ATIVAN) injection 1 mg  1 mg IntraVENous Q2H PRN Raymundo Olson MD   1 mg at 10/16/20 2151    sodium chloride 0.9% (NS) 3ml nebulizer solution  2.5 mL Nebulization PRN Julissa Carter MD   2.5 mL at 10/20/20 0737    influenza vaccine 2020-21 (4 yrs+)(PF) (FLUCELVAX QUAD) injection 0.5 mL  0.5 mL IntraMUSCular PRIOR TO DISCHARGE Shalini Garcia MD   Stopped at 10/07/20 0900    diphenhydrAMINE (BENADRYL) capsule 50 mg  50 mg Oral BID PRN Ed Reyna NP   50 mg at 10/03/20 0129    phosphorus (K PHOS NEUTRAL) 250 mg tablet 2 Tab  2 Tab Oral BID Crystal Reina MD   2 Tab at 10/20/20 7032    oxyCODONE IR (ROXICODONE) tablet 15 mg  15 mg Oral Q4H PRN Crystal Reina MD   15 mg at 10/20/20 0728    enoxaparin (LOVENOX) injection 40 mg  40 mg SubCUTAneous Q24H Angela Preciado MD   40 mg at 10/19/20 2153    acetaminophen (TYLENOL) tablet 650 mg  650 mg Oral Q6H PRN Gisele Long NP   650 mg at 10/20/20 0432    ondansetron (ZOFRAN) injection 4 mg  4 mg IntraVENous Q8H PRN Gisele Long NP   4 mg at 20 0402    guaiFENesin (ROBITUSSIN) 20 mg/mL oral liquid 400 mg  400 mg Oral Q6H PRN Keyla Wilkins NP   Stopped at 20 1855            No Known Allergies        Objective:     Blood pressure 130/71, pulse 73, temperature 99.9 °F (37.7 °C), resp. rate 26, height 6' (1.829 m), weight 64.3 kg (141 lb 12.1 oz), SpO2 99 %. Temp (24hrs), Av.6 °F (37 °C), Min:96.3 °F (35.7 °C), Max:100.8 °F (38.2 °C)      Intake and Output:  Current Shift: No intake/output data recorded. Last 3 Shifts: 10/18 1901 - 10/20 0700  In: 158 [I.V.:791]  Out: 4599 [Urine:1100; Drains:175]    Physical Exam:     General:  Lying in bed, intubated on mechanical ventilation. Sedated. Throat and Neck: Supple. No JVD. He has a right subclavian central line. Oral ET tube in place  Lung:  Much improved aeration bilaterally, minimal rhonchi in the left base. Bilateral chest tubes present on water seal without air leaks. Heart: S1+S2. No murmurs  Abdomen: Status post surgery. He has a midline incision. He has HELGA drains one on each side. Bowel sounds are hypoactive. Draining minimal fluid. Extremities:  He has pitting edema. Distal pulses of the lower extremities are noted with Dopplers. Has skin breakdown on the dependent portion of heel. : Reese catheter in place. Making some urine output. Skin: No cyanosis  Neurologic: Wakes up easily, following commands.          Recent Results (from the past 24 hour(s))   CBC WITH AUTOMATED DIFF    Collection Time: 10/20/20  4:45 AM   Result Value Ref Range    WBC 25.4 (H) 4.1 - 11.1 K/uL    RBC 2.90 (L) 4.10 - 5.70 M/uL    HGB 9.2 (L) 12.1 - 17.0 g/dL    HCT 27.2 (L) 36.6 - 50.3 %    MCV 93.8 80.0 - 99.0 FL    MCH 31.7 26.0 - 34.0 PG    MCHC 33.8 30.0 - 36.5 g/dL    RDW 19.1 (H) 11.5 - 14.5 %    PLATELET 080 679 - 344 K/uL    MPV 11.3 8.9 - 12.9 FL    NEUTROPHILS 90 (H) 32 - 75 %    LYMPHOCYTES 3 (L) 12 - 49 %    MONOCYTES 7 5 - 13 %    EOSINOPHILS 0 0 - 7 %    BASOPHILS 0 0 - 1 %    IMMATURE GRANULOCYTES 0 %    ABS. NEUTROPHILS 22.8 (H) 1.8 - 8.0 K/UL    ABS. LYMPHOCYTES 0.8 0.8 - 3.5 K/UL    ABS. MONOCYTES 1.8 (H) 0.0 - 1.0 K/UL    ABS. EOSINOPHILS 0.0 0.0 - 0.4 K/UL    ABS. BASOPHILS 0.0 0.0 - 0.1 K/UL    ABS. IMM. GRANS. 0.0 K/UL    DF Manual      RBC COMMENTS Target Cells  1+        RBC COMMENTS Hypochromia  2+        RBC COMMENTS Anisocytosis  1+        RBC COMMENTS Basophilic Stippling  1+       MAGNESIUM    Collection Time: 10/20/20  4:45 AM   Result Value Ref Range    Magnesium 1.9 1.6 - 2.4 mg/dL   BLOOD GAS, ARTERIAL    Collection Time: 10/20/20  5:20 AM   Result Value Ref Range    pH 7.46 (H) 7.35 - 7.45      PCO2 32 (L) 35 - 45 mmHg    PO2 98 75 - 100 mmHg    O2 SAT 98 >95 %    BICARBONATE 24 22 - 26 mmol/L    BASE DEFICIT 0.5 0 - 2 mmol/L    O2 METHOD VENT      FIO2 40 %    MODE SIMV      Tidal volume 500      PRESSURE SUPPORT 12      EPAP/CPAP/PEEP 5      SITE Right Radial      ENIO'S TEST Positive         CT Results  (Last 48 hours)    None          Assessment:     1. Acute respiratory failure, after aortobifemoral bypass surgery on 10/6/2020. Patient self extubated himself early in the morning on 10/9/2020. Re-intubated on 10/10/20 for left-sided mucous plugging and complete left lung collapse and had flexible bronchoscopy done with suction of mucous plug. 2.  Acute metabolic encephalopathy, resolved  3. Aspiration pneumonia  4.   Severe peripheral vascular disease   5. UTI   6. Hypertension  7. Familial polymyositis  8. Left spontaneous pneumothorax    Plan:     1.)  Acute Hypoxic respiratory failure. Intubated on mechanical ventilation  Currently on SIMV//8/40%/5, satting 100%. ABGs and chest x-ray reviewed as above. Repeat in the morning. Re-attempt SAT/SBT this morning; wean propofol to 10 and Precedex to 0.5 now. In 1 hour turn off the propofol and attempt a spontaneous breathing trial.  If he is doing very well with this, we will consider extubation today. 2.)  Bilateral pneumothoraces  Bilateral chest tubes placed, no airleak on waterseal.  Dr. Zak Keen following closely. Stable chest x-ray    3.)  Aspiration/HAP pneumonia:  Continue Zosyn  Unclear why he is on steroids, decreased to Solumedrol 40 mg IV daily yesterday, likely switch to prednisone tomorrow. On scheduled nebulized bronchodilator treatments. Modified barium swallow showed penetration and significant dysphagia. Will likely need NG tube after extubation. 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 Patient may require PEG tube in the near future. 4.)  Metabolic encephalopathy. He has a progressive neurologic disorder. Brain MRI negative    Further recommendation per Neurology. Wean down sedation today. 5.)  Myocardial ischemia:  He underwent nuclear stress testing which showed significant inferior and from will treat him with medical managememt   I will follow the patient closely with you. DVT and GI prophylaxis. He is on Lovenox and Protonix IV.     Questions of wife and answered at bedside in detail  Case discussed in detail with RN, RT, and care team in ICU  Thank you for involving me in the care of this patient  I will follow with you closely during hospitalization    Critical Care Time Spent more than 55 minutes in direct patient care with no overlap reviewing results and records, decision making, and answering questions.       Phuong Desai,   [facility]  10/20/2020

## 2020-10-20 NOTE — PROGRESS NOTES
Renal Progress Note    Patient: Juan Merritt MRN: [id number]  SSN: [id number]    YOB: 1965  Age: 48 y.o. Sex: male      Admit Date: 9/26/2020    LOS: 24 days     Subjective:   Patient seen in ICU,Intubated, sedated with propofol  Wife at bedside  S/p dwight chest tube placements for dwight pneumo  No edema  K was 3.6 yesterday.   No labs today    Current Facility-Administered Medications   Medication Dose Route Frequency    methylPREDNISolone (PF) (SOLU-MEDROL) injection 40 mg  40 mg IntraVENous DAILY    dexmedeTOMidine (PRECEDEX) 400 mcg in 0.9% sodium chloride 104 mL infusion  0.1-1.5 mcg/kg/hr IntraVENous TITRATE    albumin human 25% (BUMINATE) solution 25 g  25 g IntraVENous BID    aspirin chewable tablet 81 mg  81 mg Oral DAILY    spironolactone (ALDACTONE) tablet 25 mg  25 mg Oral BID    propofol (DIPRIVAN) 10 mg/mL infusion  10 mcg/kg/min IntraVENous TITRATE    piperacillin-tazobactam (ZOSYN) 3.375 g in 0.9% sodium chloride (MBP/ADV) 100 mL MBP  3.375 g IntraVENous Q8H    midodrine (PROAMATINE) tablet 10 mg  10 mg Oral BID    pantoprazole (PROTONIX) granules for oral suspension 40 mg  40 mg Per NG tube ACB    metoprolol tartrate (LOPRESSOR) tablet 25 mg  25 mg Oral BID    potassium chloride (KLOR-CON) packet for solution 40 mEq  40 mEq Oral BID WITH MEALS    albuterol CONCENTRATE 2.5mg/0.5 mL neb soln  2.5 mg Nebulization Q6H RT    LORazepam (ATIVAN) injection 1 mg  1 mg IntraVENous Q2H PRN    sodium chloride 0.9% (NS) 3ml nebulizer solution  2.5 mL Nebulization PRN    influenza vaccine 2020-21 (4 yrs+)(PF) (FLUCELVAX QUAD) injection 0.5 mL  0.5 mL IntraMUSCular PRIOR TO DISCHARGE    diphenhydrAMINE (BENADRYL) capsule 50 mg  50 mg Oral BID PRN    phosphorus (K PHOS NEUTRAL) 250 mg tablet 2 Tab  2 Tab Oral BID    oxyCODONE IR (ROXICODONE) tablet 15 mg  15 mg Oral Q4H PRN    enoxaparin (LOVENOX) injection 40 mg  40 mg SubCUTAneous Q24H    acetaminophen (TYLENOL) tablet 650 mg 650 mg Oral Q6H PRN    ondansetron (ZOFRAN) injection 4 mg  4 mg IntraVENous Q8H PRN    guaiFENesin (ROBITUSSIN) 20 mg/mL oral liquid 400 mg  400 mg Oral Q6H PRN        Vitals:    10/20/20 0756 10/20/20 0907 10/20/20 0955 10/20/20 1150   BP:  130/71     Pulse:  (!) 54  73   Resp:    26   Temp:       SpO2: 100%   99%   Weight:       Height:   6' (1.829 m)      Objective:   General: ON VENT, sedated, no acute distress. HEENT: EOMI, no Icterus, no Pallor, ET tube in place   neck: Neck is supple, No JVD  Lungs: decreased breathsounds, bilateral chest tubes present  CVS: heart sounds normal,  no murmurs, no rubs. GI: soft, nontender, normal BS. Extremeties: no cyanosis,no edema in ext   Neuro: On ventilator and sedated  Skin: normal skin turgor, no skin rashes. Intake and Output:  Current Shift: No intake/output data recorded.   Last three shifts: 10/18 1901 - 10/20 0700  In: 791 [I.V.:791]  Out: 1398 [Urine:1100; Drains:175]      Lab/Data Review:  Recent Labs     10/20/20  0445 10/18/20  0400   WBC 25.4* 26.2*   HGB 9.2* 9.0*   HCT 27.2* 27.7*    273     Recent Labs     10/20/20  0445 10/19/20  0450 10/18/20  0455   NA  --  145 145   K  --  3.6 3.3*   CL  --  113* 112*   CO2  --  26 28   GLU  --  140* 151*   BUN  --  23* 20   CREA  --  0.59* 0.65*   CA  --  8.9 8.8   MG 1.9 1.9  --    PHOS  --  2.0* 2.7   ALB  --  2.6* 3.2*     Recent Labs     10/20/20  0520 10/19/20  0515 10/18/20  0335   PH 7.46* 7.51* 7.46*   PCO2 32* 32* 35   PO2 98 51* 99   HCO3 24 26 26   FIO2 40 40 55     Recent Results (from the past 24 hour(s))   CBC WITH AUTOMATED DIFF    Collection Time: 10/20/20  4:45 AM   Result Value Ref Range    WBC 25.4 (H) 4.1 - 11.1 K/uL    RBC 2.90 (L) 4.10 - 5.70 M/uL    HGB 9.2 (L) 12.1 - 17.0 g/dL    HCT 27.2 (L) 36.6 - 50.3 %    MCV 93.8 80.0 - 99.0 FL    MCH 31.7 26.0 - 34.0 PG    MCHC 33.8 30.0 - 36.5 g/dL    RDW 19.1 (H) 11.5 - 14.5 %    PLATELET 113 072 - 715 K/uL    MPV 11.3 8.9 - 12.9 FL NEUTROPHILS 90 (H) 32 - 75 %    LYMPHOCYTES 3 (L) 12 - 49 %    MONOCYTES 7 5 - 13 %    EOSINOPHILS 0 0 - 7 %    BASOPHILS 0 0 - 1 %    IMMATURE GRANULOCYTES 0 %    ABS. NEUTROPHILS 22.8 (H) 1.8 - 8.0 K/UL    ABS. LYMPHOCYTES 0.8 0.8 - 3.5 K/UL    ABS. MONOCYTES 1.8 (H) 0.0 - 1.0 K/UL    ABS. EOSINOPHILS 0.0 0.0 - 0.4 K/UL    ABS. BASOPHILS 0.0 0.0 - 0.1 K/UL    ABS. IMM. GRANS. 0.0 K/UL    DF Manual      RBC COMMENTS Target Cells  1+        RBC COMMENTS Hypochromia  2+        RBC COMMENTS Anisocytosis  1+        RBC COMMENTS Basophilic Stippling  1+       MAGNESIUM    Collection Time: 10/20/20  4:45 AM   Result Value Ref Range    Magnesium 1.9 1.6 - 2.4 mg/dL   BLOOD GAS, ARTERIAL    Collection Time: 10/20/20  5:20 AM   Result Value Ref Range    pH 7.46 (H) 7.35 - 7.45      PCO2 32 (L) 35 - 45 mmHg    PO2 98 75 - 100 mmHg    O2 SAT 98 >95 %    BICARBONATE 24 22 - 26 mmol/L    BASE DEFICIT 0.5 0 - 2 mmol/L    O2 METHOD VENT      FIO2 40 %    MODE SIMV      Tidal volume 500      PRESSURE SUPPORT 12      EPAP/CPAP/PEEP 5      SITE Right Radial      ENIO'S TEST Positive          Assessment and Plan:       1. severe hypokalemia:  -from diuretics and metabolic alkalosis,   -Improved K to 3.6 today  -Off lasix now. resumed spironolactone and added KCl supplements 20 BID  -Continue kcl 40 bid and 25 twice daily of spironolactone  -monitor K levels,    -Mg is normal    2. Low urine output/ BALDEV, resolved  - likely prerenal from hypotension.    -improved with diuretics now      3. Edema: improved with good diuresis +, off lasix  -Continue same dose of spironolactone     4. .  acute resp.failure: dwight pneumo+  -On vent, had bilateral chest tube placements because of bilateral pneumothorax  -pulm and CT surgery following    5.  Hypotension:  -It is better now.   Tolerating spironolactone so far   -resumed mododrine 10 mg bid  -Continue to monitor BPs     6 status post  infrarenal aorta endarterectomy, with aortic by common femoral artery bypass with 14 mm x 7 mm Hemosure graft       Signed By: Kasey Iqbal MD     October 20, 2020

## 2020-10-20 NOTE — PROGRESS NOTES
Patient extubated at 12:36pm to 35% ventimask. No issues or complications, nurse and wife at bedside. Vitals Stable.

## 2020-10-21 ENCOUNTER — APPOINTMENT (OUTPATIENT)
Dept: ENDOSCOPY | Age: 55
DRG: 981 | End: 2020-10-21
Attending: INTERNAL MEDICINE
Payer: COMMERCIAL

## 2020-10-21 ENCOUNTER — APPOINTMENT (OUTPATIENT)
Dept: GENERAL RADIOLOGY | Age: 55
DRG: 981 | End: 2020-10-21
Attending: INTERNAL MEDICINE
Payer: COMMERCIAL

## 2020-10-21 LAB
ANION GAP SERPL CALC-SCNC: 9 MMOL/L (ref 5–15)
ARTERIAL PATENCY WRIST A: ABNORMAL
BASE DEFICIT BLDA-SCNC: 0.1 MMOL/L (ref 0–2)
BASOPHILS # BLD: 0 K/UL (ref 0–0.1)
BASOPHILS NFR BLD: 0 % (ref 0–1)
BDY SITE: ABNORMAL
BUN SERPL-MCNC: 27 MG/DL (ref 6–20)
BUN/CREAT SERPL: 54 (ref 12–20)
CA-I BLD-MCNC: 8.8 MG/DL (ref 8.5–10.1)
CHLORIDE SERPL-SCNC: 114 MMOL/L (ref 97–108)
CO2 SERPL-SCNC: 26 MMOL/L (ref 21–32)
CREAT SERPL-MCNC: 0.5 MG/DL (ref 0.7–1.3)
DIFFERENTIAL METHOD BLD: ABNORMAL
EOSINOPHIL # BLD: 0 K/UL (ref 0–0.4)
EOSINOPHIL NFR BLD: 0 % (ref 0–7)
ERYTHROCYTE [DISTWIDTH] IN BLOOD BY AUTOMATED COUNT: 19.2 % (ref 11.5–14.5)
GAS FLOW.O2 O2 DELIVERY SYS: 2 L/MIN
GLUCOSE SERPL-MCNC: 91 MG/DL (ref 65–100)
HCO3 BLDA-SCNC: 24 MMOL/L (ref 22–26)
HCT VFR BLD AUTO: 24.8 % (ref 36.6–50.3)
HGB BLD-MCNC: 8.3 G/DL (ref 12.1–17)
IMM GRANULOCYTES # BLD AUTO: 0.1 K/UL (ref 0–0.04)
IMM GRANULOCYTES NFR BLD AUTO: 1 % (ref 0–0.5)
LYMPHOCYTES # BLD: 0.8 K/UL (ref 0.8–3.5)
LYMPHOCYTES NFR BLD: 4 % (ref 12–49)
MCH RBC QN AUTO: 31.8 PG (ref 26–34)
MCHC RBC AUTO-ENTMCNC: 33.5 G/DL (ref 30–36.5)
MCV RBC AUTO: 95 FL (ref 80–99)
MONOCYTES # BLD: 1.6 K/UL (ref 0–1)
MONOCYTES NFR BLD: 8 % (ref 5–13)
NEUTS SEG # BLD: 18.9 K/UL (ref 1.8–8)
NEUTS SEG NFR BLD: 88 % (ref 32–75)
PCO2 BLDA: 31 MMHG (ref 35–45)
PH BLDA: 7.48 [PH] (ref 7.35–7.45)
PLATELET # BLD AUTO: 328 K/UL (ref 150–400)
PMV BLD AUTO: 11.9 FL (ref 8.9–12.9)
PO2 BLDA: 71 MMHG (ref 75–100)
POTASSIUM SERPL-SCNC: 3.1 MMOL/L (ref 3.5–5.1)
RBC # BLD AUTO: 2.61 M/UL (ref 4.1–5.7)
SAO2 % BLD: 96 %
SAO2% DEVICE SAO2% SENSOR NAME: ABNORMAL
SODIUM SERPL-SCNC: 149 MMOL/L (ref 136–145)
WBC # BLD AUTO: 21.8 K/UL (ref 4.1–11.1)

## 2020-10-21 PROCEDURE — 74011000258 HC RX REV CODE- 258: Performed by: INTERNAL MEDICINE

## 2020-10-21 PROCEDURE — 80048 BASIC METABOLIC PNL TOTAL CA: CPT

## 2020-10-21 PROCEDURE — 74011250636 HC RX REV CODE- 250/636: Performed by: INTERNAL MEDICINE

## 2020-10-21 PROCEDURE — 71045 X-RAY EXAM CHEST 1 VIEW: CPT

## 2020-10-21 PROCEDURE — 74011636637 HC RX REV CODE- 636/637: Performed by: INTERNAL MEDICINE

## 2020-10-21 PROCEDURE — 74011250637 HC RX REV CODE- 250/637: Performed by: INTERNAL MEDICINE

## 2020-10-21 PROCEDURE — 36415 COLL VENOUS BLD VENIPUNCTURE: CPT

## 2020-10-21 PROCEDURE — 85025 COMPLETE CBC W/AUTO DIFF WBC: CPT

## 2020-10-21 PROCEDURE — 92526 ORAL FUNCTION THERAPY: CPT

## 2020-10-21 PROCEDURE — P9047 ALBUMIN (HUMAN), 25%, 50ML: HCPCS | Performed by: SURGERY

## 2020-10-21 PROCEDURE — 74011250636 HC RX REV CODE- 250/636: Performed by: SURGERY

## 2020-10-21 PROCEDURE — 65610000006 HC RM INTENSIVE CARE

## 2020-10-21 PROCEDURE — 36600 WITHDRAWAL OF ARTERIAL BLOOD: CPT

## 2020-10-21 PROCEDURE — 74011000250 HC RX REV CODE- 250: Performed by: INTERNAL MEDICINE

## 2020-10-21 PROCEDURE — 94640 AIRWAY INHALATION TREATMENT: CPT

## 2020-10-21 PROCEDURE — 74011250637 HC RX REV CODE- 250/637: Performed by: NURSE PRACTITIONER

## 2020-10-21 PROCEDURE — 77010033678 HC OXYGEN DAILY

## 2020-10-21 PROCEDURE — 82803 BLOOD GASES ANY COMBINATION: CPT

## 2020-10-21 PROCEDURE — 94668 MNPJ CHEST WALL SBSQ: CPT

## 2020-10-21 RX ORDER — DEXTROSE MONOHYDRATE 50 MG/ML
75 INJECTION, SOLUTION INTRAVENOUS CONTINUOUS
Status: DISCONTINUED | OUTPATIENT
Start: 2020-10-21 | End: 2020-10-22

## 2020-10-21 RX ORDER — POTASSIUM CHLORIDE 7.45 MG/ML
10 INJECTION INTRAVENOUS
Status: COMPLETED | OUTPATIENT
Start: 2020-10-21 | End: 2020-10-22

## 2020-10-21 RX ORDER — PREDNISONE 20 MG/1
20 TABLET ORAL
Status: DISCONTINUED | OUTPATIENT
Start: 2020-10-21 | End: 2020-10-23

## 2020-10-21 RX ORDER — POTASSIUM CHLORIDE 7.45 MG/ML
10 INJECTION INTRAVENOUS
Status: COMPLETED | OUTPATIENT
Start: 2020-10-21 | End: 2020-10-21

## 2020-10-21 RX ADMIN — ALBUMIN (HUMAN) 25 G: 0.25 INJECTION, SOLUTION INTRAVENOUS at 21:25

## 2020-10-21 RX ADMIN — POTASSIUM CHLORIDE 10 MEQ: 7.46 INJECTION, SOLUTION INTRAVENOUS at 08:37

## 2020-10-21 RX ADMIN — DEXTROSE MONOHYDRATE 75 ML/HR: 50 INJECTION, SOLUTION INTRAVENOUS at 11:52

## 2020-10-21 RX ADMIN — SPIRONOLACTONE 25 MG: 25 TABLET ORAL at 08:36

## 2020-10-21 RX ADMIN — POTASSIUM CHLORIDE 10 MEQ: 7.46 INJECTION, SOLUTION INTRAVENOUS at 22:22

## 2020-10-21 RX ADMIN — OXYCODONE HYDROCHLORIDE 15 MG: 5 TABLET ORAL at 17:09

## 2020-10-21 RX ADMIN — PREDNISONE 20 MG: 20 TABLET ORAL at 09:31

## 2020-10-21 RX ADMIN — MIDODRINE HYDROCHLORIDE 10 MG: 5 TABLET ORAL at 21:27

## 2020-10-21 RX ADMIN — METOPROLOL TARTRATE 25 MG: 25 TABLET, FILM COATED ORAL at 08:36

## 2020-10-21 RX ADMIN — MIDODRINE HYDROCHLORIDE 10 MG: 5 TABLET ORAL at 08:36

## 2020-10-21 RX ADMIN — PIPERACILLIN AND TAZOBACTAM 3.38 G: 3; .375 INJECTION, POWDER, LYOPHILIZED, FOR SOLUTION INTRAVENOUS at 08:35

## 2020-10-21 RX ADMIN — POTASSIUM CHLORIDE 10 MEQ: 7.46 INJECTION, SOLUTION INTRAVENOUS at 23:45

## 2020-10-21 RX ADMIN — DEXTROSE MONOHYDRATE 75 ML/HR: 50 INJECTION, SOLUTION INTRAVENOUS at 07:41

## 2020-10-21 RX ADMIN — POTASSIUM CHLORIDE 40 MEQ: 1.5 FOR SOLUTION ORAL at 08:36

## 2020-10-21 RX ADMIN — POTASSIUM CHLORIDE 10 MEQ: 7.46 INJECTION, SOLUTION INTRAVENOUS at 19:54

## 2020-10-21 RX ADMIN — ENOXAPARIN SODIUM 40 MG: 40 INJECTION SUBCUTANEOUS at 21:27

## 2020-10-21 RX ADMIN — ALBUTEROL SULFATE 2.5 MG: 2.5 SOLUTION RESPIRATORY (INHALATION) at 19:35

## 2020-10-21 RX ADMIN — ASPIRIN 81 MG CHEWABLE TABLET 81 MG: 81 TABLET CHEWABLE at 08:36

## 2020-10-21 RX ADMIN — OXYCODONE HYDROCHLORIDE 15 MG: 5 TABLET ORAL at 13:17

## 2020-10-21 RX ADMIN — PIPERACILLIN AND TAZOBACTAM 3.38 G: 3; .375 INJECTION, POWDER, LYOPHILIZED, FOR SOLUTION INTRAVENOUS at 00:47

## 2020-10-21 RX ADMIN — ISODIUM CHLORIDE 3 ML: 0.03 SOLUTION RESPIRATORY (INHALATION) at 13:59

## 2020-10-21 RX ADMIN — DIBASIC SODIUM PHOSPHATE, MONOBASIC POTASSIUM PHOSPHATE AND MONOBASIC SODIUM PHOSPHATE 2 TABLET: 852; 155; 130 TABLET ORAL at 21:27

## 2020-10-21 RX ADMIN — ALBUTEROL SULFATE 2.5 MG: 2.5 SOLUTION RESPIRATORY (INHALATION) at 09:32

## 2020-10-21 RX ADMIN — PANTOPRAZOLE SODIUM 40 MG: 40 GRANULE, DELAYED RELEASE ORAL at 08:36

## 2020-10-21 RX ADMIN — PIPERACILLIN AND TAZOBACTAM 3.38 G: 3; .375 INJECTION, POWDER, LYOPHILIZED, FOR SOLUTION INTRAVENOUS at 17:09

## 2020-10-21 RX ADMIN — DIPHENHYDRAMINE HYDROCHLORIDE 50 MG: 25 CAPSULE ORAL at 23:45

## 2020-10-21 RX ADMIN — ALBUTEROL SULFATE 2.5 MG: 2.5 SOLUTION RESPIRATORY (INHALATION) at 13:59

## 2020-10-21 RX ADMIN — POTASSIUM CHLORIDE 10 MEQ: 7.46 INJECTION, SOLUTION INTRAVENOUS at 20:59

## 2020-10-21 RX ADMIN — SPIRONOLACTONE 25 MG: 25 TABLET ORAL at 21:31

## 2020-10-21 RX ADMIN — OXYCODONE HYDROCHLORIDE 15 MG: 5 TABLET ORAL at 08:35

## 2020-10-21 RX ADMIN — POTASSIUM CHLORIDE 10 MEQ: 7.46 INJECTION, SOLUTION INTRAVENOUS at 09:31

## 2020-10-21 RX ADMIN — ALBUMIN (HUMAN) 25 G: 0.25 INJECTION, SOLUTION INTRAVENOUS at 08:35

## 2020-10-21 RX ADMIN — METOPROLOL TARTRATE 25 MG: 25 TABLET, FILM COATED ORAL at 21:27

## 2020-10-21 RX ADMIN — OXYCODONE HYDROCHLORIDE 15 MG: 5 TABLET ORAL at 22:53

## 2020-10-21 RX ADMIN — ALBUTEROL SULFATE 2.5 MG: 2.5 SOLUTION RESPIRATORY (INHALATION) at 01:04

## 2020-10-21 RX ADMIN — ISODIUM CHLORIDE 3 ML: 0.03 SOLUTION RESPIRATORY (INHALATION) at 09:31

## 2020-10-21 RX ADMIN — DEXTROSE MONOHYDRATE 75 ML/HR: 50 INJECTION, SOLUTION INTRAVENOUS at 20:58

## 2020-10-21 RX ADMIN — DIBASIC SODIUM PHOSPHATE, MONOBASIC POTASSIUM PHOSPHATE AND MONOBASIC SODIUM PHOSPHATE 2 TABLET: 852; 155; 130 TABLET ORAL at 08:36

## 2020-10-21 NOTE — PROGRESS NOTES
I see he has been extubated, will give him a couple of days and see about gettign tubes out on Friday if he remains with lung expanded and no air leaks

## 2020-10-21 NOTE — PROGRESS NOTES
Renal Progress Note    Patient: Luis Blackwood MRN: [id number]  SSN: [id number]    YOB: 1965  Age: 48 y.o. Sex: male      Admit Date: 9/26/2020    LOS: 25 days     Subjective:   Patient seen in ICU,  Was extubated yesterday. Is awake.   Currently getting CPT   s/p dwight chest tube placements for dwight pneumo  No edema      Current Facility-Administered Medications   Medication Dose Route Frequency    dextrose 5% infusion  75 mL/hr IntraVENous CONTINUOUS    predniSONE (DELTASONE) tablet 20 mg  20 mg Oral DAILY WITH BREAKFAST    dexmedeTOMidine (PRECEDEX) 400 mcg in 0.9% sodium chloride 104 mL infusion  0.1-1.5 mcg/kg/hr IntraVENous TITRATE    albumin human 25% (BUMINATE) solution 25 g  25 g IntraVENous BID    aspirin chewable tablet 81 mg  81 mg Oral DAILY    spironolactone (ALDACTONE) tablet 25 mg  25 mg Oral BID    piperacillin-tazobactam (ZOSYN) 3.375 g in 0.9% sodium chloride (MBP/ADV) 100 mL MBP  3.375 g IntraVENous Q8H    midodrine (PROAMATINE) tablet 10 mg  10 mg Oral BID    pantoprazole (PROTONIX) granules for oral suspension 40 mg  40 mg Per NG tube ACB    metoprolol tartrate (LOPRESSOR) tablet 25 mg  25 mg Oral BID    potassium chloride (KLOR-CON) packet for solution 40 mEq  40 mEq Oral BID WITH MEALS    albuterol CONCENTRATE 2.5mg/0.5 mL neb soln  2.5 mg Nebulization Q6H RT    LORazepam (ATIVAN) injection 1 mg  1 mg IntraVENous Q2H PRN    sodium chloride 0.9% (NS) 3ml nebulizer solution  2.5 mL Nebulization PRN    influenza vaccine 2020-21 (4 yrs+)(PF) (FLUCELVAX QUAD) injection 0.5 mL  0.5 mL IntraMUSCular PRIOR TO DISCHARGE    diphenhydrAMINE (BENADRYL) capsule 50 mg  50 mg Oral BID PRN    phosphorus (K PHOS NEUTRAL) 250 mg tablet 2 Tab  2 Tab Oral BID    oxyCODONE IR (ROXICODONE) tablet 15 mg  15 mg Oral Q4H PRN    enoxaparin (LOVENOX) injection 40 mg  40 mg SubCUTAneous Q24H    acetaminophen (TYLENOL) tablet 650 mg  650 mg Oral Q6H PRN    ondansetron (ZOFRAN) injection 4 mg  4 mg IntraVENous Q8H PRN    guaiFENesin (ROBITUSSIN) 20 mg/mL oral liquid 400 mg  400 mg Oral Q6H PRN        Vitals:    10/21/20 0932 10/21/20 1017 10/21/20 1100 10/21/20 1400   BP: 134/71 134/71     Pulse:       Resp: 28 27     Temp:   99.9 °F (37.7 °C)    SpO2: 100% 100%  100%   Weight:       Height:         Objective:   General: no acute distress. HEENT: EOMI, no Icterus, no Pallor,  neck: Neck is supple, No JVD  Lungs: decreased breathsounds, bilateral chest tubes present  CVS: heart sounds normal,  no murmurs, no rubs. GI: soft, nontender, normal BS. Extremeties: no cyanosis,no edema in ext   Neuro: awake  Skin: normal skin turgor, no skin rashes. Intake and Output:  Current Shift: No intake/output data recorded.   Last three shifts: 10/19 1901 - 10/21 0700  In: 305 [I.V.:305]  Out: 1846 [Urine:1425; Drains:340]      Lab/Data Review:  Recent Labs     10/21/20  0410 10/20/20  0445   WBC 21.8* 25.4*   HGB 8.3* 9.2*   HCT 24.8* 27.2*    321     Recent Labs     10/21/20  0410 10/20/20  0445 10/19/20  0450   *  --  145   K 3.1*  --  3.6   *  --  113*   CO2 26  --  26   GLU 91  --  140*   BUN 27*  --  23*   CREA 0.50*  --  0.59*   CA 8.8  --  8.9   MG  --  1.9 1.9   PHOS  --   --  2.0*   ALB  --   --  2.6*     Recent Labs     10/21/20  0425 10/20/20  0520 10/19/20  0515   PH 7.48* 7.46* 7.51*   PCO2 31* 32* 32*   PO2 71* 98 51*   HCO3 24 24 26   FIO2  --  40 40     Recent Results (from the past 24 hour(s))   CBC WITH AUTOMATED DIFF    Collection Time: 10/21/20  4:10 AM   Result Value Ref Range    WBC 21.8 (H) 4.1 - 11.1 K/uL    RBC 2.61 (L) 4.10 - 5.70 M/uL    HGB 8.3 (L) 12.1 - 17.0 g/dL    HCT 24.8 (L) 36.6 - 50.3 %    MCV 95.0 80.0 - 99.0 FL    MCH 31.8 26.0 - 34.0 PG    MCHC 33.5 30.0 - 36.5 g/dL    RDW 19.2 (H) 11.5 - 14.5 %    PLATELET 715 465 - 573 K/uL    MPV 11.9 8.9 - 12.9 FL    NEUTROPHILS 88 (H) 32 - 75 %    LYMPHOCYTES 4 (L) 12 - 49 %    MONOCYTES 8 5 - 13 % EOSINOPHILS 0 0 - 7 %    BASOPHILS 0 0 - 1 %    IMMATURE GRANULOCYTES 1 (H) 0.0 - 0.5 %    ABS. NEUTROPHILS 18.9 (H) 1.8 - 8.0 K/UL    ABS. LYMPHOCYTES 0.8 0.8 - 3.5 K/UL    ABS. MONOCYTES 1.6 (H) 0.0 - 1.0 K/UL    ABS. EOSINOPHILS 0.0 0.0 - 0.4 K/UL    ABS. BASOPHILS 0.0 0.0 - 0.1 K/UL    ABS. IMM. GRANS. 0.1 (H) 0.00 - 0.04 K/UL    DF AUTOMATED     METABOLIC PANEL, BASIC    Collection Time: 10/21/20  4:10 AM   Result Value Ref Range    Sodium 149 (H) 136 - 145 mmol/L    Potassium 3.1 (L) 3.5 - 5.1 mmol/L    Chloride 114 (H) 97 - 108 mmol/L    CO2 26 21 - 32 mmol/L    Anion gap 9 5 - 15 mmol/L    Glucose 91 65 - 100 mg/dL    BUN 27 (H) 6 - 20 mg/dL    Creatinine 0.50 (L) 0.70 - 1.30 mg/dL    BUN/Creatinine ratio 54 (H) 12 - 20      GFR est AA >60 >60 ml/min/1.73m2    GFR est non-AA >60 >60 ml/min/1.73m2    Calcium 8.8 8.5 - 10.1 mg/dL   BLOOD GAS, ARTERIAL    Collection Time: 10/21/20  4:25 AM   Result Value Ref Range    pH 7.48 (H) 7.35 - 7.45      PCO2 31 (L) 35 - 45 mmHg    PO2 71 (L) 75 - 100 mmHg    O2 SAT 96 >95 %    BICARBONATE 24 22 - 26 mmol/L    BASE DEFICIT 0.1 0 - 2 mmol/L    O2 METHOD Nasal Cannula      O2 FLOW RATE 2 L/min    SITE Right Brachial      ENIO'S TEST Not Performed          Assessment and Plan:       1. severe hypokalemia:  -from diuretics and metabolic alkalosis,   -Potassium is again decreased to 3.1 today  -Off lasix now. resumed spironolactone and added KCl supplements 20 BID  -Continue kcl 40 bid and 25 twice daily of spironolactone. I will give additional dose of KCl today  -monitor K levels,    -Mg is normal    2. Hypernatremia  -Secondary to diuretics. Increased urine output noted. Now Off Lasix  -Has been started on D5W at 75 mils per hour which I will continue    3. Edema: improved with good diuresis +, off lasix  -Continue same dose of spironolactone     4. .  acute resp.failure: dwight pneumo+  -On vent, had bilateral chest tube placements because of bilateral pneumothorax  -pulm and CT surgery following    5.  Hypotension:  -It is better now.   Tolerating spironolactone so far   -resumed mododrine 10 mg bid  -Continue to monitor BPs     6 status post  infrarenal aorta endarterectomy, with aortic by common femoral artery bypass with 14 mm x 7 mm Hemosure graft       Signed By: Narendra Prince MD     October 21, 2020

## 2020-10-21 NOTE — PROGRESS NOTES
Hospitalist Progress Note           Daily Progress Note: 10/21/2020    Chief complaint: Shortness of breath and weakness  Subjective: The patient is seen for follow  up. Patient extubated yesterday and on nasal oxygen. chest tubes without air leaks.  Wife  at bedside during this evaluation        Problem List:  Problem List as of 10/21/2020 Date Reviewed: 9/17/2020          Codes Class Noted - Resolved    Metabolic encephalopathy NVA-20-LB: G93.41  ICD-9-CM: 348.31  9/26/2020 - Present        UTI (urinary tract infection) ICD-10-CM: N39.0  ICD-9-CM: 599.0  9/26/2020 - Present        Aspiration pneumonitis ([address] ICD-10-CM: J69.0  ICD-9-CM: 507.0  9/26/2020 - Present        Essential hypertension ICD-10-CM: I10  ICD-9-CM: 401.9  9/26/2020 - Present        Acute dehydration ICD-10-CM: E86.0  ICD-9-CM: 276.51  9/26/2020 - Present              Medications reviewed  Current Facility-Administered Medications   Medication Dose Route Frequency    dextrose 5% infusion  75 mL/hr IntraVENous CONTINUOUS    predniSONE (DELTASONE) tablet 20 mg  20 mg Oral DAILY WITH BREAKFAST    dexmedeTOMidine (PRECEDEX) 400 mcg in 0.9% sodium chloride 104 mL infusion  0.1-1.5 mcg/kg/hr IntraVENous TITRATE    albumin human 25% (BUMINATE) solution 25 g  25 g IntraVENous BID    aspirin chewable tablet 81 mg  81 mg Oral DAILY    spironolactone (ALDACTONE) tablet 25 mg  25 mg Oral BID    piperacillin-tazobactam (ZOSYN) 3.375 g in 0.9% sodium chloride (MBP/ADV) 100 mL MBP  3.375 g IntraVENous Q8H    midodrine (PROAMATINE) tablet 10 mg  10 mg Oral BID    pantoprazole (PROTONIX) granules for oral suspension 40 mg  40 mg Per NG tube ACB    metoprolol tartrate (LOPRESSOR) tablet 25 mg  25 mg Oral BID    potassium chloride (KLOR-CON) packet for solution 40 mEq  40 mEq Oral BID WITH MEALS    albuterol CONCENTRATE 2.5mg/0.5 mL neb soln  2.5 mg Nebulization Q6H RT    LORazepam (ATIVAN) injection 1 mg  1 mg IntraVENous Q2H PRN    sodium chloride 0.9% (NS) 3ml nebulizer solution  2.5 mL Nebulization PRN    influenza vaccine - (4 yrs+)(PF) (FLUCELVAX QUAD) injection 0.5 mL  0.5 mL IntraMUSCular PRIOR TO DISCHARGE    diphenhydrAMINE (BENADRYL) capsule 50 mg  50 mg Oral BID PRN    phosphorus (K PHOS NEUTRAL) 250 mg tablet 2 Tab  2 Tab Oral BID    oxyCODONE IR (ROXICODONE) tablet 15 mg  15 mg Oral Q4H PRN    enoxaparin (LOVENOX) injection 40 mg  40 mg SubCUTAneous Q24H    acetaminophen (TYLENOL) tablet 650 mg  650 mg Oral Q6H PRN    ondansetron (ZOFRAN) injection 4 mg  4 mg IntraVENous Q8H PRN    guaiFENesin (ROBITUSSIN) 20 mg/mL oral liquid 400 mg  400 mg Oral Q6H PRN       Review of Systems:   Review of systems unable to be obtained because he is intubated    Objective:   Physical Exam:     Visit Vitals  /71   Pulse 97   Temp 99.9 °F (37.7 °C)   Resp 27   Ht 6' (1.829 m)   Wt 65.1 kg (143 lb 8.3 oz)   SpO2 100%   BMI 19.46 kg/m²    O2 Flow Rate (L/min): 2 l/min O2 Device: Nasal cannula    Temp (24hrs), Av °F (37.2 °C), Min:98.1 °F (36.7 °C), Max:99.9 °F (37.7 °C)    No intake/output data recorded. 10/19 1901 - 10/21 0700  In: 305 [I.V.:305]  Out: 1846 [Urine:1425; Drains:340]    General:   Awake and oriented   Lungs:   Clear to auscultation bilaterally with diminished breath sounds bilateral bases. Chest wall:  No tenderness or deformity. Heart:  Regular rate and rhythm, S1, S2 normal, no murmur, click, rub or gallop. Abdomen:   Soft, non-tender. Diminished Bowel sounds. Dressings in place. Extremities: Extremities normal, atraumatic, positive edema bilaterally   Pulses: 2+ and symmetric all extremities. Skin: Skin color, texture, turgor normal. No rashes or lesions   Neurologic: CNII-XII intact.      Data Review:       Recent Days:  Recent Labs     10/21/20  0410 10/20/20  0445   WBC 21.8* 25.4*   HGB 8.3* 9.2*   HCT 24.8* 27.2*    321     Recent Labs     10/21/20  0410 10/20/20  0445 10/19/20  0450   *  --  145   K 3.1*  --  3.6   *  --  113*   CO2 26  --  26   GLU 91  --  140*   BUN 27*  --  23*   CREA 0.50*  --  0.59*   CA 8.8  --  8.9   MG  --  1.9 1.9   PHOS  --   --  2.0*   ALB  --   --  2.6*     Recent Labs     10/21/20  0425 10/20/20  0520 10/19/20  0515   PH 7.48* 7.46* 7.51*   PCO2 31* 32* 32*   PO2 71* 98 51*   HCO3 24 24 26   FIO2  --  40 40       24 Hour Results:  Recent Results (from the past 24 hour(s))   CBC WITH AUTOMATED DIFF    Collection Time: 10/21/20  4:10 AM   Result Value Ref Range    WBC 21.8 (H) 4.1 - 11.1 K/uL    RBC 2.61 (L) 4.10 - 5.70 M/uL    HGB 8.3 (L) 12.1 - 17.0 g/dL    HCT 24.8 (L) 36.6 - 50.3 %    MCV 95.0 80.0 - 99.0 FL    MCH 31.8 26.0 - 34.0 PG    MCHC 33.5 30.0 - 36.5 g/dL    RDW 19.2 (H) 11.5 - 14.5 %    PLATELET 507 977 - 713 K/uL    MPV 11.9 8.9 - 12.9 FL    NEUTROPHILS 88 (H) 32 - 75 %    LYMPHOCYTES 4 (L) 12 - 49 %    MONOCYTES 8 5 - 13 %    EOSINOPHILS 0 0 - 7 %    BASOPHILS 0 0 - 1 %    IMMATURE GRANULOCYTES 1 (H) 0.0 - 0.5 %    ABS. NEUTROPHILS 18.9 (H) 1.8 - 8.0 K/UL    ABS. LYMPHOCYTES 0.8 0.8 - 3.5 K/UL    ABS. MONOCYTES 1.6 (H) 0.0 - 1.0 K/UL    ABS. EOSINOPHILS 0.0 0.0 - 0.4 K/UL    ABS. BASOPHILS 0.0 0.0 - 0.1 K/UL    ABS. IMM.  GRANS. 0.1 (H) 0.00 - 0.04 K/UL    DF AUTOMATED     METABOLIC PANEL, BASIC    Collection Time: 10/21/20  4:10 AM   Result Value Ref Range    Sodium 149 (H) 136 - 145 mmol/L    Potassium 3.1 (L) 3.5 - 5.1 mmol/L    Chloride 114 (H) 97 - 108 mmol/L    CO2 26 21 - 32 mmol/L    Anion gap 9 5 - 15 mmol/L    Glucose 91 65 - 100 mg/dL    BUN 27 (H) 6 - 20 mg/dL    Creatinine 0.50 (L) 0.70 - 1.30 mg/dL    BUN/Creatinine ratio 54 (H) 12 - 20      GFR est AA >60 >60 ml/min/1.73m2    GFR est non-AA >60 >60 ml/min/1.73m2    Calcium 8.8 8.5 - 10.1 mg/dL   BLOOD GAS, ARTERIAL    Collection Time: 10/21/20  4:25 AM   Result Value Ref Range    pH 7.48 (H) 7.35 - 7.45      PCO2 31 (L) 35 - 45 mmHg    PO2 71 (L) 75 - 100 mmHg    O2 SAT 96 >95 %    BICARBONATE 24 22 - 26 mmol/L    BASE DEFICIT 0.1 0 - 2 mmol/L    O2 METHOD Nasal Cannula      O2 FLOW RATE 2 L/min    SITE Right Brachial      ENIO'S TEST Not Performed             Assessment/     Acute hypoxic respiratory failure postsurgery. Self extubated 10/09/20. Reintubated 10/10 due to hypoxemia. Extubated 10/11. Reintubated 10/17 for bilateral pneumothoraces. Extubated 10/20    Bilateral pneumothoraces, status post chest tubes bilateral    Acute kidney injury likely secondary to ATN from hypotension. Hypovolemic shock post surgery. Improved    Right lower lobe aspiration pneumonia improved    Urinary tract infection due to E. Coli    Abnormal Cardiolite stress test showing inferior ischemia. 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 Repleted    Hypothermia, probably largely environmental.  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 Plan:  Continue supportive care including antibiotics  Continue tube feeds and increase freewater to 100cc every 4 hrs  Discussed options of PEG placement with both patient and wife. High risk for aspiration given prior barium studies and inclusion body myositis    Care Plan discussed with: Patient/Family       Total time spent with patient: 30 minutes.     Carlie Prather MD

## 2020-10-21 NOTE — PROGRESS NOTES
Pulmonology and Critical Care Progress Note    Subjective:     Chief Complaint:   Chief Complaint   Patient presents with    Altered mental status     The patient underwent aortobifemoral bypass surgery 10/6/20. Post surgery he was left on the ventilator. Apparently blood loss was about 3.5 L. When he returned to the ICU he was on multiple pressors. Successfully extubated after suctioning of mucous plugs from the left. Unfortunately, he developed spontaneous left pneumothorax. Thoracic surgery did aspiration of his left pneumothorax. No chest tube insertion was required. Patient developed increasing respiratory distress secondary to a tension right-sided PTX. Patient ended up requiring intubation and Dr. Bipin Aaron came in to evaluate the patient and found him to have bilateral pneumothoraces and placed bilateral chest tubes.     Patient seen and examined at the bedside this morning in the ICU. I discussed the case in detail with the bedside nurse, respiratory therapy, and his wife who was sitting at the bedside with him. Patient was successfully extubated on 10/20/20 and he is tolerating RA well. Chest tubes still on water seal and without an air leak. HELGA drains likely out tomorrow per surgery. Seen by speech therapy earlier today and they are still quite concerned about aspiration risk. They have cleared him for honey thickened clear liquids but are recommending a PEG tube. Long discussion with the wife and the patient today, they are agreeable to a PEG. Will consult GI.       Review of Systems:  Unable to perform due to patient's condition    Current Facility-Administered Medications   Medication Dose Route Frequency Provider Last Rate Last Dose    dextrose 5% infusion  75 mL/hr IntraVENous CONTINUOUS Juan C Urbina MD 75 mL/hr at 10/21/20 0741 75 mL/hr at 10/21/20 0741    predniSONE (DELTASONE) tablet 20 mg  20 mg Oral DAILY WITH Johnny Araujo DO   20 mg at 10/21/20 0931    potassium chloride 10 mEq in 100 ml IVPB  10 mEq IntraVENous Q1H Johnny Santiago  mL/hr at 10/21/20 0931 10 mEq at 10/21/20 0931    dexmedeTOMidine (PRECEDEX) 400 mcg in 0.9% sodium chloride 104 mL infusion  0.1-1.5 mcg/kg/hr IntraVENous TITRATE Anirudh Lute, DO 7.9 mL/hr at 10/20/20 1716 0.5 mcg/kg/hr at 10/20/20 1716    albumin human 25% (BUMINATE) solution 25 g  25 g IntraVENous BID Sarah Beth Olson MD 50 mL/hr at 10/21/20 0835 25 g at 10/21/20 0218    aspirin chewable tablet 81 mg  81 mg Oral DAILY Quirino Alanis MD   81 mg at 10/21/20 0836    spironolactone (ALDACTONE) tablet 25 mg  25 mg Oral BID Dayo Coker MD   25 mg at 10/21/20 0836    piperacillin-tazobactam (ZOSYN) 3.375 g in 0.9% sodium chloride (MBP/ADV) 100 mL MBP  3.375 g IntraVENous Q8H Quirino Alanis MD 25 mL/hr at 10/21/20 0835 3.375 g at 10/21/20 0835    midodrine (PROAMATINE) tablet 10 mg  10 mg Oral BID Burton Coker MD   10 mg at 10/21/20 0836    pantoprazole (PROTONIX) granules for oral suspension 40 mg  40 mg Per NG tube ACB Quirino Alanis MD   40 mg at 10/21/20 0836    metoprolol tartrate (LOPRESSOR) tablet 25 mg  25 mg Oral BID Burton Coker MD   25 mg at 10/21/20 0836    potassium chloride (KLOR-CON) packet for solution 40 mEq  40 mEq Oral BID WITH MEALS Quirino Alanis MD   40 mEq at 10/21/20 0836    albuterol CONCENTRATE 2.5mg/0.5 mL neb soln  2.5 mg Nebulization Q6H RT Johnny Santiago DO   2.5 mg at 10/21/20 0932    LORazepam (ATIVAN) injection 1 mg  1 mg IntraVENous Q2H PRN Sarah Beth Olson MD   1 mg at 10/16/20 2151    sodium chloride 0.9% (NS) 3ml nebulizer solution  2.5 mL Nebulization PRN Minal Nunes MD   3 mL at 10/21/20 0931    influenza vaccine 2020-21 (4 yrs+)(PF) (FLUCELVAX QUAD) injection 0.5 mL  0.5 mL IntraMUSCular PRIOR TO DISCHARGE Quirino Alanis MD   Stopped at 10/07/20 0900    diphenhydrAMINE (BENADRYL) capsule 50 mg  50 mg Oral BID PRN Clare Madrid NP   50 mg at 10/03/20 0129    phosphorus (K PHOS NEUTRAL) 250 mg tablet 2 Tab  2 Tab Oral BID Tiny Ovalle MD   2 Tab at 10/21/20 0836    oxyCODONE IR (ROXICODONE) tablet 15 mg  15 mg Oral Q4H PRN Tiny Ovalle MD   15 mg at 10/21/20 0835    enoxaparin (LOVENOX) injection 40 mg  40 mg SubCUTAneous Q24H Ray Howard MD   40 mg at 10/20/20 2048    acetaminophen (TYLENOL) tablet 650 mg  650 mg Oral Q6H PRN Kibot Edwan T, NP   650 mg at 10/20/20 0432    ondansetron (ZOFRAN) injection 4 mg  4 mg IntraVENous Q8H PRN Kibot, Edwan T, NP   4 mg at 20 0402    guaiFENesin (ROBITUSSIN) 20 mg/mL oral liquid 400 mg  400 mg Oral Q6H PRN Towana Can, NP   Stopped at 20 1855            No Known Allergies        Objective:     Blood pressure (!) 135/92, pulse 97, temperature 99.5 °F (37.5 °C), resp. rate 30, height 6' (1.829 m), weight 65.1 kg (143 lb 8.3 oz), SpO2 100 %. Temp (24hrs), Av.9 °F (37.2 °C), Min:98.1 °F (36.7 °C), Max:99.5 °F (37.5 °C)      Intake and Output:  Current Shift: No intake/output data recorded. Last 3 Shifts: 10/19 1901 - 10/21 0700  In: 80 [I.V.:305]  Out: 1846 [Urine:1425; Drains:340]    Physical Exam:     General:  Lying in bed, NAD, on RA  Throat and Neck: Supple. No JVD. He has a right subclavian central line. Lung:  Much improved aeration bilaterally, minimal rhonchi in the left base. Bilateral chest tubes present on water seal without air leaks. On rA. Heart: S1+S2. No murmurs  Abdomen: Status post surgery. He has a midline incision. He has HELGA drains one on each side. Bowel sounds are hypoactive. Draining minimal fluid. Extremities:  He has pitting edema. Distal pulses of the lower extremities are noted with Dopplers. Has skin breakdown on the dependent portion of heel. : Reese catheter in place. Making some urine output.   Skin: No cyanosis  Neurologic:  A+Ox3, non-focal exam        Recent Results (from the past 24 hour(s))   CBC WITH AUTOMATED DIFF    Collection Time: 10/21/20  4:10 AM   Result Value Ref Range    WBC 21.8 (H) 4.1 - 11.1 K/uL    RBC 2.61 (L) 4.10 - 5.70 M/uL    HGB 8.3 (L) 12.1 - 17.0 g/dL    HCT 24.8 (L) 36.6 - 50.3 %    MCV 95.0 80.0 - 99.0 FL    MCH 31.8 26.0 - 34.0 PG    MCHC 33.5 30.0 - 36.5 g/dL    RDW 19.2 (H) 11.5 - 14.5 %    PLATELET 895 544 - 955 K/uL    MPV 11.9 8.9 - 12.9 FL    NEUTROPHILS 88 (H) 32 - 75 %    LYMPHOCYTES 4 (L) 12 - 49 %    MONOCYTES 8 5 - 13 %    EOSINOPHILS 0 0 - 7 %    BASOPHILS 0 0 - 1 %    IMMATURE GRANULOCYTES 1 (H) 0.0 - 0.5 %    ABS. NEUTROPHILS 18.9 (H) 1.8 - 8.0 K/UL    ABS. LYMPHOCYTES 0.8 0.8 - 3.5 K/UL    ABS. MONOCYTES 1.6 (H) 0.0 - 1.0 K/UL    ABS. EOSINOPHILS 0.0 0.0 - 0.4 K/UL    ABS. BASOPHILS 0.0 0.0 - 0.1 K/UL    ABS. IMM. GRANS. 0.1 (H) 0.00 - 0.04 K/UL    DF AUTOMATED     METABOLIC PANEL, BASIC    Collection Time: 10/21/20  4:10 AM   Result Value Ref Range    Sodium 149 (H) 136 - 145 mmol/L    Potassium 3.1 (L) 3.5 - 5.1 mmol/L    Chloride 114 (H) 97 - 108 mmol/L    CO2 26 21 - 32 mmol/L    Anion gap 9 5 - 15 mmol/L    Glucose 91 65 - 100 mg/dL    BUN 27 (H) 6 - 20 mg/dL    Creatinine 0.50 (L) 0.70 - 1.30 mg/dL    BUN/Creatinine ratio 54 (H) 12 - 20      GFR est AA >60 >60 ml/min/1.73m2    GFR est non-AA >60 >60 ml/min/1.73m2    Calcium 8.8 8.5 - 10.1 mg/dL   BLOOD GAS, ARTERIAL    Collection Time: 10/21/20  4:25 AM   Result Value Ref Range    pH 7.48 (H) 7.35 - 7.45      PCO2 31 (L) 35 - 45 mmHg    PO2 71 (L) 75 - 100 mmHg    O2 SAT 96 >95 %    BICARBONATE 24 22 - 26 mmol/L    BASE DEFICIT 0.1 0 - 2 mmol/L    O2 METHOD Nasal Cannula      O2 FLOW RATE 2 L/min    SITE Right Brachial      ENIO'S TEST Not Performed         CT Results  (Last 48 hours)    None          Assessment:     1. Acute respiratory failure, after aortobifemoral bypass surgery on 10/6/2020. Patient self extubated himself early in the morning on 10/9/2020.  Re-intubated on 10/10/20 for left-sided mucous plugging and complete left lung collapse and had flexible bronchoscopy done with suction of mucous plug. 2.  Acute metabolic encephalopathy, resolved  3. Aspiration pneumonia  4. Severe peripheral vascular disease   5. UTI   6. Hypertension  7. Familial polymyositis  8. Left spontaneous pneumothorax    Plan:     1.)  Acute Hypoxic respiratory failure. Extubated 10/20/20, doing well on RA. AM chest x-ray reviewed, stable b/l chest tubes without recurrent PTX. Repeat in the morning. Precedex has been weaned off. GI consult for PEG tube. Cleared by speech therapy for honey-thickened liquids, but this is very unlikely to meet his caloric needs. If PEG is planned for this week, we can likely avoid an NG tube with TF's. Patient would prefer to avoid this if possible. Okay to transfer out of the ICU to step down per Pulmonary. 2.)  Bilateral pneumothoraces  Bilateral chest tubes placed, no airleak on waterseal.  Dr. Zak Keen following closely. Stable chest x-ray  Likely to be removed in the next 24-48 hours. 3.)  Aspiration/HAP pneumonia:  Continue Zosyn (day 5/7)  Unclear why he is on steroids, decreased to prednisone 20 mg daily today. Continue this for 3 days, then stop. On scheduled nebulized bronchodilator treatments. Modified barium swallow showed penetration and significant dysphagia. Will need PEG tube as above. GI consulted. 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 4.)  Metabolic encephalopathy. He has a progressive neurologic disorder. Brain MRI negative    Further recommendation per Neurology. Back to baseline now after extubation. .    5.)  Myocardial ischemia:  He underwent nuclear stress testing which showed significant inferior and from will treat him with medical managememt   I will follow the patient closely with you. DVT and GI prophylaxis. He is on Lovenox and Protonix IV.     Questions of wife and answered at bedside in detail  Case discussed in detail with RN, RT, and care team in ICU  Thank you for involving me in the care of this patient  I will follow with you closely during hospitalization    Critical Care Time Spent more than 55 minutes in direct patient care with no overlap reviewing results and records, decision making, and answering questions.       Jarett Nunes DO  [facility]  10/21/2020

## 2020-10-21 NOTE — PROGRESS NOTES
Four eye skin assessment done with Sly Watkins RN. Pt has a midline surgical incision with a dry, intact bandage, bilateral chest tubes with dry, intact bandage, a right sided sari drain with a new dry, intact bandage, right hip with a stage II pressure injury with a new mepilex applied, an unstageable pressure injury on sacrum with a new mepilex applied, and a preventive mepilex on the upper back. Pt has noted bruising and skin tears on the arms and chest.  Pt has a new bandage on the right wrist where there is a skin tear. Pt has a skin tear on the left shoulder with a new bandage. Pt has scars on bilateral femoral pulses from previous surgery. Left heel is red and blanchable with boggy heals. Right heal is red and blanchable, with a skin tear on outside of ankle and an opening on the upper, outside foot with a new mepilex applied. Right shin is red.

## 2020-10-21 NOTE — PROGRESS NOTES
Patient examined this morning. He got extubated yesterday. He is maintaining his airway oxygenation 98% room air. Chest tubes to waterseal.  Chest x-ray this morning looks like lungs are staying up. Patient is scheduled to have swallow evaluation today. I will probably remove the rest of the HELGA drains out tomorrow. Both feet is well-perfused with a palpable pedal pulses. All incision was clean and dry as well.   I will continue monitor his progress

## 2020-10-21 NOTE — PROGRESS NOTES
SPEECH LANGUAGE PATHOLOGY BEDSIDE SWALLOW EVALUATIONS  Patient: Carolee Nunn (41 y.o. 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 Patient symptomatic of aspiration w/ most p.o. trials administered at bedside. Clinical indicators of penetration/aspiration present w/ ice chips and puree consistencies c/b throat clearing and weak cough response. Patient with mild swallow delay, reduced TBR. HLE and protraction significantly reduced to digital palpation. Multiple swallows required. Suspect on-going pharyngeal residue w/ poor pharyngeal clearance. Swallow function w/ reduced clinical indicators of aspiration improved w/ honey thick trials via tsp. O2 saturations remained stable through out trials. Patient will benefit from skilled intervention to address the above impairments. Patients rehabilitation potential is considered to be Fair     PLAN :  Recommendations and Planned Interventions:  Recommend Clear HONEY liquid diet for pleasure. At this time, patient would benefit from from alternative means of nutrition via PEG s/t suspected chronic aspiration and poor caloric intake. Patient and wife educated extensively on pros and cons of PEG placement, but largely how patient would benefit at this time s/t his aspiration risk, frequent PNA, deconditioned state, polymyositis and poor p.o. intake. Frequency/Duration: Patient will be followed by speech-language pathology daily to address goals. Discharge Recommendations: Inpatient Rehab     SUBJECTIVE:   Patient seen at bedside in ICU. Wife present. Patient is alert. Vocal quality reduced in volume and breathy.  Patient was extubated 10/20/20. Bilateral chest tubes in place. OBJECTIVE:     CXR Results  (Last 48 hours)                 10/21/20 0400  XR CHEST PORT Final result    Narrative:  Chest single view. Comparison single view chest October 20, 2020       Basilar subsegmental atelectasis advanced compared to prior imaging. No gross   interstitial or alveolar pulmonary edema. Tubes and catheter stable position. No   discernible pneumothorax or sizable pleural effusion. 10/20/20 0345  XR CHEST PORT Final result    Narrative:  Chest single view. Comparison single view chest October 19, 2020       Tubes and catheter stable position; this includes bilateral chest tubes. Significant improvement in aeration left lung base. Cardiac and mediastinal   structures unchanged. No discernible pneumothorax or sizable pleural effusion. Postsurgical changes left upper quadrant abdomen.               CT Results  (Last 48 hours)      None             Past Medical History:   Diagnosis Date    Myositis     Familial inclusion      Past Surgical History:   Procedure Laterality Date    HX ORTHOPAEDIC      disc infused, neck      Prior Level of Function/Home Situation:   Home Situation  Home Environment: Private residence  # Steps to Enter: 6  Rails to Enter: Yes  Hand Rails : Bilateral  Wheelchair Ramp: No  One/Two Story Residence: Two story(primary bedroom/bathroom on 2nd floor)  # of Interior Steps: (stair lift)  Lift Chair Available: Yes  Living Alone: No  Support Systems: Spouse/Significant Other/Partner  Patient Expects to be Discharged to[de-identified] Private residence  Current DME Used/Available at Home: Wheelchair, Walker, rolling  Diet prior to admission: Regular, thin at home  Current Diet:  NPO  Cognitive and Communication Status:  Neurologic State: (P) Alert, Confused  Orientation Level: (P) Oriented to person, Oriented to place  Cognition: (P) Follows commands        Safety/Judgement: Decreased awareness of need for safety  Swallowing Evaluation:   Oral Assessment:  Oral Assessment  Labial: (P) Other (comment)(weak)  Dentition: (P) Intact  Oral Hygiene: (P) fair  Lingual: (P) Decreased strength; Other (comment)(decreased TBR)  Velum: (P) No impairment  Mandible: (P) No impairment  P.O. Trials:  Patient Position: (P) upright in bed  Vocal quality prior to P.O.: (P) Low volume; Fatigue  Consistency Presented: (P) Ice chips;Honey thick liquid;Pudding  How Presented: (P) SLP-fed/presented;Spoon     Bolus Acceptance: (P) No impairment  Bolus Formation/Control: (P) No impairment     Propulsion: (P) Delayed (# of seconds)  Oral Residue: (P) None  Initiation of Swallow: (P) Delayed (# of seconds)  Laryngeal Elevation: (P) Decreased;Weak  Aspiration Signs/Symptoms: (P) Clear throat;Weak cough  Pharyngeal Phase Characteristics: (P) Multiple swallows;Effortful swallow;Easily fatigued ; Suspected pharyngeal residue       Oral Phase Severity: (P) Minimal  Pharyngeal Phase Severity : (P) Moderate-severe  Voice:     Vocal Quality: (P) Low volume; Fatigue  Pain:  Pain Scale 1: Numeric (0 - 10)  Pain Intensity 1: 0     After treatment:   Patient left in no apparent distress in bed    COMMUNICATION/EDUCATION:   Patient was educated regarding purpose of SLP assessment, POC, diet recs and sw safety precautions. Patient demonstrated [address] understanding as evidenced by AMS. The patient's plan of care including recommendations, planned interventions, and recommended diet changes were discussed with: Physician and pulmonologist .     Patient/family have participated as able in goal setting and plan of care.     Thank you for this referral.  Jayy Churchill M.S. CCC-SLP  Time Calculation: 25 mins    Problem: Dysphagia (Adult)  Goal: *Acute Goals and Plan of Care (Insert Text)  Description: * all goals revised from 9/28/20  Speech Therapy Goals  Initiated 9/28/2020  -Patient will participate in modified barium swallow study if indicated pending pt's progress. [ ] Not met  []  MET   [ ] Progressing  [ ] Hosea Murillo  -Patient will tolerate clear liquid diet with HONEY thick liquids for pleasure without signs/symptoms of aspiration given moderate cues within 5-7 day(s). [ ] Not met  [ ]  MET   [ ] Progressing  [ ] Hosea Murillo  -Patient will demonstrate understanding of swallow safety precautions and aspiration precautions, diet recs with moderate cues within 7 day(s).         [ ] Not met  [ ]  MET   [ ] Progressing  [ ] Hosea Murillo  -Patient will participate in oral and pharyngeal swallowing exercises to improve pharyngeal bolus transit and swallow function within 7 days/       Outcome: Progressing Towards Goal

## 2020-10-21 NOTE — CONSULTS
Consult    Patient: Tameka Winter MRN: [id number]  SSN: [id number]    YOB: 1965  Age: 48 y.o.   Sex: male      Subjective:      Tameka Winter is a 48 y.o. male who is being seen for dysphagia,  Patient patient had a history with stroke, dehydration etc and some muscle weakness partial leg paralysis, showed up at hospital because of mental status change, patient also had a pneumothorax, in ICU, he failed the swallow test, not patient needs to continue placement, for nutrition support  Minimal history from patient also history from his wife, no nausea vomiting no fever no weight loss  Past Medical History:   Diagnosis Date    Myositis     Familial inclusion      Past Surgical History:   Procedure Laterality Date    HX ORTHOPAEDIC      disc infused, neck       Family History   Problem Relation Age of Onset    Other Father     Other Sister     Other Brother      Social History     Tobacco Use    Smoking status: Current Every Day Smoker     Packs/day: 0.50    Smokeless tobacco: Never Used   Substance Use Topics    Alcohol use: Yes     Comment: occ      Current Facility-Administered Medications   Medication Dose Route Frequency Provider Last Rate Last Dose    dextrose 5% infusion  75 mL/hr IntraVENous CONTINUOUS Zina Felipe MD 75 mL/hr at 10/21/20 1152 75 mL/hr at 10/21/20 1152    predniSONE (DELTASONE) tablet 20 mg  20 mg Oral DAILY WITH BREAKFAST Johnny Santiago DO   20 mg at 10/21/20 0931    dexmedeTOMidine (PRECEDEX) 400 mcg in 0.9% sodium chloride 104 mL infusion  0.1-1.5 mcg/kg/hr IntraVENous TITRATE Wolf Philippe DO 7.9 mL/hr at 10/20/20 1716 0.5 mcg/kg/hr at 10/20/20 1716    albumin human 25% (BUMINATE) solution 25 g  25 g IntraVENous BID Adenike Olson MD 50 mL/hr at 10/21/20 0835 25 g at 10/21/20 0835    aspirin chewable tablet 81 mg  81 mg Oral DAILY Alvino Perry MD   81 mg at 10/21/20 0836    spironolactone (ALDACTONE) tablet 25 mg  25 mg Oral BID John Paul MD Burton   25 mg at 10/21/20 0836    piperacillin-tazobactam (ZOSYN) 3.375 g in 0.9% sodium chloride (MBP/ADV) 100 mL MBP  3.375 g IntraVENous Q8H Sivan Berrios MD 25 mL/hr at 10/21/20 0835 3.375 g at 10/21/20 0835    midodrine (PROAMATINE) tablet 10 mg  10 mg Oral BID Aleena Coker MD   10 mg at 10/21/20 0836    pantoprazole (PROTONIX) granules for oral suspension 40 mg  40 mg Per NG tube ACB Sivan Berrios MD   40 mg at 10/21/20 0836    metoprolol tartrate (LOPRESSOR) tablet 25 mg  25 mg Oral BID Raz Kohler MD   25 mg at 10/21/20 0836    potassium chloride (KLOR-CON) packet for solution 40 mEq  40 mEq Oral BID WITH MEALS Sivan Berrios MD   40 mEq at 10/21/20 0836    albuterol CONCENTRATE 2.5mg/0.5 mL neb soln  2.5 mg Nebulization Q6H RT Johnny Santiago DO   2.5 mg at 10/21/20 1359    LORazepam (ATIVAN) injection 1 mg  1 mg IntraVENous Q2H PRN Bailee Olson MD   1 mg at 10/16/20 2151    sodium chloride 0.9% (NS) 3ml nebulizer solution  2.5 mL Nebulization PRN Gia Sanchez MD   3 mL at 10/21/20 1359    influenza vaccine 2020-21 (4 yrs+)(PF) (FLUCELVAX QUAD) injection 0.5 mL  0.5 mL IntraMUSCular PRIOR TO DISCHARGE Sivan Berrios MD   Stopped at 10/07/20 0900    diphenhydrAMINE (BENADRYL) capsule 50 mg  50 mg Oral BID PRN Joesphine Ace, NP   50 mg at 10/03/20 0129    phosphorus (K PHOS NEUTRAL) 250 mg tablet 2 Tab  2 Tab Oral BID Sivan Berrios MD   2 Tab at 10/21/20 0836    oxyCODONE IR (ROXICODONE) tablet 15 mg  15 mg Oral Q4H PRN Sivan Berrios MD   15 mg at 10/21/20 1317    enoxaparin (LOVENOX) injection 40 mg  40 mg SubCUTAneous Q24H Gia Sanchez MD   40 mg at 10/20/20 2048    acetaminophen (TYLENOL) tablet 650 mg  650 mg Oral Q6H PRN Julieta SUTTON NP   650 mg at 10/20/20 0432    ondansetron (ZOFRAN) injection 4 mg  4 mg IntraVENous Q8H PRN Kailey Long NP   4 mg at 09/30/20 0402    guaiFENesin (ROBITUSSIN) 20 mg/mL oral liquid 400 mg  400 mg Oral Q6H PRN Marilou Duran NP   Stopped at 09/26/20 1855        No Known Allergies    Review of Systems:  Review of Systems   Unable to perform ROS: Mental acuity        Objective:     Vitals:    10/21/20 1017 10/21/20 1100 10/21/20 1400 10/21/20 1500   BP: 134/71      Pulse:       Resp: 27      Temp:  99.9 °F (37.7 °C)  99.7 °F (37.6 °C)   SpO2: 100%  100%    Weight:       Height:            Physical Exam:  Physical Exam   Constitutional: He is oriented to person, place, and time. He appears cachectic. He is cooperative. HENT:   Head: Atraumatic. Neck: No JVD present. Cardiovascular: Normal rate. Pulmonary/Chest: Effort normal.   Abdominal: Soft. Neurological: He is alert and oriented to person, place, and time. Recent Results (from the past 24 hour(s))   CBC WITH AUTOMATED DIFF    Collection Time: 10/21/20  4:10 AM   Result Value Ref Range    WBC 21.8 (H) 4.1 - 11.1 K/uL    RBC 2.61 (L) 4.10 - 5.70 M/uL    HGB 8.3 (L) 12.1 - 17.0 g/dL    HCT 24.8 (L) 36.6 - 50.3 %    MCV 95.0 80.0 - 99.0 FL    MCH 31.8 26.0 - 34.0 PG    MCHC 33.5 30.0 - 36.5 g/dL    RDW 19.2 (H) 11.5 - 14.5 %    PLATELET 271 615 - 042 K/uL    MPV 11.9 8.9 - 12.9 FL    NEUTROPHILS 88 (H) 32 - 75 %    LYMPHOCYTES 4 (L) 12 - 49 %    MONOCYTES 8 5 - 13 %    EOSINOPHILS 0 0 - 7 %    BASOPHILS 0 0 - 1 %    IMMATURE GRANULOCYTES 1 (H) 0.0 - 0.5 %    ABS. NEUTROPHILS 18.9 (H) 1.8 - 8.0 K/UL    ABS. LYMPHOCYTES 0.8 0.8 - 3.5 K/UL    ABS. MONOCYTES 1.6 (H) 0.0 - 1.0 K/UL    ABS. EOSINOPHILS 0.0 0.0 - 0.4 K/UL    ABS. BASOPHILS 0.0 0.0 - 0.1 K/UL    ABS. IMM.  GRANS. 0.1 (H) 0.00 - 0.04 K/UL    DF AUTOMATED     METABOLIC PANEL, BASIC    Collection Time: 10/21/20  4:10 AM   Result Value Ref Range    Sodium 149 (H) 136 - 145 mmol/L    Potassium 3.1 (L) 3.5 - 5.1 mmol/L    Chloride 114 (H) 97 - 108 mmol/L    CO2 26 21 - 32 mmol/L    Anion gap 9 5 - 15 mmol/L    Glucose 91 65 - 100 mg/dL    BUN 27 (H) 6 - 20 mg/dL    Creatinine 0.50 (L) 0.70 - 1.30 mg/dL    BUN/Creatinine ratio 54 (H) 12 - 20      GFR est AA >60 >60 ml/min/1.73m2    GFR est non-AA >60 >60 ml/min/1.73m2    Calcium 8.8 8.5 - 10.1 mg/dL   BLOOD GAS, ARTERIAL    Collection Time: 10/21/20  4:25 AM   Result Value Ref Range    pH 7.48 (H) 7.35 - 7.45      PCO2 31 (L) 35 - 45 mmHg    PO2 71 (L) 75 - 100 mmHg    O2 SAT 96 >95 %    BICARBONATE 24 22 - 26 mmol/L    BASE DEFICIT 0.1 0 - 2 mmol/L    O2 METHOD Nasal Cannula      O2 FLOW RATE 2 L/min    SITE Right Brachial      ENIO'S TEST Not Performed          XR CHEST PORT   Final Result      XR CHEST PORT   Final Result      XR CHEST PORT   Final Result      XR CHEST PORT   Final Result      XR CHEST PORT   Final Result      XR CHEST PORT   Final Result   IMPRESSION: OG tube extends well into the stomach. XR CHEST PORT   Final Result   IMPRESSION: Stable exam..                XR CHEST PORT   Final Result   IMPRESSION: Endotracheal tube now present with the tip approximately 8 cm above   the [place]. Persistent 50% left pneumothorax. XR CHEST PORT   Final Result      XR CHEST PORT   Final Result      XR CHEST PORT   Final Result      XR CHEST PORT   Final Result      XR CHEST PORT   Final Result      XR CHEST PORT   Final Result      XR CHEST PORT   Final Result      XR CHEST PORT   Final Result      XR CHEST SNGL V   Final Result   IMPRESSION: Breathing left basilar airspace disease. XR CHEST PORT   Final Result   IMPRESSION: Significant reexpansion of the left lung with persistent airspace   disease throughout much of the left lower lobe. XR CHEST PORT   Final Result   IMPRESSION: Postoperative changes, left upper abdomen. Left pneumonectomy versus   complete lung collapse; correlate with surgical history. Right lung relatively   clear. ET tube 27 mm above the [place]. XR CHEST PORT   Final Result   IMPRESSION: Complete collapse of the left lung with no evidence of residual   pulmonary aeration on the left. Associated left pleural effusion is likely. XR CHEST PORT   Final Result   Impression:Left lung airspace disease/atelectasis. Suspect left pleural   effusion. Focal airspace disease at the right lung base. CTA ABD ART W RUNOFF W WO CONT   Final Result   IMPRESSION:   1. Interval postoperative changes from aorto-bifemoral artery bypass. The bypass   graft is patent. The proximal and distal anastomoses are patent. 2. Patent left renal artery. Wedge shaped perfusion defect in the inferior pole   of the left kidney consistent with parenchymal infarct. 3. Patent diminutive right renal artery. Patchy parenchymal perfusion, improved   compared to the preoperative study from 9/29/2020.   4. Patent right and left lower extremity arteries without occlusion or   significant stenosis. 5. Stable mild short segment focal atherosclerotic dissection flap at the right   anterolateral aspect of the descending thoracic aorta at the T10 level. 6. Postoperative changes from splenectomy. Approximately 6.1 cm TR by 2.1 cm AP   by 8.4 cm CC hematoma in the splenic bed without findings of active contrast   extravasation. A surgical drain is present in the splenectomy bed extending to   beneath the left hemidiaphragm. 7. Expected pneumoperitoneum given recent surgery. Bilateral retroperitoneal   low-attenuation stranding and/or small volume fluid. Diffuse mesenteric   stranding and/or small volume fluid in the  pelvis. 8. Distended gallbladder with cholelithiasis and prominent common bile duct as   seen similarly on the preoperative study from 9/29/2020/.   9. Diffuse anasarca. Diffuse subcutaneous edema of the lower extremities, right   greater than left. Negative for right and left lower extremity DVT. XR CHEST PORT   Final Result   IMPRESSION:   1. The patient's life support lines and tubes are unchanged and are in   satisfactory position.    2.  There is increasing hypoventilation and atelectasis within the lung bases   greater on the left compared to the right. 3.  There also is increasing hazy opacity along the lower left hemithorax most   compatible with a layering moderate-sized left pleural effusion. XR CHEST PORT   Final Result   IMPRESSION:   1. The endotracheal tube is in satisfactory position. 2.  There is a hypoventilation of the lung bases with lung base atelectasis. The   remainder of the lung parenchyma is relatively clear. There is vascular pruning   within the upper lobes suspect for underlying emphysema. 3.  There is free intra-abdominal air likely a function of recent abdominal   surgery. US RETROPERITONEUM COMP   Final Result   IMPRESSION:   1. There is a smaller size to the right kidney. 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 The kidneys otherwise image in a normal fashion. There are normal renal echotexture characteristics. 2.  There a moderate sized right pleural effusion. XR CHEST PORT   Final Result   IMPRESSION: Postoperative findings, postprocedural findings, medical devices as   described. Minimal right lung base atelectasis may be residual adhesive   intraoperative atelectasis. Collette Ruts XR ABD (KUB)   Final Result   Findings/impression:      Numerous surgical clips project over the left upper quadrant and mid abdomen. Midline skin staples noted. Drainage tubes project over the pelvis, lower   abdomen and left upper quadrant. Enteric tube tip projects over the proximal   stomach. No unexpected radiopaque foreign bodies are identified. Oral contrast material throughout the colon. Bowel gas pattern is   nonobstructive. No acute osseous abdomen identified. XR SWALLOW FUNC VIDEO   Final Result   Impression: Significant hypopharyngeal residue. Episodes of penetration with   all consistencies. Episode of flash aspiration after water wash.       XR CHEST PORT   Final Result   Impression: Underexpanded lungs with bibasilar atelectasis. CTA ABDOMEN PELV W CONT   Final Result   IMPRESSION: Mid abdominal aortic occlusion, with threatened right kidney and   fairly good collateralization to the legs. This could be causing a mesenteric   steal phenomenon, however      MRI BRAIN WO CONT   Final Result   Impression: No evidence of acute hemorrhage, mass or infarct. No sinusitis or   mastoiditis. Please see above discussion. CTA CHEST W OR W WO CONT   Final Result   IMPRESSION: Limited by breathing motion. 1. No large pulmonary arterial filling defect. 2. Bronchial thickening with right greater than left lower lung atelectasis or   pneumonia/pneumonitis. Bubbly debris in the trachea. 3. Atherosclerosis. Abrupt discontinuation of contrast in the aorta on the very   inferior slices. Contrast is seen in the celiac artery and SMA and the left   renal artery. The right kidney demonstrates decreased attenuation compared to   the left concerning for medical renal disease to include ischemia. Recommend CTA   abdomen/pelvis. Called report to charge nurse Trever Cochran at 9:05 PM 9/27/2020.   4. Cholelithiasis within distended gallbladder. 5. Dilated small bowel. 6. Other findings as above. XR CHEST PORT   Final Result   IMPRESSION:      No airspace disease in the lungs. Follow-up as clinically indicated. CT HEAD WO CONT   Final Result   Impression: Unremarkable head CT without contrast.  No infarct, mass, or    hemorrhage. Please see full report. XR CHEST SNGL V   Final Result   Impression: No diagnostic abnormality.             US CHEST PORTABLE    (Results Pending)   XR CHEST PORT    (Results Pending)   XR CHEST PORT    (Results Pending)   XR CHEST PORT    (Results Pending)   XR CHEST PORT    (Results Pending)   XR CHEST PORT    (Results Pending)      Assessment:     Hospital Problems  Date Reviewed: 9/17/2020          Codes Class Noted POA    Metabolic encephalopathy AUV-97-LZ: G93.41  ICD-9-CM: 348.31  9/26/2020 Unknown        UTI (urinary tract infection) ICD-10-CM: N39.0  ICD-9-CM: 599.0  9/26/2020 Unknown        Aspiration pneumonitis ([address] ICD-10-CM: J69.0  ICD-9-CM: 507.0  9/26/2020 Unknown        Essential hypertension ICD-10-CM: I10  ICD-9-CM: 401.9  9/26/2020 Unknown        Acute dehydration ICD-10-CM: E86.0  ICD-9-CM: 276.51  9/26/2020 Unknown          Difficult swallow, increase of difficult feeding,  Malnutrition,  Needs access for feeding, nutrition support,    Plan:   Continue on the current antibiotic treatment  Continue on current dysphagia diet  PT OT and speech to follow  I did discuss with patient family member his wife in detail regarding the medication, alternative for the PEG tube placement she agree with PEG tube placement    EGD/ PEG tube placement will be performed in the morning    Signed By: Brandt Welch MD     October 21, 2020         Thank you for allowing me to participate in this patients care  Cc Referring Physician   Alex Huitron MD

## 2020-10-22 ENCOUNTER — ANESTHESIA EVENT (OUTPATIENT)
Dept: ENDOSCOPY | Age: 55
DRG: 981 | End: 2020-10-22
Payer: COMMERCIAL

## 2020-10-22 ENCOUNTER — APPOINTMENT (OUTPATIENT)
Dept: GENERAL RADIOLOGY | Age: 55
DRG: 981 | End: 2020-10-22
Attending: THORACIC SURGERY (CARDIOTHORACIC VASCULAR SURGERY)
Payer: COMMERCIAL

## 2020-10-22 ENCOUNTER — APPOINTMENT (OUTPATIENT)
Dept: ENDOSCOPY | Age: 55
DRG: 981 | End: 2020-10-22
Attending: INTERNAL MEDICINE
Payer: COMMERCIAL

## 2020-10-22 ENCOUNTER — ANESTHESIA (OUTPATIENT)
Dept: ENDOSCOPY | Age: 55
DRG: 981 | End: 2020-10-22
Payer: COMMERCIAL

## 2020-10-22 LAB
ALBUMIN SERPL-MCNC: 3.2 G/DL (ref 3.5–5)
ANION GAP SERPL CALC-SCNC: 6 MMOL/L (ref 5–15)
BASOPHILS # BLD: 0 K/UL (ref 0–0.1)
BASOPHILS NFR BLD: 0 % (ref 0–1)
BUN SERPL-MCNC: 19 MG/DL (ref 6–20)
BUN/CREAT SERPL: 35 (ref 12–20)
CA-I BLD-MCNC: 8.8 MG/DL (ref 8.5–10.1)
CHLORIDE SERPL-SCNC: 110 MMOL/L (ref 97–108)
CO2 SERPL-SCNC: 27 MMOL/L (ref 21–32)
CREAT SERPL-MCNC: 0.55 MG/DL (ref 0.7–1.3)
DIFFERENTIAL METHOD BLD: ABNORMAL
EOSINOPHIL # BLD: 0 K/UL (ref 0–0.4)
EOSINOPHIL NFR BLD: 0 % (ref 0–7)
ERYTHROCYTE [DISTWIDTH] IN BLOOD BY AUTOMATED COUNT: 19.7 % (ref 11.5–14.5)
GLUCOSE SERPL-MCNC: 81 MG/DL (ref 65–100)
HCT VFR BLD AUTO: 26 % (ref 36.6–50.3)
HGB BLD-MCNC: 8.3 G/DL (ref 12.1–17)
IMM GRANULOCYTES # BLD AUTO: 0 K/UL
IMM GRANULOCYTES NFR BLD AUTO: 0 %
LYMPHOCYTES # BLD: 0.9 K/UL (ref 0.8–3.5)
LYMPHOCYTES NFR BLD: 5 % (ref 12–49)
MAGNESIUM SERPL-MCNC: 1.7 MG/DL (ref 1.6–2.4)
MCH RBC QN AUTO: 31.3 PG (ref 26–34)
MCHC RBC AUTO-ENTMCNC: 31.9 G/DL (ref 30–36.5)
MCV RBC AUTO: 98.1 FL (ref 80–99)
MONOCYTES # BLD: 1.9 K/UL (ref 0–1)
MONOCYTES NFR BLD: 11 % (ref 5–13)
NEUTS SEG # BLD: 14.9 K/UL (ref 1.8–8)
NEUTS SEG NFR BLD: 84 % (ref 32–75)
NRBC # BLD: 0.14 K/UL (ref 0–0.01)
NRBC BLD-RTO: 0.8 PER 100 WBC
PHOSPHATE SERPL-MCNC: 2.9 MG/DL (ref 2.6–4.7)
PLATELET # BLD AUTO: 373 K/UL (ref 150–400)
PMV BLD AUTO: 11.8 FL (ref 8.9–12.9)
POTASSIUM SERPL-SCNC: 4 MMOL/L (ref 3.5–5.1)
RBC # BLD AUTO: 2.65 M/UL (ref 4.1–5.7)
RBC MORPH BLD: ABNORMAL
SODIUM SERPL-SCNC: 143 MMOL/L (ref 136–145)
WBC # BLD AUTO: 17.7 K/UL (ref 4.1–11.1)

## 2020-10-22 PROCEDURE — 2709999900 HC NON-CHARGEABLE SUPPLY: Performed by: INTERNAL MEDICINE

## 2020-10-22 PROCEDURE — 74011000258 HC RX REV CODE- 258: Performed by: INTERNAL MEDICINE

## 2020-10-22 PROCEDURE — 85025 COMPLETE CBC W/AUTO DIFF WBC: CPT

## 2020-10-22 PROCEDURE — 74011250636 HC RX REV CODE- 250/636: Performed by: INTERNAL MEDICINE

## 2020-10-22 PROCEDURE — 74011250636 HC RX REV CODE- 250/636: Performed by: SURGERY

## 2020-10-22 PROCEDURE — 83735 ASSAY OF MAGNESIUM: CPT

## 2020-10-22 PROCEDURE — 74011250636 HC RX REV CODE- 250/636: Performed by: NURSE ANESTHETIST, CERTIFIED REGISTERED

## 2020-10-22 PROCEDURE — 76060000031 HC ANESTHESIA FIRST 0.5 HR: Performed by: INTERNAL MEDICINE

## 2020-10-22 PROCEDURE — 65610000006 HC RM INTENSIVE CARE

## 2020-10-22 PROCEDURE — P9047 ALBUMIN (HUMAN), 25%, 50ML: HCPCS | Performed by: SURGERY

## 2020-10-22 PROCEDURE — 71045 X-RAY EXAM CHEST 1 VIEW: CPT

## 2020-10-22 PROCEDURE — 94668 MNPJ CHEST WALL SBSQ: CPT

## 2020-10-22 PROCEDURE — 94640 AIRWAY INHALATION TREATMENT: CPT

## 2020-10-22 PROCEDURE — 76040000019: Performed by: INTERNAL MEDICINE

## 2020-10-22 PROCEDURE — 74011250637 HC RX REV CODE- 250/637: Performed by: INTERNAL MEDICINE

## 2020-10-22 PROCEDURE — 0DH63UZ INSERTION OF FEEDING DEVICE INTO STOMACH, PERCUTANEOUS APPROACH: ICD-10-PCS | Performed by: INTERNAL MEDICINE

## 2020-10-22 PROCEDURE — 36415 COLL VENOUS BLD VENIPUNCTURE: CPT

## 2020-10-22 PROCEDURE — 77030005122 HC CATH GASTMY PEG BSC -B: Performed by: INTERNAL MEDICINE

## 2020-10-22 PROCEDURE — 74011000250 HC RX REV CODE- 250: Performed by: NURSE ANESTHETIST, CERTIFIED REGISTERED

## 2020-10-22 PROCEDURE — 80069 RENAL FUNCTION PANEL: CPT

## 2020-10-22 PROCEDURE — 77010033678 HC OXYGEN DAILY

## 2020-10-22 PROCEDURE — 74011000250 HC RX REV CODE- 250: Performed by: INTERNAL MEDICINE

## 2020-10-22 RX ORDER — MAGNESIUM SULFATE 1 G/100ML
1 INJECTION INTRAVENOUS ONCE
Status: COMPLETED | OUTPATIENT
Start: 2020-10-22 | End: 2020-10-22

## 2020-10-22 RX ORDER — MORPHINE SULFATE 2 MG/ML
1 INJECTION, SOLUTION INTRAMUSCULAR; INTRAVENOUS
Status: DISPENSED | OUTPATIENT
Start: 2020-10-22 | End: 2020-10-24

## 2020-10-22 RX ORDER — METOPROLOL TARTRATE 50 MG/1
50 TABLET ORAL 2 TIMES DAILY
Status: DISCONTINUED | OUTPATIENT
Start: 2020-10-22 | End: 2020-10-26

## 2020-10-22 RX ORDER — PROPOFOL 10 MG/ML
INJECTION, EMULSION INTRAVENOUS AS NEEDED
Status: DISCONTINUED | OUTPATIENT
Start: 2020-10-22 | End: 2020-10-22 | Stop reason: HOSPADM

## 2020-10-22 RX ORDER — SODIUM CHLORIDE 9 MG/ML
INJECTION, SOLUTION INTRAVENOUS
Status: DISCONTINUED | OUTPATIENT
Start: 2020-10-22 | End: 2020-10-22 | Stop reason: HOSPADM

## 2020-10-22 RX ORDER — LIDOCAINE HYDROCHLORIDE 20 MG/ML
INJECTION, SOLUTION EPIDURAL; INFILTRATION; INTRACAUDAL; PERINEURAL AS NEEDED
Status: DISCONTINUED | OUTPATIENT
Start: 2020-10-22 | End: 2020-10-22 | Stop reason: HOSPADM

## 2020-10-22 RX ADMIN — ALBUMIN (HUMAN) 25 G: 0.25 INJECTION, SOLUTION INTRAVENOUS at 09:56

## 2020-10-22 RX ADMIN — ALBUTEROL SULFATE 2.5 MG: 2.5 SOLUTION RESPIRATORY (INHALATION) at 01:21

## 2020-10-22 RX ADMIN — DIBASIC SODIUM PHOSPHATE, MONOBASIC POTASSIUM PHOSPHATE AND MONOBASIC SODIUM PHOSPHATE 2 TABLET: 852; 155; 130 TABLET ORAL at 22:08

## 2020-10-22 RX ADMIN — ENOXAPARIN SODIUM 40 MG: 40 INJECTION SUBCUTANEOUS at 22:07

## 2020-10-22 RX ADMIN — MORPHINE SULFATE 1 MG: 2 INJECTION, SOLUTION INTRAMUSCULAR; INTRAVENOUS at 16:37

## 2020-10-22 RX ADMIN — LIDOCAINE HYDROCHLORIDE 60 MG: 20 INJECTION, SOLUTION EPIDURAL; INFILTRATION; INTRACAUDAL; PERINEURAL at 12:51

## 2020-10-22 RX ADMIN — ALBUTEROL SULFATE 2.5 MG: 2.5 SOLUTION RESPIRATORY (INHALATION) at 14:00

## 2020-10-22 RX ADMIN — PROPOFOL 50 MG: 10 INJECTION, EMULSION INTRAVENOUS at 12:56

## 2020-10-22 RX ADMIN — MAGNESIUM SULFATE HEPTAHYDRATE 1 G: 1 INJECTION, SOLUTION INTRAVENOUS at 09:55

## 2020-10-22 RX ADMIN — PIPERACILLIN AND TAZOBACTAM 3.38 G: 3; .375 INJECTION, POWDER, LYOPHILIZED, FOR SOLUTION INTRAVENOUS at 01:44

## 2020-10-22 RX ADMIN — SODIUM CHLORIDE: 9 INJECTION, SOLUTION INTRAVENOUS at 12:35

## 2020-10-22 RX ADMIN — PIPERACILLIN AND TAZOBACTAM 3.38 G: 3; .375 INJECTION, POWDER, LYOPHILIZED, FOR SOLUTION INTRAVENOUS at 10:00

## 2020-10-22 RX ADMIN — ALBUTEROL SULFATE 2.5 MG: 2.5 SOLUTION RESPIRATORY (INHALATION) at 08:45

## 2020-10-22 RX ADMIN — ALBUMIN (HUMAN) 25 G: 0.25 INJECTION, SOLUTION INTRAVENOUS at 22:07

## 2020-10-22 RX ADMIN — SPIRONOLACTONE 25 MG: 25 TABLET ORAL at 22:07

## 2020-10-22 RX ADMIN — ALBUTEROL SULFATE 2.5 MG: 2.5 SOLUTION RESPIRATORY (INHALATION) at 19:12

## 2020-10-22 RX ADMIN — METOPROLOL TARTRATE 50 MG: 50 TABLET, FILM COATED ORAL at 22:07

## 2020-10-22 RX ADMIN — DEXTROSE MONOHYDRATE 75 ML/HR: 50 INJECTION, SOLUTION INTRAVENOUS at 07:54

## 2020-10-22 RX ADMIN — PROPOFOL 50 MG: 10 INJECTION, EMULSION INTRAVENOUS at 12:51

## 2020-10-22 RX ADMIN — MORPHINE SULFATE 1 MG: 2 INJECTION, SOLUTION INTRAMUSCULAR; INTRAVENOUS at 09:51

## 2020-10-22 RX ADMIN — PIPERACILLIN AND TAZOBACTAM 3.38 G: 3; .375 INJECTION, POWDER, LYOPHILIZED, FOR SOLUTION INTRAVENOUS at 16:37

## 2020-10-22 NOTE — PROGRESS NOTES
Patient is examined this morning. He looks well. He has noticed distress. Patient apparently scheduled for PEG tube insertion today. And in the peritoneum has more than 150 cc out. I will probably keep it couple more days. His chest is both waterseal.  His oxygen level is optimal.  His both feet is well-perfused. I will continue monitor his progress.

## 2020-10-22 NOTE — ANESTHESIA POSTPROCEDURE EVALUATION
Procedure(s):  PERCUTANEOUS ENDOSCOPIC GASTROSTOMY TUBE INSERTION.     total IV anesthesia, general - backup    Anesthesia Post Evaluation      Multimodal analgesia: multimodal analgesia not used between 6 hours prior to anesthesia start to PACU discharge  Patient location during evaluation: ICU  Patient participation: waiting for patient participation  Level of consciousness: sleepy but conscious  Pain score: 0  Airway patency: patent  Anesthetic complications: no  Cardiovascular status: acceptable  Respiratory status: acceptable  Hydration status: acceptable  Post anesthesia nausea and vomiting:  none  Final Post Anesthesia Temperature Assessment:  Normothermia (36.0-37.5 degrees C)      INITIAL Post-op Vital signs:   Vitals Value Taken Time   /75 10/22/2020  1:00 PM   Temp     Pulse 115 10/22/2020  1:00 PM   Resp 19 10/22/2020  1:00 PM   SpO2 99 % 10/22/2020  1:00 PM

## 2020-10-22 NOTE — PROGRESS NOTES
Hospitalist Progress Note           Daily Progress Note: 10/22/2020    Chief complaint: Shortness of breath and weakness  Subjective: The patient is seen for follow  up. Patient 10/20/2020 and on nasal oxygen. chest tubes without air leaks.   More awake and alert        Problem List:  Problem List as of 10/22/2020 Date Reviewed: 9/17/2020          Codes Class Noted - Resolved    Metabolic encephalopathy GUT-66-AP: G93.41  ICD-9-CM: 348.31  9/26/2020 - Present        UTI (urinary tract infection) ICD-10-CM: N39.0  ICD-9-CM: 599.0  9/26/2020 - Present        Aspiration pneumonitis ([address] ICD-10-CM: J69.0  ICD-9-CM: 507.0  9/26/2020 - Present        Essential hypertension ICD-10-CM: I10  ICD-9-CM: 401.9  9/26/2020 - Present        Acute dehydration ICD-10-CM: E86.0  ICD-9-CM: 276.51  9/26/2020 - Present              Medications reviewed  Current Facility-Administered Medications   Medication Dose Route Frequency    morphine injection 1 mg  1 mg IntraVENous Q6H PRN    metoprolol tartrate (LOPRESSOR) tablet 50 mg  50 mg Oral BID    dextrose 5% infusion  75 mL/hr IntraVENous CONTINUOUS    predniSONE (DELTASONE) tablet 20 mg  20 mg Oral DAILY WITH BREAKFAST    albumin human 25% (BUMINATE) solution 25 g  25 g IntraVENous BID    aspirin chewable tablet 81 mg  81 mg Oral DAILY    spironolactone (ALDACTONE) tablet 25 mg  25 mg Oral BID    piperacillin-tazobactam (ZOSYN) 3.375 g in 0.9% sodium chloride (MBP/ADV) 100 mL MBP  3.375 g IntraVENous Q8H    pantoprazole (PROTONIX) granules for oral suspension 40 mg  40 mg Per NG tube ACB    albuterol CONCENTRATE 2.5mg/0.5 mL neb soln  2.5 mg Nebulization Q6H RT    LORazepam (ATIVAN) injection 1 mg  1 mg IntraVENous Q2H PRN    sodium chloride 0.9% (NS) 3ml nebulizer solution  2.5 mL Nebulization PRN    influenza vaccine 2020-21 (4 yrs+)(PF) (FLUCELVAX QUAD) injection 0.5 mL  0.5 mL IntraMUSCular PRIOR TO DISCHARGE    diphenhydrAMINE (BENADRYL) capsule 50 mg  50 mg Oral BID PRN    phosphorus (K PHOS NEUTRAL) 250 mg tablet 2 Tab  2 Tab Oral BID    oxyCODONE IR (ROXICODONE) tablet 15 mg  15 mg Oral Q4H PRN    enoxaparin (LOVENOX) injection 40 mg  40 mg SubCUTAneous Q24H    acetaminophen (TYLENOL) tablet 650 mg  650 mg Oral Q6H PRN    ondansetron (ZOFRAN) injection 4 mg  4 mg IntraVENous Q8H PRN    guaiFENesin (ROBITUSSIN) 20 mg/mL oral liquid 400 mg  400 mg Oral Q6H PRN       Review of Systems:   Review of systems unable to be obtained because he is intubated    Objective:   Physical Exam:     Visit Vitals  BP (!) 153/84 (BP 1 Location: Left arm, BP Patient Position: At rest)   Pulse (!) 110   Temp 98.6 °F (37 °C)   Resp (!) 32   Ht 6' (1.829 m)   Wt 63.8 kg (140 lb 10.5 oz)   SpO2 98%   BMI 19.08 kg/m²    O2 Flow Rate (L/min): 1 l/min O2 Device: Nasal cannula    Temp (24hrs), Av.3 °F (37.4 °C), Min:98.6 °F (37 °C), Max:99.9 °F (37.7 °C)    10/22 0701 - 10/22 1900  In: -   Out: 300 [Urine:250; Drains:50]   10/20 1901 - 10/22 07  In: -   Out: 5808 [JOHRD:6819; Drains:390]    General:   Awake and oriented   Lungs:   Clear to auscultation bilaterally with diminished breath sounds bilateral bases. Chest wall:  No tenderness or deformity. Heart:  Regular rate and rhythm, S1, S2 normal, no murmur, click, rub or gallop. Abdomen:   Soft, non-tender. Diminished Bowel sounds. Dressings in place. Extremities: Extremities normal, atraumatic, positive edema bilaterally   Pulses: 2+ and symmetric all extremities. Skin: Skin color, texture, turgor normal. No rashes or lesions   Neurologic: CNII-XII intact.      Data Review:       Recent Days:  Recent Labs     10/21/20  0410 10/20/20  0445   WBC 21.8* 25.4*   HGB 8.3* 9.2*   HCT 24.8* 27.2*    321     Recent Labs     10/22/20  0425 10/21/20  0410 10/20/20  0445    149*  --    K 4.0 3.1*  --    * 114*  --    CO2 27 26  --    GLU 81 91  --    BUN 19 27*  --    CREA 0.55* 0.50* --    CA 8.8 8.8  --    MG 1.7  --  1.9   PHOS 2.9  --   --    ALB 3.2*  --   --      Recent Labs     10/21/20  0425 10/20/20  0520   PH 7.48* 7.46*   PCO2 31* 32*   PO2 71* 98   HCO3 24 24   FIO2  --  40       24 Hour Results:  Recent Results (from the past 24 hour(s))   RENAL FUNCTION PANEL    Collection Time: 10/22/20  4:25 AM   Result Value Ref Range    Sodium 143 136 - 145 mmol/L    Potassium 4.0 3.5 - 5.1 mmol/L    Chloride 110 (H) 97 - 108 mmol/L    CO2 27 21 - 32 mmol/L    Anion gap 6 5 - 15 mmol/L    Glucose 81 65 - 100 mg/dL    BUN 19 6 - 20 mg/dL    Creatinine 0.55 (L) 0.70 - 1.30 mg/dL    BUN/Creatinine ratio 35 (H) 12 - 20      GFR est AA >60 >60 ml/min/1.73m2    GFR est non-AA >60 >60 ml/min/1.73m2    Calcium 8.8 8.5 - 10.1 mg/dL    Phosphorus 2.9 2.6 - 4.7 mg/dL    Albumin 3.2 (L) 3.5 - 5.0 g/dL   MAGNESIUM    Collection Time: 10/22/20  4:25 AM   Result Value Ref Range    Magnesium 1.7 1.6 - 2.4 mg/dL           Assessment/     Acute hypoxic respiratory failure postsurgery. Self extubated 10/09/20. Reintubated 10/10 due to hypoxemia. Extubated 10/11. Reintubated 10/17 for bilateral pneumothoraces. Extubated 10/20    Bilateral pneumothoraces, status post chest tubes bilateral    Acute kidney injury likely secondary to ATN from hypotension. Hypovolemic shock post surgery. Improved    Right lower lobe aspiration pneumonia improved    Urinary tract infection due to E. Coli    Abnormal Cardiolite stress test showing inferior ischemia. 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  Repleted    Hypothermia, probably largely environmental.  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 Plan:  Continue supportive care including antibiotics  PEG placement planned for today   High risk for aspiration given prior barium studies and inclusion body myositis  Consider transfer to medical telemetry floor after removal of chest tubes  High Risk of decompensation    Care Plan discussed with: Patient/Family       Total time spent with patient: 30 minutes.     Heather Zhou MD

## 2020-10-22 NOTE — PROGRESS NOTES
Renal Progress Note    Patient: Jennifer Orellana MRN: [id number]  SSN: [id number]    YOB: 1965  Age: 48 y.o. Sex: male      Admit Date: 9/26/2020    LOS: 26 days     Subjective:   Patient seen in ICU,  He is having PEG tube placement bedside   is awake.   Currently getting CPT   s/p dwight chest tube placements for dwight pneumo  No edema      Current Facility-Administered Medications   Medication Dose Route Frequency    morphine injection 1 mg  1 mg IntraVENous Q6H PRN    metoprolol tartrate (LOPRESSOR) tablet 50 mg  50 mg Oral BID    dextrose 5% infusion  75 mL/hr IntraVENous CONTINUOUS    predniSONE (DELTASONE) tablet 20 mg  20 mg Oral DAILY WITH BREAKFAST    albumin human 25% (BUMINATE) solution 25 g  25 g IntraVENous BID    aspirin chewable tablet 81 mg  81 mg Oral DAILY    spironolactone (ALDACTONE) tablet 25 mg  25 mg Oral BID    piperacillin-tazobactam (ZOSYN) 3.375 g in 0.9% sodium chloride (MBP/ADV) 100 mL MBP  3.375 g IntraVENous Q8H    pantoprazole (PROTONIX) granules for oral suspension 40 mg  40 mg Per NG tube ACB    albuterol CONCENTRATE 2.5mg/0.5 mL neb soln  2.5 mg Nebulization Q6H RT    LORazepam (ATIVAN) injection 1 mg  1 mg IntraVENous Q2H PRN    sodium chloride 0.9% (NS) 3ml nebulizer solution  2.5 mL Nebulization PRN    influenza vaccine 2020-21 (4 yrs+)(PF) (FLUCELVAX QUAD) injection 0.5 mL  0.5 mL IntraMUSCular PRIOR TO DISCHARGE    diphenhydrAMINE (BENADRYL) capsule 50 mg  50 mg Oral BID PRN    phosphorus (K PHOS NEUTRAL) 250 mg tablet 2 Tab  2 Tab Oral BID    oxyCODONE IR (ROXICODONE) tablet 15 mg  15 mg Oral Q4H PRN    enoxaparin (LOVENOX) injection 40 mg  40 mg SubCUTAneous Q24H    acetaminophen (TYLENOL) tablet 650 mg  650 mg Oral Q6H PRN    ondansetron (ZOFRAN) injection 4 mg  4 mg IntraVENous Q8H PRN    guaiFENesin (ROBITUSSIN) 20 mg/mL oral liquid 400 mg  400 mg Oral Q6H PRN        Vitals:    10/22/20 1000 10/22/20 1100 10/22/20 1215 10/22/20 1300   BP: (!) 153/84 135/88 (!) 147/93 123/75   Pulse: (!) 110 (!) 108 (!) 111 (!) 115   Resp: (!) 32 26 26 19   Temp:  98.7 °F (37.1 °C)     SpO2: 98% 99% 99% 99%   Weight:       Height:         Objective:   General: no acute distress. HEENT: EOMI, no Icterus, no Pallor,  neck: Neck is supple, No JVD  Lungs: decreased breathsounds, bilateral chest tubes present  CVS: heart sounds normal,  no murmurs, no rubs. GI: soft, nontender, normal BS. Extremeties: no cyanosis,no edema in ext   Neuro: awake  Skin: normal skin turgor, no skin rashes.         Intake and Output:  Current Shift: 10/22 0701 - 10/22 1900  In: 100 [I.V.:100]  Out: 300 [Urine:250; Drains:50]  Last three shifts: 10/20 1901 - 10/22 0700  In: -   Out: 7894 [Urine:1950; Drains:390]      Lab/Data Review:  Recent Labs     10/21/20  0410 10/20/20  0445   WBC 21.8* 25.4*   HGB 8.3* 9.2*   HCT 24.8* 27.2*    321     Recent Labs     10/22/20  0425 10/21/20  0410 10/20/20  0445    149*  --    K 4.0 3.1*  --    * 114*  --    CO2 27 26  --    GLU 81 91  --    BUN 19 27*  --    CREA 0.55* 0.50*  --    CA 8.8 8.8  --    MG 1.7  --  1.9   PHOS 2.9  --   --    ALB 3.2*  --   --      Recent Labs     10/21/20  0425 10/20/20  0520   PH 7.48* 7.46*   PCO2 31* 32*   PO2 71* 98   HCO3 24 24   FIO2  --  40     Recent Results (from the past 24 hour(s))   RENAL FUNCTION PANEL    Collection Time: 10/22/20  4:25 AM   Result Value Ref Range    Sodium 143 136 - 145 mmol/L    Potassium 4.0 3.5 - 5.1 mmol/L    Chloride 110 (H) 97 - 108 mmol/L    CO2 27 21 - 32 mmol/L    Anion gap 6 5 - 15 mmol/L    Glucose 81 65 - 100 mg/dL    BUN 19 6 - 20 mg/dL    Creatinine 0.55 (L) 0.70 - 1.30 mg/dL    BUN/Creatinine ratio 35 (H) 12 - 20      GFR est AA >60 >60 ml/min/1.73m2    GFR est non-AA >60 >60 ml/min/1.73m2    Calcium 8.8 8.5 - 10.1 mg/dL    Phosphorus 2.9 2.6 - 4.7 mg/dL    Albumin 3.2 (L) 3.5 - 5.0 g/dL   MAGNESIUM    Collection Time: 10/22/20  4:25 AM   Result Value Ref Range Magnesium 1.7 1.6 - 2.4 mg/dL        Assessment and Plan:       1. severe hypokalemia:  -from diuretics and metabolic alkalosis,   -Potassium is proved to 4.0 today after replacement yesterday  -Mg is okay at 1.7  -Off lasix now.   -Continue kcl 40 bid and 25 twice daily of spironolactone.   -monitor K levels,      2. Hypernatremia  -Secondary to diuretics. Increased urine output noted. Now Off Lasix  -on D5W at 75 mils per hour which I will d/c.  -Having PEG tube placed today. Tube feeding can be restarted when okayed by GI    3. Edema: improved with good diuresis +, off lasix  -I will d/c IV fluids  -Continue same dose of spironolactone     4. .  acute resp.failure: dwight pneumo+  -On vent, had bilateral chest tube placements because of bilateral pneumothorax  -pulm and CT surgery following    5.  Hypotension:  -It is better now.   Tolerating spironolactone so far   -resumed mododrine 10 mg bid  -Continue to monitor BPs     6 status post  infrarenal aorta endarterectomy, with aortic by common femoral artery bypass with 14 mm x 7 mm Hemosure graft       Signed By: Ezequiel Cornell MD     October 22, 2020

## 2020-10-22 NOTE — ANESTHESIA PREPROCEDURE EVALUATION
Relevant Problems   No relevant active problems       Anesthetic History   No history of anesthetic complications            Review of Systems / Medical History  Patient summary reviewed, nursing notes reviewed and pertinent labs reviewed    Pulmonary          Smoker      Comments: Aspiration pneumonitis. HX OF SPONTANEOUS PNEUMOTHORAX. Neuro/Psych             Comments: NUMBNESS IN FEET. Cardiovascular    Hypertension                Comments: ECHO (10-)Image quality for this study was technically difficult. · Contrast used: DEFINITY. · LV: Estimated LVEF is 50 - 55%. Normal cavity size, wall thickness, systolic function (ejection fraction normal) and diastolic function. Wall motion: normal. Normal left ventricular strain. EKG: ACCELERATED JUNCTIONAL RHYTHM. GI/Hepatic/Renal                Endo/Other        Anemia (Hb/Hct 8.3/24. 8)     Other Findings   Comments: FAMILIAL INCLUSION BODY MYOSITIS. MULTIPLE BRUISES. Physical Exam    Airway  Mallampati: I  TM Distance: > 6 cm  Neck ROM: normal range of motion        Cardiovascular    Rhythm: regular  Rate: normal         Dental    Dentition: Loose teeth and Poor dentition     Pulmonary      Decreased breath sounds          Comments: FEW SCENT CRACKLES.   Abdominal         Other Findings            Anesthetic Plan    ASA: 3, emergent  Anesthesia type: total IV anesthesia and general - backup          Induction: Intravenous  Anesthetic plan and risks discussed with: Patient

## 2020-10-22 NOTE — PROGRESS NOTES
Pulmonology and Critical Care Progress Note    Subjective:     Chief Complaint:   Chief Complaint   Patient presents with    Altered mental status     The patient underwent aortobifemoral bypass surgery 10/6/20. Post surgery he was left on the ventilator. Apparently blood loss was about 3.5 L. When he returned to the ICU he was on multiple pressors. Successfully extubated after suctioning of mucous plugs from the left. Unfortunately, he developed spontaneous left pneumothorax. Thoracic surgery did aspiration of his left pneumothorax. No chest tube insertion was required. Patient developed increasing respiratory distress secondary to a tension right-sided PTX. Patient ended up requiring intubation and Dr. Fercho Allen came in to evaluate the patient and found him to have bilateral pneumothoraces and placed bilateral chest tubes.      Patient seen and examined at the bedside this morning in the ICU. I discussed the case in detail with the bedside nurse, respiratory therapy, and his wife who was sitting at the bedside with him. Patient was successfully extubated on 10/20/20 and he is tolerating RA to 2L NC well. Chest tubes still on water seal and without an air leak. Likely to be removed tomorrow. HELGA drains likely out tomorrow per surgery, still having some drainage today. Seen by speech therapy on 10/21/2020 and they are still quite concerned about aspiration risk. They have cleared him for honey thickened clear liquids but are recommending a PEG tube. Appreciate assistance from gastroenterology, they are planning PEG tube placement today.       Review of Systems:  Unable to perform due to patient's condition    Current Facility-Administered Medications   Medication Dose Route Frequency Provider Last Rate Last Dose    morphine injection 1 mg  1 mg IntraVENous Q6H PRN Sheila Hernandez MD        metoprolol tartrate (LOPRESSOR) tablet 50 mg  50 mg Oral BID Johnny Santiago DO        magnesium sulfate 1 g/100 ml IVPB (premix or compounded)  1 g IntraVENous ONCE Johnny Santiago DO        dextrose 5% infusion  75 mL/hr IntraVENous CONTINUOUS Sugar Parekh MD 75 mL/hr at 10/22/20 0754 75 mL/hr at 10/22/20 0754    predniSONE (DELTASONE) tablet 20 mg  20 mg Oral DAILY WITH BREAKFAST Johnny Santiago DO   20 mg at 10/21/20 0931    albumin human 25% (BUMINATE) solution 25 g  25 g IntraVENous BID Fannie Olson MD 50 mL/hr at 10/21/20 2125 25 g at 10/21/20 2125    aspirin chewable tablet 81 mg  81 mg Oral DAILY Jermaine Daniel MD   81 mg at 10/21/20 0836    spironolactone (ALDACTONE) tablet 25 mg  25 mg Oral BID Wendy Szymanski MD   25 mg at 10/21/20 2131    piperacillin-tazobactam (ZOSYN) 3.375 g in 0.9% sodium chloride (MBP/ADV) 100 mL MBP  3.375 g IntraVENous Q8H Jermaine Daniel MD 25 mL/hr at 10/22/20 0144 3.375 g at 10/22/20 0144    pantoprazole (PROTONIX) granules for oral suspension 40 mg  40 mg Per NG tube ACB Jermaine Daniel MD   40 mg at 10/21/20 0836    albuterol CONCENTRATE 2.5mg/0.5 mL neb soln  2.5 mg Nebulization Q6H RT Johnny Santiago DO   2.5 mg at 10/22/20 0845    LORazepam (ATIVAN) injection 1 mg  1 mg IntraVENous Q2H PRN Fannie Olson MD   1 mg at 10/16/20 2151    sodium chloride 0.9% (NS) 3ml nebulizer solution  2.5 mL Nebulization PRN Lan Cabello MD   3 mL at 10/21/20 1359    influenza vaccine 2020-21 (4 yrs+)(PF) (FLUCELVAX QUAD) injection 0.5 mL  0.5 mL IntraMUSCular PRIOR TO DISCHARGE Jermaine Daniel MD   Stopped at 10/07/20 0900    diphenhydrAMINE (BENADRYL) capsule 50 mg  50 mg Oral BID PRN Kobi Hernández, NP   50 mg at 10/21/20 2345    phosphorus (K PHOS NEUTRAL) 250 mg tablet 2 Tab  2 Tab Oral BID Jermaine Daniel MD   2 Tab at 10/21/20 2127    oxyCODONE IR (ROXICODONE) tablet 15 mg  15 mg Oral Q4H PRN Jermaine Daniel MD   15 mg at 10/21/20 2253    enoxaparin (LOVENOX) injection 40 mg  40 mg SubCUTAneous Q24H Lan Cabello MD   40 mg at 10/21/20 2127    acetaminophen (TYLENOL) tablet 650 mg  650 mg Oral Q6H PRN Gisele Long NP   650 mg at 10/20/20 0432    ondansetron (ZOFRAN) injection 4 mg  4 mg IntraVENous Q8H PRN Gisele Long NP   4 mg at 20 0402    guaiFENesin (ROBITUSSIN) 20 mg/mL oral liquid 400 mg  400 mg Oral Q6H PRN Dilia Jaimes NP   Stopped at 20 1855            No Known Allergies        Objective:     Blood pressure (!) 174/91, pulse (!) 101, temperature 98.6 °F (37 °C), resp. rate 29, height 6' (1.829 m), weight 63.8 kg (140 lb 10.5 oz), SpO2 96 %. Temp (24hrs), Av.3 °F (37.4 °C), Min:98.6 °F (37 °C), Max:99.9 °F (37.7 °C)      Intake and Output:  Current Shift: No intake/output data recorded. Last 3 Shifts: 10/20 1901 - 10/22 0700  In: -   Out: 5352 [Urine:1950; Drains:390]    Physical Exam:     General:  Lying in bed, NAD, on 2 L nasal cannula  Throat and Neck: Supple. No JVD. He has a right subclavian central line. Lung:  Much improved aeration bilaterally, minimal rhonchi in the left base. Bilateral chest tubes present on water seal without air leaks. On 2 L nasal cannula. Heart: S1+S2. No murmurs  Abdomen: Status post surgery. He has a midline incision. He has HELGA drains one on each side. Bowel sounds are hypoactive. Draining minimal fluid. Extremities:  He has pitting edema. Distal pulses of the lower extremities are noted with Dopplers. Has skin breakdown on the dependent portion of heel. : Reese catheter in place. Making some urine output.   Skin: No cyanosis  Neurologic:  A+Ox3, non-focal exam        Recent Results (from the past 24 hour(s))   RENAL FUNCTION PANEL    Collection Time: 10/22/20  4:25 AM   Result Value Ref Range    Sodium 143 136 - 145 mmol/L    Potassium 4.0 3.5 - 5.1 mmol/L    Chloride 110 (H) 97 - 108 mmol/L    CO2 27 21 - 32 mmol/L    Anion gap 6 5 - 15 mmol/L    Glucose 81 65 - 100 mg/dL    BUN 19 6 - 20 mg/dL    Creatinine 0.55 (L) 0.70 - 1.30 mg/dL    BUN/Creatinine ratio 35 (H) 12 - 20      GFR est AA >60 >60 ml/min/1.73m2    GFR est non-AA >60 >60 ml/min/1.73m2    Calcium 8.8 8.5 - 10.1 mg/dL    Phosphorus 2.9 2.6 - 4.7 mg/dL    Albumin 3.2 (L) 3.5 - 5.0 g/dL   MAGNESIUM    Collection Time: 10/22/20  4:25 AM   Result Value Ref Range    Magnesium 1.7 1.6 - 2.4 mg/dL       CT Results  (Last 48 hours)    None          Assessment:     1. Acute respiratory failure, after aortobifemoral bypass surgery on 10/6/2020. Patient self extubated himself early in the morning on 10/9/2020. Re-intubated on 10/10/20 for left-sided mucous plugging and complete left lung collapse and had flexible bronchoscopy done with suction of mucous plug. 2.  Acute metabolic encephalopathy, resolved  3. Aspiration pneumonia  4. Severe peripheral vascular disease   5. UTI   6. Hypertension  7. Familial polymyositis  8. Left spontaneous pneumothorax    Plan:     1.)  Acute Hypoxic respiratory failure. Extubated 10/20/20, doing well on RA. AM chest x-ray reviewed, stable b/l chest tubes without recurrent PTX. Repeat in the morning. Precedex has been weaned off. GI consult for PEG tube, to be placed today. Cleared by speech therapy for honey-thickened liquids, but this is very unlikely to meet his caloric needs. Will need nutrition on board to start tube feeds soon. Timing per GI. Okay to transfer out of the ICU likely tomorrow. He will have an easier transition to the floor once his chest tubes are out, HELGA drains are out, and his PEG tube has been successfully placed. 2.)  Bilateral pneumothoraces  Bilateral chest tubes placed, no airleak on waterseal.  Dr. Sangita Downing following closely. Stable chest x-ray  Likely to be removed tomorrow. 3.)  Aspiration/HAP pneumonia:  Continue Zosyn (day 6/7)  Unclear why he is on steroids, currently on prednisone 20 mg daily. Will plan to stop tomorrow. On scheduled nebulized bronchodilator treatments. GI following, PEG tube planned for today.   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 4.)  Metabolic encephalopathy. He has a progressive neurologic disorder. Brain MRI negative    Further recommendation per Neurology. Back to baseline now after extubation. .    5.)  Myocardial ischemia:  He underwent nuclear stress testing which showed significant inferior and from will treat him with medical managememt   I will follow the patient closely with you. DVT and GI prophylaxis. He is on Lovenox and Protonix IV. Questions of wife and answered at bedside in detail  Case discussed in detail with RN, RT, and care team in ICU  Thank you for involving me in the care of this patient  I will follow with you closely during hospitalization    Critical Care Time Spent more than 55 minutes in direct patient care with no overlap reviewing results and records, decision making, and answering questions.       Mary Grace Morales, DO  [facility]  10/22/2020

## 2020-10-23 ENCOUNTER — APPOINTMENT (OUTPATIENT)
Dept: GENERAL RADIOLOGY | Age: 55
DRG: 981 | End: 2020-10-23
Attending: FAMILY MEDICINE
Payer: COMMERCIAL

## 2020-10-23 ENCOUNTER — ANESTHESIA EVENT (OUTPATIENT)
Dept: ICU | Age: 55
DRG: 981 | End: 2020-10-23
Payer: COMMERCIAL

## 2020-10-23 ENCOUNTER — APPOINTMENT (OUTPATIENT)
Dept: GENERAL RADIOLOGY | Age: 55
DRG: 981 | End: 2020-10-23
Attending: THORACIC SURGERY (CARDIOTHORACIC VASCULAR SURGERY)
Payer: COMMERCIAL

## 2020-10-23 ENCOUNTER — ANESTHESIA (OUTPATIENT)
Dept: ICU | Age: 55
DRG: 981 | End: 2020-10-23
Payer: COMMERCIAL

## 2020-10-23 LAB
ANION GAP SERPL CALC-SCNC: 7 MMOL/L (ref 5–15)
ANION GAP SERPL CALC-SCNC: 7 MMOL/L (ref 5–15)
BASE DEFICIT BLDA-SCNC: 9.3 MMOL/L (ref 0–2)
BASE EXCESS BLDA CALC-SCNC: 5.2 MMOL/L (ref 0–2)
BASOPHILS # BLD: 0 K/UL (ref 0–0.1)
BASOPHILS # BLD: 0 K/UL (ref 0–0.1)
BASOPHILS NFR BLD: 0 % (ref 0–1)
BASOPHILS NFR BLD: 0 % (ref 0–1)
BDY SITE: ABNORMAL
BDY SITE: ABNORMAL
BUN SERPL-MCNC: 15 MG/DL (ref 6–20)
BUN SERPL-MCNC: 17 MG/DL (ref 6–20)
BUN/CREAT SERPL: 27 (ref 12–20)
BUN/CREAT SERPL: 32 (ref 12–20)
CA-I BLD-MCNC: 8.5 MG/DL (ref 8.5–10.1)
CA-I BLD-MCNC: 9 MG/DL (ref 8.5–10.1)
CHLORIDE SERPL-SCNC: 108 MMOL/L (ref 97–108)
CHLORIDE SERPL-SCNC: 111 MMOL/L (ref 97–108)
CO2 SERPL-SCNC: 25 MMOL/L (ref 21–32)
CO2 SERPL-SCNC: 30 MMOL/L (ref 21–32)
CREAT SERPL-MCNC: 0.47 MG/DL (ref 0.7–1.3)
CREAT SERPL-MCNC: 0.63 MG/DL (ref 0.7–1.3)
DIFFERENTIAL METHOD BLD: ABNORMAL
DIFFERENTIAL METHOD BLD: ABNORMAL
EOSINOPHIL # BLD: 0 K/UL (ref 0–0.4)
EOSINOPHIL # BLD: 0 K/UL (ref 0–0.4)
EOSINOPHIL NFR BLD: 0 % (ref 0–7)
EOSINOPHIL NFR BLD: 0 % (ref 0–7)
EPAP/CPAP/PEEP, PAPEEP: 5
ERYTHROCYTE [DISTWIDTH] IN BLOOD BY AUTOMATED COUNT: 18.4 % (ref 11.5–14.5)
ERYTHROCYTE [DISTWIDTH] IN BLOOD BY AUTOMATED COUNT: 18.9 % (ref 11.5–14.5)
FIO2 ON VENT: 100 %
FIO2 ON VENT: 70 %
GAS FLOW.O2 O2 DELIVERY SYS: 15 L/MIN
GAS FLOW.O2 SETTING OXYMISER: 20 L/MIN
GLUCOSE SERPL-MCNC: 186 MG/DL (ref 65–100)
GLUCOSE SERPL-MCNC: 73 MG/DL (ref 65–100)
HCO3 BLDA-SCNC: 17 MMOL/L (ref 22–26)
HCO3 BLDA-SCNC: 29 MMOL/L (ref 22–26)
HCT VFR BLD AUTO: 22.6 % (ref 36.6–50.3)
HCT VFR BLD AUTO: 24.1 % (ref 36.6–50.3)
HGB BLD-MCNC: 7.5 G/DL (ref 12.1–17)
HGB BLD-MCNC: 8.1 G/DL (ref 12.1–17)
IMM GRANULOCYTES # BLD AUTO: 0.4 K/UL (ref 0–0.04)
IMM GRANULOCYTES # BLD AUTO: 0.6 K/UL (ref 0–0.04)
IMM GRANULOCYTES NFR BLD AUTO: 2 % (ref 0–0.5)
IMM GRANULOCYTES NFR BLD AUTO: 5 % (ref 0–0.5)
LACTATE SERPL-SCNC: 2.7 MMOL/L (ref 0.4–2)
LYMPHOCYTES # BLD: 0.9 K/UL (ref 0.8–3.5)
LYMPHOCYTES # BLD: 1.1 K/UL (ref 0.8–3.5)
LYMPHOCYTES NFR BLD: 6 % (ref 12–49)
LYMPHOCYTES NFR BLD: 7 % (ref 12–49)
MAGNESIUM SERPL-MCNC: 2.2 MG/DL (ref 1.6–2.4)
MCH RBC QN AUTO: 31.2 PG (ref 26–34)
MCH RBC QN AUTO: 31.9 PG (ref 26–34)
MCHC RBC AUTO-ENTMCNC: 33.2 G/DL (ref 30–36.5)
MCHC RBC AUTO-ENTMCNC: 33.6 G/DL (ref 30–36.5)
MCV RBC AUTO: 92.7 FL (ref 80–99)
MCV RBC AUTO: 96.2 FL (ref 80–99)
MONOCYTES # BLD: 1.2 K/UL (ref 0–1)
MONOCYTES # BLD: 1.4 K/UL (ref 0–1)
MONOCYTES NFR BLD: 8 % (ref 5–13)
MONOCYTES NFR BLD: 9 % (ref 5–13)
NEUTS SEG # BLD: 10.5 K/UL (ref 1.8–8)
NEUTS SEG # BLD: 14.3 K/UL (ref 1.8–8)
NEUTS SEG NFR BLD: 83 % (ref 32–75)
NEUTS SEG NFR BLD: 85 % (ref 32–75)
NRBC # BLD: 0.13 K/UL (ref 0–0.01)
NRBC # BLD: 0.15 K/UL (ref 0–0.01)
NRBC BLD-RTO: 0.8 PER 100 WBC
NRBC BLD-RTO: 1.2 PER 100 WBC
PCO2 BLDA: 33 MMHG (ref 35–45)
PCO2 BLDA: 63 MMHG (ref 35–45)
PH BLDA: 7.11 [PH] (ref 7.35–7.45)
PH BLDA: 7.53 [PH] (ref 7.35–7.45)
PLATELET # BLD AUTO: 261 K/UL (ref 150–400)
PLATELET # BLD AUTO: 329 K/UL (ref 150–400)
PMV BLD AUTO: 11.1 FL (ref 8.9–12.9)
PMV BLD AUTO: 11.5 FL (ref 8.9–12.9)
PO2 BLDA: 111 MMHG (ref 75–100)
PO2 BLDA: 145 MMHG (ref 75–100)
POTASSIUM SERPL-SCNC: 2.8 MMOL/L (ref 3.5–5.1)
POTASSIUM SERPL-SCNC: 3.7 MMOL/L (ref 3.5–5.1)
RBC # BLD AUTO: 2.35 M/UL (ref 4.1–5.7)
RBC # BLD AUTO: 2.6 M/UL (ref 4.1–5.7)
SAO2 % BLD: 99 %
SAO2 % BLD: 99 %
SAO2% DEVICE SAO2% SENSOR NAME: ABNORMAL
SERVICE CMNT-IMP: ABNORMAL
SODIUM SERPL-SCNC: 140 MMOL/L (ref 136–145)
SODIUM SERPL-SCNC: 148 MMOL/L (ref 136–145)
TROPONIN I SERPL-MCNC: <0.05 NG/ML
VENTILATION MODE VENT: ABNORMAL
VT SETTING VENT: 400 ML
WBC # BLD AUTO: 12.6 K/UL (ref 4.1–11.1)
WBC # BLD AUTO: 16.8 K/UL (ref 4.1–11.1)

## 2020-10-23 PROCEDURE — 74011250636 HC RX REV CODE- 250/636: Performed by: ANESTHESIOLOGY

## 2020-10-23 PROCEDURE — 80048 BASIC METABOLIC PNL TOTAL CA: CPT

## 2020-10-23 PROCEDURE — 74011636637 HC RX REV CODE- 636/637: Performed by: INTERNAL MEDICINE

## 2020-10-23 PROCEDURE — P9047 ALBUMIN (HUMAN), 25%, 50ML: HCPCS | Performed by: SURGERY

## 2020-10-23 PROCEDURE — 74011250637 HC RX REV CODE- 250/637: Performed by: INTERNAL MEDICINE

## 2020-10-23 PROCEDURE — 74011000250 HC RX REV CODE- 250: Performed by: ANESTHESIOLOGY

## 2020-10-23 PROCEDURE — 65610000006 HC RM INTENSIVE CARE

## 2020-10-23 PROCEDURE — 94400 HC END TIDAL CO2 RESPONSE CURVE: CPT

## 2020-10-23 PROCEDURE — 94668 MNPJ CHEST WALL SBSQ: CPT

## 2020-10-23 PROCEDURE — 74011000250 HC RX REV CODE- 250: Performed by: FAMILY MEDICINE

## 2020-10-23 PROCEDURE — 93005 ELECTROCARDIOGRAM TRACING: CPT

## 2020-10-23 PROCEDURE — 5A12012 PERFORMANCE OF CARDIAC OUTPUT, SINGLE, MANUAL: ICD-10-PCS | Performed by: INTERNAL MEDICINE

## 2020-10-23 PROCEDURE — 36600 WITHDRAWAL OF ARTERIAL BLOOD: CPT

## 2020-10-23 PROCEDURE — P9016 RBC LEUKOCYTES REDUCED: HCPCS

## 2020-10-23 PROCEDURE — 83605 ASSAY OF LACTIC ACID: CPT

## 2020-10-23 PROCEDURE — 36430 TRANSFUSION BLD/BLD COMPNT: CPT

## 2020-10-23 PROCEDURE — 74011000250 HC RX REV CODE- 250

## 2020-10-23 PROCEDURE — 84484 ASSAY OF TROPONIN QUANT: CPT

## 2020-10-23 PROCEDURE — 74011250636 HC RX REV CODE- 250/636: Performed by: SURGERY

## 2020-10-23 PROCEDURE — 99231 SBSQ HOSP IP/OBS SF/LOW 25: CPT | Performed by: THORACIC SURGERY (CARDIOTHORACIC VASCULAR SURGERY)

## 2020-10-23 PROCEDURE — 74011000250 HC RX REV CODE- 250: Performed by: INTERNAL MEDICINE

## 2020-10-23 PROCEDURE — 85025 COMPLETE CBC W/AUTO DIFF WBC: CPT

## 2020-10-23 PROCEDURE — 94640 AIRWAY INHALATION TREATMENT: CPT

## 2020-10-23 PROCEDURE — 71045 X-RAY EXAM CHEST 1 VIEW: CPT

## 2020-10-23 PROCEDURE — 74011000258 HC RX REV CODE- 258: Performed by: INTERNAL MEDICINE

## 2020-10-23 PROCEDURE — 74011250636 HC RX REV CODE- 250/636: Performed by: INTERNAL MEDICINE

## 2020-10-23 PROCEDURE — 83735 ASSAY OF MAGNESIUM: CPT

## 2020-10-23 PROCEDURE — 86923 COMPATIBILITY TEST ELECTRIC: CPT

## 2020-10-23 PROCEDURE — 86900 BLOOD TYPING SEROLOGIC ABO: CPT

## 2020-10-23 PROCEDURE — 5A1945Z RESPIRATORY VENTILATION, 24-96 CONSECUTIVE HOURS: ICD-10-PCS | Performed by: INTERNAL MEDICINE

## 2020-10-23 PROCEDURE — 77010033678 HC OXYGEN DAILY

## 2020-10-23 PROCEDURE — 94002 VENT MGMT INPAT INIT DAY: CPT

## 2020-10-23 PROCEDURE — 74011250636 HC RX REV CODE- 250/636: Performed by: FAMILY MEDICINE

## 2020-10-23 PROCEDURE — 82803 BLOOD GASES ANY COMBINATION: CPT

## 2020-10-23 PROCEDURE — 0BH17EZ INSERTION OF ENDOTRACHEAL AIRWAY INTO TRACHEA, VIA NATURAL OR ARTIFICIAL OPENING: ICD-10-PCS | Performed by: ANESTHESIOLOGY

## 2020-10-23 PROCEDURE — 36415 COLL VENOUS BLD VENIPUNCTURE: CPT

## 2020-10-23 RX ORDER — SODIUM CHLORIDE 9 MG/ML
250 INJECTION, SOLUTION INTRAVENOUS AS NEEDED
Status: DISCONTINUED | OUTPATIENT
Start: 2020-10-23 | End: 2020-10-26

## 2020-10-23 RX ORDER — POTASSIUM CHLORIDE 20 MEQ/1
40 TABLET, EXTENDED RELEASE ORAL 3 TIMES DAILY
Status: DISPENSED | OUTPATIENT
Start: 2020-10-23 | End: 2020-10-24

## 2020-10-23 RX ORDER — EPINEPHRINE 0.1 MG/ML
INJECTION INTRACARDIAC; INTRAVENOUS
Status: COMPLETED | OUTPATIENT
Start: 2020-10-23 | End: 2020-10-23

## 2020-10-23 RX ORDER — SODIUM BICARBONATE 1 MEQ/ML
SYRINGE (ML) INTRAVENOUS
Status: COMPLETED | OUTPATIENT
Start: 2020-10-23 | End: 2020-10-23

## 2020-10-23 RX ORDER — OXYCODONE HYDROCHLORIDE 5 MG/1
5 TABLET ORAL
Status: DISCONTINUED | OUTPATIENT
Start: 2020-10-23 | End: 2020-10-26

## 2020-10-23 RX ORDER — NOREPINEPHRINE BITARTRATE/D5W 8 MG/250ML
PLASTIC BAG, INJECTION (ML) INTRAVENOUS
Status: COMPLETED
Start: 2020-10-23 | End: 2020-10-23

## 2020-10-23 RX ORDER — NOREPINEPHRINE BITARTRATE/D5W 8 MG/250ML
PLASTIC BAG, INJECTION (ML) INTRAVENOUS
Status: COMPLETED | OUTPATIENT
Start: 2020-10-23 | End: 2020-10-23

## 2020-10-23 RX ORDER — FUROSEMIDE 10 MG/ML
40 INJECTION INTRAMUSCULAR; INTRAVENOUS ONCE
Status: COMPLETED | OUTPATIENT
Start: 2020-10-23 | End: 2020-10-23

## 2020-10-23 RX ORDER — ENOXAPARIN SODIUM 100 MG/ML
30 INJECTION SUBCUTANEOUS EVERY 24 HOURS
Status: DISCONTINUED | OUTPATIENT
Start: 2020-10-23 | End: 2020-10-26

## 2020-10-23 RX ORDER — POTASSIUM CHLORIDE 750 MG/1
20 TABLET, FILM COATED, EXTENDED RELEASE ORAL 2 TIMES DAILY
Status: DISCONTINUED | OUTPATIENT
Start: 2020-10-24 | End: 2020-10-26

## 2020-10-23 RX ORDER — SODIUM BICARBONATE 1 MEQ/ML
SYRINGE (ML) INTRAVENOUS
Status: DISPENSED
Start: 2020-10-23 | End: 2020-10-24

## 2020-10-23 RX ORDER — DEXTROSE 50 % IN WATER (D50W) INTRAVENOUS SYRINGE
Status: COMPLETED | OUTPATIENT
Start: 2020-10-23 | End: 2020-10-23

## 2020-10-23 RX ORDER — ALBUMIN HUMAN 250 G/1000ML
25 SOLUTION INTRAVENOUS ONCE
Status: COMPLETED | OUTPATIENT
Start: 2020-10-23 | End: 2020-10-24

## 2020-10-23 RX ORDER — SODIUM BICARBONATE 1 MEQ/ML
150 SYRINGE (ML) INTRAVENOUS ONCE
Status: COMPLETED | OUTPATIENT
Start: 2020-10-23 | End: 2020-10-23

## 2020-10-23 RX ORDER — FUROSEMIDE 10 MG/ML
INJECTION INTRAMUSCULAR; INTRAVENOUS
Status: DISPENSED
Start: 2020-10-23 | End: 2020-10-24

## 2020-10-23 RX ORDER — POTASSIUM CHLORIDE 7.45 MG/ML
10 INJECTION INTRAVENOUS
Status: COMPLETED | OUTPATIENT
Start: 2020-10-23 | End: 2020-10-23

## 2020-10-23 RX ADMIN — PIPERACILLIN AND TAZOBACTAM 3.38 G: 3; .375 INJECTION, POWDER, LYOPHILIZED, FOR SOLUTION INTRAVENOUS at 08:58

## 2020-10-23 RX ADMIN — SPIRONOLACTONE 25 MG: 25 TABLET ORAL at 21:33

## 2020-10-23 RX ADMIN — POTASSIUM CHLORIDE 10 MEQ: 10 INJECTION, SOLUTION INTRAVENOUS at 14:32

## 2020-10-23 RX ADMIN — SODIUM CHLORIDE 500 ML: 9 INJECTION, SOLUTION INTRAVENOUS at 20:26

## 2020-10-23 RX ADMIN — Medication 5 MCG: at 18:17

## 2020-10-23 RX ADMIN — SODIUM BICARBONATE 100 MEQ: 84 INJECTION INTRAVENOUS at 17:07

## 2020-10-23 RX ADMIN — FUROSEMIDE 40 MG: 10 INJECTION, SOLUTION INTRAMUSCULAR; INTRAVENOUS at 18:16

## 2020-10-23 RX ADMIN — POTASSIUM CHLORIDE 10 MEQ: 10 INJECTION, SOLUTION INTRAVENOUS at 10:16

## 2020-10-23 RX ADMIN — ISODIUM CHLORIDE 3 ML: 0.03 SOLUTION RESPIRATORY (INHALATION) at 19:29

## 2020-10-23 RX ADMIN — SODIUM BICARBONATE 150 MEQ: 84 INJECTION, SOLUTION INTRAVENOUS at 18:16

## 2020-10-23 RX ADMIN — PIPERACILLIN AND TAZOBACTAM 3.38 G: 3; .375 INJECTION, POWDER, LYOPHILIZED, FOR SOLUTION INTRAVENOUS at 01:35

## 2020-10-23 RX ADMIN — MORPHINE SULFATE 1 MG: 2 INJECTION, SOLUTION INTRAMUSCULAR; INTRAVENOUS at 05:26

## 2020-10-23 RX ADMIN — EPINEPHRINE 1 MG: 0.1 INJECTION, SOLUTION ENDOTRACHEAL; INTRACARDIAC; INTRAVENOUS at 17:02

## 2020-10-23 RX ADMIN — DEXTROSE MONOHYDRATE 50 ML: 25 INJECTION, SOLUTION INTRAVENOUS at 17:09

## 2020-10-23 RX ADMIN — METOPROLOL TARTRATE 50 MG: 50 TABLET, FILM COATED ORAL at 08:58

## 2020-10-23 RX ADMIN — PANTOPRAZOLE SODIUM 40 MG: 40 GRANULE, DELAYED RELEASE ORAL at 09:03

## 2020-10-23 RX ADMIN — ISODIUM CHLORIDE 3 ML: 0.03 SOLUTION RESPIRATORY (INHALATION) at 09:47

## 2020-10-23 RX ADMIN — ASPIRIN 81 MG CHEWABLE TABLET 81 MG: 81 TABLET CHEWABLE at 08:58

## 2020-10-23 RX ADMIN — ALBUTEROL SULFATE 2.5 MG: 2.5 SOLUTION RESPIRATORY (INHALATION) at 09:47

## 2020-10-23 RX ADMIN — DIBASIC SODIUM PHOSPHATE, MONOBASIC POTASSIUM PHOSPHATE AND MONOBASIC SODIUM PHOSPHATE 2 TABLET: 852; 155; 130 TABLET ORAL at 09:00

## 2020-10-23 RX ADMIN — ALBUTEROL SULFATE 2.5 MG: 2.5 SOLUTION RESPIRATORY (INHALATION) at 00:16

## 2020-10-23 RX ADMIN — ENOXAPARIN SODIUM 30 MG: 30 INJECTION SUBCUTANEOUS at 21:34

## 2020-10-23 RX ADMIN — EPINEPHRINE 1 MG: 0.1 INJECTION, SOLUTION ENDOTRACHEAL; INTRACARDIAC; INTRAVENOUS at 16:59

## 2020-10-23 RX ADMIN — POTASSIUM CHLORIDE 10 MEQ: 10 INJECTION, SOLUTION INTRAVENOUS at 13:00

## 2020-10-23 RX ADMIN — Medication 2 MCG/MIN: at 17:12

## 2020-10-23 RX ADMIN — POTASSIUM CHLORIDE 40 MEQ: 1500 TABLET, EXTENDED RELEASE ORAL at 15:49

## 2020-10-23 RX ADMIN — POTASSIUM CHLORIDE 10 MEQ: 10 INJECTION, SOLUTION INTRAVENOUS at 12:53

## 2020-10-23 RX ADMIN — PREDNISONE 20 MG: 20 TABLET ORAL at 09:01

## 2020-10-23 RX ADMIN — PIPERACILLIN AND TAZOBACTAM 3.38 G: 3; .375 INJECTION, POWDER, LYOPHILIZED, FOR SOLUTION INTRAVENOUS at 21:38

## 2020-10-23 RX ADMIN — SPIRONOLACTONE 25 MG: 25 TABLET ORAL at 09:01

## 2020-10-23 RX ADMIN — POTASSIUM CHLORIDE 10 MEQ: 10 INJECTION, SOLUTION INTRAVENOUS at 09:01

## 2020-10-23 RX ADMIN — ISODIUM CHLORIDE 3 ML: 0.03 SOLUTION RESPIRATORY (INHALATION) at 14:03

## 2020-10-23 RX ADMIN — POTASSIUM CHLORIDE 10 MEQ: 10 INJECTION, SOLUTION INTRAVENOUS at 11:38

## 2020-10-23 RX ADMIN — POTASSIUM CHLORIDE 40 MEQ: 1500 TABLET, EXTENDED RELEASE ORAL at 08:58

## 2020-10-23 RX ADMIN — ALBUMIN (HUMAN) 25 G: 0.25 INJECTION, SOLUTION INTRAVENOUS at 21:24

## 2020-10-23 RX ADMIN — ALBUTEROL SULFATE 2.5 MG: 2.5 SOLUTION RESPIRATORY (INHALATION) at 19:29

## 2020-10-23 RX ADMIN — LORAZEPAM 1 MG: 2 INJECTION, SOLUTION INTRAMUSCULAR; INTRAVENOUS at 15:49

## 2020-10-23 RX ADMIN — ALBUTEROL SULFATE 2.5 MG: 2.5 SOLUTION RESPIRATORY (INHALATION) at 14:00

## 2020-10-23 NOTE — PROGRESS NOTES
Comprehensive Nutrition Assessment    Type and Reason for Visit: Reassess    Nutrition Recommendations/Plan:   As PEG okay'd for use by GI, initiate TF of Jevity 1.5 at 30mL/hr  Increase by 20mL q4hr to goal rate of 57mL/hr, continuous  Flush with 150mL free water q4hr  Goal feeds provide 2052kcal, 87g protein, 1940mL fluid (meeting ~100% est needs)    Document TF rate, water flushes, and GRVs in EMR    Advance diet to Clear HTL for pleasure per SLP recs    Nutrition Assessment:  Admitted to ICU 2/2 aspiration event in ED. Pt required intubation, pressors, and sedation; extubated 10/11. L sided pneumothorax noted on CXR (10/13), pt asymptomatic; s/p needle decompression (10/14) and thoracentesis (10/15) without results. Transferred to med unit (10/15). Rapid response 10/17 and pt reintubated. Chest imaging finding bilat pneumos; bilat chest tubes placed. Extubated 10/20. Briefly received TPN d/t concern for GI bleed, then briefly received TF (Osmolite 1.2) prior to initial extubation. Diet advanced to Full/NTL (10/12) per SLP recs. Further advanced to Ground/NTL (10/14), SLP rec'd continue dysphagia diet s/p d/c. On dysphagia diet, RN reported pt with limited PO intakes d/t dislike of food texture. During second intubation, NG in place infusing TF of Promote- running at goal rate with good tolerance. Intermittently held d/t elevated residuals- held overnight 10/18 for residual of 400mL, reinitiated to goal next day with good tolerance. Also receiving Propofol for additional kcal during both intubations. NG d/c with extubation (10/20). SLP eval'd next day, rec'd Clear HTL with alternate means nutrition. PEG placement (10/22), TF not yet initiated d/t some bleeding from PEG site, waiting for GI Surg to reassess. Noted plans to remove HELGA and chest tubes over weekend, likely. RD to provide updated TF recs. Labs: H/H 8.1/24.1, K 2.8, BUN wnl, Cr 0.53. Meds: zosyn, insulin, metroprolol, KCl.     Malnutrition Assessment:  Malnutrition Status: Moderate malnutrition      Estimated Daily Nutrient Needs:  Energy (kcal):  2050kcal(32kcal/kg)  Protein (g):  78g(1.4g/kg)       Fluid (ml/day):  1950kcal(30kcal/kg)    Nutrition Related Findings:  Pt thin with muscle wasting noted. Last BM 10/21, soft. No edema. Noted wife reports no recent wt loss pta. No documented n/v. Dysphagia noted.       Wounds:    Multiple, Open wounds, Pressure injury       Current Nutrition Therapies:  DIET TUBE FEEDING Promote  DIET NPO  Current Tube Feeding (TF) Orders:   · Feeding Route: PEG  · Goal TF & Flush Orders Provides: Rec'd TF of Jevity 1.5 at 57mL/hr, Flush with 150mL free water q4hr; Providing 2052kcal, 87g protein, 1940mL fluid      Anthropometric Measures:  · Height:  6' (182.9 cm)  · Current Body Wt:  56 kg (123 lb 7.3 oz)(10/17)   · Admission Body Wt:  15 lb 15.4 oz(pt stated)    · Ideal Body Wt:  178 lbs:  69.4 %   · BMI Category:  Underweight (BMI less than 22) age over 72       Nutrition Diagnosis:   · Predicted inadequate energy intake related to swallowing difficulty, increased demand for energy/nutrients, catabolic illness as evidenced by swallowing study results      Nutrition Interventions:   Food and/or Nutrient Delivery: Start tube feeding  Nutrition Education and Counseling: No recommendations at this time  Coordination of Nutrition Care: Continued inpatient monitoring    Goals:  Initiate means of nutrition to meet >75% EENs in 7 days (progressing)  Wt gain 1lb +/- 0.5lb per week (unable to assess)  Na and lytes wnl (progressing)  Improved skin integrity (unable to assess)    Nutrition Monitoring and Evaluation:   Behavioral-Environmental Outcomes:  N/A  Food/Nutrient Intake Outcomes: Enteral nutrition intake/tolerance  Physical Signs/Symptoms Outcomes: Weight, Skin, Chewing or swallowing    Discharge Planning:    Enteral nutrition     Electronically signed by Abhishek Escobar on 10/23/2020 at 2:28 PM    Contact:

## 2020-10-23 NOTE — PROGRESS NOTES
CATHIE spoke w/Be (liasion) @ [facility]. CATHIE informed he is still being followed. Patient has been accepted. Initial [employer] was granted by [employer]; however patient was sent back to ICU. [facility] will have to obtain auth again from [employer].       CLAUDIA Ludwig

## 2020-10-23 NOTE — ANESTHESIA PROCEDURE NOTES
Emergent Intubation  Performed by: Vikki Medina MD  Authorized by: Vikki Medina MD     Emergent Intubation:   Location:  ICU  Date/Time:  10/23/2020 5:05 PM  Indications:  Respiratory failure    Spontaneous Ventilation: absent    Level of Consciousness: unresponsive  Preoxygenated:  Yes      Airway Documentation:   Airway:  ETT - Cuffed  Technique:  Direct laryngoscopy  Blade Size:  4  ETT size (mm):  8.0  ETT Line Jules:  Gumline  ETT Insertion depth (cm):  24  Placement verified by: auscultation, EtCO2 and BBS    Attempts:  2  Difficult airway: No

## 2020-10-23 NOTE — PROGRESS NOTES
Pulmonology and Critical Care Progress Note    Subjective:     Chief Complaint:   Chief Complaint   Patient presents with    Altered mental status     The patient underwent aortobifemoral bypass surgery 10/6/20. Post surgery he was left on the ventilator. Apparently blood loss was about 3.5 L. When he returned to the ICU he was on multiple pressors. Successfully extubated after suctioning of mucous plugs from the left. Unfortunately, he developed spontaneous left pneumothorax. Thoracic surgery did aspiration of his left pneumothorax. No chest tube insertion was required. Patient developed increasing respiratory distress secondary to a tension right-sided PTX. Patient ended up requiring intubation and Dr. Megan Miller came in to evaluate the patient and found him to have bilateral pneumothoraces and placed bilateral chest tubes.      Patient seen and examined at the bedside this morning in the ICU. I discussed the case in detail with the patient, the bedside nurse, respiratory therapy, and his wife who was sitting at the bedside with him. Patient was successfully extubated on 10/20/20 and he is tolerating RA well. Chest tubes still on water seal and without an air leak. Likely to be removed today/tomorrow. HELGA drains likely out tomorrow per surgery, still having some drainage today. Patient had a PEG tube placed on 10/22/2020, apparently had some bleeding from the PEG tube site last night which required Dr. Kaitlin Currie coming back and placing a stitch at the insertion site. Still waiting on approval from Dr. Kaitlin Currie to use the PEG. Patient able to tolerate a modified diet per speech therapy. Otherwise, no new complaints from the patient. He is resting comfortably.       Review of Systems:  Unable to perform due to patient's condition    Current Facility-Administered Medications   Medication Dose Route Frequency Provider Last Rate Last Dose    potassium chloride 10 mEq in 100 ml IVPB  10 mEq IntraVENous Q1H Lavon Sheyla Ashraf  mL/hr at 10/23/20 0901 10 mEq at 10/23/20 0901    potassium chloride (K-DUR, KLOR-CON) SR tablet 40 mEq  40 mEq Oral TID Gaby Christiansen MD   40 mEq at 10/23/20 0858    morphine injection 1 mg  1 mg IntraVENous Q6H PRN Gaby Christiansen MD   1 mg at 10/23/20 0526    metoprolol tartrate (LOPRESSOR) tablet 50 mg  50 mg Oral BID Johnny Santiago DO   50 mg at 10/23/20 0858    predniSONE (DELTASONE) tablet 20 mg  20 mg Oral DAILY WITH BREAKFAST Johnny Santiago DO   20 mg at 10/23/20 0901    aspirin chewable tablet 81 mg  81 mg Oral DAILY Altaf Chauhan MD   81 mg at 10/23/20 0858    spironolactone (ALDACTONE) tablet 25 mg  25 mg Oral BID Sudhir Coker MD   25 mg at 10/23/20 0901    piperacillin-tazobactam (ZOSYN) 3.375 g in 0.9% sodium chloride (MBP/ADV) 100 mL MBP  3.375 g IntraVENous Q8H Altaf Chauhan MD 25 mL/hr at 10/23/20 0858 3.375 g at 10/23/20 0858    pantoprazole (PROTONIX) granules for oral suspension 40 mg  40 mg Per NG tube ACB Altaf Chauhan MD   40 mg at 10/23/20 0903    albuterol CONCENTRATE 2.5mg/0.5 mL neb soln  2.5 mg Nebulization Q6H RT Johnny Santiago DO   2.5 mg at 10/23/20 0947    LORazepam (ATIVAN) injection 1 mg  1 mg IntraVENous Q2H PRN Emilie Olson MD   1 mg at 10/16/20 2151    sodium chloride 0.9% (NS) 3ml nebulizer solution  2.5 mL Nebulization PRN Monie Estes MD   3 mL at 10/23/20 0947    influenza vaccine 2020-21 (4 yrs+)(PF) (FLUCELVAX QUAD) injection 0.5 mL  0.5 mL IntraMUSCular PRIOR TO DISCHARGE Altaf Chauhan MD   Stopped at 10/07/20 0900    diphenhydrAMINE (BENADRYL) capsule 50 mg  50 mg Oral BID PRN Ines Gates, NP   50 mg at 10/21/20 2345    phosphorus (K PHOS NEUTRAL) 250 mg tablet 2 Tab  2 Tab Oral BID Altaf Chauhan MD   2 Tab at 10/22/20 2208    oxyCODONE IR (ROXICODONE) tablet 15 mg  15 mg Oral Q4H PRN Altaf Chauhan MD   15 mg at 10/21/20 2253    enoxaparin (LOVENOX) injection 40 mg  40 mg SubCUTAneous Q24H Carlene Desai MD   40 mg at 10/22/20 2207    acetaminophen (TYLENOL) tablet 650 mg  650 mg Oral Q6H PRN Gisele Long NP   650 mg at 10/20/20 0432    ondansetron (ZOFRAN) injection 4 mg  4 mg IntraVENous Q8H PRN Hamida Long T, NP   4 mg at 20 0402    guaiFENesin (ROBITUSSIN) 20 mg/mL oral liquid 400 mg  400 mg Oral Q6H PRN Jocelyn Perez T NP   Stopped at 20 1855            No Known Allergies        Objective:     Blood pressure (!) 161/88, pulse 100, temperature 98.5 °F (36.9 °C), resp. rate 28, height 6' (1.829 m), weight 64.4 kg (141 lb 15.6 oz), SpO2 97 %. Temp (24hrs), Av.2 °F (36.8 °C), Min:97.6 °F (36.4 °C), Max:98.7 °F (37.1 °C)      Intake and Output:  Current Shift: No intake/output data recorded. Last 3 Shifts: 10/21 1901 - 10/23 0700  In: 100 [I.V.:100]  Out: 1818 [Urine:1300; Drains:235]    Physical Exam:     General:  Lying in bed, NAD, on room air. Throat and Neck: Supple. No JVD. He has a right subclavian central line. Lung:  Much improved aeration bilaterally, minimal rhonchi in the left base. Bilateral chest tubes present on water seal without air leaks. On room air. Heart: S1+S2. No murmurs  Abdomen: Status post surgery. He has a midline incision. He has one HELGA drains in place; draining minimal fluid. .  Bowel sounds are hypoactive. Extremities:  His edema is improved. Distal pulses of the lower extremities are noted with Dopplers. Has skin breakdown on the dependent portion of heel. : Reese catheter in place. Making some urine output.   Skin: No cyanosis  Neurologic:  A+Ox3, non-focal exam        Recent Results (from the past 24 hour(s))   CBC WITH AUTOMATED DIFF    Collection Time: 10/23/20  5:15 AM   Result Value Ref Range    WBC 16.8 (H) 4.1 - 11.1 K/uL    RBC 2.60 (L) 4.10 - 5.70 M/uL    HGB 8.1 (L) 12.1 - 17.0 g/dL    HCT 24.1 (L) 36.6 - 50.3 %    MCV 92.7 80.0 - 99.0 FL    MCH 31.2 26.0 - 34.0 PG    MCHC 33.6 30.0 - 36.5 g/dL    RDW 18.4 (H) 11.5 - 14.5 %    PLATELET 251 961 - 241 K/uL    MPV 11.1 8.9 - 12.9 FL    NRBC 0.8 (H) 0  WBC    ABSOLUTE NRBC 0.13 (H) 0.00 - 0.01 K/uL    NEUTROPHILS 85 (H) 32 - 75 %    LYMPHOCYTES 6 (L) 12 - 49 %    MONOCYTES 8 5 - 13 %    EOSINOPHILS 0 0 - 7 %    BASOPHILS 0 0 - 1 %    IMMATURE GRANULOCYTES 2 (H) 0.0 - 0.5 %    ABS. NEUTROPHILS 14.3 (H) 1.8 - 8.0 K/UL    ABS. LYMPHOCYTES 1.1 0.8 - 3.5 K/UL    ABS. MONOCYTES 1.4 (H) 0.0 - 1.0 K/UL    ABS. EOSINOPHILS 0.0 0.0 - 0.4 K/UL    ABS. BASOPHILS 0.0 0.0 - 0.1 K/UL    ABS. IMM. GRANS. 0.4 (H) 0.00 - 0.04 K/UL    DF AUTOMATED     METABOLIC PANEL, BASIC    Collection Time: 10/23/20  5:15 AM   Result Value Ref Range    Sodium 140 136 - 145 mmol/L    Potassium 2.8 (L) 3.5 - 5.1 mmol/L    Chloride 108 97 - 108 mmol/L    CO2 25 21 - 32 mmol/L    Anion gap 7 5 - 15 mmol/L    Glucose 73 65 - 100 mg/dL    BUN 15 6 - 20 mg/dL    Creatinine 0.47 (L) 0.70 - 1.30 mg/dL    BUN/Creatinine ratio 32 (H) 12 - 20      GFR est AA >60 >60 ml/min/1.73m2    GFR est non-AA >60 >60 ml/min/1.73m2    Calcium 8.5 8.5 - 10.1 mg/dL       CT Results  (Last 48 hours)    None          Assessment:     1. Acute respiratory failure, after aortobifemoral bypass surgery on 10/6/2020. Patient self extubated himself early in the morning on 10/9/2020. Re-intubated on 10/10/20 for left-sided mucous plugging and complete left lung collapse and had flexible bronchoscopy done with suction of mucous plug. 2.  Acute metabolic encephalopathy, resolved  3. Aspiration pneumonia  4. Severe peripheral vascular disease   5. UTI   6. Hypertension  7. Familial polymyositis  8. Left spontaneous pneumothorax    Plan:     1.)  Acute Hypoxic respiratory failure. Extubated 10/20/20, doing well on RA. AM chest x-ray reviewed, stable b/l chest tubes without recurrent PTX. Repeat in the morning. Precedex has been weaned off. S/p PEG tube on 10/22/20.  Cleared by speech therapy for honey-thickened liquids, but this is very unlikely to meet his caloric needs. Will need nutrition on board to start tube feeds once cleared to use the PEG by GI. Okay to transfer out of the ICU per Pulmonary. 2.)  Bilateral pneumothoraces  Bilateral chest tubes placed, no airleak on waterseal.  Dr. Diego Castro following closely. Stable chest x-ray  Likely to be removed today/tomorrow. 3.)  Aspiration/HAP pneumonia:  Continue Zosyn (day 7/7), okay to stop Zosyn tomorrow (10/24/20). Completed a long course of steroids, stop prednisone after today's dose. On scheduled nebulized bronchodilator treatments. 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 PT/OT can be more aggressive about seeing him once his chest tubes have been removed. 4.)  Metabolic encephalopathy. He has a progressive neurologic disorder. Brain MRI negative    Appreciate assistance from neurology. Back to baseline now after extubation. 5.)  Myocardial ischemia:  He underwent nuclear stress testing which showed significant inferior and from will treat him with medical managememt   I will follow the patient closely with you. DVT and GI prophylaxis. He is on Lovenox and Protonix IV.     Questions of wife and answered at bedside in detail  Case discussed in detail with RN, RT, and care team in ICU  Thank you for involving me in the care of this patient  I will follow with you closely during hospitalization        Alyssa Maurer, DO  [facility]  10/23/2020

## 2020-10-23 NOTE — PROGRESS NOTES
Code Blue Note      I responded to a Code Blue called on this patient  Patient found acute respiratory failure lost pulse,  ACLS protocol was instituted with high quality CPR, he received 2 doses of epinephrine , patient pulse returned , with a 1 dose of sodium of bicarb ,1 dose of calcium gluconate, blood sugar was  in 70s received a 1 dose D50, patient was intubated by respiratory therapist, stat ABG showed pH of 7.1 patient was given 3 more AMP sodium bicarb 50 meq  IV push, wife at the unit was  Updated, post code lab and EKG ordered portable chest x-ray ordered, will start patient with Levophed IV for hypotension, propofol for sedation, pulmonology follow-up for vent support, close monitoring in ICU

## 2020-10-23 NOTE — PROGRESS NOTES
Patient examined this morning. He looks pretty stable and comfortable. He had a PEG tube insertion yesterday. His chest tube both sides waterseal.  His abdomen is soft incision is clean dry. Both feet has palpable pedal pulse. He has a HELGA drain output noted. I will probably remove the rest of HELGA drains out tomorrow. I will continue monitor his progress.

## 2020-10-23 NOTE — PROGRESS NOTES
Hospitalist Progress Note           Daily Progress Note: 10/23/2020    Chief complaint: Shortness of breath and weakness  Subjective: The patient is seen for follow  up. Patient 10/20/2020 and on nasal oxygen. chest tubes without air leaks. More awake and alert. PEG tube placed yesterday but not ready for use. No fevers overnight.   Wife at bedside during evaluation        Problem List:  Problem List as of 10/23/2020 Date Reviewed: 9/17/2020          Codes Class Noted - Resolved    Metabolic encephalopathy ECU-74-RW: G93.41  ICD-9-CM: 348.31  9/26/2020 - Present        UTI (urinary tract infection) ICD-10-CM: N39.0  ICD-9-CM: 599.0  9/26/2020 - Present        Aspiration pneumonitis ([address] ICD-10-CM: J69.0  ICD-9-CM: 507.0  9/26/2020 - Present        Essential hypertension ICD-10-CM: I10  ICD-9-CM: 401.9  9/26/2020 - Present        Acute dehydration ICD-10-CM: E86.0  ICD-9-CM: 276.51  9/26/2020 - Present              Medications reviewed  Current Facility-Administered Medications   Medication Dose Route Frequency    potassium chloride 10 mEq in 100 ml IVPB  10 mEq IntraVENous Q1H    potassium chloride (K-DUR, KLOR-CON) SR tablet 40 mEq  40 mEq Oral TID    morphine injection 1 mg  1 mg IntraVENous Q6H PRN    metoprolol tartrate (LOPRESSOR) tablet 50 mg  50 mg Oral BID    aspirin chewable tablet 81 mg  81 mg Oral DAILY    spironolactone (ALDACTONE) tablet 25 mg  25 mg Oral BID    piperacillin-tazobactam (ZOSYN) 3.375 g in 0.9% sodium chloride (MBP/ADV) 100 mL MBP  3.375 g IntraVENous Q8H    pantoprazole (PROTONIX) granules for oral suspension 40 mg  40 mg Per NG tube ACB    albuterol CONCENTRATE 2.5mg/0.5 mL neb soln  2.5 mg Nebulization Q6H RT    LORazepam (ATIVAN) injection 1 mg  1 mg IntraVENous Q2H PRN    sodium chloride 0.9% (NS) 3ml nebulizer solution  2.5 mL Nebulization PRN    influenza vaccine 2020-21 (4 yrs+)(PF) (FLUCELVAX QUAD) injection 0.5 mL  0.5 mL IntraMUSCular PRIOR TO DISCHARGE    diphenhydrAMINE (BENADRYL) capsule 50 mg  50 mg Oral BID PRN    phosphorus (K PHOS NEUTRAL) 250 mg tablet 2 Tab  2 Tab Oral BID    oxyCODONE IR (ROXICODONE) tablet 15 mg  15 mg Oral Q4H PRN    enoxaparin (LOVENOX) injection 40 mg  40 mg SubCUTAneous Q24H    acetaminophen (TYLENOL) tablet 650 mg  650 mg Oral Q6H PRN    ondansetron (ZOFRAN) injection 4 mg  4 mg IntraVENous Q8H PRN    guaiFENesin (ROBITUSSIN) 20 mg/mL oral liquid 400 mg  400 mg Oral Q6H PRN       Review of Systems:   Review of systems unable to be obtained because he is intubated    Objective:   Physical Exam:     Visit Vitals  BP (!) 153/90 (BP 1 Location: Left arm, BP Patient Position: At rest)   Pulse (!) 110   Temp 98.5 °F (36.9 °C)   Resp 29   Ht 6' (1.829 m)   Wt 64.4 kg (141 lb 15.6 oz)   SpO2 97%   BMI 19.26 kg/m²    O2 Flow Rate (L/min): 2 l/min O2 Device: Room air    Temp (24hrs), Av.2 °F (36.8 °C), Min:97.6 °F (36.4 °C), Max:98.7 °F (37.1 °C)    No intake/output data recorded. 10/21 1901 - 10/23 0700  In: 100 [I.V.:100]  Out: 1818 [Urine:1300; Drains:235]    General:   Awake and oriented   Lungs:   Clear to auscultation bilaterally with diminished breath sounds bilateral bases. Chest wall:  No tenderness or deformity. Heart:  Regular rate and rhythm, S1, S2 normal, no murmur, click, rub or gallop. Abdomen:   Soft, non-tender. Diminished Bowel sounds. Dressings in place. Extremities: Extremities normal, atraumatic, positive edema bilaterally   Pulses: 2+ and symmetric all extremities. Skin: Skin color, texture, turgor normal. No rashes or lesions   Neurologic: CNII-XII intact.      Data Review:       Recent Days:  Recent Labs     10/23/20  0515 10/22/20  0550 10/21/20  0410   WBC 16.8* 17.7* 21.8*   HGB 8.1* 8.3* 8.3*   HCT 24.1* 26.0* 24.8*    373 328     Recent Labs     10/23/20  0515 10/22/20  0425 10/21/20  0410    143 149*   K 2.8* 4.0 3.1*    110* 114*   CO2 25 27 26   GLU 73 81 91   BUN 15 19 27*   CREA 0.47* 0.55* 0.50*   CA 8.5 8.8 8.8   MG  --  1.7  --    PHOS  --  2.9  --    ALB  --  3.2*  --      Recent Labs     10/21/20  0425   PH 7.48*   PCO2 31*   PO2 71*   HCO3 24       24 Hour Results:  Recent Results (from the past 24 hour(s))   CBC WITH AUTOMATED DIFF    Collection Time: 10/23/20  5:15 AM   Result Value Ref Range    WBC 16.8 (H) 4.1 - 11.1 K/uL    RBC 2.60 (L) 4.10 - 5.70 M/uL    HGB 8.1 (L) 12.1 - 17.0 g/dL    HCT 24.1 (L) 36.6 - 50.3 %    MCV 92.7 80.0 - 99.0 FL    MCH 31.2 26.0 - 34.0 PG    MCHC 33.6 30.0 - 36.5 g/dL    RDW 18.4 (H) 11.5 - 14.5 %    PLATELET 824 799 - 181 K/uL    MPV 11.1 8.9 - 12.9 FL    NRBC 0.8 (H) 0  WBC    ABSOLUTE NRBC 0.13 (H) 0.00 - 0.01 K/uL    NEUTROPHILS 85 (H) 32 - 75 %    LYMPHOCYTES 6 (L) 12 - 49 %    MONOCYTES 8 5 - 13 %    EOSINOPHILS 0 0 - 7 %    BASOPHILS 0 0 - 1 %    IMMATURE GRANULOCYTES 2 (H) 0.0 - 0.5 %    ABS. NEUTROPHILS 14.3 (H) 1.8 - 8.0 K/UL    ABS. LYMPHOCYTES 1.1 0.8 - 3.5 K/UL    ABS. MONOCYTES 1.4 (H) 0.0 - 1.0 K/UL    ABS. EOSINOPHILS 0.0 0.0 - 0.4 K/UL    ABS. BASOPHILS 0.0 0.0 - 0.1 K/UL    ABS. IMM. GRANS. 0.4 (H) 0.00 - 0.04 K/UL    DF AUTOMATED     METABOLIC PANEL, BASIC    Collection Time: 10/23/20  5:15 AM   Result Value Ref Range    Sodium 140 136 - 145 mmol/L    Potassium 2.8 (L) 3.5 - 5.1 mmol/L    Chloride 108 97 - 108 mmol/L    CO2 25 21 - 32 mmol/L    Anion gap 7 5 - 15 mmol/L    Glucose 73 65 - 100 mg/dL    BUN 15 6 - 20 mg/dL    Creatinine 0.47 (L) 0.70 - 1.30 mg/dL    BUN/Creatinine ratio 32 (H) 12 - 20      GFR est AA >60 >60 ml/min/1.73m2    GFR est non-AA >60 >60 ml/min/1.73m2    Calcium 8.5 8.5 - 10.1 mg/dL           Assessment/     Acute hypoxic respiratory failure postsurgery. Self extubated 10/09/20. Reintubated 10/10 due to hypoxemia. Extubated 10/11. Reintubated 10/17 for bilateral pneumothoraces.  Extubated 10/20    Bilateral pneumothoraces, status post chest tubes bilateral    Acute kidney injury likely secondary to ATN from hypotension. Hypovolemic shock post surgery. Improved    Right lower lobe aspiration pneumonia improved    Urinary tract infection due to E. Coli    Abnormal Cardiolite stress test showing inferior ischemia. 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 Repleted    Hypothermia, probably largely environmental.  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 Plan:  Continue supportive care including antibiotics  PEG placement planned for today   High risk for aspiration given prior barium studies and inclusion body myositis  Transfer to medical telemetry floor. PT OT evaluation    Care Plan discussed with: Patient/Family       Total time spent with patient: 30 minutes.     Campos Carey MD

## 2020-10-23 NOTE — PROGRESS NOTES
Spiritual Care Assessment/Progress Note  [facility]      NAME: Di Mehta      MRN: [id number]  AGE: 48 y.o. SEX: male  Congregation Affiliation:    Language: English     10/23/2020     Total Time (in minutes): 40     Spiritual Assessment begun in [address] [place] [address] ICU through conversation with:         []Patient        [x] Family    [] Friend(s)        Reason for Consult: Rapid response team     Spiritual beliefs: (Please include comment if needed)     [] Identifies with a marcus tradition:         [x] Supported by a marcus community:            [] Claims no spiritual orientation:           [] Seeking spiritual identity:                [] Adheres to an individual form of spirituality:           [] Not able to assess:                           Identified resources for coping:      [x] Prayer                               [] Music                  [] Guided Imagery     [x] Family/friends                 [] Pet visits     [] Devotional reading                         [] Unknown     [] Other:                                              Interventions offered during this visit: (See comments for more details)    Patient Interventions: Prayer (actual)     Family/Friend(s):  Affirmation of emotions/emotional suffering, Affirmation of marcus, Catharsis/review of pertinent events in supportive environment, Prayer (assurance of), Normalization of emotional/spiritual concerns, Congregation beliefs/image of God discussed     Plan of Care:     [] Support spiritual and/or cultural needs    [] Support AMD and/or advance care planning process      [] Support grieving process   [] Coordinate Rites and/or Rituals    [] Coordination with community clergy   [] No spiritual needs identified at this time   [] Detailed Plan of Care below (See Comments)  [] Make referral to Music Therapy  [] Make referral to Pet Therapy     [] Make referral to Addiction services  [] Make referral to [facility]  [] Make referral to Spiritual Care Partner  [] No future visits requested        [x] Follow up visits as needed     Comments:  Visited the patient when RRT was coded. Staff were providing care to the patient and  provided support to the patient's wife Thierry Sanchez. She seemed extremely shaken up and seemed to process her emotions tearfully and verbally. Patient stated she was a person of marcus. She said they have been  for a long time and have gone through a lot together. I provided reflective listening and verbalized/mirrored what she seemed to be going through. I normalized her fears, and provided silent and empathic presence while staff continued to provide care. I offered to lead in a prayer which she accepted prayed for her and patient. I advised of  availability should she and her family need further support when she was allowed to go into the patient's room. Chaplains will follow as able and/or needed.   Chaplain Lyndsay Campbell M.Div.

## 2020-10-23 NOTE — PROGRESS NOTES
Renal Progress Note    Patient: Ruben Estrella MRN: [id number]  SSN: [id number]    YOB: 1965  Age: 48 y.o. Sex: male      Admit Date: 9/26/2020    LOS: 27 days     Subjective:   Patient seen in ICU,  s/p PEG tube placement   is awake. Currently getting CPT   Potassium was only 2.8 this morning.   Has already been ordered 3 doses of oral potassium      Current Facility-Administered Medications   Medication Dose Route Frequency    potassium chloride 10 mEq in 100 ml IVPB  10 mEq IntraVENous Q1H    potassium chloride (K-DUR, KLOR-CON) SR tablet 40 mEq  40 mEq Oral TID    [START ON 10/24/2020] potassium chloride SR (KLOR-CON 10) tablet 20 mEq  20 mEq Oral BID    morphine injection 1 mg  1 mg IntraVENous Q6H PRN    metoprolol tartrate (LOPRESSOR) tablet 50 mg  50 mg Oral BID    aspirin chewable tablet 81 mg  81 mg Oral DAILY    spironolactone (ALDACTONE) tablet 25 mg  25 mg Oral BID    piperacillin-tazobactam (ZOSYN) 3.375 g in 0.9% sodium chloride (MBP/ADV) 100 mL MBP  3.375 g IntraVENous Q8H    pantoprazole (PROTONIX) granules for oral suspension 40 mg  40 mg Per NG tube ACB    albuterol CONCENTRATE 2.5mg/0.5 mL neb soln  2.5 mg Nebulization Q6H RT    LORazepam (ATIVAN) injection 1 mg  1 mg IntraVENous Q2H PRN    sodium chloride 0.9% (NS) 3ml nebulizer solution  2.5 mL Nebulization PRN    influenza vaccine 2020-21 (4 yrs+)(PF) (FLUCELVAX QUAD) injection 0.5 mL  0.5 mL IntraMUSCular PRIOR TO DISCHARGE    diphenhydrAMINE (BENADRYL) capsule 50 mg  50 mg Oral BID PRN    phosphorus (K PHOS NEUTRAL) 250 mg tablet 2 Tab  2 Tab Oral BID    oxyCODONE IR (ROXICODONE) tablet 15 mg  15 mg Oral Q4H PRN    enoxaparin (LOVENOX) injection 40 mg  40 mg SubCUTAneous Q24H    acetaminophen (TYLENOL) tablet 650 mg  650 mg Oral Q6H PRN    ondansetron (ZOFRAN) injection 4 mg  4 mg IntraVENous Q8H PRN    guaiFENesin (ROBITUSSIN) 20 mg/mL oral liquid 400 mg  400 mg Oral Q6H PRN        Vitals:    10/23/20 1000 10/23/20 1100 10/23/20 1200 10/23/20 1300   BP: (!) 153/90 128/83 138/87    Pulse: (!) 110 (!) 115 (!) 109 (!) 110   Resp: 29 30 30 (!) 31   Temp:  97.9 °F (36.6 °C)     SpO2: 97% 98% (!) 9% 97%   Weight:       Height:         Objective:   General: no acute distress. HEENT: EOMI, no Icterus, no Pallor,  neck: Neck is supple, No JVD  Lungs: decreased breathsounds, bilateral chest tubes present  CVS: heart sounds normal,  no murmurs, no rubs. GI: soft, nontender, normal BS. PEG+  Extremeties: no cyanosis,no edema in ext   Neuro: awake  Skin: normal skin turgor, no skin rashes. Intake and Output:  Current Shift: No intake/output data recorded.   Last three shifts: 10/21 1901 - 10/23 0700  In: 100 [I.V.:100]  Out: 1818 [Urine:1300; Drains:235]      Lab/Data Review:  Recent Labs     10/23/20  0515 10/22/20  0550 10/21/20  0410   WBC 16.8* 17.7* 21.8*   HGB 8.1* 8.3* 8.3*   HCT 24.1* 26.0* 24.8*    373 328     Recent Labs     10/23/20  0515 10/22/20  0425 10/21/20  0410    143 149*   K 2.8* 4.0 3.1*    110* 114*   CO2 25 27 26   GLU 73 81 91   BUN 15 19 27*   CREA 0.47* 0.55* 0.50*   CA 8.5 8.8 8.8   MG  --  1.7  --    PHOS  --  2.9  --    ALB  --  3.2*  --      Recent Labs     10/21/20  0425   PH 7.48*   PCO2 31*   PO2 71*   HCO3 24     Recent Results (from the past 24 hour(s))   CBC WITH AUTOMATED DIFF    Collection Time: 10/23/20  5:15 AM   Result Value Ref Range    WBC 16.8 (H) 4.1 - 11.1 K/uL    RBC 2.60 (L) 4.10 - 5.70 M/uL    HGB 8.1 (L) 12.1 - 17.0 g/dL    HCT 24.1 (L) 36.6 - 50.3 %    MCV 92.7 80.0 - 99.0 FL    MCH 31.2 26.0 - 34.0 PG    MCHC 33.6 30.0 - 36.5 g/dL    RDW 18.4 (H) 11.5 - 14.5 %    PLATELET 196 698 - 374 K/uL    MPV 11.1 8.9 - 12.9 FL    NRBC 0.8 (H) 0  WBC    ABSOLUTE NRBC 0.13 (H) 0.00 - 0.01 K/uL    NEUTROPHILS 85 (H) 32 - 75 %    LYMPHOCYTES 6 (L) 12 - 49 %    MONOCYTES 8 5 - 13 %    EOSINOPHILS 0 0 - 7 %    BASOPHILS 0 0 - 1 %    IMMATURE GRANULOCYTES 2 (H) 0.0 - 0.5 %    ABS. NEUTROPHILS 14.3 (H) 1.8 - 8.0 K/UL    ABS. LYMPHOCYTES 1.1 0.8 - 3.5 K/UL    ABS. MONOCYTES 1.4 (H) 0.0 - 1.0 K/UL    ABS. EOSINOPHILS 0.0 0.0 - 0.4 K/UL    ABS. BASOPHILS 0.0 0.0 - 0.1 K/UL    ABS. IMM. GRANS. 0.4 (H) 0.00 - 0.04 K/UL    DF AUTOMATED     METABOLIC PANEL, BASIC    Collection Time: 10/23/20  5:15 AM   Result Value Ref Range    Sodium 140 136 - 145 mmol/L    Potassium 2.8 (L) 3.5 - 5.1 mmol/L    Chloride 108 97 - 108 mmol/L    CO2 25 21 - 32 mmol/L    Anion gap 7 5 - 15 mmol/L    Glucose 73 65 - 100 mg/dL    BUN 15 6 - 20 mg/dL    Creatinine 0.47 (L) 0.70 - 1.30 mg/dL    BUN/Creatinine ratio 32 (H) 12 - 20      GFR est AA >60 >60 ml/min/1.73m2    GFR est non-AA >60 >60 ml/min/1.73m2    Calcium 8.5 8.5 - 10.1 mg/dL        Assessment and Plan:       1. severe hypokalemia:  -from diuretics and metabolic alkalosis,   -Potassium i only 2.8 today. 3 dose of 40 mg of KCl ordered  -Document scheduled KCl 20 twice daily from tomorrow  -Check mag level in a.m. It was okay till yesterday  -Off lasix now. continue to hold  -Continue 25 twice daily of spironolactone.   -monitor K levels,      2. Hypernatremia  -Secondary to diuretics. Increased urine output noted. Now Off Lasix  -off ivf    3. Edema: improved with good diuresis +, off lasix  -off IV fluids  -Continue same dose of spironolactone     4. .  acute resp.failure: dwight pneumo+  -On vent, had bilateral chest tube placements because of bilateral pneumothorax  -pulm and CT surgery following    5.  Hypotension:  -It is better now.   Tolerating spironolactone so far   -resumed mododrine 10 mg bid  -Continue to monitor BPs     6 status post  infrarenal aorta endarterectomy, with aortic by common femoral artery bypass with 14 mm x 7 mm Hemosure graft       Signed By: Stephen Marks MD     October 23, 2020

## 2020-10-23 NOTE — PROGRESS NOTES
Post-OP Visit    Keaton Soto is a 48 y.o. male who has been extubated for two days, got his PEG yesterday which I was waiting for to deal with chest tubes. Today she has no air leaks and decreased drainage awake and alert with out complaints      /83 (BP 1 Location: Left arm, BP Patient Position: At rest)   Pulse (!) 115   Temp 97.9 °F (36.6 °C)   Resp 30   Ht 6' (1.829 m)   Wt 141 lb 15.6 oz (64.4 kg)   SpO2 98%   BMI 19.26 kg/m²     Physical Exam lungs clear, wounds ok     Problem List Items Addressed This Visit        Respiratory    Aspiration pneumonitis ([facility] [address]      Other Visit Diagnoses     Encephalopathy acute    -  Primary    Relevant Medications    midodrine (PROAMATINE) 10 mg tablet    Acute occlusion of aortoiliac artery (HCC)        Pain in both lower extremities        TIA (transient ischemic attack)        Chest pain, unspecified type        Relevant Orders    CARDIAC PROCEDURE (Completed)    Pneumothorax on left        Pneumothorax on right              Assessment and Plan: I have clamped both chest tubes now at 11 am.  IF no issues or air leaks in the morning I will remove one or both if there is not buch drainage. We may have them stay in because of increased drainage as opposed to leaks.      Jeaneth Loredo MD

## 2020-10-24 ENCOUNTER — APPOINTMENT (OUTPATIENT)
Dept: GENERAL RADIOLOGY | Age: 55
DRG: 981 | End: 2020-10-24
Attending: THORACIC SURGERY (CARDIOTHORACIC VASCULAR SURGERY)
Payer: COMMERCIAL

## 2020-10-24 LAB
ALBUMIN SERPL-MCNC: 2.8 G/DL (ref 3.5–5)
ANION GAP SERPL CALC-SCNC: 7 MMOL/L (ref 5–15)
ARTERIAL PATENCY WRIST A: POSITIVE
BASE EXCESS BLDA CALC-SCNC: 5.5 MMOL/L (ref 0–2)
BASOPHILS # BLD: 0 K/UL (ref 0–0.1)
BASOPHILS NFR BLD: 0 % (ref 0–1)
BDY SITE: ABNORMAL
BUN SERPL-MCNC: 22 MG/DL (ref 6–20)
BUN/CREAT SERPL: 33 (ref 12–20)
CA-I BLD-MCNC: 8.5 MG/DL (ref 8.5–10.1)
CHLORIDE SERPL-SCNC: 110 MMOL/L (ref 97–108)
CO2 SERPL-SCNC: 30 MMOL/L (ref 21–32)
CREAT SERPL-MCNC: 0.67 MG/DL (ref 0.7–1.3)
DIFFERENTIAL METHOD BLD: ABNORMAL
EOSINOPHIL # BLD: 0 K/UL (ref 0–0.4)
EOSINOPHIL NFR BLD: 0 % (ref 0–7)
EPAP/CPAP/PEEP, PAPEEP: 5
ERYTHROCYTE [DISTWIDTH] IN BLOOD BY AUTOMATED COUNT: 19.2 % (ref 11.5–14.5)
FIO2 ON VENT: 50 %
GAS FLOW.O2 SETTING OXYMISER: 20 L/MIN
GLUCOSE SERPL-MCNC: 101 MG/DL (ref 65–100)
HCO3 BLDA-SCNC: 29 MMOL/L (ref 22–26)
HCT VFR BLD AUTO: 30.6 % (ref 36.6–50.3)
HGB BLD-MCNC: 10.4 G/DL (ref 12.1–17)
IMM GRANULOCYTES # BLD AUTO: 0.3 K/UL (ref 0–0.04)
IMM GRANULOCYTES NFR BLD AUTO: 2 % (ref 0–0.5)
LYMPHOCYTES # BLD: 0.8 K/UL (ref 0.8–3.5)
LYMPHOCYTES NFR BLD: 5 % (ref 12–49)
MAGNESIUM SERPL-MCNC: 1.8 MG/DL (ref 1.6–2.4)
MCH RBC QN AUTO: 29.5 PG (ref 26–34)
MCHC RBC AUTO-ENTMCNC: 34 G/DL (ref 30–36.5)
MCV RBC AUTO: 86.7 FL (ref 80–99)
MONOCYTES # BLD: 1.1 K/UL (ref 0–1)
MONOCYTES NFR BLD: 7 % (ref 5–13)
NEUTS SEG # BLD: 13.5 K/UL (ref 1.8–8)
NEUTS SEG NFR BLD: 86 % (ref 32–75)
NRBC # BLD: 0.07 K/UL (ref 0–0.01)
NRBC BLD-RTO: 0.5 PER 100 WBC
PCO2 BLDA: 34 MMHG (ref 35–45)
PH BLDA: 7.52 [PH] (ref 7.35–7.45)
PHOSPHATE SERPL-MCNC: 3.3 MG/DL (ref 2.6–4.7)
PLATELET # BLD AUTO: 254 K/UL (ref 150–400)
PMV BLD AUTO: 11.2 FL (ref 8.9–12.9)
PO2 BLDA: 59 MMHG (ref 75–100)
POTASSIUM SERPL-SCNC: 2.6 MMOL/L (ref 3.5–5.1)
RBC # BLD AUTO: 3.53 M/UL (ref 4.1–5.7)
SAO2 % BLD: 93 %
SAO2% DEVICE SAO2% SENSOR NAME: ABNORMAL
SODIUM SERPL-SCNC: 147 MMOL/L (ref 136–145)
VT SETTING VENT: 400 ML
WBC # BLD AUTO: 15.4 K/UL (ref 4.1–11.1)

## 2020-10-24 PROCEDURE — 74011250636 HC RX REV CODE- 250/636

## 2020-10-24 PROCEDURE — 74011250637 HC RX REV CODE- 250/637: Performed by: INTERNAL MEDICINE

## 2020-10-24 PROCEDURE — 94640 AIRWAY INHALATION TREATMENT: CPT

## 2020-10-24 PROCEDURE — 80069 RENAL FUNCTION PANEL: CPT

## 2020-10-24 PROCEDURE — 74011250636 HC RX REV CODE- 250/636: Performed by: INTERNAL MEDICINE

## 2020-10-24 PROCEDURE — 36600 WITHDRAWAL OF ARTERIAL BLOOD: CPT

## 2020-10-24 PROCEDURE — 65610000006 HC RM INTENSIVE CARE

## 2020-10-24 PROCEDURE — 94003 VENT MGMT INPAT SUBQ DAY: CPT

## 2020-10-24 PROCEDURE — 74011000250 HC RX REV CODE- 250: Performed by: INTERNAL MEDICINE

## 2020-10-24 PROCEDURE — 85025 COMPLETE CBC W/AUTO DIFF WBC: CPT

## 2020-10-24 PROCEDURE — 36430 TRANSFUSION BLD/BLD COMPNT: CPT

## 2020-10-24 PROCEDURE — 74011250636 HC RX REV CODE- 250/636: Performed by: FAMILY MEDICINE

## 2020-10-24 PROCEDURE — P9016 RBC LEUKOCYTES REDUCED: HCPCS

## 2020-10-24 PROCEDURE — 94400 HC END TIDAL CO2 RESPONSE CURVE: CPT

## 2020-10-24 PROCEDURE — 74011250637 HC RX REV CODE- 250/637: Performed by: FAMILY MEDICINE

## 2020-10-24 PROCEDURE — 83735 ASSAY OF MAGNESIUM: CPT

## 2020-10-24 PROCEDURE — 71045 X-RAY EXAM CHEST 1 VIEW: CPT

## 2020-10-24 PROCEDURE — 82803 BLOOD GASES ANY COMBINATION: CPT

## 2020-10-24 PROCEDURE — 77010033678 HC OXYGEN DAILY

## 2020-10-24 PROCEDURE — 36415 COLL VENOUS BLD VENIPUNCTURE: CPT

## 2020-10-24 PROCEDURE — 94668 MNPJ CHEST WALL SBSQ: CPT

## 2020-10-24 PROCEDURE — 74011000258 HC RX REV CODE- 258: Performed by: INTERNAL MEDICINE

## 2020-10-24 RX ORDER — POTASSIUM CHLORIDE 7.45 MG/ML
10 INJECTION INTRAVENOUS
Status: COMPLETED | OUTPATIENT
Start: 2020-10-24 | End: 2020-10-24

## 2020-10-24 RX ORDER — PROPOFOL 10 MG/ML
0-50 VIAL (ML) INTRAVENOUS
Status: DISCONTINUED | OUTPATIENT
Start: 2020-10-24 | End: 2020-10-26

## 2020-10-24 RX ORDER — MAGNESIUM SULFATE 1 G/100ML
1 INJECTION INTRAVENOUS ONCE
Status: COMPLETED | OUTPATIENT
Start: 2020-10-24 | End: 2020-10-24

## 2020-10-24 RX ORDER — POTASSIUM CHLORIDE 1.5 G/1.77G
40 POWDER, FOR SOLUTION ORAL EVERY 4 HOURS
Status: DISPENSED | OUTPATIENT
Start: 2020-10-24 | End: 2020-10-24

## 2020-10-24 RX ORDER — PROPOFOL 10 MG/ML
INJECTION, EMULSION INTRAVENOUS
Status: COMPLETED
Start: 2020-10-24 | End: 2020-10-24

## 2020-10-24 RX ADMIN — ISODIUM CHLORIDE 3 ML: 0.03 SOLUTION RESPIRATORY (INHALATION) at 07:48

## 2020-10-24 RX ADMIN — DIBASIC SODIUM PHOSPHATE, MONOBASIC POTASSIUM PHOSPHATE AND MONOBASIC SODIUM PHOSPHATE 2 TABLET: 852; 155; 130 TABLET ORAL at 12:08

## 2020-10-24 RX ADMIN — ALBUTEROL SULFATE 2.5 MG: 2.5 SOLUTION RESPIRATORY (INHALATION) at 19:28

## 2020-10-24 RX ADMIN — MAGNESIUM SULFATE HEPTAHYDRATE 1 G: 1 INJECTION, SOLUTION INTRAVENOUS at 15:30

## 2020-10-24 RX ADMIN — SODIUM CHLORIDE 250 ML: 9 INJECTION, SOLUTION INTRAVENOUS at 10:56

## 2020-10-24 RX ADMIN — PANTOPRAZOLE SODIUM 40 MG: 40 GRANULE, DELAYED RELEASE ORAL at 08:52

## 2020-10-24 RX ADMIN — PIPERACILLIN AND TAZOBACTAM 3.38 G: 3; .375 INJECTION, POWDER, LYOPHILIZED, FOR SOLUTION INTRAVENOUS at 02:58

## 2020-10-24 RX ADMIN — ALBUTEROL SULFATE 2.5 MG: 2.5 SOLUTION RESPIRATORY (INHALATION) at 07:48

## 2020-10-24 RX ADMIN — PIPERACILLIN AND TAZOBACTAM 3.38 G: 3; .375 INJECTION, POWDER, LYOPHILIZED, FOR SOLUTION INTRAVENOUS at 12:26

## 2020-10-24 RX ADMIN — POTASSIUM CHLORIDE 10 MEQ: 10 INJECTION, SOLUTION INTRAVENOUS at 12:07

## 2020-10-24 RX ADMIN — DIBASIC SODIUM PHOSPHATE, MONOBASIC POTASSIUM PHOSPHATE AND MONOBASIC SODIUM PHOSPHATE 2 TABLET: 852; 155; 130 TABLET ORAL at 23:58

## 2020-10-24 RX ADMIN — OXYCODONE 5 MG: 5 TABLET ORAL at 15:31

## 2020-10-24 RX ADMIN — POTASSIUM CHLORIDE 10 MEQ: 10 INJECTION, SOLUTION INTRAVENOUS at 14:24

## 2020-10-24 RX ADMIN — DIBASIC SODIUM PHOSPHATE, MONOBASIC POTASSIUM PHOSPHATE AND MONOBASIC SODIUM PHOSPHATE 2 TABLET: 852; 155; 130 TABLET ORAL at 00:15

## 2020-10-24 RX ADMIN — ISODIUM CHLORIDE 3 ML: 0.03 SOLUTION RESPIRATORY (INHALATION) at 13:09

## 2020-10-24 RX ADMIN — METOPROLOL TARTRATE 50 MG: 50 TABLET, FILM COATED ORAL at 12:09

## 2020-10-24 RX ADMIN — POTASSIUM CHLORIDE 10 MEQ: 10 INJECTION, SOLUTION INTRAVENOUS at 08:46

## 2020-10-24 RX ADMIN — PROPOFOL 10 MCG/KG/MIN: 10 INJECTION, EMULSION INTRAVENOUS at 02:57

## 2020-10-24 RX ADMIN — POTASSIUM CHLORIDE 10 MEQ: 10 INJECTION, SOLUTION INTRAVENOUS at 10:56

## 2020-10-24 RX ADMIN — PIPERACILLIN AND TAZOBACTAM 3.38 G: 3; .375 INJECTION, POWDER, LYOPHILIZED, FOR SOLUTION INTRAVENOUS at 19:50

## 2020-10-24 RX ADMIN — ENOXAPARIN SODIUM 30 MG: 30 INJECTION SUBCUTANEOUS at 21:53

## 2020-10-24 RX ADMIN — PROPOFOL 15 MCG/KG/MIN: 10 INJECTION, EMULSION INTRAVENOUS at 18:49

## 2020-10-24 RX ADMIN — SPIRONOLACTONE 25 MG: 25 TABLET ORAL at 21:53

## 2020-10-24 RX ADMIN — POTASSIUM CHLORIDE 10 MEQ: 10 INJECTION, SOLUTION INTRAVENOUS at 15:30

## 2020-10-24 RX ADMIN — POTASSIUM CHLORIDE 40 MEQ: 1.5 FOR SOLUTION ORAL at 15:30

## 2020-10-24 RX ADMIN — SPIRONOLACTONE 25 MG: 25 TABLET ORAL at 08:52

## 2020-10-24 RX ADMIN — METOPROLOL TARTRATE 50 MG: 50 TABLET, FILM COATED ORAL at 21:53

## 2020-10-24 RX ADMIN — POTASSIUM CHLORIDE 10 MEQ: 10 INJECTION, SOLUTION INTRAVENOUS at 13:20

## 2020-10-24 RX ADMIN — ALBUTEROL SULFATE 2.5 MG: 2.5 SOLUTION RESPIRATORY (INHALATION) at 01:06

## 2020-10-24 RX ADMIN — ALBUTEROL SULFATE 2.5 MG: 2.5 SOLUTION RESPIRATORY (INHALATION) at 13:09

## 2020-10-24 NOTE — PROGRESS NOTES
Bedside and Verbal shift change report given to DIANA Mckeon RN by JESSIE Lemons RN. Report included the following information SBAR.

## 2020-10-24 NOTE — PROGRESS NOTES
Renal Progress Note    Patient: Tameka Winter MRN: [id number]  SSN: [id number]    YOB: 1965  Age: 48 y.o. Sex: male      Admit Date: 9/26/2020    LOS: 28 days     Subjective:   Patient seen in ICU for follow  up. Patient extubated 10/20/2020 and was on nasal oxygen. He had Cardiac arrest last evening with CPR for about 10 minutes with return of spontaneous circulation. Intubated and sedated on propofol now     Potassium was only 2.6 this morning, despite aggressive replacement yesterday.     Has already been ordered 6 doses of IV 10 mEq KCl      Current Facility-Administered Medications   Medication Dose Route Frequency    propofol (DIPRIVAN) 10 mg/mL infusion  0-50 mcg/kg/min IntraVENous TITRATE    potassium chloride 10 mEq in 100 ml IVPB  10 mEq IntraVENous Q1H    potassium chloride (KLOR-CON) packet for solution 40 mEq  40 mEq Per G Tube Q4H    potassium chloride SR (KLOR-CON 10) tablet 20 mEq  20 mEq Oral BID    0.9% sodium chloride infusion 250 mL  250 mL IntraVENous PRN    oxyCODONE IR (ROXICODONE) tablet 5 mg  5 mg Oral Q4H PRN    enoxaparin (LOVENOX) injection 30 mg  30 mg SubCUTAneous Q24H    0.9% sodium chloride infusion 250 mL  250 mL IntraVENous PRN    metoprolol tartrate (LOPRESSOR) tablet 50 mg  50 mg Oral BID    spironolactone (ALDACTONE) tablet 25 mg  25 mg Oral BID    piperacillin-tazobactam (ZOSYN) 3.375 g in 0.9% sodium chloride (MBP/ADV) 100 mL MBP  3.375 g IntraVENous Q8H    pantoprazole (PROTONIX) granules for oral suspension 40 mg  40 mg Per NG tube ACB    albuterol CONCENTRATE 2.5mg/0.5 mL neb soln  2.5 mg Nebulization Q6H RT    LORazepam (ATIVAN) injection 1 mg  1 mg IntraVENous Q2H PRN    sodium chloride 0.9% (NS) 3ml nebulizer solution  2.5 mL Nebulization PRN    influenza vaccine 2020-21 (4 yrs+)(PF) (FLUCELVAX QUAD) injection 0.5 mL  0.5 mL IntraMUSCular PRIOR TO DISCHARGE    diphenhydrAMINE (BENADRYL) capsule 50 mg  50 mg Oral BID PRN    phosphorus (K PHOS NEUTRAL) 250 mg tablet 2 Tab  2 Tab Oral BID    acetaminophen (TYLENOL) tablet 650 mg  650 mg Oral Q6H PRN    ondansetron (ZOFRAN) injection 4 mg  4 mg IntraVENous Q8H PRN    guaiFENesin (ROBITUSSIN) 20 mg/mL oral liquid 400 mg  400 mg Oral Q6H PRN        Vitals:    10/24/20 0917 10/24/20 1102 10/24/20 1126 10/24/20 1209   BP: 137/77 (!) 156/85  (!) 145/83   Pulse: 92 99 97 95   Resp: 20 20 23    Temp:  98.8 °F (37.1 °C)     SpO2: 99% 100% 100%    Weight:       Height:         Objective:   General: Intubated sedated  HEENT: EOMI, no Icterus, no Pallor,ETT   neck: Neck is supple, No JVD  Lungs: decreased breathsounds, bilateral chest tubes present  CVS: heart sounds normal,  no murmurs, no rubs. GI: soft, nontender, normal BS. PEG+  Extremeties: no cyanosis,no edema in ext   Neuro: awake  Skin: normal skin turgor, no skin rashes.         Intake and Output:  Current Shift: 10/24 0701 - 10/24 1900  In: 160   Out: -   Last three shifts: 10/22 1901 - 10/24 0700  In: 671.3   Out: 1430 [Urine:740; Drains:50]      Lab/Data Review:  Recent Labs     10/24/20  0613 10/23/20  1700 10/23/20  0515   WBC 15.4* 12.6* 16.8*   HGB 10.4* 7.5* 8.1*   HCT 30.6* 22.6* 24.1*    261 329     Recent Labs     10/24/20  0613 10/23/20  1830 10/23/20  1700 10/23/20  0515 10/22/20  0425   * 148*  --  140 143   K 2.6* 3.7  --  2.8* 4.0   * 111*  --  108 110*   CO2 30 30  --  25 27   * 186*  --  73 81   BUN 22* 17  --  15 19   CREA 0.67* 0.63*  --  0.47* 0.55*   CA 8.5 9.0  --  8.5 8.8   MG 1.8  --  2.2  --  1.7   PHOS 3.3  --   --   --  2.9   ALB 2.8*  --   --   --  3.2*     Recent Labs     10/24/20  0530 10/23/20  2015 10/23/20  1715   PH 7.52* 7.53* 7.11*   PCO2 34* 33* 63*   PO2 59* 111* 145*   HCO3 29* 29* 17*   FIO2 50 70 100     Recent Results (from the past 24 hour(s))   MAGNESIUM    Collection Time: 10/23/20  5:00 PM   Result Value Ref Range    Magnesium 2.2 1.6 - 2.4 mg/dL   CBC WITH AUTOMATED DIFF    Collection Time: 10/23/20  5:00 PM   Result Value Ref Range    WBC 12.6 (H) 4.1 - 11.1 K/uL    RBC 2.35 (L) 4.10 - 5.70 M/uL    HGB 7.5 (L) 12.1 - 17.0 g/dL    HCT 22.6 (L) 36.6 - 50.3 %    MCV 96.2 80.0 - 99.0 FL    MCH 31.9 26.0 - 34.0 PG    MCHC 33.2 30.0 - 36.5 g/dL    RDW 18.9 (H) 11.5 - 14.5 %    PLATELET 717 931 - 174 K/uL    MPV 11.5 8.9 - 12.9 FL    NRBC 1.2 (H) 0  WBC    ABSOLUTE NRBC 0.15 (H) 0.00 - 0.01 K/uL    NEUTROPHILS 83 (H) 32 - 75 %    LYMPHOCYTES 7 (L) 12 - 49 %    MONOCYTES 9 5 - 13 %    EOSINOPHILS 0 0 - 7 %    BASOPHILS 0 0 - 1 %    IMMATURE GRANULOCYTES 5 (H) 0.0 - 0.5 %    ABS. NEUTROPHILS 10.5 (H) 1.8 - 8.0 K/UL    ABS. LYMPHOCYTES 0.9 0.8 - 3.5 K/UL    ABS. MONOCYTES 1.2 (H) 0.0 - 1.0 K/UL    ABS. EOSINOPHILS 0.0 0.0 - 0.4 K/UL    ABS. BASOPHILS 0.0 0.0 - 0.1 K/UL    ABS. IMM. GRANS. 0.6 (H) 0.00 - 0.04 K/UL    DF AUTOMATED     BLOOD GAS, ARTERIAL    Collection Time: 10/23/20  5:15 PM   Result Value Ref Range    pH 7.11 (LL) 7.35 - 7.45      PCO2 63 (H) 35 - 45 mmHg    PO2 145 (H) 75 - 100 mmHg    O2 SAT 99 >95 %    BICARBONATE 17 (L) 22 - 26 mmol/L    BASE DEFICIT 9.3 (H) 0 - 2 mmol/L    O2 FLOW RATE 15 L/min    FIO2 100 %    SITE Left Brachial      Critical value read back DR. GLENN RODRIGUES    LACTIC ACID    Collection Time: 10/23/20  6:30 PM   Result Value Ref Range    Lactic acid 2.7 (HH) 0.4 - 2.0 mmol/L   METABOLIC PANEL, BASIC    Collection Time: 10/23/20  6:30 PM   Result Value Ref Range    Sodium 148 (H) 136 - 145 mmol/L    Potassium 3.7 3.5 - 5.1 mmol/L    Chloride 111 (H) 97 - 108 mmol/L    CO2 30 21 - 32 mmol/L    Anion gap 7 5 - 15 mmol/L    Glucose 186 (H) 65 - 100 mg/dL    BUN 17 6 - 20 mg/dL    Creatinine 0.63 (L) 0.70 - 1.30 mg/dL    BUN/Creatinine ratio 27 (H) 12 - 20      GFR est AA >60 >60 ml/min/1.73m2    GFR est non-AA >60 >60 ml/min/1.73m2    Calcium 9.0 8.5 - 10.1 mg/dL   TROPONIN I    Collection Time: 10/23/20  6:30 PM   Result Value Ref Range    Troponin-I, Qt. <0.05 <0.05 ng/mL   TYPE & SCREEN    Collection Time: 10/23/20  8:05 PM   Result Value Ref Range    Crossmatch Expiration 10/26/2020,2359     ABO/Rh(D) Shara Her Positive     Antibody screen Negative     Unit number [id number]     Blood component type  LR,1     Unit division 00     Status of unit [address] to transfuse     Crossmatch result Compatible     Unit number [id number]     Blood component type  LR,2     Unit division 00     Status of unit Issued,final     TRANSFUSION STATUS Ok to transfuse     Crossmatch result Compatible    BLOOD GAS, ARTERIAL    Collection Time: 10/23/20  8:15 PM   Result Value Ref Range    pH 7.53 (H) 7.35 - 7.45      PCO2 33 (L) 35 - 45 mmHg    PO2 111 (H) 75 - 100 mmHg    O2 SAT 99 >95 %    BICARBONATE 29 (H) 22 - 26 mmol/L    BASE EXCESS 5.2 (H) 0 - 2 mmol/L    O2 METHOD VENT      FIO2 70 %    MODE Assist Control/Volume Control      Tidal volume 400      SET RATE 20      EPAP/CPAP/PEEP 5      SITE Right Brachial     BLOOD GAS, ARTERIAL    Collection Time: 10/24/20  5:30 AM   Result Value Ref Range    pH 7.52 (H) 7.35 - 7.45      PCO2 34 (L) 35 - 45 mmHg    PO2 59 (L) 75 - 100 mmHg    O2 SAT 93 (L) >95 %    BICARBONATE 29 (H) 22 - 26 mmol/L    BASE EXCESS 5.5 (H) 0 - 2 mmol/L    O2 METHOD VENT      FIO2 50 %    Tidal volume 400      SET RATE 20      EPAP/CPAP/PEEP 5      SITE Right Brachial      ENIO'S TEST Positive     CBC WITH AUTOMATED DIFF    Collection Time: 10/24/20  6:13 AM   Result Value Ref Range    WBC 15.4 (H) 4.1 - 11.1 K/uL    RBC 3.53 (L) 4.10 - 5.70 M/uL    HGB 10.4 (L) 12.1 - 17.0 g/dL    HCT 30.6 (L) 36.6 - 50.3 %    MCV 86.7 80.0 - 99.0 FL    MCH 29.5 26.0 - 34.0 PG    MCHC 34.0 30.0 - 36.5 g/dL    RDW 19.2 (H) 11.5 - 14.5 %    PLATELET 558 152 - 898 K/uL    MPV 11.2 8.9 - 12.9 FL    NRBC 0.5 (H) 0  WBC    ABSOLUTE NRBC 0.07 (H) 0.00 - 0.01 K/uL    NEUTROPHILS 86 (H) 32 - 75 %    LYMPHOCYTES 5 (L) 12 - 49 % MONOCYTES 7 5 - 13 %    EOSINOPHILS 0 0 - 7 %    BASOPHILS 0 0 - 1 %    IMMATURE GRANULOCYTES 2 (H) 0.0 - 0.5 %    ABS. NEUTROPHILS 13.5 (H) 1.8 - 8.0 K/UL    ABS. LYMPHOCYTES 0.8 0.8 - 3.5 K/UL    ABS. MONOCYTES 1.1 (H) 0.0 - 1.0 K/UL    ABS. EOSINOPHILS 0.0 0.0 - 0.4 K/UL    ABS. BASOPHILS 0.0 0.0 - 0.1 K/UL    ABS. IMM. GRANS. 0.3 (H) 0.00 - 0.04 K/UL    DF AUTOMATED     MAGNESIUM    Collection Time: 10/24/20  6:13 AM   Result Value Ref Range    Magnesium 1.8 1.6 - 2.4 mg/dL   RENAL FUNCTION PANEL    Collection Time: 10/24/20  6:13 AM   Result Value Ref Range    Sodium 147 (H) 136 - 145 mmol/L    Potassium 2.6 (LL) 3.5 - 5.1 mmol/L    Chloride 110 (H) 97 - 108 mmol/L    CO2 30 21 - 32 mmol/L    Anion gap 7 5 - 15 mmol/L    Glucose 101 (H) 65 - 100 mg/dL    BUN 22 (H) 6 - 20 mg/dL    Creatinine 0.67 (L) 0.70 - 1.30 mg/dL    BUN/Creatinine ratio 33 (H) 12 - 20      GFR est AA >60 >60 ml/min/1.73m2    GFR est non-AA >60 >60 ml/min/1.73m2    Calcium 8.5 8.5 - 10.1 mg/dL    Phosphorus 3.3 2.6 - 4.7 mg/dL    Albumin 2.8 (L) 3.5 - 5.0 g/dL        Assessment and Plan:       1. severe hypokalemia:  -from diuretics and metabolic alkalosis,   -Potassium i only 2.6 today, despite aggressive KCl replacement yesterday.    -6 dose of iv KCl have already been ordered. Will give additional 2 doses of KCl through PEG tube  -Continue scheduled KCl 20 twice daily   -Check mag level in a.m. Normal today.,  Will give 1 g magnesium sulfate because of persistent hypokalemia  -Off lasix now. continue to hold  -Continue 25 twice daily of spironolactone.   -monitor K levels,      2. Hypernatremia  -Secondary to diuretics. Urine output was low yesterday but has again picked up. Creatinine is normal at 0.6  -off ivf    3. Edema: improved with good diuresis +, off lasix  -off IV fluids  -Continue same dose of spironolactone     4. .  acute resp.failure: dwight pneumo+  -On vent, had bilateral chest tube placements because of bilateral pneumothorax  -pulm and CT surgery following    5.  Hypotension:  -It is better now. Tolerating spironolactone and Toprol .  Off midodrine  -Continue to monitor BPs     6 status post  infrarenal aorta endarterectomy, with aortic by common femoral artery bypass with 14 mm x 7 mm Hemosure graft       Signed By: Ezequiel oCrnell MD     October 24, 2020

## 2020-10-24 NOTE — PROGRESS NOTES
Pulmonology and Critical Care Progress Note    Subjective:     Chief Complaint:   Chief Complaint   Patient presents with    Altered mental status     The patient underwent aortobifemoral bypass surgery 10/6/20. Post surgery he was left on the ventilator. Apparently blood loss was about 3.5 L. When he returned to the ICU he was on multiple pressors. Successfully extubated after suctioning of mucous plugs from the left. Unfortunately, he developed spontaneous left pneumothorax. Thoracic surgery did aspiration of his left pneumothorax. No chest tube insertion was required. Patient developed increasing respiratory distress secondary to a tension right-sided PTX. Patient ended up requiring intubation and Dr. Dalton Nayak came in to evaluate the patient and found him to have bilateral pneumothoraces and placed bilateral chest tubes.      Patient seen and examined at the bedside in the ICU. I discussed the case in detail with the bedside nurse. I was briefed about yesterday's events. Apparently he had cardiac arrest.  He has 2 chest tubes. One on each side about of them are leaking. Chest x-ray was reviewed. Sedated with propofol at 15 mics. Patient was successfully extubated on 10/20/20 and he was tolerating RA well. Patient had a PEG tube placed on 10/22/2020, apparently had some bleeding from the PEG tube site last night which required Dr. Asia Paez coming back and placing a stitch at the insertion site. Still waiting on approval from Dr. Asia Paez to use the PEG. Patient able to tolerate a modified diet per speech therapy.          Review of Systems:  Unable to perform due to patient's condition    Current Facility-Administered Medications   Medication Dose Route Frequency Provider Last Rate Last Dose    propofol (DIPRIVAN) 10 mg/mL infusion  0-50 mcg/kg/min IntraVENous TITRATE Karissa Mariee MD 5.8 mL/hr at 10/24/20 1300 15 mcg/kg/min at 10/24/20 1300    potassium chloride (KLOR-CON) packet for solution 40 mEq  40 mEq Per G Tube Q4H Alfredo Mcguire MD   40 mEq at 10/24/20 1530    potassium chloride SR (KLOR-CON 10) tablet 20 mEq  20 mEq Oral BID Alfredo Mcguire MD   Stopped at 10/24/20 0900    0.9% sodium chloride infusion 250 mL  250 mL IntraVENous PRN Adenike Olson MD        oxyCODONE IR (ROXICODONE) tablet 5 mg  5 mg Oral Q4H PRN Sheldon Davis MD   5 mg at 10/24/20 1531    enoxaparin (LOVENOX) injection 30 mg  30 mg SubCUTAneous Q24H Sheldon Davis MD   30 mg at 10/23/20 2134    0.9% sodium chloride infusion 250 mL  250 mL IntraVENous PRN Sheldon Davis MD 15 mL/hr at 10/24/20 1056 250 mL at 10/24/20 1056    metoprolol tartrate (LOPRESSOR) tablet 50 mg  50 mg Oral BID Johnny Santiago DO   50 mg at 10/24/20 1209    spironolactone (ALDACTONE) tablet 25 mg  25 mg Oral BID Allan Coker MD   25 mg at 10/24/20 0852    piperacillin-tazobactam (ZOSYN) 3.375 g in 0.9% sodium chloride (MBP/ADV) 100 mL MBP  3.375 g IntraVENous Q8H Alvino Perry MD 25 mL/hr at 10/24/20 1226 3.375 g at 10/24/20 1226    pantoprazole (PROTONIX) granules for oral suspension 40 mg  40 mg Per NG tube ACB Alvino Perry MD   40 mg at 10/24/20 0852    albuterol CONCENTRATE 2.5mg/0.5 mL neb soln  2.5 mg Nebulization Q6H RT Johnny Santiago DO   2.5 mg at 10/24/20 1309    LORazepam (ATIVAN) injection 1 mg  1 mg IntraVENous Q2H PRN Adenike Olson MD   1 mg at 10/23/20 1549    sodium chloride 0.9% (NS) 3ml nebulizer solution  2.5 mL Nebulization PRN Pj Amezcua MD   3 mL at 10/24/20 1309    influenza vaccine 2020-21 (4 yrs+)(PF) (FLUCELVAX QUAD) injection 0.5 mL  0.5 mL IntraMUSCular PRIOR TO DISCHARGE Alvino Perry MD   Stopped at 10/07/20 0900    diphenhydrAMINE (BENADRYL) capsule 50 mg  50 mg Oral BID PRN Marleni Costello NP   50 mg at 10/21/20 2345    phosphorus (K PHOS NEUTRAL) 250 mg tablet 2 Tab  2 Tab Oral BID Alvino Perry MD   2 Tab at 10/24/20 1208    acetaminophen (TYLENOL) tablet 650 mg  650 mg Oral Q6H PRN Ethan, Javon Batres, NP   650 mg at 10/20/20 0432    ondansetron (ZOFRAN) injection 4 mg  4 mg IntraVENous Q8H PRN Gisele Long NP   4 mg at 20 0402    guaiFENesin (ROBITUSSIN) 20 mg/mL oral liquid 400 mg  400 mg Oral Q6H PRN Sonia Pereira NP   Stopped at 20 1855            No Known Allergies        Objective:     Blood pressure 124/72, pulse 88, temperature 98.6 °F (37 °C), resp. rate 20, height 6' (1.829 m), weight 64.4 kg (141 lb 15.6 oz), SpO2 100 %. Temp (24hrs), Av.8 °F (37.1 °C), Min:98.5 °F (36.9 °C), Max:99 °F (37.2 °C)      Intake and Output:  Current Shift: 10/24 0701 - 10/24 1900  In: 320   Out: 640 [Urine:200; Drains:40]  Last 3 Shifts: 10/22 1901 - 10/24 0700  In: 671.3   Out: 1430 [Urine:740; Drains:50]    Physical Exam:     General:  Elderly white male who is sedated on the ventilator. Throat and Neck: Supple. No JVD. He has a right subclavian central line. Lung:  Much improved aeration bilaterally, minimal rhonchi in the left base. Bilateral chest tubes present to waterseal.  Air leakage is noted. Heart: S1+S2. No murmurs  Abdomen: Status post surgery. He has a midline incision. He has one HELGA drains in place; draining minimal fluid. .  Bowel sounds are hypoactive. PEG tube to low intermittent suction. Extremities:  His edema is improved. Distal pulses of the lower extremities are noted with Dopplers. Has skin breakdown on the dependent portion of heel. : Reese catheter in place. Making some urine output.   Skin: No cyanosis  Neurologic:  A+Ox3, non-focal exam        Recent Results (from the past 24 hour(s))   MAGNESIUM    Collection Time: 10/23/20  5:00 PM   Result Value Ref Range    Magnesium 2.2 1.6 - 2.4 mg/dL   CBC WITH AUTOMATED DIFF    Collection Time: 10/23/20  5:00 PM   Result Value Ref Range    WBC 12.6 (H) 4.1 - 11.1 K/uL    RBC 2.35 (L) 4.10 - 5.70 M/uL    HGB 7.5 (L) 12.1 - 17.0 g/dL    HCT 22.6 (L) 36.6 - 50.3 %    MCV 96.2 80.0 - 99.0 FL    MCH 31.9 26.0 - 34.0 PG    MCHC 33.2 30.0 - 36.5 g/dL    RDW 18.9 (H) 11.5 - 14.5 %    PLATELET 909 194 - 470 K/uL    MPV 11.5 8.9 - 12.9 FL    NRBC 1.2 (H) 0  WBC    ABSOLUTE NRBC 0.15 (H) 0.00 - 0.01 K/uL    NEUTROPHILS 83 (H) 32 - 75 %    LYMPHOCYTES 7 (L) 12 - 49 %    MONOCYTES 9 5 - 13 %    EOSINOPHILS 0 0 - 7 %    BASOPHILS 0 0 - 1 %    IMMATURE GRANULOCYTES 5 (H) 0.0 - 0.5 %    ABS. NEUTROPHILS 10.5 (H) 1.8 - 8.0 K/UL    ABS. LYMPHOCYTES 0.9 0.8 - 3.5 K/UL    ABS. MONOCYTES 1.2 (H) 0.0 - 1.0 K/UL    ABS. EOSINOPHILS 0.0 0.0 - 0.4 K/UL    ABS. BASOPHILS 0.0 0.0 - 0.1 K/UL    ABS. IMM. GRANS. 0.6 (H) 0.00 - 0.04 K/UL    DF AUTOMATED     BLOOD GAS, ARTERIAL    Collection Time: 10/23/20  5:15 PM   Result Value Ref Range    pH 7.11 (LL) 7.35 - 7.45      PCO2 63 (H) 35 - 45 mmHg    PO2 145 (H) 75 - 100 mmHg    O2 SAT 99 >95 %    BICARBONATE 17 (L) 22 - 26 mmol/L    BASE DEFICIT 9.3 (H) 0 - 2 mmol/L    O2 FLOW RATE 15 L/min    FIO2 100 %    SITE Left Brachial      Critical value read back DR. GLENN RODRIGUES    LACTIC ACID    Collection Time: 10/23/20  6:30 PM   Result Value Ref Range    Lactic acid 2.7 (HH) 0.4 - 2.0 mmol/L   METABOLIC PANEL, BASIC    Collection Time: 10/23/20  6:30 PM   Result Value Ref Range    Sodium 148 (H) 136 - 145 mmol/L    Potassium 3.7 3.5 - 5.1 mmol/L    Chloride 111 (H) 97 - 108 mmol/L    CO2 30 21 - 32 mmol/L    Anion gap 7 5 - 15 mmol/L    Glucose 186 (H) 65 - 100 mg/dL    BUN 17 6 - 20 mg/dL    Creatinine 0.63 (L) 0.70 - 1.30 mg/dL    BUN/Creatinine ratio 27 (H) 12 - 20      GFR est AA >60 >60 ml/min/1.73m2    GFR est non-AA >60 >60 ml/min/1.73m2    Calcium 9.0 8.5 - 10.1 mg/dL   TROPONIN I    Collection Time: 10/23/20  6:30 PM   Result Value Ref Range    Troponin-I, Qt. <0.05 <0.05 ng/mL   TYPE & SCREEN    Collection Time: 10/23/20  8:05 PM   Result Value Ref Range    Crossmatch Expiration 10/26/2020,2359     ABO/Rh(D) O Positive     Antibody screen Negative     Unit number [id number] Blood component type RC LR,1     Unit division 00     Status of unit [address] to transfuse     Crossmatch result Compatible     Unit number [id number]     Blood component type RC LR,2     Unit division 00     Status of unit Issued,final     TRANSFUSION STATUS Ok to transfuse     Crossmatch result Compatible    BLOOD GAS, ARTERIAL    Collection Time: 10/23/20  8:15 PM   Result Value Ref Range    pH 7.53 (H) 7.35 - 7.45      PCO2 33 (L) 35 - 45 mmHg    PO2 111 (H) 75 - 100 mmHg    O2 SAT 99 >95 %    BICARBONATE 29 (H) 22 - 26 mmol/L    BASE EXCESS 5.2 (H) 0 - 2 mmol/L    O2 METHOD VENT      FIO2 70 %    MODE Assist Control/Volume Control      Tidal volume 400      SET RATE 20      EPAP/CPAP/PEEP 5      SITE Right Brachial     BLOOD GAS, ARTERIAL    Collection Time: 10/24/20  5:30 AM   Result Value Ref Range    pH 7.52 (H) 7.35 - 7.45      PCO2 34 (L) 35 - 45 mmHg    PO2 59 (L) 75 - 100 mmHg    O2 SAT 93 (L) >95 %    BICARBONATE 29 (H) 22 - 26 mmol/L    BASE EXCESS 5.5 (H) 0 - 2 mmol/L    O2 METHOD VENT      FIO2 50 %    Tidal volume 400      SET RATE 20      EPAP/CPAP/PEEP 5      SITE Right Brachial      ENIO'S TEST Positive     CBC WITH AUTOMATED DIFF    Collection Time: 10/24/20  6:13 AM   Result Value Ref Range    WBC 15.4 (H) 4.1 - 11.1 K/uL    RBC 3.53 (L) 4.10 - 5.70 M/uL    HGB 10.4 (L) 12.1 - 17.0 g/dL    HCT 30.6 (L) 36.6 - 50.3 %    MCV 86.7 80.0 - 99.0 FL    MCH 29.5 26.0 - 34.0 PG    MCHC 34.0 30.0 - 36.5 g/dL    RDW 19.2 (H) 11.5 - 14.5 %    PLATELET 099 712 - 500 K/uL    MPV 11.2 8.9 - 12.9 FL    NRBC 0.5 (H) 0  WBC    ABSOLUTE NRBC 0.07 (H) 0.00 - 0.01 K/uL    NEUTROPHILS 86 (H) 32 - 75 %    LYMPHOCYTES 5 (L) 12 - 49 %    MONOCYTES 7 5 - 13 %    EOSINOPHILS 0 0 - 7 %    BASOPHILS 0 0 - 1 %    IMMATURE GRANULOCYTES 2 (H) 0.0 - 0.5 %    ABS. NEUTROPHILS 13.5 (H) 1.8 - 8.0 K/UL    ABS. LYMPHOCYTES 0.8 0.8 - 3.5 K/UL    ABS. MONOCYTES 1.1 (H) 0.0 - 1.0 K/UL    ABS. EOSINOPHILS 0.0 0.0 - 0.4 K/UL    ABS. BASOPHILS 0.0 0.0 - 0.1 K/UL    ABS. IMM. GRANS. 0.3 (H) 0.00 - 0.04 K/UL    DF AUTOMATED     MAGNESIUM    Collection Time: 10/24/20  6:13 AM   Result Value Ref Range    Magnesium 1.8 1.6 - 2.4 mg/dL   RENAL FUNCTION PANEL    Collection Time: 10/24/20  6:13 AM   Result Value Ref Range    Sodium 147 (H) 136 - 145 mmol/L    Potassium 2.6 (LL) 3.5 - 5.1 mmol/L    Chloride 110 (H) 97 - 108 mmol/L    CO2 30 21 - 32 mmol/L    Anion gap 7 5 - 15 mmol/L    Glucose 101 (H) 65 - 100 mg/dL    BUN 22 (H) 6 - 20 mg/dL    Creatinine 0.67 (L) 0.70 - 1.30 mg/dL    BUN/Creatinine ratio 33 (H) 12 - 20      GFR est AA >60 >60 ml/min/1.73m2    GFR est non-AA >60 >60 ml/min/1.73m2    Calcium 8.5 8.5 - 10.1 mg/dL    Phosphorus 3.3 2.6 - 4.7 mg/dL    Albumin 2.8 (L) 3.5 - 5.0 g/dL       CT Results  (Last 48 hours)    None          Assessment:     1. Acute respiratory failure, after aortobifemoral bypass surgery on 10/6/2020. Patient self extubated himself early in the morning on 10/9/2020. Re-intubated on 10/10/20 for left-sided mucous plugging and complete left lung collapse and had flexible bronchoscopy done with suction of mucous plug. 2.  Acute metabolic encephalopathy, resolved  3. Aspiration pneumonia  4. Severe peripheral vascular disease   5. UTI   6. Hypertension  7. Familial polymyositis  8. Left spontaneous pneumothorax    Plan:     1.)  Acute Hypoxic respiratory failure. Extubated 10/20/20, was doing well. However had cardiac arrest yesterday and was reintubated. Current vent settings show tidal volume of 400 O2 50% assist control of 20 and PEEP of 5. Blood gases show 7.5 2/34/59. Will increase O2 to 60%. Chest x-ray shows decreasing bilateral pneumothoraces. He is sedated with propofol currently at 15 mics. S/p PEG tube on 10/22/20. Was cleared by speech therapy for honey-thickened liquids. Currently PEG tube is to low intermittent suction.   If okay with GI tube feeds can be started. Will need nutrition on board to start tube feeds once cleared to use the PEG by GI. Apparently this is her third reintubation. He will probably need tracheostomy. He is very weak and deconditioned. 2.)  Bilateral pneumothoraces  Bilateral chest tubes placed, no airleak on waterseal.  Dr. Voss Fly following closely. Plan is to keep chest tubes in.    3.)  Aspiration/HAP pneumonia:  Continue Zosyn (day 7/7), okay to stop Zosyn tomorrow (10/24/20). Completed a long course of steroids, stop prednisone after today's dose. On scheduled nebulized bronchodilator treatments. 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 PT/OT can be more aggressive about seeing him once his chest tubes have been removed. 4.)  Metabolic encephalopathy. He has a progressive neurologic disorder. Brain MRI negative    Appreciate assistance from neurology. Back to baseline now after extubation. 5.)  Myocardial ischemia:  He underwent nuclear stress testing which showed significant inferior and from will treat him with medical managememt   I will follow the patient closely with you. DVT and GI prophylaxis. He is on Lovenox and Protonix IV.     Case discussed in detail with RN, RT, and care team in ICU  Thank you for involving me in the care of this patient  I will follow with you closely during hospitalization        Suri Heath MD  [facility]  10/24/2020

## 2020-10-24 NOTE — PROGRESS NOTES
Progress Note    Patient: Cayetano Melendrez MRN: [id number]  SSN: [id number]    YOB: 1965  Age: 48 y.o. Sex: male      Admit Date: 9/26/2020    LOS: 27 days     Subjective:   He had PEG tube placement yesterday some bleeding afterwards, the suture place, today to pick to play.   Bleeding good position, no bleeding,   Past Medical History:   Diagnosis Date    Myositis     Familial inclusion         Current Facility-Administered Medications:     potassium chloride (K-DUR, KLOR-CON) SR tablet 40 mEq, 40 mEq, Oral, TID, Jillian Escobedo MD, Stopped at 10/23/20 2133    [START ON 10/24/2020] potassium chloride SR (KLOR-CON 10) tablet 20 mEq, 20 mEq, Oral, BID, Roxanna Varela MD    sodium bicarbonate 8.4 % (1 mEq/mL) injection, , , ,     furosemide (LASIX) 10 mg/mL injection, , , , , Stopped at 10/23/20 1900    0.9% sodium chloride infusion 250 mL, 250 mL, IntraVENous, PRN, Wesley, Bailee Petty MD    oxyCODONE IR (ROXICODONE) tablet 5 mg, 5 mg, Oral, Q4H PRN, Pepe Okeefe MD    enoxaparin (LOVENOX) injection 30 mg, 30 mg, SubCUTAneous, Q24H, Pepe Okeefe MD, 30 mg at 10/23/20 2134    0.9% sodium chloride infusion 250 mL, 250 mL, IntraVENous, PRN, Pepe Okeefe MD    morphine injection 1 mg, 1 mg, IntraVENous, Q6H PRN, Jillian Escobedo MD, 1 mg at 10/23/20 0526    metoprolol tartrate (LOPRESSOR) tablet 50 mg, 50 mg, Oral, BID, Johnny Santiago DO, Stopped at 10/23/20 2100    spironolactone (ALDACTONE) tablet 25 mg, 25 mg, Oral, BID, Burton Coker MD, 25 mg at 10/23/20 2133    piperacillin-tazobactam (ZOSYN) 3.375 g in 0.9% sodium chloride (MBP/ADV) 100 mL MBP, 3.375 g, IntraVENous, Q8H, Pascale Kelly MD, Last Rate: 25 mL/hr at 10/23/20 2138, 3.375 g at 10/23/20 2138    pantoprazole (PROTONIX) granules for oral suspension 40 mg, 40 mg, Per NG tube, ACB, Shwetha So, Nela Mejía MD, 40 mg at 10/23/20 0903    albuterol CONCENTRATE 2.5mg/0.5 mL neb soln, 2.5 mg, Nebulization, Q6H RT, Jack, DO Johnny, 2.5 mg at 10/23/20 1929    LORazepam (ATIVAN) injection 1 mg, 1 mg, IntraVENous, Q2H PRN, Wesley, Jarett Johns MD, 1 mg at 10/23/20 1549    sodium chloride 0.9% (NS) 3ml nebulizer solution, 2.5 mL, Nebulization, PRN, Dany Stone MD, 3 mL at 10/23/20 1929    influenza vaccine 2020-21 (4 yrs+)(PF) (FLUCELVAX QUAD) injection 0.5 mL, 0.5 mL, IntraMUSCular, PRIOR TO DISCHARGE, Loly Zapata MD, Stopped at 10/07/20 0900    diphenhydrAMINE (BENADRYL) capsule 50 mg, 50 mg, Oral, BID PRN, Kentrell Coley NP, 50 mg at 10/21/20 2345    phosphorus (K PHOS NEUTRAL) 250 mg tablet 2 Tab, 2 Tab, Oral, BID, Shira Galindo MD, 2 Tab at 10/23/20 0900    acetaminophen (TYLENOL) tablet 650 mg, 650 mg, Oral, Q6H PRN, Gisele Long NP, 650 mg at 10/20/20 0432    ondansetron (ZOFRAN) injection 4 mg, 4 mg, IntraVENous, Q8H PRN, Gisele Long, NP, 4 mg at 09/30/20 0402    guaiFENesin (ROBITUSSIN) 20 mg/mL oral liquid 400 mg, 400 mg, Oral, Q6H PRN, Gisele Long NP, Stopped at 09/26/20 1855    Objective:     Vitals:    10/23/20 1816 10/23/20 1932 10/23/20 1935 10/23/20 2100   BP: 100/68   122/84   Pulse: 98 (!) 105  (!) 109   Resp:  25     Temp:       SpO2:  98% 98%    Weight:       Height:            Intake and Output:  Current Shift: No intake/output data recorded. Last three shifts: 10/22 0701 - 10/23 1900  In: 100 [I.V.:100]  Out: 860 [Urine:550; Drains:130]    Physical Exam:   Physical Exam   Constitutional: He is oriented to person, place, and time. He appears distressed. HENT:   Head: Atraumatic. Eyes: Conjunctivae are normal.   Neck: No tracheal deviation present. Cardiovascular: Normal heart sounds. Pulmonary/Chest: He is in respiratory distress. Abdominal: He exhibits no distension and no mass. There is abdominal tenderness. There is no rebound and no guarding. Musculoskeletal:         General: No tenderness. Neurological: He is oriented to person, place, and time. Skin: Rash noted. the PEG tube site skin fine, no blood in the NG tube or  hematoma,    Lab/Data Review:  Recent Results (from the past 24 hour(s))   CBC WITH AUTOMATED DIFF    Collection Time: 10/23/20  5:15 AM   Result Value Ref Range    WBC 16.8 (H) 4.1 - 11.1 K/uL    RBC 2.60 (L) 4.10 - 5.70 M/uL    HGB 8.1 (L) 12.1 - 17.0 g/dL    HCT 24.1 (L) 36.6 - 50.3 %    MCV 92.7 80.0 - 99.0 FL    MCH 31.2 26.0 - 34.0 PG    MCHC 33.6 30.0 - 36.5 g/dL    RDW 18.4 (H) 11.5 - 14.5 %    PLATELET 109 887 - 197 K/uL    MPV 11.1 8.9 - 12.9 FL    NRBC 0.8 (H) 0  WBC    ABSOLUTE NRBC 0.13 (H) 0.00 - 0.01 K/uL    NEUTROPHILS 85 (H) 32 - 75 %    LYMPHOCYTES 6 (L) 12 - 49 %    MONOCYTES 8 5 - 13 %    EOSINOPHILS 0 0 - 7 %    BASOPHILS 0 0 - 1 %    IMMATURE GRANULOCYTES 2 (H) 0.0 - 0.5 %    ABS. NEUTROPHILS 14.3 (H) 1.8 - 8.0 K/UL    ABS. LYMPHOCYTES 1.1 0.8 - 3.5 K/UL    ABS. MONOCYTES 1.4 (H) 0.0 - 1.0 K/UL    ABS. EOSINOPHILS 0.0 0.0 - 0.4 K/UL    ABS. BASOPHILS 0.0 0.0 - 0.1 K/UL    ABS. IMM.  GRANS. 0.4 (H) 0.00 - 0.04 K/UL    DF AUTOMATED     METABOLIC PANEL, BASIC    Collection Time: 10/23/20  5:15 AM   Result Value Ref Range    Sodium 140 136 - 145 mmol/L    Potassium 2.8 (L) 3.5 - 5.1 mmol/L    Chloride 108 97 - 108 mmol/L    CO2 25 21 - 32 mmol/L    Anion gap 7 5 - 15 mmol/L    Glucose 73 65 - 100 mg/dL    BUN 15 6 - 20 mg/dL    Creatinine 0.47 (L) 0.70 - 1.30 mg/dL    BUN/Creatinine ratio 32 (H) 12 - 20      GFR est AA >60 >60 ml/min/1.73m2    GFR est non-AA >60 >60 ml/min/1.73m2    Calcium 8.5 8.5 - 10.1 mg/dL   MAGNESIUM    Collection Time: 10/23/20  5:00 PM   Result Value Ref Range    Magnesium 2.2 1.6 - 2.4 mg/dL   CBC WITH AUTOMATED DIFF    Collection Time: 10/23/20  5:00 PM   Result Value Ref Range    WBC 12.6 (H) 4.1 - 11.1 K/uL    RBC 2.35 (L) 4.10 - 5.70 M/uL    HGB 7.5 (L) 12.1 - 17.0 g/dL    HCT 22.6 (L) 36.6 - 50.3 %    MCV 96.2 80.0 - 99.0 FL    MCH 31.9 26.0 - 34.0 PG    MCHC 33.2 30.0 - 36.5 g/dL    RDW 18.9 (H) 11.5 - 14.5 %    PLATELET 475 585 - 728 K/uL    MPV 11.5 8.9 - 12.9 FL    NRBC 1.2 (H) 0  WBC    ABSOLUTE NRBC 0.15 (H) 0.00 - 0.01 K/uL    NEUTROPHILS 83 (H) 32 - 75 %    LYMPHOCYTES 7 (L) 12 - 49 %    MONOCYTES 9 5 - 13 %    EOSINOPHILS 0 0 - 7 %    BASOPHILS 0 0 - 1 %    IMMATURE GRANULOCYTES 5 (H) 0.0 - 0.5 %    ABS. NEUTROPHILS 10.5 (H) 1.8 - 8.0 K/UL    ABS. LYMPHOCYTES 0.9 0.8 - 3.5 K/UL    ABS. MONOCYTES 1.2 (H) 0.0 - 1.0 K/UL    ABS. EOSINOPHILS 0.0 0.0 - 0.4 K/UL    ABS. BASOPHILS 0.0 0.0 - 0.1 K/UL    ABS. IMM. GRANS. 0.6 (H) 0.00 - 0.04 K/UL    DF AUTOMATED     BLOOD GAS, ARTERIAL    Collection Time: 10/23/20  5:15 PM   Result Value Ref Range    pH 7.11 (LL) 7.35 - 7.45      PCO2 63 (H) 35 - 45 mmHg    PO2 145 (H) 75 - 100 mmHg    O2 SAT 99 >95 %    BICARBONATE 17 (L) 22 - 26 mmol/L    BASE DEFICIT 9.3 (H) 0 - 2 mmol/L    O2 FLOW RATE 15 L/min    FIO2 100 %    SITE Left Brachial      Critical value read back DR. GLENN RODRIGUES    LACTIC ACID    Collection Time: 10/23/20  6:30 PM   Result Value Ref Range    Lactic acid 2.7 (HH) 0.4 - 2.0 mmol/L   METABOLIC PANEL, BASIC    Collection Time: 10/23/20  6:30 PM   Result Value Ref Range    Sodium 148 (H) 136 - 145 mmol/L    Potassium 3.7 3.5 - 5.1 mmol/L    Chloride 111 (H) 97 - 108 mmol/L    CO2 30 21 - 32 mmol/L    Anion gap 7 5 - 15 mmol/L    Glucose 186 (H) 65 - 100 mg/dL    BUN 17 6 - 20 mg/dL    Creatinine 0.63 (L) 0.70 - 1.30 mg/dL    BUN/Creatinine ratio 27 (H) 12 - 20      GFR est AA >60 >60 ml/min/1.73m2    GFR est non-AA >60 >60 ml/min/1.73m2    Calcium 9.0 8.5 - 10.1 mg/dL   TROPONIN I    Collection Time: 10/23/20  6:30 PM   Result Value Ref Range    Troponin-I, Qt. <0.05 <0.05 ng/mL   BLOOD GAS, ARTERIAL    Collection Time: 10/23/20  8:15 PM   Result Value Ref Range    pH 7.53 (H) 7.35 - 7.45      PCO2 33 (L) 35 - 45 mmHg    PO2 111 (H) 75 - 100 mmHg    O2 SAT 99 >95 %    BICARBONATE 29 (H) 22 - 26 mmol/L    BASE EXCESS 5.2 (H) 0 - 2 mmol/L    O2 METHOD VENT      FIO2 70 %    MODE Assist Control/Volume Control      Tidal volume 400      SET RATE 20      EPAP/CPAP/PEEP 5      SITE Right Brachial          XR CHEST PORT   Final Result      XR CHEST PORT   Final Result      XR CHEST PORT   Final Result      XR CHEST PORT   Final Result      XR CHEST PORT   Final Result      XR CHEST PORT   Final Result      XR CHEST PORT   Final Result      XR CHEST PORT   Final Result      XR CHEST PORT   Final Result      XR CHEST PORT   Final Result   IMPRESSION: OG tube extends well into the stomach. XR CHEST PORT   Final Result   IMPRESSION: Stable exam..                XR CHEST PORT   Final Result   IMPRESSION: Endotracheal tube now present with the tip approximately 8 cm above   the [place]. Persistent 50% left pneumothorax. XR CHEST PORT   Final Result      XR CHEST PORT   Final Result      XR CHEST PORT   Final Result      XR CHEST PORT   Final Result      XR CHEST PORT   Final Result      XR CHEST PORT   Final Result      XR CHEST PORT   Final Result      XR CHEST PORT   Final Result      XR CHEST SNGL V   Final Result   IMPRESSION: Breathing left basilar airspace disease. XR CHEST PORT   Final Result   IMPRESSION: Significant reexpansion of the left lung with persistent airspace   disease throughout much of the left lower lobe. XR CHEST PORT   Final Result   IMPRESSION: Postoperative changes, left upper abdomen. Left pneumonectomy versus   complete lung collapse; correlate with surgical history. Right lung relatively   clear. ET tube 27 mm above the [place]. XR CHEST PORT   Final Result   IMPRESSION: Complete collapse of the left lung with no evidence of residual   pulmonary aeration on the left. Associated left pleural effusion is likely. XR CHEST PORT   Final Result   Impression:Left lung airspace disease/atelectasis. Suspect left pleural   effusion. Focal airspace disease at the right lung base.       CTA ABD ART W RUNOFF W WO CONT   Final Result   IMPRESSION:   1. Interval postoperative changes from aorto-bifemoral artery bypass. The bypass   graft is patent. The proximal and distal anastomoses are patent. 2. Patent left renal artery. Wedge shaped perfusion defect in the inferior pole   of the left kidney consistent with parenchymal infarct. 3. Patent diminutive right renal artery. Patchy parenchymal perfusion, improved   compared to the preoperative study from 9/29/2020.   4. Patent right and left lower extremity arteries without occlusion or   significant stenosis. 5. Stable mild short segment focal atherosclerotic dissection flap at the right   anterolateral aspect of the descending thoracic aorta at the T10 level. 6. Postoperative changes from splenectomy. Approximately 6.1 cm TR by 2.1 cm AP   by 8.4 cm CC hematoma in the splenic bed without findings of active contrast   extravasation. A surgical drain is present in the splenectomy bed extending to   beneath the left hemidiaphragm. 7. Expected pneumoperitoneum given recent surgery. Bilateral retroperitoneal   low-attenuation stranding and/or small volume fluid. Diffuse mesenteric   stranding and/or small volume fluid in the  pelvis. 8. Distended gallbladder with cholelithiasis and prominent common bile duct as   seen similarly on the preoperative study from 9/29/2020/.   9. Diffuse anasarca. Diffuse subcutaneous edema of the lower extremities, right   greater than left. Negative for right and left lower extremity DVT. XR CHEST PORT   Final Result   IMPRESSION:   1. The patient's life support lines and tubes are unchanged and are in   satisfactory position. 2.  There is increasing hypoventilation and atelectasis within the lung bases   greater on the left compared to the right. 3.  There also is increasing hazy opacity along the lower left hemithorax most   compatible with a layering moderate-sized left pleural effusion.         XR CHEST PORT Final Result   IMPRESSION:   1. The endotracheal tube is in satisfactory position. 2.  There is a hypoventilation of the lung bases with lung base atelectasis. The   remainder of the lung parenchyma is relatively clear. There is vascular pruning   within the upper lobes suspect for underlying emphysema. 3.  There is free intra-abdominal air likely a function of recent abdominal   surgery. US RETROPERITONEUM COMP   Final Result   IMPRESSION:   1. There is a smaller size to the right kidney. 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 The kidneys otherwise image in a normal fashion. There are normal renal echotexture characteristics. 2.  There a moderate sized right pleural effusion. XR CHEST PORT   Final Result   IMPRESSION: Postoperative findings, postprocedural findings, medical devices as   described. Minimal right lung base atelectasis may be residual adhesive   intraoperative atelectasis. Marybeth Diehl XR ABD (KUB)   Final Result   Findings/impression:      Numerous surgical clips project over the left upper quadrant and mid abdomen. Midline skin staples noted. Drainage tubes project over the pelvis, lower   abdomen and left upper quadrant. Enteric tube tip projects over the proximal   stomach. No unexpected radiopaque foreign bodies are identified. Oral contrast material throughout the colon. Bowel gas pattern is   nonobstructive. No acute osseous abdomen identified. XR SWALLOW FUNC VIDEO   Final Result   Impression: Significant hypopharyngeal residue. Episodes of penetration with   all consistencies. Episode of flash aspiration after water wash. XR CHEST PORT   Final Result   Impression: Underexpanded lungs with bibasilar atelectasis. CTA ABDOMEN PELV W CONT   Final Result   IMPRESSION: Mid abdominal aortic occlusion, with threatened right kidney and   fairly good collateralization to the legs.  This could be causing a mesenteric steal phenomenon, however      MRI BRAIN WO CONT   Final Result   Impression: No evidence of acute hemorrhage, mass or infarct. No sinusitis or   mastoiditis. Please see above discussion. CTA CHEST W OR W WO CONT   Final Result   IMPRESSION: Limited by breathing motion. 1. No large pulmonary arterial filling defect. 2. Bronchial thickening with right greater than left lower lung atelectasis or   pneumonia/pneumonitis. Bubbly debris in the trachea. 3. Atherosclerosis. Abrupt discontinuation of contrast in the aorta on the very   inferior slices. Contrast is seen in the celiac artery and SMA and the left   renal artery. The right kidney demonstrates decreased attenuation compared to   the left concerning for medical renal disease to include ischemia. Recommend CTA   abdomen/pelvis. Called report to charge nurse Inessa Patiño at 9:05 PM 9/27/2020.   4. Cholelithiasis within distended gallbladder. 5. Dilated small bowel. 6. Other findings as above. XR CHEST PORT   Final Result   IMPRESSION:      No airspace disease in the lungs. Follow-up as clinically indicated. CT HEAD WO CONT   Final Result   Impression: Unremarkable head CT without contrast.  No infarct, mass, or    hemorrhage. Please see full report. XR CHEST SNGL V   Final Result   Impression: No diagnostic abnormality.             US CHEST PORTABLE    (Results Pending)   XR CHEST PORT    (Results Pending)   XR CHEST PORT    (Results Pending)   XR CHEST PORT    (Results Pending)   XR CHEST PORT    (Results Pending)   XR CHEST PORT    (Results Pending)        Assessment:     Active Problems:    Metabolic encephalopathy (6/51/0728)      UTI (urinary tract infection) (9/26/2020)      Aspiration pneumonitis ([address] (9/26/2020)      Essential hypertension (9/26/2020)      Acute dehydration (9/26/2020)      Difficult swallow, increase of difficult feeding,  Malnutrition,  S/p peg placement   today the event [facility]   Plan:   Continue on the current antibiotic treatment  Continue on current dysphagia diet  PT OT and speech to follow  Start tube feeding         Signed By: Panchito Shaw MD     October 23, 2020        Thank you for allowing me to participate in this patients care  [facility] Referring Physician   Steve Abraham MD

## 2020-10-24 NOTE — PROGRESS NOTES
Hospitalist Progress Note           Daily Progress Note: 10/24/2020    Chief complaint: Shortness of breath and weakness  Subjective: The patient is seen for follow  up. Patient extubated 10/20/2020 and was on nasal oxygen. PEG tube placed 2 days ago. Cardiac arrest last evening with CPR for about 10 minutes with return of spontaneous circulation. Intubated and sedated on propofol.      Problem List:  Problem List as of 10/24/2020 Date Reviewed: 9/17/2020          Codes Class Noted - Resolved    Metabolic encephalopathy IOI-75-CQ: G93.41  ICD-9-CM: 348.31  9/26/2020 - Present        UTI (urinary tract infection) ICD-10-CM: N39.0  ICD-9-CM: 599.0  9/26/2020 - Present        Aspiration pneumonitis ([address] ICD-10-CM: J69.0  ICD-9-CM: 507.0  9/26/2020 - Present        Essential hypertension ICD-10-CM: I10  ICD-9-CM: 401.9  9/26/2020 - Present        Acute dehydration ICD-10-CM: E86.0  ICD-9-CM: 276.51  9/26/2020 - Present              Medications reviewed  Current Facility-Administered Medications   Medication Dose Route Frequency    propofol (DIPRIVAN) 10 mg/mL infusion  0-50 mcg/kg/min IntraVENous TITRATE    potassium chloride 10 mEq in 100 ml IVPB  10 mEq IntraVENous Q1H    potassium chloride SR (KLOR-CON 10) tablet 20 mEq  20 mEq Oral BID    0.9% sodium chloride infusion 250 mL  250 mL IntraVENous PRN    oxyCODONE IR (ROXICODONE) tablet 5 mg  5 mg Oral Q4H PRN    enoxaparin (LOVENOX) injection 30 mg  30 mg SubCUTAneous Q24H    0.9% sodium chloride infusion 250 mL  250 mL IntraVENous PRN    morphine injection 1 mg  1 mg IntraVENous Q6H PRN    metoprolol tartrate (LOPRESSOR) tablet 50 mg  50 mg Oral BID    spironolactone (ALDACTONE) tablet 25 mg  25 mg Oral BID    piperacillin-tazobactam (ZOSYN) 3.375 g in 0.9% sodium chloride (MBP/ADV) 100 mL MBP  3.375 g IntraVENous Q8H    pantoprazole (PROTONIX) granules for oral suspension 40 mg  40 mg Per NG tube ACB    albuterol CONCENTRATE 2.5mg/0.5 mL neb soln  2.5 mg Nebulization Q6H RT    LORazepam (ATIVAN) injection 1 mg  1 mg IntraVENous Q2H PRN    sodium chloride 0.9% (NS) 3ml nebulizer solution  2.5 mL Nebulization PRN    influenza vaccine 2020-21 (4 yrs+)(PF) (FLUCELVAX QUAD) injection 0.5 mL  0.5 mL IntraMUSCular PRIOR TO DISCHARGE    diphenhydrAMINE (BENADRYL) capsule 50 mg  50 mg Oral BID PRN    phosphorus (K PHOS NEUTRAL) 250 mg tablet 2 Tab  2 Tab Oral BID    acetaminophen (TYLENOL) tablet 650 mg  650 mg Oral Q6H PRN    ondansetron (ZOFRAN) injection 4 mg  4 mg IntraVENous Q8H PRN    guaiFENesin (ROBITUSSIN) 20 mg/mL oral liquid 400 mg  400 mg Oral Q6H PRN       Review of Systems:   Review of systems unable to be obtained because he is intubated    Objective:   Physical Exam:     Visit Vitals  /77   Pulse 92   Temp 98.7 °F (37.1 °C)   Resp 20   Ht 6' (1.829 m)   Wt 64.4 kg (141 lb 15.6 oz)   SpO2 99%   BMI 19.26 kg/m²    O2 Flow Rate (L/min): 2 l/min O2 Device: Ventilator    Temp (24hrs), Av.7 °F (37.1 °C), Min:97.4 °F (36.3 °C), Max:99 °F (37.2 °C)    10/24 07 - 10/24 190  In: 160   Out: -    10/22 190 - 10/24 0700  In: 671.3   Out: 1430 [Urine:740; Drains:50]    General:   intubated and sedated   Lungs:   Clear to auscultation bilaterally with diminished breath sounds bilateral bases. Chest wall:  No tenderness or deformity. Heart:  Regular rate and rhythm, S1, S2 normal, no murmur, click, rub or gallop. Abdomen:   Soft, non-tender. Diminished Bowel sounds. Dressings in place. Extremities: Extremities normal, atraumatic, positive edema bilaterally   Pulses: 2+ and symmetric all extremities. Skin: Skin color, texture, turgor normal. No rashes or lesions   Neurologic: CNII-XII intact.      Data Review:       Recent Days:  Recent Labs     10/24/20  0613 10/23/20  1700 10/23/20  0515   WBC 15.4* 12.6* 16.8*   HGB 10.4* 7.5* 8.1*   HCT 30.6* 22.6* 24.1*    261 329     Recent Labs 10/24/20  0613 10/23/20  1830 10/23/20  1700 10/23/20  0515 10/22/20  0425   * 148*  --  140 143   K 2.6* 3.7  --  2.8* 4.0   * 111*  --  108 110*   CO2 30 30  --  25 27   * 186*  --  73 81   BUN 22* 17  --  15 19   CREA 0.67* 0.63*  --  0.47* 0.55*   CA 8.5 9.0  --  8.5 8.8   MG 1.8  --  2.2  --  1.7   PHOS 3.3  --   --   --  2.9   ALB 2.8*  --   --   --  3.2*     Recent Labs     10/24/20  0530 10/23/20  2015 10/23/20  1715   PH 7.52* 7.53* 7.11*   PCO2 34* 33* 63*   PO2 59* 111* 145*   HCO3 29* 29* 17*   FIO2 50 70 100       24 Hour Results:  Recent Results (from the past 24 hour(s))   MAGNESIUM    Collection Time: 10/23/20  5:00 PM   Result Value Ref Range    Magnesium 2.2 1.6 - 2.4 mg/dL   CBC WITH AUTOMATED DIFF    Collection Time: 10/23/20  5:00 PM   Result Value Ref Range    WBC 12.6 (H) 4.1 - 11.1 K/uL    RBC 2.35 (L) 4.10 - 5.70 M/uL    HGB 7.5 (L) 12.1 - 17.0 g/dL    HCT 22.6 (L) 36.6 - 50.3 %    MCV 96.2 80.0 - 99.0 FL    MCH 31.9 26.0 - 34.0 PG    MCHC 33.2 30.0 - 36.5 g/dL    RDW 18.9 (H) 11.5 - 14.5 %    PLATELET 835 123 - 292 K/uL    MPV 11.5 8.9 - 12.9 FL    NRBC 1.2 (H) 0  WBC    ABSOLUTE NRBC 0.15 (H) 0.00 - 0.01 K/uL    NEUTROPHILS 83 (H) 32 - 75 %    LYMPHOCYTES 7 (L) 12 - 49 %    MONOCYTES 9 5 - 13 %    EOSINOPHILS 0 0 - 7 %    BASOPHILS 0 0 - 1 %    IMMATURE GRANULOCYTES 5 (H) 0.0 - 0.5 %    ABS. NEUTROPHILS 10.5 (H) 1.8 - 8.0 K/UL    ABS. LYMPHOCYTES 0.9 0.8 - 3.5 K/UL    ABS. MONOCYTES 1.2 (H) 0.0 - 1.0 K/UL    ABS. EOSINOPHILS 0.0 0.0 - 0.4 K/UL    ABS. BASOPHILS 0.0 0.0 - 0.1 K/UL    ABS. IMM.  GRANS. 0.6 (H) 0.00 - 0.04 K/UL    DF AUTOMATED     BLOOD GAS, ARTERIAL    Collection Time: 10/23/20  5:15 PM   Result Value Ref Range    pH 7.11 (LL) 7.35 - 7.45      PCO2 63 (H) 35 - 45 mmHg    PO2 145 (H) 75 - 100 mmHg    O2 SAT 99 >95 %    BICARBONATE 17 (L) 22 - 26 mmol/L    BASE DEFICIT 9.3 (H) 0 - 2 mmol/L    O2 FLOW RATE 15 L/min    FIO2 100 %    SITE Left Brachial Critical value read back DR. GLENN RODRIGUES    LACTIC ACID    Collection Time: 10/23/20  6:30 PM   Result Value Ref Range    Lactic acid 2.7 (HH) 0.4 - 2.0 mmol/L   METABOLIC PANEL, BASIC    Collection Time: 10/23/20  6:30 PM   Result Value Ref Range    Sodium 148 (H) 136 - 145 mmol/L    Potassium 3.7 3.5 - 5.1 mmol/L    Chloride 111 (H) 97 - 108 mmol/L    CO2 30 21 - 32 mmol/L    Anion gap 7 5 - 15 mmol/L    Glucose 186 (H) 65 - 100 mg/dL    BUN 17 6 - 20 mg/dL    Creatinine 0.63 (L) 0.70 - 1.30 mg/dL    BUN/Creatinine ratio 27 (H) 12 - 20      GFR est AA >60 >60 ml/min/1.73m2    GFR est non-AA >60 >60 ml/min/1.73m2    Calcium 9.0 8.5 - 10.1 mg/dL   TROPONIN I    Collection Time: 10/23/20  6:30 PM   Result Value Ref Range    Troponin-I, Qt. <0.05 <0.05 ng/mL   TYPE & SCREEN    Collection Time: 10/23/20  8:05 PM   Result Value Ref Range    Crossmatch Expiration 10/26/2020,2359     ABO/Rh(D) Alveta Rhein Positive     Antibody screen Negative     Unit number [id number]     Blood component type  LR,1     Unit division 00     Status of unit Issued     [address] to transfuse     Crossmatch result Compatible     Unit number [id number]     Blood component type  LR,2     Unit division 00     Status of unit Issued     TRANSFUSION STATUS Ok to transfuse     Crossmatch result Compatible    BLOOD GAS, ARTERIAL    Collection Time: 10/23/20  8:15 PM   Result Value Ref Range    pH 7.53 (H) 7.35 - 7.45      PCO2 33 (L) 35 - 45 mmHg    PO2 111 (H) 75 - 100 mmHg    O2 SAT 99 >95 %    BICARBONATE 29 (H) 22 - 26 mmol/L    BASE EXCESS 5.2 (H) 0 - 2 mmol/L    O2 METHOD VENT      FIO2 70 %    MODE Assist Control/Volume Control      Tidal volume 400      SET RATE 20      EPAP/CPAP/PEEP 5      SITE Right Brachial     BLOOD GAS, ARTERIAL    Collection Time: 10/24/20  5:30 AM   Result Value Ref Range    pH 7.52 (H) 7.35 - 7.45      PCO2 34 (L) 35 - 45 mmHg    PO2 59 (L) 75 - 100 mmHg    O2 SAT 93 (L) >95 %    BICARBONATE 29 (H) 22 - 26 mmol/L    BASE EXCESS 5.5 (H) 0 - 2 mmol/L    O2 METHOD VENT      FIO2 50 %    Tidal volume 400      SET RATE 20      EPAP/CPAP/PEEP 5      SITE Right Brachial      ENIO'S TEST Positive     CBC WITH AUTOMATED DIFF    Collection Time: 10/24/20  6:13 AM   Result Value Ref Range    WBC 15.4 (H) 4.1 - 11.1 K/uL    RBC 3.53 (L) 4.10 - 5.70 M/uL    HGB 10.4 (L) 12.1 - 17.0 g/dL    HCT 30.6 (L) 36.6 - 50.3 %    MCV 86.7 80.0 - 99.0 FL    MCH 29.5 26.0 - 34.0 PG    MCHC 34.0 30.0 - 36.5 g/dL    RDW 19.2 (H) 11.5 - 14.5 %    PLATELET 815 198 - 498 K/uL    MPV 11.2 8.9 - 12.9 FL    NRBC 0.5 (H) 0  WBC    ABSOLUTE NRBC 0.07 (H) 0.00 - 0.01 K/uL    NEUTROPHILS 86 (H) 32 - 75 %    LYMPHOCYTES 5 (L) 12 - 49 %    MONOCYTES 7 5 - 13 %    EOSINOPHILS 0 0 - 7 %    BASOPHILS 0 0 - 1 %    IMMATURE GRANULOCYTES 2 (H) 0.0 - 0.5 %    ABS. NEUTROPHILS 13.5 (H) 1.8 - 8.0 K/UL    ABS. LYMPHOCYTES 0.8 0.8 - 3.5 K/UL    ABS. MONOCYTES 1.1 (H) 0.0 - 1.0 K/UL    ABS. EOSINOPHILS 0.0 0.0 - 0.4 K/UL    ABS. BASOPHILS 0.0 0.0 - 0.1 K/UL    ABS. IMM. GRANS. 0.3 (H) 0.00 - 0.04 K/UL    DF AUTOMATED     MAGNESIUM    Collection Time: 10/24/20  6:13 AM   Result Value Ref Range    Magnesium 1.8 1.6 - 2.4 mg/dL   RENAL FUNCTION PANEL    Collection Time: 10/24/20  6:13 AM   Result Value Ref Range    Sodium 147 (H) 136 - 145 mmol/L    Potassium 2.6 (LL) 3.5 - 5.1 mmol/L    Chloride 110 (H) 97 - 108 mmol/L    CO2 30 21 - 32 mmol/L    Anion gap 7 5 - 15 mmol/L    Glucose 101 (H) 65 - 100 mg/dL    BUN 22 (H) 6 - 20 mg/dL    Creatinine 0.67 (L) 0.70 - 1.30 mg/dL    BUN/Creatinine ratio 33 (H) 12 - 20      GFR est AA >60 >60 ml/min/1.73m2    GFR est non-AA >60 >60 ml/min/1.73m2    Calcium 8.5 8.5 - 10.1 mg/dL    Phosphorus 3.3 2.6 - 4.7 mg/dL    Albumin 2.8 (L) 3.5 - 5.0 g/dL           Assessment/     Acute hypoxic respiratory failure postsurgery. Self extubated 10/09/20. Reintubated 10/10 due to hypoxemia. Extubated 10/11.   Reintubated 10/17 for bilateral pneumothoraces. Extubated 10/20. Reintubated 10/23 following cardiac arrest    Status post cardiac arrest    Bilateral pneumothoraces, status post chest tubes bilateral    Acute kidney injury likely secondary to ATN from hypotension. Hypovolemic shock post surgery. Improved    Right lower lobe aspiration pneumonia improved    Urinary tract infection due to E. Coli    Abnormal Cardiolite stress test showing inferior ischemia. 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 Repleted    Hypothermia, probably largely environmental.  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 Plan:  Continue supportive care including antibiotics   Would likely need tracheostomy this time  We will continue tube feeds for now  Overall prognosis poor    Care Plan discussed with: Patient/Family       Total time spent with patient: 30 minutes.     Rubi Ruiz MD

## 2020-10-24 NOTE — PROGRESS NOTES
Patient examined this morning. He is currently be intubated for respiratory distress yesterday. He is currently doing better. His respiratory acidosis improved. His chest x-ray this morning looks like resolution of the pneumothoraces. Chest tube is on currently waterseal.  He still have a pneumoperitoneum, we believe this is related to leaking air from the PEG tube site during the intubation. And pneumoperitoneum looks better this morning on chest x-ray. Patient also received 2 units of packed red blood cell. Urine output picked up. HELGA drain output is minimal.  I will leave the HELGA drain until pneumoperitoneum is completely resolved. Patient currently has been intubated for the time. First time intubated with the original surgery, and then 3 more reintubation. Patient is appropriate to have a tracheostomy. I have discussed with patient's wife about these procedures, rationale for the procedure risk benefits and complication. Family is agreeable patient to have tracheostomy. I will organize this procedure sometime Monday. And we will resume the tube feeds. Patient had high residual during the tube feeds the other day. We will closely monitor his GI function as well. We will continue monitor his progress closely.

## 2020-10-24 NOTE — PROGRESS NOTES
This morning after several days of no air leaks and no identifiable air leak this am the chest tubes were clamped at 11:30  At about 1330 had cxr that looked fine, evently at about 4:30 this afternoon he had resp arrest and bradycardia. He had CPR ACLS and was and placed back on the vent and a CXR was taken. The chest tubes were never unclamped during this process. The CXR that was taken after the intubation and before the chest tubes were unclamped showed TRIVIAL bilateral pneumo's regardless of what at the radiology report states the lung is barely off the chest wall a cm in the lower 1/4 of the lung. It is not suprising that he has blown open some air after being back on the vent, but clearly with this cxr that recurrent pneumothorax is NOT the reason he got reintubated. Also of note is he now has massive abdominal free air on his CXR that he did not have on the prior cxr's. Unless he has some new problem I suspect that the massive free air is from bagging him during resusication and that is air is pushed into the GI tract under pressure and then leaking out the G-tube placed yesterday.

## 2020-10-24 NOTE — PROGRESS NOTES
Stabalized after events of yesterday. At this point, even though it seems clear that he did not arrest because of tension pneumos yesterday and I am glad  The tubes were just calmped yesterday rather than pull them out, he did have small amount of air in the chest AFTER intubation on the CXR and tubes would have had to been replaced. I would just leave the tubes in until he gets off he vent and then remove them as positive pressure from the vent can cause him to continue to leak air. Agree with need for Trach as he has zero respiratory.

## 2020-10-25 ENCOUNTER — APPOINTMENT (OUTPATIENT)
Dept: GENERAL RADIOLOGY | Age: 55
DRG: 981 | End: 2020-10-25
Attending: SURGERY
Payer: COMMERCIAL

## 2020-10-25 ENCOUNTER — APPOINTMENT (OUTPATIENT)
Dept: GENERAL RADIOLOGY | Age: 55
DRG: 981 | End: 2020-10-25
Attending: THORACIC SURGERY (CARDIOTHORACIC VASCULAR SURGERY)
Payer: COMMERCIAL

## 2020-10-25 LAB
ABO + RH BLD: NORMAL
ANION GAP SERPL CALC-SCNC: 7 MMOL/L (ref 5–15)
ARTERIAL PATENCY WRIST A: POSITIVE
BASE EXCESS BLDA CALC-SCNC: 4.2 MMOL/L (ref 0–2)
BASOPHILS # BLD: 0 K/UL (ref 0–0.1)
BASOPHILS NFR BLD: 0 % (ref 0–1)
BDY SITE: ABNORMAL
BLD PROD TYP BPU: NORMAL
BLD PROD TYP BPU: NORMAL
BLOOD GROUP ANTIBODIES SERPL: NEGATIVE
BPU ID: NORMAL
BPU ID: NORMAL
BUN SERPL-MCNC: 28 MG/DL (ref 6–20)
BUN/CREAT SERPL: 29 (ref 12–20)
CA-I BLD-MCNC: 8.2 MG/DL (ref 8.5–10.1)
CHLORIDE SERPL-SCNC: 111 MMOL/L (ref 97–108)
CO2 SERPL-SCNC: 29 MMOL/L (ref 21–32)
CREAT SERPL-MCNC: 0.95 MG/DL (ref 0.7–1.3)
CROSSMATCH RESULT,%XM: NORMAL
CROSSMATCH RESULT,%XM: NORMAL
DIFFERENTIAL METHOD BLD: ABNORMAL
EOSINOPHIL # BLD: 0 K/UL (ref 0–0.4)
EOSINOPHIL NFR BLD: 0 % (ref 0–7)
EPAP/CPAP/PEEP, PAPEEP: 5
ERYTHROCYTE [DISTWIDTH] IN BLOOD BY AUTOMATED COUNT: 20 % (ref 11.5–14.5)
FIO2 ON VENT: 50 %
GAS FLOW.O2 SETTING OXYMISER: 20 L/MIN
GLUCOSE SERPL-MCNC: 70 MG/DL (ref 65–100)
HCO3 BLDA-SCNC: 28 MMOL/L (ref 22–26)
HCT VFR BLD AUTO: 27.6 % (ref 36.6–50.3)
HGB BLD-MCNC: 9.4 G/DL (ref 12.1–17)
IMM GRANULOCYTES # BLD AUTO: 0.2 K/UL (ref 0–0.04)
IMM GRANULOCYTES NFR BLD AUTO: 1 % (ref 0–0.5)
LYMPHOCYTES # BLD: 1.2 K/UL (ref 0.8–3.5)
LYMPHOCYTES NFR BLD: 7 % (ref 12–49)
MAGNESIUM SERPL-MCNC: 2 MG/DL (ref 1.6–2.4)
MCH RBC QN AUTO: 30 PG (ref 26–34)
MCHC RBC AUTO-ENTMCNC: 34.1 G/DL (ref 30–36.5)
MCV RBC AUTO: 88.2 FL (ref 80–99)
MONOCYTES # BLD: 1.3 K/UL (ref 0–1)
MONOCYTES NFR BLD: 8 % (ref 5–13)
NEUTS SEG # BLD: 15.3 K/UL (ref 1.8–8)
NEUTS SEG NFR BLD: 84 % (ref 32–75)
PCO2 BLDA: 34 MMHG (ref 35–45)
PH BLDA: 7.51 [PH] (ref 7.35–7.45)
PLATELET # BLD AUTO: 258 K/UL (ref 150–400)
PMV BLD AUTO: 10.5 FL (ref 8.9–12.9)
PO2 BLDA: 74 MMHG (ref 75–100)
POTASSIUM SERPL-SCNC: 3.1 MMOL/L (ref 3.5–5.1)
RBC # BLD AUTO: 3.13 M/UL (ref 4.1–5.7)
SAO2 % BLD: 96 %
SAO2% DEVICE SAO2% SENSOR NAME: ABNORMAL
SODIUM SERPL-SCNC: 147 MMOL/L (ref 136–145)
SPECIMEN EXP DATE BLD: NORMAL
STATUS OF UNIT,%ST: NORMAL
STATUS OF UNIT,%ST: NORMAL
TRANSFUSION STATUS PATIENT QL: NORMAL
TRANSFUSION STATUS PATIENT QL: NORMAL
UNIT DIVISION, %UDIV: 0
UNIT DIVISION, %UDIV: 0
VENTILATION MODE VENT: ABNORMAL
VT SETTING VENT: 400 ML
WBC # BLD AUTO: 17.1 K/UL (ref 4.1–11.1)

## 2020-10-25 PROCEDURE — 74011250636 HC RX REV CODE- 250/636: Performed by: INTERNAL MEDICINE

## 2020-10-25 PROCEDURE — 94003 VENT MGMT INPAT SUBQ DAY: CPT

## 2020-10-25 PROCEDURE — 74011000258 HC RX REV CODE- 258: Performed by: SURGERY

## 2020-10-25 PROCEDURE — 74011250637 HC RX REV CODE- 250/637: Performed by: INTERNAL MEDICINE

## 2020-10-25 PROCEDURE — 83735 ASSAY OF MAGNESIUM: CPT

## 2020-10-25 PROCEDURE — 85025 COMPLETE CBC W/AUTO DIFF WBC: CPT

## 2020-10-25 PROCEDURE — 74011000250 HC RX REV CODE- 250: Performed by: SURGERY

## 2020-10-25 PROCEDURE — 94640 AIRWAY INHALATION TREATMENT: CPT

## 2020-10-25 PROCEDURE — 74011000250 HC RX REV CODE- 250: Performed by: INTERNAL MEDICINE

## 2020-10-25 PROCEDURE — 71045 X-RAY EXAM CHEST 1 VIEW: CPT

## 2020-10-25 PROCEDURE — 94400 HC END TIDAL CO2 RESPONSE CURVE: CPT

## 2020-10-25 PROCEDURE — 36415 COLL VENOUS BLD VENIPUNCTURE: CPT

## 2020-10-25 PROCEDURE — 65610000006 HC RM INTENSIVE CARE

## 2020-10-25 PROCEDURE — 74018 RADEX ABDOMEN 1 VIEW: CPT

## 2020-10-25 PROCEDURE — 80048 BASIC METABOLIC PNL TOTAL CA: CPT

## 2020-10-25 PROCEDURE — 74011250636 HC RX REV CODE- 250/636: Performed by: FAMILY MEDICINE

## 2020-10-25 PROCEDURE — 94668 MNPJ CHEST WALL SBSQ: CPT

## 2020-10-25 PROCEDURE — 74011000258 HC RX REV CODE- 258: Performed by: INTERNAL MEDICINE

## 2020-10-25 PROCEDURE — 82803 BLOOD GASES ANY COMBINATION: CPT

## 2020-10-25 PROCEDURE — 77010033678 HC OXYGEN DAILY

## 2020-10-25 RX ORDER — POTASSIUM CHLORIDE 7.45 MG/ML
10 INJECTION INTRAVENOUS
Status: COMPLETED | OUTPATIENT
Start: 2020-10-25 | End: 2020-10-25

## 2020-10-25 RX ADMIN — ISODIUM CHLORIDE 2.5 ML: 0.03 SOLUTION RESPIRATORY (INHALATION) at 13:38

## 2020-10-25 RX ADMIN — SPIRONOLACTONE 25 MG: 25 TABLET ORAL at 09:55

## 2020-10-25 RX ADMIN — PIPERACILLIN AND TAZOBACTAM 3.38 G: 3; .375 INJECTION, POWDER, LYOPHILIZED, FOR SOLUTION INTRAVENOUS at 11:21

## 2020-10-25 RX ADMIN — POTASSIUM CHLORIDE 10 MEQ: 7.46 INJECTION, SOLUTION INTRAVENOUS at 16:32

## 2020-10-25 RX ADMIN — SPIRONOLACTONE 25 MG: 25 TABLET ORAL at 21:06

## 2020-10-25 RX ADMIN — DIBASIC SODIUM PHOSPHATE, MONOBASIC POTASSIUM PHOSPHATE AND MONOBASIC SODIUM PHOSPHATE 2 TABLET: 852; 155; 130 TABLET ORAL at 21:11

## 2020-10-25 RX ADMIN — DIBASIC SODIUM PHOSPHATE, MONOBASIC POTASSIUM PHOSPHATE AND MONOBASIC SODIUM PHOSPHATE 2 TABLET: 852; 155; 130 TABLET ORAL at 09:55

## 2020-10-25 RX ADMIN — POTASSIUM CHLORIDE 10 MEQ: 7.46 INJECTION, SOLUTION INTRAVENOUS at 17:48

## 2020-10-25 RX ADMIN — PIPERACILLIN AND TAZOBACTAM 3.38 G: 3; .375 INJECTION, POWDER, LYOPHILIZED, FOR SOLUTION INTRAVENOUS at 21:06

## 2020-10-25 RX ADMIN — POTASSIUM CHLORIDE 10 MEQ: 7.46 INJECTION, SOLUTION INTRAVENOUS at 15:21

## 2020-10-25 RX ADMIN — PANTOPRAZOLE SODIUM 40 MG: 40 GRANULE, DELAYED RELEASE ORAL at 09:54

## 2020-10-25 RX ADMIN — ASCORBIC ACID, VITAMIN A PALMITATE, CHOLECALCIFEROL, THIAMINE HYDROCHLORIDE, RIBOFLAVIN-5 PHOSPHATE SODIUM, PYRIDOXINE HYDROCHLORIDE, NIACINAMIDE, DEXPANTHENOL, ALPHA-TOCOPHEROL ACETATE, VITAMIN K1, FOLIC ACID, BIOTIN, CYANOCOBALAMIN: 200; 3300; 200; 6; 3.6; 6; 40; 15; 10; 150; 600; 60; 5 INJECTION, SOLUTION INTRAVENOUS at 23:14

## 2020-10-25 RX ADMIN — ENOXAPARIN SODIUM 30 MG: 30 INJECTION SUBCUTANEOUS at 21:07

## 2020-10-25 RX ADMIN — METOPROLOL TARTRATE 50 MG: 50 TABLET, FILM COATED ORAL at 21:06

## 2020-10-25 RX ADMIN — ALBUTEROL SULFATE 2.5 MG: 2.5 SOLUTION RESPIRATORY (INHALATION) at 01:08

## 2020-10-25 RX ADMIN — POTASSIUM CHLORIDE 20 MEQ: 750 TABLET, FILM COATED, EXTENDED RELEASE ORAL at 21:06

## 2020-10-25 RX ADMIN — METOPROLOL TARTRATE 50 MG: 50 TABLET, FILM COATED ORAL at 09:55

## 2020-10-25 RX ADMIN — POTASSIUM CHLORIDE 10 MEQ: 7.46 INJECTION, SOLUTION INTRAVENOUS at 19:27

## 2020-10-25 RX ADMIN — I.V. FAT EMULSION 250 ML: 20 EMULSION INTRAVENOUS at 23:14

## 2020-10-25 RX ADMIN — PIPERACILLIN AND TAZOBACTAM 3.38 G: 3; .375 INJECTION, POWDER, LYOPHILIZED, FOR SOLUTION INTRAVENOUS at 03:00

## 2020-10-25 RX ADMIN — POTASSIUM CHLORIDE 20 MEQ: 750 TABLET, FILM COATED, EXTENDED RELEASE ORAL at 09:55

## 2020-10-25 RX ADMIN — ALBUTEROL SULFATE 2.5 MG: 2.5 SOLUTION RESPIRATORY (INHALATION) at 19:17

## 2020-10-25 RX ADMIN — ALBUTEROL SULFATE 2.5 MG: 2.5 SOLUTION RESPIRATORY (INHALATION) at 13:38

## 2020-10-25 RX ADMIN — ALBUTEROL SULFATE 2.5 MG: 2.5 SOLUTION RESPIRATORY (INHALATION) at 07:27

## 2020-10-25 RX ADMIN — PROPOFOL 10 MCG/KG/MIN: 10 INJECTION, EMULSION INTRAVENOUS at 12:16

## 2020-10-25 RX ADMIN — ISODIUM CHLORIDE 2.5 ML: 0.03 SOLUTION RESPIRATORY (INHALATION) at 07:28

## 2020-10-25 NOTE — PROGRESS NOTES
Hospitalist Progress Note           Daily Progress Note: 10/25/2020    Chief complaint: Shortness of breath and weakness  Subjective: The patient is seen for follow  up. Patient extubated 10/20/2020 and was on nasal oxygen. PEG tube placed 10/22/2020. Cardiac arrest 10/23/2020 with CPR for about 10 minutes with return of spontaneous circulation. Intubated and sedated on propofol.      Problem List:  Problem List as of 10/25/2020 Date Reviewed: 9/17/2020          Codes Class Noted - Resolved    Metabolic encephalopathy U-81-GW: G93.41  ICD-9-CM: 348.31  9/26/2020 - Present        UTI (urinary tract infection) ICD-10-CM: N39.0  ICD-9-CM: 599.0  9/26/2020 - Present        Aspiration pneumonitis ([address] ICD-10-CM: J69.0  ICD-9-CM: 507.0  9/26/2020 - Present        Essential hypertension ICD-10-CM: I10  ICD-9-CM: 401.9  9/26/2020 - Present        Acute dehydration ICD-10-CM: E86.0  ICD-9-CM: 276.51  9/26/2020 - Present              Medications reviewed  Current Facility-Administered Medications   Medication Dose Route Frequency    fat emulsion 20% (LIPOSYN, INTRAlipid) infusion 250 mL  250 mL IntraVENous CONTINUOUS    TPN ADULT - CENTRAL AA 5% D20% W/ CA + ELECTROLYTES   IntraVENous CONTINUOUS    propofol (DIPRIVAN) 10 mg/mL infusion  0-50 mcg/kg/min IntraVENous TITRATE    potassium chloride SR (KLOR-CON 10) tablet 20 mEq  20 mEq Oral BID    0.9% sodium chloride infusion 250 mL  250 mL IntraVENous PRN    oxyCODONE IR (ROXICODONE) tablet 5 mg  5 mg Oral Q4H PRN    enoxaparin (LOVENOX) injection 30 mg  30 mg SubCUTAneous Q24H    0.9% sodium chloride infusion 250 mL  250 mL IntraVENous PRN    metoprolol tartrate (LOPRESSOR) tablet 50 mg  50 mg Oral BID    spironolactone (ALDACTONE) tablet 25 mg  25 mg Oral BID    piperacillin-tazobactam (ZOSYN) 3.375 g in 0.9% sodium chloride (MBP/ADV) 100 mL MBP  3.375 g IntraVENous Q8H    pantoprazole (PROTONIX) granules for oral suspension 40 mg  40 mg Per NG tube ACB    albuterol CONCENTRATE 2.5mg/0.5 mL neb soln  2.5 mg Nebulization Q6H RT    LORazepam (ATIVAN) injection 1 mg  1 mg IntraVENous Q2H PRN    sodium chloride 0.9% (NS) 3ml nebulizer solution  2.5 mL Nebulization PRN    influenza vaccine 2020- (4 yrs+)(PF) (FLUCELVAX QUAD) injection 0.5 mL  0.5 mL IntraMUSCular PRIOR TO DISCHARGE    diphenhydrAMINE (BENADRYL) capsule 50 mg  50 mg Oral BID PRN    phosphorus (K PHOS NEUTRAL) 250 mg tablet 2 Tab  2 Tab Oral BID    acetaminophen (TYLENOL) tablet 650 mg  650 mg Oral Q6H PRN    ondansetron (ZOFRAN) injection 4 mg  4 mg IntraVENous Q8H PRN    guaiFENesin (ROBITUSSIN) 20 mg/mL oral liquid 400 mg  400 mg Oral Q6H PRN       Review of Systems:   Review of systems unable to be obtained because he is intubated    Objective:   Physical Exam:     Visit Vitals  /75 (BP 1 Location: Left arm, BP Patient Position: At rest)   Pulse 78   Temp 97.9 °F (36.6 °C)   Resp 20   Ht 6' (1.829 m)   Wt 64.4 kg (141 lb 15.6 oz)   SpO2 97%   BMI 19.26 kg/m²    O2 Flow Rate (L/min): 2 l/min O2 Device: Ventilator    Temp (24hrs), Av °F (37.2 °C), Min:97.9 °F (36.6 °C), Max:99.7 °F (37.6 °C)    No intake/output data recorded. 10/23 1901 - 10/25 0700  In: 1851.2 [I.V.:839.9]  Out: 2588 [Urine:1280; Drains:80]    General:   intubated and sedated   Lungs:   Clear to auscultation bilaterally with diminished breath sounds bilateral bases. Chest wall:  No tenderness or deformity. Heart:  Regular rate and rhythm, S1, S2 normal, no murmur, click, rub or gallop. Abdomen:   Soft, non-tender. Diminished Bowel sounds. Dressings in place. Extremities: Extremities normal, atraumatic, positive edema bilaterally   Pulses: 2+ and symmetric all extremities. Skin: Skin color, texture, turgor normal. No rashes or lesions   Neurologic: CNII-XII intact.      Data Review:       Recent Days:  Recent Labs     10/25/20  0455 10/24/20  0613 10/23/20  1700   WBC 17.1* 15.4* 12.6*   HGB 9.4* 10.4* 7.5*   HCT 27.6* 30.6* 22.6*    254 261     Recent Labs     10/25/20  0455 10/24/20  0613 10/23/20  1830 10/23/20  1700   * 147* 148*  --    K 3.1* 2.6* 3.7  --    * 110* 111*  --    CO2 29 30 30  --    GLU 70 101* 186*  --    BUN 28* 22* 17  --    CREA 0.95 0.67* 0.63*  --    CA 8.2* 8.5 9.0  --    MG 2.0 1.8  --  2.2   PHOS  --  3.3  --   --    ALB  --  2.8*  --   --      Recent Labs     10/25/20  0520 10/24/20  0530 10/23/20  2015   PH 7.51* 7.52* 7.53*   PCO2 34* 34* 33*   PO2 74* 59* 111*   HCO3 28* 29* 29*   FIO2 50 50 70       24 Hour Results:  Recent Results (from the past 24 hour(s))   CBC WITH AUTOMATED DIFF    Collection Time: 10/25/20  4:55 AM   Result Value Ref Range    WBC 17.1 (H) 4.1 - 11.1 K/uL    RBC 3.13 (L) 4.10 - 5.70 M/uL    HGB 9.4 (L) 12.1 - 17.0 g/dL    HCT 27.6 (L) 36.6 - 50.3 %    MCV 88.2 80.0 - 99.0 FL    MCH 30.0 26.0 - 34.0 PG    MCHC 34.1 30.0 - 36.5 g/dL    RDW 20.0 (H) 11.5 - 14.5 %    PLATELET 241 566 - 988 K/uL    MPV 10.5 8.9 - 12.9 FL    NEUTROPHILS 84 (H) 32 - 75 %    LYMPHOCYTES 7 (L) 12 - 49 %    MONOCYTES 8 5 - 13 %    EOSINOPHILS 0 0 - 7 %    BASOPHILS 0 0 - 1 %    IMMATURE GRANULOCYTES 1 (H) 0.0 - 0.5 %    ABS. NEUTROPHILS 15.3 (H) 1.8 - 8.0 K/UL    ABS. LYMPHOCYTES 1.2 0.8 - 3.5 K/UL    ABS. MONOCYTES 1.3 (H) 0.0 - 1.0 K/UL    ABS. EOSINOPHILS 0.0 0.0 - 0.4 K/UL    ABS. BASOPHILS 0.0 0.0 - 0.1 K/UL    ABS. IMM.  GRANS. 0.2 (H) 0.00 - 0.04 K/UL    DF AUTOMATED     METABOLIC PANEL, BASIC    Collection Time: 10/25/20  4:55 AM   Result Value Ref Range    Sodium 147 (H) 136 - 145 mmol/L    Potassium 3.1 (L) 3.5 - 5.1 mmol/L    Chloride 111 (H) 97 - 108 mmol/L    CO2 29 21 - 32 mmol/L    Anion gap 7 5 - 15 mmol/L    Glucose 70 65 - 100 mg/dL    BUN 28 (H) 6 - 20 mg/dL    Creatinine 0.95 0.70 - 1.30 mg/dL    BUN/Creatinine ratio 29 (H) 12 - 20      GFR est AA >60 >60 ml/min/1.73m2    GFR est non-AA >60 >60 ml/min/1.73m2    Calcium 8.2 (L) 8.5 - 10.1 mg/dL   MAGNESIUM    Collection Time: 10/25/20  4:55 AM   Result Value Ref Range    Magnesium 2.0 1.6 - 2.4 mg/dL   BLOOD GAS, ARTERIAL    Collection Time: 10/25/20  5:20 AM   Result Value Ref Range    pH 7.51 (H) 7.35 - 7.45      PCO2 34 (L) 35 - 45 mmHg    PO2 74 (L) 75 - 100 mmHg    O2 SAT 96 >95 %    BICARBONATE 28 (H) 22 - 26 mmol/L    BASE EXCESS 4.2 (H) 0 - 2 mmol/L    O2 METHOD VENT      FIO2 50 %    MODE Assist Control/Volume Control      Tidal volume 400      SET RATE 20      EPAP/CPAP/PEEP 5      SITE Right Radial      ENIO'S TEST Positive             Assessment/     Acute hypoxic respiratory failure postsurgery. Self extubated 10/09/20. Reintubated 10/10 due to hypoxemia. Extubated 10/11. Reintubated 10/17 for bilateral pneumothoraces. Extubated 10/20. Reintubated 10/23 following cardiac arrest    Status post cardiac arrest    Bilateral pneumothoraces, status post chest tubes bilateral    Acute kidney injury likely secondary to ATN from hypotension. Hypovolemic shock post surgery. Improved    Right lower lobe aspiration pneumonia improved    Urinary tract infection due to E. Coli    Abnormal Cardiolite stress test showing inferior ischemia. 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 Repleted    Hypothermia, probably largely environmental.  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        Plan:  Continue supportive care including antibiotics   Would likely need tracheostomy this time  We will continue tube feeds for now  Overall prognosis poor    Care Plan discussed with: Patient/Family       Total time spent with patient: 30 minutes.     Donald Garcia MD

## 2020-10-25 NOTE — PROGRESS NOTES
Renal Progress Note    Patient: Tameka Winter MRN: [id number]  SSN: [id number]    YOB: 1965  Age: 48 y.o. Sex: male      Admit Date: 9/26/2020    LOS: 29 days     Subjective:   Patient seen in ICU for follow  up. Patient extubated 10/20/2020 and was on nasal oxygen. He had Cardiac arrest 10/23 with CPR for about 10 minutes with return of spontaneous circulation. Intubated and sedated on propofol now     Potassium was only 2.6 yesterday.   Improved to 3.1  Did not tolerate tube feeds so has been restarted on TPN    Current Facility-Administered Medications   Medication Dose Route Frequency    fat emulsion 20% (LIPOSYN, INTRAlipid) infusion 250 mL  250 mL IntraVENous CONTINUOUS    TPN ADULT - CENTRAL AA 5% D20% W/ CA + ELECTROLYTES   IntraVENous CONTINUOUS    potassium chloride 10 mEq in 100 ml IVPB  10 mEq IntraVENous Q1H    propofol (DIPRIVAN) 10 mg/mL infusion  0-50 mcg/kg/min IntraVENous TITRATE    potassium chloride SR (KLOR-CON 10) tablet 20 mEq  20 mEq Oral BID    0.9% sodium chloride infusion 250 mL  250 mL IntraVENous PRN    oxyCODONE IR (ROXICODONE) tablet 5 mg  5 mg Oral Q4H PRN    enoxaparin (LOVENOX) injection 30 mg  30 mg SubCUTAneous Q24H    0.9% sodium chloride infusion 250 mL  250 mL IntraVENous PRN    metoprolol tartrate (LOPRESSOR) tablet 50 mg  50 mg Oral BID    spironolactone (ALDACTONE) tablet 25 mg  25 mg Oral BID    piperacillin-tazobactam (ZOSYN) 3.375 g in 0.9% sodium chloride (MBP/ADV) 100 mL MBP  3.375 g IntraVENous Q8H    pantoprazole (PROTONIX) granules for oral suspension 40 mg  40 mg Per NG tube ACB    albuterol CONCENTRATE 2.5mg/0.5 mL neb soln  2.5 mg Nebulization Q6H RT    LORazepam (ATIVAN) injection 1 mg  1 mg IntraVENous Q2H PRN    sodium chloride 0.9% (NS) 3ml nebulizer solution  2.5 mL Nebulization PRN    influenza vaccine 2020-21 (4 yrs+)(PF) (FLUCELVAX QUAD) injection 0.5 mL  0.5 mL IntraMUSCular PRIOR TO DISCHARGE    diphenhydrAMINE (BENADRYL) capsule 50 mg  50 mg Oral BID PRN    phosphorus (K PHOS NEUTRAL) 250 mg tablet 2 Tab  2 Tab Oral BID    acetaminophen (TYLENOL) tablet 650 mg  650 mg Oral Q6H PRN    ondansetron (ZOFRAN) injection 4 mg  4 mg IntraVENous Q8H PRN    guaiFENesin (ROBITUSSIN) 20 mg/mL oral liquid 400 mg  400 mg Oral Q6H PRN        Vitals:    10/25/20 0728 10/25/20 0729 10/25/20 1143 10/25/20 1338   BP:       Pulse:  78 75    Resp:  20 25    Temp:       SpO2: 98% 97% 97% 99%   Weight:       Height:         Objective:   General: Intubated sedated  HEENT: EOMI, no Icterus, no Pallor,ETT   neck: Neck is supple, No JVD  Lungs: decreased breathsounds, bilateral chest tubes present  CVS: heart sounds normal,  no murmurs, no rubs. GI: soft, nontender, normal BS. PEG+  Extremeties: no cyanosis,no edema in ext   Neuro: awake  Skin: normal skin turgor, no skin rashes. Intake and Output:  Current Shift: No intake/output data recorded.   Last three shifts: 10/23 1901 - 10/25 0700  In: 1851.2 [I.V.:839.9]  Out: 2588 [Urine:1280; Drains:80]      Lab/Data Review:  Recent Labs     10/25/20  0455 10/24/20  0613 10/23/20  1700   WBC 17.1* 15.4* 12.6*   HGB 9.4* 10.4* 7.5*   HCT 27.6* 30.6* 22.6*    254 261     Recent Labs     10/25/20  0455 10/24/20  0613 10/23/20  1830 10/23/20  1700   * 147* 148*  --    K 3.1* 2.6* 3.7  --    * 110* 111*  --    CO2 29 30 30  --    GLU 70 101* 186*  --    BUN 28* 22* 17  --    CREA 0.95 0.67* 0.63*  --    CA 8.2* 8.5 9.0  --    MG 2.0 1.8  --  2.2   PHOS  --  3.3  --   --    ALB  --  2.8*  --   --      Recent Labs     10/25/20  0520 10/24/20  0530 10/23/20  2015   PH 7.51* 7.52* 7.53*   PCO2 34* 34* 33*   PO2 74* 59* 111*   HCO3 28* 29* 29*   FIO2 50 50 70     Recent Results (from the past 24 hour(s))   CBC WITH AUTOMATED DIFF    Collection Time: 10/25/20  4:55 AM   Result Value Ref Range    WBC 17.1 (H) 4.1 - 11.1 K/uL    RBC 3.13 (L) 4.10 - 5.70 M/uL    HGB 9.4 (L) 12.1 - 17.0 g/dL    HCT 27.6 (L) 36.6 - 50.3 %    MCV 88.2 80.0 - 99.0 FL    MCH 30.0 26.0 - 34.0 PG    MCHC 34.1 30.0 - 36.5 g/dL    RDW 20.0 (H) 11.5 - 14.5 %    PLATELET 622 331 - 677 K/uL    MPV 10.5 8.9 - 12.9 FL    NEUTROPHILS 84 (H) 32 - 75 %    LYMPHOCYTES 7 (L) 12 - 49 %    MONOCYTES 8 5 - 13 %    EOSINOPHILS 0 0 - 7 %    BASOPHILS 0 0 - 1 %    IMMATURE GRANULOCYTES 1 (H) 0.0 - 0.5 %    ABS. NEUTROPHILS 15.3 (H) 1.8 - 8.0 K/UL    ABS. LYMPHOCYTES 1.2 0.8 - 3.5 K/UL    ABS. MONOCYTES 1.3 (H) 0.0 - 1.0 K/UL    ABS. EOSINOPHILS 0.0 0.0 - 0.4 K/UL    ABS. BASOPHILS 0.0 0.0 - 0.1 K/UL    ABS. IMM.  GRANS. 0.2 (H) 0.00 - 0.04 K/UL    DF AUTOMATED     METABOLIC PANEL, BASIC    Collection Time: 10/25/20  4:55 AM   Result Value Ref Range    Sodium 147 (H) 136 - 145 mmol/L    Potassium 3.1 (L) 3.5 - 5.1 mmol/L    Chloride 111 (H) 97 - 108 mmol/L    CO2 29 21 - 32 mmol/L    Anion gap 7 5 - 15 mmol/L    Glucose 70 65 - 100 mg/dL    BUN 28 (H) 6 - 20 mg/dL    Creatinine 0.95 0.70 - 1.30 mg/dL    BUN/Creatinine ratio 29 (H) 12 - 20      GFR est AA >60 >60 ml/min/1.73m2    GFR est non-AA >60 >60 ml/min/1.73m2    Calcium 8.2 (L) 8.5 - 10.1 mg/dL   MAGNESIUM    Collection Time: 10/25/20  4:55 AM   Result Value Ref Range    Magnesium 2.0 1.6 - 2.4 mg/dL   BLOOD GAS, ARTERIAL    Collection Time: 10/25/20  5:20 AM   Result Value Ref Range    pH 7.51 (H) 7.35 - 7.45      PCO2 34 (L) 35 - 45 mmHg    PO2 74 (L) 75 - 100 mmHg    O2 SAT 96 >95 %    BICARBONATE 28 (H) 22 - 26 mmol/L    BASE EXCESS 4.2 (H) 0 - 2 mmol/L    O2 METHOD VENT      FIO2 50 %    MODE Assist Control/Volume Control      Tidal volume 400      SET RATE 20      EPAP/CPAP/PEEP 5      SITE Right Radial      ENIO'S TEST Positive          Assessment and Plan:       1. severe hypokalemia:  -from diuretics and metabolic alkalosis,   -Potassium improved from 2.6-->3.1 today, despite aggressive KCl replacement yesterday.    -Will give 4 doses of KCl 10meq  -Continue scheduled KCl 20 twice daily   -mag is okay 2.0-Off lasix now. continue to hold  -Continue 25 twice daily of spironolactone.   -monitor K levels,      2. Hypernatremia  -Secondary to diuretics. -Urine output was low yesterday but has again picked up. Creatinine is normal at 0.6  -He has been restarted on TPN. Will give TPN without any sodium    3. Edema: improved with good diuresis +, off lasix  -off IV fluids  -Continue same dose of spironolactone     4. .  acute resp.failure: dwight pneumo+  -On vent, had bilateral chest tube placements because of bilateral pneumothorax  -pulm and CT surgery following    5.  Hypotension:  -It is better now. Tolerating spironolactone and Toprol .  Off midodrine  -Continue to monitor BPs     6 status post  infrarenal aorta endarterectomy, with aortic by common femoral artery bypass with 14 mm x 7 mm Hemosure graft       Signed By: Lawrence Bisaws MD     October 25, 2020

## 2020-10-25 NOTE — PROGRESS NOTES
OT eval order received and acknowledged. Per chart review and nursing, pt intubated again on 10/23/2020 and is currently intubated/sedated. Will D/C OT eval orders at this time as pt not medically appropriate. Will need new orders when pt is appropriate for therapy. Thank you.

## 2020-10-25 NOTE — PROGRESS NOTES
Mr. Ma Shone examined this morning. He is currently intubated. His hemodynamics seems to stable. Urine output is adequate. He is currently sedated as well. Patient is not tolerating tube feeds with high residual.  I reviewed the abdominal x-ray this morning possible ileus. There is no final reports on the reading. We will start him back on TPN, will also behind his nutritional status. Meanwhile I will arrange for tracheostomy for tomorrow. We will get a ultrasound gallbladder as well.

## 2020-10-25 NOTE — PROGRESS NOTES
Pulmonology and Critical Care Progress Note    Subjective:     Chief Complaint:   Chief Complaint   Patient presents with    Altered mental status     The patient underwent aortobifemoral bypass surgery 10/6/20. Post surgery he was left on the ventilator. Apparently blood loss was about 3.5 L. When he returned to the ICU he was on multiple pressors. Successfully extubated after suctioning of mucous plugs from the left. Unfortunately, he developed spontaneous left pneumothorax. Thoracic surgery did aspiration of his left pneumothorax. No chest tube insertion was required. Patient developed increasing respiratory distress secondary to a tension right-sided PTX. Patient ended up requiring intubation and Dr. Jess Brewer came in to evaluate the patient and found him to have bilateral pneumothoraces and placed bilateral chest tubes.      Patient seen and examined at the bedside in the ICU. I discussed the case in detail with the bedside nurse. He appears to have developed ileus. PEG tube is to suction. Ultrasound of the gallbladder has been ordered. He is for possible tracheostomy in the morning. Apparently he had cardiac arrest on 10/23. He has 2 chest tubes. One on each side about of them are leaking. Chest x-ray was reviewed. Sedated with propofol at 15 mics. Patient was successfully extubated on 10/20/20 and he was tolerating RA well. Patient had a PEG tube placed on 10/22/2020, apparently had some bleeding from the PEG tube site which required Dr. Jc Tinsley coming back and placing a stitch at the insertion site.         Review of Systems:  Unable to perform due to patient's condition    Current Facility-Administered Medications   Medication Dose Route Frequency Provider Last Rate Last Dose    fat emulsion 20% (LIPOSYN, INTRAlipid) infusion 250 mL  250 mL IntraVENous CONTINUOUS Kike Olson MD        TPN ADULT - CENTRAL AA 5% D20% W/ CA + ELECTROLYTES   IntraVENous CONTINUOUS Kike Olson MD Judee Exon potassium chloride 10 mEq in 100 ml IVPB  10 mEq IntraVENous Q1H Roxanna Varela  mL/hr at 10/25/20 1521 10 mEq at 10/25/20 1521    propofol (DIPRIVAN) 10 mg/mL infusion  0-50 mcg/kg/min IntraVENous TITRATE Heriberto Talley MD 3.9 mL/hr at 10/25/20 1216 10 mcg/kg/min at 10/25/20 1216    potassium chloride SR (KLOR-CON 10) tablet 20 mEq  20 mEq Oral BID Roxanna Varela MD   20 mEq at 10/25/20 0955    0.9% sodium chloride infusion 250 mL  250 mL IntraVENous PRN Bailee Olson MD        oxyCODONE IR (ROXICODONE) tablet 5 mg  5 mg Oral Q4H PRN Pepe Okeefe MD   5 mg at 10/24/20 1531    enoxaparin (LOVENOX) injection 30 mg  30 mg SubCUTAneous Q24H Pepe Okeefe MD   30 mg at 10/24/20 2153    0.9% sodium chloride infusion 250 mL  250 mL IntraVENous PRN Pepe Okeefe MD 15 mL/hr at 10/24/20 1056 250 mL at 10/24/20 1056    metoprolol tartrate (LOPRESSOR) tablet 50 mg  50 mg Oral BID Johnny Santiago DO   50 mg at 10/25/20 0955    spironolactone (ALDACTONE) tablet 25 mg  25 mg Oral BID Jair Coker MD   25 mg at 10/25/20 0955    piperacillin-tazobactam (ZOSYN) 3.375 g in 0.9% sodium chloride (MBP/ADV) 100 mL MBP  3.375 g IntraVENous Q8H Pascale Kelly MD 25 mL/hr at 10/25/20 1121 3.375 g at 10/25/20 1121    pantoprazole (PROTONIX) granules for oral suspension 40 mg  40 mg Per NG tube ACB Pascale Kelly MD   40 mg at 10/25/20 0954    albuterol CONCENTRATE 2.5mg/0.5 mL neb soln  2.5 mg Nebulization Q6H RT Johnny Santiago DO   2.5 mg at 10/25/20 1338    LORazepam (ATIVAN) injection 1 mg  1 mg IntraVENous Q2H PRN Bailee Olson MD   1 mg at 10/23/20 1549    sodium chloride 0.9% (NS) 3ml nebulizer solution  2.5 mL Nebulization PRN Rhys Price MD   2.5 mL at 10/25/20 1338    influenza vaccine 2020-21 (4 yrs+)(PF) (FLUCELVAX QUAD) injection 0.5 mL  0.5 mL IntraMUSCular PRIOR TO DISCHARGE Pascale Kelly MD   Stopped at 10/07/20 0900    diphenhydrAMINE (BENADRYL) capsule 50 mg  50 mg Oral BID PRN Carolin, Emily Pelaez NP   50 mg at 10/21/20 2345    phosphorus (K PHOS NEUTRAL) 250 mg tablet 2 Tab  2 Tab Oral BID Justina Acevedo MD   2 Tab at 10/25/20 0955    acetaminophen (TYLENOL) tablet 650 mg  650 mg Oral Q6H PRN Gisele Long NP   650 mg at 10/20/20 0432    ondansetron (ZOFRAN) injection 4 mg  4 mg IntraVENous Q8H PRN Gisele Long NP   4 mg at 20 0402    guaiFENesin (ROBITUSSIN) 20 mg/mL oral liquid 400 mg  400 mg Oral Q6H PRN Jaycob Diaz NP   Stopped at 20 1855            No Known Allergies        Objective:     Blood pressure 136/75, pulse 87, temperature 98.8 °F (37.1 °C), resp. rate 30, height 6' (1.829 m), weight 64.4 kg (141 lb 15.6 oz), SpO2 98 %. Temp (24hrs), Av °F (37.2 °C), Min:97.9 °F (36.6 °C), Max:99.7 °F (37.6 °C)      Intake and Output:  Current Shift: No intake/output data recorded. Last 3 Shifts: 10/23 1901 - 10/25 0700  In: 1851.2 [I.V.:839.9]  Out: 2588 [Urine:1280; Drains:80]    Physical Exam:     General:  Elderly white male who is sedated on the ventilator. Throat and Neck: Supple. No JVD. He has a right subclavian central line. Lung:  Equal aeration bilaterally, minimal rhonchi in the left base. Bilateral chest tubes present to waterseal.  Air leakage is noted. No change. Heart: S1+S2. No murmurs  Abdomen: Status post surgery. He has a midline incision. He has one HELGA drains in place; draining minimal fluid. .  Bowel sounds are hypoactive. PEG tube to low intermittent suction. Extremities:  His edema is improved. Distal pulses of the lower extremities are noted with Dopplers. Has skin breakdown on the dependent portion of heel. : Erese catheter in place. Making some urine output.   Skin: No cyanosis  Neurologic:  A+Ox3, non-focal exam        Recent Results (from the past 24 hour(s))   CBC WITH AUTOMATED DIFF    Collection Time: 10/25/20  4:55 AM   Result Value Ref Range    WBC 17.1 (H) 4.1 - 11.1 K/uL    RBC 3.13 (L) 4.10 - 5.70 M/uL    HGB 9.4 (L) 12.1 - 17.0 g/dL    HCT 27.6 (L) 36.6 - 50.3 %    MCV 88.2 80.0 - 99.0 FL    MCH 30.0 26.0 - 34.0 PG    MCHC 34.1 30.0 - 36.5 g/dL    RDW 20.0 (H) 11.5 - 14.5 %    PLATELET 675 186 - 975 K/uL    MPV 10.5 8.9 - 12.9 FL    NEUTROPHILS 84 (H) 32 - 75 %    LYMPHOCYTES 7 (L) 12 - 49 %    MONOCYTES 8 5 - 13 %    EOSINOPHILS 0 0 - 7 %    BASOPHILS 0 0 - 1 %    IMMATURE GRANULOCYTES 1 (H) 0.0 - 0.5 %    ABS. NEUTROPHILS 15.3 (H) 1.8 - 8.0 K/UL    ABS. LYMPHOCYTES 1.2 0.8 - 3.5 K/UL    ABS. MONOCYTES 1.3 (H) 0.0 - 1.0 K/UL    ABS. EOSINOPHILS 0.0 0.0 - 0.4 K/UL    ABS. BASOPHILS 0.0 0.0 - 0.1 K/UL    ABS. IMM. GRANS. 0.2 (H) 0.00 - 0.04 K/UL    DF AUTOMATED     METABOLIC PANEL, BASIC    Collection Time: 10/25/20  4:55 AM   Result Value Ref Range    Sodium 147 (H) 136 - 145 mmol/L    Potassium 3.1 (L) 3.5 - 5.1 mmol/L    Chloride 111 (H) 97 - 108 mmol/L    CO2 29 21 - 32 mmol/L    Anion gap 7 5 - 15 mmol/L    Glucose 70 65 - 100 mg/dL    BUN 28 (H) 6 - 20 mg/dL    Creatinine 0.95 0.70 - 1.30 mg/dL    BUN/Creatinine ratio 29 (H) 12 - 20      GFR est AA >60 >60 ml/min/1.73m2    GFR est non-AA >60 >60 ml/min/1.73m2    Calcium 8.2 (L) 8.5 - 10.1 mg/dL   MAGNESIUM    Collection Time: 10/25/20  4:55 AM   Result Value Ref Range    Magnesium 2.0 1.6 - 2.4 mg/dL   BLOOD GAS, ARTERIAL    Collection Time: 10/25/20  5:20 AM   Result Value Ref Range    pH 7.51 (H) 7.35 - 7.45      PCO2 34 (L) 35 - 45 mmHg    PO2 74 (L) 75 - 100 mmHg    O2 SAT 96 >95 %    BICARBONATE 28 (H) 22 - 26 mmol/L    BASE EXCESS 4.2 (H) 0 - 2 mmol/L    O2 METHOD VENT      FIO2 50 %    MODE Assist Control/Volume Control      Tidal volume 400      SET RATE 20      EPAP/CPAP/PEEP 5      SITE Right Radial      ENIO'S TEST Positive         CT Results  (Last 48 hours)    None          Assessment:     1. Acute respiratory failure, after aortobifemoral bypass surgery on 10/6/2020. Patient self extubated himself early in the morning on 10/9/2020.  Re-intubated on 10/10/20 for left-sided mucous plugging and complete left lung collapse and had flexible bronchoscopy done with suction of mucous plug. 2.  Acute metabolic encephalopathy, resolved  3. Aspiration pneumonia  4. Severe peripheral vascular disease   5. UTI   6. Hypertension  7. Familial polymyositis  8. Left spontaneous pneumothorax    Plan:     1.)  Acute Hypoxic respiratory failure. Extubated 10/20/20, was doing well. However had cardiac arrest 10/23 and was reintubated. Current vent settings show tidal volume of 400 O2 50% assist control of 20 and PEEP of 5. Blood gases show 7.51/34/74. Will decrease vent rate to 16. However he is breathing over the ventilator. Chest x-ray shows no significant change in bilateral pneumothoraces. He is sedated with propofol currently at 15 mics. S/p PEG tube on 10/22/20. Was cleared by speech therapy for honey-thickened liquids. Currently PEG tube is to low intermittent suction. He has apparently developed ileus. For abdominal ultrasound in the morning. Will need nutrition on board to start tube feeds once cleared to use the PEG. For now he is to be started on TPN. Apparently this is his third reintubation. He is for probable tracheostomy in the morning. He is very weak and deconditioned. 2.)  Bilateral pneumothoraces  Bilateral chest tubes placed, no airleak on waterseal.  Dr. Gilma Camejo following closely. Plan is to keep chest tubes in.    3.)  Aspiration/HAP pneumonia:  Continue Zosyn  Completed a long course of steroids  On scheduled nebulized bronchodilator treatments. 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 PT/OT can be more aggressive about seeing him once his chest tubes have been removed. 4.)  Metabolic encephalopathy. He has a progressive neurologic disorder. Brain MRI negative    Appreciate assistance from neurology. Back to baseline now after extubation.     5.)  Myocardial ischemia:  He underwent nuclear stress testing which showed significant inferior and from will treat him with medical managememt   I will follow the patient closely with you. DVT and GI prophylaxis. He is on Lovenox and Protonix IV.     Case discussed in detail with RN, RT, and care team in ICU  Thank you for involving me in the care of this patient  I will follow with you closely during hospitalization        Margo Castro MD  [facility]  10/25/2020

## 2020-10-25 NOTE — PROGRESS NOTES
Progress Note    Patient: Pastor Ochoa MRN: [id number]  SSN: [id number]    YOB: 1965  Age: 48 y.o.   Sex: male      Admit Date: 9/26/2020    LOS: 28 days     Subjective:   Yesterday was intubated due to respiratory distress  he had PEG tube placement two days ago, some bleeding afterwards, a suture place,   Past Medical History:   Diagnosis Date    Myositis     Familial inclusion         Current Facility-Administered Medications:     propofol (DIPRIVAN) 10 mg/mL infusion, 0-50 mcg/kg/min, IntraVENous, TITRATE, Kemi Herndon MD, Last Rate: 5.8 mL/hr at 10/24/20 1849, 15 mcg/kg/min at 10/24/20 1849    potassium chloride SR (KLOR-CON 10) tablet 20 mEq, 20 mEq, Oral, BID, Shabbir Lopez MD, Stopped at 10/24/20 0900    0.9% sodium chloride infusion 250 mL, 250 mL, IntraVENous, PRN, Wesley, Denise Kuo MD    oxyCODONE IR (ROXICODONE) tablet 5 mg, 5 mg, Oral, Q4H PRN, Frieda Charlton MD, 5 mg at 10/24/20 1531    enoxaparin (LOVENOX) injection 30 mg, 30 mg, SubCUTAneous, Q24H, Frieda Charlton MD, 30 mg at 10/23/20 2134    0.9% sodium chloride infusion 250 mL, 250 mL, IntraVENous, PRN, Frieda Charlton MD, Last Rate: 15 mL/hr at 10/24/20 1056, 250 mL at 10/24/20 1056    metoprolol tartrate (LOPRESSOR) tablet 50 mg, 50 mg, Oral, BID, Johnny Santiago DO, 50 mg at 10/24/20 1209    spironolactone (ALDACTONE) tablet 25 mg, 25 mg, Oral, BID, Burton Coker MD, 25 mg at 10/24/20 0852    piperacillin-tazobactam (ZOSYN) 3.375 g in 0.9% sodium chloride (MBP/ADV) 100 mL MBP, 3.375 g, IntraVENous, Q8H, Holland Zapata MD, Last Rate: 25 mL/hr at 10/24/20 1950, 3.375 g at 10/24/20 1950    pantoprazole (PROTONIX) granules for oral suspension 40 mg, 40 mg, Per NG tube, ACB, Holland Zapata MD, 40 mg at 10/24/20 0852    albuterol CONCENTRATE 2.5mg/0.5 mL neb soln, 2.5 mg, Nebulization, Q6H RT, Johnny Santiago DO, 2.5 mg at 10/24/20 1928    LORazepam (ATIVAN) injection 1 mg, 1 mg, IntraVENous, Q2H PRN, Jun, [address] John Joseph MD, 1 mg at 10/23/20 1549    sodium chloride 0.9% (NS) 3ml nebulizer solution, 2.5 mL, Nebulization, PRN, Kalyn Smith MD, 3 mL at 10/24/20 1309    influenza vaccine 2020-21 (4 yrs+)(PF) (FLUCELVAX QUAD) injection 0.5 mL, 0.5 mL, IntraMUSCular, PRIOR TO DISCHARGE, Abdifatah Zapata MD, Stopped at 10/07/20 0900    diphenhydrAMINE (BENADRYL) capsule 50 mg, 50 mg, Oral, BID PRN, Tildon Pry, NP, 50 mg at 10/21/20 2345    phosphorus (K PHOS NEUTRAL) 250 mg tablet 2 Tab, 2 Tab, Oral, BID, Lee Cordova MD, 2 Tab at 10/24/20 1208    acetaminophen (TYLENOL) tablet 650 mg, 650 mg, Oral, Q6H PRN, Gisele Long NP, 650 mg at 10/20/20 0432    ondansetron (ZOFRAN) injection 4 mg, 4 mg, IntraVENous, Q8H PRN, Gisele Long NP, 4 mg at 09/30/20 0402    guaiFENesin (ROBITUSSIN) 20 mg/mL oral liquid 400 mg, 400 mg, Oral, Q6H PRN, Dilia Jaimes NP, Stopped at 09/26/20 1855    Objective:     Vitals:    10/24/20 1600 10/24/20 1700 10/24/20 1900 10/24/20 1923   BP: 133/75 132/77     Pulse: 88 86  89   Resp: 27 25  21   Temp:   99.3 °F (37.4 °C)    SpO2: 100% 99%  100%   Weight:       Height:            Intake and Output:  Current Shift: No intake/output data recorded. Last three shifts: 10/23 0701 - 10/24 1900  In: 1851.2 [I.V.:839.9]  Out: 1631 [UFFVO:7787; Drains:90]    Physical Exam:   Physical Exam   Constitutional: He is oriented to person, place, and time. He appears distressed. HENT:   Head: Atraumatic. Eyes: Conjunctivae are normal.   Neck: No tracheal deviation present. Cardiovascular: Normal heart sounds. Pulmonary/Chest: He is in respiratory distress. Abdominal: He exhibits no distension and no mass. There is abdominal tenderness. There is no rebound and no guarding. Musculoskeletal:         General: No tenderness. Neurological: He is oriented to person, place, and time. Skin: Rash noted.     the PEG tube site skin fine, no blood in the NG tube or  hematoma,    Lab/Data Review:  Recent Results (from the past 24 hour(s))   BLOOD GAS, ARTERIAL    Collection Time: 10/24/20  5:30 AM   Result Value Ref Range    pH 7.52 (H) 7.35 - 7.45      PCO2 34 (L) 35 - 45 mmHg    PO2 59 (L) 75 - 100 mmHg    O2 SAT 93 (L) >95 %    BICARBONATE 29 (H) 22 - 26 mmol/L    BASE EXCESS 5.5 (H) 0 - 2 mmol/L    O2 METHOD VENT      FIO2 50 %    Tidal volume 400      SET RATE 20      EPAP/CPAP/PEEP 5      SITE Right Brachial      ENIO'S TEST Positive     CBC WITH AUTOMATED DIFF    Collection Time: 10/24/20  6:13 AM   Result Value Ref Range    WBC 15.4 (H) 4.1 - 11.1 K/uL    RBC 3.53 (L) 4.10 - 5.70 M/uL    HGB 10.4 (L) 12.1 - 17.0 g/dL    HCT 30.6 (L) 36.6 - 50.3 %    MCV 86.7 80.0 - 99.0 FL    MCH 29.5 26.0 - 34.0 PG    MCHC 34.0 30.0 - 36.5 g/dL    RDW 19.2 (H) 11.5 - 14.5 %    PLATELET 271 926 - 816 K/uL    MPV 11.2 8.9 - 12.9 FL    NRBC 0.5 (H) 0  WBC    ABSOLUTE NRBC 0.07 (H) 0.00 - 0.01 K/uL    NEUTROPHILS 86 (H) 32 - 75 %    LYMPHOCYTES 5 (L) 12 - 49 %    MONOCYTES 7 5 - 13 %    EOSINOPHILS 0 0 - 7 %    BASOPHILS 0 0 - 1 %    IMMATURE GRANULOCYTES 2 (H) 0.0 - 0.5 %    ABS. NEUTROPHILS 13.5 (H) 1.8 - 8.0 K/UL    ABS. LYMPHOCYTES 0.8 0.8 - 3.5 K/UL    ABS. MONOCYTES 1.1 (H) 0.0 - 1.0 K/UL    ABS. EOSINOPHILS 0.0 0.0 - 0.4 K/UL    ABS. BASOPHILS 0.0 0.0 - 0.1 K/UL    ABS. IMM.  GRANS. 0.3 (H) 0.00 - 0.04 K/UL    DF AUTOMATED     MAGNESIUM    Collection Time: 10/24/20  6:13 AM   Result Value Ref Range    Magnesium 1.8 1.6 - 2.4 mg/dL   RENAL FUNCTION PANEL    Collection Time: 10/24/20  6:13 AM   Result Value Ref Range    Sodium 147 (H) 136 - 145 mmol/L    Potassium 2.6 (LL) 3.5 - 5.1 mmol/L    Chloride 110 (H) 97 - 108 mmol/L    CO2 30 21 - 32 mmol/L    Anion gap 7 5 - 15 mmol/L    Glucose 101 (H) 65 - 100 mg/dL    BUN 22 (H) 6 - 20 mg/dL    Creatinine 0.67 (L) 0.70 - 1.30 mg/dL    BUN/Creatinine ratio 33 (H) 12 - 20      GFR est AA >60 >60 ml/min/1.73m2    GFR est non-AA >60 >60 ml/min/1.73m2 Calcium 8.5 8.5 - 10.1 mg/dL    Phosphorus 3.3 2.6 - 4.7 mg/dL    Albumin 2.8 (L) 3.5 - 5.0 g/dL        XR CHEST PORT   Final Result   IMPRESSION: Basilar atelectasis. Improvement of bilateral pneumothoraces. Persistent pneumoperitoneum. Results called to Jose Roberto Madden RN on 10/24/2020 6:24 AM.      XR CHEST PORT   Final Result      XR CHEST PORT   Final Result      XR CHEST PORT   Final Result      XR CHEST PORT   Final Result      XR CHEST PORT   Final Result      XR CHEST PORT   Final Result      XR CHEST PORT   Final Result      XR CHEST PORT   Final Result      XR CHEST PORT   Final Result      XR CHEST PORT   Final Result   IMPRESSION: OG tube extends well into the stomach. XR CHEST PORT   Final Result   IMPRESSION: Stable exam..                XR CHEST PORT   Final Result   IMPRESSION: Endotracheal tube now present with the tip approximately 8 cm above   the [place]. Persistent 50% left pneumothorax. XR CHEST PORT   Final Result      XR CHEST PORT   Final Result      XR CHEST PORT   Final Result      XR CHEST PORT   Final Result      XR CHEST PORT   Final Result      XR CHEST PORT   Final Result      XR CHEST PORT   Final Result      XR CHEST PORT   Final Result      XR CHEST SNGL V   Final Result   IMPRESSION: Breathing left basilar airspace disease. XR CHEST PORT   Final Result   IMPRESSION: Significant reexpansion of the left lung with persistent airspace   disease throughout much of the left lower lobe. XR CHEST PORT   Final Result   IMPRESSION: Postoperative changes, left upper abdomen. Left pneumonectomy versus   complete lung collapse; correlate with surgical history. Right lung relatively   clear. ET tube 27 mm above the [place]. XR CHEST PORT   Final Result   IMPRESSION: Complete collapse of the left lung with no evidence of residual   pulmonary aeration on the left. Associated left pleural effusion is likely.       XR CHEST PORT   Final Result Impression:Left lung airspace disease/atelectasis. Suspect left pleural   effusion. Focal airspace disease at the right lung base. CTA ABD ART W RUNOFF W WO CONT   Final Result   IMPRESSION:   1. Interval postoperative changes from aorto-bifemoral artery bypass. The bypass   graft is patent. The proximal and distal anastomoses are patent. 2. Patent left renal artery. Wedge shaped perfusion defect in the inferior pole   of the left kidney consistent with parenchymal infarct. 3. Patent diminutive right renal artery. Patchy parenchymal perfusion, improved   compared to the preoperative study from 9/29/2020.   4. Patent right and left lower extremity arteries without occlusion or   significant stenosis. 5. Stable mild short segment focal atherosclerotic dissection flap at the right   anterolateral aspect of the descending thoracic aorta at the T10 level. 6. Postoperative changes from splenectomy. Approximately 6.1 cm TR by 2.1 cm AP   by 8.4 cm CC hematoma in the splenic bed without findings of active contrast   extravasation. A surgical drain is present in the splenectomy bed extending to   beneath the left hemidiaphragm. 7. Expected pneumoperitoneum given recent surgery. Bilateral retroperitoneal   low-attenuation stranding and/or small volume fluid. Diffuse mesenteric   stranding and/or small volume fluid in the  pelvis. 8. Distended gallbladder with cholelithiasis and prominent common bile duct as   seen similarly on the preoperative study from 9/29/2020/.   9. Diffuse anasarca. Diffuse subcutaneous edema of the lower extremities, right   greater than left. Negative for right and left lower extremity DVT. XR CHEST PORT   Final Result   IMPRESSION:   1. The patient's life support lines and tubes are unchanged and are in   satisfactory position. 2.  There is increasing hypoventilation and atelectasis within the lung bases   greater on the left compared to the right.    3.  There also is increasing hazy opacity along the lower left hemithorax most   compatible with a layering moderate-sized left pleural effusion. XR CHEST PORT   Final Result   IMPRESSION:   1. The endotracheal tube is in satisfactory position. 2.  There is a hypoventilation of the lung bases with lung base atelectasis. The   remainder of the lung parenchyma is relatively clear. There is vascular pruning   within the upper lobes suspect for underlying emphysema. 3.  There is free intra-abdominal air likely a function of recent abdominal   surgery. US RETROPERITONEUM COMP   Final Result   IMPRESSION:   1. There is a smaller size to the right kidney. 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 The kidneys otherwise image in a normal fashion. There are normal renal echotexture characteristics. 2.  There a moderate sized right pleural effusion. XR CHEST PORT   Final Result   IMPRESSION: Postoperative findings, postprocedural findings, medical devices as   described. Minimal right lung base atelectasis may be residual adhesive   intraoperative atelectasis. Tonye Cogan XR ABD (KUB)   Final Result   Findings/impression:      Numerous surgical clips project over the left upper quadrant and mid abdomen. Midline skin staples noted. Drainage tubes project over the pelvis, lower   abdomen and left upper quadrant. Enteric tube tip projects over the proximal   stomach. No unexpected radiopaque foreign bodies are identified. Oral contrast material throughout the colon. Bowel gas pattern is   nonobstructive. No acute osseous abdomen identified. XR SWALLOW FUNC VIDEO   Final Result   Impression: Significant hypopharyngeal residue. Episodes of penetration with   all consistencies. Episode of flash aspiration after water wash. XR CHEST PORT   Final Result   Impression: Underexpanded lungs with bibasilar atelectasis.           CTA ABDOMEN PELV W CONT   Final Result   IMPRESSION: Mid abdominal aortic occlusion, with threatened right kidney and   fairly good collateralization to the legs. This could be causing a mesenteric   steal phenomenon, however      MRI BRAIN WO CONT   Final Result   Impression: No evidence of acute hemorrhage, mass or infarct. No sinusitis or   mastoiditis. Please see above discussion. CTA CHEST W OR W WO CONT   Final Result   IMPRESSION: Limited by breathing motion. 1. No large pulmonary arterial filling defect. 2. Bronchial thickening with right greater than left lower lung atelectasis or   pneumonia/pneumonitis. Bubbly debris in the trachea. 3. Atherosclerosis. Abrupt discontinuation of contrast in the aorta on the very   inferior slices. Contrast is seen in the celiac artery and SMA and the left   renal artery. The right kidney demonstrates decreased attenuation compared to   the left concerning for medical renal disease to include ischemia. Recommend CTA   abdomen/pelvis. Called report to charge nurse Kenneth Phillips at 9:05 PM 9/27/2020.   4. Cholelithiasis within distended gallbladder. 5. Dilated small bowel. 6. Other findings as above. XR CHEST PORT   Final Result   IMPRESSION:      No airspace disease in the lungs. Follow-up as clinically indicated. CT HEAD WO CONT   Final Result   Impression: Unremarkable head CT without contrast.  No infarct, mass, or    hemorrhage. Please see full report. XR CHEST SNGL V   Final Result   Impression: No diagnostic abnormality.             US CHEST PORTABLE    (Results Pending)   XR CHEST PORT    (Results Pending)   XR CHEST PORT    (Results Pending)   XR CHEST PORT    (Results Pending)   XR CHEST PORT    (Results Pending)        Assessment:     Active Problems:    Metabolic encephalopathy (9/79/2960)      UTI (urinary tract infection) (9/26/2020)      Aspiration pneumonitis ([address] (9/26/2020)      Essential hypertension (9/26/2020)      Acute dehydration (9/26/2020)      Difficult swallow, increase of difficult feeding,  Malnutrition,  S/p peg placement   today the event noted, not related to PEG placememt. Plan:   Continue on the current antibiotic treatment  Continue on current dysphagia diet  PT OT and speech to follow  Start tube feeding as ordered.          Signed By: Jaimee Campbell MD     October 24, 2020        Thank you for allowing me to participate in this patients care  Cc Referring Physician   Odette Raymundo MD

## 2020-10-26 ENCOUNTER — APPOINTMENT (OUTPATIENT)
Dept: CT IMAGING | Age: 55
DRG: 981 | End: 2020-10-26
Attending: SURGERY
Payer: COMMERCIAL

## 2020-10-26 ENCOUNTER — APPOINTMENT (OUTPATIENT)
Dept: GENERAL RADIOLOGY | Age: 55
DRG: 981 | End: 2020-10-26
Attending: INTERNAL MEDICINE
Payer: COMMERCIAL

## 2020-10-26 LAB
ARTERIAL PATENCY WRIST A: POSITIVE
ATRIAL RATE: 109 BPM
BASE EXCESS BLDA CALC-SCNC: 2.6 MMOL/L (ref 0–2)
BDY SITE: ABNORMAL
CALCULATED P AXIS, ECG09: 76 DEGREES
CALCULATED R AXIS, ECG10: 78 DEGREES
CALCULATED T AXIS, ECG11: -84 DEGREES
DIAGNOSIS, 93000: NORMAL
FIO2 ON VENT: 50 %
GAS FLOW.O2 SETTING OXYMISER: 16 L/MIN
HCO3 BLDA-SCNC: 27 MMOL/L (ref 22–26)
HCT VFR BLD AUTO: 31.5 % (ref 36.6–50.3)
HGB BLD-MCNC: 10.4 G/DL (ref 12.1–17)
MAGNESIUM SERPL-MCNC: 2.1 MG/DL (ref 1.6–2.4)
P-R INTERVAL, ECG05: 124 MS
PCO2 BLDA: 32 MMHG (ref 35–45)
PH BLDA: 7.5 [PH] (ref 7.35–7.45)
PO2 BLDA: 110 MMHG (ref 75–100)
Q-T INTERVAL, ECG07: 296 MS
QRS DURATION, ECG06: 86 MS
QTC CALCULATION (BEZET), ECG08: 398 MS
SAO2 % BLD: 99 %
SAO2% DEVICE SAO2% SENSOR NAME: ABNORMAL
TOTAL RATE, TRATE: 16
VENTILATION MODE VENT: ABNORMAL
VENTRICULAR RATE, ECG03: 109 BPM

## 2020-10-26 PROCEDURE — 94003 VENT MGMT INPAT SUBQ DAY: CPT

## 2020-10-26 PROCEDURE — 74011000250 HC RX REV CODE- 250: Performed by: INTERNAL MEDICINE

## 2020-10-26 PROCEDURE — 74011250637 HC RX REV CODE- 250/637: Performed by: INTERNAL MEDICINE

## 2020-10-26 PROCEDURE — 94668 MNPJ CHEST WALL SBSQ: CPT

## 2020-10-26 PROCEDURE — 65610000006 HC RM INTENSIVE CARE

## 2020-10-26 PROCEDURE — 85018 HEMOGLOBIN: CPT

## 2020-10-26 PROCEDURE — 71045 X-RAY EXAM CHEST 1 VIEW: CPT

## 2020-10-26 PROCEDURE — 36600 WITHDRAWAL OF ARTERIAL BLOOD: CPT

## 2020-10-26 PROCEDURE — 85014 HEMATOCRIT: CPT

## 2020-10-26 PROCEDURE — 77010033678 HC OXYGEN DAILY

## 2020-10-26 PROCEDURE — 94640 AIRWAY INHALATION TREATMENT: CPT

## 2020-10-26 PROCEDURE — 74011250636 HC RX REV CODE- 250/636: Performed by: INTERNAL MEDICINE

## 2020-10-26 PROCEDURE — 74011000258 HC RX REV CODE- 258: Performed by: INTERNAL MEDICINE

## 2020-10-26 PROCEDURE — 36415 COLL VENOUS BLD VENIPUNCTURE: CPT

## 2020-10-26 PROCEDURE — 82803 BLOOD GASES ANY COMBINATION: CPT

## 2020-10-26 PROCEDURE — 94400 HC END TIDAL CO2 RESPONSE CURVE: CPT

## 2020-10-26 PROCEDURE — 83735 ASSAY OF MAGNESIUM: CPT

## 2020-10-26 PROCEDURE — P9045 ALBUMIN (HUMAN), 5%, 250 ML: HCPCS | Performed by: SURGERY

## 2020-10-26 PROCEDURE — 74011250636 HC RX REV CODE- 250/636: Performed by: SURGERY

## 2020-10-26 RX ORDER — LORAZEPAM 2 MG/ML
1 INJECTION INTRAMUSCULAR
Status: DISCONTINUED | OUTPATIENT
Start: 2020-10-26 | End: 2020-10-27 | Stop reason: HOSPADM

## 2020-10-26 RX ORDER — ALBUMIN HUMAN 50 G/1000ML
25 SOLUTION INTRAVENOUS 2 TIMES DAILY
Status: DISCONTINUED | OUTPATIENT
Start: 2020-10-26 | End: 2020-10-26

## 2020-10-26 RX ORDER — GLYCOPYRROLATE 0.2 MG/ML
0.2 INJECTION INTRAMUSCULAR; INTRAVENOUS
Status: DISCONTINUED | OUTPATIENT
Start: 2020-10-26 | End: 2020-10-27 | Stop reason: HOSPADM

## 2020-10-26 RX ORDER — GLYCOPYRROLATE 0.2 MG/ML
0.2 INJECTION INTRAMUSCULAR; INTRAVENOUS 3 TIMES DAILY
Status: DISCONTINUED | OUTPATIENT
Start: 2020-10-26 | End: 2020-10-26

## 2020-10-26 RX ORDER — MORPHINE SULFATE 10 MG/ML
2 INJECTION, SOLUTION INTRAMUSCULAR; INTRAVENOUS
Status: DISCONTINUED | OUTPATIENT
Start: 2020-10-26 | End: 2020-10-26

## 2020-10-26 RX ORDER — MORPHINE SULFATE 2 MG/ML
2 INJECTION, SOLUTION INTRAMUSCULAR; INTRAVENOUS
Status: DISCONTINUED | OUTPATIENT
Start: 2020-10-26 | End: 2020-10-27 | Stop reason: HOSPADM

## 2020-10-26 RX ORDER — SCOLOPAMINE TRANSDERMAL SYSTEM 1 MG/1
1 PATCH, EXTENDED RELEASE TRANSDERMAL
Status: DISCONTINUED | OUTPATIENT
Start: 2020-10-26 | End: 2020-10-27 | Stop reason: HOSPADM

## 2020-10-26 RX ADMIN — PIPERACILLIN AND TAZOBACTAM 3.38 G: 3; .375 INJECTION, POWDER, LYOPHILIZED, FOR SOLUTION INTRAVENOUS at 03:59

## 2020-10-26 RX ADMIN — DIBASIC SODIUM PHOSPHATE, MONOBASIC POTASSIUM PHOSPHATE AND MONOBASIC SODIUM PHOSPHATE 2 TABLET: 852; 155; 130 TABLET ORAL at 09:32

## 2020-10-26 RX ADMIN — PROPOFOL 20 MCG/KG/MIN: 10 INJECTION, EMULSION INTRAVENOUS at 15:04

## 2020-10-26 RX ADMIN — SPIRONOLACTONE 25 MG: 25 TABLET ORAL at 09:29

## 2020-10-26 RX ADMIN — ALBUTEROL SULFATE 2.5 MG: 2.5 SOLUTION RESPIRATORY (INHALATION) at 20:06

## 2020-10-26 RX ADMIN — ALBUTEROL SULFATE 2.5 MG: 2.5 SOLUTION RESPIRATORY (INHALATION) at 14:01

## 2020-10-26 RX ADMIN — PANTOPRAZOLE SODIUM 40 MG: 40 GRANULE, DELAYED RELEASE ORAL at 09:28

## 2020-10-26 RX ADMIN — PIPERACILLIN AND TAZOBACTAM 3.38 G: 3; .375 INJECTION, POWDER, LYOPHILIZED, FOR SOLUTION INTRAVENOUS at 12:27

## 2020-10-26 RX ADMIN — LORAZEPAM 1 MG: 2 INJECTION, SOLUTION INTRAMUSCULAR; INTRAVENOUS at 20:09

## 2020-10-26 RX ADMIN — MORPHINE SULFATE 2 MG: 10 INJECTION, SOLUTION INTRAMUSCULAR; INTRAVENOUS at 20:06

## 2020-10-26 RX ADMIN — ALBUMIN (HUMAN) 25 G: 12.5 INJECTION, SOLUTION INTRAVENOUS at 09:31

## 2020-10-26 RX ADMIN — GLYCOPYRROLATE 0.2 MG: 0.2 INJECTION, SOLUTION INTRAMUSCULAR; INTRAVENOUS at 19:58

## 2020-10-26 RX ADMIN — METOPROLOL TARTRATE 50 MG: 50 TABLET, FILM COATED ORAL at 09:28

## 2020-10-26 RX ADMIN — ISODIUM CHLORIDE 3 ML: 0.03 SOLUTION RESPIRATORY (INHALATION) at 08:43

## 2020-10-26 RX ADMIN — ALBUTEROL SULFATE 2.5 MG: 2.5 SOLUTION RESPIRATORY (INHALATION) at 08:42

## 2020-10-26 RX ADMIN — SODIUM CHLORIDE 1000 ML: 9 INJECTION, SOLUTION INTRAVENOUS at 06:32

## 2020-10-26 RX ADMIN — ALBUTEROL SULFATE 2.5 MG: 2.5 SOLUTION RESPIRATORY (INHALATION) at 01:08

## 2020-10-26 RX ADMIN — ISODIUM CHLORIDE 3 ML: 0.03 SOLUTION RESPIRATORY (INHALATION) at 14:01

## 2020-10-26 NOTE — PROGRESS NOTES
Bedside shift change report given to Nino Flores RN by Ke Dutton RN  . Report included the following information SBAR, Intake/Output, Recent Results and Cardiac Rhythm . Manuel Mcleod

## 2020-10-26 NOTE — PROGRESS NOTES
Renal Progress Note    Patient: Pastor Ochoa MRN: [id number]  SSN: [id number]    YOB: 1965  Age: 48 y.o. Sex: male      Admit Date: 9/26/2020    LOS: 30 days     Subjective:   Patient seen in ICU for follow  up. Patient extubated 10/20/2020 and was on nasal oxygen. He had Cardiac arrest 10/23 with CPR for about 10 minutes with return of spontaneous circulation. Intubated and sedated on propofol now     Potassium was only 2.6 yesterday.   Improved to 3.1  10/25  Labs are pending today      Current Facility-Administered Medications   Medication Dose Route Frequency    albumin human 5% (BUMINATE) solution 25 g  25 g IntraVENous BID    TPN ADULT - CENTRAL AA 5% D20% W/ CA + ELECTROLYTES   IntraVENous CONTINUOUS    propofol (DIPRIVAN) 10 mg/mL infusion  0-50 mcg/kg/min IntraVENous TITRATE    potassium chloride SR (KLOR-CON 10) tablet 20 mEq  20 mEq Oral BID    0.9% sodium chloride infusion 250 mL  250 mL IntraVENous PRN    oxyCODONE IR (ROXICODONE) tablet 5 mg  5 mg Oral Q4H PRN    enoxaparin (LOVENOX) injection 30 mg  30 mg SubCUTAneous Q24H    0.9% sodium chloride infusion 250 mL  250 mL IntraVENous PRN    metoprolol tartrate (LOPRESSOR) tablet 50 mg  50 mg Oral BID    spironolactone (ALDACTONE) tablet 25 mg  25 mg Oral BID    piperacillin-tazobactam (ZOSYN) 3.375 g in 0.9% sodium chloride (MBP/ADV) 100 mL MBP  3.375 g IntraVENous Q8H    pantoprazole (PROTONIX) granules for oral suspension 40 mg  40 mg Per NG tube ACB    albuterol CONCENTRATE 2.5mg/0.5 mL neb soln  2.5 mg Nebulization Q6H RT    LORazepam (ATIVAN) injection 1 mg  1 mg IntraVENous Q2H PRN    sodium chloride 0.9% (NS) 3ml nebulizer solution  2.5 mL Nebulization PRN    influenza vaccine 2020-21 (4 yrs+)(PF) (FLUCELVAX QUAD) injection 0.5 mL  0.5 mL IntraMUSCular PRIOR TO DISCHARGE    diphenhydrAMINE (BENADRYL) capsule 50 mg  50 mg Oral BID PRN    phosphorus (K PHOS NEUTRAL) 250 mg tablet 2 Tab  2 Tab Oral BID  acetaminophen (TYLENOL) tablet 650 mg  650 mg Oral Q6H PRN    ondansetron (ZOFRAN) injection 4 mg  4 mg IntraVENous Q8H PRN    guaiFENesin (ROBITUSSIN) 20 mg/mL oral liquid 400 mg  400 mg Oral Q6H PRN        Vitals:    10/26/20 0600 10/26/20 0700 10/26/20 0843 10/26/20 0900   BP: 136/70   128/66   Pulse: 81   84   Resp: 22   24   Temp:  97.7 °F (36.5 °C)     SpO2: 99%  100% 100%   Weight:       Height:         Objective:   General: Intubated sedated  HEENT: EOMI, no Icterus, no Pallor,ETT   neck: Neck is supple, No JVD  Lungs: decreased breathsounds, bilateral chest tubes present  CVS: heart sounds normal,  no murmurs, no rubs. GI: soft, nontender, normal BS. PEG+  Extremeties: no cyanosis,no edema in ext   Neuro: awake  Skin: normal skin turgor, no skin rashes. Intake and Output:  Current Shift: No intake/output data recorded.   Last three shifts: 10/24 1901 - 10/26 0700  In: 200 [I.V.:200]  Out: 1940 [Urine:840; Drains:340]      Lab/Data Review:  Recent Labs     10/26/20  0245 10/25/20  0455 10/24/20  0613 10/23/20  1700   WBC  --  17.1* 15.4* 12.6*   HGB 10.4* 9.4* 10.4* 7.5*   HCT 31.5* 27.6* 30.6* 22.6*   PLT  --  258 254 261     Recent Labs     10/26/20  0245 10/25/20  0455 10/24/20  0613 10/23/20  1830   NA  --  147* 147* 148*   K  --  3.1* 2.6* 3.7   CL  --  111* 110* 111*   CO2  --  29 30 30   GLU  --  70 101* 186*   BUN  --  28* 22* 17   CREA  --  0.95 0.67* 0.63*   CA  --  8.2* 8.5 9.0   MG 2.1 2.0 1.8  --    PHOS  --   --  3.3  --    ALB  --   --  2.8*  --      Recent Labs     10/26/20  0455 10/25/20  0520 10/24/20  0530   PH 7.50* 7.51* 7.52*   PCO2 32* 34* 34*   PO2 110* 74* 59*   HCO3 27* 28* 29*   FIO2 50 50 50     Recent Results (from the past 24 hour(s))   MAGNESIUM    Collection Time: 10/26/20  2:45 AM   Result Value Ref Range    Magnesium 2.1 1.6 - 2.4 mg/dL   HEMOGLOBIN    Collection Time: 10/26/20  2:45 AM   Result Value Ref Range    HGB 10.4 (L) 12.1 - 17.0 g/dL   HEMATOCRIT Collection Time: 10/26/20  2:45 AM   Result Value Ref Range    HCT 31.5 (L) 36.6 - 50.3 %   BLOOD GAS, ARTERIAL    Collection Time: 10/26/20  4:55 AM   Result Value Ref Range    pH 7.50 (H) 7.35 - 7.45      PCO2 32 (L) 35 - 45 mmHg    PO2 110 (H) 75 - 100 mmHg    O2 SAT 99 >95 %    BICARBONATE 27 (H) 22 - 26 mmol/L    BASE EXCESS 2.6 (H) 0 - 2 mmol/L    O2 METHOD VENT      FIO2 50 %    MODE Assist Control/Volume Control      SET RATE 16      SITE Right Brachial      ENIO'S TEST Positive      Total rate 16          Assessment and Plan:       1. severe hypokalemia:  -from diuretics and metabolic alkalosis,   -Potassium improved from 2.6-->3.1 10/25  -received 4 doses of KCl 10meq iv  -Continue scheduled KCl 20 twice daily   -mag is okay 2.0-Off lasix now. continue to hold  -Continue 25 twice daily of spironolactone.   -monitor K levels    2. Hypernatremia:  -Secondary to diuretics. -Urine output was low yesterday but has again picked up.    -Creatinine is normal at 0.6  -He has been restarted on TPN. -Will give TPN without any sodium    3. Edema: improved with good diuresis +, off lasix  -off IV fluids  -Continue same dose of spironolactone     4. .  acute resp.failure: dwight pneumo+  -On vent, had bilateral chest tube placements because of bilateral pneumothorax  -pulm and CT surgery following    5.  Hypotension:  -It is better now. Tolerating spironolactone and Toprol .  Off midodrine  -Continue to monitor BPs     6 status post  infrarenal aorta endarterectomy, with aortic by common femoral artery bypass with 14 mm x 7 mm Hemosure graft       Signed By: Jud Price MD     October 26, 2020

## 2020-10-26 NOTE — PROGRESS NOTES
Patient is examined this morning. Patient is currently intubated. PEG tube is on suction due to ileus. Patient has bilateral chest tube to waterseal.  His abdomen is soft. I will order CT scan with IV contrast to further evaluate his ileus. Pending CT scan results patient will be tentatively scheduled for tracheostomy either later today or tomorrow morning.

## 2020-10-26 NOTE — PROGRESS NOTES
Comprehensive Nutrition Assessment    Type and Reason for Visit: Reassess(goal)    Nutrition Recommendations/Plan:   Initiate TF via PEG of Osmolite 1.2 at 20mL/hr  Increase by 10mL q4hr to goal 60ml/hr, continuous  Flush with 125ml water q4h  Goal feeds provide 1728kcals, 80g protein, and 1931ml fluid (97%kcal, 100%pro, >100% fluid)     Propofol providing additional 178kcal/day (meeting >100% est needs with TF)    Continue Standard TPN at 42mL/hr, continuous  TPN provides 887kcal, 50g protein (51%kcal, 63%protein needs)     D/c TPN as TF rate achieves >/=30mL/hr    Document TF rate, water flushes, and GRVs in EMR    Monitor POC    Nutrition Assessment:  Admitted to ICU 2/2 aspiration event in ED. S/p multiple intubations and extubations thru admit. PEG placement (10/22), TF not immediately initiated d/t some bleeding from PEG site, suture placed and GI okay'd initiation of TF. Code blue 10/23, pt reintubated and sedated. Not currently on pressors. Noted apparently pt did not tolerate TF (Jevity 1.5) per [facility] Surgeon with possible ileus on XR abd, so TF held and TPN initiated yesterday. RD and RN did not note ileus on XR abd. TPN remains ordered, but [facility] surgeon okay'd reinitaition of TF. RD to provide updated TF recs. Noted pt now DNR, and family likely to withdraw care within the next few days per RN. Labs: H/H 10.4/31.5, K 3.1, Na 147. Meds: Propofol (17.5mcg/kg/min), spironolactone, zosyn, insulin, metroprolol, KCl, PPI. AMS pta. Dx dehydration, UTI on admit. Per chart review; pt typically consumes regular texture diet at home. SLP rec'd NPO on admit. S/p cardiac cath. 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 Returned to ICU intubated s/p procedure (10/6), requiring high volume pressors. Pressors requirements reduced and d/c with extubation (10/11).  TPN (10/8-11) ordered as Standard TPN at 42mL/hr with no lipids- provided ~50% of est needs; ordered d/t concern for GI bleed however not substantiated so d/c once TF initiated. Diet advanced to Full/NTL (10/12) per SLP recs. Further advanced to Ground/NTL (10/14), SLP rec'd continue dysphagia diet s/p d/c. On dysphagia diet, RN reported pt with limited PO intakes d/t dislike of food texture. L sided pneumothorax noted on CXR (10/13), pt asymptomatic; s/p needle decompression (10/14) and thoracentesis (10/15) without results. Transferred to med unit (10/15). Rapid response 10/17 and pt reintubated. Chest imaging finding bilat pneumos; bilat chest tubes placed. When intubated second time, pt ordered TF of Osmolite 1.2 at goal 68mL/hr continuous with 75mL free water q4hr- TF provided 1958kcal, 91g protein, 1788mL fluid (adequate to meet EENs on vent). Intermittently held d/t elevated residuals- held overnight 10/18 for residual of 400mL, reinitiated to goal next day with good tolerance. Also receiving Propofol for additional kcal during both intubations. NG d/c with Extubation (10/20). SLP eval'd next day, rec'd Clear HTL with alternate means nutrition. Malnutrition Assessment:  Malnutrition Status: Moderate malnutrition    Context:  Acute illness     Findings of the 6 clinical characteristics of malnutrition:   Energy Intake:  7 - 50% or less of est energy requirements for 5 or more days  Weight Loss:  Unable to assess     Body Fat Loss:  No significant body fat loss,     Muscle Mass Loss:  1 - Mild muscle mass loss, Clavicles (pectoralis & deltoids), Thigh (quadraceps)  Fluid Accumulation:  No significant fluid accumulation,      Estimated Daily Nutrient Needs:  Energy (kcal):  1775kcal(PSU 2003b)  Protein (g):  79g(1.3g/kg)       Fluid (ml/day):  1800mL(30mL/kg)    Nutrition Related Findings:  Pt thin with muscle wasting noted. Last BM 10/24, soft. Pt with continued regular BMs. PEG in place. No edema. Noted wife reports no recent wt loss pta. No documented n/v. Dysphagia noted.       Wounds:    Multiple, Open wounds, Pressure injury       Current Nutrition Therapies:  DIET TUBE FEEDING Jevity 1.5  TPN ADULT - CENTRAL AA 5% D20% W/ CA + ELECTROLYTES  TPN ADULT - CENTRAL AA 5% D20% W/ CA + ELECTROLYTES  Current Tube Feeding (TF) Orders:   · Feeding Route: PEG  · Formula: Jevity 1.5  · Schedule:Continuous    · Regimen: 57mL/hr  · Water Flushes: 150mL q4hr  · Current TF & Flush Orders Provides: 2052kcal, 87g protein, 1940mL fluid  · Goal TF & Flush Orders Provides: Rec'd Osmolite 1.2 at goal 60ml/hr continuous, Flush with 125ml water q4h; Provides 1728kcals, 80g protein, and 1931ml fluid. Current Parenteral Nutrition Orders:  · Type and Formula: Standard TPN (AA5%, D20%)   · Lipids: None  · Duration: Continuous  · Rate/Volume: 43mL/hr  · Current PN Order Provides: 887kcal, 50g protein (~50% est kcal, pro needs)  · Goal PN Orders Provides: Rec'd d/c      Anthropometric Measures:  · Height:  6' (182.9 cm)  · Current Body Wt:  56 kg (123 lb 7.3 oz)(10/17)   · Admission Body Wt:  15 lb 15.4 oz(pt stated)    · Ideal Body Wt:  178 lbs:  69.4 %   · BMI Category:  Underweight (BMI less than 22) age over 72     Wt hx: 56kg (10/17), 70.1kg (10/8), 52.6kg (9/28/2020)  Wt fluctuations noted since admit, likely 2/2 fluid status. Inconsistent nutrition so likely not wt gain.     Nutrition Diagnosis:   · Predicted inadequate energy intake related to swallowing difficulty, increased demand for energy/nutrients, catabolic illness as evidenced by nutrition support-enteral nutrition, nutrition support-parenteral nutrition, swallowing study results      Nutrition Interventions:   Food and/or Nutrient Delivery: Start tube feeding  Nutrition Education and Counseling: No recommendations at this time  Coordination of Nutrition Care: Continued inpatient monitoring    Goals:  Initiate means of nutrition to meet >75% EENs in 7 days (not met)  Wt gain 1lb +/- 0.5lb per week (unable to assess)  Na and lytes wnl (not met)  Improved skin integrity (not met)    Nutrition Monitoring and Evaluation:   Behavioral-Environmental Outcomes:  N/A  Food/Nutrient Intake Outcomes: Enteral nutrition intake/tolerance  Physical Signs/Symptoms Outcomes: Weight, Skin, Chewing or swallowing    Discharge Planning:    Enteral nutrition     Electronically signed by Ebenezer Tamez on 10/26/2020 at 2:53 PM    Contact:

## 2020-10-26 NOTE — PROGRESS NOTES
Patient made comfort care measures only per family. Dr. Татьяна Su spoke with wife over the phone to verify decision. Respiratory therapist aware, orders received in chart.

## 2020-10-26 NOTE — PROGRESS NOTES
Hospitalist Progress Note           Daily Progress Note: 10/26/2020    Chief complaint: Shortness of breath and weakness  Subjective: The patient is seen for follow  up. Patient extubated 10/20/2020 and was on nasal oxygen. PEG tube placed 10/22/2020. Cardiac arrest 10/23/2020 with CPR for about 10 minutes with return of spontaneous circulation. Intubated and sedated on propofol.    Plans for tracheostomy later today or tomorrow    Problem List:  Problem List as of 10/26/2020 Date Reviewed: 9/17/2020          Codes Class Noted - Resolved    Metabolic encephalopathy TGO-30-VZ: G93.41  ICD-9-CM: 348.31  9/26/2020 - Present        UTI (urinary tract infection) ICD-10-CM: N39.0  ICD-9-CM: 599.0  9/26/2020 - Present        Aspiration pneumonitis ([address] ICD-10-CM: J69.0  ICD-9-CM: 507.0  9/26/2020 - Present        Essential hypertension ICD-10-CM: I10  ICD-9-CM: 401.9  9/26/2020 - Present        Acute dehydration ICD-10-CM: E86.0  ICD-9-CM: 276.51  9/26/2020 - Present              Medications reviewed  Current Facility-Administered Medications   Medication Dose Route Frequency    albumin human 5% (BUMINATE) solution 25 g  25 g IntraVENous BID    fat emulsion 20% (LIPOSYN, INTRAlipid) infusion 250 mL  250 mL IntraVENous CONTINUOUS    TPN ADULT - CENTRAL AA 5% D20% W/ CA + ELECTROLYTES   IntraVENous CONTINUOUS    propofol (DIPRIVAN) 10 mg/mL infusion  0-50 mcg/kg/min IntraVENous TITRATE    potassium chloride SR (KLOR-CON 10) tablet 20 mEq  20 mEq Oral BID    0.9% sodium chloride infusion 250 mL  250 mL IntraVENous PRN    oxyCODONE IR (ROXICODONE) tablet 5 mg  5 mg Oral Q4H PRN    enoxaparin (LOVENOX) injection 30 mg  30 mg SubCUTAneous Q24H    0.9% sodium chloride infusion 250 mL  250 mL IntraVENous PRN    metoprolol tartrate (LOPRESSOR) tablet 50 mg  50 mg Oral BID    spironolactone (ALDACTONE) tablet 25 mg  25 mg Oral BID    piperacillin-tazobactam (ZOSYN) 3.375 g in 0.9% sodium chloride (MBP/ADV) 100 mL MBP  3.375 g IntraVENous Q8H    pantoprazole (PROTONIX) granules for oral suspension 40 mg  40 mg Per NG tube ACB    albuterol CONCENTRATE 2.5mg/0.5 mL neb soln  2.5 mg Nebulization Q6H RT    LORazepam (ATIVAN) injection 1 mg  1 mg IntraVENous Q2H PRN    sodium chloride 0.9% (NS) 3ml nebulizer solution  2.5 mL Nebulization PRN    influenza vaccine 2020- (4 yrs+)(PF) (FLUCELVAX QUAD) injection 0.5 mL  0.5 mL IntraMUSCular PRIOR TO DISCHARGE    diphenhydrAMINE (BENADRYL) capsule 50 mg  50 mg Oral BID PRN    phosphorus (K PHOS NEUTRAL) 250 mg tablet 2 Tab  2 Tab Oral BID    acetaminophen (TYLENOL) tablet 650 mg  650 mg Oral Q6H PRN    ondansetron (ZOFRAN) injection 4 mg  4 mg IntraVENous Q8H PRN    guaiFENesin (ROBITUSSIN) 20 mg/mL oral liquid 400 mg  400 mg Oral Q6H PRN       Review of Systems:   Review of systems unable to be obtained because he is intubated    Objective:   Physical Exam:     Visit Vitals  /70 (BP 1 Location: Left arm, BP Patient Position: At rest)   Pulse 81   Temp 97.7 °F (36.5 °C)   Resp 22   Ht 6' (1.829 m)   Wt 66.9 kg (147 lb 7.8 oz)   SpO2 100%   BMI 20.00 kg/m²    O2 Flow Rate (L/min): 2 l/min O2 Device: Ventilator    Temp (24hrs), Av.5 °F (36.9 °C), Min:97.5 °F (36.4 °C), Max:99.8 °F (37.7 °C)    No intake/output data recorded. 10/24 1901 - 10/26 0700  In: 200 [I.V.:200]  Out: 1920 [Urine:840; Drains:320]    General:   intubated and sedated   Lungs:   Clear to auscultation bilaterally with diminished breath sounds bilateral bases. Chest wall:  No tenderness or deformity. Heart:  Regular rate and rhythm, S1, S2 normal, no murmur, click, rub or gallop. Abdomen:   Soft, non-tender. Diminished Bowel sounds. Dressings in place. Extremities: Extremities normal, atraumatic, positive edema bilaterally   Pulses: 2+ and symmetric all extremities.    Skin: Skin color, texture, turgor normal. No rashes or lesions   Neurologic: CNII-XII intact. Data Review:       Recent Days:  Recent Labs     10/26/20  0245 10/25/20  0455 10/24/20  0613 10/23/20  1700   WBC  --  17.1* 15.4* 12.6*   HGB 10.4* 9.4* 10.4* 7.5*   HCT 31.5* 27.6* 30.6* 22.6*   PLT  --  258 254 261     Recent Labs     10/26/20  0245 10/25/20  0455 10/24/20  0613 10/23/20  1830   NA  --  147* 147* 148*   K  --  3.1* 2.6* 3.7   CL  --  111* 110* 111*   CO2  --  29 30 30   GLU  --  70 101* 186*   BUN  --  28* 22* 17   CREA  --  0.95 0.67* 0.63*   CA  --  8.2* 8.5 9.0   MG 2.1 2.0 1.8  --    PHOS  --   --  3.3  --    ALB  --   --  2.8*  --      Recent Labs     10/26/20  0455 10/25/20  0520 10/24/20  0530   PH 7.50* 7.51* 7.52*   PCO2 32* 34* 34*   PO2 110* 74* 59*   HCO3 27* 28* 29*   FIO2 50 50 50       24 Hour Results:  Recent Results (from the past 24 hour(s))   MAGNESIUM    Collection Time: 10/26/20  2:45 AM   Result Value Ref Range    Magnesium 2.1 1.6 - 2.4 mg/dL   HEMOGLOBIN    Collection Time: 10/26/20  2:45 AM   Result Value Ref Range    HGB 10.4 (L) 12.1 - 17.0 g/dL   HEMATOCRIT    Collection Time: 10/26/20  2:45 AM   Result Value Ref Range    HCT 31.5 (L) 36.6 - 50.3 %   BLOOD GAS, ARTERIAL    Collection Time: 10/26/20  4:55 AM   Result Value Ref Range    pH 7.50 (H) 7.35 - 7.45      PCO2 32 (L) 35 - 45 mmHg    PO2 110 (H) 75 - 100 mmHg    O2 SAT 99 >95 %    BICARBONATE 27 (H) 22 - 26 mmol/L    BASE EXCESS 2.6 (H) 0 - 2 mmol/L    O2 METHOD VENT      FIO2 50 %    MODE Assist Control/Volume Control      SET RATE 16      SITE Right Brachial      ENIO'S TEST Positive      Total rate 16             Assessment/     Acute hypoxic respiratory failure postsurgery. Self extubated 10/09/20. Reintubated 10/10 due to hypoxemia. Extubated 10/11. Reintubated 10/17 for bilateral pneumothoraces. Extubated 10/20.  Reintubated 10/23 following cardiac arrest    Status post cardiac arrest    Bilateral pneumothoraces, status post chest tubes bilateral    Acute kidney injury likely secondary to ATN from hypotension. Hypovolemic shock post surgery. Improved    Right lower lobe aspiration pneumonia improved    Urinary tract infection due to E. Coli    Abnormal Cardiolite stress test showing inferior ischemia. 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 Repleted    Hypothermia, probably largely environmental.  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 Colonic ileus    Plan:  Continue supportive care including antibiotics   Tracheostomy planned for later today  We will continue TPN due to ileus  Overall prognosis poor  Spent over 20 minutes with patient's wife and son in the ICU conference room going over entire clinical and goals of care. Option of home hospice discussed. Family to decide on whether to pursue tracheostomy versus home hospice      At 625pm,i spoke with Mrs Ariana Howard regarding withdrawal of care and comfort care measures, This was witnessed by GANGA Babcock. He will be extubated to 4L nasal canula, Morphine and ativan placed for comfort measures. Consider hospice eval in am    Care Plan discussed with: Patient/Family       Total time spent with patient: 50 minutes.     Judie Shay MD

## 2020-10-26 NOTE — PROGRESS NOTES
Pulmonology and Critical Care Progress Note    Subjective:     Chief Complaint:   Chief Complaint   Patient presents with    Altered mental status     The patient underwent aortobifemoral bypass surgery 10/6/20. Post surgery he was left on the ventilator. Apparently blood loss was about 3.5 L. When he returned to the ICU he was on multiple pressors. Successfully extubated after suctioning of mucous plugs from the left. Unfortunately, he developed spontaneous left pneumothorax. Thoracic surgery did aspiration of his left pneumothorax. No chest tube insertion was required. Patient developed increasing respiratory distress secondary to a tension right-sided PTX. Patient ended up requiring intubation and Dr. Richar Adamson came in to evaluate the patient and found him to have bilateral pneumothoraces and placed bilateral chest tubes. He had cardiac arrest on 10/23 and had to be reintubated. He has 2 chest tubes. One on each side about of them are leaking.     Sedated with propofol     Currently on assist control rate of 16, blood volume 400, FiO2 50%, PEEP of 5  ABGs and chest x-ray reviewed    Review of Systems:  Unable to perform due to patient's condition    Current Facility-Administered Medications   Medication Dose Route Frequency Provider Last Rate Last Dose    albumin human 5% (BUMINATE) solution 25 g  25 g IntraVENous BID Susi Olson MD   25 g at 10/26/20 0931    TPN ADULT - CENTRAL AA 5% D20% W/ CA + ELECTROLYTES   IntraVENous CONTINUOUS Susi Olson MD        propofol (DIPRIVAN) 10 mg/mL infusion  0-50 mcg/kg/min IntraVENous TITRATE Trang Dye MD 6.8 mL/hr at 10/25/20 1747 17.5 mcg/kg/min at 10/25/20 1747    potassium chloride SR (KLOR-CON 10) tablet 20 mEq  20 mEq Oral BID Cassie Magallon MD   Stopped at 10/26/20 0900    0.9% sodium chloride infusion 250 mL  250 mL IntraVENous PRN Susi Olson MD        oxyCODONE IR (ROXICODONE) tablet 5 mg  5 mg Oral Q4H PRN Lee Waller MD   5 mg at 10/24/20 1531    enoxaparin (LOVENOX) injection 30 mg  30 mg SubCUTAneous Q24H Eduarda Salazar MD   30 mg at 10/25/20 2107    0.9% sodium chloride infusion 250 mL  250 mL IntraVENous PRN Eduarda Salazar MD 15 mL/hr at 10/24/20 1056 250 mL at 10/24/20 1056    metoprolol tartrate (LOPRESSOR) tablet 50 mg  50 mg Oral BID Kye Turpin DO   50 mg at 10/26/20 7732    spironolactone (ALDACTONE) tablet 25 mg  25 mg Oral BID Maritza Coker MD   25 mg at 10/26/20 0929    piperacillin-tazobactam (ZOSYN) 3.375 g in 0.9% sodium chloride (MBP/ADV) 100 mL MBP  3.375 g IntraVENous Q8H Bernie Velasco MD 25 mL/hr at 10/26/20 0359 3.375 g at 10/26/20 0359    pantoprazole (PROTONIX) granules for oral suspension 40 mg  40 mg Per NG tube ACB Bernie Velasco MD   40 mg at 10/26/20 0928    albuterol CONCENTRATE 2.5mg/0.5 mL neb soln  2.5 mg Nebulization Q6H RT Johnny Santiago DO   2.5 mg at 10/26/20 0842    LORazepam (ATIVAN) injection 1 mg  1 mg IntraVENous Q2H PRN Crao Olson MD   1 mg at 10/23/20 1549    sodium chloride 0.9% (NS) 3ml nebulizer solution  2.5 mL Nebulization PRN Linda Cottrell MD   3 mL at 10/26/20 0843    influenza vaccine 2020-21 (4 yrs+)(PF) (FLUCELVAX QUAD) injection 0.5 mL  0.5 mL IntraMUSCular PRIOR TO DISCHARGE Bernie Velasco MD   Stopped at 10/07/20 0900    diphenhydrAMINE (BENADRYL) capsule 50 mg  50 mg Oral BID PRN Prince Edgar NP   50 mg at 10/21/20 2345    phosphorus (K PHOS NEUTRAL) 250 mg tablet 2 Tab  2 Tab Oral BID Bernie Velasco MD   2 Tab at 10/26/20 0932    acetaminophen (TYLENOL) tablet 650 mg  650 mg Oral Q6H PRN Gisele Long NP   650 mg at 10/20/20 0432    ondansetron (ZOFRAN) injection 4 mg  4 mg IntraVENous Q8H PRN Lisandro Long NP   4 mg at 09/30/20 0402    guaiFENesin (ROBITUSSIN) 20 mg/mL oral liquid 400 mg  400 mg Oral Q6H PRN Isaias Marquis NP   Stopped at 09/26/20 1855            No Known Allergies        Objective:     Blood pressure 128/66, pulse 84, temperature 97.7 °F (36.5 °C), resp. rate 24, height 6' (1.829 m), weight 66.9 kg (147 lb 7.8 oz), SpO2 100 %. Temp (24hrs), Av.5 °F (36.9 °C), Min:97.5 °F (36.4 °C), Max:99.8 °F (37.7 °C)      Intake and Output:  Current Shift: No intake/output data recorded. Last 3 Shifts: 10/24 1901 - 10/26 0700  In: 200 [I.V.:200]  Out: 1940 [Urine:840; Drains:340]    Physical Exam:     General:  Elderly white male who is sedated on the ventilator. Throat and Neck: Supple. No JVD. He has a right subclavian central line. Lung:  Equal aeration bilaterally, minimal rhonchi in the left base. Bilateral chest tubes present to waterseal.  Air leakage is noted. No change. Heart: S1+S2. No murmurs  Abdomen: Status post surgery. He has a midline incision. He has one HELGA drains in place; draining minimal fluid. .  Bowel sounds are hypoactive. PEG tube to low intermittent suction. Extremities:  His edema is improved. Distal pulses of the lower extremities are noted with Dopplers. Has skin breakdown on the dependent portion of heel. : Reese catheter in place. Making some urine output.   Skin: No cyanosis  Neurologic:  A+Ox3, non-focal exam        Recent Results (from the past 24 hour(s))   MAGNESIUM    Collection Time: 10/26/20  2:45 AM   Result Value Ref Range    Magnesium 2.1 1.6 - 2.4 mg/dL   HEMOGLOBIN    Collection Time: 10/26/20  2:45 AM   Result Value Ref Range    HGB 10.4 (L) 12.1 - 17.0 g/dL   HEMATOCRIT    Collection Time: 10/26/20  2:45 AM   Result Value Ref Range    HCT 31.5 (L) 36.6 - 50.3 %   BLOOD GAS, ARTERIAL    Collection Time: 10/26/20  4:55 AM   Result Value Ref Range    pH 7.50 (H) 7.35 - 7.45      PCO2 32 (L) 35 - 45 mmHg    PO2 110 (H) 75 - 100 mmHg    O2 SAT 99 >95 %    BICARBONATE 27 (H) 22 - 26 mmol/L    BASE EXCESS 2.6 (H) 0 - 2 mmol/L    O2 METHOD VENT      FIO2 50 %    MODE Assist Control/Volume Control      SET RATE 16      SITE Right Brachial      ENIO'S TEST Positive      Total rate 16 CT Results  (Last 48 hours)    None          Assessment:     1. Acute respiratory failure, after aortobifemoral bypass surgery on 10/6/2020. Patient self extubated himself early in the morning on 10/9/2020. Re-intubated on 10/10/20 for left-sided mucous plugging and complete left lung collapse and had flexible bronchoscopy done with suction of mucous plug. 2.  Acute metabolic encephalopathy, resolved  3. Aspiration pneumonia  4. Severe peripheral vascular disease   5. UTI   6. Hypertension  7. Familial polymyositis  8. Left spontaneous pneumothorax    Plan:     1.)  Acute Hypoxic respiratory failure. Extubated 10/20/20, was doing well. However had cardiac arrest 10/23 and was reintubated. Current vent settings show tidal volume of 400 O2 50% assist control of 20 and PEEP of 5. Blood gases show 7.51/34/74. Will decrease vent rate to 16. However he is breathing over the ventilator. Chest x-ray shows no significant change in bilateral pneumothoraces. He is sedated with propofol currently at 15 mics. S/p PEG tube on 10/22/20. Was cleared by speech therapy for honey-thickened liquids. Currently PEG tube is to low intermittent suction. He has apparently developed ileus. For abdominal ultrasound in the morning. Will need nutrition on board to start tube feeds once cleared to use the PEG. For now he is to be started on TPN. Apparently this is his third reintubation. ABGs and chest x-ray reviewed  Rate decreased to 10 and FiO2 decreased to 40%    Long discussion with the wife regarding tracheostomy  She is unsure and will make up her mind and let us know later on today   I have recommended that if we are to proceed without tracheostomy for possible extubation but should only be done if we have made a decision not to reintubate him should he decline    2.)  Bilateral pneumothoraces  Bilateral chest tubes placed, no airleak on waterseal.  Dr. Jovana Morales following closely.   Plan is to keep chest tubes in.    3.)  Aspiration/HAP pneumonia:  Continue Zosyn  Completed a long course of steroids  On scheduled nebulized bronchodilator treatments. 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 PT/OT can be more aggressive about seeing him once his chest tubes have been removed. 4.)  Metabolic encephalopathy. He has a progressive neurologic disorder. Brain MRI negative    Appreciate assistance from neurology. Back to baseline now after extubation. 5.)  Myocardial ischemia:  He underwent nuclear stress testing which showed significant inferior and from will treat him with medical managememt   I will follow the patient closely with you. DVT and GI prophylaxis. He is on Lovenox and Protonix IV. Questions of patient's wife were answered at bedside in detail  Case discussed in detail with RN, RT, and care team  Thank you for involving me in the care of this patient  I will follow with you closely during hospitalization    Time spent more than 55 minutes under patient care with no overlap reviewing results and records, decision making, and answering questions.     Марина Amaral MD  [facility]  10/26/2020

## 2020-10-26 NOTE — PROGRESS NOTES
He continues on the vent, today has tiny bilateral air leaks with every third breath or so. The plan at this point in terms of the chest tubes is that they need to stay in until he has not leaked air for a week on the vent or several days after he has not leaked air off the vent. It would seem that positive pressure vent has caused him to start leaking again. I will see each day, but I have nothing else to offer other than the above advice. Awaiting trach.

## 2020-10-26 NOTE — MED STUDENT NOTES
Medical Student Progress Note Daily Progress Note: 10/26/2020 Subjective: The patient is seen for follow  up. Pt is a 51yo gentlement with history of inclusion body myositis and has underwent intubation and extubation periodically, peg tube placement, and cardiac arrest 3 days ago 10/23/20. Pt is currently sedated and wife is at the bedside with their son. Problem List: 
Problem List as of 10/26/2020 Date Reviewed: 9/17/2020 Codes Class Noted - Resolved Metabolic encephalopathy IOS-11-RK: G93.41 
ICD-9-CM: 348.31  9/26/2020 - Present UTI (urinary tract infection) ICD-10-CM: N39.0 ICD-9-CM: 599.0  9/26/2020 - Present Aspiration pneumonitis (HCC) ICD-10-CM: J69.0 ICD-9-CM: 507.0  9/26/2020 - Present Essential hypertension ICD-10-CM: I10 
ICD-9-CM: 401.9  9/26/2020 - Present Acute dehydration ICD-10-CM: E86.0 ICD-9-CM: 276.51  9/26/2020 - Present Medications reviewed Current Facility-Administered Medications Medication Dose Route Frequency  albumin human 5% (BUMINATE) solution 25 g  25 g IntraVENous BID  TPN ADULT - CENTRAL AA 5% D20% W/ CA + ELECTROLYTES   IntraVENous CONTINUOUS  propofol (DIPRIVAN) 10 mg/mL infusion  0-50 mcg/kg/min IntraVENous TITRATE  potassium chloride SR (KLOR-CON 10) tablet 20 mEq  20 mEq Oral BID  
 0.9% sodium chloride infusion 250 mL  250 mL IntraVENous PRN  
 oxyCODONE IR (ROXICODONE) tablet 5 mg  5 mg Oral Q4H PRN  
 enoxaparin (LOVENOX) injection 30 mg  30 mg SubCUTAneous Q24H  
 0.9% sodium chloride infusion 250 mL  250 mL IntraVENous PRN  
 metoprolol tartrate (LOPRESSOR) tablet 50 mg  50 mg Oral BID  spironolactone (ALDACTONE) tablet 25 mg  25 mg Oral BID  piperacillin-tazobactam (ZOSYN) 3.375 g in 0.9% sodium chloride (MBP/ADV) 100 mL MBP  3.375 g IntraVENous Q8H  
 pantoprazole (PROTONIX) granules for oral suspension 40 mg  40 mg Per NG tube ACB  albuterol CONCENTRATE 2.5mg/0.5 mL neb soln  2.5 mg Nebulization Q6H RT  
 LORazepam (ATIVAN) injection 1 mg  1 mg IntraVENous Q2H PRN  
 sodium chloride 0.9% (NS) 3ml nebulizer solution  2.5 mL Nebulization PRN  
 influenza vaccine 2020- (4 yrs+)(PF) (FLUCELVAX QUAD) injection 0.5 mL  0.5 mL IntraMUSCular PRIOR TO DISCHARGE  diphenhydrAMINE (BENADRYL) capsule 50 mg  50 mg Oral BID PRN  phosphorus (K PHOS NEUTRAL) 250 mg tablet 2 Tab  2 Tab Oral BID  acetaminophen (TYLENOL) tablet 650 mg  650 mg Oral Q6H PRN  
 ondansetron (ZOFRAN) injection 4 mg  4 mg IntraVENous Q8H PRN  
 guaiFENesin (ROBITUSSIN) 20 mg/mL oral liquid 400 mg  400 mg Oral Q6H PRN Review of Systems:  
Not required Objective:  
Physical Exam:  
 
Visit Vitals /66 (BP 1 Location: Left arm, BP Patient Position: At rest) Pulse 84 Temp 97.7 °F (36.5 °C) Resp 24 Ht 6' (1.829 m) Wt 147 lb 7.8 oz (66.9 kg) SpO2 100% BMI 20.00 kg/m² O2 Flow Rate (L/min): 2 l/min O2 Device: Ventilator Temp (24hrs), Av.5 °F (36.9 °C), Min:97.5 °F (36.4 °C), Max:99.8 °F (37.7 °C) No intake/output data recorded. 10/24 1901 - 10/26 0700 In: 200 [I.V.:200] Out: 1940 [Urine:840; [facility] General:  Alert, cooperative, no distress, appears stated age. Lungs:   Clear to auscultation bilaterally. Chest wall:  No tenderness or deformity. Heart:  Regular rate and rhythm, S1, S2 normal, no murmur, click, rub or gallop. Abdomen:   Soft, non-tender. Bowel sounds normal. No masses,  No organomegaly. Extremities: Extremities normal, atraumatic, no cyanosis or edema. Pulses: 2+ and symmetric all extremities. Skin: Skin color, texture, turgor normal. No rashes or lesions Neurologic: CNII-XII intact. No gross sensory or motor deficits Data Review:  
   
Recent Days: 
Recent Labs 10/26/20 
0245 10/25/20 
0455 10/24/20 
5473 10/23/20 
1700 WBC  --  17.1* 15.4* 12.6*  
 HGB 10.4* 9.4* 10.4* 7.5* HCT 31.5* 27.6* 30.6* 22.6* PLT  --  258 254 261 Recent Labs 10/26/20 
0245 10/25/20 
0455 10/24/20 
0511 10/23/20 
1830 NA  --  147* 147* 148* K  --  3.1* 2.6* 3.7 CL  --  111* 110* 111* CO2  --  29 30 30 GLU  --  70 101* 186* BUN  --  28* 22* 17  
CREA  --  0.95 0.67* 0.63* CA  --  8.2* 8.5 9.0 MG 2.1 2.0 1.8  --   
PHOS  --   --  3.3  --   
ALB  --   --  2.8*  --   
 
Recent Labs 10/26/20 
0455 10/25/20 
0520 10/24/20 
0530 PH 7.50* 7.51* 7.52* PCO2 32* 34* 34* PO2 110* 74* 59* HCO3 27* 28* 29* FIO2 50 50 50  
 
 
24 Hour Results: 
Recent Results (from the past 24 hour(s)) MAGNESIUM Collection Time: 10/26/20  2:45 AM  
Result Value Ref Range Magnesium 2.1 1.6 - 2.4 mg/dL HEMOGLOBIN Collection Time: 10/26/20  2:45 AM  
Result Value Ref Range HGB 10.4 (L) 12.1 - 17.0 g/dL HEMATOCRIT Collection Time: 10/26/20  2:45 AM  
Result Value Ref Range HCT 31.5 (L) 36.6 - 50.3 % BLOOD GAS, ARTERIAL Collection Time: 10/26/20  4:55 AM  
Result Value Ref Range pH 7.50 (H) 7.35 - 7.45    
 PCO2 32 (L) 35 - 45 mmHg PO2 110 (H) 75 - 100 mmHg O2 SAT 99 >95 % BICARBONATE 27 (H) 22 - 26 mmol/L  
 BASE EXCESS 2.6 (H) 0 - 2 mmol/L  
 O2 METHOD VENT    
 FIO2 50 % MODE Assist Control/Volume Control SET RATE 16 SITE Right Brachial    
 ENIO'S TEST Positive Total rate 16 Assessment/  
Acute hypoxic respiratory failure postsurgery. Self extubated 10/09/20. Reintubated 10/10 due to hypoxemia. Extubated 10/11. Reintubated 10/17 for bilateral pneumothoraces. Extubated 10/20. Reintubated 10/23 following cardiac arrest 
 - tracheostomy scheduled today (10/26) Status post cardiac arrest 
 - enoxaparin, metoprolol, spironolactone,  
Bilateral pneumothoraces, status post chest tubes bilateral 
  
Acute kidney injury likely secondary to ATN from hypotension.   
  
Hypovolemic shock post surgery. Improved  - IV fluid resuscitation 
- human albumin 5% - pantoprazole Right lower lobe aspiration pneumonia improved 
  
Urinary tract infection due to E. Coli 
 - zosyn Abnormal Cardiolite stress test showing inferior ischemia. Normal coronaries by cath 
  
Metabolic encephalopathy, improved 
  Dysphagia due to inclusion body myositis s/p PEG 
  
Hypokalemia. Repleted 
  
Hypothermia, probably largely environmental.  Improved 
  
Mid abdominal aortic occlusion status post infrarenal aortic endarterectomy with aortobifemoral bypass on 10/6 
  
High-grade right renal artery stenosis 
  
Severe dehydration due to poor oral intake, improved 
 - IV fluid Benign essential hypertension 
  
History of inclusion body myositis 
  
Generalized debilitation from medical illness 
  
Moderate protein calorie malnutrition 
 - TPN Metabolic acidosis Colonic ileus Plan: 
 
Trecheotomy today 10/26/20 Care Plan discussed with: Patient/Family Total time spent with patient: 30 minutes. Thaddeus Paci 
 
 
*ATTENTION:  This note has been created by a medical student for educational purposes only. Please do not refer to the content of this note for clinical decision-making, billing, or other purposes. Please see attending physicians note to obtain clinical information on this patient. *

## 2020-10-27 VITALS
HEART RATE: 107 BPM | BODY MASS INDEX: 19.98 KG/M2 | TEMPERATURE: 97.7 F | HEIGHT: 72 IN | WEIGHT: 147.49 LBS | RESPIRATION RATE: 26 BRPM | OXYGEN SATURATION: 95 % | SYSTOLIC BLOOD PRESSURE: 153 MMHG | DIASTOLIC BLOOD PRESSURE: 86 MMHG

## 2020-10-27 PROBLEM — J93.9 BILATERAL PNEUMOTHORAX: Status: ACTIVE | Noted: 2020-10-27

## 2020-10-27 PROBLEM — I46.9 CARDIOPULMONARY ARREST (HCC): Status: ACTIVE | Noted: 2020-10-27

## 2020-10-27 PROBLEM — T81.19XA POSTOPERATIVE HYPOVOLEMIC SHOCK: Status: ACTIVE | Noted: 2020-10-27

## 2020-10-27 PROBLEM — G72.41 INCLUSION BODY MYOSITIS: Chronic | Status: ACTIVE | Noted: 2020-10-27

## 2020-10-27 PROBLEM — N17.9 AKI (ACUTE KIDNEY INJURY) (HCC): Status: ACTIVE | Noted: 2020-10-27

## 2020-10-27 PROBLEM — I70.1 RENAL ARTERY STENOSIS (HCC): Status: ACTIVE | Noted: 2020-10-27

## 2020-10-27 PROBLEM — R13.10 DYSPHAGIA: Status: ACTIVE | Noted: 2020-10-27

## 2020-10-27 PROBLEM — I70.0 AORTIC OCCLUSION (HCC): Status: ACTIVE | Noted: 2020-10-27

## 2020-10-27 PROBLEM — E87.6 HYPOKALEMIA: Status: ACTIVE | Noted: 2020-10-27

## 2020-10-27 PROCEDURE — 74011250636 HC RX REV CODE- 250/636: Performed by: INTERNAL MEDICINE

## 2020-10-27 PROCEDURE — 99231 SBSQ HOSP IP/OBS SF/LOW 25: CPT | Performed by: THORACIC SURGERY (CARDIOTHORACIC VASCULAR SURGERY)

## 2020-10-27 RX ORDER — SCOLOPAMINE TRANSDERMAL SYSTEM 1 MG/1
1 PATCH, EXTENDED RELEASE TRANSDERMAL
Qty: 3 PATCH | Refills: 0 | Status: SHIPPED | OUTPATIENT
Start: 2020-10-27

## 2020-10-27 RX ORDER — LORAZEPAM 2 MG/ML
1 CONCENTRATE ORAL
Qty: 10 ML | Refills: 0 | Status: SHIPPED | OUTPATIENT
Start: 2020-10-27

## 2020-10-27 RX ORDER — MORPHINE SULFATE ORAL SOLUTION 10 MG/5ML
5 SOLUTION ORAL
Qty: 60 ML | Refills: 0 | Status: SHIPPED | OUTPATIENT
Start: 2020-10-27 | End: 2020-10-30

## 2020-10-27 RX ADMIN — MORPHINE SULFATE 2 MG: 2 INJECTION, SOLUTION INTRAMUSCULAR; INTRAVENOUS at 04:05

## 2020-10-27 RX ADMIN — MORPHINE SULFATE 2 MG: 2 INJECTION, SOLUTION INTRAMUSCULAR; INTRAVENOUS at 00:12

## 2020-10-27 RX ADMIN — LORAZEPAM 1 MG: 2 INJECTION, SOLUTION INTRAMUSCULAR; INTRAVENOUS at 11:07

## 2020-10-27 RX ADMIN — MORPHINE SULFATE 2 MG: 2 INJECTION, SOLUTION INTRAMUSCULAR; INTRAVENOUS at 11:07

## 2020-10-27 NOTE — PROGRESS NOTES
TRANSFER - OUT REPORT:    Verbal report given to Josiane Hensley  on [facility]  being transferred to [address] [place] [address] for routine progression of care       Report consisted of patients Situation, Background, Assessment and   Recommendations(SBAR). Information from the following report(s) SBAR, Intake/Output and Cardiac Rhythm . was reviewed with the receiving nurse. Lines:   Triple Lumen placed by surgeon. 10/08/20 Right Subclavian (Active)   Central Line Being Utilized Yes 10/26/20 0700   Criteria for Appropriate Use Limited/no vessel suitable for conventional peripheral access 10/26/20 0700   Site Assessment Clean, dry, & intact 10/26/20 0700   Infiltration Assessment 0 10/26/20 0633   Affected Extremity/Extremities Pulses palpable 10/26/20 0042   Date of Last Dressing Change 10/22/20 10/26/20 0042   Dressing Status Clean, dry, & intact 10/26/20 0700   Dressing Type Stabilization/securement device;Transparent 10/26/20 0700   Action Taken Open ports on tubing capped;Blood drawn 10/26/20 0633   Proximal Hub Color/Line Status Patent 10/26/20 0700   Positive Blood Return (Medial Site) Yes 10/26/20 0700   Medial Hub Color/Line Status Patent 10/26/20 0700   Positive Blood Return (Lateral Site) Yes 10/26/20 0700   Distal Hub Color/Line Status Patent 10/26/20 0700   Positive Blood Return (Site #3) Yes 10/26/20 0700   Alcohol Cap Used Yes 10/26/20 4407        Opportunity for questions and clarification was provided.       Patient transported with:   O2 @ 6 liters  Registered Nurse

## 2020-10-27 NOTE — DISCHARGE INSTRUCTIONS
Mini 500 pleurovacs should be drained when they are full. Follow up appointment with Mark Smith in the office in 2 weeks. For drain removal if appropriate.

## 2020-10-27 NOTE — PROGRESS NOTES
The purpose of the visit was is respond to a staff request for family care. The patient was extubated and was being visit by his wife. The patient's wife expressed at peace with where  state of being. She shared that she had cried earlier and she had struggled with the tough of her  dying. She shared that her  has a relationship with God and she trusts God to see her through. The patient's wife has accepted whatever God has for him is God's will. The  facilitated storytelling, listened empathically and reflectively. [address] [facility] [address].    can be reached by calling the  at [facility]  [phone]

## 2020-10-27 NOTE — DISCHARGE SUMMARY
Physician Discharge Summary     Patient ID:    Ruben Estrella  [id number]  48 y.o.  1965    Admit date: 9/26/2020    Discharge date : 10/27/2020    Chronic Diagnoses:    Problem List as of 10/27/2020 Date Reviewed: 10/27/2020          Codes Class Noted - Resolved    Bilateral pneumothorax ICD-10-CM: J93.9  ICD-9-CM: 512.89  10/27/2020 - Present        Cardiopulmonary arrest ([facility] ICD-10-CM: I46.9  ICD-9-CM: 427.5  10/27/2020 - Present        BALDEV (acute kidney injury) ([facility] ICD-10-CM: N17.9  ICD-9-CM: 584.9  10/27/2020 - Present        Postoperative hypovolemic shock ICD-10-CM: T81.19XA  ICD-9-CM: 998.09  10/27/2020 - Present        Hypokalemia ICD-10-CM: E87.6  ICD-9-CM: 276.8  10/27/2020 - Present        Renal artery stenosis ([facility] ICD-10-CM: I70.1  ICD-9-CM: 440.1  10/27/2020 - Present        Dysphagia ICD-10-CM: R13.10  ICD-9-CM: 787.20  10/27/2020 - Present        Aortic occlusion ([facility] ICD-10-CM: I70.0  ICD-9-CM: 747.22  10/27/2020 - Present        Inclusion body myositis (Chronic) ICD-10-CM: G72.41  ICD-9-CM: 359.71  10/27/2020 - Present        Metabolic encephalopathy H-60-SN: G93.41  ICD-9-CM: 348.31  9/26/2020 - Present        UTI (urinary tract infection) ICD-10-CM: N39.0  ICD-9-CM: 599.0  9/26/2020 - Present        Aspiration pneumonitis (HCC) ICD-10-CM: J69.0  ICD-9-CM: 507.0  9/26/2020 - Present        Essential hypertension ICD-10-CM: I10  ICD-9-CM: 401.9  9/26/2020 - Present        Acute dehydration ICD-10-CM: E86.0  ICD-9-CM: 276.51  9/26/2020 - Present          22    Final Diagnoses:   Status post cardiac arrest     Bilateral pneumothoraces, status post chest tubes bilateral     Acute kidney injury likely secondary to ATN from hypotension.       Hypovolemic shock post surgery. Improved     Right lower lobe aspiration pneumonia improved     Urinary tract infection due to E. Coli     Abnormal Cardiolite stress test showing inferior ischemia.   Normal coronaries by cath     Metabolic encephalopathy, improved     Dysphagia due to inclusion body myositis s/p PEG     Hypokalemia. Repleted     Hypothermia, probably largely environmental.  Improved     Mid abdominal aortic occlusion status post infrarenal aortic endarterectomy with aortobifemoral bypass on 10/6     High-grade right renal artery stenosis     Severe dehydration due to poor oral intake, improved     Benign essential hypertension     History of inclusion body myositis     Generalized debilitation from medical illness     Moderate protein calorie malnutrition     Metabolic acidosis. Colonic ileus      Reason for Hospitalization:  Patient is a 52-year-old male with a history of inclusion body myositis who was admitted on 9/26 after presenting with generalized weakness for several weeks as well as poor oral intake. More acutely, he developed altered mental status and severe lethargy. He was felt to have aspirated while in the ED and was started on BiPAP. He was noted to have hypoxia and hypercapnia. Urinalysis suggested a UTI     His wife had actually noted a progressive decline since July in terms of his weakness. Hospital Course:   He was admitted to the medical floor. CTA was obtained which showed a right lower lobe pneumonia.     Culture grew E. coli which was fairly pan susceptible and was treated with appropriate antibiotic therapy     With IV fluids and antibiotics his mentation steadily improved and ultimately returned to baseline     Patient remained very weak, especially in the lower extremities     MRI of the brain was obtained which was negative     Initial CTA of the chest also showed the incidental finding of decreased attenuation of the right kidney which was concerning for medical renal disease including ischemia. For that reason, a CTA of the abdomen pelvis was done which demonstrated a mid abdominal aortic occlusion with collateralization to the legs.   There was high-grade stenosis of the right renal artery at its origin.     Vascular surgery was consulted. Although this was felt to be a chronic process in light of the collateralization, his leg weakness was felt likely largely due to arterial insufficiency to the legs. Revascularization was recommended     Cardiology was consulted for preoperative evaluation. His echocardiogram was unremarkable     Nuclear medicine stress test was obtained which demonstrated areas of ischemia in the inferior wall. Cardiac catheterization was then recommended and demonstrated no obstructive coronary artery disease     Patient underwent infrarenal aortic endarterectomy and aortobifemoral bypass on October 8. He was profoundly hypotensive immediately post procedure and required 3 vasopressors at 1 point.     Patient developed atelectasis of the left lung postoperatively and underwent bronchoscopy on 10/10 with reexpansion of the lung.     He was extubated on 10/11 and quickly weaned to room air     His renal function remained stable throughout. He did have some issues with hypokalemia     On 10/13 a routine chest x-ray demonstrated a 30 to 40% pneumothorax on the left. This was felt to be spontaneous as patient had no lines or procedures on that side. Thoracic surgery was consulted and this was initially just monitored. Patient was completely asymptomatic and with normal oxygenation     On 10/14 Dr. Jovana Morales performed aspiration of the pneumothorax. There was no evidence for any air leak. Pneumothorax did seem to improve somewhat. Patient does appear to have some degree of fibrotic lung which may explain the poor reexpansion     Discharge was arranged for 10/15. Rehab recommended prior but family declined. Decision made by family to go to rehab and cm assisted with arrangements. Unfortunately subsequent course deteriorated. Developed bilateral pneumothorax, bilat chest tubes placed. Ultimately CP arrest, reintubated, resucitated after ~ 10 minutes.  ICU care continued. Reintubation his 3rd. Decision for trach placement though family decided for terminal extubation and comfort care only on 10/26 with expectations of returning home with hospice. Am post intubation was more alert until morphine. Family at bedside including wife. Revisited perhaps keeping in hopsital and hydrating though prognosis not good. Wife reiterated decision to return home with hospice and to cease all therapeutic measures and continuing with comfort measures only. CV surgery will replace chest tubes with mini 500 pleurovacs. D/w case management and arrangements are made for dc to home with hospice today.           Discharge Medications:   Current Discharge Medication List      START taking these medications    Details   scopolamine (TRANSDERM-SCOP) 1 mg over 3 days pt3d 1 Patch by TransDERmal route every seventy-two (72) hours as needed for Other (SECRETIONS). Qty: 3 Patch, Refills: 0      morphine, 10 mg/5 mL, 10 mg/5 mL oral solution Take 2.5 mL by mouth every two (2) hours as needed for Pain for up to 3 days. Max Daily Amount: 60 mg.  Qty: 60 mL, Refills: 0    Associated Diagnoses: Palliative care status      LORazepam (INTENSOL) 2 mg/mL concentrated solution Take 0.5 mL by mouth every four (4) hours as needed for Agitation, Anxiety, Restlessness or Shortness of Breath. Max Daily Amount: 6 mg. Qty: 10 mL, Refills: 0    Associated Diagnoses: Palliative care status         CONTINUE these medications which have NOT CHANGED    Details   food supplemt, lactose-reduced (Ensure High Protein) liqd Take 237 mL by mouth two (2) times a day for 60 days. Qty: 23599 mL, Refills: 1    Comments: Please send PA request is necessary. Thank you  Associated Diagnoses: Weight loss, abnormal; Atrophy of muscle of multiple sites;  Poor appetite         STOP taking these medications       baclofen (LIORESAL) 10 mg tablet Comments:   Reason for Stopping:         oxyCODONE IR (OXY-IR) 15 mg immediate release tablet Comments:   Reason for Stopping:         aspirin delayed-release 81 mg tablet Comments:   Reason for Stopping:         mirtazapine (REMERON) 15 mg tablet Comments:   Reason for Stopping:         pravastatin (PRAVACHOL) 20 mg tablet Comments:   Reason for Stopping:         lisinopriL (PRINIVIL, ZESTRIL) 5 mg tablet Comments:   Reason for Stopping: Follow up Care:    1. Follow with hospice at home per stated comfort care measures only. 2. Plearal vac care    Disposition:  Home with hospice/Comfort care measures only    Advanced Directive:   FULL    DNR X     Discharge Exam:  See today's note. CONSULTATIONS: Pulmonary/Intensive care, GI, Nephrology, Vascular Surgery and Cardio thoRACIC SURGERY    Significant Diagnostic Studies:   9/26/2020: BUN 10 mg/dL (Ref range: 6 - 20 mg/dL); Calcium 7.7 mg/dL* (Ref range: 8.5 - 10.1 mg/dL); CO2 17 mmol/L* (Ref range: 21 - 32 mmol/L); Creatinine 0.61 mg/dL* (Ref range: 0.70 - 1.30 mg/dL); Glucose 87 mg/dL (Ref range: 65 - 100 mg/dL); HCT 31.6 %* (Ref range: 36.6 - 50.3 %); HGB 10.8 %* (Ref range: 12.1 - 17.0 %); Potassium 3.8 mmol/L (Ref range: 3.5 - 5.1 mmol/L); Sodium 138 mmol/L (Ref range: 136 - 145 mmol/L)  9/27/2020: BUN 11 mg/dL (Ref range: 6 - 20 mg/dL); Calcium 7.1 mg/dL* (Ref range: 8.5 - 10.1 mg/dL); CO2 23 mmol/L (Ref range: 21 - 32 mmol/L); Creatinine 0.55 mg/dL* (Ref range: 0.70 - 1.30 mg/dL); Glucose 92 mg/dL (Ref range: 65 - 100 mg/dL); HCT 29.2 %* (Ref range: 36.6 - 50.3 %); HGB 10.0 %* (Ref range: 12.1 - 17.0 %); Potassium 2.9 mmol/L* (Ref range: 3.5 - 5.1 mmol/L);  Sodium 146 mmol/L* (Ref range: 136 - 145 mmol/L)  Recent Labs     10/26/20  0245 10/25/20  0455   WBC  --  17.1*   HGB 10.4* 9.4*   HCT 31.5* 27.6*   PLT  --  258     Recent Labs     10/26/20  0245 10/25/20  0455   NA  --  147*   K  --  3.1*   CL  --  111*   CO2  --  29   BUN  --  28*   CREA  --  0.95   GLU  --  70   CA  --  8.2*   MG 2.1 2.0     No results for input(s): ALT, AP, TBIL, TBILI, TP, ALB, GLOB, GGT, AML, LPSE in the last 72 hours. No lab exists for component: SGOT, GPT, AMYP, HLPSE  No results for input(s): INR, PTP, APTT, INREXT in the last 72 hours. No results for input(s): FE, TIBC, PSAT, FERR in the last 72 hours. Recent Labs     10/26/20  0455 10/25/20  0520   PH 7.50* 7.51*   PCO2 32* 34*   PO2 110* 74*     No results for input(s): CPK, CKMB in the last 72 hours.     No lab exists for component: TROPONINI  Lab Results   Component Value Date/Time    Glucose (POC) 98 09/28/2020 08:10 AM           Signed:  Huan Berrios MD  10/27/2020  1:03 PM    Time 45 minutes

## 2020-10-27 NOTE — PROGRESS NOTES
Transfer skin assessment completed by myself and sheryl miller. Patient has redness over bony prominence of spine with mipalex intact. unstageable pressure ulcer to sacrum with mipalex intact. Left heel is red with mipalex intact. Right outer ankle has a abrasion. Right 5th toe stage 2, midline abdominal incision with 28 staples dry and intact.  Bilateral femoral incisions dry and intact

## 2020-10-27 NOTE — PROGRESS NOTES
DENIS spoke with Charlotte Valenzuela with [facility] hosp[facility]. Patient is accepted to go home with home hospice through them today. Per Charlotte Valenzuela, the chest tubes will need to be removed prior to patient coming home on hospice if they are not PleurX drains. DENIS has sent message to notify Dr. Jolan Riedel. ADDENDUM  DENIS spoke with Dr. Zak Keen with CV surgery to notify him that chest tubes will need to be removed prior to discharge on hospice. Dr. Zak Keen states believes patient may be improving and thinks hospitalist needs to evaluate the patient before patient is discharged on hospice. DENIS has sent message to notify Dr. Jolan Riedel to notify him of this.

## 2020-10-27 NOTE — PROGRESS NOTES
I place bilateral mini 500 pleurovacs on min today. He is either sleeping or becoming acidotic now, hard to say, but a significant change from 3 hours ago. Ruth Blanton

## 2020-10-27 NOTE — PROGRESS NOTES
CM met with family at the bedside to discuss DCP. Family wants patient to discharge home on hospice today. DENIS has verified this with Dr. iHtesh Law. Dr. Shima Gardner is going to come place PleurX drains prior to patient discharging. Equipment will be delivered to patient's home by 2pm. Transport can be set for 2pm pending PleurX drain placement.

## 2020-10-27 NOTE — INTERDISCIPLINARY ROUNDS
Discharged. Patient subclavian IV removed. Patient discharged with bilateral chest tubes, HELGA drain, chacko to home health hospice. Patient picked up by Adele Boles with wife and daughter at bedside.

## 2020-10-27 NOTE — PROGRESS NOTES
Post-OP Visit    Bethann Severance is a 48 y.o. male with multiple medical problems. Family yesterday went the extubation comfort care option. He was extubated at 6pm lat night and transferred to the floor. He is holding his own awake, but weak, breathing tachy but table. BP (!) 153/86 (BP 1 Location: Left arm, BP Patient Position: At rest)   Pulse (!) 107   Temp 97.7 °F (36.5 °C)   Resp 26   Ht 6' (1.829 m)   Wt 147 lb 7.8 oz (66.9 kg)   SpO2 95%   BMI 20.00 kg/m²     Physical Exam lungs clear, no ir leaks on chest tubes    Problem List Items Addressed This Visit        Respiratory    Aspiration pneumonitis ([facility]      Other Visit Diagnoses     Encephalopathy acute    -  Primary    Relevant Medications    midodrine (PROAMATINE) 10 mg tablet    Acute occlusion of aortoiliac artery (HCC)        Pain in both lower extremities        TIA (transient ischemic attack)        Chest pain, unspecified type        Relevant Orders    CARDIAC PROCEDURE (Completed)    Pneumothorax on left        Pneumothorax on right              Assessment and Plan: Acute on chronic resp distress. WE are in the difficult situation of family going the comfort care route comfort care with extubation and every body expected him to pass away soon, but the next day he his holding his own breathing wise and looking somewhat OK. Family is uncertain what to do at this point just watchful waiting. My only suggestion might be to consider maint IV Fluid on him as with out IF fluid he will obfiously certainly die. Will will leave chest tubes in place. I will change his pleuro to mini 500 pleurovacs in a day or so if we get to that point.      Geovani Ray MD

## 2020-10-27 NOTE — PROGRESS NOTES
Nutrition Note    51yo male with multiple medical issues. RD following for nutrition support. Pt now transitioned to [address] to sign off. Please reconsult as needed.     Electronically signed by Filiberto Medina on 10/27/2020 at 7:49 AM    Contact:

## 2021-01-08 NOTE — PROGRESS NOTES
Nephrology Consult    Patient: Mika Ferreira MRN: [id number]  SSN: [id number]    YOB: 1965  Age: 48 y.o. Sex: male      Subjective:       The patient is seen in the room  awake  Good UOP  K 2.7  Noticed low Bps  Noticed LLE edema till thighs      Past Medical History:   Diagnosis Date    Myositis     Familial inclusion      Past Surgical History:   Procedure Laterality Date    HX ORTHOPAEDIC      disc infused, neck       Family History   Problem Relation Age of Onset    Other Father     Other Sister     Other Brother      Social History     Tobacco Use    Smoking status: Current Every Day Smoker     Packs/day: 0.50    Smokeless tobacco: Never Used   Substance Use Topics    Alcohol use: Yes     Comment: occ      Current Facility-Administered Medications   Medication Dose Route Frequency Provider Last Rate Last Dose    potassium chloride 10 mEq in 100 ml IVPB  10 mEq IntraVENous Q1H Yolanda John  mL/hr at 10/11/20 1019 10 mEq at 10/11/20 1019    potassium chloride (K-DUR, KLOR-CON) SR tablet 40 mEq  40 mEq Oral BID Yolanda John MD   40 mEq at 10/11/20 0811    albumin human 25% (BUMINATE) solution 12.5 g  12.5 g IntraVENous BID Yue Olson MD   12.5 g at 10/11/20 4819    propofol (DIPRIVAN) 10 mg/mL infusion  5 mcg/kg/min IntraVENous TITRATE Yue Olson MD   Stopped at 10/10/20 1100    fentaNYL (PF) 1,500 mcg/30 mL (50 mcg/mL) infusion  75 mcg/hr IntraVENous TITRATE Yue Olson MD 1.5 mL/hr at 10/11/20 1018 75 mcg/hr at 10/11/20 1018    cefepime (MAXIPIME) 1 g in 0.9% sodium chloride (MBP/ADV) 50 mL MBP  1 g IntraVENous Q8H Johnny Santiago  mL/hr at 10/11/20 0206 1 g at 10/11/20 0206    metroNIDAZOLE (FLAGYL) IVPB premix 500 mg  500 mg IntraVENous Q8H Johnny Santiago  mL/hr at 10/11/20 0610 500 mg at 10/11/20 0610    acetylcysteine (MUCOMYST) 200 mg/mL (20 %) solution 600 mg  600 mg Nebulization Q6H RT Murney, Johnny, DO   600 mg at 10/11/20 8846    albuterol CONCENTRATE 2.5mg/0.5 mL neb soln  2.5 mg Nebulization Q6H RT Johnny SantiagoDO   2.5 mg at 10/11/20 0738    LORazepam (ATIVAN) injection 1 mg  1 mg IntraVENous Q2H PRN Yeni Olson MD        dexmedeTOMidine [facility]) 400 mcg in 0.9% sodium chloride 100 mL infusion  0.2-1.4 mcg/kg/hr IntraVENous TITRATE Yeni Olson MD 7 mL/hr at 10/11/20 0206 0.4 mcg/kg/hr at 10/11/20 0206    sodium chloride 0.9% (NS) 3ml nebulizer solution  2.5 mL Nebulization PRN Anton Puri MD   2.5 mL at 10/10/20 1148    midazolam (PF) (VERSED) injection 4 mg  4 mg IntraVENous Q2H PRN Yeni Olson MD   4 mg at 10/10/20 1430    hydrocortisone (CORTENEMA) 100 mg/60 mL enema 100 mg  100 mg Rectal Q12H Yeni Olson MD   100 mg at 10/11/20 0809    NOREPINephrine (LEVOPHED) 8 mg in 5% dextrose 250mL (32 mcg/mL) infusion  2 mcg/min IntraVENous TITRATE Yeni Olson MD        vasopressin (VASOSTRICT) 20 Units in 0.9% sodium chloride 100 mL infusion  0.04 Units/min IntraVENous CONTINUOUS Yeni Olson MD        propofol (DIPRIVAN) 10 mg/mL infusion  5 mcg/kg/min IntraVENous TITRATE Yeni Olson MD 6.3 mL/hr at 10/07/20 1633 20 mcg/kg/min at 10/07/20 1633    PHENYLephrine (ALISTAIR-SYNEPHRINE) 30 mg in 0.9% sodium chloride 250 mL infusion   mcg/min IntraVENous CONTINUOUS Yeni Olson MD 50 mL/hr at 10/07/20 0050 100 mcg/min at 10/07/20 0050    DOPamine (INTROPIN) 400 mg in dextrose 5% 250 mL infusion  2.5 mcg/kg/min IntraVENous TITRATE Yeni Olson MD   Stopped at 10/09/20 1607    pantoprazole (PROTONIX) 40 mg in 0.9% sodium chloride 10 mL injection  40 mg IntraVENous Q12H Anton Puri MD   40 mg at 10/11/20 0811    influenza vaccine 2020-21 (4 yrs+)(PF) (FLUCELVAX QUAD) injection 0.5 mL  0.5 mL IntraMUSCular PRIOR TO DISCHARGE Jania Valle MD   Stopped at 10/07/20 0900    diphenhydrAMINE (BENADRYL) capsule 50 mg  50 mg Oral BID PRN Estrella Carver NP   50 mg at 10/03/20 0129    phosphorus (K PHOS NEUTRAL) 250 mg tablet 2 Tab  2 Tab Oral BID Sarahi Chance MD   2 Tab at 10/11/20 0810    levoFLOXacin (LEVAQUIN) tablet 500 mg  500 mg Oral Q24H Sarahi Chance MD   500 mg at 10/11/20 1023    predniSONE (DELTASONE) tablet 20 mg  20 mg Oral DAILY WITH BREAKFAST Sarahi Chance MD   20 mg at 10/11/20 0811    oxyCODONE IR (ROXICODONE) tablet 15 mg  15 mg Oral Q4H PRN Sarahi Chance MD   15 mg at 10/06/20 1228    metoprolol tartrate (LOPRESSOR) tablet 50 mg  50 mg Oral BID Lucero Mendoza MD   Stopped at 10/11/20 0900    enoxaparin (LOVENOX) injection 40 mg  40 mg SubCUTAneous Q24H Amy Lal MD   Stopped at 10/05/20 2141    aspirin delayed-release tablet 81 mg  81 mg Oral DAILY KibotGisele T, NP   81 mg at 10/11/20 0811    acetaminophen (TYLENOL) tablet 650 mg  650 mg Oral Q6H PRN Gisele Long T, NP   650 mg at 09/30/20 0950    ondansetron (ZOFRAN) injection 4 mg  4 mg IntraVENous Q8H PRN Danielbot Edwan T, NP   4 mg at 09/30/20 0402    guaiFENesin (ROBITUSSIN) 20 mg/mL oral liquid 400 mg  400 mg Oral Q6H PRN Kelvin Rodríguez T, NP   Stopped at 09/26/20 1855        No Known Allergies    Review of Systems:  A comprehensive review of systems was negative except for that written in the History of Present Illness.     Objective:     Vitals:    10/11/20 0738 10/11/20 0741 10/11/20 0800 10/11/20 0900   BP:   (!) 94/59 (!) 96/56   Pulse:  (!) 57 66 62   Resp:  12 16 15   Temp:       SpO2: 100% 100% 100% 100%   Weight:       Height:            Physical Exam:  General: awake  Nose NG in place  Oral cavity NO THRUSH  Chest on ventilatory support  Heart S1-S2 normal  Genitourinary Reese in place  Lower extremity no edema  Assessment:     Hospital Problems  Date Reviewed: 9/17/2020          Codes Class Noted POA    Metabolic encephalopathy JYN-28-QY: G93.41  ICD-9-CM: 348.31  9/26/2020 Unknown        UTI (urinary tract infection) ICD-10-CM: N39.0  ICD-9-CM: 599.0  9/26/2020 Unknown Aspiration pneumonitis [facility]) ICD-10-CM: J69.0  ICD-9-CM: 507.0  9/26/2020 Unknown        Essential hypertension ICD-10-CM: I10  ICD-9-CM: 401.9  9/26/2020 Unknown        Acute dehydration ICD-10-CM: E86.0  ICD-9-CM: 276.51  9/26/2020 Unknown              Plan:     #1 Low urine output :  - likely prerenal from hypotension.    -He was put on 3 pressors.    -Creatinine 0.58.    -His blood pressures are better now.    -gave normal saline 1 L as a bolus.    -good UOP  -off IVF     #2 Edema:  -on iv lasi 20 mg bid  -excellent UOP  -low Bps now  -will keep lasix     #3 severe hypokalemia:  -from loops  -K 2.7  -getting  meq TODAY  -will add KCL 40 meq via NG daily    #4. Metabolic acidosis, high anion gap.   From hypotension and lactic acidosis  CO2 was 18-->24  resolve        #5 status post  infrarenal aorta endarterectomy, with aortic by common femoral artery bypass with 14 mm x 7 mm Hemosure graft            Signed By: Domingo Rabago MD     October 11, 2020 08-Jan-2021 17:43

## 2025-03-03 NOTE — PROGRESS NOTES
Mr. Shubham Rose is a postop 6 hours. Patient had infrarenal aorta endarterectomy, with aortic by common femoral artery bypass with 14 mm x 7 mm Hemosure graft. 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 Postop patient kept intubated. Postop patient had profound hypotension. Patient is currently on dopamine, levo and vasopressin. Patient's urine output essentially has been none. Most likely this is related to hypotension. Infrarenal aorta had a complete occlusion heavy calcified plaque and soft plaque. Endarterectomy was near the left renal artery. We will repeat the creatinine level, current CVP is 8-9. We will continue to challenge the patient with the fluid boluses, judicious use of pressors, patient was already dosed with hydrocortisone. We will continue monitor his progress. If he does not make any urine, this will be further worked up. Closely monitor his progress. Statement Selected

## 2025-06-05 NOTE — PROGRESS NOTES
Renal Progress Note    Patient: Herb Ko MRN: [id number]  SSN: [id number]    YOB: 1965  Age: 48 y.o. Sex: male      Admit Date: 9/26/2020    LOS: 19 days     Subjective:   Patient seen at bedside. Alert and awake, no acute distress. improving LEedema, on diuretics  No complaints of shortness of breath.    K is 3.3 today      Current Facility-Administered Medications   Medication Dose Route Frequency    pantoprazole (PROTONIX) tablet 40 mg  40 mg Oral BID    potassium chloride (KLOR-CON) packet for solution 40 mEq  40 mEq Oral BID WITH MEALS    spironolactone (ALDACTONE) tablet 25 mg  25 mg Oral BID    midodrine (PROAMATINE) tablet 10 mg  10 mg Oral BID    albumin human 25% (BUMINATE) solution 12.5 g  12.5 g IntraVENous BID    furosemide (LASIX) injection 40 mg  40 mg IntraVENous DAILY    cefepime (MAXIPIME) 1 g in 0.9% sodium chloride (MBP/ADV) 50 mL MBP  1 g IntraVENous Q8H    metroNIDAZOLE (FLAGYL) IVPB premix 500 mg  500 mg IntraVENous Q8H    albuterol CONCENTRATE 2.5mg/0.5 mL neb soln  2.5 mg Nebulization Q6H RT    LORazepam (ATIVAN) injection 1 mg  1 mg IntraVENous Q2H PRN    sodium chloride 0.9% (NS) 3ml nebulizer solution  2.5 mL Nebulization PRN    influenza vaccine 2020-21 (4 yrs+)(PF) (FLUCELVAX QUAD) injection 0.5 mL  0.5 mL IntraMUSCular PRIOR TO DISCHARGE    diphenhydrAMINE (BENADRYL) capsule 50 mg  50 mg Oral BID PRN    phosphorus (K PHOS NEUTRAL) 250 mg tablet 2 Tab  2 Tab Oral BID    levoFLOXacin (LEVAQUIN) tablet 500 mg  500 mg Oral Q24H    predniSONE (DELTASONE) tablet 20 mg  20 mg Oral DAILY WITH BREAKFAST    oxyCODONE IR (ROXICODONE) tablet 15 mg  15 mg Oral Q4H PRN    metoprolol tartrate (LOPRESSOR) tablet 50 mg  50 mg Oral BID    enoxaparin (LOVENOX) injection 40 mg  40 mg SubCUTAneous Q24H    aspirin delayed-release tablet 81 mg  81 mg Oral DAILY    acetaminophen (TYLENOL) tablet 650 mg  650 mg Oral Q6H PRN    ondansetron (ZOFRAN) injection 4 mg 4 mg IntraVENous Q8H PRN    guaiFENesin (ROBITUSSIN) 20 mg/mL oral liquid 400 mg  400 mg Oral Q6H PRN        Vitals:    10/15/20 1328 10/15/20 1517 10/15/20 1609 10/15/20 1612   BP: (!) 144/76 129/75     Pulse: 71 83     Resp: 20 18     Temp: 98.1 °F (36.7 °C) 97.9 °F (36.6 °C)     SpO2: 100% 96% 96% 96%   Weight:       Height:         Objective:   General: alert awake well-oriented, no acute distress. HEENT: EOMI, no Icterus, no Pallor,  mucosa moist.  Neck: Neck is supple, No JVD  Lungs: decreased breathsounds at bases, no respiratory distress on inspection '  CVS: heart sounds normal,  no murmurs, no rubs. GI: soft, nontender, normal BS. Extremeties: no cyanosis,1+ dependent edema in ext   Neuro: Alert, awake, oriented x3, answering simple questions appropriately  Skin: normal skin turgor, no skin rashes. Intake and Output:  Current Shift: 10/15 0701 - 10/15 1900  In: 200 [I.V.:200]  Out: 9712 [Urine:1050; Drains:14]  Last three shifts: 10/13 1901 - 10/15 0700  In: 410 [P.O.:360; I.V.:50]  Out: 5230 [Urine:5050; Drains:180]      Lab/Data Review:  Recent Labs     10/15/20  0400 10/14/20  0430 10/13/20  0510   WBC 14.5* 16.1* 17.9*   HGB 9.7* 9.8* 10.1*   HCT 28.9* 28.9* 28.9*    154 126*     Recent Labs     10/15/20  0400 10/14/20  0430 10/13/20  0510     142 142 140   K 3.3*  3.3* 3.2* 3.2*     105 105 105   CO2 30  31 31 29   *  101* 86 71   BUN 15  15 16 15   CREA 0.69*  0.66* 0.65* 0.61*   CA 9.0  8.8 8.5 8.1*   MG 2.2  --  2.1   PHOS 2.8 3.1 3.2   ALB 2.9* 2.7* 2.3*     No results for input(s): PH, PCO2, PO2, HCO3, FIO2 in the last 72 hours.   Recent Results (from the past 24 hour(s))   CBC WITH AUTOMATED DIFF    Collection Time: 10/15/20  4:00 AM   Result Value Ref Range    WBC 14.5 (H) 4.1 - 11.1 K/uL    RBC 3.08 (L) 4.10 - 5.70 M/uL    HGB 9.7 (L) 12.1 - 17.0 g/dL    HCT 28.9 (L) 36.6 - 50.3 %    MCV 93.8 80.0 - 99.0 FL    MCH 31.5 26.0 - 34.0 PG    MCHC 33.6 30.0 - 36.5 g/dL    RDW 17.7 (H) 11.5 - 14.5 %    PLATELET 946 455 - 973 K/uL    MPV 10.7 8.9 - 12.9 FL    NRBC 0.9 (H) 0  WBC    ABSOLUTE NRBC 0.13 (H) 0.00 - 0.01 K/uL    NEUTROPHILS 79 (H) 32 - 75 %    LYMPHOCYTES 8 (L) 12 - 49 %    MONOCYTES 11 5 - 13 %    EOSINOPHILS 1 0 - 7 %    BASOPHILS 0 0 - 1 %    IMMATURE GRANULOCYTES 1 (H) 0.0 - 0.5 %    ABS. NEUTROPHILS 11.7 (H) 1.8 - 8.0 K/UL    ABS. LYMPHOCYTES 1.2 0.8 - 3.5 K/UL    ABS. MONOCYTES 1.6 (H) 0.0 - 1.0 K/UL    ABS. EOSINOPHILS 0.1 0.0 - 0.4 K/UL    ABS. BASOPHILS 0.0 0.0 - 0.1 K/UL    ABS. IMM.  GRANS. 0.1 (H) 0.00 - 0.04 K/UL    DF AUTOMATED     METABOLIC PANEL, BASIC    Collection Time: 10/15/20  4:00 AM   Result Value Ref Range    Sodium 143 136 - 145 mmol/L    Potassium 3.3 (L) 3.5 - 5.1 mmol/L    Chloride 106 97 - 108 mmol/L    CO2 30 21 - 32 mmol/L    Anion gap 7 5 - 15 mmol/L    Glucose 102 (H) 65 - 100 mg/dL    BUN 15 6 - 20 mg/dL    Creatinine 0.69 (L) 0.70 - 1.30 mg/dL    BUN/Creatinine ratio 22 (H) 12 - 20      GFR est AA >60 >60 ml/min/1.73m2    GFR est non-AA >60 >60 ml/min/1.73m2    Calcium 9.0 8.5 - 10.1 mg/dL   RENAL FUNCTION PANEL    Collection Time: 10/15/20  4:00 AM   Result Value Ref Range    Sodium 142 136 - 145 mmol/L    Potassium 3.3 (L) 3.5 - 5.1 mmol/L    Chloride 105 97 - 108 mmol/L    CO2 31 21 - 32 mmol/L    Anion gap 6 5 - 15 mmol/L    Glucose 101 (H) 65 - 100 mg/dL    BUN 15 6 - 20 mg/dL    Creatinine 0.66 (L) 0.70 - 1.30 mg/dL    BUN/Creatinine ratio 23 (H) 12 - 20      GFR est AA >60 >60 ml/min/1.73m2    GFR est non-AA >60 >60 ml/min/1.73m2    Calcium 8.8 8.5 - 10.1 mg/dL    Phosphorus 2.8 2.6 - 4.7 mg/dL    Albumin 2.9 (L) 3.5 - 5.0 g/dL   MAGNESIUM    Collection Time: 10/15/20  4:00 AM   Result Value Ref Range    Magnesium 2.2 1.6 - 2.4 mg/dL        Assessment and Plan:       1. severe hypokalemia:  -from diuretics and metabolic alkalosis  Will give kcl 10 meqx 3 more runs today,   Continue kcl 40 bid  Increase spironolactone 25 tid  Continue lasix  40 mg daily-  monitor K levels,  Mg is normal    2. Low urine output/ BALDEV, resolved  - likely prerenal from hypotension.    -improved with diuretics now      3. Edema: improving with good diuresis +, continue once daily lasix     4. .  Metabolic alkalosis: sec to diuretics  Improving, monitor jerri/CO2    5.  Hypotension: improved hemodynamic status  drcrease motprolol to 25 bid, decrease mododrine to 5 mg tid  Continue to monitor BPs     6 status post  infrarenal aorta endarterectomy, with aortic by common femoral artery bypass with 14 mm x 7 mm Hemosure graft     7. Spont. Left pneumothorax: thoracic surgery monitoring. Cleared for discharge  8.  S/p CVA/debility: continue supportive care    Signed By: Maximino Montero MD     October 15, 2020 No

## (undated) DEVICE — SUTURE PERMAHAND SZ 3-0 L30IN NONABSORBABLE BLK SILK BRAID A304H

## (undated) DEVICE — INTENDED FOR TISSUE SEPARATION, AND OTHER PROCEDURES THAT REQUIRE A SHARP SURGICAL BLADE TO PUNCTURE OR CUT.: Brand: BARD-PARKER SAFETY BLADES SIZE 15, STERILE

## (undated) DEVICE — GEL US 20GM NONIRRITATING OVERWRAPPED FILE PCH TRNSMIT

## (undated) DEVICE — Device

## (undated) DEVICE — LAPAROTOMY SPONGE - RF AND X-RAY DETECTABLE PRE-WASHED, WITH RINGS: Brand: SITUATE

## (undated) DEVICE — DRAIN WND PENRS RADPQ 0.25X12 --

## (undated) DEVICE — BASIC SINGLE BASIN-LF: Brand: MEDLINE INDUSTRIES, INC.

## (undated) DEVICE — RELOAD STPL H1.5X3.6XL30MM REG TISS BLU B FRM AUTO RET PIN

## (undated) DEVICE — SUT MONOCRYL PLUS UD 4-0 --

## (undated) DEVICE — SYRINGE ANGIO 20ML WHT POLYCARB VAC PRSS CAP PLUNG FIX M

## (undated) DEVICE — DRAIN SURG W7MMXL20CM SIL FLAT HUBLESS W/ RADPQ STRP 3/4

## (undated) DEVICE — Device: Brand: JELCO

## (undated) DEVICE — PACK,UNIVERSAL,SPLIT,II,AURORA: Brand: MEDLINE

## (undated) DEVICE — STAPLER SKIN STPL LN H1.5-3.5XL30MM REG TISS 2 ROW 8 FIRING

## (undated) DEVICE — LAMINECTOMY ARM CRADLE FOAM POSITIONER: Brand: CARDINAL HEALTH

## (undated) DEVICE — APPLIER CLP L L13IN TI MULT RNG HNDL 20 CLP STR LIGACLP

## (undated) DEVICE — SUTURE PROL SZ 3-0 L48IN NONABSORBABLE BLU SH L26MM 1/2 CIR 8534H

## (undated) DEVICE — DRAPE,TOP,102X53,STERILE: Brand: MEDLINE

## (undated) DEVICE — TOWEL,OR,DSP,ST,BLUE,STD,2/PK,40PK/CS: Brand: MEDLINE

## (undated) DEVICE — INTENDED TO BE USED TO OCCLUDE, RETRACT AND IDENTIFY ARTERIES, VEINS, TENDONS AND NERVES IN SURGICAL PROCEDURES: Brand: STERION®  VESSEL LOOP

## (undated) DEVICE — LOTION PREP REMV 5OZ IODO CLR TINC OF BENZ DURAPREP

## (undated) DEVICE — FOGARTY ARTERIAL EMBOLECTOMY CATHETER 4F 80CM: Brand: FOGARTY

## (undated) DEVICE — FOGARTY ARTERIAL EMBOLECTOMY CATHETER 3F 80CM: Brand: FOGARTY

## (undated) DEVICE — SPONGE LAP PREWASHED 4X18 IN X RAY DETECTABLE SURG COUNT

## (undated) DEVICE — INTRODUCER SHTH 6FR L4CM SHT GRN HUB W/O GWIRE FOR DECLOT

## (undated) DEVICE — SYRINGE MED 10ML RED POLYCARB BRL FIX M LUER CONN FLAT GRP

## (undated) DEVICE — APPLIER LIG CLP M L11IN TI STR RNG HNDL FOR 20 CLP DISP

## (undated) DEVICE — 3M™ TEGADERM™ TRANSPARENT FILM DRESSING FRAME STYLE, 1629, 8 IN X 12 IN (20 CM X 30 CM), 10/CT 8CT/CASE: Brand: 3M™ TEGADERM™

## (undated) DEVICE — NEEDLE HYPO 25GA L1.5IN BLU POLYPR HUB S STL REG BVL STR

## (undated) DEVICE — SUTURE PROL SZ 5-0 L24IN NONABSORBABLE BLU L9.3MM BV-1 3/8 9702H

## (undated) DEVICE — SUTURE ETHLN SZ 2-0 L18IN NONABSORBABLE BLK L26MM FS 3/8 664G

## (undated) DEVICE — COPE MANDRIL WIRE GUIDE STAINLESS STEEL: Brand: COPE

## (undated) DEVICE — SPONGE GZ W4XL4IN 16 PLY DATA MSTR TAGGED DBL RADPQ

## (undated) DEVICE — LULU RADIAL/FEMORAL ANGIOGRAPHY SHEET: Brand: CONVERTORS

## (undated) DEVICE — DRAPE,REIN 53X77,STERILE: Brand: MEDLINE

## (undated) DEVICE — GUIDEWIRE VASC L260CM DIA0.035IN RAD 3MM J TIP L7CM PTFE

## (undated) DEVICE — TAPE UMB 1/8X30IN MP COT WHT --

## (undated) DEVICE — SUT ETHLN 3-0 18IN PS2 BLK --

## (undated) DEVICE — SUTURE PERMAHAND SZ 3-0 L18IN NONABSORBABLE BLK L26MM SH C013D

## (undated) DEVICE — SURGICAL PROCEDURE TRAY CRD CATH 3 PRT

## (undated) DEVICE — SUTURE VCRL + SZ 3-0 L18IN ABSRB UD SH 1/2 CIR TAPERCUT NDL VCP864D

## (undated) DEVICE — SUTURE NONABSORBABLE MONOFILAMENT 6-0 BV-1 1X30 IN PROLENE 8709H

## (undated) DEVICE — KIT GASTMY PERC PEG PULL 20FR -- ENDOVIVE BX/2

## (undated) DEVICE — TIP SUCTION FLANGE YANKAUER FLX NO VENT FN CAP STRL

## (undated) DEVICE — 3M™ SURGICAL CLIPPER PROFESSIONAL BLADE 9680: Brand: 3M™

## (undated) DEVICE — MASTISOL ADHESIVE LIQ 2/3ML

## (undated) DEVICE — PRESSURE TUBING: Brand: TRUWAVE

## (undated) DEVICE — STAPLER SKIN H3.9MM WIRE DIA0.58MM CRWN 6.9MM 35 STPL ROT

## (undated) DEVICE — INTENDED FOR TISSUE SEPARATION, AND OTHER PROCEDURES THAT REQUIRE A SHARP SURGICAL BLADE TO PUNCTURE OR CUT.: Brand: BARD-PARKER SAFETY BLADES SIZE 11, STERILE

## (undated) DEVICE — CATHETER ANGIO 5FR L100CM GRY S STL NYL JR4 3 SEG BRAID L

## (undated) DEVICE — SYR 10ML LUER LOK 1/5ML GRAD --

## (undated) DEVICE — 3M™ STERI-STRIP™ REINFORCED ADHESIVE SKIN CLOSURES, R1547, 1/2 IN X 4 IN (12 MM X 100 MM), 6 STRIPS/ENVELOPE: Brand: 3M™ STERI-STRIP™

## (undated) DEVICE — APPLIER CLP L9.375IN APER 2.1MM CLS L3.8MM 20 SM TI CLP

## (undated) DEVICE — BAND RADIAL COMPR ARTERY 24CM -- REG BX/10

## (undated) DEVICE — SUT VCRL + 0 27IN CT1 UD --

## (undated) DEVICE — SYRINGE ANGIO 10 CC POLYCARB DK GRN MEDALLION DISP

## (undated) DEVICE — TURNOVER KIT OR CUST CMB FLD GEN LF

## (undated) DEVICE — LOOP,VESSEL,MAXI,BLUE,2/PK,STERILE: Brand: MEDLINE

## (undated) DEVICE — SUTURE PDS II SZ 0 L60IN ABSRB VLT L48MM CTX 1/2 CIR Z990G

## (undated) DEVICE — SURGIFOAM SPNG SZ 12-7

## (undated) DEVICE — PAD,ABDOMINAL,8"X7.5",STERILE,LF,1/PK: Brand: MEDLINE

## (undated) DEVICE — BLADE ELECTRODE: Brand: EDGE

## (undated) DEVICE — 3M™ TEGADERM™ TRANSPARENT FILM DRESSING FIRST AID STYLE, 1621, 4 IN X 4-3/4 IN, 50 BAGS/CARTON, 4 CARTONS/CASE: Brand: 3M™ TEGADERM™

## (undated) DEVICE — SUT PROL 6-0 18IN BV1 DA BLU --

## (undated) DEVICE — 3M™ TEGADERM™ TRANSPARENT FILM DRESSING FRAME STYLE, 1626W, 4 IN X 4-3/4 IN (10 CM X 12 CM), 50/CT 4CT/CASE: Brand: 3M™ TEGADERM™

## (undated) DEVICE — SET AUTOTRNS 225ML BOWL BTM OUTLT RESVR PASXTRA

## (undated) DEVICE — 3M™ BAIR HUGGER® CHEST ACCESS BLANKET, FULL BODY, 10 PER CASE 30000: Brand: BAIR HUGGER™

## (undated) DEVICE — SUTURE PROL SZ 7-0 L30IN NONABSORBABLE BLU L9.3MM BV-1 1/2 8703H

## (undated) DEVICE — MAJOR VASCULAR PROCEDURE PACK: Brand: MEDLINE INDUSTRIES, INC.

## (undated) DEVICE — GAUZE,SPONGE,4"X4",12PLY,STERILE,LF,2'S: Brand: MEDLINE

## (undated) DEVICE — GOWN,PREVENTION PLUS,XLN/XL,ST,24/CS: Brand: MEDLINE

## (undated) DEVICE — WASTEBAG DRIP/ADAPTER: Brand: MEDLINE INDUSTRIES, INC.

## (undated) DEVICE — PREP SKN DURAPREP 26ML APPL --

## (undated) DEVICE — CATHETER ANGIO 5FR L100CM GRY S STL NYL JL3.5 3 SEG BRAID L

## (undated) DEVICE — THE ENDO CARRY-ON PROCEDURE KIT CONTAINS ALL OF THE SUPPLIES AND INFECTION PREVENTION PRODUCTS NEEDED FOR ENDOSCOPIC PROCEDURES: Brand: ENDO CARRY-ON PROCEDURE KIT

## (undated) DEVICE — MOUTHPIECE ENDOSCP 20X27MM --

## (undated) DEVICE — 3M™ TEGADERM™ TRANSPARENT FILM DRESSING FRAME STYLE, 1627, 4 IN X 10 IN (10 CM X 25 CM), 20/CT 4CT/CASE: Brand: 3M™ TEGADERM™